# Patient Record
Sex: MALE | Race: BLACK OR AFRICAN AMERICAN | NOT HISPANIC OR LATINO | URBAN - METROPOLITAN AREA
[De-identification: names, ages, dates, MRNs, and addresses within clinical notes are randomized per-mention and may not be internally consistent; named-entity substitution may affect disease eponyms.]

---

## 2017-09-25 ENCOUNTER — EMERGENCY (EMERGENCY)
Facility: HOSPITAL | Age: 32
LOS: 1 days | Discharge: HOME | End: 2017-09-25
Attending: EMERGENCY MEDICINE | Admitting: EMERGENCY MEDICINE
Payer: MEDICARE

## 2017-09-25 VITALS
TEMPERATURE: 98 F | RESPIRATION RATE: 18 BRPM | SYSTOLIC BLOOD PRESSURE: 136 MMHG | DIASTOLIC BLOOD PRESSURE: 76 MMHG | HEART RATE: 83 BPM | OXYGEN SATURATION: 99 %

## 2017-09-25 LAB
ANION GAP SERPL CALC-SCNC: 23 MMOL/L — HIGH (ref 9–16)
BASOPHILS NFR BLD AUTO: 0.4 % — SIGNIFICANT CHANGE UP (ref 0–2)
BUN SERPL-MCNC: 17 MG/DL — SIGNIFICANT CHANGE UP (ref 7–23)
CALCIUM SERPL-MCNC: 9.6 MG/DL — SIGNIFICANT CHANGE UP (ref 8.5–10.5)
CHLORIDE SERPL-SCNC: 103 MMOL/L — SIGNIFICANT CHANGE UP (ref 96–108)
CK SERPL-CCNC: 411 U/L — HIGH (ref 39–308)
CO2 SERPL-SCNC: 18 MMOL/L — LOW (ref 22–31)
CREAT SERPL-MCNC: 1.19 MG/DL — SIGNIFICANT CHANGE UP (ref 0.5–1.3)
EOSINOPHIL NFR BLD AUTO: 0.1 % — SIGNIFICANT CHANGE UP (ref 0–6)
GLUCOSE SERPL-MCNC: 141 MG/DL — HIGH (ref 70–99)
HCT VFR BLD CALC: 48.6 % — SIGNIFICANT CHANGE UP (ref 39–50)
HGB BLD-MCNC: 16 G/DL — SIGNIFICANT CHANGE UP (ref 13–17)
IMM GRANULOCYTES NFR BLD AUTO: 0.2 % — SIGNIFICANT CHANGE UP (ref 0–1.5)
LYMPHOCYTES # BLD AUTO: 60.8 % — HIGH (ref 13–44)
MAGNESIUM SERPL-MCNC: 2 MG/DL — SIGNIFICANT CHANGE UP (ref 1.6–2.6)
MCHC RBC-ENTMCNC: 31.1 PG — SIGNIFICANT CHANGE UP (ref 27–34)
MCHC RBC-ENTMCNC: 32.9 G/DL — SIGNIFICANT CHANGE UP (ref 32–36)
MCV RBC AUTO: 94.6 FL — SIGNIFICANT CHANGE UP (ref 80–100)
MONOCYTES NFR BLD AUTO: 5.6 % — SIGNIFICANT CHANGE UP (ref 2–14)
NEUTROPHILS NFR BLD AUTO: 32.9 % — LOW (ref 43–77)
PLATELET # BLD AUTO: 191 K/UL — SIGNIFICANT CHANGE UP (ref 150–400)
POTASSIUM SERPL-MCNC: 3.3 MMOL/L — LOW (ref 3.5–5.3)
POTASSIUM SERPL-SCNC: 3.3 MMOL/L — LOW (ref 3.5–5.3)
RBC # BLD: 5.14 M/UL — SIGNIFICANT CHANGE UP (ref 4.2–5.8)
RBC # FLD: 12.8 % — SIGNIFICANT CHANGE UP (ref 10.3–16.9)
SODIUM SERPL-SCNC: 144 MMOL/L — SIGNIFICANT CHANGE UP (ref 132–145)
WBC # BLD: 11.3 K/UL — HIGH (ref 3.8–10.5)
WBC # FLD AUTO: 11.3 K/UL — HIGH (ref 3.8–10.5)

## 2017-09-25 PROCEDURE — 70450 CT HEAD/BRAIN W/O DYE: CPT | Mod: 26

## 2017-09-25 PROCEDURE — 72125 CT NECK SPINE W/O DYE: CPT | Mod: 26

## 2017-09-25 PROCEDURE — 99284 EMERGENCY DEPT VISIT MOD MDM: CPT

## 2017-09-25 RX ORDER — KETOROLAC TROMETHAMINE 30 MG/ML
30 SYRINGE (ML) INJECTION ONCE
Qty: 0 | Refills: 0 | Status: DISCONTINUED | OUTPATIENT
Start: 2017-09-25 | End: 2017-09-25

## 2017-09-25 RX ORDER — POTASSIUM CHLORIDE 20 MEQ
40 PACKET (EA) ORAL ONCE
Qty: 0 | Refills: 0 | Status: COMPLETED | OUTPATIENT
Start: 2017-09-25 | End: 2017-09-25

## 2017-09-25 RX ADMIN — Medication 30 MILLIGRAM(S): at 20:32

## 2017-09-25 RX ADMIN — Medication 40 MILLIEQUIVALENT(S): at 21:43

## 2017-09-25 NOTE — ED PROVIDER NOTE - MEDICAL DECISION MAKING DETAILS
patient had witnessed seizure for seconds, and mildly post ictal. benzo held. plan to do eletrolytes, ct brain/c spine, patient with no palpable pain on exam, or s/s of meningismus. afebrile with a nonfocal exam. ct rule out bleed or mass. toradol for neck pain. u tox pending.

## 2017-09-25 NOTE — ED PROVIDER NOTE - PROGRESS NOTE DETAILS
patient is no longer post ictal and improved history obtained. patient notes he has a hx of HIV and seizure disorder. HIV was diagnosed in 2009, at [facility] in [place], after he was in status epilepticus and diagnosed at that time. since then, he has managed seizures with keppra, and takes strybol for HIV. Patient ntoes after hospital admission for status, he developed focal neuro deficit of L foot drop and thus uses a cane to ambulate. notes no opportunistic infections. endorses he has not taken his medications for 2 days as he was been in the [place] visiting family. he notes pain to the R shoulder and this is what brought him in, as he suspects he has been having seizures from not taking his medications. notes social drinker, and uses marijuana socially. denies hx of withdrawls or drug abuse. notes he was taking alleve for the shoulder discomfort today with no relief. denies fever, chills, neck stiffness, dizziness or photophobia. denies a hx of hydrocephalus or abnormal CT brain. explained will discuss findings with neurology and likely admission for mri. patient agrees with plan. will give keppra loading dose in ed. Notes no clear cause of his seizure disorder. patient is no longer post ictal and improved history obtained. patient notes he has a hx of HIV and seizure disorder. HIV was diagnosed in 2009, at [facility] in [place], after he was in status epilepticus and diagnosed at that time. since then, he has managed seizures with keppra, and takes strybol for HIV. Patient notes after hospital admission for status, he developed focal neuro deficit of L foot drop and thus uses a cane to ambulate. notes no opportunistic infections. endorses he has not taken his medications for 2 days as he was been in the [place] visiting friends and is currently homeless. he notes pain to the R shoulder and this is what brought him in. He is currently not taking his medications. notes social drinker, and uses marijuana socially. denies hx of withdrawls or drug abuse. notes he was taking alleve for the shoulder discomfort today with no relief. denies fever, chills, neck stiffness, dizziness or photophobia. denies a hx of hydrocephalus or abnormal CT brain. explained will discuss findings with neurology. patient agrees with plan. will give keppra loading dose in ed. Notes no clear cause of his seizure disorder.

## 2017-09-25 NOTE — ED ADULT NURSE NOTE - CAS EDN DISCHARGE ASSESSMENT
Alert and oriented to person, place and time Alert and oriented to person, place and time/steady gait

## 2017-09-25 NOTE — ED PROVIDER NOTE - OBJECTIVE STATEMENT
Patient with hx of seizure disorder on keppra, bid, ambulates with a cane secondary to gait disturbance per patient, presents with neck pain. patient notes the pain starts in the upper neck, and is nonradiating, started 2 days ago, with no clear precipitant. denies falls, trauma, denies numbness, paresthesias or focal weakness. denies prior hx of similar. patient notes he took something for pain, but does not recall the name. while waiting to be seen in the ed, patient was seated in the chair and had a generalized tonic clonic seizure. notes his last seizure was 3 weeks ago. compliant with this keppra. denies neck stiffness, fever, chills or uri symtpoms. denies pain management. denies N/V/D, or cp, sob. seizure lasted seconds, patient notes mild dizziness after seizure and feeling tired which is his usual post ictal symptoms. denies headache.

## 2017-09-25 NOTE — ED PROVIDER NOTE - ENMT, MLM
Airway patent, Nasal mucosa clear. Mouth with normal mucosa. Throat has no vesicles, no oropharyngeal exudates and uvula is midline. superficial R lateral upper lip bite, and tongue abrasion

## 2017-09-25 NOTE — ED PROVIDER NOTE - NOTES
spoke to dr hancock, covering neurology at [facility]. ct results discussed, and patient's hx of seizures with noncompliance. no new focal deficits. patient will be given scripts for his keppra. advised they can follow him in the office, or if patient prefers to see neuro at [facility] in [place], he can be seen there as well. ct results consistent with chronic seizure disorder. patient agrees with plan. has no questions.

## 2017-09-26 VITALS — SYSTOLIC BLOOD PRESSURE: 126 MMHG | DIASTOLIC BLOOD PRESSURE: 81 MMHG | HEART RATE: 85 BPM

## 2017-09-26 LAB — PCP SPEC-MCNC: SIGNIFICANT CHANGE UP

## 2017-09-26 PROCEDURE — 71010: CPT | Mod: 26

## 2017-09-26 RX ORDER — LEVETIRACETAM 250 MG/1
1 TABLET, FILM COATED ORAL
Qty: 60 | Refills: 0 | OUTPATIENT
Start: 2017-09-26 | End: 2017-10-26

## 2017-09-26 RX ORDER — LEVETIRACETAM 250 MG/1
1000 TABLET, FILM COATED ORAL EVERY 12 HOURS
Qty: 0 | Refills: 0 | Status: DISCONTINUED | OUTPATIENT
Start: 2017-09-26 | End: 2017-09-29

## 2017-09-26 RX ADMIN — LEVETIRACETAM 440 MILLIGRAM(S): 250 TABLET, FILM COATED ORAL at 00:18

## 2017-09-26 NOTE — ED ADULT NURSE REASSESSMENT NOTE - NS ED NURSE REASSESS COMMENT FT1
Received pt. from [facility]osteg[facility]. pt. sleeping on stretcher, respirations even and unlabored. no signs of acute distress notcied

## 2017-09-29 DIAGNOSIS — M79.1 MYALGIA: ICD-10-CM

## 2017-09-29 DIAGNOSIS — G40.909 EPILEPSY, UNSPECIFIED, NOT INTRACTABLE, WITHOUT STATUS EPILEPTICUS: ICD-10-CM

## 2017-09-29 DIAGNOSIS — Z79.899 OTHER LONG TERM (CURRENT) DRUG THERAPY: ICD-10-CM

## 2018-04-27 ENCOUNTER — EMERGENCY (EMERGENCY)
Facility: HOSPITAL | Age: 33
LOS: 1 days | Discharge: HOME | End: 2018-04-27
Admitting: EMERGENCY MEDICINE
Payer: SELF-PAY

## 2018-04-27 VITALS
HEART RATE: 101 BPM | OXYGEN SATURATION: 98 % | TEMPERATURE: 98 F | DIASTOLIC BLOOD PRESSURE: 86 MMHG | RESPIRATION RATE: 17 BRPM | SYSTOLIC BLOOD PRESSURE: 120 MMHG

## 2018-04-27 DIAGNOSIS — F41.9 ANXIETY DISORDER, UNSPECIFIED: ICD-10-CM

## 2018-04-27 DIAGNOSIS — B20 HUMAN IMMUNODEFICIENCY VIRUS [HIV] DISEASE: ICD-10-CM

## 2018-04-27 DIAGNOSIS — F17.200 NICOTINE DEPENDENCE, UNSPECIFIED, UNCOMPLICATED: ICD-10-CM

## 2018-04-27 DIAGNOSIS — M54.9 DORSALGIA, UNSPECIFIED: ICD-10-CM

## 2018-04-27 DIAGNOSIS — M54.2 CERVICALGIA: ICD-10-CM

## 2018-04-27 PROCEDURE — 99283 EMERGENCY DEPT VISIT LOW MDM: CPT | Mod: 25

## 2018-04-27 PROCEDURE — 99053 MED SERV 10PM-8AM 24 HR FAC: CPT

## 2018-04-27 RX ORDER — KETOROLAC TROMETHAMINE 30 MG/ML
30 SYRINGE (ML) INJECTION ONCE
Qty: 0 | Refills: 0 | Status: DISCONTINUED | OUTPATIENT
Start: 2018-04-27 | End: 2018-04-27

## 2018-04-27 RX ORDER — LEVETIRACETAM 250 MG/1
750 TABLET, FILM COATED ORAL ONCE
Qty: 0 | Refills: 0 | Status: COMPLETED | OUTPATIENT
Start: 2018-04-27 | End: 2018-04-27

## 2018-04-27 RX ADMIN — Medication 30 MILLIGRAM(S): at 06:56

## 2018-04-27 RX ADMIN — LEVETIRACETAM 750 MILLIGRAM(S): 250 TABLET, FILM COATED ORAL at 06:36

## 2018-04-27 NOTE — ED PROVIDER NOTE - NS ED ROS FT
· CONSTITUTIONAL: no fever and no chills.  · CARDIOVASCULAR: normal rate and rhythm, no chest pain and no edema.  · RESPIRATORY: no chest pain, no cough, and no shortness of breath.  · GASTROINTESTINAL: no abdominal pain, no bloating, no constipation, no diarrhea, no nausea and no vomiting.  · MUSCULOSKELETAL: + back pain, no musculoskeletal pain, + neck pain, and no weakness.  · SKIN: no abrasions, no jaundice, no lesions, no pruritis, and no rashes.  · NEURO: no loss of consciousness, no gait abnormality, no headache, no sensory deficits, and no weakness.  · PSYCHIATRIC: +anxiety, no known mental health issues.

## 2018-04-27 NOTE — ED PROVIDER NOTE - PHYSICAL EXAMINATION
VITAL SIGNS: I have reviewed nursing notes and confirm.  CONSTITUTIONAL: Well-developed; well-nourished; in no acute distress.  SKIN: Skin is warm and dry, no acute rash.  HEAD: Normocephalic; atraumatic.  EYES: PERRL, EOM intact; conjunctiva and sclera clear.  ENT: No nasal discharge; airway clear.  NECK: Supple; non tender.  CARD: S1, S2 normal; no murmurs, gallops, or rubs. Regular rate and rhythm.  RESP: No wheezes, rales or rhonchi.  ABD: Normal bowel sounds; soft; non-distended; non-tender; no hepatosplenomegaly.  BACK: does not want to examine back  EXT: Normal ROM. No clubbing, cyanosis or edema.  NEURO: Alert, oriented. Grossly unremarkable.  PSYCH: Cooperative, appropriate.

## 2018-04-27 NOTE — ED PROVIDER NOTE - OBJECTIVE STATEMENT
32 year old male with h/o HIV reports medication compliance, neck pain, back pain, ambulates with walker due to back pain presents with anxiety and also 32 year old male with h/o HIV reports medication compliance, neck pain, back pain, ambulates with walker due to back pain presents with anxiety and also neck muscle tightness. reports was smoking cigarettes earlier today and made his symptoms worse.    Denies alcohol/drug use. no other symptoms currently. 32 year old male with h/o HIV reports medication compliance, neck pain, back pain, ambulates with walker due to back pain presents with anxiety and also neck muscle tightness. reports was smoking cigarettes earlier today and made his symptoms worse.  chronic pain unchanged, no new pain or injury    Denies alcohol/drug use. no other symptoms currently.

## 2018-04-27 NOTE — ED PROVIDER NOTE - PROGRESS NOTE DETAILS
toradol injection given toradol injection given for chronic pain. At the time of discharge from the Emergency Department, the patient is alert with fluent appropriate speech and ambulatory without difficulty. A complete medical screening examination was performed and no emergency medical condition was identified.  VSS at time of d/c, pt non-toxic appearing and hemodynamically stable, results, ddx, and f/u plans discussed with pt at bedside, d/c'd home to f/u with PMD, strict return precautions discussed, prompt return to ER for any worsening or new sx, pt verbalized understanding.

## 2018-04-27 NOTE — ED PROVIDER NOTE - MEDICAL DECISION MAKING DETAILS
two complaints: chronic pain and anxiety. no indiction for pysch consult at this time. will treat chronic pain

## 2018-04-27 NOTE — ED ADULT TRIAGE NOTE - CHIEF COMPLAINT QUOTE
BIBEMS, ambulates with a cane, complaining of anxiety attack. States he felt 'very anxious' while smoking cigarettes earlier, also felt 'muscle tightness' on his neck. Denies c/p and SOB. Denies alcohol/drug use. BIBEMS, ambulates with a walker, complaining of anxiety attack. States he felt 'very anxious' while smoking cigarettes earlier, also felt 'muscle tightness' on his neck. Denies c/p and SOB. Denies alcohol/drug use.

## 2018-04-27 NOTE — ED ADULT NURSE NOTE - CHIEF COMPLAINT QUOTE
BIBEMS, ambulates with a walker, complaining of anxiety attack. States he felt 'very anxious' while smoking cigarettes earlier, also felt 'muscle tightness' on his neck. Denies c/p and SOB. Denies alcohol/drug use.

## 2018-05-27 ENCOUNTER — EMERGENCY (EMERGENCY)
Facility: HOSPITAL | Age: 33
LOS: 1 days | Discharge: HOME | End: 2018-05-27
Admitting: EMERGENCY MEDICINE
Payer: MEDICAID

## 2018-05-27 VITALS
OXYGEN SATURATION: 100 % | RESPIRATION RATE: 18 BRPM | SYSTOLIC BLOOD PRESSURE: 114 MMHG | DIASTOLIC BLOOD PRESSURE: 78 MMHG | TEMPERATURE: 98 F | HEART RATE: 85 BPM

## 2018-05-27 PROCEDURE — 99284 EMERGENCY DEPT VISIT MOD MDM: CPT

## 2018-05-27 RX ORDER — LEVETIRACETAM 250 MG/1
750 TABLET, FILM COATED ORAL ONCE
Qty: 0 | Refills: 0 | Status: COMPLETED | OUTPATIENT
Start: 2018-05-27 | End: 2018-05-27

## 2018-05-27 RX ORDER — KETOROLAC TROMETHAMINE 30 MG/ML
30 SYRINGE (ML) INJECTION ONCE
Qty: 0 | Refills: 0 | Status: DISCONTINUED | OUTPATIENT
Start: 2018-05-27 | End: 2018-05-27

## 2018-05-27 RX ADMIN — Medication 30 MILLIGRAM(S): at 16:22

## 2018-05-27 RX ADMIN — LEVETIRACETAM 750 MILLIGRAM(S): 250 TABLET, FILM COATED ORAL at 16:22

## 2018-05-27 NOTE — ED PROVIDER NOTE - MEDICAL DECISION MAKING DETAILS
31 y/o M presents to ED c/o atraumatic neck pain and stiffness.  Pt requesting Toradol as this has helped for the same in the past.  Pt moving all 4 extremities.  PE limited due to pt poor effort and cooperation.  No red flags. pt also requesting his home dose of Keppra and food.  Discharge home.

## 2018-05-27 NOTE — ED PROVIDER NOTE - OBJECTIVE STATEMENT
33 y/o M presents to ED c/o neck spasm.  He states he has had similar symptoms in the past and a dose of Toradol usually helps.  He also requests a dose of his Keppra.  He states he takes Keppra 750 mg twice a day and has recently run out of his medication.  Pt ambulates with a rollator walker at .  He denies fevers/chills, headache, trauma falls, weakness or numbness.

## 2018-05-31 DIAGNOSIS — Z76.0 ENCOUNTER FOR ISSUE OF REPEAT PRESCRIPTION: ICD-10-CM

## 2018-05-31 DIAGNOSIS — M62.838 OTHER MUSCLE SPASM: ICD-10-CM

## 2018-05-31 DIAGNOSIS — Z79.899 OTHER LONG TERM (CURRENT) DRUG THERAPY: ICD-10-CM

## 2018-05-31 DIAGNOSIS — M54.2 CERVICALGIA: ICD-10-CM

## 2018-06-14 ENCOUNTER — EMERGENCY (EMERGENCY)
Facility: HOSPITAL | Age: 33
LOS: 1 days | Discharge: HOME | End: 2018-06-14
Admitting: EMERGENCY MEDICINE
Payer: MEDICARE

## 2018-06-14 VITALS
RESPIRATION RATE: 20 BRPM | DIASTOLIC BLOOD PRESSURE: 88 MMHG | HEART RATE: 54 BPM | TEMPERATURE: 98 F | WEIGHT: 149.91 LBS | SYSTOLIC BLOOD PRESSURE: 128 MMHG | OXYGEN SATURATION: 100 %

## 2018-06-14 DIAGNOSIS — R60.0 LOCALIZED EDEMA: ICD-10-CM

## 2018-06-14 DIAGNOSIS — Z79.899 OTHER LONG TERM (CURRENT) DRUG THERAPY: ICD-10-CM

## 2018-06-14 PROCEDURE — 99282 EMERGENCY DEPT VISIT SF MDM: CPT

## 2018-06-14 NOTE — ED ADULT NURSE NOTE - OBJECTIVE STATEMENT
c/o b/li leg swelling x 2 days. was seen at a different ED, had nothing done. requesting food. pt in NAD, resting comfortably and will continue to monitor. denies CP, SOB, HA, numbness/tingling.

## 2018-06-14 NOTE — ED PROVIDER NOTE - OBJECTIVE STATEMENT
PMHx homeless, seizure d/o, R drop foot uses rolling walker at baseline presents with BL pedal edema for many weeks. denies pain, palptiations,  SOB. admits to being on his feet for prolonged periods of time. states that he was at  ED this morning for same complaint and is requesting two peanut butter and jelly sandwiches

## 2018-06-14 NOTE — ED PROVIDER NOTE - PHYSICAL EXAMINATION
BL LE: skin hypertrophic. +1 pitting edema to the ankles. no calf tenderness. able to ambulate at his baseline

## 2018-06-14 NOTE — ED ADULT TRIAGE NOTE - CHIEF COMPLAINT QUOTE
Pt BIBA, complaining of b/l leg swelling x 2 days. Denies recent trauma/injury. States he went to [facility]  two hours ago, and nothing was done on him.

## 2018-06-14 NOTE — ED PROVIDER NOTE - MEDICAL DECISION MAKING DETAILS
PMHx homeless, drop foot ambulates with rolling walker presents with BL dependent edema. skin intact, afebrile. advised elevation of feet

## 2018-06-18 ENCOUNTER — EMERGENCY (EMERGENCY)
Facility: HOSPITAL | Age: 33
LOS: 1 days | Discharge: HOME | End: 2018-06-18
Admitting: EMERGENCY MEDICINE
Payer: MEDICARE

## 2018-06-18 VITALS
TEMPERATURE: 98 F | RESPIRATION RATE: 16 BRPM | SYSTOLIC BLOOD PRESSURE: 131 MMHG | HEART RATE: 90 BPM | OXYGEN SATURATION: 100 % | DIASTOLIC BLOOD PRESSURE: 86 MMHG

## 2018-06-18 VITALS
TEMPERATURE: 98 F | RESPIRATION RATE: 16 BRPM | HEART RATE: 70 BPM | SYSTOLIC BLOOD PRESSURE: 115 MMHG | OXYGEN SATURATION: 100 % | DIASTOLIC BLOOD PRESSURE: 75 MMHG

## 2018-06-18 DIAGNOSIS — M79.662 PAIN IN LEFT LOWER LEG: ICD-10-CM

## 2018-06-18 DIAGNOSIS — Z79.899 OTHER LONG TERM (CURRENT) DRUG THERAPY: ICD-10-CM

## 2018-06-18 DIAGNOSIS — M79.89 OTHER SPECIFIED SOFT TISSUE DISORDERS: ICD-10-CM

## 2018-06-18 LAB
ALBUMIN SERPL ELPH-MCNC: 3.7 G/DL — SIGNIFICANT CHANGE UP (ref 3.4–5)
ALP SERPL-CCNC: 72 U/L — SIGNIFICANT CHANGE UP (ref 40–120)
ALT FLD-CCNC: 28 U/L — SIGNIFICANT CHANGE UP (ref 12–42)
ANION GAP SERPL CALC-SCNC: 5 MMOL/L — LOW (ref 9–16)
AST SERPL-CCNC: 24 U/L — SIGNIFICANT CHANGE UP (ref 15–37)
BILIRUB SERPL-MCNC: 0.4 MG/DL — SIGNIFICANT CHANGE UP (ref 0.2–1.2)
BUN SERPL-MCNC: 12 MG/DL — SIGNIFICANT CHANGE UP (ref 7–23)
CALCIUM SERPL-MCNC: 8.9 MG/DL — SIGNIFICANT CHANGE UP (ref 8.5–10.5)
CHLORIDE SERPL-SCNC: 105 MMOL/L — SIGNIFICANT CHANGE UP (ref 96–108)
CO2 SERPL-SCNC: 29 MMOL/L — SIGNIFICANT CHANGE UP (ref 22–31)
CREAT SERPL-MCNC: 1 MG/DL — SIGNIFICANT CHANGE UP (ref 0.5–1.3)
GLUCOSE SERPL-MCNC: 94 MG/DL — SIGNIFICANT CHANGE UP (ref 70–99)
HCT VFR BLD CALC: 45.3 % — SIGNIFICANT CHANGE UP (ref 39–50)
HGB BLD-MCNC: 14.8 G/DL — SIGNIFICANT CHANGE UP (ref 13–17)
MCHC RBC-ENTMCNC: 30.3 PG — SIGNIFICANT CHANGE UP (ref 27–34)
MCHC RBC-ENTMCNC: 32.7 G/DL — SIGNIFICANT CHANGE UP (ref 32–36)
MCV RBC AUTO: 92.6 FL — SIGNIFICANT CHANGE UP (ref 80–100)
PLATELET # BLD AUTO: 148 K/UL — LOW (ref 150–400)
POTASSIUM SERPL-MCNC: 4.7 MMOL/L — SIGNIFICANT CHANGE UP (ref 3.5–5.3)
POTASSIUM SERPL-SCNC: 4.7 MMOL/L — SIGNIFICANT CHANGE UP (ref 3.5–5.3)
PROT SERPL-MCNC: 7.9 G/DL — SIGNIFICANT CHANGE UP (ref 6.4–8.2)
RBC # BLD: 4.89 M/UL — SIGNIFICANT CHANGE UP (ref 4.2–5.8)
RBC # FLD: 13.5 % — SIGNIFICANT CHANGE UP (ref 10.3–16.9)
SODIUM SERPL-SCNC: 139 MMOL/L — SIGNIFICANT CHANGE UP (ref 132–145)
WBC # BLD: 5.5 K/UL — SIGNIFICANT CHANGE UP (ref 3.8–10.5)
WBC # FLD AUTO: 5.5 K/UL — SIGNIFICANT CHANGE UP (ref 3.8–10.5)

## 2018-06-18 PROCEDURE — 99284 EMERGENCY DEPT VISIT MOD MDM: CPT

## 2018-06-18 NOTE — ED PROVIDER NOTE - MEDICAL DECISION MAKING DETAILS
31 y/o M presents to ED c/o l lower leg swelling.  On exam, pt has bilat lower leg swelling, non-tender  NO significant change from visit 4 days ago.  Labs wnl.  Pt discharged and advised to f/u with PCP.

## 2018-06-18 NOTE — ED PROVIDER NOTE - CONSTITUTIONAL, MLM
normal... Unkept, poor hygeine, well nourished, awake, oriented to person, place, time/situation and in no apparent distress.

## 2018-06-18 NOTE — ED ADULT TRIAGE NOTE - CHIEF COMPLAINT QUOTE
Patient complaining of lower left leg pain and swelling, patient ambulatory with rolling walker, at  bedside

## 2018-06-18 NOTE — ED PROVIDER NOTE - OBJECTIVE STATEMENT
31 y/o M with PMH of seizure d/o on Keppra and R drop foot (ambulates with rolling walker) undomiciled presents to ED via EMS c/o L lower leg pain and swelling.  Pt seen in ED 4 days ago with c/o bilat leg swelling and stated he was seen at an outside hospital for the same.  He denies worsening condition.  He denies fevers/chills, trauma/falls, redness, pain, weakness, numbness, chest pain, SOB.

## 2018-06-20 ENCOUNTER — EMERGENCY (EMERGENCY)
Facility: HOSPITAL | Age: 33
LOS: 1 days | Discharge: HOME | End: 2018-06-20
Admitting: EMERGENCY MEDICINE
Payer: MEDICARE

## 2018-06-20 VITALS
SYSTOLIC BLOOD PRESSURE: 112 MMHG | HEART RATE: 95 BPM | DIASTOLIC BLOOD PRESSURE: 78 MMHG | TEMPERATURE: 98 F | OXYGEN SATURATION: 95 % | RESPIRATION RATE: 20 BRPM

## 2018-06-20 LAB
BASOPHILS NFR BLD AUTO: 0.4 % — SIGNIFICANT CHANGE UP (ref 0–2)
CRP SERPL-MCNC: 3.1 MG/DL — HIGH (ref 0–0.9)
EOSINOPHIL NFR BLD AUTO: 1.3 % — SIGNIFICANT CHANGE UP (ref 0–6)
HCT VFR BLD CALC: 44.4 % — SIGNIFICANT CHANGE UP (ref 39–50)
HGB BLD-MCNC: 15 G/DL — SIGNIFICANT CHANGE UP (ref 13–17)
IMM GRANULOCYTES NFR BLD AUTO: 0.2 % — SIGNIFICANT CHANGE UP (ref 0–1.5)
LYMPHOCYTES # BLD AUTO: 33 % — SIGNIFICANT CHANGE UP (ref 13–44)
MCHC RBC-ENTMCNC: 30.4 PG — SIGNIFICANT CHANGE UP (ref 27–34)
MCHC RBC-ENTMCNC: 33.8 G/DL — SIGNIFICANT CHANGE UP (ref 32–36)
MCV RBC AUTO: 89.9 FL — SIGNIFICANT CHANGE UP (ref 80–100)
MONOCYTES NFR BLD AUTO: 10.4 % — SIGNIFICANT CHANGE UP (ref 2–14)
NEUTROPHILS NFR BLD AUTO: 54.7 % — SIGNIFICANT CHANGE UP (ref 43–77)
PLATELET # BLD AUTO: 174 K/UL — SIGNIFICANT CHANGE UP (ref 150–400)
RBC # BLD: 4.94 M/UL — SIGNIFICANT CHANGE UP (ref 4.2–5.8)
RBC # FLD: 13.4 % — SIGNIFICANT CHANGE UP (ref 10.3–16.9)
WBC # BLD: 5.3 K/UL — SIGNIFICANT CHANGE UP (ref 3.8–10.5)
WBC # FLD AUTO: 5.3 K/UL — SIGNIFICANT CHANGE UP (ref 3.8–10.5)

## 2018-06-20 PROCEDURE — 99284 EMERGENCY DEPT VISIT MOD MDM: CPT

## 2018-06-20 RX ORDER — IBUPROFEN 200 MG
600 TABLET ORAL ONCE
Qty: 0 | Refills: 0 | Status: COMPLETED | OUTPATIENT
Start: 2018-06-20 | End: 2018-06-20

## 2018-06-20 RX ORDER — IBUPROFEN 200 MG
1 TABLET ORAL
Qty: 20 | Refills: 0 | OUTPATIENT
Start: 2018-06-20

## 2018-06-20 RX ADMIN — Medication 600 MILLIGRAM(S): at 23:14

## 2018-06-20 RX ADMIN — Medication 100 MILLIGRAM(S): at 23:13

## 2018-06-20 NOTE — ED PROVIDER NOTE - OBJECTIVE STATEMENT
33 yo M with PMHx of HIV, last CD4 "high" VL undetectable, on HAART, seizure d/o, undomiciled currently, presenting c/o persistent BLE swelling x 1 wk.  Pt reports having persistent BLE swelling and redness.  Seen here 2d ago, s/p cbc/cmp with unremarkable findings and d/c'd without any intervention.  Noted persistent redness, swelling, pain to BLE and ankle region. Denies fever, chills, trauma, fall, break in the skin, FB sensation, change in ROM/sensation, paresthesia, purulent d/c, N/V, HA, dizziness, LOC, CP, SOB, calf pain, and focal weakness.  Pt has been ambulatory

## 2018-06-20 NOTE — ED PROVIDER NOTE - PHYSICAL EXAMINATION
Gen - Unkempt M, lethargic but arousable by verbal stimuli  Skin - warm, dry, intact  HEENT - AT/NC, PERRL, mild conjunctival injection, o/p clear, uvula midline, airway patent, neck supple with no step off   CV - S1S2, R/R/R  Resp - respiration non-labored, CTAB, symmetric bs b/l, no r/r/w  GI - NABS, soft, ND, NT, no rebound or guarding, no CVAT b/l  MS - w/w/p, 1+ BLE edema with erythema and warmth to the touch, no streaking, compartment soft and NT, calf supple, +SILT, distal pulses palpable and intact b/l   Neuro - Lethargic but arousable by verbal stimuli, steady gait, no focal neuro deficits

## 2018-06-20 NOTE — ED ADULT TRIAGE NOTE - CHIEF COMPLAINT QUOTE
Patient arrived via EMS complaining of bilateral leg swelling for 1 week; was seen in ED two days ago with same complaint

## 2018-06-20 NOTE — ED PROVIDER NOTE - MEDICAL DECISION MAKING DETAILS
pt with atraumatic BLE swelling and redness x 1 wk, no other associated sx, no resp involvement, +cellulitic skin changes BLE with warmth, no crepitus, NV intact, +palpable symmetric distal pulses, no systemic involvement, non toxic appearing, seen here two days ago with similar findings, no Rx provided, encouraged strict elevation, course of NSAID, doxy, f/u with PMD, strict return precautions discussed, pt verbalized understanding pt with atraumatic BLE swelling and redness x 1 wk, no other associated sx, no resp involvement, +cellulitic skin changes BLE with warmth, no crepitus, NV intact, +palpable symmetric distal pulses, no systemic involvement, non toxic appearing, seen here two days ago with similar findings, no Rx provided, repeat labs today with no leukocytosis, elevated CRP, d-dimer marginally elevated but wnl after age adjustment, likely inflammatory process with cellulitis, encouraged strict elevation, course of NSAID, doxy, f/u with PMD, strict return precautions discussed, pt verbalized understanding

## 2018-06-21 ENCOUNTER — EMERGENCY (EMERGENCY)
Facility: HOSPITAL | Age: 33
LOS: 1 days | Discharge: HOME | End: 2018-06-21
Admitting: EMERGENCY MEDICINE
Payer: MEDICARE

## 2018-06-21 VITALS
WEIGHT: 149.91 LBS | HEIGHT: 68 IN | RESPIRATION RATE: 18 BRPM | SYSTOLIC BLOOD PRESSURE: 126 MMHG | HEART RATE: 98 BPM | OXYGEN SATURATION: 98 % | TEMPERATURE: 98 F | DIASTOLIC BLOOD PRESSURE: 88 MMHG

## 2018-06-21 DIAGNOSIS — Z79.899 OTHER LONG TERM (CURRENT) DRUG THERAPY: ICD-10-CM

## 2018-06-21 DIAGNOSIS — L03.115 CELLULITIS OF RIGHT LOWER LIMB: ICD-10-CM

## 2018-06-21 DIAGNOSIS — Z79.2 LONG TERM (CURRENT) USE OF ANTIBIOTICS: ICD-10-CM

## 2018-06-21 DIAGNOSIS — L03.116 CELLULITIS OF LEFT LOWER LIMB: ICD-10-CM

## 2018-06-21 DIAGNOSIS — M79.89 OTHER SPECIFIED SOFT TISSUE DISORDERS: ICD-10-CM

## 2018-06-21 DIAGNOSIS — Z79.1 LONG TERM (CURRENT) USE OF NON-STEROIDAL ANTI-INFLAMMATORIES (NSAID): ICD-10-CM

## 2018-06-21 LAB
ANION GAP SERPL CALC-SCNC: 4 MMOL/L — LOW (ref 9–16)
BASOPHILS NFR BLD AUTO: 0.5 % — SIGNIFICANT CHANGE UP (ref 0–2)
BUN SERPL-MCNC: 12 MG/DL — SIGNIFICANT CHANGE UP (ref 7–23)
CALCIUM SERPL-MCNC: 9.2 MG/DL — SIGNIFICANT CHANGE UP (ref 8.5–10.5)
CHLORIDE SERPL-SCNC: 103 MMOL/L — SIGNIFICANT CHANGE UP (ref 96–108)
CO2 SERPL-SCNC: 33 MMOL/L — HIGH (ref 22–31)
CREAT SERPL-MCNC: 1.05 MG/DL — SIGNIFICANT CHANGE UP (ref 0.5–1.3)
CRP SERPL-MCNC: 3.5 MG/DL — HIGH (ref 0–0.9)
D DIMER BLD IA.RAPID-MCNC: 258 NG/ML DDU — HIGH
EOSINOPHIL NFR BLD AUTO: 1.1 % — SIGNIFICANT CHANGE UP (ref 0–6)
GLUCOSE SERPL-MCNC: 92 MG/DL — SIGNIFICANT CHANGE UP (ref 70–99)
HCT VFR BLD CALC: 43.6 % — SIGNIFICANT CHANGE UP (ref 39–50)
HGB BLD-MCNC: 14.8 G/DL — SIGNIFICANT CHANGE UP (ref 13–17)
IMM GRANULOCYTES NFR BLD AUTO: 0.4 % — SIGNIFICANT CHANGE UP (ref 0–1.5)
LYMPHOCYTES # BLD AUTO: 34.3 % — SIGNIFICANT CHANGE UP (ref 13–44)
MCHC RBC-ENTMCNC: 30.8 PG — SIGNIFICANT CHANGE UP (ref 27–34)
MCHC RBC-ENTMCNC: 33.9 G/DL — SIGNIFICANT CHANGE UP (ref 32–36)
MCV RBC AUTO: 90.8 FL — SIGNIFICANT CHANGE UP (ref 80–100)
MONOCYTES NFR BLD AUTO: 11.4 % — SIGNIFICANT CHANGE UP (ref 2–14)
NEUTROPHILS NFR BLD AUTO: 52.3 % — SIGNIFICANT CHANGE UP (ref 43–77)
NT-PROBNP SERPL-SCNC: <5 PG/ML — SIGNIFICANT CHANGE UP
PLATELET # BLD AUTO: 174 K/UL — SIGNIFICANT CHANGE UP (ref 150–400)
POTASSIUM SERPL-MCNC: 4.2 MMOL/L — SIGNIFICANT CHANGE UP (ref 3.5–5.3)
POTASSIUM SERPL-SCNC: 4.2 MMOL/L — SIGNIFICANT CHANGE UP (ref 3.5–5.3)
RBC # BLD: 4.8 M/UL — SIGNIFICANT CHANGE UP (ref 4.2–5.8)
RBC # FLD: 13.3 % — SIGNIFICANT CHANGE UP (ref 10.3–16.9)
SODIUM SERPL-SCNC: 140 MMOL/L — SIGNIFICANT CHANGE UP (ref 132–145)
WBC # BLD: 5.6 K/UL — SIGNIFICANT CHANGE UP (ref 3.8–10.5)
WBC # FLD AUTO: 5.6 K/UL — SIGNIFICANT CHANGE UP (ref 3.8–10.5)

## 2018-06-21 PROCEDURE — 99284 EMERGENCY DEPT VISIT MOD MDM: CPT

## 2018-06-21 RX ORDER — VANCOMYCIN HCL 1 G
1000 VIAL (EA) INTRAVENOUS ONCE
Qty: 0 | Refills: 0 | Status: COMPLETED | OUTPATIENT
Start: 2018-06-21 | End: 2018-06-21

## 2018-06-21 RX ORDER — SODIUM CHLORIDE 9 MG/ML
1000 INJECTION INTRAMUSCULAR; INTRAVENOUS; SUBCUTANEOUS ONCE
Qty: 0 | Refills: 0 | Status: COMPLETED | OUTPATIENT
Start: 2018-06-21 | End: 2018-06-21

## 2018-06-21 RX ADMIN — Medication 250 MILLIGRAM(S): at 22:30

## 2018-06-21 RX ADMIN — SODIUM CHLORIDE 1000 MILLILITER(S): 9 INJECTION INTRAMUSCULAR; INTRAVENOUS; SUBCUTANEOUS at 22:30

## 2018-06-21 NOTE — ED PROVIDER NOTE - MUSCULOSKELETAL MINIMAL EXAM
1+ pitting edema to B/L lower legs/ankle/feet mildly tender to palpation. Pulses intact, compartments soft.

## 2018-06-21 NOTE — ED ADULT NURSE NOTE - OBJECTIVE STATEMENT
Patient to ED with complaint of bilateral lower leg swelling and redness.  Patient in no acute distress

## 2018-06-21 NOTE — ED PROVIDER NOTE - MEDICAL DECISION MAKING DETAILS
Pt A&Ox3. NAD. Ambulates around ED without difficulty. Labs reviewed, WBC count 5.6. CRP 3.5, no other remarkable findings. Pt given dose of IV Vanco in ED and informed that he has Rx Doxycycline at his pharmacy waiting to be picked up. Pt agrees to  Rx in the morning. Elevation, NSAIDs, F/U with PMD in 24-48 hours. Strict return precautions reviewed with pt in which pt verbalizes understanding and agrees to.

## 2018-06-21 NOTE — ED PROVIDER NOTE - OBJECTIVE STATEMENT
33 y/o undomiciled M with PMH of Siezure D/O and HIV (unknown CD, VL undetectable) on HAART, returns to ED for persistent redness and swelling of B/L LE's x 1 week. States he was seen here 3 times over the past week but "nothing was done" and the redness and swelling persist. Pt requesting to be reevaluated, stating he was never given antibiotics. Most recent visit yesterday (6/20/18) reveals he was prescribed doxycycline however pt states he does not recall that. Prior visits show labs were drawn with no remarkable findings on CBC/CMP.    Denies fever, chills, dizziness, recent fall or injury, unilateral calf pain, open wounds or drainage

## 2018-06-21 NOTE — ED ADULT TRIAGE NOTE - CHIEF COMPLAINT QUOTE
Pt BIBA from urgent care for chronic BLE swelling, seen here yesterday for same. Pt states he was given meds yesterday, but cannot recollect the names. Denies CP or SOB, NVI

## 2018-06-24 DIAGNOSIS — L03.115 CELLULITIS OF RIGHT LOWER LIMB: ICD-10-CM

## 2018-06-24 DIAGNOSIS — L03.116 CELLULITIS OF LEFT LOWER LIMB: ICD-10-CM

## 2018-06-24 DIAGNOSIS — Z79.899 OTHER LONG TERM (CURRENT) DRUG THERAPY: ICD-10-CM

## 2018-06-24 DIAGNOSIS — M79.89 OTHER SPECIFIED SOFT TISSUE DISORDERS: ICD-10-CM

## 2019-05-08 ENCOUNTER — EMERGENCY (EMERGENCY)
Facility: HOSPITAL | Age: 34
LOS: 1 days | Discharge: HOME | End: 2019-05-08
Attending: EMERGENCY MEDICINE | Admitting: EMERGENCY MEDICINE
Payer: SELF-PAY

## 2019-05-08 VITALS
TEMPERATURE: 98 F | RESPIRATION RATE: 18 BRPM | DIASTOLIC BLOOD PRESSURE: 64 MMHG | OXYGEN SATURATION: 97 % | SYSTOLIC BLOOD PRESSURE: 107 MMHG | HEART RATE: 114 BPM

## 2019-05-08 VITALS
TEMPERATURE: 98 F | HEART RATE: 83 BPM | RESPIRATION RATE: 18 BRPM | OXYGEN SATURATION: 98 % | DIASTOLIC BLOOD PRESSURE: 67 MMHG | SYSTOLIC BLOOD PRESSURE: 101 MMHG

## 2019-05-08 PROCEDURE — 99284 EMERGENCY DEPT VISIT MOD MDM: CPT | Mod: 25

## 2019-05-08 PROCEDURE — 70450 CT HEAD/BRAIN W/O DYE: CPT | Mod: 26

## 2019-05-08 PROCEDURE — 99053 MED SERV 10PM-8AM 24 HR FAC: CPT

## 2019-05-08 RX ORDER — LEVETIRACETAM 250 MG/1
1500 TABLET, FILM COATED ORAL ONCE
Refills: 0 | Status: COMPLETED | OUTPATIENT
Start: 2019-05-08 | End: 2019-05-08

## 2019-05-08 RX ORDER — LEVETIRACETAM 250 MG/1
1 TABLET, FILM COATED ORAL
Qty: 60 | Refills: 0
Start: 2019-05-08 | End: 2019-06-06

## 2019-05-08 RX ADMIN — LEVETIRACETAM 400 MILLIGRAM(S): 250 TABLET, FILM COATED ORAL at 08:23

## 2019-05-08 RX ADMIN — LEVETIRACETAM 1500 MILLIGRAM(S): 250 TABLET, FILM COATED ORAL at 08:38

## 2019-05-08 NOTE — ED ADULT NURSE NOTE - NSIMPLEMENTINTERV_GEN_ALL_ED
Implemented All Universal Safety Interventions:  [place] to call system. Call bell, personal items and telephone within reach. Instruct patient to call for assistance. Room bathroom lighting operational. Non-slip footwear when patient is off stretcher. Physically safe environment: no spills, clutter or unnecessary equipment. Stretcher in lowest position, wheels locked, appropriate side rails in place.

## 2019-05-08 NOTE — ED ADULT TRIAGE NOTE - ARRIVAL INFO ADDITIONAL COMMENTS
pt had a witnessed seizure at the homeless shelter.   + urinary incont.  + upper front tooth loss.   arrives aaox3.  no deficits.

## 2019-05-08 NOTE — ED PROVIDER NOTE - OBJECTIVE STATEMENT
34 yo M, pmhx of frequent K2 use, seizures since 2009, non compliant with keppra 750 BID,stating its "too expensive at 80 dollars," presenting after a witnessed seizure at the shelter he stays at. Patient states last K2 use was yesterday. Denies alcohol use. Denies confusion, states "I feel back to normal except I chipped my front tooth." Patient states he has chipped other teeth in the past from having seizures.

## 2019-05-08 NOTE — ED PROVIDER NOTE - CARDIAC, MLM
Normal rate, regular rhythm.  Heart sounds S1, S2.  No murmurs, rubs or gallops. Health Care Proxy (HCP)

## 2019-05-08 NOTE — ED ADULT NURSE NOTE - OBJECTIVE STATEMENT
Pt 33y male BIBA from shelter s/p seizure. As per pt has not taken seizure meds x 1 week. urinary incontinence noted, loss of tooth.

## 2019-05-08 NOTE — ED PROVIDER NOTE - CLINICAL SUMMARY MEDICAL DECISION MAKING FREE TEXT BOX
Patient with epilepsy with med non compliance, keppra 750mg po BID, with seizure just prior to arrival. Patient AAOx4, neuro intact. GIven keppra loading dose in the ED, seen by social work to faciliate outpatient medication acquisition. Plan to discharge patient with keppra prescription. Advised to f/u with his established neurologist and dentist.

## 2019-05-12 DIAGNOSIS — R56.9 UNSPECIFIED CONVULSIONS: ICD-10-CM

## 2019-05-12 DIAGNOSIS — K02.9 DENTAL CARIES, UNSPECIFIED: ICD-10-CM

## 2019-05-12 DIAGNOSIS — G40.909 EPILEPSY, UNSPECIFIED, NOT INTRACTABLE, WITHOUT STATUS EPILEPTICUS: ICD-10-CM

## 2019-05-15 NOTE — ED ADULT TRIAGE NOTE - NS ED NOTE AC HIGH RISK COUNTRIES
, Ash, stopped by regarding the referral to PT. They cannot use in house PT they have to be referred through .   He states he went through all this when he was sent and Ave helped get it straightened out.      Ash - [phone]     No

## 2019-09-05 ENCOUNTER — APPOINTMENT (EMERGENCY)
Dept: RADIOLOGY | Facility: HOSPITAL | Age: 34
End: 2019-09-05
Payer: COMMERCIAL

## 2019-09-05 ENCOUNTER — HOSPITAL ENCOUNTER (EMERGENCY)
Facility: HOSPITAL | Age: 34
Discharge: HOME | End: 2019-09-05
Attending: EMERGENCY MEDICINE | Admitting: EMERGENCY MEDICINE
Payer: COMMERCIAL

## 2019-09-05 VITALS
BODY MASS INDEX: 26.53 KG/M2 | DIASTOLIC BLOOD PRESSURE: 57 MMHG | HEIGHT: 67 IN | OXYGEN SATURATION: 99 % | TEMPERATURE: 97.9 F | HEART RATE: 84 BPM | RESPIRATION RATE: 12 BRPM | SYSTOLIC BLOOD PRESSURE: 89 MMHG | WEIGHT: 169 LBS

## 2019-09-05 DIAGNOSIS — R56.9 SEIZURE (HCC): Primary | ICD-10-CM

## 2019-09-05 LAB
ALBUMIN SERPL BCP-MCNC: 3.9 G/DL (ref 3.5–5)
ALP SERPL-CCNC: 67 U/L (ref 46–116)
ALT SERPL W P-5'-P-CCNC: 28 U/L (ref 12–78)
ANION GAP SERPL CALCULATED.3IONS-SCNC: 13 MMOL/L (ref 4–13)
AST SERPL W P-5'-P-CCNC: 28 U/L (ref 5–45)
ATRIAL RATE: 99 BPM
BASOPHILS # BLD AUTO: 0.02 THOUSANDS/ΜL (ref 0–0.1)
BASOPHILS NFR BLD AUTO: 0 % (ref 0–1)
BILIRUB SERPL-MCNC: 0.3 MG/DL (ref 0.2–1)
BUN SERPL-MCNC: 13 MG/DL (ref 5–25)
CALCIUM SERPL-MCNC: 8.6 MG/DL (ref 8.3–10.1)
CHLORIDE SERPL-SCNC: 102 MMOL/L (ref 100–108)
CO2 SERPL-SCNC: 24 MMOL/L (ref 21–32)
CREAT SERPL-MCNC: 1.17 MG/DL (ref 0.6–1.3)
EOSINOPHIL # BLD AUTO: 0.01 THOUSAND/ΜL (ref 0–0.61)
EOSINOPHIL NFR BLD AUTO: 0 % (ref 0–6)
ERYTHROCYTE [DISTWIDTH] IN BLOOD BY AUTOMATED COUNT: 12.4 % (ref 11.6–15.1)
GFR SERPL CREATININE-BSD FRML MDRD: 94 ML/MIN/1.73SQ M
GLUCOSE SERPL-MCNC: 101 MG/DL (ref 65–140)
HCT VFR BLD AUTO: 44.9 % (ref 36.5–49.3)
HGB BLD-MCNC: 14.3 G/DL (ref 12–17)
IMM GRANULOCYTES # BLD AUTO: 0.03 THOUSAND/UL (ref 0–0.2)
IMM GRANULOCYTES NFR BLD AUTO: 1 % (ref 0–2)
LYMPHOCYTES # BLD AUTO: 1.02 THOUSANDS/ΜL (ref 0.6–4.47)
LYMPHOCYTES NFR BLD AUTO: 20 % (ref 14–44)
MCH RBC QN AUTO: 28.4 PG (ref 26.8–34.3)
MCHC RBC AUTO-ENTMCNC: 31.8 G/DL (ref 31.4–37.4)
MCV RBC AUTO: 89 FL (ref 82–98)
MONOCYTES # BLD AUTO: 0.37 THOUSAND/ΜL (ref 0.17–1.22)
MONOCYTES NFR BLD AUTO: 7 % (ref 4–12)
NEUTROPHILS # BLD AUTO: 3.78 THOUSANDS/ΜL (ref 1.85–7.62)
NEUTS SEG NFR BLD AUTO: 72 % (ref 43–75)
NRBC BLD AUTO-RTO: 0 /100 WBCS
P AXIS: 58 DEGREES
PLATELET # BLD AUTO: 126 THOUSANDS/UL (ref 149–390)
PMV BLD AUTO: 10.7 FL (ref 8.9–12.7)
POTASSIUM SERPL-SCNC: 3.5 MMOL/L (ref 3.5–5.3)
PR INTERVAL: 150 MS
PROT SERPL-MCNC: 8.5 G/DL (ref 6.4–8.2)
QRS AXIS: -11 DEGREES
QRSD INTERVAL: 94 MS
QT INTERVAL: 346 MS
QTC INTERVAL: 444 MS
RBC # BLD AUTO: 5.03 MILLION/UL (ref 3.88–5.62)
SODIUM SERPL-SCNC: 139 MMOL/L (ref 136–145)
T WAVE AXIS: 10 DEGREES
VENTRICULAR RATE: 99 BPM
WBC # BLD AUTO: 5.23 THOUSAND/UL (ref 4.31–10.16)

## 2019-09-05 PROCEDURE — 85025 COMPLETE CBC W/AUTO DIFF WBC: CPT | Performed by: EMERGENCY MEDICINE

## 2019-09-05 PROCEDURE — 70450 CT HEAD/BRAIN W/O DYE: CPT

## 2019-09-05 PROCEDURE — 80053 COMPREHEN METABOLIC PANEL: CPT | Performed by: EMERGENCY MEDICINE

## 2019-09-05 PROCEDURE — 93005 ELECTROCARDIOGRAM TRACING: CPT

## 2019-09-05 PROCEDURE — 36415 COLL VENOUS BLD VENIPUNCTURE: CPT | Performed by: EMERGENCY MEDICINE

## 2019-09-05 PROCEDURE — 96374 THER/PROPH/DIAG INJ IV PUSH: CPT

## 2019-09-05 PROCEDURE — 93010 ELECTROCARDIOGRAM REPORT: CPT | Performed by: INTERNAL MEDICINE

## 2019-09-05 PROCEDURE — 96361 HYDRATE IV INFUSION ADD-ON: CPT

## 2019-09-05 PROCEDURE — 99285 EMERGENCY DEPT VISIT HI MDM: CPT

## 2019-09-05 RX ORDER — LEVETIRACETAM 1000 MG/1
1000 TABLET ORAL 2 TIMES DAILY
Qty: 60 TABLET | Refills: 0 | Status: SHIPPED | OUTPATIENT
Start: 2019-09-05 | End: 2020-01-11

## 2019-09-05 RX ORDER — LEVETIRACETAM 750 MG/1
750 TABLET ORAL DAILY
COMMUNITY
End: 2019-09-05 | Stop reason: CLARIF

## 2019-09-05 RX ADMIN — LEVETIRACETAM 1000 MG: 100 INJECTION, SOLUTION INTRAVENOUS at 16:52

## 2019-09-05 RX ADMIN — SODIUM CHLORIDE 1000 ML: 0.9 INJECTION, SOLUTION INTRAVENOUS at 16:59

## 2019-09-05 NOTE — ED NOTES
[facility] called to arrange [address] [facility] wheelchair transport home, pt aware     Jesse Reynolds RN  09/05/19 [id number]

## 2019-09-05 NOTE — ED PROCEDURE NOTE
PROCEDURE  ECG 12 Lead Documentation Only  Date/Time: 9/5/2019 4:48 PM  Performed by: Vitaliy Horne DO  Authorized by: Vitaliy Horne DO     ECG reviewed by me, the ED Provider: yes    Patient location:  ED  Interpretation:     Interpretation: normal    Rate:     ECG rate:  99    ECG rate assessment: normal    Rhythm:     Rhythm: sinus rhythm    Ectopy:     Ectopy: none    ST segments:     ST segments:  Normal  T waves:     T waves: normal           Vitaliy Horne DO  09/05/19 1649

## 2019-09-06 NOTE — ED NOTES
Patient is resting comfortably  No information received for transfer at this time,pt aware       Jace Hernandez RN  09/05/19 2040

## 2019-09-07 NOTE — ED PROVIDER NOTES
History  Chief Complaint   Patient presents with    Seizure - Prior Hx Of     Pt arrived via squad and MICU  Pt was found behind the [employer] building unconscious  Pt with hx seizure  Missed Keppra dose yesterday  Awake and alert upon arrival   Confused about address and why he was at [address] [place] [address] Road  No c/o offered  Patient presents for evaluation of seizure  Patient reports a prior history of seizures and is on Keppra 1000 mg BID however states he only takes it once a day  Last seizure he is unsure of but thinks it was in May  Patient complains of posterior headache  Admits to missing dose of Keppra  He later admitted to not having anymore as well  History provided by:  Patient   used: No    Seizure - Prior Hx Of       Prior to Admission Medications   Prescriptions Last Dose Informant Patient Reported? Taking?   elvitegravir-cobicistat-emtricitabine-tenofovir (STRIBILD) [phone] mg   Yes Yes   Sig: Take 1 tablet by mouth daily with breakfast      Facility-Administered Medications: None       Past Medical History:   Diagnosis Date    HIV (human immunodeficiency virus infection) ([facility] )     Seizures ([facility] )        Past Surgical History:   Procedure Laterality Date    HERNIA REPAIR         History reviewed  No pertinent family history  I have reviewed and agree with the history as documented  Social History     Tobacco Use    Smoking status: Current Every Day Smoker     Packs/day: 0 25    Smokeless tobacco: Never Used   Substance Use Topics    Alcohol use: Not Currently    Drug use: Not Currently     Comment: used K2 2 months ago and marijuana in past         Review of Systems   All other systems reviewed and are negative  Physical Exam  Physical Exam   Constitutional: He is oriented to person, place, and time  No distress  HENT:   Mouth/Throat: Oropharynx is clear and moist    Eyes: Pupils are equal, round, and reactive to light  Neck: Normal range of motion  Cardiovascular: Normal rate, regular rhythm and intact distal pulses  Pulmonary/Chest: Effort normal and breath sounds normal  No respiratory distress  Abdominal: Soft  There is no tenderness  Musculoskeletal: Normal range of motion  Neurological: He is alert and oriented to person, place, and time  Skin: Capillary refill takes less than 2 seconds  He is not diaphoretic  Nursing note and vitals reviewed        Vital Signs  ED Triage Vitals   Temperature Pulse Respirations Blood Pressure SpO2   09/05/19 1626 09/05/19 1626 09/05/19 1626 09/05/19 1626 09/05/19 1626   97 9 °F (36 6 °C) (!) 110 20 108/62 97 %      Temp src Heart Rate Source Patient Position - Orthostatic VS BP Location FiO2 (%)   -- 09/05/19 1834 09/05/19 1834 09/05/19 1834 --    Monitor Lying Right arm       Pain Score       09/05/19 1626       No Pain           Vitals:    09/05/19 1830 09/05/19 1834 09/05/19 1915 09/05/19 2115   BP:  103/63 104/58 (!) 89/57   Pulse: 82 89 102 84   Patient Position - Orthostatic VS:  Lying Lying Lying         Visual Acuity      ED Medications  Medications   sodium chloride 0 9 % bolus 1,000 mL (0 mL Intravenous Stopped 9/5/19 1921)   levETIRAcetam (KEPPRA) 1,000 mg in sodium chloride 0 9 % 100 mL IVPB (0 mg Intravenous Stopped 9/5/19 1707)       Diagnostic Studies  Results Reviewed     Procedure Component Value Units Date/Time    Comprehensive metabolic panel [[id number]]  (Abnormal) Collected:  09/05/19 1651    Lab Status:  Final result Specimen:  Blood from Arm, Right Updated:  09/05/19 1734     Sodium 139 mmol/L      Potassium 3 5 mmol/L      Chloride 102 mmol/L      CO2 24 mmol/L      ANION GAP 13 mmol/L      BUN 13 mg/dL      Creatinine 1 17 mg/dL      Glucose 101 mg/dL      Calcium 8 6 mg/dL      AST 28 U/L      ALT 28 U/L      Alkaline Phosphatase 67 U/L      Total Protein 8 5 g/dL      Albumin 3 9 g/dL      Total Bilirubin 0 30 mg/dL      eGFR 94 ml/min/1 73sq m     Narrative:       National Kidney Disease Foundation guidelines for Chronic Kidney Disease (CKD):     Stage 1 with normal or high GFR (GFR > 90 mL/min/1 73 square meters)    Stage 2 Mild CKD (GFR = 60-89 mL/min/1 73 square meters)    Stage 3A Moderate CKD (GFR = 45-59 mL/min/1 73 square meters)    Stage 3B Moderate CKD (GFR = 30-44 mL/min/1 73 square meters)    Stage 4 Severe CKD (GFR = 15-29 mL/min/1 73 square meters)    Stage 5 End Stage CKD (GFR <15 mL/min/1 73 square meters)  Note: GFR calculation is accurate only with a steady state creatinine    CBC and differential [[id number]]  (Abnormal) Collected:  09/05/19 1651    Lab Status:  Final result Specimen:  Blood from Arm, Right Updated:  09/05/19 1726     WBC 5 23 Thousand/uL      RBC 5 03 Million/uL      Hemoglobin 14 3 g/dL      Hematocrit 44 9 %      MCV 89 fL      MCH 28 4 pg      MCHC 31 8 g/dL      RDW 12 4 %      MPV 10 7 fL      Platelets 022 Thousands/uL      nRBC 0 /100 WBCs      Neutrophils Relative 72 %      Immat GRANS % 1 %      Lymphocytes Relative 20 %      Monocytes Relative 7 %      Eosinophils Relative 0 %      Basophils Relative 0 %      Neutrophils Absolute 3 78 Thousands/µL      Immature Grans Absolute 0 03 Thousand/uL      Lymphocytes Absolute 1 02 Thousands/µL      Monocytes Absolute 0 37 Thousand/µL      Eosinophils Absolute 0 01 Thousand/µL      Basophils Absolute 0 02 Thousands/µL     Narrative: This is an appended report  These results have been appended to a previously verified report  CT head without contrast   Final Result by Narinder Ruff DO (09/05 1801)      No acute intracranial abnormality  Mild bilateral frontal atrophy for patient age              Workstation performed: [id number]                    Procedures  Procedures       ED Course                               MDM  Number of Diagnoses or Management Options  Seizure [facility]):   Diagnosis management comments: Pulse ox 99% on RA indicating adequate oxygenation    ER work up unremarkable, loaded with Keppra IV no seizure activity in the ER  Advised to follow up outpatient  Given Rx for Keppra  Amount and/or Complexity of Data Reviewed  Clinical lab tests: ordered and reviewed  Tests in the radiology section of CPT®: ordered and reviewed  Decide to obtain previous medical records or to obtain history from someone other than the patient: yes  Review and summarize past medical records: yes    Patient Progress  Patient progress: stable      Disposition  Final diagnoses:   Seizure [facility])     Time reflects when diagnosis was documented in both MDM as applicable and the Disposition within this note     Time User Action Codes Description Comment    9/5/2019  6:22 PM TelloVijay flynntatafidencio [R56 9] Seizure [facility])       ED Disposition     ED Disposition Condition Date/Time Comment    Discharge Stable Thu Sep 5, 2019  6:22 PM Jena Living discharge to home/self care  Follow-up Information     Follow up With Specialties Details Why Contact Info    Nels Seip, MD Neurology In 1 week  [address]  [phone]            Discharge Medication List as of 9/5/2019  6:25 PM      START taking these medications    Details   levETIRAcetam (KEPPRA) 1000 MG tablet Take 1 tablet (1,000 mg total) by mouth 2 (two) times a day for 30 days, Starting Thu 9/5/2019, Until Sat 10/5/2019, Print         CONTINUE these medications which have NOT CHANGED    Details   elvitegravir-cobicistat-emtricitabine-tenofovir (STRIBILD) [phone] mg Take 1 tablet by mouth daily with breakfast, Historical Med           No discharge procedures on file      ED Provider  Electronically Signed by           Artemio Wang DO  09/06/19 2034

## 2019-10-20 ENCOUNTER — HOSPITAL ENCOUNTER (EMERGENCY)
Facility: HOSPITAL | Age: 34
Discharge: HOME | End: 2019-10-20
Attending: EMERGENCY MEDICINE | Admitting: EMERGENCY MEDICINE
Payer: COMMERCIAL

## 2019-10-20 VITALS
DIASTOLIC BLOOD PRESSURE: 81 MMHG | SYSTOLIC BLOOD PRESSURE: 123 MMHG | OXYGEN SATURATION: 100 % | HEART RATE: 92 BPM | TEMPERATURE: 98.1 F | RESPIRATION RATE: 16 BRPM

## 2019-10-20 DIAGNOSIS — G40.909 SEIZURE DISORDER (HCC): ICD-10-CM

## 2019-10-20 DIAGNOSIS — Z76.0 MEDICATION REFILL: Primary | ICD-10-CM

## 2019-10-20 PROCEDURE — 99283 EMERGENCY DEPT VISIT LOW MDM: CPT

## 2019-10-20 RX ORDER — LEVETIRACETAM 250 MG/1
1000 TABLET ORAL ONCE
Status: COMPLETED | OUTPATIENT
Start: 2019-10-20 | End: 2019-10-20

## 2019-10-20 RX ORDER — LEVETIRACETAM 1000 MG/1
1000 TABLET ORAL 2 TIMES DAILY
Qty: 60 TABLET | Refills: 0 | Status: SHIPPED | OUTPATIENT
Start: 2019-10-20 | End: 2020-01-11

## 2019-10-20 RX ADMIN — LEVETIRACETAM 1000 MG: 250 TABLET, FILM COATED ORAL at 22:48

## 2019-10-21 NOTE — ED PROVIDER NOTES
History  Chief Complaint   Patient presents with    Medical Problem - Re-Evaluation     pt called the squad because he thought he was going to have a seizure and didn't have his meds  He [place] the squad from the [place] across the street from his moms house where his meds are but she isn't home     66-year-old male with past medical history of seizure disorder, HIV, brought to the ED with ambulance for seizure medication refill  Patient states that he is currently homeless and does not have his Keppra  Patient usually takes Keppra 1000 mg b i d   Patient states the his medications are with his mother however he does not want to contact her  Patient states that he has been compliant with his medication until today  Patient then became worried that he will have a seizure  Patient called EMS and requested to come to the ED for further evaluation  Patient's last seizure episode was about 2 months ago when he missed his Keppra dose  At this time patient has no other complaints  History provided by:  Patient  Medical Problem - Re-Evaluation   Associated symptoms: no abdominal pain, no chest pain, no congestion, no cough, no diarrhea, no fatigue, no fever, no headaches, no nausea, no shortness of breath, no sore throat, no vomiting and no wheezing        Prior to Admission Medications   Prescriptions Last Dose Informant Patient Reported? Taking?   elvitegravir-cobicistat-emtricitabine-tenofovir (STRIBILD) [id number] mg   Yes Yes   Sig: Take 1 tablet by mouth daily with breakfast   levETIRAcetam (KEPPRA) 1000 MG tablet   No Yes   Sig: Take 1 tablet (1,000 mg total) by mouth 2 (two) times a day for 30 days      Facility-Administered Medications: None       Past Medical History:   Diagnosis Date    [facility] )     Seizures ([facility] )        Past Surgical History:   Procedure Laterality Date    HERNIA REPAIR         History reviewed  No pertinent family history    I have reviewed and agree with the history as documented  Social History     Tobacco Use    Smoking status: Current Every Day Smoker     Packs/day: 0 25    Smokeless tobacco: Never Used   Substance Use Topics    Alcohol use: Not Currently    Drug use: Not Currently     Comment: used K2 2 months ago and marijuana in past         Review of Systems   Constitutional: Negative for activity change, fatigue and fever  HENT: Negative for congestion, ear discharge and sore throat  Eyes: Negative for pain and redness  Respiratory: Negative for cough, chest tightness, shortness of breath and wheezing  Cardiovascular: Negative for chest pain  Gastrointestinal: Negative for abdominal pain, diarrhea, nausea and vomiting  Endocrine: Negative for cold intolerance  Genitourinary: Negative for dysuria and urgency  Musculoskeletal: Negative for arthralgias and back pain  Neurological: Negative for dizziness, weakness and headaches  Psychiatric/Behavioral: Negative for agitation and behavioral problems  Physical Exam  Physical Exam   Constitutional: He is oriented to person, place, and time  He appears well-developed and well-nourished  HENT:   Head: Normocephalic and atraumatic  Nose: Nose normal    Mouth/Throat: Oropharynx is clear and moist    Eyes: Conjunctivae and EOM are normal    Neck: Normal range of motion  Neck supple  Cardiovascular: Normal rate, regular rhythm and normal heart sounds  Pulmonary/Chest: Effort normal and breath sounds normal    Abdominal: Soft  Bowel sounds are normal  He exhibits no distension  There is no tenderness  Musculoskeletal: Normal range of motion  Neurological: He is alert and oriented to person, place, and time  Skin: Skin is warm  Psychiatric: He has a normal mood and affect  His behavior is normal  Judgment and thought content normal    Nursing note and vitals reviewed        Vital Signs  ED Triage Vitals   Temperature Pulse Respirations Blood Pressure SpO2   10/20/19 2255 10/20/19 2250 10/20/19 2250 10/20/19 2245 10/20/19 2250   98 1 °F (36 7 °C) 92 16 123/81 100 %      Temp Source Heart Rate Source Patient Position - Orthostatic VS BP Location FiO2 (%)   10/20/19 2255 10/20/19 2250 10/20/19 2250 10/20/19 2250 --   Tympanic Monitor Lying Right arm       Pain Score       10/20/19 2250       No Pain           Vitals:    10/20/19 2245 10/20/19 2250   BP: 123/81 123/81   Pulse:  92   Patient Position - Orthostatic VS:  Lying         Visual Acuity      ED Medications  Medications   levETIRAcetam (KEPPRA) tablet 1,000 mg (1,000 mg Oral Given 10/20/19 2248)       Diagnostic Studies  Results Reviewed     None                 No orders to display              Procedures  Procedures       ED Course                               MDM  Number of Diagnoses or Management Options  Medication refill: new and requires workup  Seizure disorder [facility]): new and requires workup  Diagnosis management comments: Give patient's evening Keppra dose  Risk of Complications, Morbidity, and/or Mortality  General comments: Patient given his evening Keppra dose and a prescription for his Keppra medication  We also called patient's mother who came to pick patient up from the emergency department  At this time patient is discharged home with follow-up to PCP and Neurology  Close return instructions given to return to the ER for any worsening symptoms  Patient agrees with discharge plan  Patient well appearing at time of discharge        Patient Progress  Patient progress: stable      Disposition  Final diagnoses:   Medication refill   Seizure disorder [facility])     Time reflects when diagnosis was documented in both MDM as applicable and the Disposition within this note     Time User Action Codes Description Comment    10/20/2019 11:05 PM Ama Byers Add [Z76 0] Medication refill     10/20/2019 11:05 PM Ama Byers Add [G40 909] Seizure disorder [facility])       ED Disposition ED Disposition Condition Date/Time Comment    Discharge Stable Sun Oct 20, 2019 11:05 PM Garrick Sheriff discharge to home/self care  Follow-up Information     Follow up With Specialties Details Why Contact Info        Please follow up with her family doctor and your neurologist as scheduled  Discharge Medication List as of 10/20/2019 11:07 PM      START taking these medications    Details   !! levETIRAcetam (KEPPRA) 1000 MG tablet Take 1 tablet (1,000 mg total) by mouth 2 (two) times a day, Starting Sun 10/20/2019, Print       !! - Potential duplicate medications found  Please discuss with provider  CONTINUE these medications which have NOT CHANGED    Details   elvitegravir-cobicistat-emtricitabine-tenofovir (STRIBILD) [phone] mg Take 1 tablet by mouth daily with breakfast, Historical Med      !! levETIRAcetam (KEPPRA) 1000 MG tablet Take 1 tablet (1,000 mg total) by mouth 2 (two) times a day for 30 days, Starting Thu 9/5/2019, Until Sun 10/20/2019, Print       !! - Potential duplicate medications found  Please discuss with provider  No discharge procedures on file      ED Provider  Electronically Signed by           Iris Escalante DO  10/20/19 3223

## 2020-01-02 ENCOUNTER — HOSPITAL ENCOUNTER (EMERGENCY)
Facility: HOSPITAL | Age: 35
Discharge: HOME | End: 2020-01-03
Attending: EMERGENCY MEDICINE | Admitting: EMERGENCY MEDICINE
Payer: MEDICARE

## 2020-01-02 VITALS
RESPIRATION RATE: 18 BRPM | HEART RATE: 95 BPM | SYSTOLIC BLOOD PRESSURE: 143 MMHG | OXYGEN SATURATION: 97 % | DIASTOLIC BLOOD PRESSURE: 62 MMHG | TEMPERATURE: 98.5 F

## 2020-01-02 DIAGNOSIS — M62.838 NECK MUSCLE SPASM: Primary | ICD-10-CM

## 2020-01-02 PROCEDURE — 96372 THER/PROPH/DIAG INJ SC/IM: CPT

## 2020-01-02 PROCEDURE — 99284 EMERGENCY DEPT VISIT MOD MDM: CPT | Performed by: EMERGENCY MEDICINE

## 2020-01-02 PROCEDURE — 99283 EMERGENCY DEPT VISIT LOW MDM: CPT

## 2020-01-02 RX ORDER — METHOCARBAMOL 500 MG/1
500 TABLET, FILM COATED ORAL 2 TIMES DAILY
Qty: 14 TABLET | Refills: 0 | Status: SHIPPED | OUTPATIENT
Start: 2020-01-02 | End: 2020-01-12

## 2020-01-02 RX ORDER — METHOCARBAMOL 500 MG/1
500 TABLET, FILM COATED ORAL ONCE
Status: COMPLETED | OUTPATIENT
Start: 2020-01-02 | End: 2020-01-02

## 2020-01-02 RX ORDER — DIAZEPAM 5 MG/1
5 TABLET ORAL ONCE
Status: COMPLETED | OUTPATIENT
Start: 2020-01-02 | End: 2020-01-02

## 2020-01-02 RX ORDER — IBUPROFEN 400 MG/1
400 TABLET ORAL EVERY 6 HOURS PRN
Qty: 28 TABLET | Refills: 0 | Status: SHIPPED | OUTPATIENT
Start: 2020-01-02 | End: 2020-02-06

## 2020-01-02 RX ORDER — ACETAMINOPHEN 500 MG
500 TABLET ORAL EVERY 6 HOURS PRN
Qty: 28 TABLET | Refills: 0 | Status: SHIPPED | OUTPATIENT
Start: 2020-01-02 | End: 2020-01-09

## 2020-01-02 RX ORDER — ACETAMINOPHEN 325 MG/1
975 TABLET ORAL ONCE
Status: COMPLETED | OUTPATIENT
Start: 2020-01-02 | End: 2020-01-02

## 2020-01-02 RX ORDER — LEVETIRACETAM 1000 MG/1
1000 TABLET ORAL 2 TIMES DAILY
Qty: 60 TABLET | Refills: 0 | Status: ON HOLD | OUTPATIENT
Start: 2020-01-02 | End: 2020-04-24

## 2020-01-02 RX ORDER — KETOROLAC TROMETHAMINE 30 MG/ML
30 INJECTION, SOLUTION INTRAMUSCULAR; INTRAVENOUS ONCE
Status: COMPLETED | OUTPATIENT
Start: 2020-01-02 | End: 2020-01-02

## 2020-01-02 RX ORDER — LEVETIRACETAM 250 MG/1
1000 TABLET ORAL ONCE
Status: COMPLETED | OUTPATIENT
Start: 2020-01-02 | End: 2020-01-02

## 2020-01-02 RX ADMIN — KETOROLAC TROMETHAMINE 30 MG: 30 INJECTION, SOLUTION INTRAMUSCULAR at 21:59

## 2020-01-02 RX ADMIN — METHOCARBAMOL TABLETS 500 MG: 500 TABLET, COATED ORAL at 22:00

## 2020-01-02 RX ADMIN — DIAZEPAM 5 MG: 5 TABLET ORAL at 23:10

## 2020-01-02 RX ADMIN — ACETAMINOPHEN 975 MG: 325 TABLET, FILM COATED ORAL at 23:10

## 2020-01-02 RX ADMIN — LEVETIRACETAM 1000 MG: 250 TABLET, FILM COATED ORAL at 23:21

## 2020-01-03 NOTE — ED PROVIDER NOTES
History  Chief Complaint   Patient presents with    Neck Pain     pt presents to the ed wtih neck pain and spasms as a "side affect of his keppra"  pt's neck is hyper extended and he states it hurts to straiten it        History provided by:  Patient   used: No    Neck Pain   Pain location:  R side  Quality:  Stiffness  Pain radiates to:  Does not radiate  Pain severity:  Mild  Onset quality:  Gradual  Timing:  Intermittent  Progression:  Waxing and waning  Chronicity:  Recurrent  Context: not fall    Relieved by:  None tried  Worsened by:  Nothing  Ineffective treatments:  None tried  Associated symptoms: no bladder incontinence, no bowel incontinence and no numbness        Prior to Admission Medications   Prescriptions Last Dose Informant Patient Reported? Taking?   elvitegravir-cobicistat-emtricitabine-tenofovir (STRIBILD) [phone] mg   Yes No   Sig: Take 1 tablet by mouth daily with breakfast   levETIRAcetam (KEPPRA) 1000 MG tablet   No No   Sig: Take 1 tablet (1,000 mg total) by mouth 2 (two) times a day for 30 days   levETIRAcetam (KEPPRA) 1000 MG tablet   No No   Sig: Take 1 tablet (1,000 mg total) by mouth 2 (two) times a day      Facility-Administered Medications: None       Past Medical History:   Diagnosis Date    HIV (human immunodeficiency virus infection) ([facility] )     Seizures ([facility] )        Past Surgical History:   Procedure Laterality Date    HERNIA REPAIR         History reviewed  No pertinent family history  I have reviewed and agree with the history as documented  Social History     Tobacco Use    Smoking status: Current Every Day Smoker     Packs/day: 0 25    Smokeless tobacco: Never Used   Substance Use Topics    Alcohol use: Not Currently    Drug use: Not Currently     Comment: used K2 2 months ago and marijuana in past         Review of Systems   Gastrointestinal: Negative for bowel incontinence  Genitourinary: Negative for bladder incontinence  Musculoskeletal: Positive for neck pain and neck stiffness  Neurological: Negative for numbness  All other systems reviewed and are negative  Physical Exam  Physical Exam   Constitutional: He is oriented to person, place, and time  He appears well-developed and well-nourished  No distress  HENT:   Head: Normocephalic and atraumatic  Right Ear: External ear normal    Left Ear: External ear normal    Eyes: Pupils are equal, round, and reactive to light  EOM are normal  Right eye exhibits no discharge  Left eye exhibits no discharge  Neck: Normal range of motion  Neck supple  No tracheal deviation present  No thyromegaly present  Cardiovascular: Normal rate and regular rhythm  No murmur heard  Pulmonary/Chest: Effort normal and breath sounds normal    Abdominal: Soft  Bowel sounds are normal  He exhibits no distension  There is no tenderness  Musculoskeletal: Normal range of motion  He exhibits no edema or deformity  Neurological: He is alert and oriented to person, place, and time  No cranial nerve deficit  He exhibits normal muscle tone  Normal cranial nerve exam   Normal strength and sensation bilateral upper and lower extremities  Normal coordination, normal gait  Skin: Skin is warm  Capillary refill takes less than 2 seconds  He is not diaphoretic  Psychiatric: He has a normal mood and affect  His behavior is normal    Nursing note and vitals reviewed        Vital Signs  ED Triage Vitals [01/02/20 2135]   Temperature Pulse Respirations Blood Pressure SpO2   98 5 °F (36 9 °C) 95 18 145/58 96 %      Temp Source Heart Rate Source Patient Position - Orthostatic VS BP Location FiO2 (%)   Tympanic Monitor -- -- --      Pain Score       --           Vitals:    01/02/20 2135   BP: 145/58   Pulse: 95         Visual Acuity      ED Medications  Medications   ketorolac (TORADOL) injection 30 mg (30 mg Intramuscular Given 1/2/20 2159)   methocarbamol (ROBAXIN) tablet 500 mg (500 mg Oral Given 1/2/20 2200)   acetaminophen (TYLENOL) tablet 975 mg (975 mg Oral Given 1/2/20 2310)   diazepam (VALIUM) tablet 5 mg (5 mg Oral Given 1/2/20 2310)   levETIRAcetam (KEPPRA) tablet 1,000 mg (1,000 mg Oral Given 1/2/20 2321)       Diagnostic Studies  Results Reviewed     None                 No orders to display              Procedures  Procedures         ED Course                               MDM  Number of Diagnoses or Management Options  Neck muscle spasm: new and does not require workup  Diagnosis management comments: Recurrent right neck spasm  History of the same, thought be due to seizure medications  Not take any medications prior to arrival   States typically improves with Toradol and muscle relaxants  IM Toradol, Robaxin given to patient with improvement of his symptoms  Still with continued neck spasms  Valium, Tylenol given to patient with continued improvement  Will give home Keppra dose and prescribed Keppra home as patient states he is out of his anti seizure medications  Will prescribe muscle relaxants  No signs or symptoms consistent with meningitis  No symptoms in arms or legs, do not suspect tetanus  Likely exacerbation of chronic torticollis  Will discharge home with outpatient follow-up  No labs/imaging indicated  Risk of Complications, Morbidity, and/or Mortality  Presenting problems: moderate  Diagnostic procedures: moderate  Management options: moderate    Patient Progress  Patient progress: improved        Disposition  Final diagnoses:   Neck muscle spasm     Time reflects when diagnosis was documented in both MDM as applicable and the Disposition within this note     Time User Action Codes Description Comment    1/2/2020 11:26 PM Flores Baez Add [Q76 919] Neck muscle spasm       ED Disposition     ED Disposition Condition Date/Time Comment    Discharge Stable u Jan 2, 2020 11:26 PM Tara Bland discharge to home/self care              Follow-up Information Follow up With Specialties Details Why [address]  Schedule an appointment as soon as possible for a visit in 1 week As needed [phone]          Patient's Medications   Discharge Prescriptions    ACETAMINOPHEN (TYLENOL) 500 MG TABLET    Take 1 tablet (500 mg total) by mouth every 6 (six) hours as needed for moderate pain for up to 7 days       Start Date: 1/2/2020  End Date: 1/9/2020       Order Dose: 500 mg       Quantity: 28 tablet    Refills: 0    IBUPROFEN (MOTRIN) 400 MG TABLET    Take 1 tablet (400 mg total) by mouth every 6 (six) hours as needed for moderate pain for up to 7 days       Start Date: 1/2/2020  End Date: 1/9/2020       Order Dose: 400 mg       Quantity: 28 tablet    Refills: 0    LEVETIRACETAM (KEPPRA) 1000 MG TABLET    Take 1 tablet (1,000 mg total) by mouth 2 (two) times a day       Start Date: 1/2/2020  End Date: 2/1/2020       Order Dose: 1,000 mg       Quantity: 60 tablet    Refills: 0    METHOCARBAMOL (ROBAXIN) 500 MG TABLET    Take 1 tablet (500 mg total) by mouth 2 (two) times a day for 7 days       Start Date: 1/2/2020  End Date: 1/9/2020       Order Dose: 500 mg       Quantity: 14 tablet    Refills: 0     No discharge procedures on file      ED Provider  Electronically Signed by           Lemon Cogan, MD  01/02/20 6756

## 2020-01-03 NOTE — ED NOTES
Took over care from [facility]    Pt  Is resting  In bed asking to use the urinalat this time  Pt  States has not eaten or taken his  seizure medication today  MD notified        Neto Kim, [facility]  01/02/20 8985

## 2020-01-03 NOTE — ED NOTES
Pt  Was about to be discharged and states he can not go back to his mothers  He is now living on the [address]  He states that  He has a plan to get enough money to go to [facility]  01/02/20 0812

## 2020-01-11 ENCOUNTER — HOSPITAL ENCOUNTER (EMERGENCY)
Facility: HOSPITAL | Age: 35
Discharge: HOME | End: 2020-01-12
Attending: EMERGENCY MEDICINE
Payer: MEDICARE

## 2020-01-11 DIAGNOSIS — G40.909 SEIZURE DISORDER (HCC): ICD-10-CM

## 2020-01-11 DIAGNOSIS — Z91.14 NON COMPLIANCE W MEDICATION REGIMEN: ICD-10-CM

## 2020-01-11 DIAGNOSIS — M54.2 NECK PAIN: Primary | ICD-10-CM

## 2020-01-11 LAB
ANION GAP SERPL CALCULATED.3IONS-SCNC: 26 MMOL/L (ref 4–13)
BASOPHILS # BLD MANUAL: 0 THOUSAND/UL (ref 0–0.1)
BASOPHILS NFR MAR MANUAL: 0 % (ref 0–1)
BUN SERPL-MCNC: 9 MG/DL (ref 5–25)
CALCIUM SERPL-MCNC: 9.4 MG/DL (ref 8.3–10.1)
CHLORIDE SERPL-SCNC: 101 MMOL/L (ref 100–108)
CO2 SERPL-SCNC: 16 MMOL/L (ref 21–32)
CREAT SERPL-MCNC: 1.27 MG/DL (ref 0.6–1.3)
EOSINOPHIL # BLD MANUAL: 0.28 THOUSAND/UL (ref 0–0.4)
EOSINOPHIL NFR BLD MANUAL: 2 % (ref 0–6)
ERYTHROCYTE [DISTWIDTH] IN BLOOD BY AUTOMATED COUNT: 14.1 % (ref 11.6–15.1)
GFR SERPL CREATININE-BSD FRML MDRD: 85 ML/MIN/1.73SQ M
GLUCOSE SERPL-MCNC: 126 MG/DL (ref 65–140)
HCT VFR BLD AUTO: 44.4 % (ref 36.5–49.3)
HGB BLD-MCNC: 14 G/DL (ref 12–17)
LYMPHOCYTES # BLD AUTO: 69 % (ref 14–44)
LYMPHOCYTES # BLD AUTO: 9.6 THOUSAND/UL (ref 0.6–4.47)
MCH RBC QN AUTO: 30.2 PG (ref 26.8–34.3)
MCHC RBC AUTO-ENTMCNC: 31.5 G/DL (ref 31.4–37.4)
MCV RBC AUTO: 96 FL (ref 82–98)
MONOCYTES # BLD AUTO: 0.84 THOUSAND/UL (ref 0–1.22)
MONOCYTES NFR BLD: 6 % (ref 4–12)
NEUTROPHILS # BLD MANUAL: 3.2 THOUSAND/UL (ref 1.85–7.62)
NEUTS SEG NFR BLD AUTO: 23 % (ref 43–75)
NRBC BLD AUTO-RTO: 0 /100 WBCS
PLATELET # BLD AUTO: 177 THOUSANDS/UL (ref 149–390)
PLATELET BLD QL SMEAR: ADEQUATE
PMV BLD AUTO: 10.5 FL (ref 8.9–12.7)
POTASSIUM SERPL-SCNC: 3.1 MMOL/L (ref 3.5–5.3)
RBC # BLD AUTO: 4.64 MILLION/UL (ref 3.88–5.62)
SODIUM SERPL-SCNC: 143 MMOL/L (ref 136–145)
TOTAL CELLS COUNTED SPEC: 100
WBC # BLD AUTO: 13.92 THOUSAND/UL (ref 4.31–10.16)

## 2020-01-11 PROCEDURE — 80177 DRUG SCRN QUAN LEVETIRACETAM: CPT | Performed by: EMERGENCY MEDICINE

## 2020-01-11 PROCEDURE — 96365 THER/PROPH/DIAG IV INF INIT: CPT

## 2020-01-11 PROCEDURE — 80048 BASIC METABOLIC PNL TOTAL CA: CPT | Performed by: EMERGENCY MEDICINE

## 2020-01-11 PROCEDURE — 99284 EMERGENCY DEPT VISIT MOD MDM: CPT

## 2020-01-11 PROCEDURE — 85007 BL SMEAR W/DIFF WBC COUNT: CPT | Performed by: EMERGENCY MEDICINE

## 2020-01-11 PROCEDURE — 96361 HYDRATE IV INFUSION ADD-ON: CPT

## 2020-01-11 PROCEDURE — 96375 TX/PRO/DX INJ NEW DRUG ADDON: CPT

## 2020-01-11 PROCEDURE — 99284 EMERGENCY DEPT VISIT MOD MDM: CPT | Performed by: EMERGENCY MEDICINE

## 2020-01-11 PROCEDURE — 36415 COLL VENOUS BLD VENIPUNCTURE: CPT | Performed by: EMERGENCY MEDICINE

## 2020-01-11 PROCEDURE — 93005 ELECTROCARDIOGRAM TRACING: CPT

## 2020-01-11 PROCEDURE — 85027 COMPLETE CBC AUTOMATED: CPT | Performed by: EMERGENCY MEDICINE

## 2020-01-11 PROCEDURE — 96374 THER/PROPH/DIAG INJ IV PUSH: CPT

## 2020-01-11 RX ORDER — KETOROLAC TROMETHAMINE 30 MG/ML
15 INJECTION, SOLUTION INTRAMUSCULAR; INTRAVENOUS ONCE
Status: COMPLETED | OUTPATIENT
Start: 2020-01-11 | End: 2020-01-11

## 2020-01-11 RX ORDER — POTASSIUM CHLORIDE 20 MEQ/1
40 TABLET, EXTENDED RELEASE ORAL ONCE
Status: DISCONTINUED | OUTPATIENT
Start: 2020-01-11 | End: 2020-01-12 | Stop reason: HOSPADM

## 2020-01-11 RX ORDER — DIAZEPAM 5 MG/ML
2.5 INJECTION, SOLUTION INTRAMUSCULAR; INTRAVENOUS ONCE
Status: COMPLETED | OUTPATIENT
Start: 2020-01-11 | End: 2020-01-11

## 2020-01-11 RX ADMIN — SODIUM CHLORIDE 1000 ML: 0.9 INJECTION, SOLUTION INTRAVENOUS at 22:14

## 2020-01-11 RX ADMIN — SODIUM CHLORIDE 1000 ML: 0.9 INJECTION, SOLUTION INTRAVENOUS at 23:26

## 2020-01-11 RX ADMIN — LEVETIRACETAM 1000 MG: 100 INJECTION, SOLUTION INTRAVENOUS at 22:23

## 2020-01-11 RX ADMIN — DIAZEPAM 2.5 MG: 10 INJECTION, SOLUTION INTRAMUSCULAR; INTRAVENOUS at 22:16

## 2020-01-11 RX ADMIN — KETOROLAC TROMETHAMINE 15 MG: 30 INJECTION, SOLUTION INTRAMUSCULAR at 22:25

## 2020-01-12 ENCOUNTER — HOSPITAL ENCOUNTER (EMERGENCY)
Facility: HOSPITAL | Age: 35
Discharge: HOME | End: 2020-01-13
Attending: EMERGENCY MEDICINE | Admitting: EMERGENCY MEDICINE
Payer: MEDICARE

## 2020-01-12 VITALS
SYSTOLIC BLOOD PRESSURE: 114 MMHG | WEIGHT: 158.29 LBS | OXYGEN SATURATION: 98 % | TEMPERATURE: 98.7 F | RESPIRATION RATE: 18 BRPM | DIASTOLIC BLOOD PRESSURE: 81 MMHG | BODY MASS INDEX: 24.79 KG/M2 | HEART RATE: 62 BPM

## 2020-01-12 VITALS
OXYGEN SATURATION: 98 % | HEART RATE: 110 BPM | TEMPERATURE: 98.7 F | RESPIRATION RATE: 20 BRPM | BODY MASS INDEX: 25.21 KG/M2 | DIASTOLIC BLOOD PRESSURE: 82 MMHG | SYSTOLIC BLOOD PRESSURE: 134 MMHG | WEIGHT: 160.94 LBS

## 2020-01-12 DIAGNOSIS — G89.29 CHRONIC NECK PAIN: Primary | ICD-10-CM

## 2020-01-12 DIAGNOSIS — M54.2 CHRONIC NECK PAIN: Primary | ICD-10-CM

## 2020-01-12 DIAGNOSIS — Z59.00 HOMELESSNESS: ICD-10-CM

## 2020-01-12 LAB
ATRIAL RATE: 138 BPM
P AXIS: 50 DEGREES
PR INTERVAL: 130 MS
QRS AXIS: 16 DEGREES
QRSD INTERVAL: 98 MS
QT INTERVAL: 276 MS
QTC INTERVAL: 409 MS
T WAVE AXIS: 40 DEGREES
VENTRICULAR RATE: 132 BPM

## 2020-01-12 PROCEDURE — 96372 THER/PROPH/DIAG INJ SC/IM: CPT

## 2020-01-12 PROCEDURE — 99284 EMERGENCY DEPT VISIT MOD MDM: CPT | Performed by: EMERGENCY MEDICINE

## 2020-01-12 PROCEDURE — 96360 HYDRATION IV INFUSION INIT: CPT

## 2020-01-12 PROCEDURE — 99283 EMERGENCY DEPT VISIT LOW MDM: CPT

## 2020-01-12 PROCEDURE — 93010 ELECTROCARDIOGRAM REPORT: CPT | Performed by: INTERNAL MEDICINE

## 2020-01-12 RX ORDER — KETOROLAC TROMETHAMINE 30 MG/ML
15 INJECTION, SOLUTION INTRAMUSCULAR; INTRAVENOUS ONCE
Status: COMPLETED | OUTPATIENT
Start: 2020-01-12 | End: 2020-01-12

## 2020-01-12 RX ORDER — METHOCARBAMOL 500 MG/1
500 TABLET, FILM COATED ORAL ONCE
Status: COMPLETED | OUTPATIENT
Start: 2020-01-12 | End: 2020-01-12

## 2020-01-12 RX ORDER — PSEUDOEPHEDRINE HYDROCHLORIDE 30 MG/1
60 TABLET ORAL ONCE
Status: DISCONTINUED | OUTPATIENT
Start: 2020-01-12 | End: 2020-01-12 | Stop reason: HOSPADM

## 2020-01-12 RX ORDER — DIAZEPAM 5 MG/1
5 TABLET ORAL ONCE
Status: DISCONTINUED | OUTPATIENT
Start: 2020-01-12 | End: 2020-01-12 | Stop reason: HOSPADM

## 2020-01-12 RX ADMIN — KETOROLAC TROMETHAMINE 15 MG: 30 INJECTION, SOLUTION INTRAMUSCULAR at 23:23

## 2020-01-12 RX ADMIN — METHOCARBAMOL TABLETS 500 MG: 500 TABLET, COATED ORAL at 23:23

## 2020-01-12 SDOH — ECONOMIC STABILITY - HOUSING INSECURITY: HOMELESSNESS UNSPECIFIED: Z59.00

## 2020-01-12 NOTE — ED NOTES
Patient continues to sleep easily aroused from sleep to discharge     Derrick Laws RN  01/12/20 0069

## 2020-01-12 NOTE — ED NOTES
Transport arranged for patient for [address] [place] [address] Road,[address] Floor, RN  01/12/20 1007

## 2020-01-12 NOTE — ED PROVIDER NOTES
History  Chief Complaint   Patient presents with    Neck Pain     neck and shoulder spasm began 1 hour ago history of epilepsy, most recent seizure on wednesday  No recent injury noted     79-year-old gentleman initially signed in for neck and shoulder spasm  Patient has a history of seizures and is on Keppra  Patient states that his neck and shoulder spasms are a side effect of his Keppra  He did not take his Keppra today  Did not take anything for pain  After being triaged and placed in a room patient then had a single seizure that lasted approximately 1 minute  Patient was seen at [address] [place] [address] 9 days ago with the exact same complaint  Patient was given pain medicine muscle relaxants as well as his Keppra while in ed  He was also given a script for these medications for home  If he has noted in the chart that when he was to be discharged he refused to leave because he could not go back to his mother's and was living on the street  History provided by:  Patient and medical records   used: No    Neck Pain   Pain location:  R side  Quality:  Cramping  Pain radiates to:  R shoulder  Pain severity:  Severe  Pain is:  Unable to specify  Onset quality:  Gradual  Timing:  Intermittent  Progression:  Waxing and waning  Chronicity:  Recurrent  Ineffective treatments:  None tried  Associated symptoms: no bladder incontinence, no bowel incontinence, no chest pain, no headaches, no numbness, no paresis and no tingling    Risk factors: no recent epidural and no recent head injury        Prior to Admission Medications   Prescriptions Last Dose Informant Patient Reported?  Taking?   elvitegravir-cobicistat-emtricitabine-tenofovir (STRIBILD) [phone] mg   Yes Yes   Sig: Take 1 tablet by mouth daily with breakfast   ibuprofen (MOTRIN) 400 mg tablet   No Yes   Sig: Take 1 tablet (400 mg total) by mouth every 6 (six) hours as needed for moderate pain for up to 7 days   levETIRAcetam (KEPPRA) 1000 MG tablet   No Yes   Sig: Take 1 tablet (1,000 mg total) by mouth 2 (two) times a day      Facility-Administered Medications: None       Past Medical History:   Diagnosis Date    HIV [facility] immunodeficiency virus infection) ([facility] )     Seizures ([facility] )        Past Surgical History:   Procedure Laterality Date    HERNIA REPAIR         No family history on file  I have reviewed and agree with the history as documented  Social History     Tobacco Use    Smoking status: Current Every Day Smoker     Packs/day: 0 25    Smokeless tobacco: Never Used   Substance Use Topics    Alcohol use: Not Currently    Drug use: Not Currently     Comment: used K2 2 months ago and marijuana in past         Review of Systems   Constitutional: Negative for activity change and appetite change  HENT: Negative for congestion and facial swelling  Eyes: Negative for discharge and redness  Respiratory: Negative for cough and wheezing  Cardiovascular: Negative for chest pain and leg swelling  Gastrointestinal: Negative for abdominal distention, abdominal pain, blood in stool and bowel incontinence  Endocrine: Negative for cold intolerance and polydipsia  Genitourinary: Negative for bladder incontinence, difficulty urinating and hematuria  Musculoskeletal: Positive for neck pain  Negative for arthralgias and gait problem  Skin: Negative for color change and rash  Allergic/Immunologic: Negative for food allergies and immunocompromised state  Neurological: Negative for dizziness, tingling, seizures, numbness and headaches  Hematological: Negative for adenopathy  Does not bruise/bleed easily  Psychiatric/Behavioral: Negative for agitation, confusion and decreased concentration  All other systems reviewed and are negative  Physical Exam  Physical Exam   Constitutional: He is oriented to person, place, and time  He appears well-developed and well-nourished  Non-toxic appearance     HENT:   Head: Normocephalic and atraumatic  Right Ear: Tympanic membrane normal    Left Ear: Tympanic membrane normal    Nose: Nose normal    Mouth/Throat: Oropharynx is clear and moist    Eyes: Pupils are equal, round, and reactive to light  Conjunctivae, EOM and lids are normal    Neck: Trachea normal and normal range of motion  Neck supple  No JVD present  Carotid bruit is not present  Cardiovascular: Normal rate, regular rhythm, normal heart sounds and intact distal pulses  No extrasystoles are present  Pulmonary/Chest: Effort normal  He has no decreased breath sounds  He has no wheezes  He has no rhonchi  He has no rales  He exhibits no tenderness and no deformity  Abdominal: Soft  Bowel sounds are normal  There is no tenderness  There is no rebound, no guarding and no CVA tenderness  Musculoskeletal:        Right shoulder: He exhibits normal range of motion, no tenderness, no swelling and no deformity  Cervical back: Normal  He exhibits normal range of motion, no tenderness, no bony tenderness and no deformity  Lymphadenopathy:     He has no cervical adenopathy  He has no axillary adenopathy  Neurological: He is alert and oriented to person, place, and time  He has normal strength and normal reflexes  No cranial nerve deficit or sensory deficit  He displays a negative Romberg sign  Skin: Skin is warm and dry  Psychiatric: He has a normal mood and affect  His speech is normal and behavior is normal  Judgment and thought content normal  Cognition and memory are normal    Nursing note and vitals reviewed        Vital Signs  ED Triage Vitals   Temperature Pulse Respirations Blood Pressure SpO2   01/11/20 2034 01/11/20 2213 01/11/20 2213 01/11/20 2213 01/11/20 2213   98 7 °F (37 1 °C) (!) 134 18 121/61 100 %      Temp Source Heart Rate Source Patient Position - Orthostatic VS BP Location FiO2 (%)   01/11/20 2034 01/11/20 2213 01/11/20 2213 01/11/20 2213 --   Oral Monitor Sitting Right arm       Pain Score       --                  Vitals:    01/12/20 0230 01/12/20 0245 01/12/20 0618 01/12/20 0830   BP: 108/71 92/53 125/84 114/81   Pulse: 96 80 73 62   Patient Position - Orthostatic VS: Lying Lying Lying          Visual Acuity      ED Medications  Medications   sodium chloride 0 9 % bolus 1,000 mL (0 mL Intravenous Stopped 1/11/20 2326)   levETIRAcetam (KEPPRA) 1,000 mg in sodium chloride 0 9 % 100 mL IVPB (0 mg Intravenous Stopped 1/11/20 2250)   diazepam (VALIUM) injection 2 5 mg (2 5 mg Intravenous Given 1/11/20 2216)   ketorolac (TORADOL) injection 15 mg (15 mg Intravenous Given 1/11/20 2225)   sodium chloride 0 9 % bolus 1,000 mL (0 mL Intravenous Stopped 1/12/20 0618)       Diagnostic Studies  Results Reviewed     Procedure Component Value Units Date/Time    CBC and differential [[id number]]  (Abnormal) Collected:  01/11/20 2212    Lab Status:  Final result Specimen:  Blood from Arm, Right Updated:  01/11/20 2302     WBC 13 92 Thousand/uL      RBC 4 64 Million/uL      Hemoglobin 14 0 g/dL      Hematocrit 44 4 %      MCV 96 fL      MCH 30 2 pg      MCHC 31 5 g/dL      RDW 14 1 %      MPV 10 5 fL      Platelets 032 Thousands/uL      nRBC 0 /100 WBCs     Narrative: This is an appended report  These results have been appended to a previously verified report      Basic metabolic panel [[id number]]  (Abnormal) Collected:  01/11/20 2212    Lab Status:  Final result Specimen:  Blood from Arm, Right Updated:  01/11/20 2238     Sodium 143 mmol/L      Potassium 3 1 mmol/L      Chloride 101 mmol/L      CO2 16 mmol/L      ANION GAP 26 mmol/L      BUN 9 mg/dL      Creatinine 1 27 mg/dL      Glucose 126 mg/dL      Calcium 9 4 mg/dL      eGFR 85 ml/min/1 73sq m     Narrative:       [place] guidelines for Chronic Kidney Disease (CKD):     Stage 1 with normal or high GFR (GFR > 90 mL/min/1 73 square meters)    Stage 2 Mild CKD (GFR = 60-89 mL/min/1 73 square meters)    Stage 3A Moderate CKD (GFR = 45-59 mL/min/1 73 square meters)    Stage 3B Moderate CKD (GFR = 30-44 mL/min/1 73 square meters)    Stage 4 Severe CKD (GFR = 15-29 mL/min/1 73 square meters)    Stage 5 End Stage CKD (GFR <15 mL/min/1 73 square meters)  Note: GFR calculation is accurate only with a steady state creatinine    Levetiracetam level [[id number]] Collected:  01/11/20 2212    Lab Status: In process Specimen:  Blood from Arm, Right Updated:  01/11/20 2226                 No orders to display              Procedures  ECG 12 Lead Documentation Only  Date/Time: 1/11/2020 9:55 PM  Performed by: Rosanna Main DO  Authorized by: Rosanna Main DO     Patient location:  ED  Rate:     ECG rate:  132  Rhythm:     Rhythm: sinus tachycardia               ED Course                               MDM  Number of Diagnoses or Management Options  Neck pain: established and worsening  Non compliance w medication regimen: established and worsening  [facility] disorder ([facility] ): established and worsening     Amount and/or Complexity of Data Reviewed  Clinical lab tests: ordered and reviewed  Tests in the medicine section of CPT®: ordered and reviewed    Patient Progress  Patient progress: stable        Disposition  Final diagnoses:   Neck pain   Seizure disorder ([facility] )   Non compliance w medication regimen     Time reflects when diagnosis was documented in both MDM as applicable and the Disposition within this note     Time User Action Codes Description Comment    1/11/2020 11:44 PM Alcario Muscat K Add [M54 2] Neck pain     1/11/2020 11:44 PM Alcario Muscat K Add [G40 909] Seizure disorder ([facility] )     1/11/2020 11:44 PM Crispin Serna Add [Z91 14] Non compliance w medication regimen       ED Disposition     ED Disposition Condition Date/Time Comment    Discharge Stable Sat Jan 11, 2020 11:44 PM Garrick Sheriff discharge to home/self care              Follow-up Information     Follow up With Specialties Details Why Contact Info Additional [facility] Medicine Schedule an appointment as soon as possible for a visit   [address] [id number]  [address] , [place], [place], [place], [address]          Discharge Medication List as of 1/11/2020 11:44 PM      CONTINUE these medications which have NOT CHANGED    Details   elvitegravir-cobicistat-emtricitabine-tenofovir (STRIBILD) [phone] mg Take 1 tablet by mouth daily with breakfast, Historical Med      ibuprofen (MOTRIN) 400 mg tablet Take 1 tablet (400 mg total) by mouth every 6 (six) hours as needed for moderate pain for up to 7 days, Starting Thu 1/2/2020, Until Sat 1/11/2020, Print      levETIRAcetam (KEPPRA) 1000 MG tablet Take 1 tablet (1,000 mg total) by mouth 2 (two) times a day, Starting Thu 1/2/2020, Until Sat 2/1/2020, Print      methocarbamol (ROBAXIN) 500 mg tablet Take 1 tablet (500 mg total) by mouth 2 (two) times a day for 7 days, Starting Thu 1/2/2020, Until Sat 1/11/2020, Print           No discharge procedures on file      ED Provider  Electronically Signed by           Marybel Tello DO  01/14/20 7401

## 2020-01-12 NOTE — ED NOTES
Patient continues to sleep, will allow until morning then discharge     Roselia Rose RN  01/12/20 1093

## 2020-01-13 NOTE — ED PROVIDER NOTES
History  Chief Complaint   Patient presents with    Neck Pain     Pt  reports neck pain and spasms that began today  Pt  was here yesterday for possible seizure  As per EMS, pt  is homeless and was looking for a place to stay  71-year-old male presents via EMS with chief complaint of neck pain and the feeling that he might have a seizure  Patient has longstanding epilepsy and reports that he has neck stiffness and pain as a side effect of his Keppra  Patient was seen here yesterday for the same  Patient was seen a week ago at [facility] for similar symptoms  Of note patient is homeless and there is suspicion that his presentation to the emergency department is simply for a place to stay  According to charting patient reportedly did have a seizure here yesterday lasting 1 minute  Patient had a workup which was non-illuminating and was discharged  History provided by:  Patient and medical records   used: No    Neck Pain   Pain location:  Generalized neck  Quality:  Aching and stiffness  Stiffness is present:  All day  Pain radiates to:  Does not radiate  Pain severity:  Moderate  Pain is:  Same all the time  Timing:  Constant  Progression:  Unchanged  Chronicity:  Chronic  Relieved by:  Nothing  Worsened by:  Nothing  Ineffective treatments:  None tried  Associated symptoms: no chest pain and no fever        Prior to Admission Medications   Prescriptions Last Dose Informant Patient Reported?  Taking?   elvitegravir-cobicistat-emtricitabine-tenofovir (STRIBILD) [phone] mg   Yes Yes   Sig: Take 1 tablet by mouth daily with breakfast   ibuprofen (MOTRIN) 400 mg tablet   No Yes   Sig: Take 1 tablet (400 mg total) by mouth every 6 (six) hours as needed for moderate pain for up to 7 days   levETIRAcetam (KEPPRA) 1000 MG tablet   No Yes   Sig: Take 1 tablet (1,000 mg total) by mouth 2 (two) times a day      Facility-Administered Medications: None       Past Medical History: Diagnosis Date    [facility]     Seizures ([facility] )        Past Surgical History:   Procedure Laterality Date    HERNIA REPAIR         History reviewed  No pertinent family history  I have reviewed and agree with the history as documented  Social History     Tobacco Use    Smoking status: Current Every Day Smoker     Packs/day: 0 25    Smokeless tobacco: Never Used   Substance Use Topics    Alcohol use: Not Currently    Drug use: Not Currently     Comment: used K2 2 months ago and marijuana in past         Review of Systems   Constitutional: Negative for chills, diaphoresis and fever  Respiratory: Negative for shortness of breath  Cardiovascular: Negative for chest pain and palpitations  Gastrointestinal: Negative for diarrhea, nausea and vomiting  Genitourinary: Negative for dysuria and frequency  Musculoskeletal: Positive for neck pain  Skin: Negative for rash  Neurological: Positive for seizures  All other systems reviewed and are negative  Physical Exam  Physical Exam   Constitutional: He is oriented to person, place, and time  He appears well-developed and well-nourished  No distress  HENT:   Head: Normocephalic and atraumatic  Eyes: Pupils are equal, round, and reactive to light  EOM are normal    Neck: Normal range of motion  No JVD present  Cardiovascular: Normal rate, regular rhythm, normal heart sounds and intact distal pulses  Exam reveals no gallop and no friction rub  No murmur heard  Pulmonary/Chest: Effort normal and breath sounds normal  No respiratory distress  He has no wheezes  He has no rales  He exhibits no tenderness  Musculoskeletal: Normal range of motion  He exhibits no tenderness  Mild paravertebral muscle spasm cervical spine     Neurological: He is alert and oriented to person, place, and time  Skin: Skin is warm and dry  Capillary refill takes less than 2 seconds     Psychiatric: He has a normal mood and affect  His behavior is normal  Judgment and thought content normal    Nursing note and vitals reviewed  Vital Signs  ED Triage Vitals [01/12/20 2204]   Temperature Pulse Respirations Blood Pressure SpO2   98 7 °F (37 1 °C) (!) 110 20 134/82 98 %      Temp Source Heart Rate Source Patient Position - Orthostatic VS BP Location FiO2 (%)   Oral Monitor Lying Right arm --      Pain Score       9           Vitals:    01/12/20 2204   BP: 134/82   Pulse: (!) 110   Patient Position - Orthostatic VS: Lying         Visual Acuity      ED Medications  Medications   ketorolac (TORADOL) injection 15 mg (15 mg Intramuscular Given 1/12/20 2323)   methocarbamol (ROBAXIN) tablet 500 mg (500 mg Oral Given 1/12/20 2323)       Diagnostic Studies  Results Reviewed     None                 No orders to display              Procedures  Procedures         ED Course  ED Course as of Jan 13 0042   Jamie Reaves Jan 12, 2020 2329 Patient is comfortable without any signs of distress at this time  I do plan on discharging him after medication administration  I am slightly concerned that he may have a non-epilieptic seizure as a delay tactic to prevent discharge  If he doesn't have one I will remove this note                                      MDM  Number of Diagnoses or Management Options  Chronic neck pain: new and does not require workup  Homelessness: new and does not require workup  Diagnosis management comments: Background: 29 y o  male presents with neck pain and stiffness    Differential DX includes but is not limited to: secondary gain, chronic stiffness    Plan: muscle relaxant and anti-inflammatory therapy      Risk of Complications, Morbidity, and/or Mortality  Management options: high    Patient Progress  Patient progress: improved        Disposition  Final diagnoses:   Chronic neck pain   Homelessness     Time reflects when diagnosis was documented in both MDM as applicable and the Disposition within this note     Time User Action Codes Description Comment    1/13/2020 12:41 AM Celene Favre Add [M54 2,  G89 29] Chronic neck pain     1/13/2020 12:41 AM Celene Favre Add [Z59 0] Homelessness       ED Disposition     ED Disposition Condition Date/Time Comment    Discharge Stable Mon Jan 13, 2020 12:41 AM Nayeli Mena discharge to home/self care  Follow-up Information     Follow up With Specialties Details Why Contact Info    [employer]  Call  to establish primary care [phone]            Patient's Medications   Discharge Prescriptions    No medications on file     No discharge procedures on file      ED Provider  Electronically Signed by           Dara Bonner MD  01/13/20 2186

## 2020-01-15 ENCOUNTER — HOSPITAL ENCOUNTER (EMERGENCY)
Facility: HOSPITAL | Age: 35
Discharge: HOME | End: 2020-01-15
Attending: EMERGENCY MEDICINE | Admitting: EMERGENCY MEDICINE
Payer: MEDICARE

## 2020-01-15 VITALS
BODY MASS INDEX: 25.25 KG/M2 | OXYGEN SATURATION: 100 % | RESPIRATION RATE: 16 BRPM | HEIGHT: 67 IN | HEART RATE: 74 BPM | WEIGHT: 160.9 LBS | TEMPERATURE: 98.1 F

## 2020-01-15 DIAGNOSIS — R25.2 SPASM: Primary | ICD-10-CM

## 2020-01-15 PROCEDURE — 99283 EMERGENCY DEPT VISIT LOW MDM: CPT

## 2020-01-15 PROCEDURE — 99283 EMERGENCY DEPT VISIT LOW MDM: CPT | Performed by: EMERGENCY MEDICINE

## 2020-01-15 RX ORDER — IBUPROFEN 600 MG/1
600 TABLET ORAL ONCE
Status: COMPLETED | OUTPATIENT
Start: 2020-01-15 | End: 2020-01-15

## 2020-01-15 RX ADMIN — IBUPROFEN 600 MG: 600 TABLET ORAL at 14:44

## 2020-01-15 NOTE — ED PROVIDER NOTES
History  Chief Complaint   Patient presents with    Spasms     pt complains of neck spasms started getting worse today was seen 3 days ago for the same     30 yo male with h/o seizure d/o presents with neck spasm  Seen in ED for similar 1/2, 1/11, 1/12  During those visits symptoms were treated with Toradol and muscle relaxers although there was some charted concern about secondary gain  Pt denies numbness, weakness, of UE or clumsiness of hand  Multiple prescriptions given on previous visits  Denies new injury  Denies color change of limbs  Pt very taciturn on interview  History provided by:  Medical records and patient  History limited by: Intubated   used: No    Neck Pain   Pain location:  R side  Quality:  Cramping  Pain radiates to:  Does not radiate  Pain severity:  Moderate  Pain is:  Unable to specify  Onset quality:  Unable to specify  Timing:  Unable to specify  Progression:  Unable to specify  Context: not recent injury    Relieved by:  Muscle relaxants and NSAIDs  Associated symptoms: no numbness, no tingling and no weakness    Risk factors: no hx of head and neck radiation and no hx of spinal trauma        Prior to Admission Medications   Prescriptions Last Dose Informant Patient Reported? Taking?   elvitegravir-cobicistat-emtricitabine-tenofovir (STRIBILD) [phone] mg   Yes No   Sig: Take 1 tablet by mouth daily with breakfast   ibuprofen (MOTRIN) 400 mg tablet   No No   Sig: Take 1 tablet (400 mg total) by mouth every 6 (six) hours as needed for moderate pain for up to 7 days   levETIRAcetam (KEPPRA) 1000 MG tablet   No No   Sig: Take 1 tablet (1,000 mg total) by mouth 2 (two) times a day      Facility-Administered Medications: None       Past Medical History:   Diagnosis Date    [facility]     [facility]        Past Surgical History:   Procedure Laterality Date    HERNIA REPAIR         History reviewed   No pertinent family history  I have reviewed and agree with the history as documented  Social History     Tobacco Use    Smoking status: Current Every Day Smoker     Packs/day: 0 25    Smokeless tobacco: Never Used   Substance Use Topics    Alcohol use: Not Currently    Drug use: Not Currently     Comment: used K2 2 months ago and marijuana in past         Review of Systems   Musculoskeletal: Positive for neck pain  Neurological: Negative for tingling, weakness and numbness  All other systems reviewed and are negative  Physical Exam  Physical Exam   Constitutional: He is oriented to person, place, and time  He appears well-developed and well-nourished  HENT:   Head: Normocephalic and atraumatic  Eyes: Conjunctivae are normal    Neck: Normal range of motion  No spinous process tenderness present  No palpable spasm, no TTP with distraction, skin intact, no warmth, no rigidity,    Cardiovascular: Normal rate, regular rhythm, normal heart sounds and intact distal pulses  No murmur heard  Pulmonary/Chest: Effort normal and breath sounds normal  No respiratory distress  Abdominal: Soft  Musculoskeletal: Normal range of motion  He exhibits no deformity  Neurological: He is alert and oriented to person, place, and time  He has normal strength  He displays no atrophy and no tremor  No sensory deficit  He displays no seizure activity  Intact m/r/u nerves bilaterally, strong equal  bilaterally, able to arise from stretch and ambulate with minimal assistance (walks with cane at baseline)   Skin: Skin is warm and dry  He is not diaphoretic  Psychiatric: He has a normal mood and affect  His behavior is normal  Judgment and thought content normal    Nursing note and vitals reviewed        Vital Signs  ED Triage Vitals [01/15/20 1344]   Temperature Pulse Respirations BP SpO2   98 1 °F (36 7 °C) 74 16 -- 100 %      Temp Source Heart Rate Source Patient Position - Orthostatic VS BP Location FiO2 (%)   Oral -- -- -- --      Pain Score       8           Vitals:    01/15/20 1344   Pulse: 74         Visual Acuity      ED Medications  Medications   ibuprofen (MOTRIN) tablet 600 mg (600 mg Oral Given 1/15/20 1444)       Diagnostic Studies  Results Reviewed     None                 No orders to display              Procedures  Procedures         ED Course  ED Course as of Jan 16 1504   Wed Dominic 15, 2020   1557 4th ED visit since 11/2 for same  Treated during previous visits with Toradol and mm relaxers +/- benzos  Given multiple prescriptions in past   On my exam pt neuro intact, no strength or sensory deficits  Pt ambulates at baseline  Pt very terse with giving details of history and subsequent follow up from previous visits  Pt without significant somnolence or signs of intoxication, behavior best characterized as not cooperative  Pt does state he has somewhere safe to stay  Only requests good and a Uber home  Agrees to discharge after Motrin upon my re-evaluation                                  MDM  Number of Diagnoses or Management Options  Spasm:      Amount and/or Complexity of Data Reviewed  Review and summarize past medical records: yes    Risk of Complications, Morbidity, and/or Mortality  Presenting problems: low  Diagnostic procedures: low  Management options: low    Patient Progress  Patient progress: stable        Disposition  Final diagnoses:   None     ED Disposition     None      Follow-up Information    None         Patient's Medications   Discharge Prescriptions    No medications on file     No discharge procedures on file      ED Provider  Electronically Signed by           Trudi Mahajan MD  01/16/20 6637

## 2020-01-15 NOTE — ED NOTES
Pt  Sitting in wheelchair, pt  Being difficult to the point he does not want to talk to staff  Sitting in chair with eyes closed  Dr Dale Pope assessing pt       Benji Irizarry, RN  01/15/20 8926

## 2020-01-15 NOTE — DISCHARGE INSTRUCTIONS
Your exam was reassuring  You have been prescribed medications during your last visit that will treat this issue  You can take Motrin and Tylenol as needed  Stay hydrated  Follow up with your doctor or at the [facility] Medicine office (number provided above)

## 2020-01-17 LAB — LEVETIRACETAM SERPL-MCNC: <1 UG/ML (ref 10–40)

## 2020-01-18 NOTE — RESULT ENCOUNTER NOTE
Call patient phone number listed  No answer at this time  Voice mail boxes not been set up yet  Will send letter

## 2020-02-06 ENCOUNTER — HOSPITAL ENCOUNTER (EMERGENCY)
Facility: HOSPITAL | Age: 35
Discharge: HOME | End: 2020-02-06
Attending: EMERGENCY MEDICINE | Admitting: EMERGENCY MEDICINE
Payer: MEDICARE

## 2020-02-06 VITALS
HEART RATE: 88 BPM | DIASTOLIC BLOOD PRESSURE: 87 MMHG | WEIGHT: 155.25 LBS | BODY MASS INDEX: 24.37 KG/M2 | OXYGEN SATURATION: 98 % | RESPIRATION RATE: 16 BRPM | TEMPERATURE: 97.4 F | HEIGHT: 67 IN | SYSTOLIC BLOOD PRESSURE: 130 MMHG

## 2020-02-06 DIAGNOSIS — R56.9 SEIZURE (HCC): Primary | ICD-10-CM

## 2020-02-06 PROCEDURE — 99285 EMERGENCY DEPT VISIT HI MDM: CPT | Performed by: EMERGENCY MEDICINE

## 2020-02-06 PROCEDURE — 99285 EMERGENCY DEPT VISIT HI MDM: CPT

## 2020-02-06 PROCEDURE — 96365 THER/PROPH/DIAG IV INF INIT: CPT

## 2020-02-06 RX ADMIN — LEVETIRACETAM 1500 MG: 100 INJECTION, SOLUTION INTRAVENOUS at 07:45

## 2020-02-06 NOTE — ED PROVIDER NOTES
History  Chief Complaint   Patient presents with    Seizure - Prior Hx Of     Pt was asleep in the homeless shelter in [place], [place] and workers couldn't wake him up so they called EMS, pt was awake and incontinent urine, possible seizure, history of and pt has not had keppra since December  Patient was brought in by the [place] squad because the staff at his homeless shelter had difficult time waking him this morning  When he was arousable they noted he had urinary incontinence  Patient has no recollection of the events  Staff thinks he may have had a seizure, though no one witnessed  Patient arrives awake and alert  States he has been off seizure medicines for about a month and a half  He denies any intoxicants  He has had no recent head injury or fever  Blood sugar at the scene was normal   Patient states he had at least 1 other seizure last month  Prior to Admission Medications   Prescriptions Last Dose Informant Patient Reported? Taking?   elvitegravir-cobicistat-emtricitabine-tenofovir (STRIBILD) [phone] mg Past Month at Unknown time  Yes Yes   Sig: Take 1 tablet by mouth daily with breakfast   levETIRAcetam (KEPPRA) 1000 MG tablet Past Month at Unknown time  No Yes   Sig: Take 1 tablet (1,000 mg total) by mouth 2 (two) times a day      Facility-Administered Medications: None       Past Medical History:   Diagnosis Date    [facility]     Seizures ([facility] )        Past Surgical History:   Procedure Laterality Date    HERNIA REPAIR         History reviewed  No pertinent family history  I have reviewed and agree with the history as documented      Social History     Tobacco Use    Smoking status: Current Every Day Smoker     Packs/day: 0 25    Smokeless tobacco: Never Used   Substance Use Topics    Alcohol use: Not Currently    Drug use: Not Currently     Comment: used K2 2 months ago and marijuana in past         Review of Systems Constitutional: Negative for chills and fever  HENT: Negative for congestion and sore throat  Eyes: Negative for visual disturbance  Respiratory: Negative for chest tightness and shortness of breath  Cardiovascular: Negative for chest pain  Gastrointestinal: Negative for abdominal pain and vomiting  Genitourinary: Negative for dysuria  Musculoskeletal: Negative for back pain  Skin: Negative for rash  Neurological: Positive for seizures  Negative for weakness and headaches  Hematological: Does not bruise/bleed easily  Psychiatric/Behavioral: Positive for dysphoric mood and sleep disturbance  Negative for confusion  Physical Exam  Physical Exam   Constitutional: He is oriented to person, place, and time  He appears well-developed and well-nourished  HENT:   Head: Normocephalic and atraumatic  Patient has very poor dentition  No tongue bite or laceration is noted   Eyes: Conjunctivae are normal    Neck: Normal range of motion  Neck supple  Cardiovascular: Normal rate and regular rhythm  Pulmonary/Chest: Effort normal and breath sounds normal    Abdominal: Soft  Bowel sounds are normal  There is no tenderness  Musculoskeletal: Normal range of motion  He exhibits no edema  Neurological: He is alert and oriented to person, place, and time  No cranial nerve deficit  Coordination normal    Skin: Skin is warm and dry  Capillary refill takes less than 2 seconds  Psychiatric: He has a normal mood and affect  His behavior is normal    Patient states he does not want to discussed the reasons for his noncompliance  States that is personal  He does not want to see crisis   Nursing note and vitals reviewed        Vital Signs  ED Triage Vitals [02/06/20 0721]   Temperature Pulse Respirations Blood Pressure SpO2   (!) 97 4 °F (36 3 °C) 88 16 130/87 98 %      Temp Source Heart Rate Source Patient Position - Orthostatic VS BP Location FiO2 (%)   Tympanic Monitor Lying Right arm -- Pain Score       No Pain           Vitals:    02/06/20 0721   BP: 130/87   Pulse: 88   Patient Position - Orthostatic VS: Lying         Visual Acuity      ED Medications  Medications   levETIRAcetam (KEPPRA) 1,500 mg in sodium chloride 0 9 % 100 mL IVPB (has no administration in time range)       Diagnostic Studies  Results Reviewed     None                 No orders to display              Procedures  Procedures         ED Course                               MDM  Number of Diagnoses or Management Options  Diagnosis management comments: Patient states he has prescription medication available to him at the pharmacy, and that he chose to stop taking medication  Disposition  Final diagnoses:   None     ED Disposition     None      Follow-up Information    None         Patient's Medications   Discharge Prescriptions    No medications on file     No discharge procedures on file      ED Provider  Electronically Signed by           Aide Wallace MD  02/06/20 3597

## 2020-02-06 NOTE — ED NOTES
2/6/20 @ 0805:  PES was asked to speak with 29year-old black male, who was brought to ED by squad from [facility] in [place], [place]  According to report, [facility] staff was having a difficult time waking patient, so he was sent to ED  While in ED, patient has been sleeping, and difficult to wake  When asked what he needed, patient says, "My Keppra "  Again, when asked, "do you have your medication, he states, " yes, but not with me "  Patient said he is going back to [facility] upon discharge, but at this point, patient continues with difficulties remaining awake, but at no time, did he admit to any mental health issues  PES discussed with ED RN and MD, who will continue to monitor patient's medical condition, and when patient is fully awake, if he reports any needs that PES could assist with, to notify viji Kenney, MS

## 2020-02-06 NOTE — ED NOTES
Attempting to wake patient again,opened eyes but then closed,not waking  Patient informed he is being discharged and can get dressed but not waking to do so       Hermila Mcfadden, MARTIN  02/06/20 6581

## 2020-02-06 NOTE — ED NOTES
Patient given urinal to void,patient also given hospital pants due to his pants being wet  Patient instructed to dress after voiding,he is being discharged       Tiffanie Howell RN  02/06/20 7480

## 2020-02-06 NOTE — ED NOTES
Patient received keppra and is still sleepy at this time  Attempted to wake patient and not waking up,makes slight noise as to him hearing nurse       Ulysses Dienes, RN  02/06/20 9714

## 2020-03-19 ENCOUNTER — HOSPITAL ENCOUNTER (EMERGENCY)
Facility: HOSPITAL | Age: 35
Discharge: HOME | End: 2020-03-19
Attending: EMERGENCY MEDICINE
Payer: MEDICARE

## 2020-03-19 VITALS
RESPIRATION RATE: 18 BRPM | SYSTOLIC BLOOD PRESSURE: 144 MMHG | DIASTOLIC BLOOD PRESSURE: 67 MMHG | HEIGHT: 67 IN | OXYGEN SATURATION: 97 % | WEIGHT: 155 LBS | TEMPERATURE: 98.6 F | HEART RATE: 103 BPM | BODY MASS INDEX: 24.33 KG/M2

## 2020-03-19 DIAGNOSIS — S16.1XXA STRAIN OF NECK MUSCLE, INITIAL ENCOUNTER: Primary | ICD-10-CM

## 2020-03-19 DIAGNOSIS — M62.838 MUSCLE SPASM: ICD-10-CM

## 2020-03-19 PROCEDURE — 99284 EMERGENCY DEPT VISIT MOD MDM: CPT | Performed by: EMERGENCY MEDICINE

## 2020-03-19 PROCEDURE — 99283 EMERGENCY DEPT VISIT LOW MDM: CPT

## 2020-03-19 PROCEDURE — 96372 THER/PROPH/DIAG INJ SC/IM: CPT

## 2020-03-19 RX ORDER — ACETAMINOPHEN 325 MG/1
650 TABLET ORAL ONCE
Status: COMPLETED | OUTPATIENT
Start: 2020-03-19 | End: 2020-03-19

## 2020-03-19 RX ORDER — KETOROLAC TROMETHAMINE 30 MG/ML
30 INJECTION, SOLUTION INTRAMUSCULAR; INTRAVENOUS ONCE
Status: COMPLETED | OUTPATIENT
Start: 2020-03-19 | End: 2020-03-19

## 2020-03-19 RX ORDER — NAPROXEN 500 MG/1
500 TABLET ORAL 2 TIMES DAILY WITH MEALS
Qty: 10 TABLET | Refills: 0 | Status: SHIPPED | OUTPATIENT
Start: 2020-03-19 | End: 2020-04-08

## 2020-03-19 RX ADMIN — KETOROLAC TROMETHAMINE 30 MG: 30 INJECTION, SOLUTION INTRAMUSCULAR at 02:15

## 2020-03-19 RX ADMIN — ACETAMINOPHEN 650 MG: 325 TABLET, FILM COATED ORAL at 02:16

## 2020-03-19 NOTE — ED NOTES
ANA LUISA called for transport back to [facility]  Transfer pending @0500       Henna Sol RN  03/19/20 3320

## 2020-03-19 NOTE — ED PROVIDER NOTES
History  Chief Complaint   Patient presents with    Neck Pain     pt  was at [address] and began having  neck spasms  28 y/o male presents with neck spasms diffuse no trauma, no anterior neck pain, no neck stiffness  No fevers,no neurological symptoms of weakness, numbness,tingling or vision changes  Been here for the same in the past  The pain is sharp continuous currently 5/10 nothing makes it better movement makes it worse  No other symptoms  History provided by:  Patient   used: No        Prior to Admission Medications   Prescriptions Last Dose Informant Patient Reported? Taking?   elvitegravir-cobicistat-emtricitabine-tenofovir (STRIBILD) [phone] mg More than a month at Unknown time  Yes No   Sig: Take 1 tablet by mouth daily with breakfast   levETIRAcetam (KEPPRA) 1000 MG tablet More than a month at Unknown time  No No   Sig: Take 1 tablet (1,000 mg total) by mouth 2 (two) times a day      Facility-Administered Medications: None       Past Medical History:   Diagnosis Date    HIV (human immunodeficiency virus infection) ([facility] )     Seizures ([facility] )        Past Surgical History:   Procedure Laterality Date    HERNIA REPAIR         History reviewed  No pertinent family history  I have reviewed and agree with the history as documented  E-Cigarette/Vaping     E-Cigarette/Vaping Substances     Social History     Tobacco Use    Smoking status: Current Every Day Smoker     Packs/day: 1 00    Smokeless tobacco: Never Used   Substance Use Topics    Alcohol use: Not Currently    Drug use: Not Currently     Comment: used K2 2 months ago and marijuana in past        Review of Systems   Constitutional: Negative  HENT: Negative  Eyes: Negative  Respiratory: Negative  Cardiovascular: Negative  Gastrointestinal: Negative  Endocrine: Negative  Genitourinary: Negative  Musculoskeletal: Positive for neck pain  Skin: Negative  Allergic/Immunologic: Negative  Neurological: Negative  Hematological: Negative  Psychiatric/Behavioral: Negative  All other systems reviewed and are negative  Physical Exam  Physical Exam   Constitutional: He is oriented to person, place, and time  He appears well-developed and well-nourished  HENT:   Head: Normocephalic and atraumatic  Eyes: Pupils are equal, round, and reactive to light  EOM are normal    Neck: Normal range of motion  Neck supple  Cardiovascular: Normal rate and regular rhythm  Pulmonary/Chest: Effort normal and breath sounds normal    Abdominal: Soft  Bowel sounds are normal    Musculoskeletal: Normal range of motion  Diffuse cervical spine tenderness, no midline tenderness, no step offs  Neurological: He is alert and oriented to person, place, and time  Skin: Skin is warm and dry  Psychiatric: He has a normal mood and affect  Nursing note and vitals reviewed        Vital Signs  ED Triage Vitals   Temperature Pulse Respirations Blood Pressure SpO2   03/19/20 0131 03/19/20 0135 03/19/20 0135 03/19/20 0135 03/19/20 0135   98 6 °F (37 °C) 103 18 144/67 97 %      Temp Source Heart Rate Source Patient Position - Orthostatic VS BP Location FiO2 (%)   03/19/20 0131 -- -- 03/19/20 0135 --   Tympanic   Left arm       Pain Score       03/19/20 0135       8           Vitals:    03/19/20 0135   BP: 144/67   Pulse: 103         Visual Acuity      ED Medications  Medications   ketorolac (TORADOL) injection 30 mg (30 mg Intramuscular Given 3/19/20 0215)   acetaminophen (TYLENOL) tablet 650 mg (650 mg Oral Given 3/19/20 0216)       Diagnostic Studies  Results Reviewed     None                 No orders to display              Procedures  Procedures         ED Course                                 MDM  Number of Diagnoses or Management Options  Muscle spasm:   Strain of neck muscle, initial encounter:   Patient Progress  Patient progress: stable        Disposition  Final diagnoses:   Strain of neck muscle, initial encounter   Muscle spasm     Time reflects when diagnosis was documented in both MDM as applicable and the Disposition within this note     Time User Action Codes Description Comment    3/19/2020  2:34 AM Jacob Morris Add [S16  1XXA] Strain of neck muscle, initial encounter     3/19/2020  2:34 AM Neha Morris Add [I61 190] Muscle spasm       ED Disposition     ED Disposition Condition Date/Time Comment    Discharge Stable Thu Mar 19, 2020  2:34 AM Pedro Young discharge to home/self care  Follow-up Information     Follow up With Specialties Details Why Contact Info Additional Information    [address] [facility] Emergency Department Emergency Medicine  If symptoms worsen [address] [facility] [address]  [phone] [facility] ED, [place], [place], [address]          Patient's Medications   Discharge Prescriptions    NAPROXEN (NAPROSYN) 500 MG TABLET    Take 1 tablet (500 mg total) by mouth 2 (two) times a day with meals for 5 days       Start Date: 3/19/2020 End Date: 3/24/2020       Order Dose: 500 mg       Quantity: 10 tablet    Refills: 0     No discharge procedures on file      PDMP Review     None          ED Provider  Electronically Signed by           Emery Gardiner DO  03/19/20 4262

## 2020-03-19 NOTE — ED NOTES
Called [facility] in [facility] for pt ,he left his cell phone charging in store, per employee cell phone in their safe  Pt made aware       Nestor Garcia RN  03/19/20 0801

## 2020-04-05 ENCOUNTER — HOSPITAL ENCOUNTER (EMERGENCY)
Facility: HOSPITAL | Age: 35
Discharge: HOME | End: 2020-04-05
Attending: EMERGENCY MEDICINE | Admitting: EMERGENCY MEDICINE
Payer: MEDICARE

## 2020-04-05 VITALS
SYSTOLIC BLOOD PRESSURE: 106 MMHG | DIASTOLIC BLOOD PRESSURE: 70 MMHG | OXYGEN SATURATION: 96 % | WEIGHT: 156.53 LBS | HEART RATE: 88 BPM | TEMPERATURE: 97.2 F | BODY MASS INDEX: 24.52 KG/M2 | RESPIRATION RATE: 13 BRPM

## 2020-04-05 DIAGNOSIS — B20 HISTORY OF HIV INFECTION (HCC): ICD-10-CM

## 2020-04-05 DIAGNOSIS — R00.0 SINUS TACHYCARDIA: Primary | ICD-10-CM

## 2020-04-05 DIAGNOSIS — Z86.69 HISTORY OF EPILEPSY: ICD-10-CM

## 2020-04-05 LAB
ALBUMIN SERPL BCP-MCNC: 3.9 G/DL (ref 3.5–5)
ALP SERPL-CCNC: 61 U/L (ref 46–116)
ALT SERPL W P-5'-P-CCNC: 24 U/L (ref 12–78)
AST SERPL W P-5'-P-CCNC: 26 U/L (ref 5–45)
BACTERIA UR QL AUTO: ABNORMAL /HPF
BASOPHILS # BLD AUTO: 0.02 THOUSANDS/ΜL (ref 0–0.1)
BASOPHILS NFR BLD AUTO: 0 % (ref 0–1)
BILIRUB DIRECT SERPL-MCNC: 0.17 MG/DL (ref 0–0.2)
BILIRUB SERPL-MCNC: 0.7 MG/DL (ref 0.2–1)
BILIRUB UR QL STRIP: NEGATIVE
CLARITY UR: CLEAR
COLOR UR: YELLOW
EOSINOPHIL # BLD AUTO: 0.02 THOUSAND/ΜL (ref 0–0.61)
EOSINOPHIL NFR BLD AUTO: 0 % (ref 0–6)
ERYTHROCYTE [DISTWIDTH] IN BLOOD BY AUTOMATED COUNT: 12.2 % (ref 11.6–15.1)
GLUCOSE UR STRIP-MCNC: NEGATIVE MG/DL
HCT VFR BLD AUTO: 48.6 % (ref 36.5–49.3)
HGB BLD-MCNC: 15.4 G/DL (ref 12–17)
HGB UR QL STRIP.AUTO: NEGATIVE
IMM GRANULOCYTES # BLD AUTO: 0.02 THOUSAND/UL (ref 0–0.2)
IMM GRANULOCYTES NFR BLD AUTO: 0 % (ref 0–2)
KETONES UR STRIP-MCNC: NEGATIVE MG/DL
LEUKOCYTE ESTERASE UR QL STRIP: NEGATIVE
LYMPHOCYTES # BLD AUTO: 1.73 THOUSANDS/ΜL (ref 0.6–4.47)
LYMPHOCYTES NFR BLD AUTO: 35 % (ref 14–44)
MAGNESIUM SERPL-MCNC: 1.8 MG/DL (ref 1.6–2.6)
MCH RBC QN AUTO: 29.3 PG (ref 26.8–34.3)
MCHC RBC AUTO-ENTMCNC: 31.7 G/DL (ref 31.4–37.4)
MCV RBC AUTO: 93 FL (ref 82–98)
MONOCYTES # BLD AUTO: 0.59 THOUSAND/ΜL (ref 0.17–1.22)
MONOCYTES NFR BLD AUTO: 12 % (ref 4–12)
MUCOUS THREADS UR QL AUTO: ABNORMAL
NEUTROPHILS # BLD AUTO: 2.53 THOUSANDS/ΜL (ref 1.85–7.62)
NEUTS SEG NFR BLD AUTO: 53 % (ref 43–75)
NITRITE UR QL STRIP: NEGATIVE
NON-SQ EPI CELLS URNS QL MICRO: ABNORMAL /HPF
NRBC BLD AUTO-RTO: 0 /100 WBCS
PH UR STRIP.AUTO: 6 [PH]
PLATELET # BLD AUTO: 178 THOUSANDS/UL (ref 149–390)
PMV BLD AUTO: 10 FL (ref 8.9–12.7)
PROT SERPL-MCNC: 8.9 G/DL (ref 6.4–8.2)
PROT UR STRIP-MCNC: ABNORMAL MG/DL
RBC # BLD AUTO: 5.25 MILLION/UL (ref 3.88–5.62)
RBC #/AREA URNS AUTO: ABNORMAL /HPF
SP GR UR STRIP.AUTO: 1.02 (ref 1–1.03)
UROBILINOGEN UR QL STRIP.AUTO: 0.2 E.U./DL
WBC # BLD AUTO: 4.91 THOUSAND/UL (ref 4.31–10.16)
WBC #/AREA URNS AUTO: ABNORMAL /HPF

## 2020-04-05 PROCEDURE — 80177 DRUG SCRN QUAN LEVETIRACETAM: CPT | Performed by: EMERGENCY MEDICINE

## 2020-04-05 PROCEDURE — 99284 EMERGENCY DEPT VISIT MOD MDM: CPT | Performed by: EMERGENCY MEDICINE

## 2020-04-05 PROCEDURE — 85025 COMPLETE CBC W/AUTO DIFF WBC: CPT | Performed by: EMERGENCY MEDICINE

## 2020-04-05 PROCEDURE — 99284 EMERGENCY DEPT VISIT MOD MDM: CPT

## 2020-04-05 PROCEDURE — 36415 COLL VENOUS BLD VENIPUNCTURE: CPT | Performed by: EMERGENCY MEDICINE

## 2020-04-05 PROCEDURE — 96361 HYDRATE IV INFUSION ADD-ON: CPT

## 2020-04-05 PROCEDURE — 80076 HEPATIC FUNCTION PANEL: CPT | Performed by: EMERGENCY MEDICINE

## 2020-04-05 PROCEDURE — 83735 ASSAY OF MAGNESIUM: CPT | Performed by: EMERGENCY MEDICINE

## 2020-04-05 PROCEDURE — 81001 URINALYSIS AUTO W/SCOPE: CPT | Performed by: EMERGENCY MEDICINE

## 2020-04-05 PROCEDURE — 96360 HYDRATION IV INFUSION INIT: CPT

## 2020-04-05 RX ADMIN — SODIUM CHLORIDE 1000 ML: 0.9 INJECTION, SOLUTION INTRAVENOUS at 14:57

## 2020-04-08 ENCOUNTER — HOSPITAL ENCOUNTER (EMERGENCY)
Facility: HOSPITAL | Age: 35
Discharge: HOME | End: 2020-04-08
Attending: EMERGENCY MEDICINE | Admitting: EMERGENCY MEDICINE
Payer: MEDICARE

## 2020-04-08 VITALS
OXYGEN SATURATION: 98 % | TEMPERATURE: 98.2 F | BODY MASS INDEX: 24.16 KG/M2 | WEIGHT: 154.25 LBS | RESPIRATION RATE: 18 BRPM | SYSTOLIC BLOOD PRESSURE: 111 MMHG | HEART RATE: 82 BPM | DIASTOLIC BLOOD PRESSURE: 62 MMHG

## 2020-04-08 DIAGNOSIS — Z76.5 MALINGERING: ICD-10-CM

## 2020-04-08 DIAGNOSIS — R53.1 WEAKNESS: Primary | ICD-10-CM

## 2020-04-08 PROCEDURE — 82948 REAGENT STRIP/BLOOD GLUCOSE: CPT

## 2020-04-08 PROCEDURE — 99284 EMERGENCY DEPT VISIT MOD MDM: CPT

## 2020-04-08 PROCEDURE — 99283 EMERGENCY DEPT VISIT LOW MDM: CPT | Performed by: EMERGENCY MEDICINE

## 2020-04-08 RX ORDER — ACETAMINOPHEN 325 MG/1
650 TABLET ORAL ONCE
Status: COMPLETED | OUTPATIENT
Start: 2020-04-08 | End: 2020-04-08

## 2020-04-08 RX ADMIN — ACETAMINOPHEN 650 MG: 325 TABLET ORAL at 11:41

## 2020-04-09 LAB — GLUCOSE SERPL-MCNC: 91 MG/DL (ref 65–140)

## 2020-04-10 ENCOUNTER — APPOINTMENT (EMERGENCY)
Dept: CT IMAGING | Facility: HOSPITAL | Age: 35
End: 2020-04-10
Payer: MEDICARE

## 2020-04-10 ENCOUNTER — HOSPITAL ENCOUNTER (OUTPATIENT)
Facility: HOSPITAL | Age: 35
Setting detail: OBSERVATION
Discharge: HOME | End: 2020-04-11
Attending: EMERGENCY MEDICINE | Admitting: FAMILY MEDICINE
Payer: MEDICARE

## 2020-04-10 DIAGNOSIS — M62.838 NECK MUSCLE SPASM: ICD-10-CM

## 2020-04-10 DIAGNOSIS — Z87.898 HISTORY OF SEIZURES: ICD-10-CM

## 2020-04-10 DIAGNOSIS — R20.2 PARESTHESIAS: Primary | ICD-10-CM

## 2020-04-10 PROBLEM — R20.0 NUMBNESS IN BOTH LEGS: Status: ACTIVE | Noted: 2020-04-10

## 2020-04-10 PROBLEM — F17.200 TOBACCO DEPENDENCE: Status: ACTIVE | Noted: 2020-04-10

## 2020-04-10 PROBLEM — Z59.00 HOMELESSNESS: Status: ACTIVE | Noted: 2020-04-10

## 2020-04-10 PROBLEM — A53.9 SYPHILIS IN MALE: Status: ACTIVE | Noted: 2020-04-10

## 2020-04-10 PROBLEM — M43.6 TORTICOLLIS, ACQUIRED: Status: ACTIVE | Noted: 2020-04-10

## 2020-04-10 PROBLEM — B20 HIV (HUMAN IMMUNODEFICIENCY VIRUS INFECTION) (HCC): Status: ACTIVE | Noted: 2020-04-10

## 2020-04-10 PROBLEM — Z21 HIV (HUMAN IMMUNODEFICIENCY VIRUS INFECTION) (HCC): Status: ACTIVE | Noted: 2020-04-10

## 2020-04-10 PROBLEM — G40.909 SEIZURE DISORDER (HCC): Status: ACTIVE | Noted: 2020-04-10

## 2020-04-10 LAB
ALBUMIN SERPL BCP-MCNC: 3.3 G/DL (ref 3.5–5)
ALP SERPL-CCNC: 68 U/L (ref 46–116)
ALT SERPL W P-5'-P-CCNC: 22 U/L (ref 12–78)
AMPHETAMINES SERPL QL SCN: NEGATIVE
ANION GAP SERPL CALCULATED.3IONS-SCNC: 7 MMOL/L (ref 4–13)
AST SERPL W P-5'-P-CCNC: 27 U/L (ref 5–45)
ATRIAL RATE: 75 BPM
BARBITURATES UR QL: NEGATIVE
BASOPHILS # BLD AUTO: 0.02 THOUSANDS/ΜL (ref 0–0.1)
BASOPHILS NFR BLD AUTO: 0 % (ref 0–1)
BENZODIAZ UR QL: NEGATIVE
BILIRUB SERPL-MCNC: 0.2 MG/DL (ref 0.2–1)
BILIRUB UR QL STRIP: NEGATIVE
BUN SERPL-MCNC: 9 MG/DL (ref 5–25)
CALCIUM SERPL-MCNC: 8.5 MG/DL (ref 8.3–10.1)
CHLORIDE SERPL-SCNC: 105 MMOL/L (ref 100–108)
CK MB SERPL-MCNC: 1.1 % (ref 0–2.5)
CK MB SERPL-MCNC: 3.3 NG/ML (ref 0–5)
CK SERPL-CCNC: 306 U/L (ref 39–308)
CLARITY UR: CLEAR
CO2 SERPL-SCNC: 28 MMOL/L (ref 21–32)
COCAINE UR QL: NEGATIVE
COLOR UR: YELLOW
CREAT SERPL-MCNC: 0.71 MG/DL (ref 0.6–1.3)
CRP SERPL HS-MCNC: 8.57 MG/L
EOSINOPHIL # BLD AUTO: 0.1 THOUSAND/ΜL (ref 0–0.61)
EOSINOPHIL NFR BLD AUTO: 2 % (ref 0–6)
ERYTHROCYTE [DISTWIDTH] IN BLOOD BY AUTOMATED COUNT: 12.3 % (ref 11.6–15.1)
GFR SERPL CREATININE-BSD FRML MDRD: 142 ML/MIN/1.73SQ M
GLUCOSE SERPL-MCNC: 101 MG/DL (ref 65–140)
GLUCOSE SERPL-MCNC: 97 MG/DL (ref 65–140)
GLUCOSE UR STRIP-MCNC: NEGATIVE MG/DL
HCT VFR BLD AUTO: 40.8 % (ref 36.5–49.3)
HGB BLD-MCNC: 13.6 G/DL (ref 12–17)
HGB UR QL STRIP.AUTO: NEGATIVE
IMM GRANULOCYTES # BLD AUTO: 0.01 THOUSAND/UL (ref 0–0.2)
IMM GRANULOCYTES NFR BLD AUTO: 0 % (ref 0–2)
KETONES UR STRIP-MCNC: NEGATIVE MG/DL
LEUKOCYTE ESTERASE UR QL STRIP: NEGATIVE
LEVETIRACETAM SERPL-MCNC: 21.3 UG/ML (ref 10–40)
LYMPHOCYTES # BLD AUTO: 2.01 THOUSANDS/ΜL (ref 0.6–4.47)
LYMPHOCYTES NFR BLD AUTO: 39 % (ref 14–44)
MCH RBC QN AUTO: 30 PG (ref 26.8–34.3)
MCHC RBC AUTO-ENTMCNC: 33.3 G/DL (ref 31.4–37.4)
MCV RBC AUTO: 90 FL (ref 82–98)
METHADONE UR QL: NEGATIVE
MONOCYTES # BLD AUTO: 0.44 THOUSAND/ΜL (ref 0.17–1.22)
MONOCYTES NFR BLD AUTO: 9 % (ref 4–12)
NEUTROPHILS # BLD AUTO: 2.52 THOUSANDS/ΜL (ref 1.85–7.62)
NEUTS SEG NFR BLD AUTO: 50 % (ref 43–75)
NITRITE UR QL STRIP: NEGATIVE
NRBC BLD AUTO-RTO: 0 /100 WBCS
OPIATES UR QL SCN: NEGATIVE
P AXIS: 66 DEGREES
PCP UR QL: NEGATIVE
PH UR STRIP.AUTO: 7.5 [PH]
PLATELET # BLD AUTO: 163 THOUSANDS/UL (ref 149–390)
PMV BLD AUTO: 10.8 FL (ref 8.9–12.7)
POTASSIUM SERPL-SCNC: 3.5 MMOL/L (ref 3.5–5.3)
PR INTERVAL: 156 MS
PROT SERPL-MCNC: 7.6 G/DL (ref 6.4–8.2)
PROT UR STRIP-MCNC: NEGATIVE MG/DL
QRS AXIS: -3 DEGREES
QRSD INTERVAL: 108 MS
QT INTERVAL: 364 MS
QTC INTERVAL: 406 MS
RBC # BLD AUTO: 4.53 MILLION/UL (ref 3.88–5.62)
SODIUM SERPL-SCNC: 140 MMOL/L (ref 136–145)
SP GR UR STRIP.AUTO: 1.01 (ref 1–1.03)
T WAVE AXIS: 30 DEGREES
THC UR QL: NEGATIVE
TSH SERPL DL<=0.05 MIU/L-ACNC: 0.83 UIU/ML (ref 0.36–3.74)
UROBILINOGEN UR QL STRIP.AUTO: 0.2 E.U./DL
VENTRICULAR RATE: 75 BPM
VIT B12 SERPL-MCNC: 392 PG/ML (ref 100–900)
WBC # BLD AUTO: 5.1 THOUSAND/UL (ref 4.31–10.16)

## 2020-04-10 PROCEDURE — 82550 ASSAY OF CK (CPK): CPT | Performed by: PHYSICIAN ASSISTANT

## 2020-04-10 PROCEDURE — 82607 VITAMIN B-12: CPT | Performed by: INTERNAL MEDICINE

## 2020-04-10 PROCEDURE — 93010 ELECTROCARDIOGRAM REPORT: CPT | Performed by: INTERNAL MEDICINE

## 2020-04-10 PROCEDURE — 93005 ELECTROCARDIOGRAM TRACING: CPT

## 2020-04-10 PROCEDURE — 99285 EMERGENCY DEPT VISIT HI MDM: CPT | Performed by: PHYSICIAN ASSISTANT

## 2020-04-10 PROCEDURE — 99220 PR INITIAL OBSERVATION CARE/DAY 70 MINUTES: CPT | Performed by: FAMILY MEDICINE

## 2020-04-10 PROCEDURE — 70450 CT HEAD/BRAIN W/O DYE: CPT

## 2020-04-10 PROCEDURE — 85025 COMPLETE CBC W/AUTO DIFF WBC: CPT | Performed by: PHYSICIAN ASSISTANT

## 2020-04-10 PROCEDURE — 86141 C-REACTIVE PROTEIN HS: CPT | Performed by: PHYSICIAN ASSISTANT

## 2020-04-10 PROCEDURE — 80053 COMPREHEN METABOLIC PANEL: CPT | Performed by: PHYSICIAN ASSISTANT

## 2020-04-10 PROCEDURE — 99285 EMERGENCY DEPT VISIT HI MDM: CPT

## 2020-04-10 PROCEDURE — 36415 COLL VENOUS BLD VENIPUNCTURE: CPT | Performed by: PHYSICIAN ASSISTANT

## 2020-04-10 PROCEDURE — 82948 REAGENT STRIP/BLOOD GLUCOSE: CPT

## 2020-04-10 PROCEDURE — 80307 DRUG TEST PRSMV CHEM ANLYZR: CPT | Performed by: PHYSICIAN ASSISTANT

## 2020-04-10 PROCEDURE — 99213 OFFICE O/P EST LOW 20 MIN: CPT | Performed by: PSYCHIATRY & NEUROLOGY

## 2020-04-10 PROCEDURE — 84443 ASSAY THYROID STIM HORMONE: CPT | Performed by: PHYSICIAN ASSISTANT

## 2020-04-10 PROCEDURE — 94664 DEMO&/EVAL PT USE INHALER: CPT

## 2020-04-10 PROCEDURE — 81003 URINALYSIS AUTO W/O SCOPE: CPT | Performed by: PHYSICIAN ASSISTANT

## 2020-04-10 PROCEDURE — 94760 N-INVAS EAR/PLS OXIMETRY 1: CPT

## 2020-04-10 PROCEDURE — 82553 CREATINE MB FRACTION: CPT | Performed by: PHYSICIAN ASSISTANT

## 2020-04-10 RX ORDER — LEVETIRACETAM 500 MG/1
1000 TABLET ORAL DAILY
Status: DISCONTINUED | OUTPATIENT
Start: 2020-04-10 | End: 2020-04-10

## 2020-04-10 RX ORDER — LEVETIRACETAM 500 MG/1
1000 TABLET ORAL 2 TIMES DAILY
Status: DISCONTINUED | OUTPATIENT
Start: 2020-04-10 | End: 2020-04-11 | Stop reason: HOSPADM

## 2020-04-10 RX ORDER — NICOTINE 21 MG/24HR
1 PATCH, TRANSDERMAL 24 HOURS TRANSDERMAL DAILY
Status: DISCONTINUED | OUTPATIENT
Start: 2020-04-10 | End: 2020-04-11 | Stop reason: HOSPADM

## 2020-04-10 RX ORDER — POLYETHYLENE GLYCOL 3350 17 G/17G
17 POWDER, FOR SOLUTION ORAL ONCE
Status: COMPLETED | OUTPATIENT
Start: 2020-04-10 | End: 2020-04-10

## 2020-04-10 RX ADMIN — LEVETIRACETAM 1000 MG: 500 TABLET ORAL at 17:33

## 2020-04-10 RX ADMIN — POLYETHYLENE GLYCOL 3350 17 G: 17 POWDER, FOR SOLUTION ORAL at 17:33

## 2020-04-10 RX ADMIN — ELVITEGRAVIR, COBICISTAT, EMTRICITABINE, AND TENOFOVIR DISOPROXIL FUMARATE 1 TABLET: 150; 150; 200; 300 TABLET, FILM COATED ORAL at 14:34

## 2020-04-10 RX ADMIN — NICOTINE 1 PATCH: 21 PATCH TRANSDERMAL at 09:57

## 2020-04-10 RX ADMIN — LEVETIRACETAM 1000 MG: 500 TABLET ORAL at 09:57

## 2020-04-11 VITALS
OXYGEN SATURATION: 96 % | DIASTOLIC BLOOD PRESSURE: 63 MMHG | BODY MASS INDEX: 26.96 KG/M2 | SYSTOLIC BLOOD PRESSURE: 125 MMHG | HEIGHT: 67 IN | TEMPERATURE: 98.4 F | WEIGHT: 171.74 LBS | RESPIRATION RATE: 14 BRPM | HEART RATE: 94 BPM

## 2020-04-11 PROCEDURE — 86780 TREPONEMA PALLIDUM: CPT | Performed by: INTERNAL MEDICINE

## 2020-04-11 PROCEDURE — 99217 PR OBSERVATION CARE DISCHARGE MANAGEMENT: CPT | Performed by: PHYSICIAN ASSISTANT

## 2020-04-11 PROCEDURE — 82652 VIT D 1 25-DIHYDROXY: CPT | Performed by: INTERNAL MEDICINE

## 2020-04-11 PROCEDURE — 86593 SYPHILIS TEST NON-TREP QUANT: CPT | Performed by: INTERNAL MEDICINE

## 2020-04-11 PROCEDURE — 86592 SYPHILIS TEST NON-TREP QUAL: CPT | Performed by: INTERNAL MEDICINE

## 2020-04-11 RX ORDER — LORAZEPAM 0.5 MG/1
0.5 TABLET ORAL ONCE AS NEEDED
Status: DISCONTINUED | OUTPATIENT
Start: 2020-04-11 | End: 2020-04-11 | Stop reason: HOSPADM

## 2020-04-11 RX ORDER — ACETAMINOPHEN 325 MG/1
650 TABLET ORAL EVERY 6 HOURS PRN
Status: DISCONTINUED | OUTPATIENT
Start: 2020-04-11 | End: 2020-04-11 | Stop reason: HOSPADM

## 2020-04-11 RX ORDER — LEVETIRACETAM 1000 MG/1
1000 TABLET ORAL 2 TIMES DAILY
Qty: 60 TABLET | Refills: 0 | Status: SHIPPED | OUTPATIENT
Start: 2020-04-11 | End: 2020-07-13 | Stop reason: SDUPTHER

## 2020-04-11 RX ADMIN — ELVITEGRAVIR, COBICISTAT, EMTRICITABINE, AND TENOFOVIR DISOPROXIL FUMARATE 1 TABLET: 150; 150; 200; 300 TABLET, FILM COATED ORAL at 09:31

## 2020-04-11 RX ADMIN — LEVETIRACETAM 1000 MG: 500 TABLET ORAL at 09:30

## 2020-04-11 RX ADMIN — LEVETIRACETAM 1000 MG: 500 TABLET ORAL at 18:09

## 2020-04-13 LAB
1,25(OH)2D3 SERPL-MCNC: 30.5 PG/ML (ref 19.9–79.3)
RPR SER QL: REACTIVE
RPR SER-TITR: ABNORMAL {TITER}

## 2020-04-14 LAB — T PALLIDUM AB SER QL IF: REACTIVE

## 2020-04-16 ENCOUNTER — HOSPITAL ENCOUNTER (EMERGENCY)
Facility: HOSPITAL | Age: 35
Discharge: HOME | End: 2020-04-17
Attending: EMERGENCY MEDICINE | Admitting: EMERGENCY MEDICINE
Payer: MEDICARE

## 2020-04-16 DIAGNOSIS — M43.6 TORTICOLLIS: Primary | ICD-10-CM

## 2020-04-16 PROCEDURE — 99283 EMERGENCY DEPT VISIT LOW MDM: CPT

## 2020-04-16 PROCEDURE — 99284 EMERGENCY DEPT VISIT MOD MDM: CPT | Performed by: EMERGENCY MEDICINE

## 2020-04-16 RX ORDER — METHOCARBAMOL 500 MG/1
500 TABLET, FILM COATED ORAL ONCE
Status: COMPLETED | OUTPATIENT
Start: 2020-04-16 | End: 2020-04-17

## 2020-04-16 RX ORDER — KETOROLAC TROMETHAMINE 30 MG/ML
30 INJECTION, SOLUTION INTRAMUSCULAR; INTRAVENOUS ONCE
Status: COMPLETED | OUTPATIENT
Start: 2020-04-16 | End: 2020-04-17

## 2020-04-17 VITALS
RESPIRATION RATE: 18 BRPM | OXYGEN SATURATION: 95 % | SYSTOLIC BLOOD PRESSURE: 114 MMHG | BODY MASS INDEX: 26.9 KG/M2 | TEMPERATURE: 98 F | HEIGHT: 67 IN | DIASTOLIC BLOOD PRESSURE: 64 MMHG | HEART RATE: 86 BPM

## 2020-04-17 PROCEDURE — 96372 THER/PROPH/DIAG INJ SC/IM: CPT

## 2020-04-17 RX ADMIN — KETOROLAC TROMETHAMINE 30 MG: 30 INJECTION, SOLUTION INTRAMUSCULAR at 00:26

## 2020-04-17 RX ADMIN — METHOCARBAMOL TABLETS 500 MG: 500 TABLET, COATED ORAL at 00:25

## 2020-04-18 ENCOUNTER — APPOINTMENT (EMERGENCY)
Dept: RADIOLOGY | Facility: HOSPITAL | Age: 35
End: 2020-04-18
Payer: MEDICARE

## 2020-04-18 ENCOUNTER — APPOINTMENT (EMERGENCY)
Dept: CT IMAGING | Facility: HOSPITAL | Age: 35
End: 2020-04-18
Payer: MEDICARE

## 2020-04-18 ENCOUNTER — HOSPITAL ENCOUNTER (EMERGENCY)
Facility: HOSPITAL | Age: 35
Discharge: HOME | End: 2020-04-19
Attending: EMERGENCY MEDICINE | Admitting: EMERGENCY MEDICINE
Payer: MEDICARE

## 2020-04-18 DIAGNOSIS — M54.2 NECK PAIN: Primary | ICD-10-CM

## 2020-04-18 DIAGNOSIS — M25.511 RIGHT SHOULDER PAIN: ICD-10-CM

## 2020-04-18 PROCEDURE — 99284 EMERGENCY DEPT VISIT MOD MDM: CPT

## 2020-04-18 PROCEDURE — 70491 CT SOFT TISSUE NECK W/DYE: CPT

## 2020-04-18 PROCEDURE — 71045 X-RAY EXAM CHEST 1 VIEW: CPT

## 2020-04-18 PROCEDURE — 73030 X-RAY EXAM OF SHOULDER: CPT

## 2020-04-18 PROCEDURE — 99285 EMERGENCY DEPT VISIT HI MDM: CPT | Performed by: EMERGENCY MEDICINE

## 2020-04-18 RX ORDER — LIDOCAINE 50 MG/G
1 PATCH TOPICAL ONCE
Status: DISCONTINUED | OUTPATIENT
Start: 2020-04-18 | End: 2020-04-19 | Stop reason: HOSPADM

## 2020-04-19 VITALS
HEART RATE: 110 BPM | RESPIRATION RATE: 18 BRPM | SYSTOLIC BLOOD PRESSURE: 107 MMHG | TEMPERATURE: 98.5 F | WEIGHT: 168.21 LBS | DIASTOLIC BLOOD PRESSURE: 57 MMHG | OXYGEN SATURATION: 96 % | BODY MASS INDEX: 27.03 KG/M2 | HEIGHT: 66 IN

## 2020-04-19 LAB
ALBUMIN SERPL BCP-MCNC: 3.3 G/DL (ref 3.5–5)
ALP SERPL-CCNC: 62 U/L (ref 46–116)
ALT SERPL W P-5'-P-CCNC: 22 U/L (ref 12–78)
ANION GAP SERPL CALCULATED.3IONS-SCNC: 6 MMOL/L (ref 4–13)
APTT PPP: 33 SECONDS (ref 23–37)
AST SERPL W P-5'-P-CCNC: 26 U/L (ref 5–45)
ATRIAL RATE: 122 BPM
BASOPHILS # BLD AUTO: 0.01 THOUSANDS/ΜL (ref 0–0.1)
BASOPHILS NFR BLD AUTO: 0 % (ref 0–1)
BILIRUB SERPL-MCNC: 0.31 MG/DL (ref 0.2–1)
BUN SERPL-MCNC: 11 MG/DL (ref 5–25)
CALCIUM SERPL-MCNC: 8.3 MG/DL (ref 8.3–10.1)
CHLORIDE SERPL-SCNC: 104 MMOL/L (ref 100–108)
CK MB SERPL-MCNC: 1.3 % (ref 0–2.5)
CK MB SERPL-MCNC: 3.5 NG/ML (ref 0–5)
CK SERPL-CCNC: 270 U/L (ref 39–308)
CO2 SERPL-SCNC: 28 MMOL/L (ref 21–32)
CREAT SERPL-MCNC: 0.91 MG/DL (ref 0.6–1.3)
CRP SERPL HS-MCNC: 12.84 MG/L
EOSINOPHIL # BLD AUTO: 0.2 THOUSAND/ΜL (ref 0–0.61)
EOSINOPHIL NFR BLD AUTO: 3 % (ref 0–6)
ERYTHROCYTE [DISTWIDTH] IN BLOOD BY AUTOMATED COUNT: 12.9 % (ref 11.6–15.1)
GFR SERPL CREATININE-BSD FRML MDRD: 127 ML/MIN/1.73SQ M
GLUCOSE SERPL-MCNC: 114 MG/DL (ref 65–140)
HCT VFR BLD AUTO: 41.6 % (ref 36.5–49.3)
HGB BLD-MCNC: 13.5 G/DL (ref 12–17)
IMM GRANULOCYTES # BLD AUTO: 0.01 THOUSAND/UL (ref 0–0.2)
IMM GRANULOCYTES NFR BLD AUTO: 0 % (ref 0–2)
INR PPP: 1.06 (ref 0.84–1.19)
LACTATE SERPL-SCNC: 1.1 MMOL/L (ref 0.5–2)
LYMPHOCYTES # BLD AUTO: 1.94 THOUSANDS/ΜL (ref 0.6–4.47)
LYMPHOCYTES NFR BLD AUTO: 31 % (ref 14–44)
MAGNESIUM SERPL-MCNC: 1.7 MG/DL (ref 1.6–2.6)
MCH RBC QN AUTO: 29.3 PG (ref 26.8–34.3)
MCHC RBC AUTO-ENTMCNC: 32.5 G/DL (ref 31.4–37.4)
MCV RBC AUTO: 90 FL (ref 82–98)
MONOCYTES # BLD AUTO: 0.72 THOUSAND/ΜL (ref 0.17–1.22)
MONOCYTES NFR BLD AUTO: 11 % (ref 4–12)
NEUTROPHILS # BLD AUTO: 3.43 THOUSANDS/ΜL (ref 1.85–7.62)
NEUTS SEG NFR BLD AUTO: 55 % (ref 43–75)
NRBC BLD AUTO-RTO: 0 /100 WBCS
P AXIS: 75 DEGREES
PLATELET # BLD AUTO: 189 THOUSANDS/UL (ref 149–390)
PMV BLD AUTO: 10 FL (ref 8.9–12.7)
POTASSIUM SERPL-SCNC: 3.5 MMOL/L (ref 3.5–5.3)
PR INTERVAL: 130 MS
PROT SERPL-MCNC: 7.6 G/DL (ref 6.4–8.2)
PROTHROMBIN TIME: 13.2 SECONDS (ref 11.6–14.5)
QRS AXIS: 16 DEGREES
QRSD INTERVAL: 92 MS
QT INTERVAL: 306 MS
QTC INTERVAL: 436 MS
RBC # BLD AUTO: 4.61 MILLION/UL (ref 3.88–5.62)
SODIUM SERPL-SCNC: 138 MMOL/L (ref 136–145)
T WAVE AXIS: 68 DEGREES
TSH SERPL DL<=0.05 MIU/L-ACNC: 0.7 UIU/ML (ref 0.36–3.74)
VENTRICULAR RATE: 122 BPM
WBC # BLD AUTO: 6.31 THOUSAND/UL (ref 4.31–10.16)

## 2020-04-19 PROCEDURE — 36415 COLL VENOUS BLD VENIPUNCTURE: CPT | Performed by: EMERGENCY MEDICINE

## 2020-04-19 PROCEDURE — 93005 ELECTROCARDIOGRAM TRACING: CPT

## 2020-04-19 PROCEDURE — 80177 DRUG SCRN QUAN LEVETIRACETAM: CPT | Performed by: EMERGENCY MEDICINE

## 2020-04-19 PROCEDURE — 83735 ASSAY OF MAGNESIUM: CPT | Performed by: EMERGENCY MEDICINE

## 2020-04-19 PROCEDURE — 96361 HYDRATE IV INFUSION ADD-ON: CPT

## 2020-04-19 PROCEDURE — 85025 COMPLETE CBC W/AUTO DIFF WBC: CPT | Performed by: EMERGENCY MEDICINE

## 2020-04-19 PROCEDURE — 85610 PROTHROMBIN TIME: CPT | Performed by: EMERGENCY MEDICINE

## 2020-04-19 PROCEDURE — 96360 HYDRATION IV INFUSION INIT: CPT

## 2020-04-19 PROCEDURE — 82550 ASSAY OF CK (CPK): CPT | Performed by: EMERGENCY MEDICINE

## 2020-04-19 PROCEDURE — 83605 ASSAY OF LACTIC ACID: CPT | Performed by: EMERGENCY MEDICINE

## 2020-04-19 PROCEDURE — 85730 THROMBOPLASTIN TIME PARTIAL: CPT | Performed by: EMERGENCY MEDICINE

## 2020-04-19 PROCEDURE — 80053 COMPREHEN METABOLIC PANEL: CPT | Performed by: EMERGENCY MEDICINE

## 2020-04-19 PROCEDURE — 87040 BLOOD CULTURE FOR BACTERIA: CPT | Performed by: EMERGENCY MEDICINE

## 2020-04-19 PROCEDURE — 84443 ASSAY THYROID STIM HORMONE: CPT | Performed by: EMERGENCY MEDICINE

## 2020-04-19 PROCEDURE — 86141 C-REACTIVE PROTEIN HS: CPT | Performed by: EMERGENCY MEDICINE

## 2020-04-19 PROCEDURE — 93010 ELECTROCARDIOGRAM REPORT: CPT | Performed by: INTERNAL MEDICINE

## 2020-04-19 PROCEDURE — 82553 CREATINE MB FRACTION: CPT | Performed by: EMERGENCY MEDICINE

## 2020-04-19 RX ORDER — NAPROXEN 375 MG/1
375 TABLET ORAL 2 TIMES DAILY WITH MEALS
Qty: 20 TABLET | Refills: 0 | Status: SHIPPED | OUTPATIENT
Start: 2020-04-19 | End: 2020-04-24

## 2020-04-19 RX ADMIN — LIDOCAINE 1 PATCH: 50 PATCH TOPICAL at 01:11

## 2020-04-19 RX ADMIN — SODIUM CHLORIDE 1000 ML: 0.9 INJECTION, SOLUTION INTRAVENOUS at 00:17

## 2020-04-19 RX ADMIN — IOHEXOL 85 ML: 350 INJECTION, SOLUTION INTRAVENOUS at 01:35

## 2020-04-21 LAB — LEVETIRACETAM SERPL-MCNC: 1.9 UG/ML (ref 10–40)

## 2020-04-24 ENCOUNTER — HOSPITAL ENCOUNTER (EMERGENCY)
Facility: HOSPITAL | Age: 35
Discharge: HOME | End: 2020-04-24
Attending: EMERGENCY MEDICINE
Payer: MEDICARE

## 2020-04-24 VITALS
HEART RATE: 96 BPM | SYSTOLIC BLOOD PRESSURE: 148 MMHG | WEIGHT: 168 LBS | TEMPERATURE: 97.6 F | BODY MASS INDEX: 27 KG/M2 | DIASTOLIC BLOOD PRESSURE: 87 MMHG | RESPIRATION RATE: 18 BRPM | HEIGHT: 66 IN | OXYGEN SATURATION: 97 %

## 2020-04-24 DIAGNOSIS — M25.511 RIGHT SHOULDER PAIN: Primary | ICD-10-CM

## 2020-04-24 LAB
BACTERIA BLD CULT: NORMAL
BACTERIA BLD CULT: NORMAL

## 2020-04-24 PROCEDURE — 99283 EMERGENCY DEPT VISIT LOW MDM: CPT

## 2020-04-24 PROCEDURE — 99283 EMERGENCY DEPT VISIT LOW MDM: CPT | Performed by: EMERGENCY MEDICINE

## 2020-04-24 RX ORDER — NAPROXEN 500 MG/1
500 TABLET ORAL 2 TIMES DAILY WITH MEALS
Qty: 14 TABLET | Refills: 0 | Status: SHIPPED | OUTPATIENT
Start: 2020-04-24 | End: 2020-05-26 | Stop reason: ALTCHOICE

## 2020-04-24 RX ORDER — CYCLOBENZAPRINE HCL 10 MG
10 TABLET ORAL 3 TIMES DAILY PRN
Qty: 15 TABLET | Refills: 0 | Status: SHIPPED | OUTPATIENT
Start: 2020-04-24 | End: 2020-07-13 | Stop reason: ALTCHOICE

## 2020-04-25 ENCOUNTER — HOSPITAL ENCOUNTER (EMERGENCY)
Facility: HOSPITAL | Age: 35
Discharge: HOME | End: 2020-04-25
Attending: EMERGENCY MEDICINE | Admitting: EMERGENCY MEDICINE
Payer: MEDICARE

## 2020-04-25 VITALS
WEIGHT: 167.55 LBS | HEIGHT: 67 IN | SYSTOLIC BLOOD PRESSURE: 125 MMHG | OXYGEN SATURATION: 99 % | HEART RATE: 97 BPM | BODY MASS INDEX: 26.3 KG/M2 | DIASTOLIC BLOOD PRESSURE: 85 MMHG | RESPIRATION RATE: 14 BRPM | TEMPERATURE: 98.3 F

## 2020-04-25 DIAGNOSIS — M25.552 LEFT HIP PAIN: Primary | ICD-10-CM

## 2020-04-25 DIAGNOSIS — Z76.5 MALINGERING: ICD-10-CM

## 2020-04-25 PROCEDURE — 99283 EMERGENCY DEPT VISIT LOW MDM: CPT

## 2020-04-25 PROCEDURE — 99284 EMERGENCY DEPT VISIT MOD MDM: CPT | Performed by: EMERGENCY MEDICINE

## 2020-05-03 ENCOUNTER — HOSPITAL ENCOUNTER (EMERGENCY)
Facility: HOSPITAL | Age: 35
Discharge: HOME | End: 2020-05-03
Attending: EMERGENCY MEDICINE | Admitting: EMERGENCY MEDICINE
Payer: MEDICARE

## 2020-05-03 VITALS
HEART RATE: 114 BPM | SYSTOLIC BLOOD PRESSURE: 115 MMHG | HEIGHT: 67 IN | BODY MASS INDEX: 26.51 KG/M2 | TEMPERATURE: 98.9 F | RESPIRATION RATE: 16 BRPM | OXYGEN SATURATION: 98 % | DIASTOLIC BLOOD PRESSURE: 69 MMHG | WEIGHT: 168.87 LBS

## 2020-05-03 DIAGNOSIS — Z76.5 MALINGERING: Primary | ICD-10-CM

## 2020-05-03 PROCEDURE — 99284 EMERGENCY DEPT VISIT MOD MDM: CPT

## 2020-05-03 PROCEDURE — 99284 EMERGENCY DEPT VISIT MOD MDM: CPT | Performed by: EMERGENCY MEDICINE

## 2020-05-03 RX ORDER — LEVETIRACETAM 250 MG/1
1000 TABLET ORAL ONCE
Status: COMPLETED | OUTPATIENT
Start: 2020-05-03 | End: 2020-05-03

## 2020-05-03 RX ADMIN — LEVETIRACETAM 1000 MG: 250 TABLET, FILM COATED ORAL at 23:44

## 2020-05-14 ENCOUNTER — HOSPITAL ENCOUNTER (EMERGENCY)
Facility: HOSPITAL | Age: 35
Discharge: HOME | End: 2020-05-14
Attending: EMERGENCY MEDICINE | Admitting: EMERGENCY MEDICINE
Payer: MEDICARE

## 2020-05-14 VITALS
SYSTOLIC BLOOD PRESSURE: 110 MMHG | BODY MASS INDEX: 27.11 KG/M2 | RESPIRATION RATE: 18 BRPM | WEIGHT: 173.06 LBS | DIASTOLIC BLOOD PRESSURE: 56 MMHG | TEMPERATURE: 97.5 F | OXYGEN SATURATION: 98 % | HEART RATE: 93 BPM

## 2020-05-14 DIAGNOSIS — R53.83 FATIGUE: ICD-10-CM

## 2020-05-14 DIAGNOSIS — Z59.00 HOMELESSNESS: Primary | ICD-10-CM

## 2020-05-14 LAB
ATRIAL RATE: 81 BPM
P AXIS: 57 DEGREES
PR INTERVAL: 140 MS
QRS AXIS: 42 DEGREES
QRSD INTERVAL: 96 MS
QT INTERVAL: 362 MS
QTC INTERVAL: 420 MS
T WAVE AXIS: 14 DEGREES
VENTRICULAR RATE: 81 BPM

## 2020-05-14 PROCEDURE — 99283 EMERGENCY DEPT VISIT LOW MDM: CPT

## 2020-05-14 PROCEDURE — 93005 ELECTROCARDIOGRAM TRACING: CPT

## 2020-05-14 PROCEDURE — 99284 EMERGENCY DEPT VISIT MOD MDM: CPT | Performed by: EMERGENCY MEDICINE

## 2020-05-14 PROCEDURE — 93010 ELECTROCARDIOGRAM REPORT: CPT | Performed by: INTERNAL MEDICINE

## 2020-05-14 SDOH — ECONOMIC STABILITY - HOUSING INSECURITY: HOMELESSNESS UNSPECIFIED: Z59.00

## 2020-05-26 ENCOUNTER — APPOINTMENT (EMERGENCY)
Dept: CT IMAGING | Facility: HOSPITAL | Age: 35
End: 2020-05-26
Payer: MEDICARE

## 2020-05-26 ENCOUNTER — HOSPITAL ENCOUNTER (OUTPATIENT)
Facility: HOSPITAL | Age: 35
Setting detail: OBSERVATION
Discharge: HOME | End: 2020-05-28
Attending: EMERGENCY MEDICINE | Admitting: INTERNAL MEDICINE
Payer: MEDICARE

## 2020-05-26 ENCOUNTER — APPOINTMENT (EMERGENCY)
Dept: RADIOLOGY | Facility: HOSPITAL | Age: 35
End: 2020-05-26
Payer: MEDICARE

## 2020-05-26 DIAGNOSIS — R00.0 TACHYCARDIA: ICD-10-CM

## 2020-05-26 DIAGNOSIS — R60.9 PERIPHERAL EDEMA: Primary | ICD-10-CM

## 2020-05-26 PROBLEM — R60.0 LOWER EXTREMITY EDEMA: Status: ACTIVE | Noted: 2020-05-26

## 2020-05-26 LAB
ALBUMIN SERPL BCP-MCNC: 3.5 G/DL (ref 3.5–5)
ALP SERPL-CCNC: 62 U/L (ref 46–116)
ALT SERPL W P-5'-P-CCNC: 40 U/L (ref 12–78)
AMPHETAMINES SERPL QL SCN: NEGATIVE
ANION GAP SERPL CALCULATED.3IONS-SCNC: 8 MMOL/L (ref 4–13)
AST SERPL W P-5'-P-CCNC: 36 U/L (ref 5–45)
BACTERIA UR QL AUTO: ABNORMAL /HPF
BARBITURATES UR QL: NEGATIVE
BASOPHILS # BLD AUTO: 0.01 THOUSANDS/ΜL (ref 0–0.1)
BASOPHILS NFR BLD AUTO: 0 % (ref 0–1)
BENZODIAZ UR QL: NEGATIVE
BILIRUB SERPL-MCNC: 0.38 MG/DL (ref 0.2–1)
BILIRUB UR QL STRIP: NEGATIVE
BUN SERPL-MCNC: 12 MG/DL (ref 5–25)
CALCIUM SERPL-MCNC: 8.2 MG/DL (ref 8.3–10.1)
CHLORIDE SERPL-SCNC: 106 MMOL/L (ref 100–108)
CLARITY UR: CLEAR
CO2 SERPL-SCNC: 28 MMOL/L (ref 21–32)
COCAINE UR QL: NEGATIVE
COLOR UR: YELLOW
CREAT SERPL-MCNC: 0.99 MG/DL (ref 0.6–1.3)
D DIMER PPP FEU-MCNC: 0.74 UG/ML FEU
EOSINOPHIL # BLD AUTO: 0.08 THOUSAND/ΜL (ref 0–0.61)
EOSINOPHIL NFR BLD AUTO: 2 % (ref 0–6)
ERYTHROCYTE [DISTWIDTH] IN BLOOD BY AUTOMATED COUNT: 13.3 % (ref 11.6–15.1)
ETHANOL EXG-MCNC: 0 MG/DL
GFR SERPL CREATININE-BSD FRML MDRD: 114 ML/MIN/1.73SQ M
GLUCOSE SERPL-MCNC: 107 MG/DL (ref 65–140)
GLUCOSE UR STRIP-MCNC: NEGATIVE MG/DL
HCT VFR BLD AUTO: 42.1 % (ref 36.5–49.3)
HGB BLD-MCNC: 13.8 G/DL (ref 12–17)
HGB UR QL STRIP.AUTO: NEGATIVE
IMM GRANULOCYTES # BLD AUTO: 0.01 THOUSAND/UL (ref 0–0.2)
IMM GRANULOCYTES NFR BLD AUTO: 0 % (ref 0–2)
KETONES UR STRIP-MCNC: NEGATIVE MG/DL
LEUKOCYTE ESTERASE UR QL STRIP: NEGATIVE
LYMPHOCYTES # BLD AUTO: 1.78 THOUSANDS/ΜL (ref 0.6–4.47)
LYMPHOCYTES NFR BLD AUTO: 46 % (ref 14–44)
MCH RBC QN AUTO: 29.7 PG (ref 26.8–34.3)
MCHC RBC AUTO-ENTMCNC: 32.8 G/DL (ref 31.4–37.4)
MCV RBC AUTO: 91 FL (ref 82–98)
METHADONE UR QL: NEGATIVE
MONOCYTES # BLD AUTO: 0.39 THOUSAND/ΜL (ref 0.17–1.22)
MONOCYTES NFR BLD AUTO: 10 % (ref 4–12)
MUCOUS THREADS UR QL AUTO: ABNORMAL
NEUTROPHILS # BLD AUTO: 1.63 THOUSANDS/ΜL (ref 1.85–7.62)
NEUTS SEG NFR BLD AUTO: 42 % (ref 43–75)
NITRITE UR QL STRIP: NEGATIVE
NON-SQ EPI CELLS URNS QL MICRO: ABNORMAL /HPF
NRBC BLD AUTO-RTO: 0 /100 WBCS
NT-PROBNP SERPL-MCNC: 7 PG/ML
OPIATES UR QL SCN: NEGATIVE
PCP UR QL: NEGATIVE
PH UR STRIP.AUTO: 6.5 [PH]
PLATELET # BLD AUTO: 164 THOUSANDS/UL (ref 149–390)
PMV BLD AUTO: 9.9 FL (ref 8.9–12.7)
POTASSIUM SERPL-SCNC: 3.8 MMOL/L (ref 3.5–5.3)
PROT SERPL-MCNC: 7.9 G/DL (ref 6.4–8.2)
PROT UR STRIP-MCNC: ABNORMAL MG/DL
RBC # BLD AUTO: 4.65 MILLION/UL (ref 3.88–5.62)
RBC #/AREA URNS AUTO: ABNORMAL /HPF
SODIUM SERPL-SCNC: 142 MMOL/L (ref 136–145)
SP GR UR STRIP.AUTO: 1.02 (ref 1–1.03)
THC UR QL: POSITIVE
UROBILINOGEN UR QL STRIP.AUTO: 1 E.U./DL
WBC # BLD AUTO: 3.9 THOUSAND/UL (ref 4.31–10.16)
WBC #/AREA URNS AUTO: ABNORMAL /HPF

## 2020-05-26 PROCEDURE — 71045 X-RAY EXAM CHEST 1 VIEW: CPT

## 2020-05-26 PROCEDURE — 82075 ASSAY OF BREATH ETHANOL: CPT | Performed by: EMERGENCY MEDICINE

## 2020-05-26 PROCEDURE — 85025 COMPLETE CBC W/AUTO DIFF WBC: CPT | Performed by: EMERGENCY MEDICINE

## 2020-05-26 PROCEDURE — 81001 URINALYSIS AUTO W/SCOPE: CPT | Performed by: EMERGENCY MEDICINE

## 2020-05-26 PROCEDURE — 82553 CREATINE MB FRACTION: CPT | Performed by: NURSE PRACTITIONER

## 2020-05-26 PROCEDURE — 99285 EMERGENCY DEPT VISIT HI MDM: CPT

## 2020-05-26 PROCEDURE — 36415 COLL VENOUS BLD VENIPUNCTURE: CPT | Performed by: EMERGENCY MEDICINE

## 2020-05-26 PROCEDURE — 71275 CT ANGIOGRAPHY CHEST: CPT

## 2020-05-26 PROCEDURE — 96374 THER/PROPH/DIAG INJ IV PUSH: CPT

## 2020-05-26 PROCEDURE — 80307 DRUG TEST PRSMV CHEM ANLYZR: CPT | Performed by: EMERGENCY MEDICINE

## 2020-05-26 PROCEDURE — 83880 ASSAY OF NATRIURETIC PEPTIDE: CPT | Performed by: EMERGENCY MEDICINE

## 2020-05-26 PROCEDURE — 83735 ASSAY OF MAGNESIUM: CPT | Performed by: NURSE PRACTITIONER

## 2020-05-26 PROCEDURE — 96361 HYDRATE IV INFUSION ADD-ON: CPT

## 2020-05-26 PROCEDURE — 99284 EMERGENCY DEPT VISIT MOD MDM: CPT | Performed by: EMERGENCY MEDICINE

## 2020-05-26 PROCEDURE — 82550 ASSAY OF CK (CPK): CPT | Performed by: NURSE PRACTITIONER

## 2020-05-26 PROCEDURE — 80053 COMPREHEN METABOLIC PANEL: CPT | Performed by: EMERGENCY MEDICINE

## 2020-05-26 PROCEDURE — 93005 ELECTROCARDIOGRAM TRACING: CPT

## 2020-05-26 PROCEDURE — 84443 ASSAY THYROID STIM HORMONE: CPT | Performed by: NURSE PRACTITIONER

## 2020-05-26 PROCEDURE — 85379 FIBRIN DEGRADATION QUANT: CPT | Performed by: EMERGENCY MEDICINE

## 2020-05-26 RX ORDER — LORAZEPAM 2 MG/ML
1 INJECTION INTRAMUSCULAR ONCE
Status: COMPLETED | OUTPATIENT
Start: 2020-05-26 | End: 2020-05-26

## 2020-05-26 RX ADMIN — SODIUM CHLORIDE 1000 ML: 0.9 INJECTION, SOLUTION INTRAVENOUS at 21:19

## 2020-05-26 RX ADMIN — IOHEXOL 100 ML: 350 INJECTION, SOLUTION INTRAVENOUS at 22:16

## 2020-05-26 RX ADMIN — LORAZEPAM 1 MG: 2 INJECTION INTRAMUSCULAR; INTRAVENOUS at 21:03

## 2020-05-27 ENCOUNTER — APPOINTMENT (OUTPATIENT)
Dept: CT IMAGING | Facility: HOSPITAL | Age: 35
End: 2020-05-27
Payer: MEDICARE

## 2020-05-27 ENCOUNTER — APPOINTMENT (OUTPATIENT)
Dept: ULTRASOUND IMAGING | Facility: HOSPITAL | Age: 35
End: 2020-05-27
Payer: MEDICARE

## 2020-05-27 PROBLEM — D70.8 OTHER NEUTROPENIA (HCC): Status: ACTIVE | Noted: 2020-05-27

## 2020-05-27 LAB
ATRIAL RATE: 127 BPM
CK MB SERPL-MCNC: 1 % (ref 0–2.5)
CK MB SERPL-MCNC: 2.4 NG/ML (ref 0–5)
CK SERPL-CCNC: 232 U/L (ref 39–308)
CREAT UR-MCNC: 53 MG/DL
GLUCOSE SERPL-MCNC: 85 MG/DL (ref 65–140)
GLUCOSE SERPL-MCNC: 91 MG/DL (ref 65–140)
MAGNESIUM SERPL-MCNC: 1.9 MG/DL (ref 1.6–2.6)
P AXIS: 50 DEGREES
PR INTERVAL: 142 MS
PROT UR-MCNC: 32 MG/DL
PROT/CREAT UR: 0.6 MG/G{CREAT} (ref 0–0.1)
QRS AXIS: -8 DEGREES
QRSD INTERVAL: 94 MS
QT INTERVAL: 310 MS
QTC INTERVAL: 442 MS
T WAVE AXIS: 48 DEGREES
TSH SERPL DL<=0.05 MIU/L-ACNC: 0.6 UIU/ML (ref 0.36–3.74)
VENTRICULAR RATE: 122 BPM

## 2020-05-27 PROCEDURE — 99220 PR INITIAL OBSERVATION CARE/DAY 70 MINUTES: CPT | Performed by: INTERNAL MEDICINE

## 2020-05-27 PROCEDURE — 80177 DRUG SCRN QUAN LEVETIRACETAM: CPT | Performed by: NURSE PRACTITIONER

## 2020-05-27 PROCEDURE — 82570 ASSAY OF URINE CREATININE: CPT | Performed by: INTERNAL MEDICINE

## 2020-05-27 PROCEDURE — 87536 HIV-1 QUANT&REVRSE TRNSCRPJ: CPT | Performed by: NURSE PRACTITIONER

## 2020-05-27 PROCEDURE — 84156 ASSAY OF PROTEIN URINE: CPT | Performed by: INTERNAL MEDICINE

## 2020-05-27 PROCEDURE — 70470 CT HEAD/BRAIN W/O & W/DYE: CPT

## 2020-05-27 PROCEDURE — 93970 EXTREMITY STUDY: CPT

## 2020-05-27 PROCEDURE — 93010 ELECTROCARDIOGRAM REPORT: CPT | Performed by: INTERNAL MEDICINE

## 2020-05-27 PROCEDURE — 82948 REAGENT STRIP/BLOOD GLUCOSE: CPT

## 2020-05-27 PROCEDURE — 86361 T CELL ABSOLUTE COUNT: CPT | Performed by: NURSE PRACTITIONER

## 2020-05-27 RX ORDER — ACETAMINOPHEN 325 MG/1
650 TABLET ORAL EVERY 6 HOURS PRN
Status: DISCONTINUED | OUTPATIENT
Start: 2020-05-27 | End: 2020-05-28 | Stop reason: HOSPADM

## 2020-05-27 RX ORDER — POTASSIUM CHLORIDE 20 MEQ/1
20 TABLET, EXTENDED RELEASE ORAL ONCE
Status: DISCONTINUED | OUTPATIENT
Start: 2020-05-27 | End: 2020-05-28 | Stop reason: HOSPADM

## 2020-05-27 RX ORDER — NICOTINE 21 MG/24HR
1 PATCH, TRANSDERMAL 24 HOURS TRANSDERMAL DAILY
Status: DISCONTINUED | OUTPATIENT
Start: 2020-05-27 | End: 2020-05-28 | Stop reason: HOSPADM

## 2020-05-27 RX ORDER — ONDANSETRON 2 MG/ML
4 INJECTION INTRAMUSCULAR; INTRAVENOUS EVERY 6 HOURS PRN
Status: DISCONTINUED | OUTPATIENT
Start: 2020-05-27 | End: 2020-05-28 | Stop reason: HOSPADM

## 2020-05-27 RX ORDER — LEVETIRACETAM 500 MG/1
1000 TABLET ORAL 2 TIMES DAILY
Status: DISCONTINUED | OUTPATIENT
Start: 2020-05-27 | End: 2020-05-28 | Stop reason: HOSPADM

## 2020-05-27 RX ORDER — SODIUM CHLORIDE, SODIUM LACTATE, POTASSIUM CHLORIDE, CALCIUM CHLORIDE 600; 310; 30; 20 MG/100ML; MG/100ML; MG/100ML; MG/100ML
75 INJECTION, SOLUTION INTRAVENOUS CONTINUOUS
Status: DISPENSED | OUTPATIENT
Start: 2020-05-27 | End: 2020-05-27

## 2020-05-27 RX ADMIN — IOHEXOL 100 ML: 350 INJECTION, SOLUTION INTRAVENOUS at 22:47

## 2020-05-27 RX ADMIN — NICOTINE 1 PATCH: 21 PATCH TRANSDERMAL at 10:28

## 2020-05-27 RX ADMIN — LEVETIRACETAM 1000 MG: 500 TABLET, FILM COATED ORAL at 10:28

## 2020-05-27 RX ADMIN — LEVETIRACETAM 1000 MG: 500 TABLET, FILM COATED ORAL at 17:14

## 2020-05-27 RX ADMIN — SODIUM CHLORIDE, SODIUM LACTATE, POTASSIUM CHLORIDE, AND CALCIUM CHLORIDE 75 ML/HR: .6; .31; .03; .02 INJECTION, SOLUTION INTRAVENOUS at 01:32

## 2020-05-27 RX ADMIN — ELVITEGRAVIR, COBICISTAT, EMTRICITABINE, AND TENOFOVIR DISOPROXIL FUMARATE 1 TABLET: 150; 150; 200; 300 TABLET, FILM COATED ORAL at 10:29

## 2020-05-28 VITALS
BODY MASS INDEX: 27.8 KG/M2 | DIASTOLIC BLOOD PRESSURE: 70 MMHG | RESPIRATION RATE: 18 BRPM | TEMPERATURE: 98.8 F | WEIGHT: 177.47 LBS | OXYGEN SATURATION: 98 % | SYSTOLIC BLOOD PRESSURE: 119 MMHG | HEART RATE: 96 BPM

## 2020-05-28 LAB
ALBUMIN SERPL BCP-MCNC: 3.3 G/DL (ref 3.5–5)
ALP SERPL-CCNC: 58 U/L (ref 46–116)
ALT SERPL W P-5'-P-CCNC: 31 U/L (ref 12–78)
ANION GAP SERPL CALCULATED.3IONS-SCNC: 10 MMOL/L (ref 4–13)
AST SERPL W P-5'-P-CCNC: 35 U/L (ref 5–45)
BASOPHILS # BLD AUTO: 0 X10E3/UL (ref 0–0.2)
BASOPHILS NFR BLD AUTO: 0 %
BILIRUB SERPL-MCNC: 0.57 MG/DL (ref 0.2–1)
BUN SERPL-MCNC: 11 MG/DL (ref 5–25)
CALCIUM SERPL-MCNC: 8.5 MG/DL (ref 8.3–10.1)
CD3+CD4+ CELLS # BLD: 106 /UL (ref 359–1519)
CD3+CD4+ CELLS NFR BLD: 5.9 % (ref 30.8–58.5)
CHLORIDE SERPL-SCNC: 104 MMOL/L (ref 100–108)
CO2 SERPL-SCNC: 25 MMOL/L (ref 21–32)
CREAT SERPL-MCNC: 0.9 MG/DL (ref 0.6–1.3)
EOSINOPHIL # BLD AUTO: 0.1 X10E3/UL (ref 0–0.4)
EOSINOPHIL NFR BLD AUTO: 3 %
ERYTHROCYTE [DISTWIDTH] IN BLOOD BY AUTOMATED COUNT: 13.3 % (ref 11.6–15.4)
GFR SERPL CREATININE-BSD FRML MDRD: 128 ML/MIN/1.73SQ M
GLUCOSE SERPL-MCNC: 131 MG/DL (ref 65–140)
GLUCOSE SERPL-MCNC: 85 MG/DL (ref 65–140)
HCT VFR BLD AUTO: 38.8 % (ref 37.5–51)
HGB BLD-MCNC: 12.9 G/DL (ref 13–17.7)
IMM GRANULOCYTES # BLD: 0 X10E3/UL (ref 0–0.1)
IMM GRANULOCYTES NFR BLD: 0 %
LYMPHOCYTES # BLD AUTO: 1.8 X10E3/UL (ref 0.7–3.1)
LYMPHOCYTES NFR BLD AUTO: 39 %
MCH RBC QN AUTO: 29.5 PG (ref 26.6–33)
MCHC RBC AUTO-ENTMCNC: 33.2 G/DL (ref 31.5–35.7)
MCV RBC AUTO: 89 FL (ref 79–97)
MONOCYTES # BLD AUTO: 0.5 X10E3/UL (ref 0.1–0.9)
MONOCYTES NFR BLD AUTO: 10 %
NEUTROPHILS # BLD AUTO: 2.1 X10E3/UL (ref 1.4–7)
NEUTROPHILS NFR BLD AUTO: 48 %
PLATELET # BLD AUTO: 167 X10E3/UL (ref 150–450)
POTASSIUM SERPL-SCNC: 4.2 MMOL/L (ref 3.5–5.3)
PROT SERPL-MCNC: 7.9 G/DL (ref 6.4–8.2)
RBC # BLD AUTO: 4.38 X10E6/UL (ref 4.14–5.8)
SODIUM SERPL-SCNC: 139 MMOL/L (ref 136–145)
WBC # BLD AUTO: 4.5 X10E3/UL (ref 3.4–10.8)

## 2020-05-28 PROCEDURE — 93970 EXTREMITY STUDY: CPT | Performed by: SURGERY

## 2020-05-28 PROCEDURE — 82948 REAGENT STRIP/BLOOD GLUCOSE: CPT

## 2020-05-28 PROCEDURE — 80053 COMPREHEN METABOLIC PANEL: CPT | Performed by: INTERNAL MEDICINE

## 2020-05-28 PROCEDURE — 99217 PR OBSERVATION CARE DISCHARGE MANAGEMENT: CPT | Performed by: INTERNAL MEDICINE

## 2020-05-28 RX ADMIN — ELVITEGRAVIR, COBICISTAT, EMTRICITABINE, AND TENOFOVIR DISOPROXIL FUMARATE 1 TABLET: 150; 150; 200; 300 TABLET, FILM COATED ORAL at 09:24

## 2020-05-28 RX ADMIN — NICOTINE 1 PATCH: 21 PATCH TRANSDERMAL at 09:29

## 2020-05-28 RX ADMIN — LEVETIRACETAM 1000 MG: 500 TABLET, FILM COATED ORAL at 09:24

## 2020-05-29 LAB — LEVETIRACETAM SERPL-MCNC: 25.6 UG/ML (ref 10–40)

## 2020-05-30 LAB
HIV1 RNA # SERPL NAA+PROBE: NORMAL COPIES/ML
HIV1 RNA SERPL NAA+PROBE-LOG#: 4.72 LOG10COPY/ML

## 2020-06-05 ENCOUNTER — HOSPITAL ENCOUNTER (EMERGENCY)
Facility: HOSPITAL | Age: 35
Discharge: HOME | End: 2020-06-05
Attending: EMERGENCY MEDICINE | Admitting: EMERGENCY MEDICINE
Payer: MEDICARE

## 2020-06-05 VITALS
SYSTOLIC BLOOD PRESSURE: 147 MMHG | WEIGHT: 167.11 LBS | RESPIRATION RATE: 16 BRPM | TEMPERATURE: 96.2 F | DIASTOLIC BLOOD PRESSURE: 82 MMHG | HEART RATE: 86 BPM | BODY MASS INDEX: 26.23 KG/M2 | HEIGHT: 67 IN | OXYGEN SATURATION: 99 %

## 2020-06-05 DIAGNOSIS — R56.9 SEIZURE (HCC): Primary | ICD-10-CM

## 2020-06-05 PROCEDURE — 99284 EMERGENCY DEPT VISIT MOD MDM: CPT

## 2020-06-05 PROCEDURE — 99284 EMERGENCY DEPT VISIT MOD MDM: CPT | Performed by: EMERGENCY MEDICINE

## 2020-06-05 RX ORDER — LEVETIRACETAM 500 MG/1
1000 TABLET ORAL ONCE
Status: COMPLETED | OUTPATIENT
Start: 2020-06-05 | End: 2020-06-05

## 2020-06-05 RX ORDER — LEVETIRACETAM 1000 MG/1
1000 TABLET ORAL 2 TIMES DAILY
Qty: 60 TABLET | Refills: 0 | Status: SHIPPED | OUTPATIENT
Start: 2020-06-05 | End: 2020-07-09 | Stop reason: SDUPTHER

## 2020-06-05 RX ADMIN — LEVETIRACETAM 1000 MG: 500 TABLET ORAL at 07:27

## 2020-07-09 ENCOUNTER — HOSPITAL ENCOUNTER (EMERGENCY)
Facility: HOSPITAL | Age: 35
Discharge: HOME | End: 2020-07-09
Attending: EMERGENCY MEDICINE | Admitting: EMERGENCY MEDICINE
Payer: MEDICARE

## 2020-07-09 VITALS
TEMPERATURE: 98.3 F | DIASTOLIC BLOOD PRESSURE: 83 MMHG | BODY MASS INDEX: 26.67 KG/M2 | OXYGEN SATURATION: 97 % | SYSTOLIC BLOOD PRESSURE: 122 MMHG | RESPIRATION RATE: 18 BRPM | WEIGHT: 170.3 LBS | HEART RATE: 108 BPM

## 2020-07-09 DIAGNOSIS — B20 HIV INFECTION, UNSPECIFIED SYMPTOM STATUS (HCC): ICD-10-CM

## 2020-07-09 DIAGNOSIS — R00.0 CHRONIC TACHYCARDIA: Primary | ICD-10-CM

## 2020-07-09 DIAGNOSIS — R56.9 SEIZURE (HCC): ICD-10-CM

## 2020-07-09 DIAGNOSIS — M79.89 LEG SWELLING: ICD-10-CM

## 2020-07-09 LAB
ATRIAL RATE: 109 BPM
P AXIS: 32 DEGREES
PR INTERVAL: 164 MS
QRS AXIS: -11 DEGREES
QRSD INTERVAL: 97 MS
QT INTERVAL: 330 MS
QTC INTERVAL: 443 MS
T WAVE AXIS: 3 DEGREES
VENTRICULAR RATE: 108 BPM

## 2020-07-09 PROCEDURE — 93005 ELECTROCARDIOGRAM TRACING: CPT

## 2020-07-09 PROCEDURE — 99283 EMERGENCY DEPT VISIT LOW MDM: CPT

## 2020-07-09 PROCEDURE — 93010 ELECTROCARDIOGRAM REPORT: CPT | Performed by: INTERNAL MEDICINE

## 2020-07-09 PROCEDURE — 99285 EMERGENCY DEPT VISIT HI MDM: CPT | Performed by: EMERGENCY MEDICINE

## 2020-07-09 RX ORDER — IBUPROFEN 600 MG/1
600 TABLET ORAL ONCE
Status: COMPLETED | OUTPATIENT
Start: 2020-07-09 | End: 2020-07-09

## 2020-07-09 RX ORDER — LEVETIRACETAM 1000 MG/1
1000 TABLET ORAL 2 TIMES DAILY
Qty: 60 TABLET | Refills: 0 | Status: SHIPPED | OUTPATIENT
Start: 2020-07-09 | End: 2020-07-16 | Stop reason: SDUPTHER

## 2020-07-09 RX ORDER — LEVETIRACETAM 250 MG/1
1000 TABLET ORAL ONCE
Status: COMPLETED | OUTPATIENT
Start: 2020-07-09 | End: 2020-07-09

## 2020-07-09 RX ADMIN — IBUPROFEN 600 MG: 600 TABLET ORAL at 06:26

## 2020-07-09 RX ADMIN — LEVETIRACETAM 1000 MG: 250 TABLET, FILM COATED ORAL at 05:44

## 2020-07-09 NOTE — ED PROVIDER NOTES
History  Chief Complaint   Patient presents with    Leg Pain     Patient presents via EMS complaining of bilateral leg pain and swelling  Evaluated numerous times in ED for same  Patient is homeless  Review of records prior to arrival after EMS phone call shows patient has a history of tachycardia to the 110s which was worked up inpatient, it appears to be benign in origin  Patient also has a history of bilateral lower extremity swelling, seizure, HIV     EMS was called for patient sleeping on the ground, he noted leg swelling and stated he was recently in the hospital for it  He has had no changes to his leg swelling since his inpatient time  He has no chest pain or shortness of breath  He does note that his legs are achy, he does not take any medications at all including his prescribed Keppra or HIV meds because he has not seen a  in some time and can not afford his medications  No changes or concerns  Patient is currently homeless, he last ate at 7:00 p m  last night for dinner  Prior to Admission Medications   Prescriptions Last Dose Informant Patient Reported? Taking?    cyclobenzaprine (FLEXERIL) 10 mg tablet   No No   Sig: Take 1 tablet (10 mg total) by mouth 3 (three) times a day as needed for muscle spasms for up to 15 doses   Patient not taking: Reported on 5/26/2020   elvitegravir-cobicistat-emtricitabine-tenofovir (STRIBILD) [phone] mg   Yes No   Sig: Take 1 tablet by mouth daily with breakfast   elvitegravir-cobicistat-emtricitabine-tenofovir (Stribild) [phone] mg   No No   Sig: Take 1 tablet by mouth daily with breakfast   levETIRAcetam (KEPPRA) 1000 MG tablet   No No   Sig: Take 1 tablet (1,000 mg total) by mouth 2 (two) times a day   levETIRAcetam (KEPPRA) 1000 MG tablet   No No   Sig: Take 1 tablet (1,000 mg total) by mouth 2 (two) times a day   levETIRAcetam (KEPPRA) 1000 MG tablet   No No   Sig: Take 1 tablet (1,000 mg total) by mouth 2 (two) times a day      Facility-Administered Medications: None       Past Medical History:   Diagnosis Date    [facility]) [facility] )     [facility]        Past Surgical History:   Procedure Laterality Date    HERNIA REPAIR         History reviewed  No pertinent family history  I have reviewed and agree with the history as documented  E-Cigarette/Vaping    E-Cigarette Use Never User      E-Cigarette/Vaping Substances     Social History     Tobacco Use    Smoking status: Current Every Day Smoker     Packs/day: 1 00    Smokeless tobacco: Never Used   Substance Use Topics    Alcohol use: Yes     Comment: social    Drug use: Not Currently     Comment: used K2 2 months ago and marijuana in past        Review of Systems   All other systems reviewed and are negative  Physical Exam  Physical Exam   Constitutional: He is oriented to person, place, and time  He appears well-developed and well-nourished  HENT:   Head: Normocephalic and atraumatic  Mouth/Throat: Oropharynx is clear and moist    Eyes: Pupils are equal, round, and reactive to light  EOM are normal    Cardiovascular: Regular rhythm, normal heart sounds and intact distal pulses  Tachycardia to 108, this appears to be patient's baseline based on prior records   Pulmonary/Chest: Effort normal and breath sounds normal  No respiratory distress  He has no wheezes  He has no rales  Abdominal: Soft  He exhibits no distension  There is no tenderness  There is no rebound and no guarding  Musculoskeletal: Normal range of motion  He exhibits no deformity  Bilateral trace pitting edema without erythema or abrasions, patient has tenderness with testing of the erythema but no bony tenderness   Neurological: He is alert and oriented to person, place, and time  No sensory deficit  He exhibits normal muscle tone  Coordination normal    Skin: Skin is warm and dry  Capillary refill takes less than 2 seconds     Psychiatric: He has a normal mood and affect  His behavior is normal  Thought content normal    Nursing note and vitals reviewed  Vital Signs  ED Triage Vitals   Temperature Pulse Respirations Blood Pressure SpO2   07/09/20 0513 07/09/20 0514 07/09/20 0513 07/09/20 0514 07/09/20 0513   98 3 °F (36 8 °C) (!) 108 18 122/83 97 %      Temp Source Heart Rate Source Patient Position - Orthostatic VS BP Location FiO2 (%)   07/09/20 0513 07/09/20 0513 07/09/20 0513 07/09/20 0513 --   Oral Monitor Sitting Right arm       Pain Score       07/09/20 0513       9           Vitals:    07/09/20 0513 07/09/20 0514   BP:  122/83   Pulse:  (!) 108   Patient Position - Orthostatic VS: Sitting          Visual Acuity      ED Medications  Medications   levETIRAcetam (KEPPRA) tablet 1,000 mg (1,000 mg Oral Given 7/9/20 0544)   ibuprofen (MOTRIN) tablet 600 mg (600 mg Oral Given 7/9/20 1666)       Diagnostic Studies  Results Reviewed     None                 No orders to display              Procedures  ECG 12 Lead Documentation Only  Date/Time: 7/9/2020 6:19 AM  Performed by: Delores Temple DO  Authorized by: Delores Temple DO     Indications / Diagnosis:  Chronic tachycardia  ECG reviewed by me, the ED Provider: yes    Patient location:  ED and bedside  Previous ECG:     Previous ECG:  Compared to current  Comments:      EKG is interpreted by me:  Normal sinus rhythm at 108 beats per minute, no MATTHIEU/D, TWI lead III only, no acute ischemia, normal intervals; unchanged from prior EKG             ED Course  ED Course as of Jul 09 0713   Thu Jul 09, 2020   0544 Review of prior workup from his hospitalization at [facility] for tachycardia and peripheral edema 1 month ago:  Heart rates were from 80s to 120s  CTA was negative, troponins were negative, patient has mild leg swelling which is not thought to be due to heart failure, patient had a reassuring workup and appears to have a benign cause for his tachycardia    His physical status today appears to be much like his status was upon discharge 1 month ago  Viral load was around 53,000  CD4 count was 106          0611 EKG tonight is reassuring with a heart rate of 108 which is reasonable compared to his prior tachycardia  We do not have  available at this hour, patient is feeling better after ibuprofen  Discussed plan for discharge, follow-up with social Work this to work on [address] as well as work on how to take his HIV  Since he has not been taking his HIV meds and does not make sense restart tonight as they may no longer cover his virus  Discussed importance of follow-up with Infectious Disease and the nature of the HIV virus is something that mutated so often this stopping and starting meds will eventually lead to an untreatable and worsening disease  Patient verbalizes understanding of this and states he will follow-up with infectious disease is possible as well as social Work this morning  Patient is stable for discharge to the waiting room to wait for social Work to arrive as this will be several hours from now and the patient is medically stable  US AUDIT      Most Recent Value   Initial Alcohol Screen: US AUDIT-C    1  How often do you have a drink containing alcohol? 2 Filed at: 07/09/2020 0512   2  How many drinks containing alcohol do you have on a typical day you are drinking? 2 Filed at: 07/09/2020 0512   3a  Male UNDER 65: How often do you have five or more drinks on one occasion? 0 Filed at: 07/09/2020 8413   Audit-C Score  4 Filed at: 07/09/2020 3408                  CALI/DAST-10      Most Recent Value   How many times in the past year have you    Used an illegal drug or used a prescription medication for non-medical reasons?   Never Filed at: 07/09/2020 0037                                MDM      Disposition  Final diagnoses:   Chronic tachycardia   Leg swelling - Chronic and unchanged   HIV infection, unspecified symptom status ([facility] )     Time reflects when diagnosis was documented in both MDM as applicable and the Disposition within this note     Time User Action Codes Description Comment    7/9/2020  6:14 AM Lira Speller Add [R00 0] Chronic tachycardia     7/9/2020  6:14 AM Lira Speller Add [M79 89] Leg swelling     7/9/2020  6:14 AM Lira Speller Modify [X22 96] Leg swelling Chronic and unchanged    7/9/2020  6:15 AM Lira Speller Add [R56 9] Seizure ([address] )     7/9/2020  6:16 AM Lira Speller Add [B20] HIV infection, unspecified symptom status [facility])       ED Disposition     ED Disposition Condition Date/Time Comment    Discharge Stable Thu Jul 9, 2020  6:13 AM Ancelmo Porter discharge to home/self care              Follow-up Information     Follow up With Specialties Details Why Contact Info Additional Information    [facility] Infectious Diseases  Call to make an appointment for the next 1-2 days if he needed a new Infectious Disease doctor, otherwise you can follow-up with your infectious disease doctor in the next 1-2 days [address] [address]  [address] [facility], [place], [place], [address] Schedule an appointment as soon as possible for a visit  Call to make a follow-up with a primary care doctor next 1-2 days if you need a new one, otherwise you can follow-up with your primary doctor in the next 2-3 days [address] [address] [address]  [facility] [address] [facility] [address], [address] [facility], [place], [place], [address], [phone]          Discharge Medication List as of 7/9/2020  6:19 AM      CONTINUE these medications which have CHANGED    Details   elvitegravir-cobicistat-emtricitabine-tenofovir (Stribild) [phone] mg Take 1 tablet by mouth daily with breakfast, Starting Thu 7/9/2020, Normal      levETIRAcetam (KEPPRA) 1000 MG tablet Take 1 tablet (1,000 mg total) by mouth 2 (two) times a day, Starting Thu 7/9/2020, Until Sat 8/8/2020, Print         CONTINUE these medications which have NOT CHANGED    Details   cyclobenzaprine (FLEXERIL) 10 mg tablet Take 1 tablet (10 mg total) by mouth 3 (three) times a day as needed for muscle spasms for up to 15 doses, Starting Fri 4/24/2020, Normal           No discharge procedures on file      PDMP Review       Value Time User    PDMP Reviewed  Yes 5/27/2020 12:37 AM LEBRON Larsen          ED Provider  Electronically Signed by           Talya Peraza DO  07/09/20 1037

## 2020-07-13 ENCOUNTER — HOSPITAL ENCOUNTER (EMERGENCY)
Facility: HOSPITAL | Age: 35
Discharge: HOME | End: 2020-07-13
Payer: MEDICARE

## 2020-07-13 VITALS
BODY MASS INDEX: 27.74 KG/M2 | SYSTOLIC BLOOD PRESSURE: 130 MMHG | DIASTOLIC BLOOD PRESSURE: 85 MMHG | HEART RATE: 83 BPM | RESPIRATION RATE: 18 BRPM | OXYGEN SATURATION: 99 % | TEMPERATURE: 97.9 F | WEIGHT: 172.62 LBS | HEIGHT: 66 IN

## 2020-07-13 DIAGNOSIS — M79.89 LEG SWELLING: Primary | ICD-10-CM

## 2020-07-13 LAB
ALBUMIN SERPL BCP-MCNC: 3.7 G/DL (ref 3.4–4.8)
ALP SERPL-CCNC: 64.1 U/L (ref 10–129)
ALT SERPL W P-5'-P-CCNC: 14 U/L (ref 5–63)
ANION GAP SERPL CALCULATED.3IONS-SCNC: 6 MMOL/L (ref 4–13)
AST SERPL W P-5'-P-CCNC: 25 U/L (ref 15–41)
BILIRUB SERPL-MCNC: 0.69 MG/DL (ref 0.3–1.2)
BUN SERPL-MCNC: 9 MG/DL (ref 6–20)
CALCIUM SERPL-MCNC: 8.6 MG/DL (ref 8.4–10.2)
CHLORIDE SERPL-SCNC: 103 MMOL/L (ref 96–108)
CO2 SERPL-SCNC: 27 MMOL/L (ref 22–33)
CREAT SERPL-MCNC: 0.82 MG/DL (ref 0.5–1.2)
GFR SERPL CREATININE-BSD FRML MDRD: 133 ML/MIN/1.73SQ M
GLUCOSE SERPL-MCNC: 98 MG/DL (ref 65–140)
POTASSIUM SERPL-SCNC: 3.2 MMOL/L (ref 3.5–5)
PROT SERPL-MCNC: 7.6 G/DL (ref 6.4–8.3)
SODIUM SERPL-SCNC: 136 MMOL/L (ref 133–145)

## 2020-07-13 PROCEDURE — 80053 COMPREHEN METABOLIC PANEL: CPT

## 2020-07-13 PROCEDURE — 99284 EMERGENCY DEPT VISIT MOD MDM: CPT

## 2020-07-13 PROCEDURE — 36415 COLL VENOUS BLD VENIPUNCTURE: CPT

## 2020-07-13 PROCEDURE — 99283 EMERGENCY DEPT VISIT LOW MDM: CPT

## 2020-07-13 RX ORDER — POTASSIUM CHLORIDE 20 MEQ/1
20 TABLET, EXTENDED RELEASE ORAL ONCE
Status: COMPLETED | OUTPATIENT
Start: 2020-07-13 | End: 2020-07-13

## 2020-07-13 RX ADMIN — POTASSIUM CHLORIDE 20 MEQ: 1500 TABLET, EXTENDED RELEASE ORAL at 09:27

## 2020-07-13 NOTE — ED PROVIDER NOTES
History  Chief Complaint   Patient presents with    Leg Swelling     Patient known to [facility]  Patient homeless, picked up at [facility] for evaluation of bilatera lower ankle edema  Was recently seen for same, improved but returned x3 days ago  22-year-old male history of HIV history of seizure also homeless well known to this emergency department presents secondary to complaints of swelling in bilateral ankles  Patient really has no other specific complaints  Patient still is homeless he has been pretty much living on the streets still has not gotten plugged into a local shelter  Patient does take Keppra for seizures and states that he has been taking his medication  Patient is not appear intoxicated he is awake alert oriented cooperative  Patient denies any trauma that would involve his lower extremities he just states both ankle seems swollen and slightly tender at times  Patient denies any shortness of breath denies any chest pain denies any nausea vomiting denies any abdominal pain  Prior to Admission Medications   Prescriptions Last Dose Informant Patient Reported? Taking?   elvitegravir-cobicistat-emtricitabine-tenofovir (Stribild) [phone] mg 7/12/2020 at Unknown time  No Yes   Sig: Take 1 tablet by mouth daily with breakfast   levETIRAcetam (KEPPRA) 1000 MG tablet 7/12/2020 at Unknown time  No Yes   Sig: Take 1 tablet (1,000 mg total) by mouth 2 (two) times a day      Facility-Administered Medications: None       Past Medical History:   Diagnosis Date    [facility]     Seizures [facility]     Smoker        Past Surgical History:   Procedure Laterality Date    HERNIA REPAIR         History reviewed  No pertinent family history  I have reviewed and agree with the history as documented      E-Cigarette/Vaping    E-Cigarette Use Never User      E-Cigarette/Vaping Substances     Social History     Tobacco Use    Smoking status: Current Every Day Smoker Packs/day: 2 00     Types: Cigarettes    Smokeless tobacco: Never Used   Substance Use Topics    Alcohol use: Yes     Frequency: Monthly or less     Comment: social    Drug use: Not Currently     Comment: used K2 2 months ago and marijuana in past        Review of Systems   Constitutional: Negative for chills and fever  HENT: Negative for congestion  Eyes: Negative for visual disturbance  Respiratory: Negative for shortness of breath  Cardiovascular: Positive for leg swelling  Negative for chest pain  Gastrointestinal: Negative for abdominal pain  Endocrine: Negative for cold intolerance  Genitourinary: Negative for frequency  Musculoskeletal: Negative for gait problem  Skin: Negative for rash  Neurological: Negative for dizziness  Psychiatric/Behavioral: Negative for behavioral problems and confusion  Physical Exam  Physical Exam   Constitutional: He is oriented to person, place, and time  He appears well-developed and well-nourished  He appears distressed  HENT:   Head: Normocephalic and atraumatic  Eyes: Pupils are equal, round, and reactive to light  Conjunctivae and EOM are normal    Neck: Normal range of motion  Neck supple  Cardiovascular: Normal rate, regular rhythm and normal heart sounds  Pulmonary/Chest: Effort normal and breath sounds normal    Abdominal: Soft  Bowel sounds are normal    Musculoskeletal: Normal range of motion  He exhibits edema (bilateral ankle swelling noted)  Neurological: He is alert and oriented to person, place, and time  Skin: Skin is warm and dry  Capillary refill takes less than 2 seconds  Psychiatric: He has a normal mood and affect  His behavior is normal    Nursing note and vitals reviewed        Vital Signs  ED Triage Vitals   Temperature Pulse Respirations Blood Pressure SpO2   07/13/20 0728 07/13/20 0728 07/13/20 0728 07/13/20 0728 07/13/20 0728   97 9 °F (36 6 °C) 103 16 143/93 98 %      Temp Source Heart Rate Source Patient Position - Orthostatic VS BP Location FiO2 (%)   07/13/20 0728 07/13/20 0933 07/13/20 0728 07/13/20 0728 --   Tympanic Monitor Lying Right arm       Pain Score       --                  Vitals:    07/13/20 0728 07/13/20 0933   BP: 143/93 130/83   Pulse: 103 92   Patient Position - Orthostatic VS: Lying Lying         Visual Acuity      ED Medications  Medications   potassium chloride (K-DUR,KLOR-CON) CR tablet 20 mEq (20 mEq Oral Given 7/13/20 9435)       Diagnostic Studies  Results Reviewed     None                 No orders to display              Procedures  Procedures         ED Course                                             MDM    Patient was monitored for any remained stable here in emergency department patient did have mild hypokalemia with a potassium of 3 2  Patient received potassium chloride 20 mEq p o  Plan is to discharge patient home continue his current medication regimen follow-up emergency department symptoms worsen in any way  Impression is dependent edema  Disposition  Final diagnoses:   Leg swelling     Time reflects when diagnosis was documented in both MDM as applicable and the Disposition within this note     Time User Action Codes Description Comment    7/13/2020 11:54 AM Claudio Bojorquez [M79 [id number]] Leg swelling       ED Disposition     ED Disposition Condition Date/Time Comment    Discharge Stable Mon Jul 13, 2020 11:54 AM Alfreda Reeves discharge to home/self care  Follow-up Information     Follow up With Specialties Details Why Contact Info [facility] Schedule an appointment as soon as possible for a visit in 3 days  [address] [address]  [address] , [place], [place], [place], [address] [phone]          Patient's Medications   Discharge Prescriptions    No medications on file     No discharge procedures on file      PDMP Review       Value Time User    PDMP Reviewed  Yes 5/27/2020 12:37 AM LEBRON Macedo          ED Provider  Electronically Signed by           Jordana Gomez MD  07/13/20 [address] MD  07/13/20 [address], MD  07/13/20 4654

## 2020-07-13 NOTE — ED NOTES
Was given lunch earlier but patient did not eat it   Patient packed food up to take with him     Sharmaine Bejarano RN  07/13/20 9761

## 2020-07-13 NOTE — ED NOTES
Attempted to discharge patient  Patient requested to speak to   Nick White sent her a message and she is to come see patient in approximately 1/2 hour       Burt Blanco RN  07/13/20 6261

## 2020-07-16 ENCOUNTER — HOSPITAL ENCOUNTER (EMERGENCY)
Facility: HOSPITAL | Age: 35
Discharge: HOME | End: 2020-07-16
Attending: EMERGENCY MEDICINE | Admitting: EMERGENCY MEDICINE
Payer: MEDICARE

## 2020-07-16 VITALS
HEIGHT: 67 IN | OXYGEN SATURATION: 99 % | SYSTOLIC BLOOD PRESSURE: 119 MMHG | WEIGHT: 164 LBS | RESPIRATION RATE: 18 BRPM | DIASTOLIC BLOOD PRESSURE: 76 MMHG | BODY MASS INDEX: 25.74 KG/M2 | HEART RATE: 110 BPM | TEMPERATURE: 97.7 F

## 2020-07-16 DIAGNOSIS — Z91.19 NON COMPLIANCE WITH MEDICAL TREATMENT: ICD-10-CM

## 2020-07-16 DIAGNOSIS — B20 HIV INFECTION, UNSPECIFIED SYMPTOM STATUS (HCC): ICD-10-CM

## 2020-07-16 DIAGNOSIS — R56.9 SEIZURE (HCC): ICD-10-CM

## 2020-07-16 DIAGNOSIS — G40.909 RECURRENT SEIZURES (HCC): Primary | ICD-10-CM

## 2020-07-16 PROBLEM — Z91.199 NON COMPLIANCE WITH MEDICAL TREATMENT: Status: ACTIVE | Noted: 2020-07-16

## 2020-07-16 LAB
ALBUMIN SERPL BCP-MCNC: 4 G/DL (ref 3.4–4.8)
ALP SERPL-CCNC: 71.2 U/L (ref 10–129)
ALT SERPL W P-5'-P-CCNC: 18 U/L (ref 5–63)
AMPHETAMINES SERPL QL SCN: NEGATIVE
ANION GAP SERPL CALCULATED.3IONS-SCNC: 9 MMOL/L (ref 4–13)
AST SERPL W P-5'-P-CCNC: 38 U/L (ref 15–41)
BACTERIA UR QL AUTO: ABNORMAL /HPF
BARBITURATES UR QL: NEGATIVE
BASOPHILS # BLD AUTO: 0.04 THOUSANDS/ΜL (ref 0–0.1)
BASOPHILS NFR BLD AUTO: 1 % (ref 0–1)
BENZODIAZ UR QL: NEGATIVE
BILIRUB SERPL-MCNC: 0.5 MG/DL (ref 0.3–1.2)
BILIRUB UR QL STRIP: NEGATIVE
BUN SERPL-MCNC: 11 MG/DL (ref 6–20)
CALCIUM SERPL-MCNC: 9.1 MG/DL (ref 8.4–10.2)
CHLORIDE SERPL-SCNC: 102 MMOL/L (ref 96–108)
CLARITY UR: CLEAR
CO2 SERPL-SCNC: 26 MMOL/L (ref 22–33)
COCAINE UR QL: NEGATIVE
COLOR UR: YELLOW
CREAT SERPL-MCNC: 0.95 MG/DL (ref 0.5–1.2)
EOSINOPHIL # BLD AUTO: 0.17 THOUSAND/ΜL (ref 0–0.61)
EOSINOPHIL NFR BLD AUTO: 3 % (ref 0–6)
ERYTHROCYTE [DISTWIDTH] IN BLOOD BY AUTOMATED COUNT: 13.6 % (ref 11.6–15.1)
GFR SERPL CREATININE-BSD FRML MDRD: 120 ML/MIN/1.73SQ M
GLUCOSE SERPL-MCNC: 95 MG/DL (ref 65–140)
GLUCOSE UR STRIP-MCNC: NEGATIVE MG/DL
HCT VFR BLD AUTO: 45.2 % (ref 36.5–49.3)
HGB BLD-MCNC: 15.1 G/DL (ref 12–17)
HGB UR QL STRIP.AUTO: ABNORMAL
IMM GRANULOCYTES # BLD AUTO: 0.01 THOUSAND/UL (ref 0–0.2)
IMM GRANULOCYTES NFR BLD AUTO: 0 % (ref 0–2)
KETONES UR STRIP-MCNC: NEGATIVE MG/DL
LEUKOCYTE ESTERASE UR QL STRIP: NEGATIVE
LYMPHOCYTES # BLD AUTO: 2.49 THOUSANDS/ΜL (ref 0.6–4.47)
LYMPHOCYTES NFR BLD AUTO: 38 % (ref 14–44)
MCH RBC QN AUTO: 29.1 PG (ref 26.8–34.3)
MCHC RBC AUTO-ENTMCNC: 33.4 G/DL (ref 31.4–37.4)
MCV RBC AUTO: 87 FL (ref 82–98)
METHADONE UR QL: NEGATIVE
MONOCYTES # BLD AUTO: 0.52 THOUSAND/ΜL (ref 0.17–1.22)
MONOCYTES NFR BLD AUTO: 8 % (ref 4–12)
MUCOUS THREADS UR QL AUTO: ABNORMAL
NEUTROPHILS # BLD AUTO: 3.39 THOUSANDS/ΜL (ref 1.85–7.62)
NEUTS SEG NFR BLD AUTO: 50 % (ref 43–75)
NITRITE UR QL STRIP: NEGATIVE
NON-SQ EPI CELLS URNS QL MICRO: ABNORMAL /HPF
OPIATES UR QL SCN: NEGATIVE
OXYCODONE+OXYMORPHONE UR QL SCN: NEGATIVE
PCP UR QL: NEGATIVE
PH UR STRIP.AUTO: 6 [PH]
PLATELET # BLD AUTO: 149 THOUSANDS/UL (ref 149–390)
PMV BLD AUTO: 9.9 FL (ref 8.9–12.7)
POTASSIUM SERPL-SCNC: 4.4 MMOL/L (ref 3.5–5)
PROLACTIN SERPL-MCNC: 25 NG/ML (ref 2.5–17.4)
PROT SERPL-MCNC: 8.4 G/DL (ref 6.4–8.3)
PROT UR STRIP-MCNC: ABNORMAL MG/DL
RBC # BLD AUTO: 5.19 MILLION/UL (ref 3.88–5.62)
RBC #/AREA URNS AUTO: ABNORMAL /HPF
SODIUM SERPL-SCNC: 137 MMOL/L (ref 133–145)
SP GR UR STRIP.AUTO: 1.02 (ref 1–1.03)
THC UR QL: NEGATIVE
UROBILINOGEN UR QL STRIP.AUTO: 0.2 E.U./DL
WBC # BLD AUTO: 6.62 THOUSAND/UL (ref 4.31–10.16)
WBC #/AREA URNS AUTO: ABNORMAL /HPF

## 2020-07-16 PROCEDURE — 85025 COMPLETE CBC W/AUTO DIFF WBC: CPT | Performed by: EMERGENCY MEDICINE

## 2020-07-16 PROCEDURE — 99285 EMERGENCY DEPT VISIT HI MDM: CPT | Performed by: EMERGENCY MEDICINE

## 2020-07-16 PROCEDURE — 99284 EMERGENCY DEPT VISIT MOD MDM: CPT

## 2020-07-16 PROCEDURE — 84146 ASSAY OF PROLACTIN: CPT | Performed by: EMERGENCY MEDICINE

## 2020-07-16 PROCEDURE — 96365 THER/PROPH/DIAG IV INF INIT: CPT

## 2020-07-16 PROCEDURE — 81001 URINALYSIS AUTO W/SCOPE: CPT | Performed by: EMERGENCY MEDICINE

## 2020-07-16 PROCEDURE — 36415 COLL VENOUS BLD VENIPUNCTURE: CPT | Performed by: EMERGENCY MEDICINE

## 2020-07-16 PROCEDURE — 80307 DRUG TEST PRSMV CHEM ANLYZR: CPT | Performed by: EMERGENCY MEDICINE

## 2020-07-16 PROCEDURE — 80053 COMPREHEN METABOLIC PANEL: CPT | Performed by: EMERGENCY MEDICINE

## 2020-07-16 PROCEDURE — 80177 DRUG SCRN QUAN LEVETIRACETAM: CPT | Performed by: EMERGENCY MEDICINE

## 2020-07-16 RX ORDER — LEVETIRACETAM 1000 MG/1
1000 TABLET ORAL 2 TIMES DAILY
Qty: 60 TABLET | Refills: 0 | Status: SHIPPED | OUTPATIENT
Start: 2020-07-16 | End: 2021-05-21 | Stop reason: SDUPTHER

## 2020-07-16 RX ADMIN — Medication 500 MG: at 06:16

## 2020-07-16 RX ADMIN — SODIUM CHLORIDE 1000 ML: 0.9 INJECTION, SOLUTION INTRAVENOUS at 06:15

## 2020-07-16 NOTE — ED NOTES
Pt states last Keppra was Tuesday  Pt denies taking drugs today  Pt denies trauma to head recently       Curt Vizcaino RN  07/16/20 0677

## 2020-07-16 NOTE — DISCHARGE INSTRUCTIONS
I have sent in a months supply of your Keppra to [facility] - please pick this up today -     I have also referred to a [facility] PCP so that you may establish a PCP - please make an appt for follow up - they will be able to assist you with community services

## 2020-07-16 NOTE — ED NOTES
Patient escorted to [address] [place] [address] with boxed lunch and discharge papers   Patient was discharged by nightshift          Tang Cruz RN  07/16/20 9607

## 2020-07-16 NOTE — ED PROVIDER NOTES
History  Chief Complaint   Patient presents with    Seizure - Prior Hx Of     Found laying in front of [employer]  Pt cannot remember when he last took his Keppra     This is a 29year old male who presents emergency department this morning by EMS  Patient was once again picked up by EMS at [facility]  Patient states that he believes that he had a seizure  The patient is not postictal  Reports that he does not know who called 911 - EMS reports that they did not witness seizure activity    Patient is frequently hears does not take his Keppra as ordered  He is homeless  Patient states last time he took his Keppra was 2 days ago least as he states he is out of it     Patient is noncompliant and does not follow up with his physicians  Patient is unkempt and malodorous today  Denies recent illness fever chills  Denies striking his head          Prior to Admission Medications   Prescriptions Last Dose Informant Patient Reported? Taking?   elvitegravir-cobicistat-emtricitabine-tenofovir (Stribild) [phone] mg   No Yes   Sig: Take 1 tablet by mouth daily with breakfast   levETIRAcetam (KEPPRA) 1000 MG tablet   No Yes   Sig: Take 1 tablet (1,000 mg total) by mouth 2 (two) times a day   levETIRAcetam (KEPPRA) 1000 MG tablet   No No   Sig: Take 1 tablet (1,000 mg total) by mouth 2 (two) times a day      Facility-Administered Medications: None       Past Medical History:   Diagnosis Date    [facility]     [facility]     Smoker        Past Surgical History:   Procedure Laterality Date    HERNIA REPAIR         History reviewed  No pertinent family history  I have reviewed and agree with the history as documented      E-Cigarette/Vaping    E-Cigarette Use Never User      E-Cigarette/Vaping Substances     Social History     Tobacco Use    Smoking status: Current Every Day Smoker     Packs/day: 2 00     Types: Cigarettes    Smokeless tobacco: Never Used   Substance Use Topics    Alcohol use: Yes     Frequency: Monthly or less     Comment: social    Drug use: Not Currently     Comment: used K2 2 months ago and marijuana in past        Review of Systems   Constitutional: Negative for chills and fever  Unkempt   HENT: Negative for rhinorrhea and sore throat  Eyes: Negative for visual disturbance  Respiratory: Negative for cough and shortness of breath  Cardiovascular: Negative for chest pain and leg swelling  Gastrointestinal: Negative for abdominal pain, diarrhea, nausea and vomiting  Endocrine: Negative for cold intolerance, heat intolerance, polydipsia, polyphagia and polyuria  Genitourinary: Negative for dysuria and frequency  Musculoskeletal: Negative for back pain and myalgias  Skin: Negative for rash  Allergic/Immunologic: Negative for immunocompromised state  Neurological: Positive for seizures  Negative for dizziness, weakness and headaches  Psychiatric/Behavioral: Negative for confusion  All other systems reviewed and are negative  Physical Exam  Physical Exam   Constitutional: He is oriented to person, place, and time  He appears well-developed and well-nourished  unkempt   HENT:   Head: Normocephalic and atraumatic  Right Ear: External ear normal    Left Ear: External ear normal    Nose: Nose normal    Mouth/Throat: Oropharynx is clear and moist  No oropharyngeal exudate  Eyes: Pupils are equal, round, and reactive to light  Conjunctivae and EOM are normal  No scleral icterus  Neck: Normal range of motion  Neck supple  No JVD present  No tracheal deviation present  Cardiovascular: Normal rate, regular rhythm, normal heart sounds and intact distal pulses  No murmur heard  Pulmonary/Chest: Effort normal and breath sounds normal  No respiratory distress  He has no wheezes  He has no rales  Abdominal: Soft  Bowel sounds are normal  There is no tenderness  There is no guarding     Musculoskeletal: Normal range of motion  He exhibits no edema or tenderness  Neurological: He is alert and oriented to person, place, and time  No cranial nerve deficit or sensory deficit  He exhibits normal muscle tone  5/5 motor, nl sens   Skin: Skin is warm and dry  Capillary refill takes less than 2 seconds  Psychiatric: He has a normal mood and affect  His behavior is normal    Nursing note and vitals reviewed        Vital Signs  ED Triage Vitals [07/16/20 0533]   Temperature Pulse Respirations Blood Pressure SpO2   97 7 °F (36 5 °C) (!) 110 18 119/76 99 %      Temp Source Heart Rate Source Patient Position - Orthostatic VS BP Location FiO2 (%)   Tympanic Monitor Lying Left arm --      Pain Score       --           Vitals:    07/16/20 0533   BP: 119/76   Pulse: (!) 110   Patient Position - Orthostatic VS: Lying         Visual Acuity  Visual Acuity      Most Recent Value   L Pupil Size (mm)  2   R Pupil Size (mm)  2          ED Medications  Medications   levETIRAcetam (KEPPRA) 500 mg in sodium chloride 0 9 % 100 mL IVPB (0 mg Intravenous Stopped 7/16/20 0705)   sodium chloride 0 9 % bolus 1,000 mL (0 mL Intravenous Stopped 7/16/20 0716)       Diagnostic Studies  Results Reviewed     Procedure Component Value Units Date/Time    Prolactin [[id number]]  (Abnormal) Collected:  07/16/20 0554    Lab Status:  Final result Specimen:  Blood from Arm, Left Updated:  07/16/20 1354     Prolactin 25 0 ng/mL     Urine Microscopic [[id number]]  (Abnormal) Collected:  07/16/20 0552    Lab Status:  Final result Specimen:  Urine, Clean Catch Updated:  07/16/20 0637     RBC, UA None Seen /hpf      WBC, UA None Seen /hpf      Epithelial Cells None Seen /hpf      Bacteria, UA None Seen /hpf      MUCUS THREADS Occasional    Rapid drug screen, urine [[id number]]  (Normal) Collected:  07/16/20 0552    Lab Status:  Final result Specimen:  Urine, Clean Catch Updated:  07/16/20 0636     Amph/Meth UR Negative     Barbiturate Ur Negative     Benzodiazepine Urine Negative Cocaine Urine Negative     Methadone Urine Negative     Opiate Urine Negative     PCP Ur Negative     THC Urine Negative     Oxycodone Urine Negative    Narrative:       FOR MEDICAL PURPOSES ONLY  IF CONFIRMATION NEEDED PLEASE CONTACT THE LAB WITHIN 5 DAYS      Drug Screen Cutoff Levels:  AMPHETAMINE/METHAMPHETAMINES  1000 ng/mL  BARBITURATES     200 ng/mL  BENZODIAZEPINES     200 ng/mL  COCAINE      300 ng/mL  METHADONE      300 ng/mL  OPIATES      300 ng/mL  PHENCYCLIDINE     25 ng/mL  THC       50 ng/mL  OXYCODONE      100 ng/mL    Comprehensive metabolic panel [[id number]]  (Abnormal) Collected:  07/16/20 0554    Lab Status:  Final result Specimen:  Blood from Arm, Left Updated:  07/16/20 0636     Sodium 137 mmol/L      Potassium 4 4 mmol/L      Chloride 102 mmol/L      CO2 26 mmol/L      ANION GAP 9 mmol/L      BUN 11 mg/dL      Creatinine 0 95 mg/dL      Glucose 95 mg/dL      Calcium 9 1 mg/dL      AST 38 U/L      ALT 18 U/L      Alkaline Phosphatase 71 2 U/L      Total Protein 8 4 g/dL      Albumin 4 0 g/dL      Total Bilirubin 0 50 mg/dL      eGFR 120 ml/min/1 73sq m     Narrative:       National Kidney Disease Foundation guidelines for Chronic Kidney Disease (CKD):     Stage 1 with normal or high GFR (GFR > 90 mL/min/1 73 square meters)    Stage 2 Mild CKD (GFR = 60-89 mL/min/1 73 square meters)    Stage 3A Moderate CKD (GFR = 45-59 mL/min/1 73 square meters)    Stage 3B Moderate CKD (GFR = 30-44 mL/min/1 73 square meters)    Stage 4 Severe CKD (GFR = 15-29 mL/min/1 73 square meters)    Stage 5 End Stage CKD (GFR <15 mL/min/1 73 square meters)  Note: GFR calculation is accurate only with a steady state creatinine    CBC and differential [[id number]]  (Normal) Collected:  07/16/20 0555    Lab Status:  Final result Specimen:  Blood from Arm, Left Updated:  07/16/20 0610     WBC 6 62 Thousand/uL      RBC 5 19 Million/uL      Hemoglobin 15 1 g/dL      Hematocrit 45 2 %      MCV 87 fL      MCH 29 1 pg MCHC 33 4 g/dL      RDW 13 6 %      MPV 9 9 fL      Platelets 117 Thousands/uL      Neutrophils Relative 50 %      Immat GRANS % 0 %      Lymphocytes Relative 38 %      Monocytes Relative 8 %      Eosinophils Relative 3 %      Basophils Relative 1 %      Neutrophils Absolute 3 39 Thousands/µL      Immature Grans Absolute 0 01 Thousand/uL      Lymphocytes Absolute 2 49 Thousands/µL      Monocytes Absolute 0 52 Thousand/µL      Eosinophils Absolute 0 17 Thousand/µL      Basophils Absolute 0 04 Thousands/µL     UA w Reflex to Microscopic w Reflex to Culture [[id number]]  (Abnormal) Collected:  07/16/20 0552    Lab Status:  Final result Specimen:  Urine, Clean Catch Updated:  07/16/20 0608     Color, UA Yellow     Clarity, UA Clear     Specific [place], UA 1 020     pH, UA 6 0     Leukocytes, UA Negative     Nitrite, UA Negative     Protein, UA Trace mg/dl      Glucose, UA Negative mg/dl      Ketones, UA Negative mg/dl      Urobilinogen, UA 0 2 E U /dl      Bilirubin, UA Negative     Blood, UA Trace-Intact    Levetiracetam level [[id number]] Collected:  07/16/20 0554    Lab Status: In process Specimen:  Blood from Arm, Left Updated:  07/16/20 0601                 No orders to display              Procedures  Procedures         ED Course    this 70-year-old male with a history of noncompliance with his medications and seizure history was brought to the emergency department by EMS  Apparently patient was at local gas station and asked to come to the ER because he stated that he had a seizure  This was not witnessed by anybody  Patient is here frequently  Patient was alert and oriented cooperative on arrival   He was not postictal   Vital signs were stable  Labs were normal   Patient states he has been out of his Keppra for 3 days  Gave him 1 dose of Keppra IV and did prescribe Keppra for him for 1 month  Also referred to PCP     Patient remained stable while  He did not have any seizure activity while here  Patient is stable for discharge    US AUDIT      Most Recent Value   Initial Alcohol Screen: US AUDIT-C    1  How often do you have a drink containing alcohol?  0 Filed at: 07/16/2020 0531   2  How many drinks containing alcohol do you have on a typical day you are drinking? 0 Filed at: 07/16/2020 0531   3a  Male UNDER 65: How often do you have five or more drinks on one occasion? 0 Filed at: 07/16/2020 0531   3b  FEMALE Any Age, or MALE 65+: How often do you have 4 or more drinks on one occassion? 0 Filed at: 07/16/2020 0531   Audit-C Score  0 Filed at: 07/16/2020 0531                  CALI/DAST-10      Most Recent Value   How many times in the past year have you    Used an illegal drug or used a prescription medication for non-medical reasons? Never Filed at: 07/16/2020 0532                                MDM  Number of Diagnoses or Management Options  Recurrent seizures [facility]):   Diagnosis management comments: This patient has seizures and pseudoseizures  He is here in the ED frequently for the same  He is almost always picked up with the wall while downtown  Patient is not postictal   Exam is normal   Will do CBC CMP and a Keppra level  Will give patient 500 mg of Keppra IV  Patient states he had a Keppra so I will order a Keppra  The plan is to check blood for infection or electrolyte imbalances    Also do UDS       Amount and/or Complexity of Data Reviewed  Clinical lab tests: ordered    Risk of Complications, Morbidity, and/or Mortality  Presenting problems: moderate  Diagnostic procedures: minimal  Management options: low          Disposition  Final diagnoses:   Recurrent seizures ([facility] )   Non compliance with medical treatment     Time reflects when diagnosis was documented in both MDM as applicable and the Disposition within this note     Time User Action Codes Description Comment    7/16/2020  5:41 AM Carl Rodriguez Add [X37 810] Recurrent seizures ([facility] )     7/16/2020  5:42 AM Carl Rodriguez Add [B20] HIV infection, unspecified symptom status ([place] [address] )     7/16/2020  5:42 AM Lon Dace Modify [B20] HIV infection, unspecified symptom status ([place] [address] )     7/16/2020  5:42 AM Lon Dace Modify [B20] HIV infection, unspecified symptom status ([place] [address] )     7/16/2020  5:42 AM Lon Dace Add [R56 9] Seizure ([place] [address] )     7/16/2020  5:42 AM Lon Dace Modify [B20] HIV infection, unspecified symptom status ([place] [address] )     7/16/2020  6:52 AM Lon Dace Add [Z91 19] Non compliance with medical treatment       ED Disposition     ED Disposition Condition Date/Time Comment    Discharge Stable Thu Jul 16, 2020  6:51 AM Owen Monae discharge to home/self care              Follow-up Information    None         Discharge Medication List as of 7/16/2020  6:55 AM      CONTINUE these medications which have CHANGED    Details   levETIRAcetam (KEPPRA) 1000 MG tablet Take 1 tablet (1,000 mg total) by mouth 2 (two) times a day, Starting Thu 7/16/2020, Until Sat 8/15/2020, Print         CONTINUE these medications which have NOT CHANGED    Details   elvitegravir-cobicistat-emtricitabine-tenofovir (Stribild) [phone] mg Take 1 tablet by mouth daily with breakfast, Starting Thu 7/9/2020, Normal               PDMP Review       Value Time User    PDMP Reviewed  Yes 5/27/2020 12:37 AM Amparo Tolliver, [address]           ED Provider  Electronically Signed by           Luli Alva MD  07/19/20 5103

## 2020-07-20 LAB — LEVETIRACETAM SERPL-MCNC: <1 UG/ML (ref 10–40)

## 2020-07-27 ENCOUNTER — APPOINTMENT (EMERGENCY)
Dept: RADIOLOGY | Facility: HOSPITAL | Age: 35
End: 2020-07-27
Payer: MEDICARE

## 2020-07-27 ENCOUNTER — HOSPITAL ENCOUNTER (EMERGENCY)
Facility: HOSPITAL | Age: 35
Discharge: HOME | End: 2020-07-27
Attending: EMERGENCY MEDICINE
Payer: MEDICARE

## 2020-07-27 VITALS
HEART RATE: 94 BPM | WEIGHT: 164.02 LBS | BODY MASS INDEX: 25.69 KG/M2 | DIASTOLIC BLOOD PRESSURE: 89 MMHG | TEMPERATURE: 98.3 F | SYSTOLIC BLOOD PRESSURE: 131 MMHG | RESPIRATION RATE: 16 BRPM | OXYGEN SATURATION: 98 %

## 2020-07-27 DIAGNOSIS — Z59.00 HOMELESSNESS: Primary | ICD-10-CM

## 2020-07-27 DIAGNOSIS — R60.0 BILATERAL LOWER EXTREMITY EDEMA: ICD-10-CM

## 2020-07-27 PROCEDURE — 99284 EMERGENCY DEPT VISIT MOD MDM: CPT

## 2020-07-27 PROCEDURE — 99284 EMERGENCY DEPT VISIT MOD MDM: CPT | Performed by: EMERGENCY MEDICINE

## 2020-07-27 PROCEDURE — 71045 X-RAY EXAM CHEST 1 VIEW: CPT

## 2020-07-27 SDOH — ECONOMIC STABILITY - HOUSING INSECURITY: HOMELESSNESS UNSPECIFIED: Z59.00

## 2020-07-27 NOTE — ED PROVIDER NOTES
History  Chief Complaint   Patient presents with    Numbness     Pt presents to the ED with c/o of numbness in B/L feet and toes     28-year-old homeless male with history of seizure disorder and HIV presents emergency department complaining of lower extremity edema, pain in his legs and difficulty with walking secondary to some numbness and pain in his feet and toes  He is homeless, he frequently visits the emergency department, he has had peripheral edema in his legs chronically and has been worked up with negative lower extremity Doppler ultrasounds and he has had CT studies that have ruled out pulmonary embolisms in the past   He is not complaining of any shortness of breath or chest pain, he just says that his feet are hurting because he is homeless and walking on them all day and he does have some peripheral edema  No shortness of breath, no cough, no fever  After speaking with him for some time he requests feet have something the eat and requests if he can sleep here for the night since he has no vertigo at this time  History provided by:  Patient      Prior to Admission Medications   Prescriptions Last Dose Informant Patient Reported? Taking?   elvitegravir-cobicistat-emtricitabine-tenofovir (Stribild) [phone] mg   No No   Sig: Take 1 tablet by mouth daily with breakfast   levETIRAcetam (KEPPRA) 1000 MG tablet   No No   Sig: Take 1 tablet (1,000 mg total) by mouth 2 (two) times a day      Facility-Administered Medications: None       Past Medical History:   Diagnosis Date    [facility]     [facility]izures ([facility] )     Smoker        Past Surgical History:   Procedure Laterality Date    HERNIA REPAIR         History reviewed  No pertinent family history  I have reviewed and agree with the history as documented      E-Cigarette/Vaping    E-Cigarette Use Never User      E-Cigarette/Vaping Substances     Social History     Tobacco Use    Smoking status: Current Every Day Smoker     Packs/day: 2 00     Types: Cigarettes    Smokeless tobacco: Never Used   Substance Use Topics    Alcohol use: Yes     Frequency: Monthly or less     Comment: social    Drug use: Not Currently     Comment: used K2 2 months ago and marijuana in past        Review of Systems   Constitutional: Negative for fever  HENT: Negative for sore throat  Eyes: Negative for discharge  Respiratory: Negative for shortness of breath  Cardiovascular: Negative for chest pain  Gastrointestinal: Negative for vomiting  Endocrine: Negative for polydipsia and polyuria  Genitourinary: Negative for dysuria and frequency  Musculoskeletal: Positive for arthralgias  Negative for myalgias  Skin: Negative for rash  Neurological: Positive for numbness  Negative for seizures  Hematological: Negative for adenopathy  Psychiatric/Behavioral: Negative for hallucinations  Physical Exam  Physical Exam   Constitutional: He is oriented to person, place, and time  He appears well-developed and well-nourished  HENT:   Head: Normocephalic and atraumatic  Eyes: Pupils are equal, round, and reactive to light  Conjunctivae and EOM are normal    Neck: Normal range of motion  Neck supple  Cardiovascular: Normal rate, regular rhythm and normal heart sounds  Exam reveals no gallop and no friction rub  No murmur heard  Pulmonary/Chest: Effort normal and breath sounds normal  No respiratory distress  Abdominal: Soft  Bowel sounds are normal  He exhibits no distension  There is no tenderness  No hernia  Musculoskeletal: Normal range of motion  He exhibits edema ( patient has bilateral peripheral edema to the mid-shin this which is chronic)  He exhibits no tenderness  Neurological: He is alert and oriented to person, place, and time  A sensory deficit (Patient states slightly decreased sensation in his legs, however he has no sensory deficits on physical exam) is present   He exhibits normal muscle tone    Chronic right drop foot, patient uses a cane to walk because of this which he says he has had since 2009   Skin: Skin is warm and dry  Capillary refill takes less than 2 seconds  Patient has onychomycosis of his toenails of his feet   Psychiatric: He has a normal mood and affect  Judgment normal    Nursing note and vitals reviewed  Vital Signs  ED Triage Vitals [07/27/20 0118]   Temperature Pulse Respirations Blood Pressure SpO2   98 3 °F (36 8 °C) 94 16 131/89 98 %      Temp Source Heart Rate Source Patient Position - Orthostatic VS BP Location FiO2 (%)   Tympanic Monitor Sitting Right arm --      Pain Score       --           Vitals:    07/27/20 0118   BP: 131/89   Pulse: 94   Patient Position - Orthostatic VS: Sitting         Visual Acuity      ED Medications  Medications - No data to display    Diagnostic Studies  Results Reviewed     None                 XR chest 1 view portable   ED Interpretation by Vikram Feldman MD (07/27 0330)   No acute cardiopulmonary process                 Procedures  Procedures         ED Course       US AUDIT      Most Recent Value   Initial Alcohol Screen: US AUDIT-C    1  How often do you have a drink containing alcohol? 1 Filed at: 07/27/2020 0120   2  How many drinks containing alcohol do you have on a typical day you are drinking? 1 Filed at: 07/27/2020 0120   3a  Male UNDER 65: How often do you have five or more drinks on one occasion? 1 Filed at: 07/27/2020 0120   Audit-C Score  3 Filed at: 07/27/2020 0120                  CALI/DAST-10      Most Recent Value   How many times in the past year have you    Used an illegal drug or used a prescription medication for non-medical reasons? Monthly Filed at: 07/27/2020 0119   In the past 12 months      1  Have you used drugs other than those required for medical reasons? 0 Filed at: 07/27/2020 0119   2  Do you use more than one drug at a time? 0 Filed at: 07/27/2020 0119   3   Have you had medical problems as a result of your drug use (e g , memory loss, hepatitis, convulsions, bleeding, etc )? 0 Filed at: 07/27/2020 0119   4  Have you had "blackouts" or "flashbacks" as a result of drug use? YesNo  0 Filed at: 07/27/2020 0119   5  Do you ever feel bad or guilty about your drug use? 0 Filed at: 07/27/2020 0119   6  Does your spouse (or parent) ever complain about your involvement with drugs? 0 Filed at: 07/27/2020 0119   7  Have you neglected your family because of your use of drugs? 0 Filed at: 07/27/2020 0119   8  Have you engaged in illegal activities in order to obtain drugs? 0 Filed at: 07/27/2020 0119   9  Have you ever experienced withdrawal symptoms (felt sick) when you stopped taking drugs? 0 Filed at: 07/27/2020 0119   10  Are you always able to stop using drugs when you want to?  0 Filed at: 07/27/2020 0119   DAST-10 Score  0 Filed at: 07/27/2020 0119                                MDM  Number of Diagnoses or Management Options     Amount and/or Complexity of Data Reviewed  Tests in the radiology section of CPT®: ordered and reviewed    Risk of Complications, Morbidity, and/or Mortality  Presenting problems: low  Diagnostic procedures: low  Management options: low  General comments: Patient given something the eat in the emergency department, we let him sleep, obtain screening chest x-ray on the patient which is normal   After allowing the sleep for several hours and feeding the patient giving him some fresh socks, he is able to ambulate on his own, stable for discharge      Patient Progress  Patient progress: stable        Disposition  Final diagnoses:   Homelessness   Bilateral lower extremity edema - chronic     Time reflects when diagnosis was documented in both MDM as applicable and the Disposition within this note     Time User Action Codes Description Comment    7/27/2020  3:51 AM [employer] Add [Z59 0] Homelessness     7/27/2020  3:51 AM [place] Add [R60 0] Bilateral lower extremity edema 7/27/2020  3:51 AM Douglas Parmar [R60 0] Bilateral lower extremity edema chronic      ED Disposition     ED Disposition Condition Date/Time Comment    Discharge Stable Mon Jul 27, 2020  3:50 AM Cecelia Gibbs discharge to home/self care  Follow-up Information     Follow up With Specialties Details Why Contact Info Additional Information    [facility] [address] [facility] Schedule an appointment as soon as possible for a visit   [facility] [id number] [place] [address], [facility] [address] [facility] [address], [phone]          Patient's Medications   Discharge Prescriptions    No medications on file     No discharge procedures on file      PDMP Review       Value Time User    PDMP Reviewed  Yes 5/27/2020 12:37 AM LEBRON Murdock          ED Provider  Electronically Signed by           Aaliyah Gonzalez MD  07/27/20 1409

## 2020-07-28 ENCOUNTER — HOSPITAL ENCOUNTER (EMERGENCY)
Facility: HOSPITAL | Age: 35
Discharge: HOME | End: 2020-07-28
Attending: EMERGENCY MEDICINE
Payer: MEDICARE

## 2020-07-28 VITALS
OXYGEN SATURATION: 97 % | WEIGHT: 163.14 LBS | DIASTOLIC BLOOD PRESSURE: 64 MMHG | BODY MASS INDEX: 25.61 KG/M2 | HEART RATE: 97 BPM | HEIGHT: 67 IN | TEMPERATURE: 97.8 F | SYSTOLIC BLOOD PRESSURE: 111 MMHG | RESPIRATION RATE: 19 BRPM

## 2020-07-28 DIAGNOSIS — M25.472 ANKLE EDEMA, BILATERAL: Primary | ICD-10-CM

## 2020-07-28 DIAGNOSIS — Z59.00 HOMELESSNESS: ICD-10-CM

## 2020-07-28 DIAGNOSIS — B20 HIV (HUMAN IMMUNODEFICIENCY VIRUS INFECTION) (HCC): ICD-10-CM

## 2020-07-28 DIAGNOSIS — G40.909 SEIZURE DISORDER (HCC): ICD-10-CM

## 2020-07-28 DIAGNOSIS — Z91.19 NON COMPLIANCE WITH MEDICAL TREATMENT: ICD-10-CM

## 2020-07-28 DIAGNOSIS — M25.471 ANKLE EDEMA, BILATERAL: Primary | ICD-10-CM

## 2020-07-28 PROCEDURE — 99283 EMERGENCY DEPT VISIT LOW MDM: CPT

## 2020-07-28 PROCEDURE — 99281 EMR DPT VST MAYX REQ PHY/QHP: CPT

## 2020-07-28 RX ORDER — LEVETIRACETAM 250 MG/1
500 TABLET ORAL ONCE
Status: COMPLETED | OUTPATIENT
Start: 2020-07-28 | End: 2020-07-28

## 2020-07-28 RX ADMIN — LEVETIRACETAM 500 MG: 250 TABLET, FILM COATED ORAL at 05:37

## 2020-07-28 SDOH — ECONOMIC STABILITY - HOUSING INSECURITY: HOMELESSNESS UNSPECIFIED: Z59.00

## 2020-07-28 NOTE — ED PROVIDER NOTES
History  Chief Complaint   Patient presents with    Joint Swelling     pt presents to ed via EMS with swollen ankles hx of the same seen yesterday for the same      HPI    Prior to Admission Medications   Prescriptions Last Dose Informant Patient Reported? Taking?   elvitegravir-cobicistat-emtricitabine-tenofovir (Stribild) [phone] mg Unknown at Unknown time  No No   Sig: Take 1 tablet by mouth daily with breakfast   levETIRAcetam (KEPPRA) 1000 MG tablet Unknown at Unknown time  No No   Sig: Take 1 tablet (1,000 mg total) by mouth 2 (two) times a day      Facility-Administered Medications: None       Past Medical History:   Diagnosis Date    [facility]     [facility]     Smoker        Past Surgical History:   Procedure Laterality Date    HERNIA REPAIR         History reviewed  No pertinent family history  I have reviewed and agree with the history as documented  E-Cigarette/Vaping    E-Cigarette Use Never User      E-Cigarette/Vaping Substances     Social History     Tobacco Use    Smoking status: Current Every Day Smoker     Packs/day: 2 00     Types: Cigarettes    Smokeless tobacco: Never Used   Substance Use Topics    Alcohol use: Yes     Frequency: Monthly or less     Comment: social    Drug use: Yes     Types: Marijuana     Comment: used K2 2 months ago and marijuana in past        Review of Systems   Constitutional: Negative for chills and fever  HENT: Negative for congestion  Eyes: Negative for visual disturbance  Respiratory: Negative for shortness of breath  Cardiovascular: Positive for leg swelling  Negative for chest pain  Gastrointestinal: Negative for abdominal pain  Endocrine: Negative for cold intolerance  Genitourinary: Negative for frequency  Musculoskeletal: Negative for gait problem  Skin: Negative for rash  Neurological: Negative for dizziness     Psychiatric/Behavioral: Negative for behavioral problems and confusion  Physical Exam  Physical Exam  Vitals signs and nursing note reviewed  Constitutional:       Appearance: He is well-developed  HENT:      Head: Normocephalic and atraumatic  Eyes:      Conjunctiva/sclera: Conjunctivae normal       Pupils: Pupils are equal, round, and reactive to light  Neck:      Musculoskeletal: Normal range of motion and neck supple  Cardiovascular:      Rate and Rhythm: Normal rate and regular rhythm  Heart sounds: Normal heart sounds  Pulmonary:      Effort: Pulmonary effort is normal       Breath sounds: Normal breath sounds  Abdominal:      General: Bowel sounds are normal       Palpations: Abdomen is soft  Musculoskeletal: Normal range of motion  Right lower leg: Edema present  Left lower leg: Edema present  Skin:     General: Skin is warm and dry  Capillary Refill: Capillary refill takes less than 2 seconds  Neurological:      Mental Status: He is alert and oriented to person, place, and time  Psychiatric:         Behavior: Behavior normal          Vital Signs  ED Triage Vitals [07/28/20 0045]   Temperature Pulse Respirations Blood Pressure SpO2   97 8 °F (36 6 °C) 97 19 111/64 97 %      Temp Source Heart Rate Source Patient Position - Orthostatic VS BP Location FiO2 (%)   Tympanic Monitor Lying Left arm --      Pain Score       7           Vitals:    07/28/20 0045   BP: 111/64   Pulse: 97   Patient Position - Orthostatic VS: Lying         Visual Acuity      ED Medications  Medications   levETIRAcetam (KEPPRA) tablet 500 mg (500 mg Oral Given 7/28/20 0537)       Diagnostic Studies  Results Reviewed     None                 No orders to display              Procedures  Procedures         ED Course       US AUDIT      Most Recent Value   Initial Alcohol Screen: US AUDIT-C    1  How often do you have a drink containing alcohol?   1 Filed at: 07/28/2020 0047   Audit-C Score  1 Filed at: 07/28/2020 0047                  CALI/DAST-10 Most Recent Value   How many times in the past year have you    Used an illegal drug or used a prescription medication for non-medical reasons? Never Filed at: 07/28/2020 0047                                MDM      Disposition  Final diagnoses: Ankle edema, bilateral   Homelessness   Seizure disorder ([facility] )   Non compliance with medical treatment   HIV [facility]human immunodeficiency virus infection) ([facility] )     Time reflects when diagnosis was documented in both MDM as applicable and the Disposition within this note     Time User Action Codes Description Comment    7/28/2020 12:59 AM [place] Caldron Add [M25 471,  M25 472] Ankle edema, bilateral     7/28/2020 12:59 AM [place] Caldron Add [Z59 0] Homelessness     7/28/2020 12:59 AM Carolina Caldron Add [G40 909] Seizure disorder ([facility] )     7/28/2020  1:00 AM Carolina Caldron Add [Z91 19] Non compliance with medical treatment     7/28/2020  1:00 AM Carolina Caldron Add [B20] HIV (human immunodeficiency virus infection) [facility])       ED Disposition     ED Disposition Condition Date/Time Comment    Discharge Stable Tue Jul 28, 2020  1:48 AM [place] discharge to home/self care              Follow-up Information    None         Discharge Medication List as of 7/28/2020  1:48 AM      CONTINUE these medications which have NOT CHANGED    Details   elvitegravir-cobicistat-emtricitabine-tenofovir (Stribild) [phone] mg Take 1 tablet by mouth daily with breakfast, Starting Thu 7/9/2020, Normal      levETIRAcetam (KEPPRA) 1000 MG tablet Take 1 tablet (1,000 mg total) by mouth 2 (two) times a day, Starting Thu 7/16/2020, Until Sat 8/15/2020, Print               PDMP Review       Value Time User    PDMP Reviewed  Yes 5/27/2020 12:37 AM Yeimy Castañeda, [address] [place] [address]          ED Provider  Electronically Signed by           Jessi Watt MD  07/28/20 7723 [facility] Genesis Hernandez MD  08/07/20 8311

## 2020-07-28 NOTE — DISCHARGE INSTRUCTIONS
AS we discussed, I have again referred you to a [facility] PCP - since you are not able to receive incoming calls on your telephone, you will need to call yourself - call [phone] which is Central Scheduling

## 2020-07-31 ENCOUNTER — HOSPITAL ENCOUNTER (EMERGENCY)
Facility: HOSPITAL | Age: 35
Discharge: HOME | End: 2020-07-31
Attending: EMERGENCY MEDICINE | Admitting: EMERGENCY MEDICINE
Payer: MEDICARE

## 2020-07-31 VITALS
HEART RATE: 91 BPM | RESPIRATION RATE: 19 BRPM | SYSTOLIC BLOOD PRESSURE: 141 MMHG | OXYGEN SATURATION: 98 % | HEIGHT: 67 IN | TEMPERATURE: 98.7 F | BODY MASS INDEX: 25.11 KG/M2 | DIASTOLIC BLOOD PRESSURE: 65 MMHG | WEIGHT: 160 LBS

## 2020-07-31 DIAGNOSIS — R60.0 BILATERAL LEG EDEMA: Primary | ICD-10-CM

## 2020-07-31 PROCEDURE — 99283 EMERGENCY DEPT VISIT LOW MDM: CPT

## 2020-07-31 PROCEDURE — 99284 EMERGENCY DEPT VISIT MOD MDM: CPT | Performed by: EMERGENCY MEDICINE

## 2020-07-31 RX ORDER — IBUPROFEN 600 MG/1
600 TABLET ORAL ONCE
Status: COMPLETED | OUTPATIENT
Start: 2020-07-31 | End: 2020-07-31

## 2020-07-31 RX ADMIN — IBUPROFEN 600 MG: 600 TABLET ORAL at 01:43

## 2020-07-31 NOTE — ED PROVIDER NOTES
History  Chief Complaint   Patient presents with    Foot Swelling     bilateral foot swelling and pain with ambulation  seen in ED 2 days ago for same  This is a 15-year-old male with a history of HIV that presents to the emergency department complaining of bilateral lower extremity pain  Patient states that pain is been going on for quite some time  He states there is nothing new about his pain today  He states he has been walking hurts more  Patient denies any new or additional injuries  The patient denies any skin breakdown  Patient denies taking any medication prior to arrival in the emergency department  Patient denies fevers or chills  Patient denies chest pain or trouble breathing  Prior to Admission Medications   Prescriptions Last Dose Informant Patient Reported? Taking?   elvitegravir-cobicistat-emtricitabine-tenofovir (Stribild) [phone] mg More than a month at Unknown time  No No   Sig: Take 1 tablet by mouth daily with breakfast   levETIRAcetam (KEPPRA) 1000 MG tablet Past Week at Unknown time  No Yes   Sig: Take 1 tablet (1,000 mg total) by mouth 2 (two) times a day      Facility-Administered Medications: None       Past Medical History:   Diagnosis Date    [facility] ([facility] )     Seizures ([facility] )     Smoker        Past Surgical History:   Procedure Laterality Date    HERNIA REPAIR         History reviewed  No pertinent family history  I have reviewed and agree with the history as documented      E-Cigarette/Vaping    E-Cigarette Use Never User      E-Cigarette/Vaping Substances     Social History     Tobacco Use    Smoking status: Current Every Day Smoker     Packs/day: 2 00     Types: Cigarettes    Smokeless tobacco: Never Used   Substance Use Topics    Alcohol use: Yes     Frequency: Monthly or less     Comment: social    Drug use: Yes     Types: Marijuana     Comment: used K2 2 months ago and marijuana in past        Review of Systems   All other systems reviewed and are negative  Physical Exam  Physical Exam  Constitutional:  Vital signs reviewed, patient appears nontoxic, no acute distress  Eyes: Pupils equal round reactive to light and accommodation, extraocular muscles intact  HEENT: trachea midline, no JVD, moist mucous membranes  Respiratory: lung sounds clear throughout, no rhonchi, no rales  Cardiovascular: regular rate rhythm, no murmurs or rubs  Abdomen: soft, nontender, nondistended, no rebound or guarding  Back: no CVA tenderness, normal inspection  Extremities:  Bilateral lower extremity edema, unchanged from prior visits, pulses equal in all 4 extremities  Neuro: awake, alert, oriented, no focal weakness  Skin: warm, dry, intact, no rashes noted    Vital Signs  ED Triage Vitals [07/31/20 0049]   Temperature Pulse Respirations Blood Pressure SpO2   98 7 °F (37 1 °C) (!) 108 18 137/88 96 %      Temp Source Heart Rate Source Patient Position - Orthostatic VS BP Location FiO2 (%)   Tympanic Monitor Sitting Right arm --      Pain Score       9           Vitals:    07/31/20 0049 07/31/20 0145   BP: 137/88 141/65   Pulse: (!) 108 91   Patient Position - Orthostatic VS: Sitting Lying         Visual Acuity      ED Medications  Medications   ibuprofen (MOTRIN) tablet 600 mg (600 mg Oral Given 7/31/20 0143)       Diagnostic Studies  Results Reviewed     None                 No orders to display              Procedures  Procedures         ED Course       US AUDIT      Most Recent Value   Initial Alcohol Screen: US AUDIT-C    1  How often do you have a drink containing alcohol? 1 Filed at: 07/31/2020 0051   2  How many drinks containing alcohol do you have on a typical day you are drinking? 0 Filed at: 07/31/2020 0051   3a  Male UNDER 65: How often do you have five or more drinks on one occasion?   0 Filed at: 07/31/2020 0051   Audit-C Score  1 Filed at: 07/31/2020 0051                  CALI/DAST-10      Most Recent Value   How many times in the past year have you    Used an illegal drug or used a prescription medication for non-medical reasons? Monthly Filed at: 07/31/2020 0051   In the past 12 months      1  Have you used drugs other than those required for medical reasons? 1 Filed at: 07/31/2020 0051   2  Do you use more than one drug at a time? 1 Filed at: 07/31/2020 0051   3  Have you had medical problems as a result of your drug use (e g , memory loss, hepatitis, convulsions, bleeding, etc )? 0 Filed at: 07/31/2020 0051   4  Have you had "blackouts" or "flashbacks" as a result of drug use? YesNo  0 Filed at: 07/31/2020 0051   5  Do you ever feel bad or guilty about your drug use? 0 Filed at: 07/31/2020 0051   6  Does your spouse (or parent) ever complain about your involvement with drugs? 0 Filed at: 07/31/2020 0051   7  Have you neglected your family because of your use of drugs? 0 Filed at: 07/31/2020 0051   8  Have you engaged in illegal activities in order to obtain drugs? 0 Filed at: 07/31/2020 0051   9  Have you ever experienced withdrawal symptoms (felt sick) when you stopped taking drugs? 0 Filed at: 07/31/2020 0051   10  Are you always able to stop using drugs when you want to?  0 Filed at: 07/31/2020 0051   DAST-10 Score  2 Filed at: 07/31/2020 0051                                MDM  Number of Diagnoses or Management Options  Bilateral leg edema:   Diagnosis management comments: This is a 51-year-old male who presents to the emergency department complaining of chronic lower extremity pain and swelling  The patient is well known to the emergency department  The patient has had unchanged exam from his prior visits  Patient was given Motrin in the emergency department and discharged with follow-up to his primary care physician  The patient did receive Motrin and a sandwich          Amount and/or Complexity of Data Reviewed  Review and summarize past medical records: yes          Disposition  Final diagnoses: Bilateral leg edema     Time reflects when diagnosis was documented in both MDM as applicable and the Disposition within this note     Time User Action Codes Description Comment    7/31/2020  1:10 AM Blayne Bojorquez [R60 0] Bilateral leg edema       ED Disposition     ED Disposition Condition Date/Time Comment    Discharge Stable Fri Jul 31, 2020  1:10 AM Shwetha Maria De Jesus discharge to home/self care  Follow-up Information     Follow up With Specialties Details Why Contact Info Additional [id number]  Emergency Department Emergency Medicine  As needed [address] [facility],[address] [address] [phone]  ED, [address], [address]          Discharge Medication List as of 7/31/2020  1:10 AM      CONTINUE these medications which have NOT CHANGED    Details   levETIRAcetam (KEPPRA) 1000 MG tablet Take 1 tablet (1,000 mg total) by mouth 2 (two) times a day, Starting Thu 7/16/2020, Until Sat 8/15/2020, Print      elvitegravir-cobicistat-emtricitabine-tenofovir (Stribild) [phone] mg Take 1 tablet by mouth daily with breakfast, Starting Thu 7/9/2020, Normal           No discharge procedures on file      PDMP Review       Value Time User    PDMP Reviewed  Yes 5/27/2020 12:37 AM LEBRON Meyers          ED Provider  Electronically Signed by           José Miguel Addison DO  07/31/20 7527

## 2020-07-31 NOTE — ED NOTES
Pt given boxed lunch and motrin, pt requested "Can I stay here for the night because it is raining?" (pt homeless)  Pt aware he will not be held in the department but may wait in waiting area for lata Monreal RN  07/31/20 8335

## 2020-08-07 ENCOUNTER — HOSPITAL ENCOUNTER (EMERGENCY)
Facility: HOSPITAL | Age: 35
Discharge: HOME | End: 2020-08-07
Payer: MEDICARE

## 2020-08-07 VITALS
SYSTOLIC BLOOD PRESSURE: 126 MMHG | DIASTOLIC BLOOD PRESSURE: 71 MMHG | RESPIRATION RATE: 17 BRPM | HEART RATE: 89 BPM | TEMPERATURE: 97.8 F | OXYGEN SATURATION: 99 %

## 2020-08-07 DIAGNOSIS — T67.5XXA HEAT EXHAUSTION, INITIAL ENCOUNTER: Primary | ICD-10-CM

## 2020-08-07 LAB
ALBUMIN SERPL BCP-MCNC: 3.6 G/DL (ref 3.4–4.8)
ALP SERPL-CCNC: 54.5 U/L (ref 10–129)
ALT SERPL W P-5'-P-CCNC: 11 U/L (ref 5–63)
ANION GAP SERPL CALCULATED.3IONS-SCNC: 8 MMOL/L (ref 4–13)
AST SERPL W P-5'-P-CCNC: 21 U/L (ref 15–41)
BASOPHILS # BLD AUTO: 0.02 THOUSANDS/ΜL (ref 0–0.1)
BASOPHILS NFR BLD AUTO: 0 % (ref 0–1)
BILIRUB SERPL-MCNC: 0.39 MG/DL (ref 0.3–1.2)
BUN SERPL-MCNC: 10 MG/DL (ref 6–20)
CALCIUM SERPL-MCNC: 8.7 MG/DL (ref 8.4–10.2)
CHLORIDE SERPL-SCNC: 105 MMOL/L (ref 96–108)
CO2 SERPL-SCNC: 25 MMOL/L (ref 22–33)
CREAT SERPL-MCNC: 0.79 MG/DL (ref 0.5–1.2)
EOSINOPHIL # BLD AUTO: 0.18 THOUSAND/ΜL (ref 0–0.61)
EOSINOPHIL NFR BLD AUTO: 4 % (ref 0–6)
ERYTHROCYTE [DISTWIDTH] IN BLOOD BY AUTOMATED COUNT: 13.4 % (ref 11.6–15.1)
GFR SERPL CREATININE-BSD FRML MDRD: 135 ML/MIN/1.73SQ M
GLUCOSE SERPL-MCNC: 87 MG/DL (ref 65–140)
HCT VFR BLD AUTO: 42.4 % (ref 36.5–49.3)
HGB BLD-MCNC: 14.3 G/DL (ref 12–17)
IMM GRANULOCYTES # BLD AUTO: 0.01 THOUSAND/UL (ref 0–0.2)
IMM GRANULOCYTES NFR BLD AUTO: 0 % (ref 0–2)
LYMPHOCYTES # BLD AUTO: 1.95 THOUSANDS/ΜL (ref 0.6–4.47)
LYMPHOCYTES NFR BLD AUTO: 39 % (ref 14–44)
MCH RBC QN AUTO: 29.2 PG (ref 26.8–34.3)
MCHC RBC AUTO-ENTMCNC: 33.7 G/DL (ref 31.4–37.4)
MCV RBC AUTO: 87 FL (ref 82–98)
MONOCYTES # BLD AUTO: 0.57 THOUSAND/ΜL (ref 0.17–1.22)
MONOCYTES NFR BLD AUTO: 11 % (ref 4–12)
NEUTROPHILS # BLD AUTO: 2.33 THOUSANDS/ΜL (ref 1.85–7.62)
NEUTS SEG NFR BLD AUTO: 46 % (ref 43–75)
PLATELET # BLD AUTO: 181 THOUSANDS/UL (ref 149–390)
PMV BLD AUTO: 10.2 FL (ref 8.9–12.7)
POTASSIUM SERPL-SCNC: 3.6 MMOL/L (ref 3.5–5)
PROT SERPL-MCNC: 7.5 G/DL (ref 6.4–8.3)
RBC # BLD AUTO: 4.89 MILLION/UL (ref 3.88–5.62)
SODIUM SERPL-SCNC: 138 MMOL/L (ref 133–145)
WBC # BLD AUTO: 5.06 THOUSAND/UL (ref 4.31–10.16)

## 2020-08-07 PROCEDURE — 80177 DRUG SCRN QUAN LEVETIRACETAM: CPT

## 2020-08-07 PROCEDURE — 99285 EMERGENCY DEPT VISIT HI MDM: CPT

## 2020-08-07 PROCEDURE — 36415 COLL VENOUS BLD VENIPUNCTURE: CPT

## 2020-08-07 PROCEDURE — 85025 COMPLETE CBC W/AUTO DIFF WBC: CPT

## 2020-08-07 PROCEDURE — 80053 COMPREHEN METABOLIC PANEL: CPT

## 2020-08-07 PROCEDURE — 99282 EMERGENCY DEPT VISIT SF MDM: CPT

## 2020-08-07 RX ORDER — SODIUM CHLORIDE 9 MG/ML
500 INJECTION, SOLUTION INTRAVENOUS CONTINUOUS
Status: DISCONTINUED | OUTPATIENT
Start: 2020-08-07 | End: 2020-08-07

## 2020-08-07 RX ORDER — SODIUM CHLORIDE 9 MG/ML
500 INJECTION, SOLUTION INTRAVENOUS ONCE
Status: DISCONTINUED | OUTPATIENT
Start: 2020-08-07 | End: 2020-08-07 | Stop reason: HOSPADM

## 2020-08-07 NOTE — ED PROVIDER NOTES
History  Chief Complaint   Patient presents with    Weakness - Generalized     Police called due to pt falling asleep in parking lot, pt c/o ankle sweeling and generalized weakness     70-year-old male known history of HIV seizure disorder homelessness social situation presents secondary to sleeping in a parking lot today  Patient is homeless patient has no place to go apparently somewhat despondent and sleeping in a parking lot called EMS EMS evaluated patient brought here to the emergency department  Patient's only complaint was that he is just feeling weak in his legs or swelling  Patient denies any trauma he is awake alert oriented answers all questions appropriately  Does arrive slightly tachycardic possible heat exhaustion by history  Prior to Admission Medications   Prescriptions Last Dose Informant Patient Reported? Taking?   elvitegravir-cobicistat-emtricitabine-tenofovir (Stribild) [phone] mg   No No   Sig: Take 1 tablet by mouth daily with breakfast   levETIRAcetam (KEPPRA) 1000 MG tablet   No No   Sig: Take 1 tablet (1,000 mg total) by mouth 2 (two) times a day      Facility-Administered Medications: None       Past Medical History:   Diagnosis Date    HIV [facility]human immunodeficiency virus infection) ([facility] )     Seizures ([facility] )     Smoker        Past Surgical History:   Procedure Laterality Date    HERNIA REPAIR         History reviewed  No pertinent family history  I have reviewed and agree with the history as documented      E-Cigarette/Vaping    E-Cigarette Use Never User      E-Cigarette/Vaping Substances     Social History     Tobacco Use    Smoking status: Current Every Day Smoker     Packs/day: 1 00     Types: Cigarettes    Smokeless tobacco: Never Used   Substance Use Topics    Alcohol use: Yes     Frequency: 4 or more times a week    Drug use: Yes     Types: Marijuana     Comment: used K2 2 months ago and marijuana in past        Review of Systems   Constitutional: Positive for fatigue  Negative for chills and fever  HENT: Negative for congestion  Eyes: Negative for visual disturbance  Respiratory: Negative for shortness of breath  Cardiovascular: Positive for leg swelling  Negative for chest pain  Gastrointestinal: Negative for abdominal pain  Endocrine: Negative for cold intolerance  Genitourinary: Negative for frequency  Musculoskeletal: Negative for gait problem  Skin: Negative for rash  Neurological: Positive for weakness  Negative for dizziness  Psychiatric/Behavioral: Negative for behavioral problems and confusion  Physical Exam  Physical Exam  Vitals signs and nursing note reviewed  Constitutional:       Appearance: He is well-developed  HENT:      Head: Normocephalic and atraumatic  Eyes:      Conjunctiva/sclera: Conjunctivae normal       Pupils: Pupils are equal, round, and reactive to light  Neck:      Musculoskeletal: Normal range of motion and neck supple  Cardiovascular:      Rate and Rhythm: Normal rate and regular rhythm  Heart sounds: Normal heart sounds  Pulmonary:      Effort: Pulmonary effort is normal       Breath sounds: Normal breath sounds  Abdominal:      General: Bowel sounds are normal       Palpations: Abdomen is soft  Musculoskeletal: Normal range of motion  Right lower leg: Edema present  Left lower leg: Edema present  Skin:     General: Skin is warm and dry  Capillary Refill: Capillary refill takes less than 2 seconds  Neurological:      Mental Status: He is alert and oriented to person, place, and time     Psychiatric:         Behavior: Behavior normal          Vital Signs  ED Triage Vitals [08/07/20 1414]   Temp Pulse Respirations Blood Pressure SpO2   -- (!) 121 18 124/70 99 %      Temp src Heart Rate Source Patient Position - Orthostatic VS BP Location FiO2 (%)   -- Monitor Lying Left arm --      Pain Score       --           Vitals:    08/07/20 1414   BP: 124/70   Pulse: (!) 121   Patient Position - Orthostatic VS: Lying         Visual Acuity      ED Medications  Medications   sodium chloride 0 9 % infusion (has no administration in time range)       Diagnostic Studies  Results Reviewed     Procedure Component Value Units Date/Time    Comprehensive metabolic panel [[id number]] Collected:  08/07/20 1426    Lab Status:  Final result Specimen:  Blood from Arm, Left Updated:  08/07/20 1505     Sodium 138 mmol/L      Potassium 3 6 mmol/L      Chloride 105 mmol/L      CO2 25 mmol/L      ANION GAP 8 mmol/L      BUN 10 mg/dL      Creatinine 0 79 mg/dL      Glucose 87 mg/dL      Calcium 8 7 mg/dL      AST 21 U/L      ALT 11 U/L      Alkaline Phosphatase 54 5 U/L      Total Protein 7 5 g/dL      Albumin 3 6 g/dL      Total Bilirubin 0 39 mg/dL      eGFR 135 ml/min/1 73sq m     Narrative:       [place] guidelines for Chronic Kidney Disease (CKD):     Stage 1 with normal or high GFR (GFR > 90 mL/min/1 73 square meters)    Stage 2 Mild CKD (GFR = 60-89 mL/min/1 73 square meters)    Stage 3A Moderate CKD (GFR = 45-59 mL/min/1 73 square meters)    Stage 3B Moderate CKD (GFR = 30-44 mL/min/1 73 square meters)    Stage 4 Severe CKD (GFR = 15-29 mL/min/1 73 square meters)    Stage 5 End Stage CKD (GFR <15 mL/min/1 73 square meters)  Note: GFR calculation is accurate only with a steady state creatinine    CBC and differential [[id number]]  (Normal) Collected:  08/07/20 1426    Lab Status:  Final result Specimen:  Blood from Arm, Left Updated:  08/07/20 1437     WBC 5 06 Thousand/uL      RBC 4 89 Million/uL      Hemoglobin 14 3 g/dL      Hematocrit 42 4 %      MCV 87 fL      MCH 29 2 pg      MCHC 33 7 g/dL      RDW 13 4 %      MPV 10 2 fL      Platelets 238 Thousands/uL      Neutrophils Relative 46 %      Immat GRANS % 0 %      Lymphocytes Relative 39 %      Monocytes Relative 11 %      Eosinophils Relative 4 %      Basophils Relative 0 %      Neutrophils Absolute 2 33 Thousands/µL      Immature Grans Absolute 0 01 Thousand/uL      Lymphocytes Absolute 1 95 Thousands/µL      Monocytes Absolute 0 57 Thousand/µL      Eosinophils Absolute 0 18 Thousand/µL      Basophils Absolute 0 02 Thousands/µL     Levetiracetam level [[id number]] Collected:  08/07/20 1426    Lab Status: In process Specimen:  Blood from Arm, Left Updated:  08/07/20 1428                 No orders to display              Procedures  Procedures         ED Course       US AUDIT      Most Recent Value   Initial Alcohol Screen: US AUDIT-C    1  How often do you have a drink containing alcohol? 3 Filed at: 08/07/2020 1414   3a  Male UNDER 65: How often do you have five or more drinks on one occasion? 0 Filed at: 08/07/2020 1414   Audit-C Score  3 Filed at: 08/07/2020 1414                  CALI/DAST-10      Most Recent Value   How many times in the past year have you    Used an illegal drug or used a prescription medication for non-medical reasons? Never Filed at: 08/07/2020 1415                                [facility]  Number of Diagnoses or Management Options  Diagnosis management comments: Patient was monitored in the emergency department remained stable  Lab work demonstrated no acute findings vital signs within normal limits patient clinically was stable is able the tolerate a p o  Diet  Plan will be to discharge patient home  Impression is heat exhaustion        Disposition  Final diagnoses:   Heat exhaustion, initial encounter     Time reflects when diagnosis was documented in both MDM as applicable and the Disposition within this note     Time User Action Codes Description Comment    8/7/2020  6:45 PM Saint Heir  5XXA] Heat exhaustion, initial encounter       ED Disposition     ED Disposition Condition Date/Time Comment    Discharge Stable Fri Aug 7, 2020  6:45 PM Lily Nunes discharge to home/self care              Follow-up Information     Follow up With Specialties Details Why Contact Info Additional [facility] Family Medicine Schedule an appointment as soon as possible for a visit in 1 week  [address] [id number]  [address] , [place], [place], [address]          Patient's Medications   Discharge Prescriptions    No medications on file     No discharge procedures on file      PDMP Review       Value Time User    PDMP Reviewed  Yes 5/27/2020 12:37 AM LEBRON Marie          ED Provider  Electronically Signed by           Ayush Perrin MD  08/07/20 9589

## 2020-08-07 NOTE — ED NOTES
Pt resting on litter in no distress even an unlabored breathing pt left to rest at this time      He Buck RN  08/07/20 0553

## 2020-08-08 ENCOUNTER — HOSPITAL ENCOUNTER (EMERGENCY)
Facility: HOSPITAL | Age: 35
Discharge: HOME | End: 2020-08-08
Attending: EMERGENCY MEDICINE | Admitting: EMERGENCY MEDICINE
Payer: MEDICARE

## 2020-08-08 VITALS
SYSTOLIC BLOOD PRESSURE: 127 MMHG | OXYGEN SATURATION: 98 % | TEMPERATURE: 97.5 F | RESPIRATION RATE: 16 BRPM | HEART RATE: 82 BPM | DIASTOLIC BLOOD PRESSURE: 78 MMHG

## 2020-08-08 DIAGNOSIS — Z91.14 NON COMPLIANCE W MEDICATION REGIMEN: ICD-10-CM

## 2020-08-08 DIAGNOSIS — G40.909 SEIZURE DISORDER (HCC): Primary | ICD-10-CM

## 2020-08-08 DIAGNOSIS — E86.0 DEHYDRATION: ICD-10-CM

## 2020-08-08 DIAGNOSIS — G40.909 RECURRENT SEIZURES (HCC): ICD-10-CM

## 2020-08-08 PROBLEM — Z91.148 NON COMPLIANCE W MEDICATION REGIMEN: Status: ACTIVE | Noted: 2020-07-16

## 2020-08-08 LAB
ALBUMIN SERPL BCP-MCNC: 4 G/DL (ref 3.4–4.8)
ALP SERPL-CCNC: 61.1 U/L (ref 10–129)
ALT SERPL W P-5'-P-CCNC: 13 U/L (ref 5–63)
ANION GAP SERPL CALCULATED.3IONS-SCNC: 15 MMOL/L (ref 4–13)
AST SERPL W P-5'-P-CCNC: 31 U/L (ref 15–41)
BASOPHILS # BLD AUTO: 0.02 THOUSANDS/ΜL (ref 0–0.1)
BASOPHILS NFR BLD AUTO: 0 % (ref 0–1)
BILIRUB SERPL-MCNC: 0.44 MG/DL (ref 0.3–1.2)
BUN SERPL-MCNC: 11 MG/DL (ref 6–20)
CALCIUM SERPL-MCNC: 8.9 MG/DL (ref 8.4–10.2)
CHLORIDE SERPL-SCNC: 102 MMOL/L (ref 96–108)
CO2 SERPL-SCNC: 19 MMOL/L (ref 22–33)
CREAT SERPL-MCNC: 0.9 MG/DL (ref 0.5–1.2)
EOSINOPHIL # BLD AUTO: 0.1 THOUSAND/ΜL (ref 0–0.61)
EOSINOPHIL NFR BLD AUTO: 2 % (ref 0–6)
ERYTHROCYTE [DISTWIDTH] IN BLOOD BY AUTOMATED COUNT: 13.5 % (ref 11.6–15.1)
GFR SERPL CREATININE-BSD FRML MDRD: 128 ML/MIN/1.73SQ M
GLUCOSE SERPL-MCNC: 88 MG/DL (ref 65–140)
HCT VFR BLD AUTO: 46.3 % (ref 36.5–49.3)
HGB BLD-MCNC: 15.2 G/DL (ref 12–17)
IMM GRANULOCYTES # BLD AUTO: 0.01 THOUSAND/UL (ref 0–0.2)
IMM GRANULOCYTES NFR BLD AUTO: 0 % (ref 0–2)
LYMPHOCYTES # BLD AUTO: 2.8 THOUSANDS/ΜL (ref 0.6–4.47)
LYMPHOCYTES NFR BLD AUTO: 45 % (ref 14–44)
MAGNESIUM SERPL-MCNC: 1.9 MG/DL (ref 1.6–2.6)
MCH RBC QN AUTO: 28.8 PG (ref 26.8–34.3)
MCHC RBC AUTO-ENTMCNC: 32.8 G/DL (ref 31.4–37.4)
MCV RBC AUTO: 88 FL (ref 82–98)
MONOCYTES # BLD AUTO: 0.55 THOUSAND/ΜL (ref 0.17–1.22)
MONOCYTES NFR BLD AUTO: 9 % (ref 4–12)
NEUTROPHILS # BLD AUTO: 2.75 THOUSANDS/ΜL (ref 1.85–7.62)
NEUTS SEG NFR BLD AUTO: 44 % (ref 43–75)
PLATELET # BLD AUTO: 177 THOUSANDS/UL (ref 149–390)
PMV BLD AUTO: 10.1 FL (ref 8.9–12.7)
POTASSIUM SERPL-SCNC: 3.6 MMOL/L (ref 3.5–5)
PROLACTIN SERPL-MCNC: 27.6 NG/ML (ref 2.5–17.4)
PROT SERPL-MCNC: 7.9 G/DL (ref 6.4–8.3)
RBC # BLD AUTO: 5.28 MILLION/UL (ref 3.88–5.62)
SODIUM SERPL-SCNC: 136 MMOL/L (ref 133–145)
WBC # BLD AUTO: 6.23 THOUSAND/UL (ref 4.31–10.16)

## 2020-08-08 PROCEDURE — 83735 ASSAY OF MAGNESIUM: CPT | Performed by: EMERGENCY MEDICINE

## 2020-08-08 PROCEDURE — 99285 EMERGENCY DEPT VISIT HI MDM: CPT | Performed by: EMERGENCY MEDICINE

## 2020-08-08 PROCEDURE — 36415 COLL VENOUS BLD VENIPUNCTURE: CPT | Performed by: EMERGENCY MEDICINE

## 2020-08-08 PROCEDURE — 80053 COMPREHEN METABOLIC PANEL: CPT | Performed by: EMERGENCY MEDICINE

## 2020-08-08 PROCEDURE — 80177 DRUG SCRN QUAN LEVETIRACETAM: CPT | Performed by: EMERGENCY MEDICINE

## 2020-08-08 PROCEDURE — 84146 ASSAY OF PROLACTIN: CPT | Performed by: EMERGENCY MEDICINE

## 2020-08-08 PROCEDURE — 85025 COMPLETE CBC W/AUTO DIFF WBC: CPT | Performed by: EMERGENCY MEDICINE

## 2020-08-08 PROCEDURE — 96365 THER/PROPH/DIAG IV INF INIT: CPT

## 2020-08-08 PROCEDURE — 99284 EMERGENCY DEPT VISIT MOD MDM: CPT

## 2020-08-08 PROCEDURE — 96361 HYDRATE IV INFUSION ADD-ON: CPT

## 2020-08-08 RX ADMIN — SODIUM CHLORIDE 500 MG: 9 INJECTION, SOLUTION INTRAVENOUS at 09:12

## 2020-08-08 RX ADMIN — SODIUM CHLORIDE 1000 ML: 0.9 INJECTION, SOLUTION INTRAVENOUS at 10:28

## 2020-08-08 RX ADMIN — SODIUM CHLORIDE 1000 ML: 0.9 INJECTION, SOLUTION INTRAVENOUS at 08:28

## 2020-08-08 NOTE — ED NOTES
Pt lunch tray arrived, attempting to wake pt up to eat, but he refuses to get up stating "i'm tired", linda, call bell with in reach     [facility], RN  08/08/20 0563

## 2020-08-08 NOTE — ED NOTES
Pt continues to sleep, vss, call bell within reach     [facility], RN  08/08/20 6273

## 2020-08-10 LAB — LEVETIRACETAM SERPL-MCNC: <1 UG/ML (ref 10–40)

## 2020-08-11 LAB — LEVETIRACETAM SERPL-MCNC: <1 UG/ML (ref 10–40)

## 2021-05-16 ENCOUNTER — HOSPITAL ENCOUNTER (EMERGENCY)
Facility: HOSPITAL | Age: 36
Discharge: HOME | End: 2021-05-16
Attending: EMERGENCY MEDICINE
Payer: MEDICARE

## 2021-05-16 VITALS
DIASTOLIC BLOOD PRESSURE: 81 MMHG | SYSTOLIC BLOOD PRESSURE: 123 MMHG | WEIGHT: 136 LBS | OXYGEN SATURATION: 98 % | HEART RATE: 108 BPM | HEIGHT: 66 IN | RESPIRATION RATE: 20 BRPM | TEMPERATURE: 97.5 F | BODY MASS INDEX: 21.86 KG/M2

## 2021-05-16 DIAGNOSIS — R56.9 SEIZURE (HCC): Primary | ICD-10-CM

## 2021-05-16 LAB
ALBUMIN SERPL BCP-MCNC: 3.6 G/DL (ref 3.5–5)
ALP SERPL-CCNC: 72 U/L (ref 46–116)
ALT SERPL W P-5'-P-CCNC: 39 U/L (ref 12–78)
ANION GAP SERPL CALCULATED.3IONS-SCNC: 8 MMOL/L (ref 4–13)
AST SERPL W P-5'-P-CCNC: 39 U/L (ref 5–45)
BASOPHILS # BLD AUTO: 0.02 THOUSANDS/ΜL (ref 0–0.1)
BASOPHILS NFR BLD AUTO: 0 % (ref 0–1)
BILIRUB SERPL-MCNC: 0.36 MG/DL (ref 0.2–1)
BUN SERPL-MCNC: 10 MG/DL (ref 5–25)
CALCIUM SERPL-MCNC: 8.7 MG/DL (ref 8.3–10.1)
CHLORIDE SERPL-SCNC: 103 MMOL/L (ref 100–108)
CO2 SERPL-SCNC: 31 MMOL/L (ref 21–32)
CREAT SERPL-MCNC: 0.71 MG/DL (ref 0.6–1.3)
EOSINOPHIL # BLD AUTO: 0.02 THOUSAND/ΜL (ref 0–0.61)
EOSINOPHIL NFR BLD AUTO: 0 % (ref 0–6)
ERYTHROCYTE [DISTWIDTH] IN BLOOD BY AUTOMATED COUNT: 13.3 % (ref 11.6–15.1)
GFR SERPL CREATININE-BSD FRML MDRD: 141 ML/MIN/1.73SQ M
GLUCOSE SERPL-MCNC: 98 MG/DL (ref 65–140)
HCT VFR BLD AUTO: 39.8 % (ref 36.5–49.3)
HGB BLD-MCNC: 12.7 G/DL (ref 12–17)
IMM GRANULOCYTES # BLD AUTO: 0.02 THOUSAND/UL (ref 0–0.2)
IMM GRANULOCYTES NFR BLD AUTO: 0 % (ref 0–2)
LYMPHOCYTES # BLD AUTO: 2.33 THOUSANDS/ΜL (ref 0.6–4.47)
LYMPHOCYTES NFR BLD AUTO: 35 % (ref 14–44)
MAGNESIUM SERPL-MCNC: 1.7 MG/DL (ref 1.6–2.6)
MCH RBC QN AUTO: 29.3 PG (ref 26.8–34.3)
MCHC RBC AUTO-ENTMCNC: 31.9 G/DL (ref 31.4–37.4)
MCV RBC AUTO: 92 FL (ref 82–98)
MONOCYTES # BLD AUTO: 0.6 THOUSAND/ΜL (ref 0.17–1.22)
MONOCYTES NFR BLD AUTO: 9 % (ref 4–12)
NEUTROPHILS # BLD AUTO: 3.69 THOUSANDS/ΜL (ref 1.85–7.62)
NEUTS SEG NFR BLD AUTO: 56 % (ref 43–75)
NRBC BLD AUTO-RTO: 0 /100 WBCS
PLATELET # BLD AUTO: 155 THOUSANDS/UL (ref 149–390)
PMV BLD AUTO: 10.5 FL (ref 8.9–12.7)
POTASSIUM SERPL-SCNC: 3.6 MMOL/L (ref 3.5–5.3)
PROT SERPL-MCNC: 7.9 G/DL (ref 6.4–8.2)
RBC # BLD AUTO: 4.34 MILLION/UL (ref 3.88–5.62)
SODIUM SERPL-SCNC: 142 MMOL/L (ref 136–145)
WBC # BLD AUTO: 6.68 THOUSAND/UL (ref 4.31–10.16)

## 2021-05-16 PROCEDURE — 96365 THER/PROPH/DIAG IV INF INIT: CPT

## 2021-05-16 PROCEDURE — 96361 HYDRATE IV INFUSION ADD-ON: CPT

## 2021-05-16 PROCEDURE — 36415 COLL VENOUS BLD VENIPUNCTURE: CPT | Performed by: EMERGENCY MEDICINE

## 2021-05-16 PROCEDURE — 80053 COMPREHEN METABOLIC PANEL: CPT | Performed by: EMERGENCY MEDICINE

## 2021-05-16 PROCEDURE — 85025 COMPLETE CBC W/AUTO DIFF WBC: CPT | Performed by: EMERGENCY MEDICINE

## 2021-05-16 PROCEDURE — 83735 ASSAY OF MAGNESIUM: CPT | Performed by: EMERGENCY MEDICINE

## 2021-05-16 PROCEDURE — 99284 EMERGENCY DEPT VISIT MOD MDM: CPT

## 2021-05-16 PROCEDURE — 93005 ELECTROCARDIOGRAM TRACING: CPT

## 2021-05-16 PROCEDURE — 99285 EMERGENCY DEPT VISIT HI MDM: CPT | Performed by: EMERGENCY MEDICINE

## 2021-05-16 RX ORDER — LEVETIRACETAM 750 MG/1
750 TABLET ORAL 2 TIMES DAILY
Qty: 28 TABLET | Refills: 0 | Status: SHIPPED | OUTPATIENT
Start: 2021-05-16 | End: 2021-05-21 | Stop reason: SDUPTHER

## 2021-05-16 RX ADMIN — LEVETIRACETAM 1000 MG: 100 INJECTION, SOLUTION INTRAVENOUS at 05:42

## 2021-05-16 RX ADMIN — SODIUM CHLORIDE 1000 ML: 0.9 INJECTION, SOLUTION INTRAVENOUS at 05:12

## 2021-05-16 NOTE — ED PROVIDER NOTES
History  Chief Complaint   Patient presents with    Seizure - Prior Hx Of     PATIENT STATES "I HAVE NOT HAD MY SEIZURE MEDICATIONS IN OVER A MONTH"     22-year-old male with history of HIV history of seizures supposed to be on antiseizure medicine says that he is getting an aura might get a seizure so he came to the ER  Admits to mild lightheadedness,  Denies any head trauma no neck pain back pain abdominal pain nausea vomiting fevers chills diarrhea or any other symptoms  Not on any medications for his HIV currently  He is homeless  History provided by:  Patient   used: No        Prior to Admission Medications   Prescriptions Last Dose Informant Patient Reported? Taking?   elvitegravir-cobicistat-emtricitabine-tenofovir (Stribild) [phone] mg Not Taking at Unknown time  No No   Sig: Take 1 tablet by mouth daily with breakfast   Patient not taking: Reported on 5/16/2021   levETIRAcetam (KEPPRA) 1000 MG tablet Not Taking at Unknown time  No No   Sig: Take 1 tablet (1,000 mg total) by mouth 2 (two) times a day   Patient not taking: Reported on 5/16/2021      Facility-Administered Medications: None       Past Medical History:   Diagnosis Date    [facility] immunodeficiency virus infection) [facility] )     [facility]     Smoker        Past Surgical History:   Procedure Laterality Date    HERNIA REPAIR         No family history on file  I have reviewed and agree with the history as documented  E-Cigarette/Vaping    E-Cigarette Use Never User      E-Cigarette/Vaping Substances     Social History     Tobacco Use    Smoking status: Current Every Day Smoker     Packs/day: 1 00     Types: Cigarettes    Smokeless tobacco: Never Used   Substance Use Topics    Alcohol use: Yes     Frequency: 4 or more times a week    Drug use: Yes     Types: Marijuana     Comment: used K2 2 months ago and marijuana in past        Review of Systems   Constitutional: Negative  HENT: Negative  Eyes: Negative  Respiratory: Negative  Cardiovascular: Negative  Gastrointestinal: Negative  Endocrine: Negative  Genitourinary: Negative  Musculoskeletal: Negative  Skin: Negative  Allergic/Immunologic: Negative  Neurological: Negative  Hematological: Negative  Psychiatric/Behavioral: Negative  All other systems reviewed and are negative  Physical Exam  Physical Exam  Vitals signs and nursing note reviewed  Constitutional:       Appearance: He is well-developed  HENT:      Head: Normocephalic and atraumatic  Eyes:      Pupils: Pupils are equal, round, and reactive to light  Neck:      Musculoskeletal: Normal range of motion and neck supple  Cardiovascular:      Rate and Rhythm: Normal rate and regular rhythm  Pulmonary:      Effort: Pulmonary effort is normal       Breath sounds: Normal breath sounds  Abdominal:      General: Bowel sounds are normal       Palpations: Abdomen is soft  Musculoskeletal: Normal range of motion  Skin:     General: Skin is warm and dry  Capillary Refill: Capillary refill takes less than 2 seconds  Neurological:      General: No focal deficit present  Mental Status: He is alert and oriented to person, place, and time  Mental status is at baseline  Cranial Nerves: No cranial nerve deficit  Sensory: No sensory deficit  Motor: No weakness        Coordination: Coordination normal       Gait: Gait normal       Deep Tendon Reflexes: Reflexes normal          Vital Signs  ED Triage Vitals [05/16/21 0505]   Temperature Pulse Respirations Blood Pressure SpO2   97 5 °F (36 4 °C) (!) 118 20 123/77 99 %      Temp Source Heart Rate Source Patient Position - Orthostatic VS BP Location FiO2 (%)   Tympanic Monitor Lying Right arm --      Pain Score       --           Vitals:    05/16/21 0505 05/16/21 0530 05/16/21 0600 05/16/21 0630   BP: 123/77 144/88 128/85 123/81   Pulse: (!) 118 (!) 114 (!) 114 (!) 108   Patient Position - Orthostatic VS: Lying  Lying Sitting         Visual Acuity  Visual Acuity      Most Recent Value   L Pupil Size (mm)  2   R Pupil Size (mm)  2          ED Medications  Medications   levETIRAcetam (KEPPRA) 1,000 mg in sodium chloride 0 9 % 100 mL IVPB (0 mg Intravenous Stopped 5/16/21 0607)   sodium chloride 0 9 % bolus 1,000 mL (0 mL Intravenous Stopped 5/16/21 0630)       Diagnostic Studies  Results Reviewed     Procedure Component Value Units Date/Time    Comprehensive metabolic panel [[id number]] Collected: 05/16/21 0509    Lab Status: Final result Specimen: Blood from Arm, Left Updated: 05/16/21 0533     Sodium 142 mmol/L      Potassium 3 6 mmol/L      Chloride 103 mmol/L      CO2 31 mmol/L      ANION GAP 8 mmol/L      BUN 10 mg/dL      Creatinine 0 71 mg/dL      Glucose 98 mg/dL      Calcium 8 7 mg/dL      AST 39 U/L      ALT 39 U/L      Alkaline Phosphatase 72 U/L      Total Protein 7 9 g/dL      Albumin 3 6 g/dL      Total Bilirubin 0 36 mg/dL      eGFR 141 ml/min/1 73sq m     Narrative:      [place] guidelines for Chronic Kidney Disease (CKD):     Stage 1 with normal or high GFR (GFR > 90 mL/min/1 73 square meters)    Stage 2 Mild CKD (GFR = 60-89 mL/min/1 73 square meters)    Stage 3A Moderate CKD (GFR = 45-59 mL/min/1 73 square meters)    Stage 3B Moderate CKD (GFR = 30-44 mL/min/1 73 square meters)    Stage 4 Severe CKD (GFR = 15-29 mL/min/1 73 square meters)    Stage 5 End Stage CKD (GFR <15 mL/min/1 73 square meters)  Note: GFR calculation is accurate only with a steady state creatinine    Magnesium [[id number]]  (Normal) Collected: 05/16/21 0509    Lab Status: Final result Specimen: Blood from Arm, Left Updated: 05/16/21 0533     Magnesium 1 7 mg/dL     CBC and differential [[id number]] Collected: 05/16/21 0509    Lab Status: Final result Specimen: Blood from Arm, Left Updated: 05/16/21 0514     WBC 6 68 Thousand/uL      RBC 4 34 Million/uL      Hemoglobin 12 7 g/dL      Hematocrit 39 8 %      MCV 92 fL      MCH 29 3 pg      MCHC 31 9 g/dL      RDW 13 3 %      MPV 10 5 fL      Platelets 141 Thousands/uL      nRBC 0 /100 WBCs      Neutrophils Relative 56 %      Immat GRANS % 0 %      Lymphocytes Relative 35 %      Monocytes Relative 9 %      Eosinophils Relative 0 %      Basophils Relative 0 %      Neutrophils Absolute 3 69 Thousands/µL      Immature Grans Absolute 0 02 Thousand/uL      Lymphocytes Absolute 2 33 Thousands/µL      Monocytes Absolute 0 60 Thousand/µL      Eosinophils Absolute 0 02 Thousand/µL      Basophils Absolute 0 02 Thousands/µL                  No orders to display              Procedures  Procedures         ED Course                             SBIRT 20yo+      Most Recent Value   SBIRT (24 yo +)   In order to provide better care to our patients, we are screening all of our patients for alcohol and drug use  Would it be okay to ask you these screening questions? Yes Filed at: 05/16/2021 9748   Initial Alcohol Screen: US AUDIT-C    1  How often do you have a drink containing alcohol?  0 Filed at: 05/16/2021 0614   2  How many drinks containing alcohol do you have on a typical day you are drinking? 0 Filed at: 05/16/2021 0614   3a  Male UNDER 65: How often do you have five or more drinks on one occasion? 0 Filed at: 05/16/2021 0614   3b  FEMALE Any Age, or MALE 65+: How often do you have 4 or more drinks on one occassion? 0 Filed at: 05/16/2021 2412   Audit-C Score  0 Filed at: 05/16/2021 6532   CALI: How many times in the past year have you    Used an illegal drug or used a prescription medication for non-medical reasons?   Never Filed at: 05/16/2021 0614                    MDM  Number of Diagnoses or Management Options  Seizure [facility]):      Amount and/or Complexity of Data Reviewed  Clinical lab tests: ordered and reviewed  Tests in the medicine section of CPT®: ordered and reviewed    Patient Progress  Patient progress: stable      Disposition  Final diagnoses:   Seizure [facility])     Time reflects when diagnosis was documented in both MDM as applicable and the Disposition within this note     Time User Action Codes Description Comment    5/16/2021  6:08 AM Cy Morris Add [R56 9] Seizure [facility])       ED Disposition     ED Disposition Condition Date/Time Comment    Discharge Stable Sun May 16, 2021  6:08 AM Laila Taylor discharge to home/self care  Follow-up Information     Follow up With Specialties Details Why Contact Info Additional Information    [address] [facility] Emergency Department Emergency Medicine  If symptoms worsen [address] [facility] [address]  [address] [address] Emergency Department, [place], [place], [address]          Discharge Medication List as of 5/16/2021  6:08 AM      CONTINUE these medications which have CHANGED    Details   levETIRAcetam (KEPPRA) 750 mg tablet Take 1 tablet (750 mg total) by mouth 2 (two) times a day for 14 days, Starting Sun 5/16/2021, Until Sun 5/30/2021, Normal         CONTINUE these medications which have NOT CHANGED    Details   elvitegravir-cobicistat-emtricitabine-tenofovir (Stribild) [phone] mg Take 1 tablet by mouth daily with breakfast, Starting Thu 7/9/2020, Normal           No discharge procedures on file      PDMP Review       Value Time User    PDMP Reviewed  Yes 5/27/2020 12:37 AM LEBRON Garcia          ED Provider  Electronically Signed by           Rosalba Ovalles DO  05/18/21 0234

## 2021-05-16 NOTE — ED NOTES
PATIENT DENIES AURA OR SEIZURES AT THIS TIME  STATES "MY LAST SEIZURE WAS A WHILE AGO"  PATIENT REPORTS "I HAVE A STIFF NECK, THIS IS NORMAL"  PATIENT AOX4, PLEASANT AND COOPERATIVE  RESPIRATIONS NOTED TO BE EVEN AND UNLABORED  DENIES CHEST PAIN, SOB, N/V/D        Pao Callaway RN  05/16/21 1138

## 2021-05-17 LAB
ATRIAL RATE: 113 BPM
ATRIAL RATE: 114 BPM
P AXIS: 15 DEGREES
P AXIS: 54 DEGREES
PR INTERVAL: 104 MS
PR INTERVAL: 134 MS
QRS AXIS: -11 DEGREES
QRS AXIS: -14 DEGREES
QRSD INTERVAL: 106 MS
QRSD INTERVAL: 82 MS
QT INTERVAL: 346 MS
QT INTERVAL: 370 MS
QTC INTERVAL: 474 MS
QTC INTERVAL: 509 MS
T WAVE AXIS: 20 DEGREES
T WAVE AXIS: 35 DEGREES
VENTRICULAR RATE: 113 BPM
VENTRICULAR RATE: 114 BPM

## 2021-05-17 PROCEDURE — 93010 ELECTROCARDIOGRAM REPORT: CPT | Performed by: INTERNAL MEDICINE

## 2021-05-21 ENCOUNTER — APPOINTMENT (EMERGENCY)
Dept: RADIOLOGY | Facility: HOSPITAL | Age: 36
End: 2021-05-21
Payer: MEDICARE

## 2021-05-21 ENCOUNTER — APPOINTMENT (EMERGENCY)
Dept: CT IMAGING | Facility: HOSPITAL | Age: 36
End: 2021-05-21
Payer: MEDICARE

## 2021-05-21 ENCOUNTER — HOSPITAL ENCOUNTER (EMERGENCY)
Facility: HOSPITAL | Age: 36
Discharge: HOME | End: 2021-05-21
Attending: INTERNAL MEDICINE
Payer: MEDICARE

## 2021-05-21 ENCOUNTER — HOSPITAL ENCOUNTER (EMERGENCY)
Facility: HOSPITAL | Age: 36
Discharge: HOME | End: 2021-05-21
Attending: EMERGENCY MEDICINE | Admitting: EMERGENCY MEDICINE
Payer: MEDICARE

## 2021-05-21 VITALS
HEART RATE: 94 BPM | RESPIRATION RATE: 18 BRPM | TEMPERATURE: 98.7 F | DIASTOLIC BLOOD PRESSURE: 71 MMHG | OXYGEN SATURATION: 100 % | SYSTOLIC BLOOD PRESSURE: 111 MMHG

## 2021-05-21 VITALS
HEART RATE: 98 BPM | DIASTOLIC BLOOD PRESSURE: 70 MMHG | BODY MASS INDEX: 21.95 KG/M2 | OXYGEN SATURATION: 99 % | RESPIRATION RATE: 16 BRPM | WEIGHT: 136 LBS | TEMPERATURE: 98.6 F | SYSTOLIC BLOOD PRESSURE: 114 MMHG

## 2021-05-21 DIAGNOSIS — M54.9 BACK PAIN: Primary | ICD-10-CM
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
## 2021-05-21 DIAGNOSIS — Z91.14 NONCOMPLIANCE W/MEDICATION TREATMENT DUE TO INTERMIT USE OF MEDICATION: ICD-10-CM

## 2021-05-21 DIAGNOSIS — R56.9 SEIZURE (HCC): ICD-10-CM

## 2021-05-21 DIAGNOSIS — G40.919 BREAKTHROUGH SEIZURE (HCC): Primary | ICD-10-CM

## 2021-05-21 LAB
ALBUMIN SERPL BCP-MCNC: 4 G/DL (ref 3.4–4.8)
ALP SERPL-CCNC: 49.8 U/L (ref 10–129)
ALT SERPL W P-5'-P-CCNC: 17 U/L (ref 5–63)
ANION GAP SERPL CALCULATED.3IONS-SCNC: 12 MMOL/L (ref 4–13)
AST SERPL W P-5'-P-CCNC: 24 U/L (ref 15–41)
BASE EX.OXY STD BLDV CALC-SCNC: 69.6 % (ref 60–80)
BASE EXCESS BLDV CALC-SCNC: -4.2 MMOL/L
BASOPHILS # BLD AUTO: 0.02 THOUSANDS/ΜL (ref 0–0.1)
BASOPHILS NFR BLD AUTO: 0 % (ref 0–1)
BILIRUB SERPL-MCNC: 0.43 MG/DL (ref 0.3–1.2)
BUN SERPL-MCNC: 13 MG/DL (ref 6–20)
CALCIUM SERPL-MCNC: 8.8 MG/DL (ref 8.4–10.2)
CHLORIDE SERPL-SCNC: 102 MMOL/L (ref 96–108)
CO2 SERPL-SCNC: 23 MMOL/L (ref 22–33)
CREAT SERPL-MCNC: 0.9 MG/DL (ref 0.5–1.2)
EOSINOPHIL # BLD AUTO: 0.01 THOUSAND/ΜL (ref 0–0.61)
EOSINOPHIL NFR BLD AUTO: 0 % (ref 0–6)
ERYTHROCYTE [DISTWIDTH] IN BLOOD BY AUTOMATED COUNT: 14.2 % (ref 11.6–15.1)
GFR SERPL CREATININE-BSD FRML MDRD: 127 ML/MIN/1.73SQ M
GLUCOSE SERPL-MCNC: 81 MG/DL (ref 65–140)
GLUCOSE SERPL-MCNC: 83 MG/DL (ref 65–140)
HCO3 BLDV-SCNC: 21.4 MMOL/L (ref 24–30)
HCT VFR BLD AUTO: 39.9 % (ref 36.5–49.3)
HGB BLD-MCNC: 13.1 G/DL (ref 12–17)
IMM GRANULOCYTES # BLD AUTO: 0.02 THOUSAND/UL (ref 0–0.2)
IMM GRANULOCYTES NFR BLD AUTO: 0 % (ref 0–2)
LYMPHOCYTES # BLD AUTO: 2.06 THOUSANDS/ΜL (ref 0.6–4.47)
LYMPHOCYTES NFR BLD AUTO: 31 % (ref 14–44)
MAGNESIUM SERPL-MCNC: 1.9 MG/DL (ref 1.6–2.6)
MCH RBC QN AUTO: 29.4 PG (ref 26.8–34.3)
MCHC RBC AUTO-ENTMCNC: 32.8 G/DL (ref 31.4–37.4)
MCV RBC AUTO: 90 FL (ref 82–98)
MONOCYTES # BLD AUTO: 0.76 THOUSAND/ΜL (ref 0.17–1.22)
MONOCYTES NFR BLD AUTO: 11 % (ref 4–12)
NEUTROPHILS # BLD AUTO: 3.82 THOUSANDS/ΜL (ref 1.85–7.62)
NEUTS SEG NFR BLD AUTO: 58 % (ref 43–75)
O2 CT BLDV-SCNC: 13.8 ML/DL
PCO2 BLDV: 41.2 MM HG (ref 42–50)
PH BLDV: 7.33 [PH] (ref 7.3–7.4)
PLATELET # BLD AUTO: 200 THOUSANDS/UL (ref 149–390)
PMV BLD AUTO: 10.3 FL (ref 8.9–12.7)
PO2 BLDV: 38.8 MM HG (ref 35–45)
POTASSIUM SERPL-SCNC: 3.3 MMOL/L (ref 3.5–5)
PROLACTIN SERPL-MCNC: 17 NG/ML (ref 2.5–17.4)
PROT SERPL-MCNC: 7.4 G/DL (ref 6.4–8.3)
RBC # BLD AUTO: 4.45 MILLION/UL (ref 3.88–5.62)
SODIUM SERPL-SCNC: 137 MMOL/L (ref 133–145)
TSH SERPL DL<=0.05 MIU/L-ACNC: 0.51 UIU/ML (ref 0.34–5.6)
WBC # BLD AUTO: 6.69 THOUSAND/UL (ref 4.31–10.16)

## 2021-05-21 PROCEDURE — 86360 T CELL ABSOLUTE COUNT/RATIO: CPT | Performed by: PHYSICIAN ASSISTANT

## 2021-05-21 PROCEDURE — 36415 COLL VENOUS BLD VENIPUNCTURE: CPT | Performed by: PHYSICIAN ASSISTANT

## 2021-05-21 PROCEDURE — 71045 X-RAY EXAM CHEST 1 VIEW: CPT

## 2021-05-21 PROCEDURE — 87536 HIV-1 QUANT&REVRSE TRNSCRPJ: CPT | Performed by: PHYSICIAN ASSISTANT

## 2021-05-21 PROCEDURE — 80053 COMPREHEN METABOLIC PANEL: CPT | Performed by: PHYSICIAN ASSISTANT

## 2021-05-21 PROCEDURE — 80177 DRUG SCRN QUAN LEVETIRACETAM: CPT | Performed by: PHYSICIAN ASSISTANT

## 2021-05-21 PROCEDURE — 96365 THER/PROPH/DIAG IV INF INIT: CPT

## 2021-05-21 PROCEDURE — 83735 ASSAY OF MAGNESIUM: CPT | Performed by: PHYSICIAN ASSISTANT

## 2021-05-21 PROCEDURE — G1004 CDSM NDSC: HCPCS

## 2021-05-21 PROCEDURE — 99285 EMERGENCY DEPT VISIT HI MDM: CPT | Performed by: PHYSICIAN ASSISTANT

## 2021-05-21 PROCEDURE — 85025 COMPLETE CBC W/AUTO DIFF WBC: CPT | Performed by: PHYSICIAN ASSISTANT

## 2021-05-21 PROCEDURE — 96361 HYDRATE IV INFUSION ADD-ON: CPT

## 2021-05-21 PROCEDURE — 99284 EMERGENCY DEPT VISIT MOD MDM: CPT | Performed by: EMERGENCY MEDICINE

## 2021-05-21 PROCEDURE — 82805 BLOOD GASES W/O2 SATURATION: CPT | Performed by: PHYSICIAN ASSISTANT

## 2021-05-21 PROCEDURE — 82948 REAGENT STRIP/BLOOD GLUCOSE: CPT

## 2021-05-21 PROCEDURE — 84146 ASSAY OF PROLACTIN: CPT | Performed by: PHYSICIAN ASSISTANT

## 2021-05-21 PROCEDURE — 72100 X-RAY EXAM L-S SPINE 2/3 VWS: CPT

## 2021-05-21 PROCEDURE — 99285 EMERGENCY DEPT VISIT HI MDM: CPT

## 2021-05-21 PROCEDURE — 70470 CT HEAD/BRAIN W/O & W/DYE: CPT

## 2021-05-21 PROCEDURE — 99283 EMERGENCY DEPT VISIT LOW MDM: CPT

## 2021-05-21 PROCEDURE — 84443 ASSAY THYROID STIM HORMONE: CPT | Performed by: PHYSICIAN ASSISTANT

## 2021-05-21 RX ORDER — LEVETIRACETAM 750 MG/1
750 TABLET ORAL 2 TIMES DAILY
Qty: 28 TABLET | Refills: 0 | Status: ON HOLD | OUTPATIENT
Start: 2021-05-21 | End: 2021-11-18

## 2021-05-21 RX ORDER — POTASSIUM CHLORIDE 20 MEQ/1
20 TABLET, EXTENDED RELEASE ORAL ONCE
Status: COMPLETED | OUTPATIENT
Start: 2021-05-21 | End: 2021-05-21

## 2021-05-21 RX ORDER — LEVETIRACETAM 1000 MG/1
1000 TABLET ORAL 2 TIMES DAILY
Qty: 60 TABLET | Refills: 0 | Status: SHIPPED | OUTPATIENT
Start: 2021-05-21 | End: 2021-08-07 | Stop reason: ALTCHOICE

## 2021-05-21 RX ORDER — NALOXONE HYDROCHLORIDE 1 MG/ML
INJECTION INTRAMUSCULAR; INTRAVENOUS; SUBCUTANEOUS
Status: COMPLETED
Start: 2021-05-21 | End: 2021-05-21

## 2021-05-21 RX ORDER — ACETAMINOPHEN 325 MG/1
650 TABLET ORAL ONCE
Status: COMPLETED | OUTPATIENT
Start: 2021-05-21 | End: 2021-05-21

## 2021-05-21 RX ADMIN — LEVETIRACETAM 750 MG: 100 INJECTION, SOLUTION INTRAVENOUS at 15:39

## 2021-05-21 RX ADMIN — SODIUM CHLORIDE 1000 ML: 0.9 INJECTION, SOLUTION INTRAVENOUS at 15:37

## 2021-05-21 RX ADMIN — IOHEXOL 100 ML: 350 INJECTION, SOLUTION INTRAVENOUS at 18:42

## 2021-05-21 RX ADMIN — POTASSIUM CHLORIDE 20 MEQ: 1500 TABLET, EXTENDED RELEASE ORAL at 19:14

## 2021-05-21 RX ADMIN — ACETAMINOPHEN 650 MG: 325 TABLET, FILM COATED ORAL at 23:04

## 2021-05-21 NOTE — ED NOTES
Patient drowsy and unable to safely take anything PO at this time, provider made aware  KcL held at this time       Ashwin Campa RN  05/21/21 1570

## 2021-05-21 NOTE — ED PROVIDER NOTES
History  Chief Complaint   Patient presents with    Seizure - Prior Hx Of     Per EMS pt was eating ice cream when he went unresponsive  Pt has hx of seizures, told EMS it was a seizure  Pt got 0 4mg narcan nasal by FD, then 2mg IV by EMS  27-year-old male with hx HIV comes in today via EMS after he had a witnessed seizure while eating ice cream   Patient reports he did not take his Keppra yesterday or today, he can not remember which day it was  He tells me he ran out and didn't  a refill  Reports he does have a refill at the pharmacy  He denies HA or change in vision  Tells me his last viral load was undetectable and doesn't know what his CD4 count was  Thinks his last blood work was a few months ago  Prior to Admission Medications   Prescriptions Last Dose Informant Patient Reported? Taking?   elvitegravir-cobicistat-emtricitabine-tenofovir (Stribild) [phone] mg   No No   Sig: Take 1 tablet by mouth daily with breakfast   Patient not taking: Reported on 5/16/2021   levETIRAcetam (KEPPRA) 1000 MG tablet   No No   Sig: Take 1 tablet (1,000 mg total) by mouth 2 (two) times a day   Patient not taking: Reported on 5/16/2021   levETIRAcetam (KEPPRA) 1000 MG tablet   No No   Sig: Take 1 tablet (1,000 mg total) by mouth 2 (two) times a day   levETIRAcetam (KEPPRA) 750 mg tablet Past Week at Unknown time  No Yes   Sig: Take 1 tablet (750 mg total) by mouth 2 (two) times a day for 14 days   levETIRAcetam (KEPPRA) 750 mg tablet   No No   Sig: Take 1 tablet (750 mg total) by mouth 2 (two) times a day for 14 days      Facility-Administered Medications: None       Past Medical History:   Diagnosis Date    [facility] )     [facility]     Smoker        Past Surgical History:   Procedure Laterality Date    HERNIA REPAIR         History reviewed  No pertinent family history    I have reviewed and agree with the history as documented  E-Cigarette/Vaping    E-Cigarette Use Never User      E-Cigarette/Vaping Substances     Social History     Tobacco Use    Smoking status: Current Every Day Smoker     Packs/day: 1 00     Types: Cigarettes    Smokeless tobacco: Never Used   Substance Use Topics    Alcohol use: Yes     Frequency: 4 or more times a week    Drug use: Yes     Types: Marijuana     Comment: used K2 2 months ago and marijuana in past        Review of Systems   Constitutional: Negative for fever  HENT: Negative for nosebleeds  Eyes: Negative for redness  Respiratory: Negative for shortness of breath  Cardiovascular: Negative for chest pain  Gastrointestinal: Negative for blood in stool  Genitourinary: Negative for hematuria  Musculoskeletal: Negative for gait problem  Skin: Negative for rash  Neurological: Negative for seizures  Psychiatric/Behavioral: Negative for behavioral problems  Physical Exam  Physical Exam  Constitutional:       Appearance: Normal appearance  Comments: Post-ictal, slow to respond, confused at times   HENT:      Head: Normocephalic and atraumatic  Nose: No rhinorrhea  Mouth/Throat:      Mouth: Mucous membranes are moist    Eyes:      Extraocular Movements: Extraocular movements intact  Neck:      Musculoskeletal: Normal range of motion  Cardiovascular:      Rate and Rhythm: Normal rate and regular rhythm  Pulmonary:      Effort: Pulmonary effort is normal  No respiratory distress  Breath sounds: Normal breath sounds  Abdominal:      General: Abdomen is flat  Bowel sounds are normal       Palpations: Abdomen is soft  Musculoskeletal: Normal range of motion  Skin:     General: Skin is warm and dry  Neurological:      General: No focal deficit present  Mental Status: He is alert and oriented to person, place, and time     Psychiatric:         Mood and Affect: Mood normal          Behavior: Behavior normal          Vital Signs  ED Triage Vitals   Temperature Pulse Respirations Blood Pressure SpO2   05/21/21 1402 05/21/21 1402 05/21/21 1402 05/21/21 1402 05/21/21 1402   98 6 °F (37 °C) 104 20 124/72 99 %      Temp Source Heart Rate Source Patient Position - Orthostatic VS BP Location FiO2 (%)   05/21/21 1402 05/21/21 1402 05/21/21 1402 05/21/21 1402 --   Tympanic Monitor Lying Right arm       Pain Score       05/21/21 1819       No Pain           Vitals:    05/21/21 1402 05/21/21 1428 05/21/21 1543 05/21/21 1819   BP: 124/72 115/73 104/62 114/70   Pulse: 104 91 80 98   Patient Position - Orthostatic VS: Lying Lying Lying          Visual Acuity      ED Medications  Medications   naloxone (NARCAN) 2 MG/2ML injection **ADS Override Pull** (0 mg  Given to EMS 5/21/21 1407)   potassium chloride (K-DUR,KLOR-CON) CR tablet 20 mEq (20 mEq Oral Given 5/21/21 1914)   sodium chloride 0 9 % bolus 1,000 mL (0 mL Intravenous Stopped 5/21/21 1657)   levETIRAcetam (KEPPRA) 750 mg in sodium chloride 0 9 % 100 mL IVPB (0 mg Intravenous Stopped 5/21/21 1600)   iohexol (OMNIPAQUE) 350 MG/ML injection (SINGLE-DOSE) 100 mL (100 mL Intravenous Given 5/21/21 1842)       Diagnostic Studies  Results Reviewed     Procedure Component Value Units Date/Time    TSH [[id number]]  (Normal) Collected: 05/21/21 1425    Lab Status: Final result Specimen: Blood from Arm, Left Updated: 05/21/21 1511     TSH 3RD GENERATON 0 506 uIU/mL     Narrative:      Patients undergoing fluorescein dye angiography may retain small amounts of fluorescein in the body for 48-72 hours post procedure  Samples containing fluorescein can produce falsely depressed TSH values  If the patient had this procedure,a specimen should be resubmitted post fluorescein clearance        Comprehensive metabolic panel [[id number]]  (Abnormal) Collected: 05/21/21 1425    Lab Status: Final result Specimen: Blood from Arm, Left Updated: 05/21/21 1500     Sodium 137 mmol/L      Potassium 3 3 mmol/L      Chloride 102 mmol/L CO2 23 mmol/L      ANION GAP 12 mmol/L      BUN 13 mg/dL      Creatinine 0 90 mg/dL      Glucose 81 mg/dL      Calcium 8 8 mg/dL      AST 24 U/L      ALT 17 U/L      Alkaline Phosphatase 49 8 U/L      Total Protein 7 4 g/dL      Albumin 4 0 g/dL      Total Bilirubin 0 43 mg/dL      eGFR 127 ml/min/1 73sq m     Narrative:      National Kidney Disease Foundation guidelines for Chronic Kidney Disease (CKD):     Stage 1 with normal or high GFR (GFR > 90 mL/min/1 73 square meters)    Stage 2 Mild CKD (GFR = 60-89 mL/min/1 73 square meters)    Stage 3A Moderate CKD (GFR = 45-59 mL/min/1 73 square meters)    Stage 3B Moderate CKD (GFR = 30-44 mL/min/1 73 square meters)    Stage 4 Severe CKD (GFR = 15-29 mL/min/1 73 square meters)    Stage 5 End Stage CKD (GFR <15 mL/min/1 73 square meters)  Note: GFR calculation is accurate only with a steady state creatinine    Magnesium [[id number]]  (Normal) Collected: 05/21/21 1425    Lab Status: Final result Specimen: Blood from Arm, Left Updated: 05/21/21 1500     Magnesium 1 9 mg/dL     Blood gas, venous [[id number]]  (Abnormal) Collected: 05/21/21 1425    Lab Status: Final result Specimen: Blood from Arm, Left Updated: 05/21/21 1441     pH, Carl 7 333     pCO2, Carl 41 2 mm Hg      pO2, Carl 38 8 mm Hg      HCO3, Carl 21 4 mmol/L      Base Excess, Carl -4 2 mmol/L      O2 Content, Carl 13 8 ml/dL      O2 HGB, VENOUS 69 6 %     CBC and differential [[id number]]  (Normal) Collected: 05/21/21 1425    Lab Status: Final result Specimen: Blood from Arm, Left Updated: 05/21/21 1437     WBC 6 69 Thousand/uL      RBC 4 45 Million/uL      Hemoglobin 13 1 g/dL      Hematocrit 39 9 %      MCV 90 fL      MCH 29 4 pg      MCHC 32 8 g/dL      RDW 14 2 %      MPV 10 3 fL      Platelets 042 Thousands/uL      Neutrophils Relative 58 %      Immat GRANS % 0 %      Lymphocytes Relative 31 %      Monocytes Relative 11 %      Eosinophils Relative 0 %      Basophils Relative 0 %      Neutrophils Absolute 3 82 Thousands/µL      Immature Grans Absolute 0 02 Thousand/uL      Lymphocytes Absolute 2 06 Thousands/µL      Monocytes Absolute 0 76 Thousand/µL      Eosinophils Absolute 0 01 Thousand/µL      Basophils Absolute 0 02 Thousands/µL     Prolactin [[id number]] Collected: 05/21/21 1425    Lab Status: In process Specimen: Blood from Arm, Left Updated: 05/21/21 1432    HIV-1 RNA, quantitative, PCR [[id number]] Collected: 05/21/21 1425    Lab Status: In process Specimen: Blood from Arm, Left Updated: 05/21/21 1432    Levetiracetam level [[id number]] Collected: 05/21/21 1425    Lab Status: In process Specimen: Blood from Arm, Left Updated: 05/21/21 1431    T-helper cells CD4/CD8 % [[id number]] Collected: 05/21/21 1425    Lab Status: In process Specimen: Blood from Arm, Left Updated: 05/21/21 1431    Fingerstick Glucose (POCT) [[id number]]  (Normal) Collected: 05/21/21 1422    Lab Status: Final result Updated: 05/21/21 1428     POC Glucose 83 mg/dl     Rapid drug screen, urine [[id number]]     Lab Status: No result Specimen: Urine     UA w Reflex to Microscopic w Reflex to Culture [[id number]]     Lab Status: No result Specimen: Urine                  CT head with and without contrast   Final Result by Reggie Thomas MD (05/21 6754)      No acute intracranial abnormality  Workstation performed: [id number]         XR chest 1 view portable   ED Interpretation by Marion Ramos PA-C (05/21 1440)   No acute cardiopulmonary disease      Final Result by Reggie Thomas MD (05/21 3160)      No acute cardiopulmonary disease  Workstation performed: [id number]                    Procedures  Procedures         ED Course  ED Course as of May 21 2000   Fri May 21, 2021   1430 Patient alert and talking      [phone] Patient re-evaluated, again lethargic, arousable to verbal stimuli      1743 Still lethargic   Spoke with hospitalist  Recommends head CT w wo given hx of HIV      1908 Patient is now awake and alert and asking for sandwich  Patient will be discharged home                                              MDM  Number of Diagnoses or Management Options  Breakthrough seizure [facility]):   Noncompliance w/medication treatment due to intermit use of medication:   Diagnosis management comments: 72-year-old male with known history of noncompliance with his seizure medications comes in today after having a witnessed seizure in public  Patient was lethargic throughout most of his stay in the emergency department  He eventually came around and was awake alert oriented and asking for food  Patient does have a history of HIV and a head CT was performed as he states he has not had 1 done in 3 years  There is no sign of intracranial infection or any acute abnormality to account for his repeat seizures  Patient was given a new prescription for his Keppra  Patient was given a dose of his Keppra IV here      Disposition  Final diagnoses:   Breakthrough seizure ([address] )   Noncompliance w/medication treatment due to intermit use of medication     Time reflects when diagnosis was documented in both MDM as applicable and the Disposition within this note     Time User Action Codes Description Comment    5/21/2021  7:09 PM [place] Daily Add [G40 919] Breakthrough seizure ([address] )     5/21/2021  7:10 PM [place] Daily Add [R56 9] Seizure ([address] )     5/21/2021  7:11 PM [place] Daily Add [Z91 14] Noncompliance w/medication treatment due to intermit use of medication       ED Disposition     ED Disposition Condition Date/Time Comment    Discharge Stable Fri May 21, 2021  7:09 PM Azael Loyola discharge to home/self care              Follow-up Information     Follow up With Specialties Details Why Contact Info [facility] Schedule an appointment as soon as possible for a visit   [address] [id number]  [address] , [place], [place], [address]    Infol[employer]  Call  As needed [phone]       [facility] Schedule an appointment as soon as possible for a visit   [address], [address] [facility] [address] [facility], [address] [facility], [address] [place] [address], [place], [facility]          Discharge Medication List as of 5/21/2021  7:12 PM      CONTINUE these medications which have CHANGED    Details   !! levETIRAcetam (KEPPRA) 1000 MG tablet Take 1 tablet (1,000 mg total) by mouth 2 (two) times a day, Starting Fri 5/21/2021, Until Sun 6/20/2021, Print      !! levETIRAcetam (KEPPRA) 750 mg tablet Take 1 tablet (750 mg total) by mouth 2 (two) times a day for 14 days, Starting Fri 5/21/2021, Until Fri 6/4/2021, Normal       !! - Potential duplicate medications found  Please discuss with provider        CONTINUE these medications which have NOT CHANGED    Details   elvitegravir-cobicistat-emtricitabine-tenofovir (Stribild) [phone] mg Take 1 tablet by mouth daily with breakfast, Starting Thu 7/9/2020, Normal               PDMP Review       Value Time User    PDMP Reviewed  Yes 5/27/2020 12:37 AM LEBRON Lemus          ED Provider  Electronically Signed by           Martha Albert PA-C  05/21/21 2000

## 2021-05-21 NOTE — ED NOTES
Patient unable to provide urine sample at this time, will reattempt once fluid bolus is completed     Alaina Floyd RN  05/21/21 8365

## 2021-05-22 LAB
BASOPHILS # BLD AUTO: 0 X10E3/UL (ref 0–0.2)
BASOPHILS NFR BLD AUTO: 0 %
CD3+CD4+ CELLS # BLD: 115 /UL (ref 359–1519)
CD3+CD4+ CELLS NFR BLD: 5 % (ref 30.8–58.5)
CD3+CD4+ CELLS/CD3+CD8+ CLL BLD: 0.09 % (ref 0.92–3.72)
CD3+CD8+ CELLS # BLD: 1341 /UL (ref 109–897)
CD3+CD8+ CELLS NFR BLD: 58.3 % (ref 12–35.5)
EOSINOPHIL # BLD AUTO: 0 X10E3/UL (ref 0–0.4)
EOSINOPHIL NFR BLD AUTO: 0 %
ERYTHROCYTE [DISTWIDTH] IN BLOOD BY AUTOMATED COUNT: 13.8 % (ref 11.6–15.4)
HCT VFR BLD AUTO: 38.1 % (ref 37.5–51)
HGB BLD-MCNC: 13 G/DL (ref 13–17.7)
IMM GRANULOCYTES # BLD: 0 X10E3/UL (ref 0–0.1)
IMM GRANULOCYTES NFR BLD: 0 %
LYMPHOCYTES # BLD AUTO: 2.3 X10E3/UL (ref 0.7–3.1)
LYMPHOCYTES NFR BLD AUTO: 32 %
MCH RBC QN AUTO: 30.4 PG (ref 26.6–33)
MCHC RBC AUTO-ENTMCNC: 34.1 G/DL (ref 31.5–35.7)
MCV RBC AUTO: 89 FL (ref 79–97)
MONOCYTES # BLD AUTO: 0.8 X10E3/UL (ref 0.1–0.9)
MONOCYTES NFR BLD AUTO: 11 %
NEUTROPHILS # BLD AUTO: 4.1 X10E3/UL (ref 1.4–7)
NEUTROPHILS NFR BLD AUTO: 57 %
PLATELET # BLD AUTO: 179 X10E3/UL (ref 150–450)
RBC # BLD AUTO: 4.28 X10E6/UL (ref 4.14–5.8)
WBC # BLD AUTO: 7.2 X10E3/UL (ref 3.4–10.8)

## 2021-05-22 NOTE — ED PROVIDER NOTES
History  Chief Complaint   Patient presents with    Fall     Pt was walking uphill, fell backwards  Pt unable to get up on his own, EMS called     This is a 42-year-old male presents the emergency department complaining of lower back pain  Patient states he was walking up a hill and fell backwards onto his buttocks  He complains of pain to the lower back  Pain does not radiate  It is minor  Nothing makes it better or worse  The patient describes it as dull ache  The patient denies hitting his head  Patient denies no head or neck pain  The patient denies fevers or chills  Prior to Admission Medications   Prescriptions Last Dose Informant Patient Reported? Taking?   elvitegravir-cobicistat-emtricitabine-tenofovir (Stribild) [phone] mg   No No   Sig: Take 1 tablet by mouth daily with breakfast   Patient not taking: Reported on 5/16/2021   levETIRAcetam (KEPPRA) 1000 MG tablet   No No   Sig: Take 1 tablet (1,000 mg total) by mouth 2 (two) times a day   levETIRAcetam (KEPPRA) 750 mg tablet   No No   Sig: Take 1 tablet (750 mg total) by mouth 2 (two) times a day for 14 days      Facility-Administered Medications: None       Past Medical History:   Diagnosis Date    HIV [facility] immunodeficiency virus infection) ([facility] )     Seizures ([facility] )     Smoker        Past Surgical History:   Procedure Laterality Date    HERNIA REPAIR         No family history on file  I have reviewed and agree with the history as documented      E-Cigarette/Vaping    E-Cigarette Use Never User      E-Cigarette/Vaping Substances     Social History     Tobacco Use    Smoking status: Current Every Day Smoker     Packs/day: 1 00     Types: Cigarettes    Smokeless tobacco: Never Used   Substance Use Topics    Alcohol use: Yes     Frequency: 4 or more times a week    Drug use: Yes     Types: Marijuana     Comment: used K2 2 months ago and marijuana in past        Review of Systems   All other systems reviewed and are negative  Physical Exam  Physical Exam  Constitutional:  Vital signs reviewed, patient appears nontoxic, no acute distress  Eyes: Pupils equal round reactive to light and accommodation, extraocular muscles intact  HEENT: trachea midline, no JVD, moist mucous membranes, atraumatic, normocephalic, no C-spine tenderness  Respiratory: lung sounds clear throughout, no rhonchi, no rales  Cardiovascular: regular rate rhythm, no murmurs or rubs  Abdomen: soft, nontender, nondistended, no rebound or guarding  Back: no CVA tenderness, normal inspection, no T spine tenderness, L4-L5 midline tenderness  Extremities: no edema, pulses equal in all 4 extremities  Neuro: awake, alert, oriented, no focal weakness  Skin: warm, dry, intact, no rashes noted    Vital Signs  ED Triage Vitals   Temperature Pulse Respirations Blood Pressure SpO2   05/21/21 2209 05/21/21 2150 05/21/21 2150 05/21/21 2150 05/21/21 2150   98 7 °F (37 1 °C) 94 18 111/71 100 %      Temp Source Heart Rate Source Patient Position - Orthostatic VS BP Location FiO2 (%)   05/21/21 2209 -- -- -- --   Oral          Pain Score       05/21/21 2304       7           Vitals:    05/21/21 2150   BP: 111/71   Pulse: 94         Visual Acuity      ED Medications  Medications   acetaminophen (TYLENOL) tablet 650 mg (650 mg Oral Given 5/21/21 2304)       Diagnostic Studies  Results Reviewed     None                 XR lumbar spine 2 or 3 views   ED Interpretation by Richard Perera DO (05/21 2301)   No fracture                   Procedures  Procedures         ED Course  ED Course as of May 22 0621   Fri May 21, 2021   2301 The patient had an x-ray that was interpreted by me of his lumbar spine that shows no acute fracture  SBIRT 20yo+      Most Recent Value   SBIRT (24 yo +)   In order to provide better care to our patients, we are screening all of our patients for alcohol and drug use  Would it be okay to ask you these screening questions? No Filed at: 05/21/2021 2209                    [facility]  Number of Diagnoses or Management Options  Back pain:   Fall, initial encounter:   Diagnosis management comments: This is a 22-year-old male presented to the emergency department complaining of low back pain after fall  The patient was neurologically intact  I considered fracture, contusion  These and other diagnoses were considered  The patient had an x-ray that was interpreted by me that showed no acute fracture  The patient was well-appearing on exam unable to ambulate without difficulty  The patient was discharged after receiving Tylenol for pain medication  Amount and/or Complexity of Data Reviewed  Tests in the radiology section of CPT®: reviewed and ordered  Independent visualization of images, tracings, or specimens: yes        Disposition  Final diagnoses:   Back pain   Fall, initial encounter     Time reflects when diagnosis was documented in both MDM as applicable and the Disposition within this note     Time User Action Codes Description Comment    5/21/2021 11:01 PM Caroline Bojorquez [M54 9] Back pain     5/21/2021 11:01 PM Caroline Jones Doctor, initial encounter       ED Disposition     ED Disposition Condition Date/Time Comment    Discharge Stable Fri May 21, 2021 11:01 PM Rashmi Chavez discharge to home/self care              Follow-up Information     Follow up With Specialties Details Why Contact Info    [employer]    [phone]            Discharge Medication List as of 5/21/2021 11:02 PM      CONTINUE these medications which have NOT CHANGED    Details   elvitegravir-cobicistat-emtricitabine-tenofovir (Stribild) [phone] mg Take 1 tablet by mouth daily with breakfast, Starting Thu 7/9/2020, Normal      !! levETIRAcetam (KEPPRA) 1000 MG tablet Take 1 tablet (1,000 mg total) by mouth 2 (two) times a day, Starting Fri 5/21/2021, Until Sun 6/20/2021, Print      !! levETIRAcetam (KEPPRA) 750 mg tablet Take 1 tablet (750 mg total) by mouth 2 (two) times a day for 14 days, Starting Fri 5/21/2021, Until Fri 6/4/2021, Normal       !! - Potential duplicate medications found  Please discuss with provider  No discharge procedures on file      PDMP Review       Value Time User    PDMP Reviewed  Yes 5/27/2020 12:37 AM LEBRON Mcknight          ED Provider  Electronically Signed by           Jyothi Alcantara DO  05/22/21 8425

## 2021-05-23 LAB
HIV1 RNA # SERPL NAA+PROBE: 110 COPIES/ML
HIV1 RNA SERPL NAA+PROBE-LOG#: 2.04 LOG10COPY/ML

## 2021-05-26 LAB — LEVETIRACETAM SERPL-MCNC: <1 UG/ML (ref 10–40)

## 2021-06-09 ENCOUNTER — TELEPHONE (OUTPATIENT)
Dept: NEUROLOGY | Facility: CLINIC | Age: 36
End: 2021-06-09

## 2021-06-09 NOTE — TELEPHONE ENCOUNTER
Unable to contact patient  Patient does routinely go to the ED, for seizures and any medical needs  I will close out referral, as there is not a phone number listed; and no patient contacts listed

## 2021-07-30 ENCOUNTER — HOSPITAL ENCOUNTER (EMERGENCY)
Facility: HOSPITAL | Age: 36
Discharge: HOME | End: 2021-07-30
Payer: MEDICARE

## 2021-07-30 ENCOUNTER — APPOINTMENT (EMERGENCY)
Dept: RADIOLOGY | Facility: HOSPITAL | Age: 36
End: 2021-07-30
Payer: MEDICARE

## 2021-07-30 VITALS
TEMPERATURE: 98.1 F | DIASTOLIC BLOOD PRESSURE: 54 MMHG | RESPIRATION RATE: 16 BRPM | WEIGHT: 144.6 LBS | HEART RATE: 68 BPM | HEIGHT: 66 IN | SYSTOLIC BLOOD PRESSURE: 102 MMHG | BODY MASS INDEX: 23.24 KG/M2 | OXYGEN SATURATION: 97 %

## 2021-07-30 DIAGNOSIS — S30.0XXA CONTUSION OF LOWER BACK, INITIAL ENCOUNTER: Primary | ICD-10-CM

## 2021-07-30 DIAGNOSIS — L03.115 CELLULITIS OF RIGHT LEG: ICD-10-CM

## 2021-07-30 LAB
ALBUMIN SERPL BCP-MCNC: 4.2 G/DL (ref 3.4–4.8)
ALP SERPL-CCNC: 50.7 U/L (ref 10–129)
ALT SERPL W P-5'-P-CCNC: 12 U/L (ref 5–63)
ANION GAP SERPL CALCULATED.3IONS-SCNC: 4 MMOL/L (ref 4–13)
AST SERPL W P-5'-P-CCNC: 23 U/L (ref 15–41)
BASOPHILS # BLD AUTO: 0.03 THOUSANDS/ΜL (ref 0–0.1)
BASOPHILS NFR BLD AUTO: 1 % (ref 0–1)
BILIRUB SERPL-MCNC: 0.56 MG/DL (ref 0.3–1.2)
BILIRUB UR QL STRIP: NEGATIVE
BUN SERPL-MCNC: 7 MG/DL (ref 6–20)
CALCIUM SERPL-MCNC: 9.1 MG/DL (ref 8.4–10.2)
CHLORIDE SERPL-SCNC: 104 MMOL/L (ref 96–108)
CLARITY UR: CLEAR
CO2 SERPL-SCNC: 32 MMOL/L (ref 22–33)
COLOR UR: YELLOW
CREAT SERPL-MCNC: 0.98 MG/DL (ref 0.5–1.2)
EOSINOPHIL # BLD AUTO: 0.2 THOUSAND/ΜL (ref 0–0.61)
EOSINOPHIL NFR BLD AUTO: 4 % (ref 0–6)
ERYTHROCYTE [DISTWIDTH] IN BLOOD BY AUTOMATED COUNT: 14.4 % (ref 11.6–15.1)
GFR SERPL CREATININE-BSD FRML MDRD: 115 ML/MIN/1.73SQ M
GLUCOSE SERPL-MCNC: 92 MG/DL (ref 65–140)
GLUCOSE UR STRIP-MCNC: NEGATIVE MG/DL
HCT VFR BLD AUTO: 43.9 % (ref 36.5–49.3)
HGB BLD-MCNC: 14.9 G/DL (ref 12–17)
HGB UR QL STRIP.AUTO: NEGATIVE
IMM GRANULOCYTES # BLD AUTO: 0.01 THOUSAND/UL (ref 0–0.2)
IMM GRANULOCYTES NFR BLD AUTO: 0 % (ref 0–2)
KETONES UR STRIP-MCNC: NEGATIVE MG/DL
LACTATE SERPL-SCNC: 1.1 MMOL/L (ref 0–2)
LEUKOCYTE ESTERASE UR QL STRIP: NEGATIVE
LYMPHOCYTES # BLD AUTO: 1.52 THOUSANDS/ΜL (ref 0.6–4.47)
LYMPHOCYTES NFR BLD AUTO: 30 % (ref 14–44)
MCH RBC QN AUTO: 30.5 PG (ref 26.8–34.3)
MCHC RBC AUTO-ENTMCNC: 33.9 G/DL (ref 31.4–37.4)
MCV RBC AUTO: 90 FL (ref 82–98)
MONOCYTES # BLD AUTO: 0.47 THOUSAND/ΜL (ref 0.17–1.22)
MONOCYTES NFR BLD AUTO: 9 % (ref 4–12)
NEUTROPHILS # BLD AUTO: 2.85 THOUSANDS/ΜL (ref 1.85–7.62)
NEUTS SEG NFR BLD AUTO: 56 % (ref 43–75)
NITRITE UR QL STRIP: NEGATIVE
PH UR STRIP.AUTO: 6.5 [PH]
PLATELET # BLD AUTO: 179 THOUSANDS/UL (ref 149–390)
PMV BLD AUTO: 10.6 FL (ref 8.9–12.7)
POTASSIUM SERPL-SCNC: 4.4 MMOL/L (ref 3.5–5)
PROT SERPL-MCNC: 8 G/DL (ref 6.4–8.3)
PROT UR STRIP-MCNC: NEGATIVE MG/DL
RBC # BLD AUTO: 4.89 MILLION/UL (ref 3.88–5.62)
SODIUM SERPL-SCNC: 140 MMOL/L (ref 133–145)
SP GR UR STRIP.AUTO: 1.01 (ref 1–1.03)
UROBILINOGEN UR QL STRIP.AUTO: 0.2 E.U./DL
WBC # BLD AUTO: 5.08 THOUSAND/UL (ref 4.31–10.16)

## 2021-07-30 PROCEDURE — 99283 EMERGENCY DEPT VISIT LOW MDM: CPT

## 2021-07-30 PROCEDURE — 85025 COMPLETE CBC W/AUTO DIFF WBC: CPT

## 2021-07-30 PROCEDURE — 96365 THER/PROPH/DIAG IV INF INIT: CPT

## 2021-07-30 PROCEDURE — 96361 HYDRATE IV INFUSION ADD-ON: CPT

## 2021-07-30 PROCEDURE — 81003 URINALYSIS AUTO W/O SCOPE: CPT

## 2021-07-30 PROCEDURE — 72100 X-RAY EXAM L-S SPINE 2/3 VWS: CPT

## 2021-07-30 PROCEDURE — 80053 COMPREHEN METABOLIC PANEL: CPT

## 2021-07-30 PROCEDURE — 73590 X-RAY EXAM OF LOWER LEG: CPT

## 2021-07-30 PROCEDURE — 99285 EMERGENCY DEPT VISIT HI MDM: CPT

## 2021-07-30 PROCEDURE — 83605 ASSAY OF LACTIC ACID: CPT

## 2021-07-30 PROCEDURE — 80177 DRUG SCRN QUAN LEVETIRACETAM: CPT

## 2021-07-30 PROCEDURE — 36415 COLL VENOUS BLD VENIPUNCTURE: CPT

## 2021-07-30 RX ORDER — CEPHALEXIN 250 MG/1
500 CAPSULE ORAL EVERY 8 HOURS SCHEDULED
Qty: 21 CAPSULE | Refills: 0 | Status: SHIPPED | OUTPATIENT
Start: 2021-07-30 | End: 2021-08-07

## 2021-07-30 RX ORDER — CEFAZOLIN SODIUM 1 G/50ML
1000 SOLUTION INTRAVENOUS ONCE
Status: COMPLETED | OUTPATIENT
Start: 2021-07-30 | End: 2021-07-30

## 2021-07-30 RX ORDER — SODIUM CHLORIDE 9 MG/ML
125 INJECTION, SOLUTION INTRAVENOUS CONTINUOUS
Status: DISCONTINUED | OUTPATIENT
Start: 2021-07-30 | End: 2021-07-30 | Stop reason: HOSPADM

## 2021-07-30 RX ADMIN — SODIUM CHLORIDE 125 ML/HR: 0.9 INJECTION, SOLUTION INTRAVENOUS at 08:10

## 2021-07-30 RX ADMIN — CEFAZOLIN SODIUM 1000 MG: 1 SOLUTION INTRAVENOUS at 08:11

## 2021-07-30 NOTE — ED PROVIDER NOTES
History  Chief Complaint   Patient presents with   Juanjose Raza Fall     pt reports mechanical fall, while trying to ambulate with walker  pt state he fell on to bottom and has a c/o tailbone pain  70-year-old male history of HIV seizure disorder homelessness presents secondary to fall last evening  Patient new normally uses a rolling walker patient states that he went to sit down on the walker and it slid away from him and he landed on his low back buttock area  Patient denies hitting his head  Patient is complaining of pain in his low back and difficulty walking secondary to this  Patient also is complaining of pain in his right lower ankle area for the past few days patient has swelling noted in this area as well  Patient does have what appears to be area of an abrasion in that area  There is some redness noted as well as warmth consistent with possible infection patient is awake and alert denies any headaches denies any nausea vomiting denies any chest pain denies any shortness of breath  Patient states yet even though he is home was he has been able to eat and drink people been giving him food  Patient claims he has been compliant with his medication he does take Keppra for seizures and Stibild  Prior to Admission Medications   Prescriptions Last Dose Informant Patient Reported?  Taking?   elvitegravir-cobicistat-emtricitabine-tenofovir (Stribild) [phone] mg   No Yes   Sig: Take 1 tablet by mouth daily with breakfast   levETIRAcetam (KEPPRA) 1000 MG tablet   No Yes   Sig: Take 1 tablet (1,000 mg total) by mouth 2 (two) times a day   levETIRAcetam (KEPPRA) 750 mg tablet   No Yes   Sig: Take 1 tablet (750 mg total) by mouth 2 (two) times a day for 14 days      Facility-Administered Medications: None       Past Medical History:   Diagnosis Date    HIV [facility] immunodeficiency virus [facility] [address] )     [facility]     Smoker        Past Surgical History:   Procedure Laterality Date    HERNIA REPAIR         History reviewed  No pertinent family history  I have reviewed and agree with the history as documented  E-Cigarette/Vaping    E-Cigarette Use Never User      E-Cigarette/Vaping Substances     Social History     Tobacco Use    Smoking status: Current Every Day Smoker     Packs/day: 1 00     Types: Cigarettes    Smokeless tobacco: Never Used   Vaping Use    Vaping Use: Never used   Substance Use Topics    Alcohol use: Yes    Drug use: Yes     Types: Marijuana     Comment: used K2 2 months ago and marijuana in past        Review of Systems   Constitutional: Negative for chills and fever  HENT: Negative for congestion  Eyes: Negative for visual disturbance  Respiratory: Negative for shortness of breath  Cardiovascular: Negative for chest pain  Gastrointestinal: Negative for abdominal pain  Endocrine: Negative for cold intolerance  Genitourinary: Negative for frequency  Musculoskeletal: Positive for arthralgias and back pain  Negative for gait problem  Skin: Positive for wound  Negative for rash  Neurological: Positive for weakness  Negative for dizziness  Psychiatric/Behavioral: Negative for behavioral problems and confusion  Physical Exam  Physical Exam  Vitals and nursing note reviewed  Constitutional:       Appearance: He is well-developed  HENT:      Head: Normocephalic and atraumatic  Eyes:      Conjunctiva/sclera: Conjunctivae normal       Pupils: Pupils are equal, round, and reactive to light  Cardiovascular:      Rate and Rhythm: Normal rate and regular rhythm  Heart sounds: Normal heart sounds  Pulmonary:      Effort: Pulmonary effort is normal       Breath sounds: Normal breath sounds  Abdominal:      General: Bowel sounds are normal       Palpations: Abdomen is soft  Musculoskeletal:         General: Swelling and tenderness present  Normal range of motion  Cervical back: Normal range of motion and neck supple  Comments: Patient has tenderness to palpation over low back buttock area no wound noted no bleeding  Patient also has what appears to be significant swelling right ankle area appears to be abrasion over the anterior aspect of the lower leg 2+ edema noted of right ankle 1+ edema noted left ankle   Skin:     General: Skin is warm and dry  Capillary Refill: Capillary refill takes less than 2 seconds  Neurological:      Mental Status: He is alert and oriented to person, place, and time     Psychiatric:         Behavior: Behavior normal          Vital Signs  ED Triage Vitals [07/30/21 0743]   Temperature Pulse Respirations Blood Pressure SpO2   98 1 °F (36 7 °C) 76 16 126/92 100 %      Temp Source Heart Rate Source Patient Position - Orthostatic VS BP Location FiO2 (%)   Oral Monitor Lying Left arm --      Pain Score       --           Vitals:    07/30/21 0743   BP: 126/92   Pulse: 76   Patient Position - Orthostatic VS: Lying         Visual Acuity      ED Medications  Medications   sodium chloride 0 9 % infusion (125 mL/hr Intravenous New Bag 7/30/21 0810)   ceFAZolin (ANCEF) IVPB (premix in dextrose) 1,000 mg 50 mL (0 mg Intravenous Stopped 7/30/21 0841)       Diagnostic Studies  Results Reviewed     Procedure Component Value Units Date/Time    Comprehensive metabolic panel [[id number]] Collected: 07/30/21 0809    Lab Status: Final result Specimen: Blood from Arm, Right Updated: 07/30/21 7909     Sodium 140 mmol/L      Potassium 4 4 mmol/L      Chloride 104 mmol/L      CO2 32 mmol/L      ANION GAP 4 mmol/L      BUN 7 mg/dL      Creatinine 0 98 mg/dL      Glucose 92 mg/dL      Calcium 9 1 mg/dL      AST 23 U/L      ALT 12 U/L      Alkaline Phosphatase 50 7 U/L      Total Protein 8 0 g/dL      Albumin 4 2 g/dL      Total Bilirubin 0 56 mg/dL      eGFR 115 ml/min/1 73sq m     Narrative:      [place] guidelines for Chronic Kidney Disease (CKD):     Stage 1 with normal or high GFR (GFR > 90 mL/min/1 73 square meters)    Stage 2 Mild CKD (GFR = 60-89 mL/min/1 73 square meters)    Stage 3A Moderate CKD (GFR = 45-59 mL/min/1 73 square meters)    Stage 3B Moderate CKD (GFR = 30-44 mL/min/1 73 square meters)    Stage 4 Severe CKD (GFR = 15-29 mL/min/1 73 square meters)    Stage 5 End Stage CKD (GFR <15 mL/min/1 73 square meters)  Note: GFR calculation is accurate only with a steady state creatinine    Lactic acid, plasma [[id number]]  (Normal) Collected: 07/30/21 0808    Lab Status: Final result Specimen: Blood from Arm, Right Updated: 07/30/21 7321     LACTIC ACID 1 1 mmol/L     Narrative:      Result may be elevated if tourniquet was used during collection      CBC and differential [[id number]]  (Normal) Collected: 07/30/21 0809    Lab Status: Final result Specimen: Blood from Arm, Right Updated: 07/30/21 0820     WBC 5 08 Thousand/uL      RBC 4 89 Million/uL      Hemoglobin 14 9 g/dL      Hematocrit 43 9 %      MCV 90 fL      MCH 30 5 pg      MCHC 33 9 g/dL      RDW 14 4 %      MPV 10 6 fL      Platelets 063 Thousands/uL      Neutrophils Relative 56 %      Immat GRANS % 0 %      Lymphocytes Relative 30 %      Monocytes Relative 9 %      Eosinophils Relative 4 %      Basophils Relative 1 %      Neutrophils Absolute 2 85 Thousands/µL      Immature Grans Absolute 0 01 Thousand/uL      Lymphocytes Absolute 1 52 Thousands/µL      Monocytes Absolute 0 47 Thousand/µL      Eosinophils Absolute 0 20 Thousand/µL      Basophils Absolute 0 03 Thousands/µL     UA w Reflex to Microscopic w Reflex to Culture [[id number]]  (Normal) Collected: 07/30/21 0808    Lab Status: Final result Specimen: Urine, Clean Catch Updated: 07/30/21 0819     Color, UA Yellow     Clarity, UA Clear     Specific Gravity, UA 1 010     pH, UA 6 5     Leukocytes, UA Negative     Nitrite, UA Negative     Protein, UA Negative mg/dl      Glucose, UA Negative mg/dl      Ketones, UA Negative mg/dl      Urobilinogen, UA 0 2 E U /dl      Bilirubin, UA Negative     Blood, UA Negative    Levetiracetam level [[id number]] Collected: 07/30/21 0809    Lab Status: In process Specimen: Blood from Arm, Right Updated: 07/30/21 0813                 XR tibia fibula 2 views RIGHT   ED Interpretation by Lucía Sidhu MD (07/30 1753)   No acute fracture or dislocation      XR spine lumbar 2 or 3 views injury   ED Interpretation by Lucía Sidhu MD (07/30 3469)   No acute fracture or dislocation                 Procedures  Procedures         ED Course                                           MDM  Number of Diagnoses or Management Options  Diagnosis management comments: Patient was monitored emergency department lab work demonstrated no acute findings x-ray of right tib-fib demonstrate no acute fracture no acute findings x-ray of lumbar spine demonstrated acute fractures no dislocation  Impression is fall contusion low back abrasion possible early cellulitis right lower leg  Plan will be to discharge patient home continue his current medications will add Keflex 500 3 times a day for the next 7 days for early infection leg Tylenol as needed for buttock pain cold packs as needed follow-up with primary care physician within the week for any ongoing issues  Disposition  Final diagnoses:   Contusion of lower back, initial encounter   Cellulitis of right leg     Time reflects when diagnosis was documented in both MDM as applicable and the Disposition within this note     Time User Action Codes Description Comment    7/30/2021 10:43 AM IND [employer] Reveal Add [S30  0XXA] Contusion of lower back, initial encounter     7/30/2021 10:44 AM Lolita Reveal Add [Y54 926] Cellulitis of right leg       ED Disposition     ED Disposition Condition Date/Time Comment    Discharge Stable Fri Jul 30, 2021 10:43 AM Richmond Muhammad discharge to home/self care              Follow-up Information     Follow up With Specialties Details Why Contact Info Additional Information    [facility] [address] Schedule an appointment as soon as possible for a visit in 1 week  [address] [id number]  [address] , [place], [place], [place], [address]          Patient's Medications   Discharge Prescriptions    CEPHALEXIN (KEFLEX) 250 MG CAPSULE    Take 2 capsules (500 mg total) by mouth every 8 (eight) hours for 7 days       Start Date: 7/30/2021 End Date: 8/6/2021       Order Dose: 500 mg       Quantity: 21 capsule    Refills: 0     No discharge procedures on file      PDMP Review       Value Time User    PDMP Reviewed  Yes 5/27/2020 12:37 AM LEBRON Valdez          ED Provider  Electronically Signed by           Jason Barillas MD  07/30/21 8129

## 2021-08-01 LAB — LEVETIRACETAM SERPL-MCNC: 7.7 UG/ML (ref 10–40)

## 2021-08-01 NOTE — RESULT ENCOUNTER NOTE
Patient has a subtherapeutic Keppra level, however he is homeless, the have phone number listed, does not have any emergency contact listed   No way to contact patient

## 2021-08-04 ENCOUNTER — APPOINTMENT (EMERGENCY)
Dept: RADIOLOGY | Facility: HOSPITAL | Age: 36
End: 2021-08-04
Payer: MEDICARE

## 2021-08-04 ENCOUNTER — HOSPITAL ENCOUNTER (EMERGENCY)
Facility: HOSPITAL | Age: 36
Discharge: HOME | End: 2021-08-04
Attending: INTERNAL MEDICINE
Payer: MEDICARE

## 2021-08-04 VITALS
SYSTOLIC BLOOD PRESSURE: 117 MMHG | OXYGEN SATURATION: 99 % | RESPIRATION RATE: 18 BRPM | TEMPERATURE: 97.8 F | HEART RATE: 81 BPM | WEIGHT: 140.65 LBS | HEIGHT: 66 IN | DIASTOLIC BLOOD PRESSURE: 85 MMHG | BODY MASS INDEX: 22.6 KG/M2

## 2021-08-04 DIAGNOSIS — R60.9 DEPENDENT EDEMA: Primary | ICD-10-CM

## 2021-08-04 LAB
ALBUMIN SERPL BCP-MCNC: 4.1 G/DL (ref 3.4–4.8)
ALP SERPL-CCNC: 56 U/L (ref 10–129)
ALT SERPL W P-5'-P-CCNC: 12 U/L (ref 5–63)
ANION GAP SERPL CALCULATED.3IONS-SCNC: 7 MMOL/L (ref 4–13)
AST SERPL W P-5'-P-CCNC: 22 U/L (ref 15–41)
BASOPHILS # BLD AUTO: 0.01 THOUSANDS/ΜL (ref 0–0.1)
BASOPHILS NFR BLD AUTO: 0 % (ref 0–1)
BILIRUB SERPL-MCNC: 0.41 MG/DL (ref 0.3–1.2)
BNP SERPL-MCNC: 14.1 PG/ML (ref 1–100)
BUN SERPL-MCNC: 14 MG/DL (ref 6–20)
CALCIUM SERPL-MCNC: 9 MG/DL (ref 8.4–10.2)
CHLORIDE SERPL-SCNC: 102 MMOL/L (ref 96–108)
CO2 SERPL-SCNC: 29 MMOL/L (ref 22–33)
CREAT SERPL-MCNC: 0.85 MG/DL (ref 0.5–1.2)
EOSINOPHIL # BLD AUTO: 0.17 THOUSAND/ΜL (ref 0–0.61)
EOSINOPHIL NFR BLD AUTO: 4 % (ref 0–6)
ERYTHROCYTE [DISTWIDTH] IN BLOOD BY AUTOMATED COUNT: 14.1 % (ref 11.6–15.1)
GFR SERPL CREATININE-BSD FRML MDRD: 131 ML/MIN/1.73SQ M
GLUCOSE SERPL-MCNC: 102 MG/DL (ref 65–140)
HCT VFR BLD AUTO: 43 % (ref 36.5–49.3)
HGB BLD-MCNC: 14.4 G/DL (ref 12–17)
IMM GRANULOCYTES # BLD AUTO: 0.01 THOUSAND/UL (ref 0–0.2)
IMM GRANULOCYTES NFR BLD AUTO: 0 % (ref 0–2)
LYMPHOCYTES # BLD AUTO: 1.22 THOUSANDS/ΜL (ref 0.6–4.47)
LYMPHOCYTES NFR BLD AUTO: 30 % (ref 14–44)
MCH RBC QN AUTO: 30.1 PG (ref 26.8–34.3)
MCHC RBC AUTO-ENTMCNC: 33.5 G/DL (ref 31.4–37.4)
MCV RBC AUTO: 90 FL (ref 82–98)
MONOCYTES # BLD AUTO: 0.44 THOUSAND/ΜL (ref 0.17–1.22)
MONOCYTES NFR BLD AUTO: 11 % (ref 4–12)
NEUTROPHILS # BLD AUTO: 2.26 THOUSANDS/ΜL (ref 1.85–7.62)
NEUTS SEG NFR BLD AUTO: 55 % (ref 43–75)
PLATELET # BLD AUTO: 172 THOUSANDS/UL (ref 149–390)
PMV BLD AUTO: 9.9 FL (ref 8.9–12.7)
POTASSIUM SERPL-SCNC: 3.8 MMOL/L (ref 3.5–5)
PROT SERPL-MCNC: 7.9 G/DL (ref 6.4–8.3)
RBC # BLD AUTO: 4.79 MILLION/UL (ref 3.88–5.62)
SODIUM SERPL-SCNC: 138 MMOL/L (ref 133–145)
TROPONIN I SERPL-MCNC: <0.03 NG/ML (ref 0–0.07)
WBC # BLD AUTO: 4.11 THOUSAND/UL (ref 4.31–10.16)

## 2021-08-04 PROCEDURE — 71045 X-RAY EXAM CHEST 1 VIEW: CPT

## 2021-08-04 PROCEDURE — 80053 COMPREHEN METABOLIC PANEL: CPT | Performed by: INTERNAL MEDICINE

## 2021-08-04 PROCEDURE — 80177 DRUG SCRN QUAN LEVETIRACETAM: CPT | Performed by: INTERNAL MEDICINE

## 2021-08-04 PROCEDURE — 83880 ASSAY OF NATRIURETIC PEPTIDE: CPT | Performed by: INTERNAL MEDICINE

## 2021-08-04 PROCEDURE — 96374 THER/PROPH/DIAG INJ IV PUSH: CPT

## 2021-08-04 PROCEDURE — 99284 EMERGENCY DEPT VISIT MOD MDM: CPT | Performed by: INTERNAL MEDICINE

## 2021-08-04 PROCEDURE — 84484 ASSAY OF TROPONIN QUANT: CPT | Performed by: INTERNAL MEDICINE

## 2021-08-04 PROCEDURE — 36415 COLL VENOUS BLD VENIPUNCTURE: CPT | Performed by: INTERNAL MEDICINE

## 2021-08-04 PROCEDURE — 99284 EMERGENCY DEPT VISIT MOD MDM: CPT

## 2021-08-04 PROCEDURE — 85025 COMPLETE CBC W/AUTO DIFF WBC: CPT | Performed by: INTERNAL MEDICINE

## 2021-08-04 PROCEDURE — 73610 X-RAY EXAM OF ANKLE: CPT

## 2021-08-04 RX ORDER — FUROSEMIDE 10 MG/ML
20 INJECTION INTRAMUSCULAR; INTRAVENOUS ONCE
Status: COMPLETED | OUTPATIENT
Start: 2021-08-04 | End: 2021-08-04

## 2021-08-04 RX ORDER — FUROSEMIDE 20 MG/1
20 TABLET ORAL EVERY OTHER DAY
Qty: 15 TABLET | Refills: 0 | Status: SHIPPED | OUTPATIENT
Start: 2021-08-04 | End: 2021-11-19 | Stop reason: HOSPADM

## 2021-08-04 RX ADMIN — FUROSEMIDE 20 MG: 10 INJECTION, SOLUTION INTRAVENOUS at 11:02

## 2021-08-04 NOTE — DISCHARGE INSTRUCTIONS
Take medications as prescribed  Follow up with your PMD  Decrease salt in deit  Keep leges elevated all times while you are lying or sitting  Labs Reviewed   CBC AND DIFFERENTIAL - Abnormal       Result Value Ref Range Status    WBC 4 11 (*) 4 31 - 10 16 Thousand/uL Final    RBC 4 79  3 88 - 5 62 Million/uL Final    Hemoglobin 14 4  12 0 - 17 0 g/dL Final    Hematocrit 43 0  36 5 - 49 3 % Final    MCV 90  82 - 98 fL Final    MCH 30 1  26 8 - 34 3 pg Final    MCHC 33 5  31 4 - 37 4 g/dL Final    RDW 14 1  11 6 - 15 1 % Final    MPV 9 9  8 9 - 12 7 fL Final    Platelets 409  347 - 390 Thousands/uL Final    Neutrophils Relative 55  43 - 75 % Final    Immat GRANS % 0  0 - 2 % Final    Lymphocytes Relative 30  14 - 44 % Final    Monocytes Relative 11  4 - 12 % Final    Eosinophils Relative 4  0 - 6 % Final    Basophils Relative 0  0 - 1 % Final    Neutrophils Absolute 2 26  1 85 - 7 62 Thousands/µL Final    Immature Grans Absolute 0 01  0 00 - 0 20 Thousand/uL Final    Lymphocytes Absolute 1 22  0 60 - 4 47 Thousands/µL Final    Monocytes Absolute 0 44  0 17 - 1 22 Thousand/µL Final    Eosinophils Absolute 0 17  0 00 - 0 61 Thousand/µL Final    Basophils Absolute 0 01  0 00 - 0 10 Thousands/µL Final   TROPONIN I - Normal    Troponin I <0 03  0 00 - 0 07 ng/mL Final    Comment: [employer] Chemistry analyzer 99% cutoff is > 0 03ng/mL    o cTnl 99% cutoff is useful only when applied to patients in the clinical setting of myocardial ischemia  o cTnl 99% cutoff should be interpreted in the context of clinical history, ECG findings and possibly cardiac imaging to establish correct diagnosis  o cTnl 99% cutoff may be suggestive but clearly not indicative of a coronary event without the clinical setting of myocardial ischemia     B-TYPE NATRIURETIC PEPTIDE (BNP)CA, [facility]AMPUSES ONLY - Normal    BNP 14 1  1 - 100 pg/mL Final   COMPREHENSIVE METABOLIC PANEL    Sodium 769  133 - 145 mmol/L Final    Potassium 3 8  3 5 - 5 0 mmol/L Final    Chloride 102  96 - 108 mmol/L Final    CO2 29  22 - 33 mmol/L Final    ANION GAP 7  4 - 13 mmol/L Final    BUN 14  6 - 20 mg/dL Final    Creatinine 0 85  0 50 - 1 20 mg/dL Final    Comment: Standardized to IDMS reference method    Glucose 102  65 - 140 mg/dL Final    Comment: If the patient is fasting, the ADA then defines impaired fasting glucose as > 100 mg/dL and diabetes as > or equal to 123 mg/dL  Specimen collection should occur prior to Sulfasalazine administration due to the potential for falsely depressed results  Specimen collection should occur prior to Sulfapyridine administration due to the potential for falsely elevated results  Calcium 9 0  8 4 - 10 2 mg/dL Final    AST 22  15 - 41 U/L Final    Comment: Specimen collection should occur prior to Sulfasalazine administration due to the potential for falsely depressed results  ALT 12  5 - 63 U/L Final    Comment: Specimen collection should occur prior to Sulfasalazine administration due to the potential for falsely depressed results       Alkaline Phosphatase 56 0  10 - 129 U/L Final    Total Protein 7 9  6 4 - 8 3 g/dL Final    Albumin 4 1  3 4 - 4 8 g/dL Final    Total Bilirubin 0 41  0 30 - 1 20 mg/dL Final    eGFR 131  ml/min/1 73sq m Final    Narrative:     [place] guidelines for Chronic Kidney Disease (CKD):     Stage 1 with normal or high GFR (GFR > 90 mL/min/1 73 square meters)    Stage 2 Mild CKD (GFR = 60-89 mL/min/1 73 square meters)    Stage 3A Moderate CKD (GFR = 45-59 mL/min/1 73 square meters)    Stage 3B Moderate CKD (GFR = 30-44 mL/min/1 73 square meters)    Stage 4 Severe CKD (GFR = 15-29 mL/min/1 73 square meters)    Stage 5 End Stage CKD (GFR <15 mL/min/1 73 square meters)  Note: GFR calculation is accurate only with a steady state creatinine   LEVETIRACETAM LEVEL

## 2021-08-04 NOTE — ED PROVIDER NOTES
History  Chief Complaint   Patient presents with    Ankle Swelling     Patient complains of bilat ankle pain and swelling x 2 days  Denies injury      A 35Y old homeless, came by ambulance for swollen both ankles  Pt denies any trauma or falls  But he was at er on 7/30/21 and came for fall, and had back pain, and according to the note of dr Vinnie Diggs has swelling of both ankles R>L  Pt had work up and discharged home  Pt has h/o of HIV on Biktarvy and seizure disorder and he is on Keppra  Pt denies any sob, chest pain, no h/o of CAD  Pt stated he can not walk today and called 911  Pt had h/o of drug abuse and quit one year ago   Prior to Admission Medications   Prescriptions Last Dose Informant Patient Reported? Taking? cephalexin (KEFLEX) 250 mg capsule   No No   Sig: Take 2 capsules (500 mg total) by mouth every 8 (eight) hours for 7 days   elvitegravir-cobicistat-emtricitabine-tenofovir (Stribild) [phone] mg   No No   Sig: Take 1 tablet by mouth daily with breakfast   levETIRAcetam (KEPPRA) 1000 MG tablet   No No   Sig: Take 1 tablet (1,000 mg total) by mouth 2 (two) times a day   levETIRAcetam (KEPPRA) 750 mg tablet   No No   Sig: Take 1 tablet (750 mg total) by mouth 2 (two) times a day for 14 days      Facility-Administered Medications: None       Past Medical History:   Diagnosis Date    [facility]     Seizures ([facility] )     Smoker        Past Surgical History:   Procedure Laterality Date    HERNIA REPAIR         History reviewed  No pertinent family history  I have reviewed and agree with the history as documented  E-Cigarette/Vaping    E-Cigarette Use Never User      E-Cigarette/Vaping Substances     Social History     Tobacco Use    Smoking status: Current Every Day Smoker     Packs/day: 1 00     Types: Cigarettes    Smokeless tobacco: Never Used   Vaping Use    Vaping Use: Never used   Substance Use Topics    Alcohol use:  Yes    Drug use: Not Currently       Review of Systems   Constitutional: Negative for diaphoresis, fatigue and fever  HENT: Negative for hearing loss  Eyes: Negative for visual disturbance  Respiratory: Negative for cough, chest tightness and shortness of breath  Cardiovascular: Negative for chest pain, palpitations and leg swelling  Gastrointestinal: Negative for abdominal pain, diarrhea, nausea and vomiting  Genitourinary: Negative for difficulty urinating, dysuria, flank pain and hematuria  Musculoskeletal: Negative for arthralgias, back pain, gait problem, neck pain and neck stiffness  Skin: Negative for color change, pallor and rash  Neurological: Negative for dizziness, light-headedness and headaches  Hematological: Negative for adenopathy  Does not bruise/bleed easily  Psychiatric/Behavioral: Negative for agitation and behavioral problems  Physical Exam  Physical Exam  Vitals and nursing note reviewed  Constitutional:       General: He is not in acute distress  Appearance: Normal appearance  He is well-developed  He is not ill-appearing, toxic-appearing or diaphoretic  HENT:      Head: Normocephalic and atraumatic  Right Ear: Ear canal normal       Left Ear: Ear canal normal       Nose: Nose normal  No congestion or rhinorrhea  Mouth/Throat:      Mouth: Mucous membranes are moist       Pharynx: No oropharyngeal exudate or posterior oropharyngeal erythema  Eyes:      General: No scleral icterus  Right eye: No discharge  Left eye: No discharge  Extraocular Movements: Extraocular movements intact  Conjunctiva/sclera: Conjunctivae normal       Pupils: Pupils are equal, round, and reactive to light  Cardiovascular:      Rate and Rhythm: Normal rate and regular rhythm  Heart sounds: Normal heart sounds  No murmur heard  No friction rub  Pulmonary:      Effort: Pulmonary effort is normal  No respiratory distress        Breath sounds: Normal breath sounds  No wheezing  Chest:      Chest wall: No tenderness  Abdominal:      General: Bowel sounds are normal  There is no distension  Palpations: Abdomen is soft  There is no mass  Tenderness: There is no abdominal tenderness  There is no right CVA tenderness or guarding  Musculoskeletal:         General: Swelling present  No tenderness, deformity or signs of injury  Normal range of motion  Cervical back: Normal range of motion and neck supple  Right lower leg: Edema present  Left lower leg: Edema present  Comments: Has bilateral ankle and foot edema ++   Skin:     General: Skin is warm and dry  Capillary Refill: Capillary refill takes less than 2 seconds  Coloration: Skin is not pale  Findings: No erythema or rash  Neurological:      Mental Status: He is alert and oriented to person, place, and time     Psychiatric:         Behavior: Behavior normal          Vital Signs  ED Triage Vitals   Temperature Pulse Respirations Blood Pressure SpO2   08/04/21 0933 08/04/21 0933 08/04/21 0933 08/04/21 0935 08/04/21 0933   97 8 °F (36 6 °C) 81 18 117/85 99 %      Temp Source Heart Rate Source Patient Position - Orthostatic VS BP Location FiO2 (%)   08/04/21 0933 -- -- -- --   Oral          Pain Score       --                  Vitals:    08/04/21 0933 08/04/21 0935   BP:  117/85   Pulse: 81          Visual Acuity      ED Medications  Medications   furosemide (LASIX) injection 20 mg (20 mg Intravenous Given 8/4/21 1102)       Diagnostic Studies  Results Reviewed     Procedure Component Value Units Date/Time    B-Type Natriuretic Peptide(BNP) CA, GH, EA Camp[facility] Only [[id number]]  (Normal) Collected: 08/04/21 1057    Lab Status: Final result Specimen: Blood from Arm, Left Updated: 08/04/21 1130     BNP 14 1 pg/mL     Troponin I [[id number]]  (Normal) Collected: 08/04/21 1057    Lab Status: Final result Specimen: Blood from Arm, Left Updated: 08/04/21 1127     Troponin I <0 03 ng/mL     Comprehensive metabolic panel [[id number]] Collected: 08/04/21 1057    Lab Status: Final result Specimen: Blood from Arm, Left Updated: 08/04/21 1125     Sodium 138 mmol/L      Potassium 3 8 mmol/L      Chloride 102 mmol/L      CO2 29 mmol/L      ANION GAP 7 mmol/L      BUN 14 mg/dL      Creatinine 0 85 mg/dL      Glucose 102 mg/dL      Calcium 9 0 mg/dL      AST 22 U/L      ALT 12 U/L      Alkaline Phosphatase 56 0 U/L      Total Protein 7 9 g/dL      Albumin 4 1 g/dL      Total Bilirubin 0 41 mg/dL      eGFR 131 ml/min/1 73sq m     Narrative:      [employer] guidelines for Chronic Kidney Disease (CKD):     Stage 1 with normal or high GFR (GFR > 90 mL/min/1 73 square meters)    Stage 2 Mild CKD (GFR = 60-89 mL/min/1 73 square meters)    Stage 3A Moderate CKD (GFR = 45-59 mL/min/1 73 square meters)    Stage 3B Moderate CKD (GFR = 30-44 mL/min/1 73 square meters)    Stage 4 Severe CKD (GFR = 15-29 mL/min/1 73 square meters)    Stage 5 End Stage CKD (GFR <15 mL/min/1 73 square meters)  Note: GFR calculation is accurate only with a steady state creatinine    CBC and differential [[id number]]  (Abnormal) Collected: 08/04/21 1057    Lab Status: Final result Specimen: Blood from Arm, Left Updated: 08/04/21 1116     WBC 4 11 Thousand/uL      RBC 4 79 Million/uL      Hemoglobin 14 4 g/dL      Hematocrit 43 0 %      MCV 90 fL      MCH 30 1 pg      MCHC 33 5 g/dL      RDW 14 1 %      MPV 9 9 fL      Platelets 185 Thousands/uL      Neutrophils Relative 55 %      Immat GRANS % 0 %      Lymphocytes Relative 30 %      Monocytes Relative 11 %      Eosinophils Relative 4 %      Basophils Relative 0 %      Neutrophils Absolute 2 26 Thousands/µL      Immature Grans Absolute 0 01 Thousand/uL      Lymphocytes Absolute 1 22 Thousands/µL      Monocytes Absolute 0 44 Thousand/µL      Eosinophils Absolute 0 17 Thousand/µL      Basophils Absolute 0 01 Thousands/µL     Levetiracetam level [[id number]] Collected: 08/04/21 1057    Lab Status: In process Specimen: Blood from Arm, Left Updated: 08/04/21 1102                 XR ankle 3+ views LEFT   Final Result by Alissa Stanton MD (08/04 1344)      No acute osseous abnormality  Workstation performed: [id number]         XR ankle 3+ views RIGHT   Final Result by Alissa Stanton MD (08/04 1344)   Soft tissue swelling      No acute osseous abnormality  Workstation performed: [id number]         XR chest portable   Final Result by Alissa Stanton MD (08/04 1324)      No acute cardiopulmonary disease  Findings are stable            Workstation performed: [id number]                    Procedures  Procedures         ED Course  ED Course as of Aug 04 1821   Wed Aug 04, 2021   1153 X ray L & R ankle; no fracture or dislocation  CXR  No acute pulmonary findings  MDM  Number of Diagnoses or Management Options  Diagnosis management comments: Case discussed with pt in detail and all questions and concerns answered  Will discharge home on diuretics  Amount and/or Complexity of Data Reviewed  Clinical lab tests: ordered and reviewed  Tests in the radiology section of CPT®: ordered and reviewed    Risk of Complications, Morbidity, and/or Mortality  Presenting problems: moderate  Diagnostic procedures: moderate  Management options: moderate        Disposition  Final diagnoses:   Dependent edema     Time reflects when diagnosis was documented in both MDM as applicable and the Disposition within this note     Time User Action Codes Description Comment    8/4/2021 11:55 AM Bea Hogan Add [R60 9] Dependent edema       ED Disposition     ED Disposition Condition Date/Time Comment    Discharge Stable Wed Aug 4, 2021 11:55 AM Giovanni Terryer discharge to home/self care              Follow-up Information     Follow up With Specialties Details Why Contact Info      In 1 week FOLLOW UP with your PMD          Discharge Medication List as of 8/4/2021 11:59 AM      START taking these medications    Details   furosemide (LASIX) 20 mg tablet Take 1 tablet (20 mg total) by mouth every other day for 15 days, Starting Wed 8/4/2021, Until Thu 8/19/2021, Normal         CONTINUE these medications which have NOT CHANGED    Details   cephalexin (KEFLEX) 250 mg capsule Take 2 capsules (500 mg total) by mouth every 8 (eight) hours for 7 days, Starting Fri 7/30/2021, Until Fri 8/6/2021, Normal      elvitegravir-cobicistat-emtricitabine-tenofovir (Stribild) [phone] mg Take 1 tablet by mouth daily with breakfast, Starting Thu 7/9/2020, Normal      levETIRAcetam (KEPPRA) 1000 MG tablet Take 1 tablet (1,000 mg total) by mouth 2 (two) times a day, Starting Fri 5/21/2021, Until Fri 7/30/2021, Print      levETIRAcetam (KEPPRA) 750 mg tablet Take 1 tablet (750 mg total) by mouth 2 (two) times a day for 14 days, Starting Fri 5/21/2021, Until Fri 7/30/2021, Normal           No discharge procedures on file      PDMP Review       Value Time User    PDMP Reviewed  Yes 5/27/2020 12:37 AM LEBRON Carr          ED Provider  Electronically Signed by           Kwame Obrien MD  08/04/21 9035

## 2021-08-07 ENCOUNTER — HOSPITAL ENCOUNTER (EMERGENCY)
Facility: HOSPITAL | Age: 36
Discharge: HOME | End: 2021-08-07
Payer: MEDICARE

## 2021-08-07 ENCOUNTER — HOSPITAL ENCOUNTER (EMERGENCY)
Facility: HOSPITAL | Age: 36
Discharge: HOME | End: 2021-08-07
Attending: EMERGENCY MEDICINE | Admitting: EMERGENCY MEDICINE
Payer: MEDICARE

## 2021-08-07 VITALS
WEIGHT: 140 LBS | BODY MASS INDEX: 22.5 KG/M2 | HEIGHT: 66 IN | RESPIRATION RATE: 18 BRPM | DIASTOLIC BLOOD PRESSURE: 71 MMHG | SYSTOLIC BLOOD PRESSURE: 112 MMHG | OXYGEN SATURATION: 98 % | TEMPERATURE: 98.7 F | HEART RATE: 78 BPM

## 2021-08-07 VITALS
DIASTOLIC BLOOD PRESSURE: 57 MMHG | OXYGEN SATURATION: 97 % | RESPIRATION RATE: 18 BRPM | TEMPERATURE: 98.4 F | SYSTOLIC BLOOD PRESSURE: 97 MMHG | HEART RATE: 96 BPM

## 2021-08-07 DIAGNOSIS — R53.83 FATIGUE: Primary | ICD-10-CM

## 2021-08-07 DIAGNOSIS — R60.9 DEPENDENT EDEMA: Primary | ICD-10-CM

## 2021-08-07 LAB
ALBUMIN SERPL BCP-MCNC: 4.4 G/DL (ref 3.4–4.8)
ALP SERPL-CCNC: 54.6 U/L (ref 10–129)
ALT SERPL W P-5'-P-CCNC: 15 U/L (ref 5–63)
ANION GAP SERPL CALCULATED.3IONS-SCNC: 5 MMOL/L (ref 4–13)
AST SERPL W P-5'-P-CCNC: 24 U/L (ref 15–41)
BASOPHILS # BLD AUTO: 0.02 THOUSANDS/ΜL (ref 0–0.1)
BASOPHILS NFR BLD AUTO: 0 % (ref 0–1)
BILIRUB SERPL-MCNC: 0.38 MG/DL (ref 0.3–1.2)
BUN SERPL-MCNC: 18 MG/DL (ref 6–20)
CALCIUM SERPL-MCNC: 9.6 MG/DL (ref 8.4–10.2)
CHLORIDE SERPL-SCNC: 104 MMOL/L (ref 96–108)
CO2 SERPL-SCNC: 32 MMOL/L (ref 22–33)
CREAT SERPL-MCNC: 1.21 MG/DL (ref 0.5–1.2)
EOSINOPHIL # BLD AUTO: 0.21 THOUSAND/ΜL (ref 0–0.61)
EOSINOPHIL NFR BLD AUTO: 4 % (ref 0–6)
ERYTHROCYTE [DISTWIDTH] IN BLOOD BY AUTOMATED COUNT: 14.5 % (ref 11.6–15.1)
GFR SERPL CREATININE-BSD FRML MDRD: 89 ML/MIN/1.73SQ M
GLUCOSE SERPL-MCNC: 111 MG/DL (ref 65–140)
GLUCOSE SERPL-MCNC: 91 MG/DL (ref 65–140)
HCT VFR BLD AUTO: 44 % (ref 36.5–49.3)
HGB BLD-MCNC: 14.7 G/DL (ref 12–17)
IMM GRANULOCYTES # BLD AUTO: 0.01 THOUSAND/UL (ref 0–0.2)
IMM GRANULOCYTES NFR BLD AUTO: 0 % (ref 0–2)
LEVETIRACETAM SERPL-MCNC: 4.6 UG/ML (ref 10–40)
LYMPHOCYTES # BLD AUTO: 2.16 THOUSANDS/ΜL (ref 0.6–4.47)
LYMPHOCYTES NFR BLD AUTO: 39 % (ref 14–44)
MCH RBC QN AUTO: 30.4 PG (ref 26.8–34.3)
MCHC RBC AUTO-ENTMCNC: 33.4 G/DL (ref 31.4–37.4)
MCV RBC AUTO: 91 FL (ref 82–98)
MONOCYTES # BLD AUTO: 0.67 THOUSAND/ΜL (ref 0.17–1.22)
MONOCYTES NFR BLD AUTO: 12 % (ref 4–12)
NEUTROPHILS # BLD AUTO: 2.53 THOUSANDS/ΜL (ref 1.85–7.62)
NEUTS SEG NFR BLD AUTO: 45 % (ref 43–75)
PLATELET # BLD AUTO: 201 THOUSANDS/UL (ref 149–390)
PMV BLD AUTO: 10.6 FL (ref 8.9–12.7)
POTASSIUM SERPL-SCNC: 4.3 MMOL/L (ref 3.5–5)
PROT SERPL-MCNC: 8.3 G/DL (ref 6.4–8.3)
RBC # BLD AUTO: 4.83 MILLION/UL (ref 3.88–5.62)
SODIUM SERPL-SCNC: 141 MMOL/L (ref 133–145)
WBC # BLD AUTO: 5.6 THOUSAND/UL (ref 4.31–10.16)

## 2021-08-07 PROCEDURE — 99284 EMERGENCY DEPT VISIT MOD MDM: CPT

## 2021-08-07 PROCEDURE — 99284 EMERGENCY DEPT VISIT MOD MDM: CPT | Performed by: EMERGENCY MEDICINE

## 2021-08-07 PROCEDURE — 85025 COMPLETE CBC W/AUTO DIFF WBC: CPT | Performed by: STUDENT IN AN ORGANIZED HEALTH CARE EDUCATION/TRAINING PROGRAM

## 2021-08-07 PROCEDURE — 82948 REAGENT STRIP/BLOOD GLUCOSE: CPT

## 2021-08-07 PROCEDURE — 80053 COMPREHEN METABOLIC PANEL: CPT | Performed by: STUDENT IN AN ORGANIZED HEALTH CARE EDUCATION/TRAINING PROGRAM

## 2021-08-07 PROCEDURE — 96374 THER/PROPH/DIAG INJ IV PUSH: CPT

## 2021-08-07 PROCEDURE — 36415 COLL VENOUS BLD VENIPUNCTURE: CPT | Performed by: STUDENT IN AN ORGANIZED HEALTH CARE EDUCATION/TRAINING PROGRAM

## 2021-08-07 RX ORDER — LAMOTRIGINE 100 MG/1
100 TABLET ORAL ONCE
Status: COMPLETED | OUTPATIENT
Start: 2021-08-07 | End: 2021-08-07

## 2021-08-07 RX ORDER — KETOROLAC TROMETHAMINE 30 MG/ML
15 INJECTION, SOLUTION INTRAMUSCULAR; INTRAVENOUS ONCE
Status: COMPLETED | OUTPATIENT
Start: 2021-08-07 | End: 2021-08-07

## 2021-08-07 RX ADMIN — LAMOTRIGINE 100 MG: 100 TABLET ORAL at 13:52

## 2021-08-07 RX ADMIN — KETOROLAC TROMETHAMINE 15 MG: 30 INJECTION, SOLUTION INTRAMUSCULAR; INTRAVENOUS at 04:08

## 2021-08-07 NOTE — ED PROVIDER NOTES
History  Chief Complaint   Patient presents with    Ankle Swelling     Bilateral ankle/foot swelling 3-4 days  Patient seen in ER 1 week ago for same  Jose Guadalupe is a 28year old male with a PMHx of HIV, seizure disorder, and tobacco use who presents to the emergency department for evaluation of b/l symmetric ankle swelling and pain that began 4 days ago, states he is able to ambulate, denies trauma or falls  Was seen here 8/4 with the same symptoms, XR with no acute fracture, CXR with no acute cardiopulmonary abnormality, CBC, CMP, BNP, trop unremarkable, was prescribed lasix and discharged  Patient reports he been compliant on Keppra and Biktarvy and has been taking prescribed lasix  Patient notes he is homeless d/t not getting along with his mother who he lives with, he notes he sits sleeping up and rarely elevates his legs  Patient denies h/o similar symptoms; however, on chart review has been seen with same symptoms multiple times dating back to 5/2020 with unremarkable work ups  Patient denies seizure activity, lightheadedness, syncope, HA, fever/chills, sore throat, cough, SOB, chest pain, abdominal pain, n/v/d, urinary symptoms, or any other complaints at this time  History provided by:  Patient   used: No    Ankle Swelling  Associated symptoms: no back pain, no fever and no neck pain        Prior to Admission Medications   Prescriptions Last Dose Informant Patient Reported? Taking?    cephalexin (KEFLEX) 250 mg capsule   No Yes   Sig: Take 2 capsules (500 mg total) by mouth every 8 (eight) hours for 7 days   elvitegravir-cobicistat-emtricitabine-tenofovir (Stribild) [phone] mg   No Yes   Sig: Take 1 tablet by mouth daily with breakfast   furosemide (LASIX) 20 mg tablet   No Yes   Sig: Take 1 tablet (20 mg total) by mouth every other day for 15 days   levETIRAcetam (KEPPRA) 750 mg tablet   No Yes   Sig: Take 1 tablet (750 mg total) by mouth 2 (two) times a day for 14 days Patient taking differently: Take 750 mg by mouth daily       Facility-Administered Medications: None       Past Medical History:   Diagnosis Date    HIV ([facility] immunodeficiency virus infection) ([facility] )     Seizures ([facility] )     Smoker        Past Surgical History:   Procedure Laterality Date    HERNIA REPAIR         History reviewed  No pertinent family history  I have reviewed and agree with the history as documented  E-Cigarette/Vaping    E-Cigarette Use Never User      E-Cigarette/Vaping Substances     Social History     Tobacco Use    Smoking status: Current Every Day Smoker     Packs/day: 1 00     Types: Cigarettes    Smokeless tobacco: Never Used   Vaping Use    Vaping Use: Never used   Substance Use Topics    Alcohol use: Yes    Drug use: Not Currently       Review of Systems   Constitutional: Negative for chills and fever  HENT: Negative for ear pain, sore throat and trouble swallowing  Eyes: Negative for pain and visual disturbance  Respiratory: Negative for cough and shortness of breath  Cardiovascular: Negative for chest pain and palpitations  Gastrointestinal: Negative for abdominal pain, diarrhea, nausea and vomiting  Genitourinary: Negative for dysuria and frequency  Musculoskeletal: Negative for back pain and neck pain  Skin: Negative for color change and rash  Neurological: Negative for syncope, light-headedness and headaches  Physical Exam  Physical Exam  Vitals and nursing note reviewed  Constitutional:       General: He is not in acute distress  Appearance: Normal appearance  He is well-developed  HENT:      Head: Normocephalic and atraumatic  Nose: Nose normal       Mouth/Throat:      Mouth: Mucous membranes are moist    Eyes:      Extraocular Movements: Extraocular movements intact  Conjunctiva/sclera: Conjunctivae normal       Pupils: Pupils are equal, round, and reactive to light     Cardiovascular:      Rate and Rhythm: Normal rate and regular rhythm  Heart sounds: No murmur heard  No friction rub  No gallop  Pulmonary:      Effort: Pulmonary effort is normal  No respiratory distress  Breath sounds: No stridor  No wheezing, rhonchi or rales  Abdominal:      General: Bowel sounds are normal  There is no distension  Palpations: Abdomen is soft  Tenderness: There is no abdominal tenderness  There is no right CVA tenderness, left CVA tenderness, guarding or rebound  Musculoskeletal:      Cervical back: Normal range of motion and neck supple  Comments: B/l symmetric foot and ankle swelling, TTP to posterior portion of ankle and forefoot, ROM of ankles limited d/t pain, sensation intact, cap refill <2s, DP 1+, no erythema    No calf tenderness or erythema b/l   Skin:     General: Skin is warm and dry  Capillary Refill: Capillary refill takes less than 2 seconds  Findings: No rash  Neurological:      General: No focal deficit present  Mental Status: He is alert and oriented to person, place, and time           Vital Signs  ED Triage Vitals [08/07/21 0344]   Temperature Pulse Respirations Blood Pressure SpO2   98 4 °F (36 9 °C) 92 18 128/88 100 %      Temp Source Heart Rate Source Patient Position - Orthostatic VS BP Location FiO2 (%)   Oral Monitor -- Left arm --      Pain Score       9           Vitals:    08/07/21 0344 08/07/21 0435   BP: 128/88 97/57   Pulse: 92 96         Visual Acuity      ED Medications  Medications   ketorolac (TORADOL) injection 15 mg (15 mg Intravenous Given 8/7/21 0408)       Diagnostic Studies  Results Reviewed     Procedure Component Value Units Date/Time    Comprehensive metabolic panel [[id number]]  (Abnormal) Collected: 08/07/21 0408    Lab Status: Final result Specimen: Blood from Arm, Left Updated: 08/07/21 0435     Sodium 141 mmol/L      Potassium 4 3 mmol/L      Chloride 104 mmol/L      CO2 32 mmol/L      ANION GAP 5 mmol/L      BUN 18 mg/dL      Creatinine 1 21 mg/dL Glucose 91 mg/dL      Calcium 9 6 mg/dL      AST 24 U/L      ALT 15 U/L      Alkaline Phosphatase 54 6 U/L      Total Protein 8 3 g/dL      Albumin 4 4 g/dL      Total Bilirubin 0 38 mg/dL      eGFR 89 ml/min/1 73sq m     Narrative:      [place] guidelines for Chronic Kidney Disease (CKD):     Stage 1 with normal or high GFR (GFR > 90 mL/min/1 73 square meters)    Stage 2 Mild CKD (GFR = 60-89 mL/min/1 73 square meters)    Stage 3A Moderate CKD (GFR = 45-59 mL/min/1 73 square meters)    Stage 3B Moderate CKD (GFR = 30-44 mL/min/1 73 square meters)    Stage 4 Severe CKD (GFR = 15-29 mL/min/1 73 square meters)    Stage 5 End Stage CKD (GFR <15 mL/min/1 73 square meters)  Note: GFR calculation is accurate only with a steady state creatinine    CBC and differential [[id number]]  (Normal) Collected: 08/07/21 0408    Lab Status: Final result Specimen: Blood from Arm, Left Updated: 08/07/21 0417     WBC 5 60 Thousand/uL      RBC 4 83 Million/uL      Hemoglobin 14 7 g/dL      Hematocrit 44 0 %      MCV 91 fL      MCH 30 4 pg      MCHC 33 4 g/dL      RDW 14 5 %      MPV 10 6 fL      Platelets 171 Thousands/uL      Neutrophils Relative 45 %      Immat GRANS % 0 %      Lymphocytes Relative 39 %      Monocytes Relative 12 %      Eosinophils Relative 4 %      Basophils Relative 0 %      Neutrophils Absolute 2 53 Thousands/µL      Immature Grans Absolute 0 01 Thousand/uL      Lymphocytes Absolute 2 16 Thousands/µL      Monocytes Absolute 0 67 Thousand/µL      Eosinophils Absolute 0 21 Thousand/µL      Basophils Absolute 0 02 Thousands/µL                  No orders to display              Procedures  Procedures         ED Course  ED Course as of Aug 07 0456   Sat Aug 07, 2021   0435 CBC/CMP unremarkable      0442 Pain improved with toradol                                              MDM  Number of Diagnoses or Management Options  Dependent edema  Diagnosis management comments: Austin Suarez is a 28 year old male with a PMHx of HIV, seizure disorder, and tobacco use who presents to the ED with b/l symmetric ankle swelling and pain, able to ambulate, denies trauma or falls, compliant on Keppra, Biktarvy, and lasix  Was seen here 8/4 with the same symptoms, XR with no acute fracture, CXR with no acute cardiopulmonary abnormality, CBC, CMP, BNP, trop unremarkable  Today CBC and CMP unremarkable, pain improved with toradol  Dependent edema likely d/t sleeping in a sitting position and rarely elevating LE    -ibuprofen/tylenol PRN for pain  -f/u with PCP  -strict ED return precautions discussed     Results discussed with patient, who verbalized understanding and agreement with the management plan  Strict ED return instructions were discussed at bedside and all questions were answered  Prior to discharge, I provided both verbal and written instructions of the management plan and the signs and symptoms that should prompt the patient to return to the ED  All questions were answered and the patient was comfortable with the plan of care and discharged home  The patient agrees to return to the Emergency Department for concerns and/or progression of illness  Amount and/or Complexity of Data Reviewed  Clinical lab tests: ordered and reviewed    Patient Progress  Patient progress: improved      Disposition  Final diagnoses:   Dependent edema     Time reflects when diagnosis was documented in both MDM as applicable and the Disposition within this note     Time User Action Codes Description Comment    8/7/2021  4:41 AM Leeanne Damon Add [R60 9] Dependent edema       ED Disposition     ED Disposition Condition Date/Time Comment    Discharge Stable Sat Aug 7, 2021  4:41 AM Onesimo Villela discharge to home/self care              Follow-up Information     Follow up With Specialties Details Why Contact Info Additional Information    [address] Family Medicine Schedule an appointment as soon as possible for a visit in 1 day  DARNELL Matos [address] [facility] [address]  [address] [facility], [address] [address], [address], [place], [place], [address], [address] [facility]  Emergency Department Emergency Medicine  If symptoms worsen [address] [address]  [address] [facility] Emergency Department, [address], [address]          Discharge Medication List as of 8/7/2021  4:41 AM      CONTINUE these medications which have NOT CHANGED    Details   cephalexin (KEFLEX) 250 mg capsule Take 2 capsules (500 mg total) by mouth every 8 (eight) hours for 7 days, Starting Fri 7/30/2021, Until Sat 8/7/2021, Normal      elvitegravir-cobicistat-emtricitabine-tenofovir (Stribild) [phone] mg Take 1 tablet by mouth daily with breakfast, Starting Thu 7/9/2020, Normal      furosemide (LASIX) 20 mg tablet Take 1 tablet (20 mg total) by mouth every other day for 15 days, Starting Wed 8/4/2021, Until Thu 8/19/2021, Normal      levETIRAcetam (KEPPRA) 750 mg tablet Take 1 tablet (750 mg total) by mouth 2 (two) times a day for 14 days, Starting Fri 5/21/2021, Until Sat 8/7/2021, Normal           No discharge procedures on file      PDMP Review       Value Time User    PDMP Reviewed  Yes 5/27/2020 12:37 AM LEBRON Edgar          ED Provider  Electronically Signed by           Vance Yañez PA-C  08/07/21 7991

## 2021-08-07 NOTE — ED PROVIDER NOTES
History  Chief Complaint   Patient presents with    Ankle Pain     Patient here with c/o bilateral ankle pain  Patient seen Rebecca Ellis this morning for same complaint, found outside sleeping sitting on walker, did not take Lasix today yet  45-year-old male history of seizure disorder HIV homelessness here secondary to being found sleeping in his walker on the street  Patient was discharged from the emergency department a few hours ago walked across the street with his walker set in his walker placed all his belongings on the curve and sitting there and falling asleep  People stop to talk to him he was unresponsive so they called EMS to have him brought back to the emergency department  Patient shows no outward signs of trauma  This is patient's typical presentation  Patient does arouse to verbal stimuli voluntarily at times  Patient's vital signs demonstrate a mild hypotension normal heart rate normal respiratory rate pulse ox 90%  Patient's blood sugar was within normal range  Prior to Admission Medications   Prescriptions Last Dose Informant Patient Reported? Taking? cephalexin (KEFLEX) 250 mg capsule   No Yes   Sig: Take 2 capsules (500 mg total) by mouth every 8 (eight) hours for 7 days   elvitegravir-cobicistat-emtricitabine-tenofovir (Stribild) [phone] mg   No Yes   Sig: Take 1 tablet by mouth daily with breakfast   furosemide (LASIX) 20 mg tablet   No Yes   Sig: Take 1 tablet (20 mg total) by mouth every other day for 15 days   levETIRAcetam (KEPPRA) 750 mg tablet   No Yes   Sig: Take 1 tablet (750 mg total) by mouth 2 (two) times a day for 14 days   Patient taking differently: Take 750 mg by mouth daily       Facility-Administered Medications: None       Past Medical History:   Diagnosis Date    HIV [facility]human immunodeficiency virus infection) ([facility] [place] )     Seizures ([facility] )     Smoker        Past Surgical History:   Procedure Laterality Date    HERNIA REPAIR         History reviewed  No pertinent family history  I have reviewed and agree with the history as documented  E-Cigarette/Vaping    E-Cigarette Use Never User      E-Cigarette/Vaping Substances     Social History     Tobacco Use    Smoking status: Current Every Day Smoker     Packs/day: 1 00     Types: Cigarettes    Smokeless tobacco: Never Used   Vaping Use    Vaping Use: Never used   Substance Use Topics    Alcohol use: Yes    Drug use: Not Currently       Review of Systems   Constitutional: Positive for fatigue  Negative for chills and fever  HENT: Negative for congestion  Eyes: Negative for visual disturbance  Respiratory: Negative for shortness of breath  Cardiovascular: Negative for chest pain  Gastrointestinal: Negative for abdominal pain  Endocrine: Negative for cold intolerance  Genitourinary: Negative for frequency  Musculoskeletal: Negative for gait problem  Skin: Negative for rash  Neurological: Positive for weakness  Negative for dizziness  Psychiatric/Behavioral: Negative for behavioral problems and confusion  Physical Exam  Physical Exam  Vitals and nursing note reviewed  Constitutional:       Appearance: He is well-developed  HENT:      Head: Normocephalic and atraumatic  Right Ear: Tympanic membrane normal       Left Ear: Tympanic membrane normal       Nose: Nose normal       Mouth/Throat:      Mouth: Mucous membranes are dry  Eyes:      Conjunctiva/sclera: Conjunctivae normal       Pupils: Pupils are equal, round, and reactive to light  Cardiovascular:      Rate and Rhythm: Normal rate and regular rhythm  Heart sounds: Normal heart sounds  Pulmonary:      Effort: Pulmonary effort is normal       Breath sounds: Normal breath sounds  Abdominal:      General: Bowel sounds are normal       Palpations: Abdomen is soft  Musculoskeletal:         General: Normal range of motion  Cervical back: Normal range of motion and neck supple     Skin:     General: Skin is warm and dry  Capillary Refill: Capillary refill takes less than 2 seconds  Neurological:      General: No focal deficit present  Mental Status: He is alert  Vital Signs  ED Triage Vitals   Temperature Pulse Respirations Blood Pressure SpO2   08/07/21 1030 08/07/21 0952 08/07/21 0952 08/07/21 0952 08/07/21 0952   98 7 °F (37 1 °C) 78 17 94/53 98 %      Temp Source Heart Rate Source Patient Position - Orthostatic VS BP Location FiO2 (%)   08/07/21 0952 08/07/21 0952 08/07/21 0952 08/07/21 0952 --   Oral Monitor Lying Right arm       Pain Score       08/07/21 1045       No Pain           Vitals:    08/07/21 0952 08/07/21 1045 08/07/21 1150 08/07/21 1353   BP: 94/53 122/89 110/69 112/71   Pulse: 78 74 72 78   Patient Position - Orthostatic VS: Lying            Visual Acuity      ED Medications  Medications   lamoTRIgine (LaMICtal) tablet 100 mg (100 mg Oral Given 8/7/21 1352)       Diagnostic Studies  Results Reviewed     Procedure Component Value Units Date/Time    Fingerstick Glucose (POCT) [[id number]]  (Normal) Collected: 08/07/21 0953    Lab Status: Final result Updated: 08/07/21 0954     POC Glucose 111 mg/dl                  No orders to display              Procedures  Procedures         ED Course                                           MDM  Number of Diagnoses or Management Options  Diagnosis management comments: Patient stable in the emergency department no acute events occurred patient was given Keppra secondary to him being subtherapeutic on Keppra level of the chest came back to us  Other than that patient be discharged continue his current medications follow up with his medical team as needed        Disposition  Final diagnoses:   Fatigue     Time reflects when diagnosis was documented in both MDM as applicable and the Disposition within this note     Time User Action Codes Description Comment    8/7/2021  2:20 PM Glo Dutton Add [R53 83] Fatigue       ED Disposition     ED Disposition Condition Date/Time Comment    Discharge Stable Sat Aug 7, 2021  2:19 PM Daniel Dodd discharge to home/self care  Follow-up Information     Follow up With Specialties Details Why Contact Info Additional Information    [address] [facility] Medicine Schedule an appointment as soon as possible for a visit in 3 days  [address] [id number]  [address] , [place], [place], [address]          Current Discharge Medication List      CONTINUE these medications which have NOT CHANGED    Details   cephalexin (KEFLEX) 250 mg capsule Take 2 capsules (500 mg total) by mouth every 8 (eight) hours for 7 days  Qty: 21 capsule, Refills: 0    Associated Diagnoses: Cellulitis of right leg      elvitegravir-cobicistat-emtricitabine-tenofovir (Stribild) [phone] mg Take 1 tablet by mouth daily with breakfast  Qty: 30 tablet, Refills: 0    Associated Diagnoses: HIV infection, unspecified symptom status (HCC)      furosemide (LASIX) 20 mg tablet Take 1 tablet (20 mg total) by mouth every other day for 15 days  Qty: 15 tablet, Refills: 0    Associated Diagnoses: Dependent edema      levETIRAcetam (KEPPRA) 750 mg tablet Take 1 tablet (750 mg total) by mouth 2 (two) times a day for 14 days  Qty: 28 tablet, Refills: 0    Associated Diagnoses: Seizure ([address] )           No discharge procedures on file      PDMP Review       Value Time User    PDMP Reviewed  Yes 5/27/2020 12:37 AM LEBRON Mena          ED Provider  Electronically Signed by           Vickey Brian MD  08/07/21 9656

## 2021-08-07 NOTE — ED NOTES
Patient noted sleeping, woken up and encouraged to eat his sandwich/drink his juice     Jeet Leach RN  08/07/21 6515

## 2021-08-07 NOTE — ED ATTENDING ATTESTATION
8/7/2021  Harjeet aMuricio, DO, saw and evaluated the patient  I have discussed the patient with the resident/non-physician practitioner and agree with the resident's/non-physician practitioner's findings, Plan of Care, and MDM as documented in the resident's/non-physician practitioner's note, except where noted  All available labs and Radiology studies were reviewed  I was present for key portions of any procedure(s) performed by the resident/non-physician practitioner and I was immediately available to provide assistance  At this point I agree with the current assessment done in the Emergency Department  I have conducted an independent evaluation of this patient a history and physical is as follows:  28-year-old homeless male presents the emergency department with ankle swelling  Patient has bilateral ankle swelling on exam   Patient had lab work that was within normal limits and no change from his prior lab work few days ago  Patient will be discharged with follow-up to his primary care physician    ED Course         Critical Care Time  Procedures

## 2021-08-07 NOTE — ED NOTES
Patient ate a few bites from sandwich, noted sleeping at present, awoken and encouraged to eat and drink, call bell within reach, bed low position, side rails up x 2     Twin Clancy, MARTIN  08/07/21 2321

## 2021-08-07 NOTE — ED NOTES
When patient's [facility] is placed at 90 degree angle patient verbal and responding to questions  Patient reports his ankles hurt, admits to not taking lasix's today, reports there is not reason   He reports not knowing who called Frank Palmer RN  08/07/21 [address], RN  08/07/21 1064

## 2021-11-01 ENCOUNTER — HOSPITAL ENCOUNTER (EMERGENCY)
Facility: HOSPITAL | Age: 36
Discharge: HOME | End: 2021-11-01
Attending: EMERGENCY MEDICINE | Admitting: EMERGENCY MEDICINE
Payer: MEDICARE

## 2021-11-01 VITALS
RESPIRATION RATE: 18 BRPM | HEART RATE: 66 BPM | DIASTOLIC BLOOD PRESSURE: 85 MMHG | TEMPERATURE: 97.7 F | WEIGHT: 140 LBS | BODY MASS INDEX: 22.5 KG/M2 | OXYGEN SATURATION: 100 % | SYSTOLIC BLOOD PRESSURE: 125 MMHG | HEIGHT: 66 IN

## 2021-11-01 DIAGNOSIS — Z59.00 HOMELESSNESS: ICD-10-CM

## 2021-11-01 DIAGNOSIS — R53.1 WEAKNESS: Primary | ICD-10-CM

## 2021-11-01 LAB
ALBUMIN SERPL BCP-MCNC: 4.4 G/DL (ref 3.4–4.8)
ALP SERPL-CCNC: 56 U/L (ref 10–129)
ALT SERPL W P-5'-P-CCNC: 12 U/L (ref 5–63)
ANION GAP SERPL CALCULATED.3IONS-SCNC: 5 MMOL/L (ref 4–13)
AST SERPL W P-5'-P-CCNC: 21 U/L (ref 15–41)
BASOPHILS # BLD AUTO: 0.01 THOUSANDS/ΜL (ref 0–0.1)
BASOPHILS NFR BLD AUTO: 0 % (ref 0–1)
BILIRUB SERPL-MCNC: 0.32 MG/DL (ref 0.3–1.2)
BILIRUB UR QL STRIP: NEGATIVE
BUN SERPL-MCNC: 15 MG/DL (ref 6–20)
CALCIUM SERPL-MCNC: 9.4 MG/DL (ref 8.4–10.2)
CHLORIDE SERPL-SCNC: 105 MMOL/L (ref 96–108)
CLARITY UR: CLEAR
CO2 SERPL-SCNC: 33 MMOL/L (ref 22–33)
COLOR UR: YELLOW
CREAT SERPL-MCNC: 0.85 MG/DL (ref 0.5–1.2)
EOSINOPHIL # BLD AUTO: 0.08 THOUSAND/ΜL (ref 0–0.61)
EOSINOPHIL NFR BLD AUTO: 1 % (ref 0–6)
ERYTHROCYTE [DISTWIDTH] IN BLOOD BY AUTOMATED COUNT: 13.2 % (ref 11.6–15.1)
GFR SERPL CREATININE-BSD FRML MDRD: 130 ML/MIN/1.73SQ M
GLUCOSE SERPL-MCNC: 73 MG/DL (ref 65–140)
GLUCOSE UR STRIP-MCNC: NEGATIVE MG/DL
HCT VFR BLD AUTO: 42.7 % (ref 36.5–49.3)
HGB BLD-MCNC: 14.6 G/DL (ref 12–17)
HGB UR QL STRIP.AUTO: NEGATIVE
IMM GRANULOCYTES # BLD AUTO: 0.01 THOUSAND/UL (ref 0–0.2)
IMM GRANULOCYTES NFR BLD AUTO: 0 % (ref 0–2)
KETONES UR STRIP-MCNC: NEGATIVE MG/DL
LEUKOCYTE ESTERASE UR QL STRIP: NEGATIVE
LYMPHOCYTES # BLD AUTO: 1.71 THOUSANDS/ΜL (ref 0.6–4.47)
LYMPHOCYTES NFR BLD AUTO: 28 % (ref 14–44)
MCH RBC QN AUTO: 30.2 PG (ref 26.8–34.3)
MCHC RBC AUTO-ENTMCNC: 34.2 G/DL (ref 31.4–37.4)
MCV RBC AUTO: 88 FL (ref 82–98)
MONOCYTES # BLD AUTO: 0.63 THOUSAND/ΜL (ref 0.17–1.22)
MONOCYTES NFR BLD AUTO: 11 % (ref 4–12)
NEUTROPHILS # BLD AUTO: 3.58 THOUSANDS/ΜL (ref 1.85–7.62)
NEUTS SEG NFR BLD AUTO: 60 % (ref 43–75)
NITRITE UR QL STRIP: NEGATIVE
PH UR STRIP.AUTO: 6 [PH]
PLATELET # BLD AUTO: 173 THOUSANDS/UL (ref 149–390)
PMV BLD AUTO: 10.7 FL (ref 8.9–12.7)
POTASSIUM SERPL-SCNC: 4.1 MMOL/L (ref 3.5–5)
PROT SERPL-MCNC: 7.7 G/DL (ref 6.4–8.3)
PROT UR STRIP-MCNC: NEGATIVE MG/DL
RBC # BLD AUTO: 4.84 MILLION/UL (ref 3.88–5.62)
SODIUM SERPL-SCNC: 143 MMOL/L (ref 133–145)
SP GR UR STRIP.AUTO: 1.02 (ref 1–1.03)
UROBILINOGEN UR QL STRIP.AUTO: 0.2 E.U./DL
WBC # BLD AUTO: 6.02 THOUSAND/UL (ref 4.31–10.16)

## 2021-11-01 PROCEDURE — 80053 COMPREHEN METABOLIC PANEL: CPT | Performed by: PHYSICIAN ASSISTANT

## 2021-11-01 PROCEDURE — 81003 URINALYSIS AUTO W/O SCOPE: CPT | Performed by: PHYSICIAN ASSISTANT

## 2021-11-01 PROCEDURE — 99283 EMERGENCY DEPT VISIT LOW MDM: CPT

## 2021-11-01 PROCEDURE — 99284 EMERGENCY DEPT VISIT MOD MDM: CPT | Performed by: PHYSICIAN ASSISTANT

## 2021-11-01 PROCEDURE — 85025 COMPLETE CBC W/AUTO DIFF WBC: CPT | Performed by: PHYSICIAN ASSISTANT

## 2021-11-01 PROCEDURE — 36415 COLL VENOUS BLD VENIPUNCTURE: CPT | Performed by: PHYSICIAN ASSISTANT

## 2021-11-01 RX ADMIN — LEVETIRACETAM 750 MG: 250 TABLET, FILM COATED ORAL at 08:59

## 2021-11-01 SDOH — ECONOMIC STABILITY - HOUSING INSECURITY: HOMELESSNESS UNSPECIFIED: Z59.00

## 2021-11-16 ENCOUNTER — HOSPITAL ENCOUNTER (EMERGENCY)
Facility: HOSPITAL | Age: 36
Discharge: HOME | DRG: 101 | End: 2021-11-16
Attending: EMERGENCY MEDICINE
Payer: MEDICARE

## 2021-11-16 VITALS
TEMPERATURE: 98.2 F | WEIGHT: 147.8 LBS | DIASTOLIC BLOOD PRESSURE: 75 MMHG | BODY MASS INDEX: 23.2 KG/M2 | OXYGEN SATURATION: 100 % | HEART RATE: 85 BPM | RESPIRATION RATE: 16 BRPM | HEIGHT: 67 IN | SYSTOLIC BLOOD PRESSURE: 118 MMHG

## 2021-11-16 DIAGNOSIS — Z76.89 FREQUENT PATIENT IN EMERGENCY DEPARTMENT: ICD-10-CM

## 2021-11-16 DIAGNOSIS — Z59.00 HOMELESSNESS: Primary | ICD-10-CM

## 2021-11-16 PROCEDURE — 99283 EMERGENCY DEPT VISIT LOW MDM: CPT

## 2021-11-16 PROCEDURE — 99284 EMERGENCY DEPT VISIT MOD MDM: CPT | Performed by: EMERGENCY MEDICINE

## 2021-11-16 RX ORDER — LEVETIRACETAM 500 MG/1
500 TABLET ORAL ONCE
Status: COMPLETED | OUTPATIENT
Start: 2021-11-16 | End: 2021-11-16

## 2021-11-16 RX ADMIN — LEVETIRACETAM 500 MG: 500 TABLET, FILM COATED ORAL at 01:10

## 2021-11-16 SDOH — ECONOMIC STABILITY - HOUSING INSECURITY: HOMELESSNESS UNSPECIFIED: Z59.00

## 2021-11-17 ENCOUNTER — APPOINTMENT (EMERGENCY)
Dept: CT IMAGING | Facility: HOSPITAL | Age: 36
DRG: 101 | End: 2021-11-17
Payer: MEDICARE

## 2021-11-17 ENCOUNTER — HOSPITAL ENCOUNTER (EMERGENCY)
Facility: HOSPITAL | Age: 36
Discharge: HOME | DRG: 101 | End: 2021-11-17
Attending: EMERGENCY MEDICINE | Admitting: EMERGENCY MEDICINE
Payer: MEDICARE

## 2021-11-17 ENCOUNTER — APPOINTMENT (EMERGENCY)
Dept: RADIOLOGY | Facility: HOSPITAL | Age: 36
DRG: 101 | End: 2021-11-17
Payer: MEDICARE

## 2021-11-17 ENCOUNTER — HOSPITAL ENCOUNTER (INPATIENT)
Facility: HOSPITAL | Age: 36
LOS: 1 days | Discharge: HOME | DRG: 101 | End: 2021-11-19
Attending: EMERGENCY MEDICINE | Admitting: INTERNAL MEDICINE
Payer: MEDICARE

## 2021-11-17 VITALS
WEIGHT: 147 LBS | HEIGHT: 67 IN | SYSTOLIC BLOOD PRESSURE: 119 MMHG | TEMPERATURE: 97.8 F | DIASTOLIC BLOOD PRESSURE: 73 MMHG | BODY MASS INDEX: 23.07 KG/M2 | OXYGEN SATURATION: 97 % | RESPIRATION RATE: 14 BRPM | HEART RATE: 90 BPM

## 2021-11-17 DIAGNOSIS — Z72.0 TOBACCO ABUSE: ICD-10-CM
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
## 2021-11-17 DIAGNOSIS — E87.6 HYPOKALEMIA: ICD-10-CM

## 2021-11-17 DIAGNOSIS — S00.81XA ABRASION OF FACE, INITIAL ENCOUNTER: ICD-10-CM

## 2021-11-17 DIAGNOSIS — R56.9 SEIZURE (HCC): Primary | ICD-10-CM

## 2021-11-17 DIAGNOSIS — G40.919 BREAKTHROUGH SEIZURE (HCC): Primary | ICD-10-CM

## 2021-11-17 LAB
ALBUMIN SERPL BCP-MCNC: 4.2 G/DL (ref 3.4–4.8)
ALBUMIN SERPL BCP-MCNC: 4.4 G/DL (ref 3.4–4.8)
ALP SERPL-CCNC: 53.9 U/L (ref 10–129)
ALP SERPL-CCNC: 55.6 U/L (ref 10–129)
ALT SERPL W P-5'-P-CCNC: 11 U/L (ref 5–63)
ALT SERPL W P-5'-P-CCNC: 13 U/L (ref 5–63)
ANION GAP SERPL CALCULATED.3IONS-SCNC: 13 MMOL/L (ref 4–13)
ANION GAP SERPL CALCULATED.3IONS-SCNC: 21 MMOL/L (ref 4–13)
AST SERPL W P-5'-P-CCNC: 20 U/L (ref 15–41)
AST SERPL W P-5'-P-CCNC: 22 U/L (ref 15–41)
BILIRUB SERPL-MCNC: 0.31 MG/DL (ref 0.3–1.2)
BILIRUB SERPL-MCNC: 0.37 MG/DL (ref 0.3–1.2)
BUN SERPL-MCNC: 13 MG/DL (ref 6–20)
BUN SERPL-MCNC: 14 MG/DL (ref 6–20)
CALCIUM SERPL-MCNC: 8.6 MG/DL (ref 8.4–10.2)
CALCIUM SERPL-MCNC: 9.4 MG/DL (ref 8.4–10.2)
CHLORIDE SERPL-SCNC: 105 MMOL/L (ref 96–108)
CHLORIDE SERPL-SCNC: 107 MMOL/L (ref 96–108)
CO2 SERPL-SCNC: 14 MMOL/L (ref 22–33)
CO2 SERPL-SCNC: 23 MMOL/L (ref 22–33)
CREAT SERPL-MCNC: 1.01 MG/DL (ref 0.5–1.2)
CREAT SERPL-MCNC: 1.05 MG/DL (ref 0.5–1.2)
GFR SERPL CREATININE-BSD FRML MDRD: 105 ML/MIN/1.73SQ M
GFR SERPL CREATININE-BSD FRML MDRD: 110 ML/MIN/1.73SQ M
GLUCOSE SERPL-MCNC: 102 MG/DL (ref 65–140)
GLUCOSE SERPL-MCNC: 131 MG/DL (ref 65–140)
MAGNESIUM SERPL-MCNC: 2.1 MG/DL (ref 1.6–2.6)
PHOSPHATE SERPL-MCNC: 3.3 MG/DL (ref 2.5–5)
POTASSIUM SERPL-SCNC: 3.4 MMOL/L (ref 3.5–5)
POTASSIUM SERPL-SCNC: 3.9 MMOL/L (ref 3.5–5)
PROT SERPL-MCNC: 7.8 G/DL (ref 6.4–8.3)
PROT SERPL-MCNC: 8 G/DL (ref 6.4–8.3)
SODIUM SERPL-SCNC: 141 MMOL/L (ref 133–145)
SODIUM SERPL-SCNC: 142 MMOL/L (ref 133–145)

## 2021-11-17 PROCEDURE — 93005 ELECTROCARDIOGRAM TRACING: CPT

## 2021-11-17 PROCEDURE — 99285 EMERGENCY DEPT VISIT HI MDM: CPT | Performed by: EMERGENCY MEDICINE

## 2021-11-17 PROCEDURE — 83735 ASSAY OF MAGNESIUM: CPT | Performed by: STUDENT IN AN ORGANIZED HEALTH CARE EDUCATION/TRAINING PROGRAM

## 2021-11-17 PROCEDURE — 70450 CT HEAD/BRAIN W/O DYE: CPT

## 2021-11-17 PROCEDURE — 99285 EMERGENCY DEPT VISIT HI MDM: CPT

## 2021-11-17 PROCEDURE — 96360 HYDRATION IV INFUSION INIT: CPT

## 2021-11-17 PROCEDURE — 36415 COLL VENOUS BLD VENIPUNCTURE: CPT | Performed by: EMERGENCY MEDICINE

## 2021-11-17 PROCEDURE — 72125 CT NECK SPINE W/O DYE: CPT

## 2021-11-17 PROCEDURE — 80177 DRUG SCRN QUAN LEVETIRACETAM: CPT | Performed by: EMERGENCY MEDICINE

## 2021-11-17 PROCEDURE — 73030 X-RAY EXAM OF SHOULDER: CPT

## 2021-11-17 PROCEDURE — 96365 THER/PROPH/DIAG IV INF INIT: CPT

## 2021-11-17 PROCEDURE — 72040 X-RAY EXAM NECK SPINE 2-3 VW: CPT

## 2021-11-17 PROCEDURE — 96375 TX/PRO/DX INJ NEW DRUG ADDON: CPT

## 2021-11-17 PROCEDURE — 84100 ASSAY OF PHOSPHORUS: CPT | Performed by: STUDENT IN AN ORGANIZED HEALTH CARE EDUCATION/TRAINING PROGRAM

## 2021-11-17 PROCEDURE — 80053 COMPREHEN METABOLIC PANEL: CPT | Performed by: STUDENT IN AN ORGANIZED HEALTH CARE EDUCATION/TRAINING PROGRAM

## 2021-11-17 PROCEDURE — 99284 EMERGENCY DEPT VISIT MOD MDM: CPT

## 2021-11-17 PROCEDURE — 70486 CT MAXILLOFACIAL W/O DYE: CPT

## 2021-11-17 PROCEDURE — 99285 EMERGENCY DEPT VISIT HI MDM: CPT | Performed by: STUDENT IN AN ORGANIZED HEALTH CARE EDUCATION/TRAINING PROGRAM

## 2021-11-17 PROCEDURE — 80053 COMPREHEN METABOLIC PANEL: CPT | Performed by: EMERGENCY MEDICINE

## 2021-11-17 PROCEDURE — 96361 HYDRATE IV INFUSION ADD-ON: CPT

## 2021-11-17 RX ORDER — LEVETIRACETAM 10 MG/ML
1000 INJECTION INTRAVASCULAR ONCE
Status: COMPLETED | OUTPATIENT
Start: 2021-11-17 | End: 2021-11-17

## 2021-11-17 RX ORDER — POTASSIUM CHLORIDE 14.9 MG/ML
20 INJECTION INTRAVENOUS ONCE
Status: COMPLETED | OUTPATIENT
Start: 2021-11-17 | End: 2021-11-18

## 2021-11-17 RX ORDER — POTASSIUM CHLORIDE 20 MEQ/1
20 TABLET, EXTENDED RELEASE ORAL ONCE
Status: DISCONTINUED | OUTPATIENT
Start: 2021-11-17 | End: 2021-11-19 | Stop reason: HOSPADM

## 2021-11-17 RX ADMIN — SODIUM CHLORIDE 500 ML: 0.9 INJECTION, SOLUTION INTRAVENOUS at 18:47

## 2021-11-17 RX ADMIN — POTASSIUM CHLORIDE 20 MEQ: 14.9 INJECTION, SOLUTION INTRAVENOUS at 23:53

## 2021-11-17 RX ADMIN — SODIUM CHLORIDE 1000 ML: 0.9 INJECTION, SOLUTION INTRAVENOUS at 21:50

## 2021-11-17 RX ADMIN — SODIUM CHLORIDE 1000 ML: 0.9 INJECTION, SOLUTION INTRAVENOUS at 23:50

## 2021-11-17 RX ADMIN — LEVETIRACETAM 1000 MG: 10 INJECTION INTRAVENOUS at 21:48

## 2021-11-18 PROBLEM — E87.2 METABOLIC ACIDOSIS: Status: ACTIVE | Noted: 2021-11-18

## 2021-11-18 PROBLEM — E87.20 METABOLIC ACIDOSIS: Status: ACTIVE | Noted: 2021-11-18

## 2021-11-18 LAB
AMPHETAMINES SERPL QL SCN: NEGATIVE
ANION GAP SERPL CALCULATED.3IONS-SCNC: 6 MMOL/L (ref 4–13)
ATRIAL RATE: 126 BPM
BARBITURATES UR QL: NEGATIVE
BASE EX.OXY STD BLDV CALC-SCNC: 95.7 % (ref 60–80)
BASE EXCESS BLDV CALC-SCNC: -1.6 MMOL/L
BASOPHILS # BLD AUTO: 0.01 THOUSANDS/ΜL (ref 0–0.1)
BASOPHILS # BLD AUTO: 0.02 THOUSANDS/ΜL (ref 0–0.1)
BASOPHILS NFR BLD AUTO: 0 % (ref 0–1)
BASOPHILS NFR BLD AUTO: 0 % (ref 0–1)
BENZODIAZ UR QL: NEGATIVE
BILIRUB UR QL STRIP: NEGATIVE
BUN SERPL-MCNC: 11 MG/DL (ref 6–20)
CALCIUM SERPL-MCNC: 8.1 MG/DL (ref 8.4–10.2)
CHLORIDE SERPL-SCNC: 112 MMOL/L (ref 96–108)
CLARITY UR: CLEAR
CO2 SERPL-SCNC: 25 MMOL/L (ref 22–33)
COCAINE UR QL: NEGATIVE
COLOR UR: YELLOW
CREAT SERPL-MCNC: 0.87 MG/DL (ref 0.5–1.2)
EOSINOPHIL # BLD AUTO: 0.01 THOUSAND/ΜL (ref 0–0.61)
EOSINOPHIL # BLD AUTO: 0.02 THOUSAND/ΜL (ref 0–0.61)
EOSINOPHIL NFR BLD AUTO: 0 % (ref 0–6)
EOSINOPHIL NFR BLD AUTO: 0 % (ref 0–6)
ERYTHROCYTE [DISTWIDTH] IN BLOOD BY AUTOMATED COUNT: 13.8 % (ref 11.6–15.1)
ERYTHROCYTE [DISTWIDTH] IN BLOOD BY AUTOMATED COUNT: 13.8 % (ref 11.6–15.1)
GFR SERPL CREATININE-BSD FRML MDRD: 128 ML/MIN/1.73SQ M
GLUCOSE SERPL-MCNC: 82 MG/DL (ref 65–140)
GLUCOSE UR STRIP-MCNC: NEGATIVE MG/DL
HCO3 BLDV-SCNC: 23.2 MMOL/L (ref 24–30)
HCT VFR BLD AUTO: 36.2 % (ref 36.5–49.3)
HCT VFR BLD AUTO: 39.6 % (ref 36.5–49.3)
HGB BLD-MCNC: 12 G/DL (ref 12–17)
HGB BLD-MCNC: 13.2 G/DL (ref 12–17)
HGB UR QL STRIP.AUTO: NEGATIVE
IMM GRANULOCYTES # BLD AUTO: 0.01 THOUSAND/UL (ref 0–0.2)
IMM GRANULOCYTES # BLD AUTO: 0.01 THOUSAND/UL (ref 0–0.2)
IMM GRANULOCYTES NFR BLD AUTO: 0 % (ref 0–2)
IMM GRANULOCYTES NFR BLD AUTO: 0 % (ref 0–2)
KETONES UR STRIP-MCNC: NEGATIVE MG/DL
LACTATE SERPL-SCNC: 0.4 MMOL/L (ref 0–2)
LEUKOCYTE ESTERASE UR QL STRIP: NEGATIVE
LYMPHOCYTES # BLD AUTO: 1.1 THOUSANDS/ΜL (ref 0.6–4.47)
LYMPHOCYTES # BLD AUTO: 1.93 THOUSANDS/ΜL (ref 0.6–4.47)
LYMPHOCYTES NFR BLD AUTO: 14 % (ref 14–44)
LYMPHOCYTES NFR BLD AUTO: 27 % (ref 14–44)
MCH RBC QN AUTO: 29.9 PG (ref 26.8–34.3)
MCH RBC QN AUTO: 30.2 PG (ref 26.8–34.3)
MCHC RBC AUTO-ENTMCNC: 33.1 G/DL (ref 31.4–37.4)
MCHC RBC AUTO-ENTMCNC: 33.3 G/DL (ref 31.4–37.4)
MCV RBC AUTO: 90 FL (ref 82–98)
MCV RBC AUTO: 91 FL (ref 82–98)
METHADONE UR QL: NEGATIVE
MONOCYTES # BLD AUTO: 0.72 THOUSAND/ΜL (ref 0.17–1.22)
MONOCYTES # BLD AUTO: 0.79 THOUSAND/ΜL (ref 0.17–1.22)
MONOCYTES NFR BLD AUTO: 10 % (ref 4–12)
MONOCYTES NFR BLD AUTO: 10 % (ref 4–12)
NEUTROPHILS # BLD AUTO: 4.44 THOUSANDS/ΜL (ref 1.85–7.62)
NEUTROPHILS # BLD AUTO: 5.93 THOUSANDS/ΜL (ref 1.85–7.62)
NEUTS SEG NFR BLD AUTO: 63 % (ref 43–75)
NEUTS SEG NFR BLD AUTO: 76 % (ref 43–75)
NITRITE UR QL STRIP: NEGATIVE
O2 CT BLDV-SCNC: 17.4 ML/DL
OPIATES UR QL SCN: NEGATIVE
OXYCODONE+OXYMORPHONE UR QL SCN: NEGATIVE
P AXIS: 62 DEGREES
PCO2 BLDV: 39.4 MM HG (ref 42–50)
PCP UR QL: NEGATIVE
PH BLDV: 7.39 [PH] (ref 7.3–7.4)
PH UR STRIP.AUTO: 6 [PH]
PLATELET # BLD AUTO: 166 THOUSANDS/UL (ref 149–390)
PLATELET # BLD AUTO: 179 THOUSANDS/UL (ref 149–390)
PMV BLD AUTO: 10.8 FL (ref 8.9–12.7)
PMV BLD AUTO: 11.6 FL (ref 8.9–12.7)
PO2 BLDV: 132.3 MM HG (ref 35–45)
POTASSIUM SERPL-SCNC: 3.8 MMOL/L (ref 3.5–5)
PR INTERVAL: 148 MS
PROT UR STRIP-MCNC: NEGATIVE MG/DL
QRS AXIS: 13 DEGREES
QRSD INTERVAL: 91 MS
QT INTERVAL: 316 MS
QTC INTERVAL: 458 MS
RBC # BLD AUTO: 3.97 MILLION/UL (ref 3.88–5.62)
RBC # BLD AUTO: 4.41 MILLION/UL (ref 3.88–5.62)
SODIUM SERPL-SCNC: 143 MMOL/L (ref 133–145)
SP GR UR STRIP.AUTO: >=1.03 (ref 1–1.03)
T WAVE AXIS: -12 DEGREES
THC UR QL: POSITIVE
UROBILINOGEN UR QL STRIP.AUTO: 0.2 E.U./DL
VENTRICULAR RATE: 126 BPM
WBC # BLD AUTO: 7.14 THOUSAND/UL (ref 4.31–10.16)
WBC # BLD AUTO: 7.85 THOUSAND/UL (ref 4.31–10.16)

## 2021-11-18 PROCEDURE — 36415 COLL VENOUS BLD VENIPUNCTURE: CPT | Performed by: STUDENT IN AN ORGANIZED HEALTH CARE EDUCATION/TRAINING PROGRAM

## 2021-11-18 PROCEDURE — 80048 BASIC METABOLIC PNL TOTAL CA: CPT | Performed by: NURSE PRACTITIONER

## 2021-11-18 PROCEDURE — 83605 ASSAY OF LACTIC ACID: CPT | Performed by: NURSE PRACTITIONER

## 2021-11-18 PROCEDURE — 96366 THER/PROPH/DIAG IV INF ADDON: CPT

## 2021-11-18 PROCEDURE — 82805 BLOOD GASES W/O2 SATURATION: CPT | Performed by: NURSE PRACTITIONER

## 2021-11-18 PROCEDURE — 85025 COMPLETE CBC W/AUTO DIFF WBC: CPT | Performed by: NURSE PRACTITIONER

## 2021-11-18 PROCEDURE — 80307 DRUG TEST PRSMV CHEM ANLYZR: CPT | Performed by: STUDENT IN AN ORGANIZED HEALTH CARE EDUCATION/TRAINING PROGRAM

## 2021-11-18 PROCEDURE — 81003 URINALYSIS AUTO W/O SCOPE: CPT | Performed by: STUDENT IN AN ORGANIZED HEALTH CARE EDUCATION/TRAINING PROGRAM

## 2021-11-18 PROCEDURE — G0425 INPT/ED TELECONSULT30: HCPCS | Performed by: PSYCHIATRY & NEUROLOGY

## 2021-11-18 PROCEDURE — 90715 TDAP VACCINE 7 YRS/> IM: CPT | Performed by: STUDENT IN AN ORGANIZED HEALTH CARE EDUCATION/TRAINING PROGRAM

## 2021-11-18 PROCEDURE — 93010 ELECTROCARDIOGRAM REPORT: CPT | Performed by: INTERNAL MEDICINE

## 2021-11-18 PROCEDURE — 99223 1ST HOSP IP/OBS HIGH 75: CPT | Performed by: INTERNAL MEDICINE

## 2021-11-18 PROCEDURE — 85025 COMPLETE CBC W/AUTO DIFF WBC: CPT | Performed by: STUDENT IN AN ORGANIZED HEALTH CARE EDUCATION/TRAINING PROGRAM

## 2021-11-18 RX ORDER — LEVETIRACETAM 500 MG/1
750 TABLET ORAL DAILY
Status: DISCONTINUED | OUTPATIENT
Start: 2021-11-18 | End: 2021-11-18

## 2021-11-18 RX ORDER — LEVETIRACETAM 10 MG/ML
1000 INJECTION INTRAVASCULAR EVERY 12 HOURS SCHEDULED
Status: DISCONTINUED | OUTPATIENT
Start: 2021-11-18 | End: 2021-11-18

## 2021-11-18 RX ORDER — LEVETIRACETAM 10 MG/ML
1000 INJECTION INTRAVASCULAR EVERY 12 HOURS
Status: DISCONTINUED | OUTPATIENT
Start: 2021-11-18 | End: 2021-11-18

## 2021-11-18 RX ORDER — NICOTINE 21 MG/24HR
1 PATCH, TRANSDERMAL 24 HOURS TRANSDERMAL DAILY
Qty: 28 PATCH | Refills: 0 | Status: SHIPPED | OUTPATIENT
Start: 2021-11-19 | End: 2022-03-28

## 2021-11-18 RX ORDER — LEVETIRACETAM 750 MG/1
750 TABLET ORAL 2 TIMES DAILY
Qty: 60 TABLET | Refills: 0 | Status: SHIPPED | OUTPATIENT
Start: 2021-11-18 | End: 2021-11-19

## 2021-11-18 RX ORDER — SODIUM CHLORIDE 9 MG/ML
125 INJECTION, SOLUTION INTRAVENOUS CONTINUOUS
Status: DISCONTINUED | OUTPATIENT
Start: 2021-11-18 | End: 2021-11-19 | Stop reason: HOSPADM

## 2021-11-18 RX ORDER — NICOTINE 21 MG/24HR
1 PATCH, TRANSDERMAL 24 HOURS TRANSDERMAL DAILY
Status: DISCONTINUED | OUTPATIENT
Start: 2021-11-18 | End: 2021-11-19 | Stop reason: HOSPADM

## 2021-11-18 RX ADMIN — ELVITEGRAVIR, COBICISTAT, EMTRICITABINE, AND TENOFOVIR DISOPROXIL FUMARATE 1 TABLET: 150; 150; 200; 300 TABLET, FILM COATED ORAL at 09:30

## 2021-11-18 RX ADMIN — SODIUM CHLORIDE 125 ML/HR: 0.9 INJECTION, SOLUTION INTRAVENOUS at 22:20

## 2021-11-18 RX ADMIN — TETANUS TOXOID, REDUCED DIPHTHERIA TOXOID AND ACELLULAR PERTUSSIS VACCINE, ADSORBED 0.5 ML: 5; 2.5; 8; 8; 2.5 SUSPENSION INTRAMUSCULAR at 02:47

## 2021-11-18 RX ADMIN — LEVETIRACETAM 1000 MG: 10 INJECTION INTRAVENOUS at 15:10

## 2021-11-18 RX ADMIN — SODIUM CHLORIDE 125 ML/HR: 0.9 INJECTION, SOLUTION INTRAVENOUS at 13:14

## 2021-11-18 RX ADMIN — SODIUM CHLORIDE 125 ML/HR: 0.9 INJECTION, SOLUTION INTRAVENOUS at 04:51

## 2021-11-19 VITALS
HEART RATE: 98 BPM | TEMPERATURE: 98.9 F | HEIGHT: 67 IN | RESPIRATION RATE: 18 BRPM | SYSTOLIC BLOOD PRESSURE: 109 MMHG | WEIGHT: 149.91 LBS | BODY MASS INDEX: 23.53 KG/M2 | OXYGEN SATURATION: 99 % | DIASTOLIC BLOOD PRESSURE: 69 MMHG

## 2021-11-19 LAB
ATRIAL RATE: 126 BPM
P AXIS: 62 DEGREES
PR INTERVAL: 148 MS
QRS AXIS: 13 DEGREES
QRSD INTERVAL: 91 MS
QT INTERVAL: 316 MS
QTC INTERVAL: 458 MS
T WAVE AXIS: -12 DEGREES
VENTRICULAR RATE: 126 BPM

## 2021-11-19 PROCEDURE — 93010 ELECTROCARDIOGRAM REPORT: CPT | Performed by: INTERNAL MEDICINE

## 2021-11-19 PROCEDURE — 99239 HOSP IP/OBS DSCHRG MGMT >30: CPT | Performed by: INTERNAL MEDICINE

## 2021-11-19 RX ORDER — LEVETIRACETAM 750 MG/1
750 TABLET ORAL 2 TIMES DAILY
Qty: 60 TABLET | Refills: 0 | Status: SHIPPED | OUTPATIENT
Start: 2021-11-19 | End: 2022-01-15 | Stop reason: SDUPTHER

## 2021-11-19 RX ADMIN — LEVETIRACETAM 750 MG: 100 INJECTION, SOLUTION INTRAVENOUS at 02:22

## 2021-11-19 RX ADMIN — ELVITEGRAVIR, COBICISTAT, EMTRICITABINE, AND TENOFOVIR DISOPROXIL FUMARATE 1 TABLET: 150; 150; 200; 300 TABLET, FILM COATED ORAL at 08:43

## 2021-11-28 LAB — LEVETIRACETAM SERPL-MCNC: <1 UG/ML (ref 10–40)

## 2021-12-04 ENCOUNTER — HOSPITAL ENCOUNTER (EMERGENCY)
Facility: HOSPITAL | Age: 36
Discharge: HOME | End: 2021-12-04
Attending: EMERGENCY MEDICINE
Payer: MEDICARE

## 2021-12-04 VITALS
TEMPERATURE: 98.5 F | RESPIRATION RATE: 16 BRPM | HEART RATE: 95 BPM | WEIGHT: 147.9 LBS | DIASTOLIC BLOOD PRESSURE: 73 MMHG | OXYGEN SATURATION: 97 % | BODY MASS INDEX: 23.16 KG/M2 | SYSTOLIC BLOOD PRESSURE: 118 MMHG

## 2021-12-04 DIAGNOSIS — M62.838 TRAPEZIUS MUSCLE SPASM: Primary | ICD-10-CM

## 2021-12-04 LAB
ANION GAP SERPL CALCULATED.3IONS-SCNC: 8 MMOL/L (ref 4–13)
BUN SERPL-MCNC: 8 MG/DL (ref 5–25)
CALCIUM SERPL-MCNC: 8.8 MG/DL (ref 8.3–10.1)
CHLORIDE SERPL-SCNC: 104 MMOL/L (ref 100–108)
CO2 SERPL-SCNC: 30 MMOL/L (ref 21–32)
CREAT SERPL-MCNC: 0.92 MG/DL (ref 0.6–1.3)
GFR SERPL CREATININE-BSD FRML MDRD: 123 ML/MIN/1.73SQ M
GLUCOSE SERPL-MCNC: 87 MG/DL (ref 65–140)
POTASSIUM SERPL-SCNC: 4 MMOL/L (ref 3.5–5.3)
SODIUM SERPL-SCNC: 142 MMOL/L (ref 136–145)

## 2021-12-04 PROCEDURE — 36415 COLL VENOUS BLD VENIPUNCTURE: CPT | Performed by: EMERGENCY MEDICINE

## 2021-12-04 PROCEDURE — 96361 HYDRATE IV INFUSION ADD-ON: CPT

## 2021-12-04 PROCEDURE — 99284 EMERGENCY DEPT VISIT MOD MDM: CPT

## 2021-12-04 PROCEDURE — 96360 HYDRATION IV INFUSION INIT: CPT

## 2021-12-04 PROCEDURE — 80048 BASIC METABOLIC PNL TOTAL CA: CPT | Performed by: EMERGENCY MEDICINE

## 2021-12-04 PROCEDURE — 99284 EMERGENCY DEPT VISIT MOD MDM: CPT | Performed by: EMERGENCY MEDICINE

## 2021-12-04 RX ADMIN — SODIUM CHLORIDE 1000 ML: 0.9 INJECTION, SOLUTION INTRAVENOUS at 03:49

## 2022-01-15 ENCOUNTER — HOSPITAL ENCOUNTER (EMERGENCY)
Facility: HOSPITAL | Age: 37
Discharge: HOME | End: 2022-01-15
Attending: EMERGENCY MEDICINE
Payer: MEDICARE

## 2022-01-15 VITALS
DIASTOLIC BLOOD PRESSURE: 62 MMHG | OXYGEN SATURATION: 100 % | HEART RATE: 88 BPM | TEMPERATURE: 98.2 F | SYSTOLIC BLOOD PRESSURE: 98 MMHG | RESPIRATION RATE: 18 BRPM

## 2022-01-15 DIAGNOSIS — Z91.14 NON COMPLIANCE W MEDICATION REGIMEN: ICD-10-CM

## 2022-01-15 DIAGNOSIS — Z59.00 HOMELESSNESS: Primary | ICD-10-CM

## 2022-01-15 DIAGNOSIS — R56.9 SEIZURE (HCC): ICD-10-CM

## 2022-01-15 LAB — ETHANOL EXG-MCNC: 0.06 MG/DL

## 2022-01-15 PROCEDURE — 82075 ASSAY OF BREATH ETHANOL: CPT | Performed by: EMERGENCY MEDICINE

## 2022-01-15 PROCEDURE — 99284 EMERGENCY DEPT VISIT MOD MDM: CPT

## 2022-01-15 PROCEDURE — 99284 EMERGENCY DEPT VISIT MOD MDM: CPT | Performed by: EMERGENCY MEDICINE

## 2022-01-15 RX ORDER — LEVETIRACETAM 750 MG/1
750 TABLET ORAL 2 TIMES DAILY
Qty: 60 TABLET | Refills: 0 | Status: SHIPPED | OUTPATIENT
Start: 2022-01-15 | End: 2022-06-26 | Stop reason: ALTCHOICE

## 2022-01-15 RX ORDER — LEVETIRACETAM 500 MG/1
1000 TABLET ORAL ONCE
Status: COMPLETED | OUTPATIENT
Start: 2022-01-15 | End: 2022-01-15

## 2022-01-15 RX ADMIN — LEVETIRACETAM 1000 MG: 500 TABLET, FILM COATED ORAL at 04:14

## 2022-01-15 SDOH — ECONOMIC STABILITY - HOUSING INSECURITY: HOMELESSNESS UNSPECIFIED: Z59.00

## 2022-01-15 NOTE — ED PROVIDER NOTES
History  Chief Complaint   Patient presents with    Alcohol Problem     Per ems, patient was drinking, called ems for seizures, did not have seizure  EMS reports pt outside of a buidling and someone with him  Reports drinking 3 drinks of [place] and being outside because he has no where to go     This is a 39year old male that is well known to this ED - he has a seizure and has non-compliance - reports that he has not taken his Keppra for the past 4 days as he ran out  - this is not uncommon for Deepa Zhang    He is homeless    He reports that he had 3 vodka drinks this evening with the last drink at midnight    Reports that he "feels like he is going to have a seizure" however denies actual seizure activity    He reports that he has nowhere to go as he is homeless so he called 911    Denies CP or shortness of breath              Prior to Admission Medications   Prescriptions Last Dose Informant Patient Reported? Taking?   elvitegravir-cobicistat-emtricitabine-tenofovir (Stribild) [phone] mg   No No   Sig: Take 1 tablet by mouth daily with breakfast   levETIRAcetam (KEPPRA) 750 mg tablet   No No   Sig: Take 1 tablet (750 mg total) by mouth 2 (two) times a day for 14 days   levETIRAcetam (KEPPRA) 750 mg tablet   No No   Sig: Take 1 tablet (750 mg total) by mouth 2 (two) times a day   nicotine (NICODERM CQ) 21 mg/24 hr TD 24 hr patch   No No   Sig: Place 1 patch on the skin daily      Facility-Administered Medications: None       Past Medical History:   Diagnosis Date    HIV (human immunodeficiency virus infection) ([facility] )     Seizures ([facility] )     Smoker        Past Surgical History:   Procedure Laterality Date    HERNIA REPAIR         History reviewed  No pertinent family history  I have reviewed and agree with the history as documented      E-Cigarette/Vaping    E-Cigarette Use Never User      E-Cigarette/Vaping Substances     Social History     Tobacco Use    Smoking status: Current Every Day Smoker Packs/day: 1 00     Types: Cigarettes    Smokeless tobacco: Never Used   Vaping Use    Vaping Use: Never used   Substance Use Topics    Alcohol use: Yes     Comment: socially    Drug use: Yes     Types: Marijuana       Review of Systems   Constitutional: Negative for activity change, appetite change, chills, diaphoresis, fatigue, fever and unexpected weight change  HENT: Negative for congestion, ear discharge, ear pain, mouth sores, sinus pressure, sinus pain, sneezing, sore throat, trouble swallowing and voice change  Eyes: Negative for photophobia, pain, discharge, redness, itching and visual disturbance  Respiratory: Negative for cough, chest tightness and shortness of breath  Cardiovascular: Negative for chest pain, palpitations and leg swelling  Gastrointestinal: Negative for abdominal pain, constipation, nausea and vomiting  Endocrine: Negative for cold intolerance, heat intolerance, polydipsia, polyphagia and polyuria  Genitourinary: Negative for decreased urine volume, difficulty urinating, dysuria, frequency, hematuria and urgency  Musculoskeletal: Negative for arthralgias, back pain, gait problem, joint swelling, myalgias, neck pain and neck stiffness  Skin: Negative for color change and rash  Allergic/Immunologic: Negative for immunocompromised state  Neurological: Negative for dizziness, tremors, seizures, syncope, speech difficulty, weakness, light-headedness, numbness and headaches  Hematological: Does not bruise/bleed easily  Psychiatric/Behavioral: Negative for behavioral problems and suicidal ideas  Physical Exam  Physical Exam  Vitals and nursing note reviewed  Constitutional:       General: He is not in acute distress  Appearance: Normal appearance  He is well-developed and normal weight  He is not ill-appearing, toxic-appearing or diaphoretic  Comments: unkempt   HENT:      Head: Normocephalic and atraumatic        Right Ear: Tympanic membrane, ear canal and external ear normal  There is no impacted cerumen  Left Ear: Tympanic membrane, ear canal and external ear normal  There is no impacted cerumen  Nose: Nose normal  No congestion or rhinorrhea  Mouth/Throat:      Mouth: Mucous membranes are moist       Pharynx: Oropharynx is clear  No oropharyngeal exudate or posterior oropharyngeal erythema  Eyes:      General: No scleral icterus  Right eye: No discharge  Left eye: No discharge  Extraocular Movements: Extraocular movements intact  Conjunctiva/sclera: Conjunctivae normal       Pupils: Pupils are equal, round, and reactive to light  Neck:      Vascular: No JVD  Trachea: No tracheal deviation  Cardiovascular:      Rate and Rhythm: Normal rate and regular rhythm  Heart sounds: Normal heart sounds  No murmur heard  Pulmonary:      Effort: Pulmonary effort is normal  No respiratory distress  Breath sounds: Normal breath sounds  No wheezing or rales  Chest:      Chest wall: No tenderness  Abdominal:      General: Bowel sounds are normal       Palpations: Abdomen is soft  There is no mass  Tenderness: There is no abdominal tenderness  There is no right CVA tenderness, left CVA tenderness or guarding  Hernia: No hernia is present  Musculoskeletal:         General: No swelling, tenderness, deformity or signs of injury  Normal range of motion  Cervical back: Normal range of motion and neck supple  No rigidity  No muscular tenderness  Right lower leg: No edema  Left lower leg: No edema  Lymphadenopathy:      Cervical: No cervical adenopathy  Skin:     General: Skin is warm and dry  Capillary Refill: Capillary refill takes less than 2 seconds  Findings: No bruising, erythema or rash  Neurological:      General: No focal deficit present  Mental Status: He is alert and oriented to person, place, and time  Mental status is at baseline        Cranial Nerves: No cranial nerve deficit  Sensory: No sensory deficit  Motor: No weakness or abnormal muscle tone  Coordination: Coordination normal       Gait: Gait normal       Deep Tendon Reflexes: Reflexes normal    Psychiatric:         Mood and Affect: Mood normal          Behavior: Behavior normal          Thought Content: Thought content normal          Judgment: Judgment normal          Vital Signs  ED Triage Vitals [01/15/22 0354]   Temperature Pulse Respirations Blood Pressure SpO2   98 2 °F (36 8 °C) 88 18 98/62 100 %      Temp Source Heart Rate Source Patient Position - Orthostatic VS BP Location FiO2 (%)   Oral Monitor -- Right arm --      Pain Score       No Pain           Vitals:    01/15/22 0354   BP: 98/62   Pulse: 88         Visual Acuity      ED Medications  Medications   levETIRAcetam (KEPPRA) tablet 1,000 mg (1,000 mg Oral Given 1/15/22 0414)       Diagnostic Studies  Results Reviewed     Procedure Component Value Units Date/Time    POCT alcohol breath test [[id number]]  (Normal) Resulted: 01/15/22 0403    Lab Status: Final result Updated: 01/15/22 0403     EXTBreath Alcohol 0 056                 No orders to display              Procedures  Procedures         ED Course                                             MDM  Number of Diagnoses or Management Options  Homelessness: minor  Non compliance w medication regimen: established and worsening  Diagnosis management comments:   This is a 39year old male with a PMH of seizures and non-compliance with his medications - he is homeless and well known to this ED    He called 911 from the [facility] where was drinking earlier this evening - he reports that he has not taken his meds in 4 days as he ran out - did not have seizure    Not intoxicated   ETOH 56    Keppra 1000 mg given orally - no seizure activity here in the ED    Ordered Keppra to the pharmacy    Discussed the importance of taking his meds and not drinking alcohol to avoid seizure activity - Petra Maciel stated "my neurologist told me I can have 4 alcohol drinks per day "    Patient was reexamined at this time and informed of laboratory and/or imaging results and was found to be stable for discharge  Return to emergency department criteria was reviewed with the patient who verbalized understanding and was agreeable to discharge and the treatment plan at this time  Portions of the record may have been created with voice recognition software  Occasional wrong word or "sound a like" substitutions may have occurred due to the inherent limitations of voice recognition software  Read the chart carefully and recognize, using context, where substitutions have occurred  Amount and/or Complexity of Data Reviewed  Obtain history from someone other than the patient: yes (ems)    Risk of Complications, Morbidity, and/or Mortality  Presenting problems: minimal  Diagnostic procedures: minimal  Management options: minimal    Patient Progress  Patient progress: stable      Disposition  Final diagnoses:   Homelessness   Non compliance w medication regimen     Time reflects when diagnosis was documented in both MDM as applicable and the Disposition within this note     Time User Action Codes Description Comment    1/15/2022  4:07 AM Marylin Dilling Add [Z59 00] Homelessness     1/15/2022  4:08 AM Marylin Dilling Add [Z91 14] Non compliance w medication regimen     1/15/2022  4:08 AM Marylin Dilling Modify [Z91 14] Non compliance w medication regimen     1/15/2022  4:08 AM Marylin Dilling Add [R56 9] Seizure ([address] )     1/15/2022  4:08 AM Marylin Dilling Modify [Z91 14] Non compliance w medication regimen     1/15/2022  4:09 AM Mraylin Dilling Modify [Z91 14] Non compliance w medication regimen       ED Disposition     ED Disposition Condition Date/Time Comment    Discharge Stable Sat Dominic 15, 2022  4:07 AM Ramses Valdez discharge to home/self care              Follow-up Information    None         Discharge Medication List as of 1/15/2022  4:09 AM      CONTINUE these medications which have CHANGED    Details   levETIRAcetam (KEPPRA) 750 mg tablet Take 1 tablet (750 mg total) by mouth 2 (two) times a day, Starting Sat 1/15/2022, Until Mon 2/14/2022, Normal         CONTINUE these medications which have NOT CHANGED    Details   elvitegravir-cobicistat-emtricitabine-tenofovir (Stribild) [phone] mg Take 1 tablet by mouth daily with breakfast, Starting Thu 7/9/2020, Normal      nicotine (NICODERM CQ) 21 mg/24 hr TD 24 hr patch Place 1 patch on the skin daily, Starting Fri 11/19/2021, Normal             No discharge procedures on file      PDMP Review       Value Time User    PDMP Reviewed  Yes 5/27/2020 12:37 AM LEBRON Pro          ED Provider  Electronically Signed by           Bonnie Torres MD  01/15/22 3910

## 2022-01-17 ENCOUNTER — HOSPITAL ENCOUNTER (EMERGENCY)
Facility: HOSPITAL | Age: 37
Discharge: HOME | End: 2022-01-18
Attending: EMERGENCY MEDICINE | Admitting: EMERGENCY MEDICINE
Payer: MEDICARE

## 2022-01-17 VITALS
HEART RATE: 96 BPM | DIASTOLIC BLOOD PRESSURE: 66 MMHG | SYSTOLIC BLOOD PRESSURE: 131 MMHG | OXYGEN SATURATION: 95 % | BODY MASS INDEX: 23.63 KG/M2 | WEIGHT: 147 LBS | HEIGHT: 66 IN | RESPIRATION RATE: 18 BRPM | TEMPERATURE: 97.5 F

## 2022-01-17 DIAGNOSIS — G40.919 BREAKTHROUGH SEIZURE (HCC): Primary | ICD-10-CM

## 2022-01-17 DIAGNOSIS — U07.1 COVID-19: ICD-10-CM

## 2022-01-17 LAB
ALBUMIN SERPL BCP-MCNC: 4.2 G/DL (ref 3.4–4.8)
ALP SERPL-CCNC: 54.4 U/L (ref 10–129)
ALT SERPL W P-5'-P-CCNC: 16 U/L (ref 5–63)
AMPHETAMINES SERPL QL SCN: NEGATIVE
ANION GAP SERPL CALCULATED.3IONS-SCNC: 8 MMOL/L (ref 4–13)
AST SERPL W P-5'-P-CCNC: 30 U/L (ref 15–41)
BACTERIA UR QL AUTO: ABNORMAL /HPF
BARBITURATES UR QL: NEGATIVE
BASOPHILS # BLD AUTO: 0.01 THOUSANDS/ΜL (ref 0–0.1)
BASOPHILS NFR BLD AUTO: 0 % (ref 0–1)
BENZODIAZ UR QL: NEGATIVE
BILIRUB SERPL-MCNC: 0.44 MG/DL (ref 0.3–1.2)
BILIRUB UR QL STRIP: NEGATIVE
BUN SERPL-MCNC: 11 MG/DL (ref 6–20)
CALCIUM SERPL-MCNC: 9.1 MG/DL (ref 8.4–10.2)
CHLORIDE SERPL-SCNC: 101 MMOL/L (ref 96–108)
CLARITY UR: CLEAR
CO2 SERPL-SCNC: 27 MMOL/L (ref 22–33)
COCAINE UR QL: NEGATIVE
COLOR UR: YELLOW
CREAT SERPL-MCNC: 0.79 MG/DL (ref 0.5–1.2)
EOSINOPHIL # BLD AUTO: 0.02 THOUSAND/ΜL (ref 0–0.61)
EOSINOPHIL NFR BLD AUTO: 0 % (ref 0–6)
ERYTHROCYTE [DISTWIDTH] IN BLOOD BY AUTOMATED COUNT: 13.5 % (ref 11.6–15.1)
ETHANOL EXG-MCNC: 0 MG/DL
FLUAV RNA RESP QL NAA+PROBE: NEGATIVE
FLUBV RNA RESP QL NAA+PROBE: NEGATIVE
GFR SERPL CREATININE-BSD FRML MDRD: 115 ML/MIN/1.73SQ M
GLUCOSE SERPL-MCNC: 96 MG/DL (ref 65–140)
GLUCOSE UR STRIP-MCNC: NEGATIVE MG/DL
HCT VFR BLD AUTO: 41.2 % (ref 36.5–49.3)
HGB BLD-MCNC: 14 G/DL (ref 12–17)
HGB UR QL STRIP.AUTO: ABNORMAL
IMM GRANULOCYTES # BLD AUTO: 0.02 THOUSAND/UL (ref 0–0.2)
IMM GRANULOCYTES NFR BLD AUTO: 0 % (ref 0–2)
KETONES UR STRIP-MCNC: NEGATIVE MG/DL
LEUKOCYTE ESTERASE UR QL STRIP: NEGATIVE
LYMPHOCYTES # BLD AUTO: 1.64 THOUSANDS/ΜL (ref 0.6–4.47)
LYMPHOCYTES NFR BLD AUTO: 21 % (ref 14–44)
MAGNESIUM SERPL-MCNC: 1.8 MG/DL (ref 1.6–2.6)
MCH RBC QN AUTO: 30.1 PG (ref 26.8–34.3)
MCHC RBC AUTO-ENTMCNC: 34 G/DL (ref 31.4–37.4)
MCV RBC AUTO: 89 FL (ref 82–98)
METHADONE UR QL: NEGATIVE
MONOCYTES # BLD AUTO: 0.7 THOUSAND/ΜL (ref 0.17–1.22)
MONOCYTES NFR BLD AUTO: 9 % (ref 4–12)
MUCOUS THREADS UR QL AUTO: ABNORMAL
NEUTROPHILS # BLD AUTO: 5.36 THOUSANDS/ΜL (ref 1.85–7.62)
NEUTS SEG NFR BLD AUTO: 70 % (ref 43–75)
NITRITE UR QL STRIP: NEGATIVE
NON-SQ EPI CELLS URNS QL MICRO: ABNORMAL /HPF
NRBC BLD AUTO-RTO: 0 /100 WBCS
OPIATES UR QL SCN: NEGATIVE
OTHER STN SPEC: ABNORMAL
OXYCODONE+OXYMORPHONE UR QL SCN: NEGATIVE
PCP UR QL: NEGATIVE
PH UR STRIP.AUTO: 6 [PH]
PLATELET # BLD AUTO: 148 THOUSANDS/UL (ref 149–390)
PMV BLD AUTO: 10.7 FL (ref 8.9–12.7)
POTASSIUM SERPL-SCNC: 4 MMOL/L (ref 3.5–5)
PROT SERPL-MCNC: 7.6 G/DL (ref 6.4–8.3)
PROT UR STRIP-MCNC: NEGATIVE MG/DL
RBC # BLD AUTO: 4.65 MILLION/UL (ref 3.88–5.62)
RBC #/AREA URNS AUTO: ABNORMAL /HPF
RSV RNA RESP QL NAA+PROBE: NEGATIVE
SARS-COV-2 RNA RESP QL NAA+PROBE: POSITIVE
SODIUM SERPL-SCNC: 136 MMOL/L (ref 133–145)
SP GR UR STRIP.AUTO: 1.01 (ref 1–1.03)
THC UR QL: NEGATIVE
UROBILINOGEN UR QL STRIP.AUTO: 0.2 E.U./DL
WBC # BLD AUTO: 7.75 THOUSAND/UL (ref 4.31–10.16)
WBC #/AREA URNS AUTO: ABNORMAL /HPF

## 2022-01-17 PROCEDURE — 0241U HB NFCT DS VIR RESP RNA 4 TRGT: CPT | Performed by: EMERGENCY MEDICINE

## 2022-01-17 PROCEDURE — 93005 ELECTROCARDIOGRAM TRACING: CPT

## 2022-01-17 PROCEDURE — 99284 EMERGENCY DEPT VISIT MOD MDM: CPT | Performed by: EMERGENCY MEDICINE

## 2022-01-17 PROCEDURE — 80307 DRUG TEST PRSMV CHEM ANLYZR: CPT | Performed by: EMERGENCY MEDICINE

## 2022-01-17 PROCEDURE — 96361 HYDRATE IV INFUSION ADD-ON: CPT

## 2022-01-17 PROCEDURE — 83735 ASSAY OF MAGNESIUM: CPT | Performed by: EMERGENCY MEDICINE

## 2022-01-17 PROCEDURE — 96374 THER/PROPH/DIAG INJ IV PUSH: CPT

## 2022-01-17 PROCEDURE — 36415 COLL VENOUS BLD VENIPUNCTURE: CPT | Performed by: EMERGENCY MEDICINE

## 2022-01-17 PROCEDURE — 82075 ASSAY OF BREATH ETHANOL: CPT | Performed by: EMERGENCY MEDICINE

## 2022-01-17 PROCEDURE — 99284 EMERGENCY DEPT VISIT MOD MDM: CPT

## 2022-01-17 PROCEDURE — 80177 DRUG SCRN QUAN LEVETIRACETAM: CPT | Performed by: EMERGENCY MEDICINE

## 2022-01-17 PROCEDURE — 81001 URINALYSIS AUTO W/SCOPE: CPT | Performed by: EMERGENCY MEDICINE

## 2022-01-17 PROCEDURE — 85025 COMPLETE CBC W/AUTO DIFF WBC: CPT | Performed by: EMERGENCY MEDICINE

## 2022-01-17 PROCEDURE — 80053 COMPREHEN METABOLIC PANEL: CPT | Performed by: EMERGENCY MEDICINE

## 2022-01-17 RX ORDER — LEVETIRACETAM 10 MG/ML
1000 INJECTION INTRAVASCULAR ONCE
Status: COMPLETED | OUTPATIENT
Start: 2022-01-17 | End: 2022-01-17

## 2022-01-17 RX ORDER — B-COMPLEX WITH VITAMIN C
4 TABLET ORAL
Qty: 40 TABLET | Refills: 0 | Status: SHIPPED | OUTPATIENT
Start: 2022-01-17 | End: 2022-03-28

## 2022-01-17 RX ORDER — IBUPROFEN 600 MG/1
600 TABLET ORAL EVERY 6 HOURS PRN
Qty: 30 TABLET | Refills: 0 | Status: SHIPPED | OUTPATIENT
Start: 2022-01-17 | End: 2022-03-26 | Stop reason: SDUPTHER

## 2022-01-17 RX ORDER — MULTIVITAMIN
1 CAPSULE ORAL DAILY
Qty: 10 CAPSULE | Refills: 0 | Status: SHIPPED | OUTPATIENT
Start: 2022-01-17 | End: 2022-03-28

## 2022-01-17 RX ORDER — ONDANSETRON 4 MG/1
4 TABLET, FILM COATED ORAL EVERY 6 HOURS
Qty: 20 TABLET | Refills: 0 | Status: SHIPPED | OUTPATIENT
Start: 2022-01-17 | End: 2022-03-28

## 2022-01-17 RX ADMIN — SODIUM CHLORIDE 1000 ML: 0.9 INJECTION, SOLUTION INTRAVENOUS at 21:46

## 2022-01-17 RX ADMIN — LEVETIRACETAM 1000 MG: 10 INJECTION INTRAVENOUS at 21:46

## 2022-01-18 LAB
ATRIAL RATE: 107 BPM
ATRIAL RATE: 110 BPM
P AXIS: 42 DEGREES
P AXIS: 46 DEGREES
PR INTERVAL: 147 MS
PR INTERVAL: 154 MS
QRS AXIS: -11 DEGREES
QRS AXIS: -12 DEGREES
QRSD INTERVAL: 94 MS
QRSD INTERVAL: 98 MS
QT INTERVAL: 328 MS
QT INTERVAL: 335 MS
QTC INTERVAL: 436 MS
QTC INTERVAL: 449 MS
T WAVE AXIS: 11 DEGREES
T WAVE AXIS: 8 DEGREES
VENTRICULAR RATE: 106 BPM
VENTRICULAR RATE: 108 BPM

## 2022-01-18 PROCEDURE — 93010 ELECTROCARDIOGRAM REPORT: CPT | Performed by: INTERNAL MEDICINE

## 2022-01-18 NOTE — ED PROCEDURE NOTE
PROCEDURE  ECG 12 Lead Documentation Only    Date/Time: 1/17/2022 9:56 PM  Performed by: Hilario Tracy MD  Authorized by:  Hilario Tracy MD     Indications / Diagnosis:  Seizure  ECG reviewed by me, the ED Provider: yes    Patient location:  ED and bedside  Previous ECG:     Previous ECG:  Unavailable  Interpretation:     Interpretation: abnormal    Quality:     Tracing quality:  Limited by artifact  Rate:     ECG rate:  108    ECG rate assessment: tachycardic    Rhythm:     Rhythm: sinus tachycardia    Ectopy:     Ectopy: none    QRS:     QRS axis:  Normal    QRS intervals:  Normal  Conduction:     Conduction: normal    ST segments:     ST segments:  Normal  T waves:     T waves: normal    Other findings:     Other findings: LVH    Comments:      No acute ischemia or infarction         Hilario Tracy MD  01/17/22 1463

## 2022-01-18 NOTE — DISCHARGE INSTRUCTIONS
Take your Keppra as ordered - I sent a new RX to your pharmacy 2 night ago when you were here    You are COVID positive - you must quarantine for the next 10 days     I have also prescribed vitamins and medications to help with the symptoms of [address]    Your healthcare provider and/or public health staff have evaluated you and have determined that you do not need to remain in the hospital at this time  At this time you can be isolated at home where you will be monitored by staff from your local or state health department  You should carefully follow the prevention and isolation steps below until a healthcare provider or local or state health department says that you can return to your normal activities  Stay home except to get medical care    People who are mildly ill with COVID-19 are able to isolate at home during their illness  You should restrict activities outside your home, except for getting medical care  Do not go to work, school, or public areas  Avoid using public transportation, ride-sharing, or taxis  Separate yourself from other people and animals in your home    People: As much as possible, you should stay in a specific room and away from other people in your home  Also, you should use a separate bathroom, if available  Animals: You should restrict contact with pets and other animals while you are sick with COVID-19, just like you would around other people  Although there have not been reports of pets or other animals becoming sick with COVID-19, it is still recommended that people sick with COVID-19 limit contact with animals until more information is known about the virus  When possible, have another member of your household care for your animals while you are sick  If you are sick with COVID-19, avoid contact with your pet, including petting, snuggling, being kissed or licked, and sharing food   If you must care for your pet or be around animals while you are sick, wash your hands before and after you interact with pets and wear a facemask  See COVID-19 and Animals for more information  Call ahead before visiting your doctor    If you have a medical appointment, call the healthcare provider and tell them that you have or may have COVID-19  This will help the healthcare providers office take steps to keep other people from getting infected or exposed  Wear a facemask    You should wear a facemask when you are around other people (e g , sharing a room or vehicle) or pets and before you enter a healthcare providers office  If you are not able to wear a facemask (for example, because it causes trouble breathing), then people who live with you should not stay in the same room with you, or they should wear a facemask if they enter your room  Cover your coughs and sneezes    Cover your mouth and nose with a tissue when you cough or sneeze  Throw used tissues in a lined trash can  Immediately wash your hands with soap and water for at least 20 seconds or, if soap and water are not available, clean your hands with an alcohol-based hand  that contains at least 60% alcohol  Clean your hands often    Wash your hands often with soap and water for at least 20 seconds, especially after blowing your nose, coughing, or sneezing; going to the bathroom; and before eating or preparing food  If soap and water are not readily available, use an alcohol-based hand  with at least 60% alcohol, covering all surfaces of your hands and rubbing them together until they feel dry  Soap and water are the best option if hands are visibly dirty  Avoid touching your eyes, nose, and mouth with unwashed hands  Avoid sharing personal household items    You should not share dishes, drinking glasses, cups, eating utensils, towels, or bedding with other people or pets in your home  After using these items, they should be washed thoroughly with soap and water      Clean all high-touch surfaces everyday    High touch surfaces include counters, tabletops, doorknobs, bathroom fixtures, toilets, phones, keyboards, tablets, and bedside tables  Also, clean any surfaces that may have blood, stool, or body fluids on them  Use a household cleaning spray or wipe, according to the label instructions  Labels contain instructions for safe and effective use of the cleaning product including precautions you should take when applying the product, such as wearing gloves and making sure you have good ventilation during use of the product  Monitor your symptoms    Seek prompt medical attention if your illness is worsening (e g , difficulty breathing)  Before seeking care, call your healthcare provider and tell them that you have, or are being evaluated for, COVID-19  Put on a facemask before you enter the facility  These steps will help the healthcare providers office to keep other people in the office or waiting room from getting infected or exposed  Ask your healthcare provider to call the local or [facility] health department  Persons who are placed under active monitoring or facilitated self-monitoring should follow instructions provided by their local health department or occupational health professionals, as appropriate  If you have a medical emergency and need to call 911, notify the dispatch personnel that you have, or are being evaluated for COVID-19  If possible, put on a facemask before emergency medical services arrive  Discontinuing home isolation    Patients with confirmed COVID-19 should remain under home isolation precautions until the following conditions are met:    They have had no fever for at least 24 hours (that is one full day of no fever without the use medicine that reduces fevers)  AND  other symptoms have improved (for example, when their cough or shortness of breath have improved)  AND  If had mild or moderate illness, at least 10 days have passed since their symptoms first appeared or if severe illness (needed oxygen) or immunosuppressed, at least 20 days have passed since symptoms first appeared  Patients with confirmed COVID-19 should also notify close contacts (including their workplace) and ask that they self-quarantine  Currently, close contact is defined as being within 6 feet for 15 minutes or more from the period 24 hours starting 48 hours before symptom onset to the time at which the patient went into isolation  Close contacts of patients diagnosed with COVID-19 should be instructed by the patient to self-quarantine for 14 days from the last time of their last contact with the patient       Source: [employer]

## 2022-01-18 NOTE — ED PROVIDER NOTES
History  Chief Complaint   Patient presents with    Seizure - Prior Hx Of     witnessed seizure by bystanders, no head strike, history of seizures     This is a 39year old male that presented to the ED via EMS  He reports that he had a seizure while with his friends this evening - self limited - did not have to have medications to stop the seizure  He is not post ictal on exam      He has a seizure hx with hx of non-compliance  Her is ordered to be taking Keppra 750 mg BID which he frequently runs out of - I did send a new RX in for him when he was here 2 nights ago however he has not picked it up    He had been drinking alcohol 2 night ago when he was here - he reported that his neurologist has advised him that "he can have up to 4 alcoholic beverages a day "    Denies head injury, chest pain or shortness of breath          Prior to Admission Medications   Prescriptions Last Dose Informant Patient Reported? Taking?   elvitegravir-cobicistat-emtricitabine-tenofovir (Stribild) [phone] mg   No No   Sig: Take 1 tablet by mouth daily with breakfast   levETIRAcetam (KEPPRA) 750 mg tablet   No No   Sig: Take 1 tablet (750 mg total) by mouth 2 (two) times a day   nicotine (NICODERM CQ) 21 mg/24 hr TD 24 hr patch   No No   Sig: Place 1 patch on the skin daily      Facility-Administered Medications: None       Past Medical History:   Diagnosis Date    HIV (human immunodeficiency virus infection) ([facility] )     Seizures ([facility] )     Smoker        Past Surgical History:   Procedure Laterality Date    HERNIA REPAIR         History reviewed  No pertinent family history  I have reviewed and agree with the history as documented      E-Cigarette/Vaping    E-Cigarette Use Never User      E-Cigarette/Vaping Substances     Social History     Tobacco Use    Smoking status: Current Every Day Smoker     Packs/day: 1 00     Types: Cigarettes    Smokeless tobacco: Never Used   Vaping Use    Vaping Use: Never used   Substance Use Topics    Alcohol use: Yes     Comment: socially    Drug use: Yes     Types: Marijuana       Review of Systems   Constitutional: Negative for activity change, appetite change, chills, diaphoresis, fatigue, fever and unexpected weight change  HENT: Negative for congestion, ear discharge, ear pain, mouth sores, sinus pressure, sinus pain, sneezing, sore throat, trouble swallowing and voice change  Eyes: Negative for photophobia, pain, discharge, redness, itching and visual disturbance  Respiratory: Negative for cough, chest tightness and shortness of breath  Cardiovascular: Negative for chest pain, palpitations and leg swelling  Gastrointestinal: Negative for abdominal pain, constipation, nausea and vomiting  Endocrine: Negative for cold intolerance, heat intolerance, polydipsia, polyphagia and polyuria  Genitourinary: Negative for decreased urine volume, difficulty urinating, dysuria, frequency, hematuria and urgency  Musculoskeletal: Negative for arthralgias, back pain, gait problem, joint swelling, myalgias, neck pain and neck stiffness  Skin: Negative for color change and rash  Allergic/Immunologic: Negative for immunocompromised state  Neurological: Positive for seizures  Negative for dizziness, tremors, syncope, speech difficulty, weakness, light-headedness, numbness and headaches  Hematological: Does not bruise/bleed easily  Psychiatric/Behavioral: Negative for behavioral problems and suicidal ideas  Physical Exam  Physical Exam  Vitals and nursing note reviewed  Constitutional:       General: He is not in acute distress  Appearance: Normal appearance  He is well-developed and normal weight  He is not ill-appearing, toxic-appearing or diaphoretic  HENT:      Head: Normocephalic and atraumatic  Right Ear: Tympanic membrane, ear canal and external ear normal  There is no impacted cerumen        Left Ear: Tympanic membrane, ear canal and external ear normal  There is no impacted cerumen  Nose: Nose normal  No congestion or rhinorrhea  Mouth/Throat:      Mouth: Mucous membranes are moist       Pharynx: Oropharynx is clear  No oropharyngeal exudate or posterior oropharyngeal erythema  Eyes:      General: No scleral icterus  Right eye: No discharge  Left eye: No discharge  Extraocular Movements: Extraocular movements intact  Conjunctiva/sclera: Conjunctivae normal       Pupils: Pupils are equal, round, and reactive to light  Neck:      Vascular: No JVD  Trachea: No tracheal deviation  Cardiovascular:      Rate and Rhythm: Normal rate and regular rhythm  Heart sounds: Normal heart sounds  No murmur heard  Pulmonary:      Effort: Pulmonary effort is normal  No respiratory distress  Breath sounds: Normal breath sounds  No wheezing or rales  Chest:      Chest wall: No tenderness  Abdominal:      General: Bowel sounds are normal       Palpations: Abdomen is soft  There is no mass  Tenderness: There is no abdominal tenderness  There is no right CVA tenderness, left CVA tenderness or guarding  Hernia: No hernia is present  Musculoskeletal:         General: No swelling, tenderness, deformity or signs of injury  Normal range of motion  Cervical back: Normal range of motion and neck supple  No rigidity  No muscular tenderness  Right lower leg: No edema  Left lower leg: No edema  Lymphadenopathy:      Cervical: No cervical adenopathy  Skin:     General: Skin is warm and dry  Capillary Refill: Capillary refill takes less than 2 seconds  Findings: No bruising, erythema or rash  Neurological:      General: No focal deficit present  Mental Status: He is alert and oriented to person, place, and time  Mental status is at baseline  Cranial Nerves: No cranial nerve deficit  Sensory: No sensory deficit  Motor: No weakness or abnormal muscle tone        Coordination: Coordination normal       Gait: Gait normal       Deep Tendon Reflexes: Reflexes normal    Psychiatric:         Mood and Affect: Mood normal          Behavior: Behavior normal          Thought Content: Thought content normal          Judgment: Judgment normal          Vital Signs  ED Triage Vitals   Temperature Pulse Respirations Blood Pressure SpO2   01/17/22 2112 01/17/22 2112 01/17/22 2112 01/17/22 2112 01/17/22 2112   97 5 °F (36 4 °C) (!) 110 16 129/81 96 %      Temp Source Heart Rate Source Patient Position - Orthostatic VS BP Location FiO2 (%)   01/17/22 2112 01/17/22 2112 01/17/22 2112 01/17/22 2112 --   Oral Monitor Lying Right arm       Pain Score       01/17/22 2111       No Pain           Vitals:    01/17/22 2112 01/17/22 2304   BP: 129/81 131/66   Pulse: (!) 110 96   Patient Position - Orthostatic VS: Lying          Visual Acuity  Visual Acuity      Most Recent Value   L Pupil Size (mm) 3   R Pupil Size (mm) 3          ED Medications  Medications   sodium chloride 0 9 % bolus 1,000 mL (0 mL Intravenous Stopped 1/17/22 2304)   levetiracetam in NaCl (KEPPRA) infusion 1,000 mg (1,000 mg Intravenous Given 1/17/22 2146)       Diagnostic Studies  Results Reviewed     Procedure Component Value Units Date/Time    COVID/FLU/RSV [[id number]]  (Abnormal) Collected: 01/17/22 2144    Lab Status: Final result Specimen: Nares from Nose Updated: 01/17/22 2240     SARS-CoV-2 Positive     INFLUENZA A PCR Negative     INFLUENZA B PCR Negative     RSV PCR Negative    Narrative:      FOR PEDIATRIC PATIENTS - copy/paste COVID Guidelines URL to browser: [email or web address]    SARS-CoV-2 assay is a Nucleic Acid Amplification assay intended for the  qualitative detection of nucleic acid from SARS-CoV-2 in nasopharyngeal  swabs  Results are for the presumptive identification of SARS-CoV-2 RNA      Positive results are indicative of infection with SARS-CoV-2, the virus  causing COVID-19, but do not rule out bacterial infection or co-infection  with other viruses  Laboratories within the [place] and its  territories are required to report all positive results to the appropriate  public health authorities  Negative results do not preclude SARS-CoV-2  infection and should not be used as the sole basis for treatment or other  patient management decisions  Negative results must be combined with  clinical observations, patient history, and epidemiological information  This test has not been FDA cleared or approved  This test has been authorized by FDA under an Emergency Use Authorization  (EUA)  This test is only authorized for the duration of time the  declaration that circumstances exist justifying the authorization of the  emergency use of an in vitro diagnostic tests for detection of SARS-CoV-2  virus and/or diagnosis of COVID-19 infection under section 564(b)(1) of  the Act, 21 U  S C  648EFZ-2(K)(1), unless the authorization is terminated  or revoked sooner  The test has been validated but independent review by FDA  and CLIA is pending  Test performed using [employer] GeneXpert: This RT-PCR assay targets N2,  a region unique to SARS-CoV-2  A conserved region in the E-gene was chosen  for pan-Sarbecovirus detection which includes SARS-CoV-2  Rapid drug screen, urine [[id number]]  (Normal) Collected: 01/17/22 2144    Lab Status: Final result Specimen: Urine, Clean Catch Updated: 01/17/22 2214     Amph/Meth UR Negative     Barbiturate Ur Negative     Benzodiazepine Urine Negative     Cocaine Urine Negative     Methadone Urine Negative     Opiate Urine Negative     PCP Ur Negative     THC Urine Negative     Oxycodone Urine Negative    Narrative:      FOR MEDICAL PURPOSES ONLY  IF CONFIRMATION NEEDED PLEASE CONTACT THE LAB WITHIN 5 DAYS      Drug Screen Cutoff Levels:  AMPHETAMINE/METHAMPHETAMINES  1000 ng/mL  BARBITURATES     200 ng/mL  BENZODIAZEPINES     200 ng/mL  COCAINE      300 ng/mL  METHADONE      300 ng/mL  OPIATES      300 ng/mL  PHENCYCLIDINE     25 ng/mL  THC       50 ng/mL  OXYCODONE      100 ng/mL    Comprehensive metabolic panel [[id number]] Collected: 01/17/22 2144    Lab Status: Final result Specimen: Blood from Arm, Left Updated: 01/17/22 2213     Sodium 136 mmol/L      Potassium 4 0 mmol/L      Chloride 101 mmol/L      CO2 27 mmol/L      ANION GAP 8 mmol/L      BUN 11 mg/dL      Creatinine 0 79 mg/dL      Glucose 96 mg/dL      Calcium 9 1 mg/dL      AST 30 U/L      ALT 16 U/L      Alkaline Phosphatase 54 4 U/L      Total Protein 7 6 g/dL      Albumin 4 2 g/dL      Total Bilirubin 0 44 mg/dL      eGFR 115 ml/min/1 73sq m     Narrative:      [place] guidelines for Chronic Kidney Disease (CKD):     Stage 1 with normal or high GFR (GFR > 90 mL/min/1 73 square meters)    Stage 2 Mild CKD (GFR = 60-89 mL/min/1 73 square meters)    Stage 3A Moderate CKD (GFR = 45-59 mL/min/1 73 square meters)    Stage 3B Moderate CKD (GFR = 30-44 mL/min/1 73 square meters)    Stage 4 Severe CKD (GFR = 15-29 mL/min/1 73 square meters)    Stage 5 End Stage CKD (GFR <15 mL/min/1 73 square meters)  Note: GFR calculation is accurate only with a steady state creatinine    Magnesium [[id number]]  (Normal) Collected: 01/17/22 2144    Lab Status: Final result Specimen: Blood from Arm, Left Updated: 01/17/22 2213     Magnesium 1 8 mg/dL     Urine Microscopic [[id number]]  (Abnormal) Collected: 01/17/22 2144    Lab Status: Final result Specimen: Urine, Clean Catch Updated: 01/17/22 2208     RBC, UA 0-1 /hpf      WBC, UA 4-10 /hpf      Epithelial Cells None Seen /hpf      Bacteria, UA Occasional /hpf      OTHER OBSERVATIONS Sperm Present     MUCUS THREADS Occasional    UA (URINE) with reflex to Scope [[id number]]  (Abnormal) Collected: 01/17/22 2144    Lab Status: Final result Specimen: Urine, Clean Catch Updated: 01/17/22 2158     Color, UA Yellow Clarity, UA Clear     Specific Gravity, UA 1 010     pH, UA 6 0     Leukocytes, UA Negative     Nitrite, UA Negative     Protein, UA Negative mg/dl      Glucose, UA Negative mg/dl      Ketones, UA Negative mg/dl      Urobilinogen, UA 0 2 E U /dl      Bilirubin, UA Negative     Blood, UA Trace-Intact    CBC and differential [[id number]]  (Abnormal) Collected: 01/17/22 2144    Lab Status: Final result Specimen: Blood from Arm, Left Updated: 01/17/22 2156     WBC 7 75 Thousand/uL      RBC 4 65 Million/uL      Hemoglobin 14 0 g/dL      Hematocrit 41 2 %      MCV 89 fL      MCH 30 1 pg      MCHC 34 0 g/dL      RDW 13 5 %      MPV 10 7 fL      Platelets 647 Thousands/uL      nRBC 0 /100 WBCs      Neutrophils Relative 70 %      Immat GRANS % 0 %      Lymphocytes Relative 21 %      Monocytes Relative 9 %      Eosinophils Relative 0 %      Basophils Relative 0 %      Neutrophils Absolute 5 36 Thousands/µL      Immature Grans Absolute 0 02 Thousand/uL      Lymphocytes Absolute 1 64 Thousands/µL      Monocytes Absolute 0 70 Thousand/µL      Eosinophils Absolute 0 02 Thousand/µL      Basophils Absolute 0 01 Thousands/µL     POCT alcohol breath test [[id number]]  (Normal) Resulted: 01/17/22 2155    Lab Status: Final result Updated: 01/17/22 2156     EXTBreath Alcohol 0 000    Levetiracetam level [[id number]] Collected: 01/17/22 2144    Lab Status: In process Specimen: Blood from Arm, Left Updated: 01/17/22 2153                 No orders to display              Procedures  Procedures         ED Course  ED Course as of 01/17/22 2308   Mon Jan 17, 2022   2306 COVID positive flu negative RSV negative  Magnesium normal 1 8  Electrolytes are normal   Liver function tests are normal     Rapid drug screen is negative  Alcohol level is negative  No evidence of infection with leuko sites normal at 7 75  Hemoglobin hematocrit stable 14 and 41 2  No indications of anemia    Platelet count is 511 this consistent with the COVID virus    Hydrated patient with L of normal saline and also medicated him with Keppra 1000 mg IV  Patient is stable for discharge  SBIRT 22yo+      Most Recent Value   SBIRT (24 yo +)    In order to provide better care to our patients, we are screening all of our patients for alcohol and drug use  Would it be okay to ask you these screening questions? No Filed at: 01/17/2022 2113                    MDM  Number of Diagnoses or Management Options  Breakthrough seizure [facility]): established and worsening  COVID-19: new and requires workup  Diagnosis management comments: 10year-old male lives well-known to this emergency department  He has history of seizures number repeatedly presents with either a breakthrough seizure or "feeling he was going to have a seizure  "He has history of noncompliance with his Keppra  INC no 2 nights ago when he had had a small amount of alcohol with an alcohol level of 30 any stated he felt like he was going to the seizure  That time I did load him with Keppra  I also prescribed a new prescription for Keppra and he was discharged  The tonight apparently he had a short grand mal seizure in front of his friends  He was not postictal on arrival   He did not require any medications for intervention to stop the seizure  Again loaded him with Keppra this time IV  Discussed with him the need to take his Keppra  Did check an alcohol level which was negative  Is concerned the patient may have COVID due to him being other and states most of the time the COVID in the was positive  Discussed with patient the need to quarantine  He does in fact have a place to stay he as he resides with his mother        Concern for repeated noncompliance of meds, drinking alcohol, substance abuse, COVID, flu, electrolyte imbalance, and   Liver function, leukocytosis, anemia, thrombocytopenia, dehydration and other concerns       Amount and/or Complexity of Data Reviewed  Clinical lab tests: ordered and reviewed  Obtain history from someone other than the patient: yes (ems)  Review and summarize past medical records: yes    Risk of Complications, Morbidity, and/or Mortality  Presenting problems: high  Diagnostic procedures: moderate  Management options: moderate    Patient Progress  Patient progress: improved      Disposition  Final diagnoses:   Breakthrough seizure ([address] )   COVID-19     Time reflects when diagnosis was documented in both MDM as applicable and the Disposition within this note     Time User Action Codes Description Comment    1/17/2022 10:51 PM Mook Bojorquez [G40 919] Breakthrough seizure ([address] )     1/17/2022 10:51 PM [address] Mayela [U07 1] COVID-19       ED Disposition     ED Disposition Condition Date/Time Comment    Discharge Stable Mon Jan 17, 2022 10:51 PM Alfreda Reeves discharge to home/self care              Follow-up Information     Follow up With Specialties Details Why Contact Info    [employer]  Schedule an appointment as soon as possible for a visit in 3 days  [phone]            Patient's Medications   Discharge Prescriptions    ASCORBIC ACID (VITAMIN C) 1000 MG TABLET    Take 1 tablet (1,000 mg total) by mouth 2 (two) times a day for 10 days       Start Date: 1/17/2022 End Date: 1/27/2022       Order Dose: 1,000 mg       Quantity: 20 tablet    Refills: 0    CALCIUM CARBONATE-VITAMIN D (OSCAL-D) 500 MG-200 UNITS PER TABLET    Take 4 tablets by mouth daily with breakfast for 10 days       Start Date: 1/17/2022 End Date: 1/27/2022       Order Dose: 4 tablets       Quantity: 40 tablet    Refills: 0    IBUPROFEN (MOTRIN) 600 MG TABLET    Take 1 tablet (600 mg total) by mouth every 6 (six) hours as needed for fever or headaches       Start Date: 1/17/2022 End Date: --       Order Dose: 600 mg       Quantity: 30 tablet    Refills: 0    MULTIPLE VITAMIN (MULTIVITAMIN) CAPSULE    Take 1 capsule by mouth daily for 10 days       Start Date: 1/17/2022 End Date: 1/27/2022       Order Dose: 1 capsule       Quantity: 10 capsule    Refills: 0    ONDANSETRON (ZOFRAN) 4 MG TABLET    Take 1 tablet (4 mg total) by mouth every 6 (six) hours       Start Date: 1/17/2022 End Date: --       Order Dose: 4 mg       Quantity: 20 tablet    Refills: 0       No discharge procedures on file      PDMP Review       Value Time User    PDMP Reviewed  Yes 5/27/2020 12:37 AM LEBRON Fortune          ED Provider  Electronically Signed by           Christiana Quiroga MD  01/17/22 6982

## 2022-01-26 LAB — LEVETIRACETAM SERPL-MCNC: <1 UG/ML (ref 10–40)

## 2022-01-26 NOTE — RESULT ENCOUNTER NOTE
Attempted to call regarding subtherapeutic keppra (as expected, hasn't been taking)- call cannot be completed   Called and left messaged for emergenct contact to have patient call back ER for test results

## 2022-03-26 ENCOUNTER — HOSPITAL ENCOUNTER (EMERGENCY)
Facility: HOSPITAL | Age: 37
Discharge: HOME | End: 2022-03-26
Attending: INTERNAL MEDICINE
Payer: MEDICARE

## 2022-03-26 ENCOUNTER — APPOINTMENT (EMERGENCY)
Dept: RADIOLOGY | Facility: HOSPITAL | Age: 37
End: 2022-03-26
Payer: MEDICARE

## 2022-03-26 VITALS
TEMPERATURE: 97.8 F | DIASTOLIC BLOOD PRESSURE: 56 MMHG | OXYGEN SATURATION: 99 % | SYSTOLIC BLOOD PRESSURE: 91 MMHG | HEART RATE: 62 BPM | RESPIRATION RATE: 16 BRPM

## 2022-03-26 DIAGNOSIS — U07.1 COVID-19: ICD-10-CM

## 2022-03-26 DIAGNOSIS — M62.838 NECK MUSCLE SPASM: Primary | ICD-10-CM

## 2022-03-26 PROCEDURE — 96372 THER/PROPH/DIAG INJ SC/IM: CPT

## 2022-03-26 PROCEDURE — 99284 EMERGENCY DEPT VISIT MOD MDM: CPT | Performed by: INTERNAL MEDICINE

## 2022-03-26 PROCEDURE — 99284 EMERGENCY DEPT VISIT MOD MDM: CPT

## 2022-03-26 PROCEDURE — 72040 X-RAY EXAM NECK SPINE 2-3 VW: CPT

## 2022-03-26 RX ORDER — IBUPROFEN 600 MG/1
600 TABLET ORAL EVERY 6 HOURS PRN
Qty: 30 TABLET | Refills: 0 | Status: SHIPPED | OUTPATIENT
Start: 2022-03-26

## 2022-03-26 RX ORDER — CYCLOBENZAPRINE HCL 10 MG
10 TABLET ORAL ONCE
Status: COMPLETED | OUTPATIENT
Start: 2022-03-26 | End: 2022-03-26

## 2022-03-26 RX ORDER — KETOROLAC TROMETHAMINE 30 MG/ML
30 INJECTION, SOLUTION INTRAMUSCULAR; INTRAVENOUS ONCE
Status: COMPLETED | OUTPATIENT
Start: 2022-03-26 | End: 2022-03-26

## 2022-03-26 RX ORDER — CYCLOBENZAPRINE HCL 10 MG
10 TABLET ORAL 2 TIMES DAILY PRN
Qty: 20 TABLET | Refills: 0 | Status: SHIPPED | OUTPATIENT
Start: 2022-03-26

## 2022-03-26 RX ADMIN — KETOROLAC TROMETHAMINE 30 MG: 30 INJECTION, SOLUTION INTRAMUSCULAR at 10:00

## 2022-03-26 RX ADMIN — CYCLOBENZAPRINE HYDROCHLORIDE 10 MG: 10 TABLET, FILM COATED ORAL at 10:00

## 2022-03-26 NOTE — ED PROVIDER NOTES
History  Chief Complaint   Patient presents with    Neck Pain     pt presents to ED  homeless, pt found inside "[employer] station asleep on walker, staff called EMS, pt admits to generalized body stiffness and neck pain     This is 45y old was found at [employer] [facility] asleep , when awaked pt complaint of neck pain and generalized body ache  Pt has no chest pain, sob, abdomial pain  Pt denies N/V/D  Pt stated he has neck muscle spasm for 6 month but he does not take anything for it  Pt denies HA, Dizziness, fever  Pt stated when her turn his head to L side he has the pain  Pt has on other symtpoms  Prior to Admission Medications   Prescriptions Last Dose Informant Patient Reported? Taking?    Multiple Vitamin (multivitamin) capsule   No No   Sig: Take 1 capsule by mouth daily for 10 days   ascorbic acid (VITAMIN C) 1000 MG tablet   No No   Sig: Take 1 tablet (1,000 mg total) by mouth 2 (two) times a day for 10 days   calcium carbonate-vitamin D (OSCAL-D) 500 mg-200 units per tablet   No No   Sig: Take 4 tablets by mouth daily with breakfast for 10 days   elvitegravir-cobicistat-emtricitabine-tenofovir (Stribild) [phone] mg   No No   Sig: Take 1 tablet by mouth daily with breakfast   ibuprofen (MOTRIN) 600 mg tablet   No No   Sig: Take 1 tablet (600 mg total) by mouth every 6 (six) hours as needed for fever or headaches   ibuprofen (MOTRIN) 600 mg tablet   No No   Sig: Take 1 tablet (600 mg total) by mouth every 6 (six) hours as needed for fever or headaches   levETIRAcetam (KEPPRA) 750 mg tablet   No No   Sig: Take 1 tablet (750 mg total) by mouth 2 (two) times a day   nicotine (NICODERM CQ) 21 mg/24 hr TD 24 hr patch   No No   Sig: Place 1 patch on the skin daily   ondansetron (ZOFRAN) 4 mg tablet   No No   Sig: Take 1 tablet (4 mg total) by mouth every 6 (six) hours      Facility-Administered Medications: None       Past Medical History:   Diagnosis Date    HIV (human immunodeficiency virus infection) ([facility] )     Seizures ([facility] )     Smoker     Syphilis        Past Surgical History:   Procedure Laterality Date    HERNIA REPAIR         History reviewed  No pertinent family history  I have reviewed and agree with the history as documented  E-Cigarette/Vaping    E-Cigarette Use Never User      E-Cigarette/Vaping Substances     Social History     Tobacco Use    Smoking status: Current Every Day Smoker     Packs/day: 1 00     Types: Cigarettes    Smokeless tobacco: Never Used   Vaping Use    Vaping Use: Never used   Substance Use Topics    Alcohol use: Not Currently     Comment: socially    Drug use: Yes     Types: Marijuana       Review of Systems   Constitutional: Negative for diaphoresis, fatigue and fever  HENT: Negative for hearing loss  Respiratory: Negative for cough, chest tightness and shortness of breath  Cardiovascular: Negative for chest pain, palpitations and leg swelling  Gastrointestinal: Negative for abdominal pain, diarrhea, nausea and vomiting  Genitourinary: Negative for difficulty urinating, dysuria, flank pain and hematuria  Musculoskeletal: Positive for neck pain and neck stiffness  Negative for arthralgias, back pain and gait problem  Skin: Negative for color change, pallor and rash  Neurological: Negative for dizziness, light-headedness and headaches  Hematological: Negative for adenopathy  Does not bruise/bleed easily  Psychiatric/Behavioral: Negative for agitation and behavioral problems  Physical Exam  Physical Exam  Vitals and nursing note reviewed  Constitutional:       General: He is not in acute distress  Appearance: He is well-developed  He is not ill-appearing, toxic-appearing or diaphoretic  HENT:      Head: Normocephalic and atraumatic  Right Ear: Tympanic membrane, ear canal and external ear normal       Left Ear: Tympanic membrane, ear canal and external ear normal       Nose: Nose normal  No congestion or rhinorrhea  Mouth/Throat:      Mouth: Mucous membranes are moist       Pharynx: No oropharyngeal exudate or posterior oropharyngeal erythema  Eyes:      Extraocular Movements: Extraocular movements intact  Pupils: Pupils are equal, round, and reactive to light  Neck:      Vascular: No carotid bruit  Comments: L sternocleidomastoid muscle spasm , NO tenderness at the cervical spines  Cardiovascular:      Rate and Rhythm: Normal rate and regular rhythm  Heart sounds: Normal heart sounds  No murmur heard  No friction rub  No gallop  Pulmonary:      Effort: Pulmonary effort is normal  No respiratory distress  Breath sounds: Normal breath sounds  No wheezing, rhonchi or rales  Chest:      Chest wall: No tenderness  Abdominal:      General: Bowel sounds are normal  There is no distension  Palpations: Abdomen is soft  There is no mass  Tenderness: There is no abdominal tenderness  There is no right CVA tenderness, left CVA tenderness, guarding or rebound  Hernia: No hernia is present  Musculoskeletal:         General: No swelling, tenderness, deformity or signs of injury  Normal range of motion  Cervical back: Normal range of motion and neck supple  No rigidity or tenderness  Right lower leg: No edema  Left lower leg: No edema  Lymphadenopathy:      Cervical: No cervical adenopathy  Skin:     General: Skin is warm and dry  Capillary Refill: Capillary refill takes less than 2 seconds  Coloration: Skin is not jaundiced or pale  Findings: No bruising, erythema, lesion or rash  Neurological:      General: No focal deficit present  Mental Status: He is alert and oriented to person, place, and time     Psychiatric:         Behavior: Behavior normal          Vital Signs  ED Triage Vitals   Temperature Pulse Respirations Blood Pressure SpO2   03/26/22 0935 03/26/22 0933 03/26/22 0933 03/26/22 0933 03/26/22 0933   97 8 °F (36 6 °C) 73 19 123/66 99 % Temp Source Heart Rate Source Patient Position - Orthostatic VS BP Location FiO2 (%)   03/26/22 0935 03/26/22 0933 03/26/22 0933 03/26/22 0933 --   Oral Monitor Lying Left arm       Pain Score       03/26/22 1000       10 - Worst Possible Pain           Vitals:    03/26/22 0933 03/26/22 1043 03/26/22 1149   BP: 123/66 113/69 91/56   Pulse: 73 71 62   Patient Position - Orthostatic VS: Lying Lying Lying         Visual Acuity      ED Medications  Medications   ketorolac (TORADOL) injection 30 mg (30 mg Intramuscular Given 3/26/22 1000)   cyclobenzaprine (FLEXERIL) tablet 10 mg (10 mg Oral Given 3/26/22 1000)       Diagnostic Studies  Results Reviewed     None                 XR cervical spine 2 or 3 views   Final Result by Irwin Castro MD (03/26 1345)      No acute osseous abnormality  Straightening of cervical lordosis  Cervical degenerative changes  No acute osseous abnormality  Workstation performed: [id number]                    Procedures  Procedures         ED Course  ED Course as of 03/26/22 1840   Sat Mar 26, 2022   1137 X ray cervical spine shows; DJD  NO fracture or sublaxation                               SBIRT 20yo+      Most Recent Value   SBIRT (24 yo +)    In order to provide better care to our patients, we are screening all of our patients for alcohol and drug use  Would it be okay to ask you these screening questions? Yes Filed at: 03/26/2022 2582   Initial Alcohol Screen: US AUDIT-C     1  How often do you have a drink containing alcohol? 0 Filed at: 03/26/2022 0951   2  How many drinks containing alcohol do you have on a typical day you are drinking? 0 Filed at: 03/26/2022 0951   3a  Male UNDER 65: How often do you have five or more drinks on one occasion? 0 Filed at: 03/26/2022 0951   3b  FEMALE Any Age, or MALE 65+: How often do you have 4 or more drinks on one occassion?  0 Filed at: 03/26/2022 0951   Audit-C Score 0 Filed at: 03/26/2022 8209   CALI: How many times in the past year have you    Used an illegal drug or used a prescription medication for non-medical reasons? Never Filed at: 03/26/2022 1162                    [facility]  Number of Diagnoses or Management Options  Diagnosis management comments: A 45y old came for having neck spasm for 6 month, and take no meds  Pt on arrival asked to eat and he was sleeping at the gas station  Pt has x ray cervical spine , and it shows DJD  Pt felt better after receiving toradol and flexeril  Will discharge pt  Amount and/or Complexity of Data Reviewed  Tests in the radiology section of CPT®: ordered and reviewed    Risk of Complications, Morbidity, and/or Mortality  Presenting problems: moderate  Diagnostic procedures: low  Management options: moderate        Disposition  Final diagnoses:   Neck muscle spasm     Time reflects when diagnosis was documented in both MDM as applicable and the Disposition within this note     Time User Action Codes Description Comment    3/26/2022 11:47 AM Veverly Herve Add [M62 838] Neck muscle spasm     3/26/2022 11:48 AM Kimberly Mesa Add [U07 1] COVID-19     3/26/2022 11:48 AM Kimberly Mesa Modify [U07 1] COVID-19     3/26/2022 11:48 AM Kimberly Sandoval Modify [U07 1] COVID-19       ED Disposition     ED Disposition Condition Date/Time Comment    Discharge Stable Sat Mar 26, 2022 11:47 AM Kimberly Neeta discharge to home/self care              Follow-up Information     Follow up With Specialties Details Why Contact Info      In 1 week  follow up with your PMD    [facility]  Go to  As needed [address] [id number]          Discharge Medication List as of 3/26/2022 11:50 AM      START taking these medications    Details   cyclobenzaprine (FLEXERIL) 10 mg tablet Take 1 tablet (10 mg total) by mouth 2 (two) times a day as needed for muscle spasms, Starting Sat 3/26/2022, Normal         CONTINUE these medications which have CHANGED    Details ibuprofen (MOTRIN) 600 mg tablet Take 1 tablet (600 mg total) by mouth every 6 (six) hours as needed for fever or headaches, Starting Sat 3/26/2022, Normal         CONTINUE these medications which have NOT CHANGED    Details   ascorbic acid (VITAMIN C) 1000 MG tablet Take 1 tablet (1,000 mg total) by mouth 2 (two) times a day for 10 days, Starting Mon 1/17/2022, Until Thu 1/27/2022, Normal      calcium carbonate-vitamin D (OSCAL-D) 500 mg-200 units per tablet Take 4 tablets by mouth daily with breakfast for 10 days, Starting Mon 1/17/2022, Until Thu 1/27/2022, Normal      elvitegravir-cobicistat-emtricitabine-tenofovir (Stribild) [phone] mg Take 1 tablet by mouth daily with breakfast, Starting Thu 7/9/2020, Normal      levETIRAcetam (KEPPRA) 750 mg tablet Take 1 tablet (750 mg total) by mouth 2 (two) times a day, Starting Sat 1/15/2022, Until Mon 2/14/2022, Normal      Multiple Vitamin (multivitamin) capsule Take 1 capsule by mouth daily for 10 days, Starting Mon 1/17/2022, Until Thu 1/27/2022, Normal      nicotine (NICODERM CQ) 21 mg/24 hr TD 24 hr patch Place 1 patch on the skin daily, Starting Fri 11/19/2021, Normal      ondansetron (ZOFRAN) 4 mg tablet Take 1 tablet (4 mg total) by mouth every 6 (six) hours, Starting Mon 1/17/2022, Normal             No discharge procedures on file      PDMP Review       Value Time User    PDMP Reviewed  Yes 5/27/2020 12:37 AM LEBRON Montenegro          ED Provider  Electronically Signed by           Angus Montenegro MD  03/26/22 7236

## 2022-03-28 ENCOUNTER — HOSPITAL ENCOUNTER (EMERGENCY)
Facility: HOSPITAL | Age: 37
Discharge: HOME | End: 2022-03-28
Attending: EMERGENCY MEDICINE
Payer: MEDICARE

## 2022-03-28 VITALS
DIASTOLIC BLOOD PRESSURE: 84 MMHG | RESPIRATION RATE: 16 BRPM | BODY MASS INDEX: 24.21 KG/M2 | WEIGHT: 150 LBS | HEART RATE: 85 BPM | OXYGEN SATURATION: 96 % | TEMPERATURE: 98.4 F | SYSTOLIC BLOOD PRESSURE: 137 MMHG

## 2022-03-28 DIAGNOSIS — B20 HIV INFECTION, UNSPECIFIED SYMPTOM STATUS (HCC): ICD-10-CM

## 2022-03-28 DIAGNOSIS — G40.909 SEIZURE DISORDER (HCC): ICD-10-CM

## 2022-03-28 DIAGNOSIS — Z76.0 MEDICATION REFILL: Primary | ICD-10-CM

## 2022-03-28 PROCEDURE — 96365 THER/PROPH/DIAG IV INF INIT: CPT

## 2022-03-28 PROCEDURE — 99284 EMERGENCY DEPT VISIT MOD MDM: CPT

## 2022-03-28 PROCEDURE — 96366 THER/PROPH/DIAG IV INF ADDON: CPT

## 2022-03-28 PROCEDURE — 99284 EMERGENCY DEPT VISIT MOD MDM: CPT | Performed by: EMERGENCY MEDICINE

## 2022-03-28 RX ORDER — LEVETIRACETAM 750 MG/1
750 TABLET ORAL 2 TIMES DAILY
Qty: 60 TABLET | Refills: 0 | Status: SHIPPED | OUTPATIENT
Start: 2022-03-28

## 2022-03-28 RX ORDER — BICTEGRAVIR SODIUM, EMTRICITABINE, AND TENOFOVIR ALAFENAMIDE FUMARATE 50; 200; 25 MG/1; MG/1; MG/1
1 TABLET ORAL
Qty: 30 TABLET | Refills: 0 | Status: SHIPPED | OUTPATIENT
Start: 2022-03-28

## 2022-03-28 RX ORDER — BICTEGRAVIR SODIUM, EMTRICITABINE, AND TENOFOVIR ALAFENAMIDE FUMARATE 50; 200; 25 MG/1; MG/1; MG/1
1 TABLET ORAL DAILY
COMMUNITY
Start: 2022-03-22

## 2022-03-28 RX ORDER — LEVETIRACETAM 500 MG/1
1500 TABLET ORAL ONCE
Status: DISCONTINUED | OUTPATIENT
Start: 2022-03-28 | End: 2022-03-28

## 2022-03-28 RX ADMIN — LEVETIRACETAM 1500 MG: 100 INJECTION, SOLUTION INTRAVENOUS at 03:28

## 2022-03-28 RX ADMIN — BICTEGRAVIR SODIUM, EMTRICITABINE, AND TENOFOVIR ALAFENAMIDE FUMARATE 1 TABLET: 50; 200; 25 TABLET ORAL at 06:20

## 2022-03-28 NOTE — ED PROVIDER NOTES
History  Chief Complaint   Patient presents with    Seizure - Prior Hx Of     patient was at convenient store and felt like he may have a seizure, has not taken seizure meds in a few days due to not being available to him, patient reports he is homeless     51-year-old male with past medical history of seizure disorder, HIV, presents to the ED for medication refill  Patient states that he had a fight with his mom and left the house without taking his medications  He has been without his HIV and seizure medication for the past 3 days  Patient was at a local wall a earlier this evening  Patient felt like he was going to have a seizure  Patient preemptively called EMS and was brought to the ED for further evaluation  Patient did not have a seizure event yet  Patient has no other complaints  Patient is requesting refill of his HIV medication as well as his Keppra  Patient is currently homeless but feels safe  History provided by:  Patient  Seizure - Prior Hx Of      Prior to Admission Medications   Prescriptions Last Dose Informant Patient Reported?  Taking?   bictegravir-emtricitab-tenofovir alafenamide (Biktarvy) -25 MG tablet Unknown at Unknown time  Yes No   Sig: Take 1 tablet by mouth daily   cyclobenzaprine (FLEXERIL) 10 mg tablet Unknown at Unknown time  No No   Sig: Take 1 tablet (10 mg total) by mouth 2 (two) times a day as needed for muscle spasms   ibuprofen (MOTRIN) 600 mg tablet Not Taking at Unknown time  No No   Sig: Take 1 tablet (600 mg total) by mouth every 6 (six) hours as needed for fever or headaches   Patient not taking: Reported on 3/28/2022    levETIRAcetam (KEPPRA) 750 mg tablet Past Week at Unknown time  No Yes   Sig: Take 1 tablet (750 mg total) by mouth 2 (two) times a day      Facility-Administered Medications: None       Past Medical History:   Diagnosis Date    [facility] ([facility] immunodeficiency virus [facility]) ([facility] )     [facility]     Smoker     Syphilis        Past Surgical History:   Procedure Laterality Date    HERNIA REPAIR         History reviewed  No pertinent family history  I have reviewed and agree with the history as documented  E-Cigarette/Vaping    E-Cigarette Use Never User      E-Cigarette/Vaping Substances     Social History     Tobacco Use    Smoking status: Current Every Day Smoker     Packs/day: 1 00     Types: Cigarettes    Smokeless tobacco: Never Used   Vaping Use    Vaping Use: Never used   Substance Use Topics    Alcohol use: Not Currently     Comment: socially    Drug use: Yes     Types: Marijuana       Review of Systems   Constitutional: Negative for activity change, fatigue and fever  HENT: Negative for congestion, ear discharge and sore throat  Eyes: Negative for pain and redness  Respiratory: Negative for cough, chest tightness, shortness of breath and wheezing  Cardiovascular: Negative for chest pain  Gastrointestinal: Negative for abdominal pain, diarrhea, nausea and vomiting  Endocrine: Negative for cold intolerance  Genitourinary: Negative for dysuria and urgency  Musculoskeletal: Negative for arthralgias and back pain  Neurological: Negative for dizziness, weakness and headaches  Psychiatric/Behavioral: Negative for agitation and behavioral problems  Physical Exam  Physical Exam  Vitals and nursing note reviewed  Constitutional:       Appearance: He is well-developed  HENT:      Head: Normocephalic and atraumatic  Nose: Nose normal    Eyes:      Conjunctiva/sclera: Conjunctivae normal    Cardiovascular:      Rate and Rhythm: Normal rate and regular rhythm  Heart sounds: Normal heart sounds  Pulmonary:      Effort: Pulmonary effort is normal       Breath sounds: Normal breath sounds  Abdominal:      General: Bowel sounds are normal  There is no distension  Palpations: Abdomen is soft  Tenderness: There is no abdominal tenderness     Musculoskeletal:         General: Normal range of motion  Cervical back: Normal range of motion and neck supple  Skin:     General: Skin is warm  Neurological:      General: No focal deficit present  Mental Status: He is alert and oriented to person, place, and time  Psychiatric:         Mood and Affect: Mood normal          Behavior: Behavior normal          Thought Content: Thought content normal          Judgment: Judgment normal          Vital Signs  ED Triage Vitals [03/28/22 0255]   Temperature Pulse Respirations Blood Pressure SpO2   98 4 °F (36 9 °C) 85 16 137/84 96 %      Temp Source Heart Rate Source Patient Position - Orthostatic VS BP Location FiO2 (%)   Oral Monitor Lying Right arm --      Pain Score       --           Vitals:    03/28/22 0255   BP: 137/84   Pulse: 85   Patient Position - Orthostatic VS: Lying         Visual Acuity      ED Medications  Medications   levETIRAcetam (KEPPRA) 1,500 mg in sodium chloride 0 9 % 100 mL IVPB (has no administration in time range)   bictegravir-emtricitab-tenofovir alafenamide (BIKTARVY) -25 MG tablet 1 tablet (has no administration in time range)       Diagnostic Studies  Results Reviewed     None                 No orders to display              Procedures  Procedures         ED Course                                             MDM  Number of Diagnoses or Management Options  HIV infection, unspecified symptom status ([facility] ): new and requires workup  Medication refill: new and requires workup  Seizure disorder [facility]): new and requires workup     Amount and/or Complexity of Data Reviewed  Tests in the medicine section of CPT®: ordered and reviewed  Review and summarize past medical records: yes  Independent visualization of images, tracings, or specimens: yes    Risk of Complications, Morbidity, and/or Mortality  General comments: Keppra load as well as patient's HIV medications given in the ED    Patient given prescription for his HIV medication as well as his Keppra and discharged with follow-up to his physicians  Close return instructions given to return to the ER for any worsening symptoms  Patient agrees with discharge plan  Patient well appearing at time of discharge  Please Note: Fluency Direct voice recognition software may have been used in the creation of this document  Wrong words or sound a like substitutions may have occurred due to the inherent limitations of the voice software  Patient Progress  Patient progress: stable      Disposition  Final diagnoses:   Medication refill   Seizure disorder ([facility] )   HIV infection, unspecified symptom status ([facility] )     Time reflects when diagnosis was documented in both MDM as applicable and the Disposition within this note     Time User Action Codes Description Comment    3/28/2022  3:07 AM Avani Bonnieduncan Add [Z76 0] Medication refill     3/28/2022  3:07 AM Luisito Jacobs Add [G40 909] Seizure disorder ([facility] )     3/28/2022  3:14 AM Luisito Jacobs Add [B20] HIV infection, unspecified symptom status [facility])       ED Disposition     ED Disposition Condition Date/Time Comment    Discharge Stable Mon Mar 28, 2022  3:07 AM Pastor Acosta discharge to home/self care  Follow-up Information     Follow up With Specialties Details Why Contact Info    Your family doctor  Schedule an appointment as soon as possible for a visit             Patient's Medications   Discharge Prescriptions    BICTEGRAVIR-EMTRICITAB-TENOFOVIR ALAFENAMIDE (BIKTARVY) -25 MG TABLET    Take 1 tablet by mouth daily with breakfast       Start Date: 3/28/2022 End Date: --       Order Dose: 1 tablet       Quantity: 30 tablet    Refills: 0    LEVETIRACETAM (KEPPRA) 750 MG TABLET    Take 1 tablet (750 mg total) by mouth 2 (two) times a day       Start Date: 3/28/2022 End Date: --       Order Dose: 750 mg       Quantity: 60 tablet    Refills: 0       No discharge procedures on file      PDMP Review       Value Time User    PDMP Reviewed  Yes 5/27/2020 12:37 AM Gypsy Gregory, [address] [place] [address]          ED Provider  Electronically Signed by           Carlotta Tovar,   03/28/22 0982

## 2022-04-08 ENCOUNTER — HOSPITAL ENCOUNTER (EMERGENCY)
Facility: HOSPITAL | Age: 37
Discharge: HOME | End: 2022-04-08
Attending: EMERGENCY MEDICINE
Payer: MEDICARE

## 2022-04-08 ENCOUNTER — APPOINTMENT (EMERGENCY)
Dept: RADIOLOGY | Facility: HOSPITAL | Age: 37
End: 2022-04-08
Payer: MEDICARE

## 2022-04-08 VITALS
WEIGHT: 149.91 LBS | RESPIRATION RATE: 18 BRPM | HEART RATE: 104 BPM | SYSTOLIC BLOOD PRESSURE: 115 MMHG | OXYGEN SATURATION: 98 % | TEMPERATURE: 98.1 F | DIASTOLIC BLOOD PRESSURE: 80 MMHG | HEIGHT: 66 IN | BODY MASS INDEX: 24.09 KG/M2

## 2022-04-08 DIAGNOSIS — E87.6 HYPOKALEMIA: ICD-10-CM

## 2022-04-08 DIAGNOSIS — S43.402A SPRAIN OF LEFT SHOULDER: Primary | ICD-10-CM

## 2022-04-08 DIAGNOSIS — G40.919 BREAKTHROUGH SEIZURE (HCC): ICD-10-CM

## 2022-04-08 LAB
ALBUMIN SERPL BCP-MCNC: 4.6 G/DL (ref 3.5–5)
ALP SERPL-CCNC: 58 U/L (ref 34–104)
ALT SERPL W P-5'-P-CCNC: 21 U/L (ref 7–52)
ANION GAP SERPL CALCULATED.3IONS-SCNC: 21 MMOL/L (ref 4–13)
AST SERPL W P-5'-P-CCNC: 42 U/L (ref 13–39)
BACTERIA UR QL AUTO: NORMAL /HPF
BASOPHILS # BLD AUTO: 0.04 THOUSANDS/ΜL (ref 0–0.1)
BASOPHILS NFR BLD AUTO: 0 % (ref 0–1)
BILIRUB SERPL-MCNC: 0.47 MG/DL (ref 0.2–1)
BILIRUB UR QL STRIP: NEGATIVE
BUN SERPL-MCNC: 13 MG/DL (ref 5–25)
CALCIUM SERPL-MCNC: 9.1 MG/DL (ref 8.4–10.2)
CHLORIDE SERPL-SCNC: 100 MMOL/L (ref 96–108)
CLARITY UR: CLEAR
CO2 SERPL-SCNC: 14 MMOL/L (ref 21–32)
COLOR UR: YELLOW
CREAT SERPL-MCNC: 1.05 MG/DL (ref 0.6–1.3)
EOSINOPHIL # BLD AUTO: 0.04 THOUSAND/ΜL (ref 0–0.61)
EOSINOPHIL NFR BLD AUTO: 0 % (ref 0–6)
ERYTHROCYTE [DISTWIDTH] IN BLOOD BY AUTOMATED COUNT: 13.8 % (ref 11.6–15.1)
GFR SERPL CREATININE-BSD FRML MDRD: 90 ML/MIN/1.73SQ M
GLUCOSE SERPL-MCNC: 112 MG/DL (ref 65–140)
GLUCOSE UR STRIP-MCNC: NEGATIVE MG/DL
HCT VFR BLD AUTO: 44.2 % (ref 36.5–49.3)
HGB BLD-MCNC: 14.6 G/DL (ref 12–17)
HGB UR QL STRIP.AUTO: ABNORMAL
IMM GRANULOCYTES # BLD AUTO: 0.02 THOUSAND/UL (ref 0–0.2)
IMM GRANULOCYTES NFR BLD AUTO: 0 % (ref 0–2)
KETONES UR STRIP-MCNC: NEGATIVE MG/DL
LEUKOCYTE ESTERASE UR QL STRIP: NEGATIVE
LYMPHOCYTES # BLD AUTO: 4.89 THOUSANDS/ΜL (ref 0.6–4.47)
LYMPHOCYTES NFR BLD AUTO: 49 % (ref 14–44)
MCH RBC QN AUTO: 30 PG (ref 26.8–34.3)
MCHC RBC AUTO-ENTMCNC: 33 G/DL (ref 31.4–37.4)
MCV RBC AUTO: 91 FL (ref 82–98)
MONOCYTES # BLD AUTO: 0.82 THOUSAND/ΜL (ref 0.17–1.22)
MONOCYTES NFR BLD AUTO: 8 % (ref 4–12)
NEUTROPHILS # BLD AUTO: 4.37 THOUSANDS/ΜL (ref 1.85–7.62)
NEUTS SEG NFR BLD AUTO: 43 % (ref 43–75)
NITRITE UR QL STRIP: NEGATIVE
NON-SQ EPI CELLS URNS QL MICRO: NORMAL /HPF
NRBC BLD AUTO-RTO: 0 /100 WBCS
PH UR STRIP.AUTO: 6 [PH]
PLATELET # BLD AUTO: 174 THOUSANDS/UL (ref 149–390)
PMV BLD AUTO: 11.4 FL (ref 8.9–12.7)
POTASSIUM SERPL-SCNC: 3.1 MMOL/L (ref 3.5–5.3)
PROT SERPL-MCNC: 7.9 G/DL (ref 6.4–8.4)
PROT UR STRIP-MCNC: ABNORMAL MG/DL
RBC # BLD AUTO: 4.87 MILLION/UL (ref 3.88–5.62)
RBC #/AREA URNS AUTO: NORMAL /HPF
SODIUM SERPL-SCNC: 135 MMOL/L (ref 135–147)
SP GR UR STRIP.AUTO: 1.02 (ref 1–1.03)
UROBILINOGEN UR QL STRIP.AUTO: 0.2 E.U./DL
WBC # BLD AUTO: 10.18 THOUSAND/UL (ref 4.31–10.16)
WBC #/AREA URNS AUTO: NORMAL /HPF

## 2022-04-08 PROCEDURE — 99285 EMERGENCY DEPT VISIT HI MDM: CPT | Performed by: EMERGENCY MEDICINE

## 2022-04-08 PROCEDURE — 81001 URINALYSIS AUTO W/SCOPE: CPT | Performed by: EMERGENCY MEDICINE

## 2022-04-08 PROCEDURE — 36415 COLL VENOUS BLD VENIPUNCTURE: CPT | Performed by: EMERGENCY MEDICINE

## 2022-04-08 PROCEDURE — 85025 COMPLETE CBC W/AUTO DIFF WBC: CPT | Performed by: EMERGENCY MEDICINE

## 2022-04-08 PROCEDURE — 99284 EMERGENCY DEPT VISIT MOD MDM: CPT

## 2022-04-08 PROCEDURE — 80053 COMPREHEN METABOLIC PANEL: CPT | Performed by: EMERGENCY MEDICINE

## 2022-04-08 PROCEDURE — 96374 THER/PROPH/DIAG INJ IV PUSH: CPT

## 2022-04-08 PROCEDURE — 73030 X-RAY EXAM OF SHOULDER: CPT

## 2022-04-08 RX ORDER — POTASSIUM CHLORIDE 20 MEQ/1
40 TABLET, EXTENDED RELEASE ORAL ONCE
Status: COMPLETED | OUTPATIENT
Start: 2022-04-08 | End: 2022-04-08

## 2022-04-08 RX ORDER — LEVETIRACETAM 10 MG/ML
1000 INJECTION INTRAVASCULAR ONCE
Status: COMPLETED | OUTPATIENT
Start: 2022-04-08 | End: 2022-04-08

## 2022-04-08 RX ORDER — IBUPROFEN 600 MG/1
600 TABLET ORAL ONCE
Status: COMPLETED | OUTPATIENT
Start: 2022-04-08 | End: 2022-04-08

## 2022-04-08 RX ORDER — ACETAMINOPHEN 325 MG/1
975 TABLET ORAL ONCE
Status: COMPLETED | OUTPATIENT
Start: 2022-04-08 | End: 2022-04-08

## 2022-04-08 RX ADMIN — IBUPROFEN 600 MG: 600 TABLET ORAL at 13:14

## 2022-04-08 RX ADMIN — POTASSIUM CHLORIDE 40 MEQ: 1500 TABLET, EXTENDED RELEASE ORAL at 15:49

## 2022-04-08 RX ADMIN — ACETAMINOPHEN 975 MG: 325 TABLET ORAL at 13:14

## 2022-04-08 RX ADMIN — LEVETIRACETAM 1000 MG: 10 INJECTION INTRAVENOUS at 13:14

## 2022-04-08 NOTE — ED NOTES
Keysha provided for the pt to [address] [place] [address], [address] [place] [address],[address], as requested by the pt     Riya Bertrand RN  04/08/22 9199

## 2022-04-08 NOTE — ED PROVIDER NOTES
History  Chief Complaint   Patient presents with    Seizure - Prior Hx Of     Pt presents to ED after having seizure w/ prior hx of  Pt seen here many times for same  Pt A&O on arrival      51-year-old male, history of HIV, seizures, presents after he was at a store and ended up having a seizure  Patient reports that he missed his dose of Keppra yesterday, otherwise he takes his Keppra every day any took today  Patient reports he did not fall down or hit his head, it was a witnessed seizure, he did not have any oral trauma or incontinence, patient is now complaining of pain in his left shoulder, but denies any recent fever, headache, dizziness, cough, shortness of breath, abdominal pain, nausea, vomiting, chest pain, constipation or diarrhea, dysuria, hematuria, or other complaints  Patient drinks occasionally, smokes a pack a day, regularly uses marijuana  Prior to Admission Medications   Prescriptions Last Dose Informant Patient Reported?  Taking?   bictegravir-emtricitab-tenofovir alafenamide (Biktarvy) -25 MG tablet   Yes No   Sig: Take 1 tablet by mouth daily   bictegravir-emtricitab-tenofovir alafenamide (Biktarvy) -25 MG tablet   No No   Sig: Take 1 tablet by mouth daily with breakfast   cyclobenzaprine (FLEXERIL) 10 mg tablet   No No   Sig: Take 1 tablet (10 mg total) by mouth 2 (two) times a day as needed for muscle spasms   ibuprofen (MOTRIN) 600 mg tablet   No No   Sig: Take 1 tablet (600 mg total) by mouth every 6 (six) hours as needed for fever or headaches   Patient not taking: Reported on 3/28/2022    levETIRAcetam (KEPPRA) 750 mg tablet   No No   Sig: Take 1 tablet (750 mg total) by mouth 2 (two) times a day   levETIRAcetam (Keppra) 750 mg tablet   No No   Sig: Take 1 tablet (750 mg total) by mouth 2 (two) times a day      Facility-Administered Medications: None       Past Medical History:   Diagnosis Date    HIV (human immunodeficiency virus infection) ([facility] )     Se[facility]ures ([facility] [address] )     Smoker     Syphilis        Past Surgical History:   Procedure Laterality Date    HERNIA REPAIR         No family history on file  I have reviewed and agree with the history as documented  E-Cigarette/Vaping    E-Cigarette Use Never User      E-Cigarette/Vaping Substances     Social History     Tobacco Use    Smoking status: Current Every Day Smoker     Packs/day: 1 00     Types: Cigarettes    Smokeless tobacco: Never Used   Vaping Use    Vaping Use: Never used   Substance Use Topics    Alcohol use: Yes     Alcohol/week: 2 0 standard drinks     Types: 1 Glasses of wine, 1 Cans of beer per week    Drug use: Yes     Types: Marijuana       Review of Systems   Constitutional: Negative for fever  HENT: Negative for congestion  Eyes: Negative for visual disturbance  Respiratory: Negative for cough and shortness of breath  Cardiovascular: Negative for chest pain  Gastrointestinal: Negative for abdominal pain, diarrhea and vomiting  Endocrine: Negative for polyuria  Genitourinary: Negative for dysuria and hematuria  Musculoskeletal: Positive for arthralgias  Negative for myalgias  Neurological: Positive for seizures  Negative for dizziness and headaches  Physical Exam  Physical Exam  Constitutional:       Appearance: Normal appearance  HENT:      Head: Normocephalic and atraumatic  Right Ear: External ear normal       Left Ear: External ear normal       Mouth/Throat:      Mouth: Mucous membranes are moist       Pharynx: Oropharynx is clear  Eyes:      Conjunctiva/sclera: Conjunctivae normal       Pupils: Pupils are equal, round, and reactive to light  Cardiovascular:      Rate and Rhythm: Normal rate and regular rhythm  Heart sounds: Normal heart sounds  Pulmonary:      Breath sounds: Normal breath sounds  Abdominal:      General: Abdomen is flat  There is no distension  Palpations: Abdomen is soft  Musculoskeletal:         General: No deformity  Normal range of motion  Cervical back: Normal range of motion  Comments: Tenderness to palpation over the left glenohumeral joint, with pain with passive range of motion of the joint  Skin:     General: Skin is warm and dry  Neurological:      General: No focal deficit present  Mental Status: He is alert and oriented to person, place, and time  Cranial Nerves: No cranial nerve deficit  Sensory: No sensory deficit  Motor: No weakness     Psychiatric:         Mood and Affect: Mood normal          Behavior: Behavior normal          Vital Signs  ED Triage Vitals   Temperature Pulse Respirations Blood Pressure SpO2   04/08/22 1235 04/08/22 1235 04/08/22 1235 04/08/22 1236 04/08/22 1235   98 1 °F (36 7 °C) (!) 109 16 126/99 95 %      Temp src Heart Rate Source Patient Position - Orthostatic VS BP Location FiO2 (%)   -- 04/08/22 1400 04/08/22 1400 04/08/22 1400 --    Monitor Sitting Left arm       Pain Score       --                  Vitals:    04/08/22 1235 04/08/22 1236 04/08/22 1400   BP:  126/99 115/80   Pulse: (!) 109  104   Patient Position - Orthostatic VS:   Sitting         Visual Acuity  Visual Acuity      Most Recent Value   L Pupil Size (mm) 2   R Pupil Size (mm) 2          ED Medications  Medications   levetiracetam in NaCl (KEPPRA) infusion 1,000 mg (1,000 mg Intravenous Given 4/8/22 1314)   acetaminophen (TYLENOL) tablet 975 mg (975 mg Oral Given 4/8/22 1314)   ibuprofen (MOTRIN) tablet 600 mg (600 mg Oral Given 4/8/22 1314)   potassium chloride (K-DUR,KLOR-CON) CR tablet 40 mEq (40 mEq Oral Given 4/8/22 1549)       Diagnostic Studies  Results Reviewed     Procedure Component Value Units Date/Time    Urine Microscopic [[id number]]  (Normal) Collected: 04/08/22 1337    Lab Status: Final result Specimen: Urine, Clean Catch Updated: 04/08/22 1414     RBC, UA None Seen /hpf      WBC, UA 0-1 /hpf      Epithelial Cells None Seen /hpf      Bacteria, UA None Seen /hpf     UA (URINE) with reflex to Scope [[id number]]  (Abnormal) Collected: 04/08/22 1337    Lab Status: Final result Specimen: Urine, Clean Catch Updated: 04/08/22 1359     Color, UA Yellow     Clarity, UA Clear     Specific Gravity, UA 1 025     pH, UA 6 0     Leukocytes, UA Negative     Nitrite, UA Negative     Protein, UA Trace mg/dl      Glucose, UA Negative mg/dl      Ketones, UA Negative mg/dl      Urobilinogen, UA 0 2 E U /dl      Bilirubin, UA Negative     Blood, UA 1+    Comprehensive metabolic panel [[id number]]  (Abnormal) Collected: 04/08/22 1307    Lab Status: Final result Specimen: Blood from Arm, Right Updated: 04/08/22 1339     Sodium 135 mmol/L      Potassium 3 1 mmol/L      Chloride 100 mmol/L      CO2 14 mmol/L      ANION GAP 21 mmol/L      BUN 13 mg/dL      Creatinine 1 05 mg/dL      Glucose 112 mg/dL      Calcium 9 1 mg/dL      AST 42 U/L      ALT 21 U/L      Alkaline Phosphatase 58 U/L      Total Protein 7 9 g/dL      Albumin 4 6 g/dL      Total Bilirubin 0 47 mg/dL      eGFR 90 ml/min/1 73sq m     Narrative:      [place] guidelines for Chronic Kidney Disease (CKD):     Stage 1 with normal or high GFR (GFR > 90 mL/min/1 73 square meters)    Stage 2 Mild CKD (GFR = 60-89 mL/min/1 73 square meters)    Stage 3A Moderate CKD (GFR = 45-59 mL/min/1 73 square meters)    Stage 3B Moderate CKD (GFR = 30-44 mL/min/1 73 square meters)    Stage 4 Severe CKD (GFR = 15-29 mL/min/1 73 square meters)    Stage 5 End Stage CKD (GFR <15 mL/min/1 73 square meters)  Note: GFR calculation is accurate only with a steady state creatinine    CBC and differential [[id number]]  (Abnormal) Collected: 04/08/22 1307    Lab Status: Final result Specimen: Blood from Arm, Right Updated: 04/08/22 1323     WBC 10 18 Thousand/uL      RBC 4 87 Million/uL      Hemoglobin 14 6 g/dL      Hematocrit 44 2 %      MCV 91 fL      MCH 30 0 pg      MCHC 33 0 g/dL      RDW 13 8 %      MPV 11 4 fL      Platelets 949 Thousands/uL nRBC 0 /100 WBCs      Neutrophils Relative 43 %      Immat GRANS % 0 %      Lymphocytes Relative 49 %      Monocytes Relative 8 %      Eosinophils Relative 0 %      Basophils Relative 0 %      Neutrophils Absolute 4 37 Thousands/µL      Immature Grans Absolute 0 02 Thousand/uL      Lymphocytes Absolute 4 89 Thousands/µL      Monocytes Absolute 0 82 Thousand/µL      Eosinophils Absolute 0 04 Thousand/µL      Basophils Absolute 0 04 Thousands/µL                  XR shoulder 2+ views LEFT   Final Result by Paty Bojorquez MD (04/08 1429)      No acute osseous abnormality  Workstation performed: [id number]                    Procedures  Procedures         ED Course         MDM  Number of Diagnoses or Management Options  Breakthrough seizure ([facility] )  Hypokalemia  Sprain of left shoulder  Diagnosis management comments: Patient's workup is remarkable for hypokalemia which will be repleted, imaging was unremarkable, patient placed into a sling and will be discharged with follow-up with orthopedic surgery for further management of shoulders brain, return indications given  Patient is safe for discharge home  Patient was given an additional dose of his antiseizure medications are discharge for breakthrough seizure  Disposition  Final diagnoses:   Sprain of left shoulder   Breakthrough seizure ([facility] )   Hypokalemia     Time reflects when diagnosis was documented in both MDM as applicable and the Disposition within this note     Time User Action Codes Description Comment    4/8/2022  2:45 PM Dannial Leyland Add [H48 821F] Sprain of left shoulder     4/8/2022  2:46 PM Dannial Leyland Add [G40 919] Breakthrough seizure ([facility] )     4/8/2022  2:48 PM Dannial Leyland Add [E87 6] Hypokalemia       ED Disposition     ED Disposition Condition Date/Time Comment    Discharge Stable Fri Apr 8, 2022  2:45 PM Jose Aviles discharge to home/self care              Follow-up Information     Follow up With Specialties Details Why Contact Info Additional Information    MIC[place]ANO Jessica Cooper [address] Emergency Department Emergency Medicine Go to  As needed [address] [address]  [address] [facility] Emergency Department, [address] Family Medicine Schedule an appointment as soon as possible for a visit in 3 days For follow-up [phone] [facility], [facility] [address], [facility] [place] [facility], [facility]   [phone]    Yo Berumen MD Neurology Schedule an appointment as soon as possible for a visit in 3 days For follow-up [address] [phone]       [address] Specialists [facility] Orthopedic Surgery Schedule an appointment as soon as possible for a visit in 3 days For follow-up [address] [address] [phone] [address] [facility] [address] [facility] 97, 44[address] [facility] [place] [phone]          Discharge Medication List as of 4/8/2022  2:48 PM      CONTINUE these medications which have NOT CHANGED    Details   !! bictegravir-emtricitab-tenofovir alafenamide (Biktarvy) -25 MG tablet Take 1 tablet by mouth daily, Starting Tue 3/22/2022, Historical Med      !! bictegravir-emtricitab-tenofovir alafenamide (Biktarvy) -25 MG tablet Take 1 tablet by mouth daily with breakfast, Starting Mon 3/28/2022, Normal      cyclobenzaprine (FLEXERIL) 10 mg tablet Take 1 tablet (10 mg total) by mouth 2 (two) times a day as needed for muscle spasms, Starting Sat 3/26/2022, Normal      ibuprofen (MOTRIN) 600 mg tablet Take 1 tablet (600 mg total) by mouth every 6 (six) hours as needed for fever or headaches, Starting Sat 3/26/2022, Normal      levETIRAcetam (Keppra) 750 mg tablet Take 1 tablet (750 mg total) by mouth 2 (two) times a day, Starting Mon 3/28/2022, Normal       !! - Potential duplicate medications found  Please discuss with provider  No discharge procedures on file      PDMP Review       Value Time User    PDMP Reviewed  Yes 5/27/2020 12:37 AM LEBRON Chacon          ED Provider  Electronically Signed by           Dominic Tracey MD  04/15/22 8370

## 2022-06-26 ENCOUNTER — HOSPITAL ENCOUNTER (EMERGENCY)
Facility: HOSPITAL | Age: 37
Discharge: HOME | End: 2022-06-26
Attending: EMERGENCY MEDICINE
Payer: MEDICARE

## 2022-06-26 VITALS
SYSTOLIC BLOOD PRESSURE: 99 MMHG | DIASTOLIC BLOOD PRESSURE: 66 MMHG | TEMPERATURE: 98.1 F | RESPIRATION RATE: 18 BRPM | HEART RATE: 95 BPM | OXYGEN SATURATION: 97 %

## 2022-06-26 DIAGNOSIS — E87.6 HYPOKALEMIA: ICD-10-CM

## 2022-06-26 DIAGNOSIS — R53.83 FATIGUE: Primary | ICD-10-CM

## 2022-06-26 LAB
ALBUMIN SERPL BCP-MCNC: 4.5 G/DL (ref 3.5–5)
ALP SERPL-CCNC: 63 U/L (ref 34–104)
ALT SERPL W P-5'-P-CCNC: 14 U/L (ref 7–52)
ANION GAP SERPL CALCULATED.3IONS-SCNC: 8 MMOL/L (ref 4–13)
AST SERPL W P-5'-P-CCNC: 22 U/L (ref 13–39)
BASOPHILS # BLD AUTO: 0.03 THOUSANDS/ΜL (ref 0–0.1)
BASOPHILS NFR BLD AUTO: 0 % (ref 0–1)
BILIRUB SERPL-MCNC: 0.56 MG/DL (ref 0.2–1)
BILIRUB UR QL STRIP: NEGATIVE
BUN SERPL-MCNC: 12 MG/DL (ref 5–25)
CALCIUM SERPL-MCNC: 9.3 MG/DL (ref 8.4–10.2)
CHLORIDE SERPL-SCNC: 101 MMOL/L (ref 96–108)
CLARITY UR: CLEAR
CO2 SERPL-SCNC: 28 MMOL/L (ref 21–32)
COLOR UR: YELLOW
CREAT SERPL-MCNC: 0.87 MG/DL (ref 0.6–1.3)
EOSINOPHIL # BLD AUTO: 0.17 THOUSAND/ΜL (ref 0–0.61)
EOSINOPHIL NFR BLD AUTO: 3 % (ref 0–6)
ERYTHROCYTE [DISTWIDTH] IN BLOOD BY AUTOMATED COUNT: 13.4 % (ref 11.6–15.1)
GFR SERPL CREATININE-BSD FRML MDRD: 111 ML/MIN/1.73SQ M
GLUCOSE SERPL-MCNC: 96 MG/DL (ref 65–140)
GLUCOSE UR STRIP-MCNC: NEGATIVE MG/DL
HCT VFR BLD AUTO: 45.2 % (ref 36.5–49.3)
HGB BLD-MCNC: 15.5 G/DL (ref 12–17)
HGB UR QL STRIP.AUTO: NEGATIVE
IMM GRANULOCYTES # BLD AUTO: 0 THOUSAND/UL (ref 0–0.2)
IMM GRANULOCYTES NFR BLD AUTO: 0 % (ref 0–2)
KETONES UR STRIP-MCNC: NEGATIVE MG/DL
LEUKOCYTE ESTERASE UR QL STRIP: NEGATIVE
LYMPHOCYTES # BLD AUTO: 2.59 THOUSANDS/ΜL (ref 0.6–4.47)
LYMPHOCYTES NFR BLD AUTO: 38 % (ref 14–44)
MCH RBC QN AUTO: 31.2 PG (ref 26.8–34.3)
MCHC RBC AUTO-ENTMCNC: 34.3 G/DL (ref 31.4–37.4)
MCV RBC AUTO: 91 FL (ref 82–98)
MONOCYTES # BLD AUTO: 0.73 THOUSAND/ΜL (ref 0.17–1.22)
MONOCYTES NFR BLD AUTO: 11 % (ref 4–12)
NEUTROPHILS # BLD AUTO: 3.28 THOUSANDS/ΜL (ref 1.85–7.62)
NEUTS SEG NFR BLD AUTO: 48 % (ref 43–75)
NITRITE UR QL STRIP: NEGATIVE
NRBC BLD AUTO-RTO: 0 /100 WBCS
PH UR STRIP.AUTO: 6 [PH]
PLATELET # BLD AUTO: 158 THOUSANDS/UL (ref 149–390)
PMV BLD AUTO: 11.1 FL (ref 8.9–12.7)
POTASSIUM SERPL-SCNC: 3 MMOL/L (ref 3.5–5.3)
PROT SERPL-MCNC: 8 G/DL (ref 6.4–8.4)
PROT UR STRIP-MCNC: NEGATIVE MG/DL
RBC # BLD AUTO: 4.97 MILLION/UL (ref 3.88–5.62)
SODIUM SERPL-SCNC: 137 MMOL/L (ref 135–147)
SP GR UR STRIP.AUTO: <=1.005 (ref 1–1.03)
UROBILINOGEN UR QL STRIP.AUTO: 0.2 E.U./DL
WBC # BLD AUTO: 6.8 THOUSAND/UL (ref 4.31–10.16)

## 2022-06-26 PROCEDURE — 99284 EMERGENCY DEPT VISIT MOD MDM: CPT

## 2022-06-26 PROCEDURE — 36415 COLL VENOUS BLD VENIPUNCTURE: CPT | Performed by: EMERGENCY MEDICINE

## 2022-06-26 PROCEDURE — 80177 DRUG SCRN QUAN LEVETIRACETAM: CPT | Performed by: EMERGENCY MEDICINE

## 2022-06-26 PROCEDURE — 96367 TX/PROPH/DG ADDL SEQ IV INF: CPT

## 2022-06-26 PROCEDURE — 85025 COMPLETE CBC W/AUTO DIFF WBC: CPT | Performed by: EMERGENCY MEDICINE

## 2022-06-26 PROCEDURE — 96366 THER/PROPH/DIAG IV INF ADDON: CPT

## 2022-06-26 PROCEDURE — 81003 URINALYSIS AUTO W/O SCOPE: CPT | Performed by: EMERGENCY MEDICINE

## 2022-06-26 PROCEDURE — 96365 THER/PROPH/DIAG IV INF INIT: CPT

## 2022-06-26 PROCEDURE — 80053 COMPREHEN METABOLIC PANEL: CPT | Performed by: EMERGENCY MEDICINE

## 2022-06-26 PROCEDURE — 99284 EMERGENCY DEPT VISIT MOD MDM: CPT | Performed by: EMERGENCY MEDICINE

## 2022-06-26 RX ORDER — POTASSIUM CHLORIDE 20 MEQ/1
40 TABLET, EXTENDED RELEASE ORAL ONCE
Status: COMPLETED | OUTPATIENT
Start: 2022-06-26 | End: 2022-06-26

## 2022-06-26 RX ORDER — MORPHINE SULFATE 4 MG/ML
4 INJECTION, SOLUTION INTRAMUSCULAR; INTRAVENOUS ONCE
Status: DISCONTINUED | OUTPATIENT
Start: 2022-06-26 | End: 2022-06-26

## 2022-06-26 RX ORDER — POTASSIUM CHLORIDE 14.9 MG/ML
20 INJECTION INTRAVENOUS ONCE
Status: COMPLETED | OUTPATIENT
Start: 2022-06-26 | End: 2022-06-26

## 2022-06-26 RX ADMIN — POTASSIUM CHLORIDE 40 MEQ: 1500 TABLET, EXTENDED RELEASE ORAL at 11:36

## 2022-06-26 RX ADMIN — LEVETIRACETAM 750 MG: 100 INJECTION, SOLUTION INTRAVENOUS at 10:56

## 2022-06-26 RX ADMIN — SODIUM CHLORIDE 500 ML: 0.9 INJECTION, SOLUTION INTRAVENOUS at 11:37

## 2022-06-26 RX ADMIN — POTASSIUM CHLORIDE 20 MEQ: 14.9 INJECTION, SOLUTION INTRAVENOUS at 11:38

## 2022-06-26 NOTE — ED PROVIDER NOTES
History  Chief Complaint   Patient presents with    Seizure - Prior Hx Of     Brought by [facility], felt like he was going to have seizure, no seizure activity, drinking last night, did not take seizure medication x 2 days, smoked pot last night     To er with concern for seizure aura  He states he was in a coffee shop and got very tired and this is usually what occurs before a seizure  He was out last night, had one large alc slushie and smoked some thc  Denies ha, fever, chills, recent illness, trauma  reported he has not taken his seizure meds in two days  Hx of hiv  Does not feel sick no fever chills, ha, neck stiffness, abd pain, uri sx, uti sx, gi sx  Denies focal neuro sx  Prior to Admission Medications   Prescriptions Last Dose Informant Patient Reported? Taking?   bictegravir-emtricitab-tenofovir alafenamide (Biktarvy) -25 MG tablet   Yes Yes   Sig: Take 1 tablet by mouth daily   bictegravir-emtricitab-tenofovir alafenamide (Biktarvy) -25 MG tablet   No Yes   Sig: Take 1 tablet by mouth daily with breakfast   cyclobenzaprine (FLEXERIL) 10 mg tablet   No Yes   Sig: Take 1 tablet (10 mg total) by mouth 2 (two) times a day as needed for muscle spasms   ibuprofen (MOTRIN) 600 mg tablet Not Taking at Unknown time  No No   Sig: Take 1 tablet (600 mg total) by mouth every 6 (six) hours as needed for fever or headaches   Patient not taking: No sig reported   levETIRAcetam (Keppra) 750 mg tablet   No Yes   Sig: Take 1 tablet (750 mg total) by mouth 2 (two) times a day      Facility-Administered Medications: None       Past Medical History:   Diagnosis Date    [facility]) [facility] [address] )     [facility]     Smoker     Syphilis        Past Surgical History:   Procedure Laterality Date    HERNIA REPAIR         History reviewed  No pertinent family history  I have reviewed and agree with the history as documented      E-Cigarette/Vaping    E-Cigarette Use Never User      E-Cigarette/Vaping Substances     Social History     Tobacco Use    Smoking status: Current Every Day Smoker     Packs/day: 1 00     Types: Cigarettes    Smokeless tobacco: Never Used   Vaping Use    Vaping Use: Never used   Substance Use Topics    Alcohol use: Yes     Alcohol/week: 2 0 standard drinks     Types: 1 Glasses of wine, 1 Cans of beer per week    Drug use: Yes     Types: Marijuana       Review of Systems   All other systems reviewed and are negative  Physical Exam  Physical Exam  Constitutional:       General: He is not in acute distress  Appearance: Normal appearance  He is normal weight  He is not ill-appearing, toxic-appearing or diaphoretic  HENT:      Head: Normocephalic and atraumatic  Right Ear: External ear normal       Left Ear: External ear normal       Nose: Nose normal       Mouth/Throat:      Mouth: Mucous membranes are moist       Pharynx: No oropharyngeal exudate or posterior oropharyngeal erythema  Eyes:      General: No scleral icterus  Right eye: No discharge  Left eye: No discharge  Conjunctiva/sclera: Conjunctivae normal       Pupils: Pupils are equal, round, and reactive to light  Neck:      Vascular: No JVD  Trachea: No tracheal deviation  Cardiovascular:      Rate and Rhythm: Normal rate and regular rhythm  Pulses: Normal pulses  Heart sounds: Normal heart sounds  No murmur heard  No friction rub  No gallop  Pulmonary:      Effort: Pulmonary effort is normal  No respiratory distress  Breath sounds: Normal breath sounds  No stridor  No wheezing, rhonchi or rales  Chest:      Chest wall: No tenderness  Abdominal:      General: Abdomen is flat  Bowel sounds are normal  There is no distension  Palpations: Abdomen is soft  There is no mass  Tenderness: There is no abdominal tenderness  There is no right CVA tenderness, left CVA tenderness, guarding or rebound        Hernia: No hernia is present  Musculoskeletal:         General: No swelling, tenderness, deformity or signs of injury  Normal range of motion  Cervical back: Normal range of motion and neck supple  No rigidity or tenderness  Right lower leg: No edema  Left lower leg: No edema  Skin:     General: Skin is warm and dry  Capillary Refill: Capillary refill takes less than 2 seconds  Coloration: Skin is not jaundiced or pale  Findings: No bruising, erythema, lesion or rash  Neurological:      General: No focal deficit present  Mental Status: He is alert and oriented to person, place, and time  Cranial Nerves: No cranial nerve deficit  Sensory: No sensory deficit  Motor: No weakness or abnormal muscle tone        Coordination: Coordination normal       Gait: Gait normal       Deep Tendon Reflexes: Reflexes normal          Vital Signs  ED Triage Vitals [06/26/22 1032]   Temperature Pulse Respirations Blood Pressure SpO2   98 1 °F (36 7 °C) 77 18 123/74 100 %      Temp Source Heart Rate Source Patient Position - Orthostatic VS BP Location FiO2 (%)   Oral Monitor -- Left arm --      Pain Score       No Pain           Vitals:    06/26/22 1032 06/26/22 1120 06/26/22 1213 06/26/22 1305   BP: 123/74 114/66 116/64 99/66   Pulse: 77 58 64 95         Visual Acuity      ED Medications  Medications   levETIRAcetam (KEPPRA) 750 mg in sodium chloride 0 9 % 100 mL IVPB (0 mg Intravenous Stopped 6/26/22 1118)   potassium chloride 20 mEq IVPB (premix) (0 mEq Intravenous Stopped 6/26/22 1331)   potassium chloride (K-DUR,KLOR-CON) CR tablet 40 mEq (40 mEq Oral Given 6/26/22 1136)   sodium chloride 0 9 % bolus 500 mL (0 mL Intravenous Stopped 6/26/22 1220)       Diagnostic Studies  Results Reviewed     Procedure Component Value Units Date/Time    Comprehensive metabolic panel [[id number]]  (Abnormal) Collected: 06/26/22 1042    Lab Status: Final result Specimen: Blood from Arm, Left Updated: 06/26/22 1113 Sodium 137 mmol/L      Potassium 3 0 mmol/L      Chloride 101 mmol/L      CO2 28 mmol/L      ANION GAP 8 mmol/L      BUN 12 mg/dL      Creatinine 0 87 mg/dL      Glucose 96 mg/dL      Calcium 9 3 mg/dL      AST 22 U/L      ALT 14 U/L      Alkaline Phosphatase 63 U/L      Total Protein 8 0 g/dL      Albumin 4 5 g/dL      Total Bilirubin 0 56 mg/dL      eGFR 111 ml/min/1 73sq m     Narrative:      National Kidney Disease Foundation guidelines for Chronic Kidney Disease (CKD):     Stage 1 with normal or high GFR (GFR > 90 mL/min/1 73 square meters)    Stage 2 Mild CKD (GFR = 60-89 mL/min/1 73 square meters)    Stage 3A Moderate CKD (GFR = 45-59 mL/min/1 73 square meters)    Stage 3B Moderate CKD (GFR = 30-44 mL/min/1 73 square meters)    Stage 4 Severe CKD (GFR = 15-29 mL/min/1 73 square meters)    Stage 5 End Stage CKD (GFR <15 mL/min/1 73 square meters)  Note: GFR calculation is accurate only with a steady state creatinine    UA w Reflex to Microscopic w Reflex to Culture [[id number]]  (Normal) Collected: 06/26/22 1044    Lab Status: Final result Specimen: Urine, Clean Catch Updated: 06/26/22 1102     Color, UA Yellow     Clarity, UA Clear     Specific Gravity, UA <=1 005     pH, UA 6 0     Leukocytes, UA Negative     Nitrite, UA Negative     Protein, UA Negative mg/dl      Glucose, UA Negative mg/dl      Ketones, UA Negative mg/dl      Urobilinogen, UA 0 2 E U /dl      Bilirubin, UA Negative     Occult Blood, UA Negative    CBC and differential [[id number]] Collected: 06/26/22 1042    Lab Status: Final result Specimen: Blood from Arm, Left Updated: 06/26/22 1052     WBC 6 80 Thousand/uL      RBC 4 97 Million/uL      Hemoglobin 15 5 g/dL      Hematocrit 45 2 %      MCV 91 fL      MCH 31 2 pg      MCHC 34 3 g/dL      RDW 13 4 %      MPV 11 1 fL      Platelets 885 Thousands/uL      nRBC 0 /100 WBCs      Neutrophils Relative 48 %      Immat GRANS % 0 %      Lymphocytes Relative 38 %      Monocytes Relative 11 % Eosinophils Relative 3 %      Basophils Relative 0 %      Neutrophils Absolute 3 28 Thousands/µL      Immature Grans Absolute 0 00 Thousand/uL      Lymphocytes Absolute 2 59 Thousands/µL      Monocytes Absolute 0 73 Thousand/µL      Eosinophils Absolute 0 17 Thousand/µL      Basophils Absolute 0 03 Thousands/µL     Levetiracetam level [[id number]] Collected: 06/26/22 1042    Lab Status: In process Specimen: Blood from Arm, Left Updated: 06/26/22 1050                 No orders to display              Procedures  Procedures         ED Course                                             MDM  Number of Diagnoses or Management Options  Diagnosis management comments: To er with concern for aura, he felt tired, prior to seizure that has not yet occurred  No seizure today  He reported non compliance with his keppra x2 days  In er he was given keppra and labs checked, K low and is being repleted  His labs are otherwise reassuring  Will plan to dc home with pcp fu  He reported he was plenty of keppra at home and does not need this refilled  He will return with red flags, sx  I have addressed all red flags, need for fu and when to return to er and she has voiced understanding  Disposition  Final diagnoses:   Fatigue   Hypokalemia     Time reflects when diagnosis was documented in both MDM as applicable and the Disposition within this note     Time User Action Codes Description Comment    6/26/2022  1:14 PM Sandra Bojorquez [R53 83] Fatigue     6/26/2022  1:14 PM Sandra Bojorquez [E87 6] Hypokalemia       ED Disposition     ED Disposition   Discharge    Condition   Stable    Date/Time   Sun Jun 26, 2022  1:14 PM    Comment   Noé Siegel discharge to home/self care                 Follow-up Information     Follow up With Specialties Details Why Contact Info Additional [id number]  Emergency Department Emergency Medicine  If symptoms worsen [address] [facility],[address] [facility] Emergency Department, [address] [facility], [address] [place] [address] Rd          Discharge Medication List as of 6/26/2022  1:16 PM      CONTINUE these medications which have NOT CHANGED    Details   !! bictegravir-emtricitab-tenofovir alafenamide (Biktarvy) -25 MG tablet Take 1 tablet by mouth daily, Starting Tue 3/22/2022, Historical Med      !! bictegravir-emtricitab-tenofovir alafenamide (Biktarvy) -25 MG tablet Take 1 tablet by mouth daily with breakfast, Starting Mon 3/28/2022, Normal      cyclobenzaprine (FLEXERIL) 10 mg tablet Take 1 tablet (10 mg total) by mouth 2 (two) times a day as needed for muscle spasms, Starting Sat 3/26/2022, Normal      levETIRAcetam (Keppra) 750 mg tablet Take 1 tablet (750 mg total) by mouth 2 (two) times a day, Starting Mon 3/28/2022, Normal      ibuprofen (MOTRIN) 600 mg tablet Take 1 tablet (600 mg total) by mouth every 6 (six) hours as needed for fever or headaches, Starting Sat 3/26/2022, Normal       !! - Potential duplicate medications found  Please discuss with provider  No discharge procedures on file      PDMP Review       Value Time User    PDMP Reviewed  Yes 5/27/2020 12:37 AM LEBRON Garcia          ED Provider  Electronically Signed by           Santiago Mayer MD  06/26/22 2996

## 2022-06-26 NOTE — ED NOTES
Pt awoken, aware to call for ride as he will be discharged after potassium infusion   Pt stated "I hear you"     Francoise Castellanos, RN  06/26/22 2601

## 2022-06-29 ENCOUNTER — HOSPITAL ENCOUNTER (EMERGENCY)
Facility: HOSPITAL | Age: 37
Discharge: HOME | End: 2022-06-29
Attending: EMERGENCY MEDICINE
Payer: MEDICARE

## 2022-06-29 ENCOUNTER — APPOINTMENT (EMERGENCY)
Dept: RADIOLOGY | Facility: HOSPITAL | Age: 37
End: 2022-06-29
Payer: MEDICARE

## 2022-06-29 ENCOUNTER — APPOINTMENT (EMERGENCY)
Dept: CT IMAGING | Facility: HOSPITAL | Age: 37
End: 2022-06-29
Payer: MEDICARE

## 2022-06-29 VITALS
DIASTOLIC BLOOD PRESSURE: 60 MMHG | SYSTOLIC BLOOD PRESSURE: 95 MMHG | OXYGEN SATURATION: 97 % | HEART RATE: 74 BPM | RESPIRATION RATE: 16 BRPM | TEMPERATURE: 97.6 F

## 2022-06-29 DIAGNOSIS — S93.402A SPRAIN OF LEFT ANKLE, UNSPECIFIED LIGAMENT, INITIAL ENCOUNTER: Primary | ICD-10-CM

## 2022-06-29 DIAGNOSIS — R56.9 SEIZURE (HCC): ICD-10-CM

## 2022-06-29 DIAGNOSIS — E87.6 HYPOKALEMIA: ICD-10-CM

## 2022-06-29 LAB
ALBUMIN SERPL BCP-MCNC: 4.5 G/DL (ref 3.5–5)
ALP SERPL-CCNC: 57 U/L (ref 34–104)
ALT SERPL W P-5'-P-CCNC: 15 U/L (ref 7–52)
ANION GAP SERPL CALCULATED.3IONS-SCNC: 20 MMOL/L (ref 4–13)
AST SERPL W P-5'-P-CCNC: 22 U/L (ref 13–39)
BASOPHILS # BLD MANUAL: 0 THOUSAND/UL (ref 0–0.1)
BASOPHILS NFR MAR MANUAL: 0 % (ref 0–1)
BILIRUB SERPL-MCNC: 0.57 MG/DL (ref 0.2–1)
BUN SERPL-MCNC: 12 MG/DL (ref 5–25)
CALCIUM SERPL-MCNC: 9.4 MG/DL (ref 8.4–10.2)
CHLORIDE SERPL-SCNC: 102 MMOL/L (ref 96–108)
CO2 SERPL-SCNC: 19 MMOL/L (ref 21–32)
CREAT SERPL-MCNC: 1.01 MG/DL (ref 0.6–1.3)
EOSINOPHIL # BLD MANUAL: 0 THOUSAND/UL (ref 0–0.4)
EOSINOPHIL NFR BLD MANUAL: 0 % (ref 0–6)
ERYTHROCYTE [DISTWIDTH] IN BLOOD BY AUTOMATED COUNT: 14 % (ref 11.6–15.1)
GFR SERPL CREATININE-BSD FRML MDRD: 95 ML/MIN/1.73SQ M
GLUCOSE SERPL-MCNC: 118 MG/DL (ref 65–140)
HCT VFR BLD AUTO: 47.4 % (ref 36.5–49.3)
HGB BLD-MCNC: 15.2 G/DL (ref 12–17)
LYMPHOCYTES # BLD AUTO: 75 % (ref 14–44)
LYMPHOCYTES # BLD AUTO: 9.08 THOUSAND/UL (ref 0.6–4.47)
MCH RBC QN AUTO: 30.8 PG (ref 26.8–34.3)
MCHC RBC AUTO-ENTMCNC: 32.1 G/DL (ref 31.4–37.4)
MCV RBC AUTO: 96 FL (ref 82–98)
MONOCYTES # BLD AUTO: 0.61 THOUSAND/UL (ref 0–1.22)
MONOCYTES NFR BLD: 5 % (ref 4–12)
NEUTROPHILS # BLD MANUAL: 2.42 THOUSAND/UL (ref 1.85–7.62)
NEUTS SEG NFR BLD AUTO: 20 % (ref 43–75)
PLATELET # BLD AUTO: 174 THOUSANDS/UL (ref 149–390)
PLATELET BLD QL SMEAR: ADEQUATE
PMV BLD AUTO: 11.5 FL (ref 8.9–12.7)
POTASSIUM SERPL-SCNC: 2.9 MMOL/L (ref 3.5–5.3)
PROT SERPL-MCNC: 7.9 G/DL (ref 6.4–8.4)
RBC # BLD AUTO: 4.93 MILLION/UL (ref 3.88–5.62)
RBC MORPH BLD: NORMAL
SODIUM SERPL-SCNC: 141 MMOL/L (ref 135–147)
WBC # BLD AUTO: 12.11 THOUSAND/UL (ref 4.31–10.16)

## 2022-06-29 PROCEDURE — 73610 X-RAY EXAM OF ANKLE: CPT

## 2022-06-29 PROCEDURE — 96374 THER/PROPH/DIAG INJ IV PUSH: CPT

## 2022-06-29 PROCEDURE — 80053 COMPREHEN METABOLIC PANEL: CPT | Performed by: EMERGENCY MEDICINE

## 2022-06-29 PROCEDURE — 70450 CT HEAD/BRAIN W/O DYE: CPT

## 2022-06-29 PROCEDURE — 85027 COMPLETE CBC AUTOMATED: CPT | Performed by: EMERGENCY MEDICINE

## 2022-06-29 PROCEDURE — 99284 EMERGENCY DEPT VISIT MOD MDM: CPT | Performed by: EMERGENCY MEDICINE

## 2022-06-29 PROCEDURE — G1004 CDSM NDSC: HCPCS

## 2022-06-29 PROCEDURE — 36415 COLL VENOUS BLD VENIPUNCTURE: CPT | Performed by: EMERGENCY MEDICINE

## 2022-06-29 PROCEDURE — 99285 EMERGENCY DEPT VISIT HI MDM: CPT

## 2022-06-29 PROCEDURE — 93005 ELECTROCARDIOGRAM TRACING: CPT

## 2022-06-29 PROCEDURE — 85007 BL SMEAR W/DIFF WBC COUNT: CPT | Performed by: EMERGENCY MEDICINE

## 2022-06-29 RX ORDER — POTASSIUM CHLORIDE 20 MEQ/1
40 TABLET, EXTENDED RELEASE ORAL ONCE
Status: DISCONTINUED | OUTPATIENT
Start: 2022-06-29 | End: 2022-06-29

## 2022-06-29 RX ORDER — LEVETIRACETAM 10 MG/ML
1000 INJECTION INTRAVASCULAR ONCE
Status: COMPLETED | OUTPATIENT
Start: 2022-06-29 | End: 2022-06-29

## 2022-06-29 RX ORDER — POTASSIUM CHLORIDE 29.8 MG/ML
40 INJECTION INTRAVENOUS ONCE
Status: DISCONTINUED | OUTPATIENT
Start: 2022-06-29 | End: 2022-06-29

## 2022-06-29 RX ORDER — POTASSIUM CHLORIDE 20 MEQ/1
40 TABLET, EXTENDED RELEASE ORAL ONCE
Status: COMPLETED | OUTPATIENT
Start: 2022-06-29 | End: 2022-06-29

## 2022-06-29 RX ADMIN — POTASSIUM CHLORIDE 40 MEQ: 1500 TABLET, EXTENDED RELEASE ORAL at 08:30

## 2022-06-29 RX ADMIN — LEVETIRACETAM 1000 MG: 10 INJECTION INTRAVENOUS at 04:46

## 2022-06-29 NOTE — ED NOTES
Patient began seizing while lying on stretcher when radiology staff entered room and woke patient up to let him know she planned to do x-ray  Patient placed on L side safely by myself and Adriana Christopher RN and patient's mouth suctioned  Seizure activity lasted less than 2 mins total  Blankets secured to stretcher siderails for safety  IV access established and bloodwork drawn per physician's orders       Kleber Blood RN  06/29/22 4019

## 2022-06-29 NOTE — ED PROVIDER NOTES
History  Chief Complaint   Patient presents with    Ankle Pain    Fall     Patient reports fall tonight secondary to L ankle pain and swelling that has been going on for a few days     Patient presents to the emergency room with left ankle pain after a fall from standing in the community  Just prior to my evaluation patient had a witnessed tonic-clonic full body seizure  History provided by:  Patient   used: No    Fall      Prior to Admission Medications   Prescriptions Last Dose Informant Patient Reported? Taking?   bictegravir-emtricitab-tenofovir alafenamide (Biktarvy) -25 MG tablet   Yes No   Sig: Take 1 tablet by mouth daily   bictegravir-emtricitab-tenofovir alafenamide (Biktarvy) -25 MG tablet   No No   Sig: Take 1 tablet by mouth daily with breakfast   cyclobenzaprine (FLEXERIL) 10 mg tablet   No No   Sig: Take 1 tablet (10 mg total) by mouth 2 (two) times a day as needed for muscle spasms   ibuprofen (MOTRIN) 600 mg tablet   No No   Sig: Take 1 tablet (600 mg total) by mouth every 6 (six) hours as needed for fever or headaches   Patient not taking: No sig reported   levETIRAcetam (Keppra) 750 mg tablet   No No   Sig: Take 1 tablet (750 mg total) by mouth 2 (two) times a day      Facility-Administered Medications: None       Past Medical History:   Diagnosis Date    HIV [facility]human immunodeficiency virus infection) ([facility] )     Seizures ([facility] )     Smoker     Syphilis        Past Surgical History:   Procedure Laterality Date    HERNIA REPAIR         History reviewed  No pertinent family history  I have reviewed and agree with the history as documented      E-Cigarette/Vaping    E-Cigarette Use Never User      E-Cigarette/Vaping Substances     Social History     Tobacco Use    Smoking status: Current Every Day Smoker     Packs/day: 1 00     Types: Cigarettes    Smokeless tobacco: Never Used   Vaping Use    Vaping Use: Never used   Substance Use Topics    Alcohol use: Yes     Alcohol/week: 2 0 standard drinks     Types: 1 Glasses of wine, 1 Cans of beer per week    Drug use: Yes     Types: Marijuana       Review of Systems   Unable to perform ROS: Acuity of condition       Physical Exam  Physical Exam  Vitals and nursing note reviewed  Constitutional:       Appearance: He is ill-appearing  HENT:      Head: Normocephalic and atraumatic  Mouth/Throat:      Mouth: Mucous membranes are moist    Eyes:      Conjunctiva/sclera: Conjunctivae normal    Cardiovascular:      Rate and Rhythm: Tachycardia present  Pulses: Normal pulses  Abdominal:      General: Bowel sounds are normal  There is no distension  Palpations: There is no mass  Tenderness: There is no abdominal tenderness  There is no guarding or rebound  Hernia: No hernia is present  Skin:     General: Skin is warm and dry  Capillary Refill: Capillary refill takes less than 2 seconds  Findings: No rash  Neurological:      Mental Status: He is unresponsive  Comments: Patient actively seizing during evaluation  Full body convulsions with snoring respirations           Vital Signs  ED Triage Vitals   Temperature Pulse Respirations Blood Pressure SpO2   06/29/22 0405 06/29/22 0405 06/29/22 0405 06/29/22 0405 06/29/22 0405   97 6 °F (36 4 °C) 65 16 133/82 100 %      Temp src Heart Rate Source Patient Position - Orthostatic VS BP Location FiO2 (%)   -- 06/29/22 0405 06/29/22 0405 06/29/22 0405 --    Monitor Lying Left arm       Pain Score       06/29/22 0530       No Pain           Vitals:    06/29/22 0405 06/29/22 0530   BP: 133/82 117/73   Pulse: 65 95   Patient Position - Orthostatic VS: Lying Lying         Visual Acuity      ED Medications  Medications   potassium chloride (K-DUR,KLOR-CON) CR tablet 40 mEq (has no administration in time range)   levetiracetam in NaCl (KEPPRA) infusion 1,000 mg (1,000 mg Intravenous Given 6/29/22 0446)       Diagnostic Studies  Results Reviewed Procedure Component Value Units Date/Time    CBC and differential [[id number]]  (Abnormal) Collected: 06/29/22 0438    Lab Status: Final result Specimen: Blood from Arm, Left Updated: 06/29/22 0555     WBC 12 11 Thousand/uL      RBC 4 93 Million/uL      Hemoglobin 15 2 g/dL      Hematocrit 47 4 %      MCV 96 fL      MCH 30 8 pg      MCHC 32 1 g/dL      RDW 14 0 %      MPV 11 5 fL      Platelets 094 Thousands/uL     Narrative: This is an appended report  These results have been appended to a previously verified report      Manual Differential(PHLEBS Do Not Order) [[id number]]  (Abnormal) Collected: 06/29/22 0438    Lab Status: Final result Specimen: Blood from Arm, Left Updated: 06/29/22 0555     Segmented % 20 %      Lymphocytes % 75 %      Monocytes % 5 %      Eosinophils, % 0 %      Basophils % 0 %      Absolute Neutrophils 2 42 Thousand/uL      Lymphocytes Absolute 9 08 Thousand/uL      Monocytes Absolute 0 61 Thousand/uL      Eosinophils Absolute 0 00 Thousand/uL      Basophils Absolute 0 00 Thousand/uL      Total Counted --     RBC Morphology Normal     Platelet Estimate Adequate    Comprehensive metabolic panel [[id number]]  (Abnormal) Collected: 06/29/22 0438    Lab Status: Final result Specimen: Blood from Arm, Left Updated: 06/29/22 0513     Sodium 141 mmol/L      Potassium 2 9 mmol/L      Chloride 102 mmol/L      CO2 19 mmol/L      ANION GAP 20 mmol/L      BUN 12 mg/dL      Creatinine 1 01 mg/dL      Glucose 118 mg/dL      Calcium 9 4 mg/dL      AST 22 U/L      ALT 15 U/L      Alkaline Phosphatase 57 U/L      Total Protein 7 9 g/dL      Albumin 4 5 g/dL      Total Bilirubin 0 57 mg/dL      eGFR 95 ml/min/1 73sq m     Narrative:      [facility] guidelines for Chronic Kidney Disease (CKD):     Stage 1 with normal or high GFR (GFR > 90 mL/min/1 73 square meters)    Stage 2 Mild CKD (GFR = 60-89 mL/min/1 73 square meters)    Stage 3A Moderate CKD (GFR = 45-59 mL/min/1 73 square meters)    Stage 3B Moderate CKD (GFR = 30-44 mL/min/1 73 square meters)    Stage 4 Severe CKD (GFR = 15-29 mL/min/1 73 square meters)    Stage 5 End Stage CKD (GFR <15 mL/min/1 73 square meters)  Note: GFR calculation is accurate only with a steady state creatinine    Rapid drug screen, urine [[id number]]     Lab Status: No result Specimen: Urine     UA w Reflex to Microscopic w Reflex to Culture [[id number]]     Lab Status: No result Specimen: Urine                  CT head without contrast   Final Result by Deidra Wilson MD (06/29 5888)      No acute intracranial abnormality  Bifrontal atrophy and mild microangiopathic changes are similar to the prior examination  Workstation performed: [id number]         XR ankle 3+ views LEFT    (Results Pending)              Procedures  Procedures         ED Course  ED Course as of 06/29/22 0705   Wed Jun 29, 2022   [id number] Patient currently resting comfortably on stretcher  Labs and imaging reviewed  Potassium repleted  Patient given a g of IV Keppra for presumed medication noncompliance  MDM  Number of Diagnoses or Management Options     Amount and/or Complexity of Data Reviewed  Clinical lab tests: ordered and reviewed  Tests in the radiology section of CPT®: ordered and reviewed  Review and summarize past medical records: yes    Risk of Complications, Morbidity, and/or Mortality  Presenting problems: moderate  Diagnostic procedures: moderate  Management options: low  General comments: Patient signed out to Dr Francis Hernandez at shift change pending administration of oral potassium and discharge          Disposition  Final diagnoses:   Sprain of left ankle, unspecified ligament, initial encounter   Seizure ([facility] )   Hypokalemia     Time reflects when diagnosis was documented in both MDM as applicable and the Disposition within this note     Time User Action Codes Description Comment    6/29/2022  6:26 AM Garrett Diaz Oren Alpers Add [T40 633E] Sprain of left ankle, unspecified ligament, initial encounter     6/29/2022  6:26 AM [place] April Add [R56 9] Seizure ([address] )     6/29/2022  7:05 AM [place] April Add [E87 6] Hypokalemia       ED Disposition     None      Follow-up Information    None         Patient's Medications   Discharge Prescriptions    No medications on file       No discharge procedures on file      PDMP Review       Value Time User    PDMP Reviewed  Yes 5/27/2020 12:37 AM LEBRON Kelley          ED Provider  Electronically Signed by           Divya Smyth MD  06/29/22 4816

## 2022-06-30 LAB
ATRIAL RATE: 102 BPM
P AXIS: 48 DEGREES
PR INTERVAL: 154 MS
QRS AXIS: -8 DEGREES
QRSD INTERVAL: 92 MS
QT INTERVAL: 368 MS
QTC INTERVAL: 479 MS
T WAVE AXIS: 1 DEGREES
VENTRICULAR RATE: 102 BPM

## 2022-06-30 PROCEDURE — 93010 ELECTROCARDIOGRAM REPORT: CPT | Performed by: INTERNAL MEDICINE

## 2022-07-01 LAB — LEVETIRACETAM SERPL-MCNC: 1.2 UG/ML (ref 10–40)

## 2022-07-02 ENCOUNTER — HOSPITAL ENCOUNTER (EMERGENCY)
Facility: HOSPITAL | Age: 37
Discharge: HOME | End: 2022-07-03
Attending: EMERGENCY MEDICINE
Payer: MEDICARE

## 2022-07-02 ENCOUNTER — APPOINTMENT (EMERGENCY)
Dept: RADIOLOGY | Facility: HOSPITAL | Age: 37
End: 2022-07-02
Payer: MEDICARE

## 2022-07-02 DIAGNOSIS — R60.9 PERIPHERAL EDEMA: Primary | ICD-10-CM

## 2022-07-02 PROCEDURE — 99284 EMERGENCY DEPT VISIT MOD MDM: CPT

## 2022-07-02 RX ORDER — KETOROLAC TROMETHAMINE 30 MG/ML
15 INJECTION, SOLUTION INTRAMUSCULAR; INTRAVENOUS ONCE
Status: COMPLETED | OUTPATIENT
Start: 2022-07-03 | End: 2022-07-03

## 2022-07-03 ENCOUNTER — APPOINTMENT (EMERGENCY)
Dept: RADIOLOGY | Facility: HOSPITAL | Age: 37
End: 2022-07-03
Payer: MEDICARE

## 2022-07-03 VITALS
SYSTOLIC BLOOD PRESSURE: 99 MMHG | TEMPERATURE: 98 F | HEART RATE: 68 BPM | OXYGEN SATURATION: 94 % | WEIGHT: 150 LBS | HEIGHT: 66 IN | RESPIRATION RATE: 16 BRPM | BODY MASS INDEX: 24.11 KG/M2 | DIASTOLIC BLOOD PRESSURE: 60 MMHG

## 2022-07-03 LAB
ALBUMIN SERPL BCP-MCNC: 4 G/DL (ref 3.5–5)
ALP SERPL-CCNC: 62 U/L (ref 34–104)
ALT SERPL W P-5'-P-CCNC: 11 U/L (ref 7–52)
ANION GAP SERPL CALCULATED.3IONS-SCNC: 6 MMOL/L (ref 4–13)
AST SERPL W P-5'-P-CCNC: 20 U/L (ref 13–39)
ATRIAL RATE: 90 BPM
BASOPHILS # BLD AUTO: 0.02 THOUSANDS/ΜL (ref 0–0.1)
BASOPHILS NFR BLD AUTO: 0 % (ref 0–1)
BILIRUB SERPL-MCNC: 0.34 MG/DL (ref 0.2–1)
BNP SERPL-MCNC: 6 PG/ML (ref 0–100)
BUN SERPL-MCNC: 9 MG/DL (ref 5–25)
CALCIUM SERPL-MCNC: 9.1 MG/DL (ref 8.4–10.2)
CHLORIDE SERPL-SCNC: 103 MMOL/L (ref 96–108)
CO2 SERPL-SCNC: 29 MMOL/L (ref 21–32)
CREAT SERPL-MCNC: 1.07 MG/DL (ref 0.6–1.3)
EOSINOPHIL # BLD AUTO: 0.22 THOUSAND/ΜL (ref 0–0.61)
EOSINOPHIL NFR BLD AUTO: 3 % (ref 0–6)
ERYTHROCYTE [DISTWIDTH] IN BLOOD BY AUTOMATED COUNT: 13.7 % (ref 11.6–15.1)
GFR SERPL CREATININE-BSD FRML MDRD: 88 ML/MIN/1.73SQ M
GLUCOSE SERPL-MCNC: 93 MG/DL (ref 65–140)
HCT VFR BLD AUTO: 40.3 % (ref 36.5–49.3)
HGB BLD-MCNC: 13.7 G/DL (ref 12–17)
IMM GRANULOCYTES # BLD AUTO: 0.01 THOUSAND/UL (ref 0–0.2)
IMM GRANULOCYTES NFR BLD AUTO: 0 % (ref 0–2)
LYMPHOCYTES # BLD AUTO: 1.91 THOUSANDS/ΜL (ref 0.6–4.47)
LYMPHOCYTES NFR BLD AUTO: 28 % (ref 14–44)
MAGNESIUM SERPL-MCNC: 1.9 MG/DL (ref 1.9–2.7)
MCH RBC QN AUTO: 31 PG (ref 26.8–34.3)
MCHC RBC AUTO-ENTMCNC: 34 G/DL (ref 31.4–37.4)
MCV RBC AUTO: 91 FL (ref 82–98)
MONOCYTES # BLD AUTO: 0.58 THOUSAND/ΜL (ref 0.17–1.22)
MONOCYTES NFR BLD AUTO: 9 % (ref 4–12)
NEUTROPHILS # BLD AUTO: 4.07 THOUSANDS/ΜL (ref 1.85–7.62)
NEUTS SEG NFR BLD AUTO: 60 % (ref 43–75)
NRBC BLD AUTO-RTO: 0 /100 WBCS
P AXIS: 35 DEGREES
PLATELET # BLD AUTO: 156 THOUSANDS/UL (ref 149–390)
PMV BLD AUTO: 10.9 FL (ref 8.9–12.7)
POTASSIUM SERPL-SCNC: 4 MMOL/L (ref 3.5–5.3)
PR INTERVAL: 150 MS
PROT SERPL-MCNC: 7.2 G/DL (ref 6.4–8.4)
QRS AXIS: -23 DEGREES
QRSD INTERVAL: 100 MS
QT INTERVAL: 360 MS
QTC INTERVAL: 441 MS
RBC # BLD AUTO: 4.42 MILLION/UL (ref 3.88–5.62)
SODIUM SERPL-SCNC: 138 MMOL/L (ref 135–147)
T WAVE AXIS: 6 DEGREES
VENTRICULAR RATE: 90 BPM
WBC # BLD AUTO: 6.81 THOUSAND/UL (ref 4.31–10.16)

## 2022-07-03 PROCEDURE — 80053 COMPREHEN METABOLIC PANEL: CPT | Performed by: STUDENT IN AN ORGANIZED HEALTH CARE EDUCATION/TRAINING PROGRAM

## 2022-07-03 PROCEDURE — 83735 ASSAY OF MAGNESIUM: CPT | Performed by: STUDENT IN AN ORGANIZED HEALTH CARE EDUCATION/TRAINING PROGRAM

## 2022-07-03 PROCEDURE — 36415 COLL VENOUS BLD VENIPUNCTURE: CPT | Performed by: STUDENT IN AN ORGANIZED HEALTH CARE EDUCATION/TRAINING PROGRAM

## 2022-07-03 PROCEDURE — 99285 EMERGENCY DEPT VISIT HI MDM: CPT | Performed by: STUDENT IN AN ORGANIZED HEALTH CARE EDUCATION/TRAINING PROGRAM

## 2022-07-03 PROCEDURE — 96374 THER/PROPH/DIAG INJ IV PUSH: CPT

## 2022-07-03 PROCEDURE — 93005 ELECTROCARDIOGRAM TRACING: CPT

## 2022-07-03 PROCEDURE — 85025 COMPLETE CBC W/AUTO DIFF WBC: CPT | Performed by: STUDENT IN AN ORGANIZED HEALTH CARE EDUCATION/TRAINING PROGRAM

## 2022-07-03 PROCEDURE — 93010 ELECTROCARDIOGRAM REPORT: CPT | Performed by: INTERNAL MEDICINE

## 2022-07-03 PROCEDURE — 83880 ASSAY OF NATRIURETIC PEPTIDE: CPT | Performed by: STUDENT IN AN ORGANIZED HEALTH CARE EDUCATION/TRAINING PROGRAM

## 2022-07-03 PROCEDURE — 71045 X-RAY EXAM CHEST 1 VIEW: CPT

## 2022-07-03 RX ORDER — FUROSEMIDE 10 MG/ML
20 INJECTION INTRAMUSCULAR; INTRAVENOUS ONCE
Status: DISCONTINUED | OUTPATIENT
Start: 2022-07-03 | End: 2022-07-03 | Stop reason: HOSPADM

## 2022-07-03 RX ADMIN — KETOROLAC TROMETHAMINE 15 MG: 30 INJECTION, SOLUTION INTRAMUSCULAR at 00:04

## 2022-07-03 NOTE — ED PROVIDER NOTES
History  Chief Complaint   Patient presents with    Ankle Swelling     Patient reports B/L ankle swelling x2 days  Denies injury     Patient is a 24-year-old male history of HIV and seizures who presents emergency department today with concern for bilateral ankle swelling x2 days  Patient states over the past 2 days he reports worsening swelling in his ankles along with pain which is worse to touch and when attempting to walk  States he is able to walk approximately 10-20 steps for he needs to take break due to pain  He denies any recent trauma or injuries to the area  States he has had issues like this in the past   Patient denies any chest pain or shortness of breath, lightheadedness or dizziness, fevers or chills, abdominal pain/nausea/vomiting,, numbness or tingling in extremities  Prior to Admission Medications   Prescriptions Last Dose Informant Patient Reported? Taking?    Cholecalciferol 25 MCG (1000 UT) tablet   Yes No   Sig: Take 1,000 Units by mouth   bictegravir-emtricitab-tenofovir alafenamide (Biktarvy) -25 MG tablet 7/2/2022 at Unknown time  Yes Yes   Sig: Take 1 tablet by mouth daily   bictegravir-emtricitab-tenofovir alafenamide (Biktarvy) -25 MG tablet 7/2/2022 at Unknown time  No Yes   Sig: Take 1 tablet by mouth daily with breakfast   cyclobenzaprine (FLEXERIL) 10 mg tablet Not Taking at Unknown time  No No   Sig: Take 1 tablet (10 mg total) by mouth 2 (two) times a day as needed for muscle spasms   Patient not taking: Reported on 7/2/2022   ibuprofen (MOTRIN) 600 mg tablet   No No   Sig: Take 1 tablet (600 mg total) by mouth every 6 (six) hours as needed for fever or headaches   Patient not taking: No sig reported   levETIRAcetam (Keppra) 750 mg tablet 7/2/2022 at Unknown time  No Yes   Sig: Take 1 tablet (750 mg total) by mouth 2 (two) times a day      Facility-Administered Medications: None       Past Medical History:   Diagnosis Date    [facility] )     Seizures ([facility] )     Smoker     Syphilis        Past Surgical History:   Procedure Laterality Date    HERNIA REPAIR         No family history on file  I have reviewed and agree with the history as documented  E-Cigarette/Vaping    E-Cigarette Use Never User      E-Cigarette/Vaping Substances     Social History     Tobacco Use    Smoking status: Current Every Day Smoker     Packs/day: 1 50     Types: Cigarettes    Smokeless tobacco: Never Used   Vaping Use    Vaping Use: Never used   Substance Use Topics    Alcohol use: Yes     Alcohol/week: 2 0 standard drinks     Types: 1 Glasses of wine, 1 Cans of beer per week     Comment: socially    Drug use: Yes     Types: Marijuana     Comment: last used 07/02/22       Review of Systems   Constitutional: Negative for chills, fatigue and fever  HENT: Negative  Respiratory: Negative for cough, chest tightness and shortness of breath  Cardiovascular: Positive for leg swelling  Negative for chest pain  Gastrointestinal: Negative for abdominal pain, diarrhea, nausea and vomiting  Musculoskeletal: Positive for arthralgias (ankle pain)  Negative for back pain and neck pain  Skin: Negative for color change, pallor and rash  Neurological: Negative for dizziness, weakness, light-headedness and numbness  All other systems reviewed and are negative  Physical Exam  Physical Exam  Vitals and nursing note reviewed  Constitutional:       General: He is not in acute distress  Appearance: Normal appearance  He is not ill-appearing  Eyes:      Conjunctiva/sclera: Conjunctivae normal       Pupils: Pupils are equal, round, and reactive to light  Cardiovascular:      Rate and Rhythm: Normal rate and regular rhythm  Pulses: Normal pulses  Dorsalis pedis pulses are 2+ on the right side and 2+ on the left side  Posterior tibial pulses are 2+ on the right side and 2+ on the left side        Heart sounds: Normal heart sounds  No murmur heard  Pulmonary:      Effort: Pulmonary effort is normal  No respiratory distress  Breath sounds: Normal breath sounds  No decreased breath sounds, wheezing or rales  Musculoskeletal:      Cervical back: Neck supple  Right lower leg: Tenderness present  No bony tenderness  2+ Pitting Edema present  Left lower leg: Tenderness present  No bony tenderness  2+ Pitting Edema present  Right ankle: Tenderness present  Left ankle: Tenderness present  Right foot: Normal range of motion  No tenderness or bony tenderness  Normal pulse  Left foot: Normal range of motion  No tenderness or bony tenderness  Normal pulse  Skin:     General: Skin is warm and dry  Neurological:      General: No focal deficit present  Mental Status: He is alert and oriented to person, place, and time  Sensory: Sensation is intact  No sensory deficit  Motor: Motor function is intact  No weakness  Psychiatric:         Attention and Perception: Attention normal          Mood and Affect: Mood normal          Speech: Speech normal          Behavior: Behavior normal  Behavior is cooperative  Thought Content:  Thought content normal          Judgment: Judgment normal          Vital Signs  ED Triage Vitals   Temperature Pulse Respirations Blood Pressure SpO2   07/02/22 2329 07/02/22 2329 07/02/22 2329 07/02/22 2329 07/02/22 2329   98 °F (36 7 °C) 92 18 119/74 100 %      Temp Source Heart Rate Source Patient Position - Orthostatic VS BP Location FiO2 (%)   07/02/22 2329 07/02/22 2329 07/02/22 2329 07/02/22 2329 --   Oral Monitor Lying Left arm       Pain Score       07/03/22 0004       4           Vitals:    07/02/22 2329 07/03/22 0056   BP: 119/74 99/60   Pulse: 92 68   Patient Position - Orthostatic VS: Lying Lying         Visual Acuity      ED Medications  Medications   ketorolac (TORADOL) injection 15 mg (15 mg Intravenous Given 7/3/22 0004)       Diagnostic Studies  Results Reviewed     Procedure Component Value Units Date/Time    B-Type Natriuretic Peptide(BNP) AN, CA, EA Campuses Only [[id number]]  (Normal) Collected: 07/03/22 0005    Lab Status: Final result Specimen: Blood from Arm, Right Updated: 07/03/22 0034     BNP 6 pg/mL     Comprehensive metabolic panel [[id number]] Collected: 07/03/22 0005    Lab Status: Final result Specimen: Blood from Arm, Right Updated: 07/03/22 0031     Sodium 138 mmol/L      Potassium 4 0 mmol/L      Chloride 103 mmol/L      CO2 29 mmol/L      ANION GAP 6 mmol/L      BUN 9 mg/dL      Creatinine 1 07 mg/dL      Glucose 93 mg/dL      Calcium 9 1 mg/dL      AST 20 U/L      ALT 11 U/L      Alkaline Phosphatase 62 U/L      Total Protein 7 2 g/dL      Albumin 4 0 g/dL      Total Bilirubin 0 34 mg/dL      eGFR 88 ml/min/1 73sq m     Narrative:      [facility] guidelines for Chronic Kidney Disease (CKD):     Stage 1 with normal or high GFR (GFR > 90 mL/min/1 73 square meters)    Stage 2 Mild CKD (GFR = 60-89 mL/min/1 73 square meters)    Stage 3A Moderate CKD (GFR = 45-59 mL/min/1 73 square meters)    Stage 3B Moderate CKD (GFR = 30-44 mL/min/1 73 square meters)    Stage 4 Severe CKD (GFR = 15-29 mL/min/1 73 square meters)    Stage 5 End Stage CKD (GFR <15 mL/min/1 73 square meters)  Note: GFR calculation is accurate only with a steady state creatinine    Magnesium [[id number]]  (Normal) Collected: 07/03/22 0005    Lab Status: Final result Specimen: Blood from Arm, Right Updated: 07/03/22 0031     Magnesium 1 9 mg/dL     CBC and differential [[id number]] Collected: 07/03/22 0005    Lab Status: Final result Specimen: Blood from Arm, Right Updated: 07/03/22 0013     WBC 6 81 Thousand/uL      RBC 4 42 Million/uL      Hemoglobin 13 7 g/dL      Hematocrit 40 3 %      MCV 91 fL      MCH 31 0 pg      MCHC 34 0 g/dL      RDW 13 7 %      MPV 10 9 fL      Platelets 504 Thousands/uL      nRBC 0 /100 WBCs      Neutrophils Relative 60 %      Immat GRANS % 0 %      Lymphocytes Relative 28 %      Monocytes Relative 9 %      Eosinophils Relative 3 %      Basophils Relative 0 %      Neutrophils Absolute 4 07 Thousands/µL      Immature Grans Absolute 0 01 Thousand/uL      Lymphocytes Absolute 1 91 Thousands/µL      Monocytes Absolute 0 58 Thousand/µL      Eosinophils Absolute 0 22 Thousand/µL      Basophils Absolute 0 02 Thousands/µL                  XR chest 1 view portable    (Results Pending)              Procedures  ECG 12 Lead Documentation Only    Date/Time: 7/3/2022 12:12 AM  Performed by: Sonu Varela PA-C  Authorized by: Sonu Varela PA-C     Indications / Diagnosis:  Lower extremity edema  Patient location:  ED  Interpretation:     Interpretation: non-specific    Rate:     ECG rate:  90    ECG rate assessment: normal    Rhythm:     Rhythm: sinus rhythm    Ectopy:     Ectopy: none    QRS:     QRS axis:  Left    QRS intervals:  Normal  Conduction:     Conduction: normal    ST segments:     ST segments:  Non-specific    Elevation:  V2  T waves:     T waves: non-specific and inverted      Inverted:  III  Comments:      No signs of ischemia or block                 ED Course  ED Course as of 07/03/22 0535   Sun Jul 03, 2022   0024 Lasix not given due to patient's pressures of 90s over 60s  Plan to discharge home with PCP follow-up for further evaluation and management peripheral edema  MDM  Number of Diagnoses or Management Options  Peripheral edema  Diagnosis management comments: Patient  61-year-old male presents emergency department today with concern for bilateral lower extremity swelling x3 days  Examination showed 2+ pitting edema bilateral lower extremities extending to the distal lower legs but was otherwise unremarkable  Laboratory evaluation including CBC and CMP were unremarkable and reassuring    Cardiac workup is also reassuring including BNP which was within normal limits, EKG which appeared consistent with prior EKGs and chest from which showed no acute cardiopulmonary abnormalities or signs of pulmonary congestion  Suspected cause edema likely dependent  Planned to give patient single dose of Lasix in the emergency department was unable due to patient's pressures being in the 90s/60  Patient was discharged home advised to follow-up with primary care provider within the next week for re-evaluation to return emergency department any new or worsening symptoms as discussed with patient  Patient verbalized understanding agreement plan of care, all patient's questions were answered, patient was stable at time of discharge  Amount and/or Complexity of Data Reviewed  Clinical lab tests: ordered and reviewed  Tests in the radiology section of CPT®: ordered and reviewed  Independent visualization of images, tracings, or specimens: yes    Patient Progress  Patient progress: stable      Disposition  Final diagnoses:   Peripheral edema     Time reflects when diagnosis was documented in both MDM as applicable and the Disposition within this note     Time User Action Codes Description Comment    7/3/2022 12:58 AM Mandi Bojorquez [R60 9] Peripheral edema       ED Disposition     ED Disposition   Discharge    Condition   Stable    Date/Time   Sun Jul 3, 2022 12:58 AM    Comment   Laila Taylor discharge to home/self care                 Follow-up Information     Follow up With Specialties Details Why Contact Info Additional Information    [facility] [address] [facility]   [address] [id number]  [facility] [address] [place] [address], [address] [address], [address] [place] [address], [id number], [address] [facility]  Emergency Department Emergency Medicine   [address] [facility],[address] [id number]  [facility] Emergency Department, [address] [place] [address] [address] [facility] [address], [address]          Discharge Medication List as of 7/3/2022  1:01 AM      CONTINUE these medications which have NOT CHANGED    Details   !! bictegravir-emtricitab-tenofovir alafenamide (Biktarvy) -25 MG tablet Take 1 tablet by mouth daily, Starting Tue 3/22/2022, Historical Med      !! bictegravir-emtricitab-tenofovir alafenamide (Biktarvy) -25 MG tablet Take 1 tablet by mouth daily with breakfast, Starting Mon 3/28/2022, Normal      levETIRAcetam (Keppra) 750 mg tablet Take 1 tablet (750 mg total) by mouth 2 (two) times a day, Starting Mon 3/28/2022, Normal      Cholecalciferol 25 MCG (1000 UT) tablet Take 1,000 Units by mouth, Historical Med      cyclobenzaprine (FLEXERIL) 10 mg tablet Take 1 tablet (10 mg total) by mouth 2 (two) times a day as needed for muscle spasms, Starting Sat 3/26/2022, Normal      ibuprofen (MOTRIN) 600 mg tablet Take 1 tablet (600 mg total) by mouth every 6 (six) hours as needed for fever or headaches, Starting Sat 3/26/2022, Normal       !! - Potential duplicate medications found  Please discuss with provider  No discharge procedures on file      PDMP Review       Value Time User    PDMP Reviewed  Yes 5/27/2020 12:37 AM LEBRON Meyers          ED Provider  Electronically Signed by           Holley Lacey PA-C  07/03/22 9218

## 2022-07-03 NOTE — DISCHARGE INSTRUCTIONS
Continue all medications as directed  Use Tylenol or ibuprofen every 6 hours as needed for pain  Continue to rest and elevate the affected extremities for symptomatic relief the  Follow-up with primary care provider for further evaluation and management of peripheral edema  Return emergency department any new or worsening symptoms including development of chest pain shortness of breath, lightheadedness or dizziness, complete loss of sensations in the legs, severe redness or red streaking extending to legs, development of fever or chills, lightheadedness or dizziness, passing out

## 2022-07-05 ENCOUNTER — HOSPITAL ENCOUNTER (EMERGENCY)
Facility: HOSPITAL | Age: 37
Discharge: HOME | End: 2022-07-05
Attending: EMERGENCY MEDICINE
Payer: MEDICARE

## 2022-07-05 VITALS
RESPIRATION RATE: 16 BRPM | TEMPERATURE: 98.5 F | OXYGEN SATURATION: 97 % | WEIGHT: 162.04 LBS | DIASTOLIC BLOOD PRESSURE: 61 MMHG | HEART RATE: 89 BPM | SYSTOLIC BLOOD PRESSURE: 109 MMHG | BODY MASS INDEX: 26.04 KG/M2 | HEIGHT: 66 IN

## 2022-07-05 DIAGNOSIS — G40.919 BREAKTHROUGH SEIZURE (HCC): Primary | ICD-10-CM

## 2022-07-05 LAB
ALBUMIN SERPL BCP-MCNC: 4.7 G/DL (ref 3.5–5)
ALP SERPL-CCNC: 68 U/L (ref 34–104)
ALT SERPL W P-5'-P-CCNC: 13 U/L (ref 7–52)
ANION GAP SERPL CALCULATED.3IONS-SCNC: 18 MMOL/L (ref 4–13)
AST SERPL W P-5'-P-CCNC: 25 U/L (ref 13–39)
ATRIAL RATE: 105 BPM
BASOPHILS # BLD AUTO: 0.02 THOUSANDS/ΜL (ref 0–0.1)
BASOPHILS NFR BLD AUTO: 0 % (ref 0–1)
BILIRUB SERPL-MCNC: 0.46 MG/DL (ref 0.2–1)
BUN SERPL-MCNC: 11 MG/DL (ref 5–25)
CALCIUM SERPL-MCNC: 9.2 MG/DL (ref 8.4–10.2)
CHLORIDE SERPL-SCNC: 99 MMOL/L (ref 96–108)
CO2 SERPL-SCNC: 19 MMOL/L (ref 21–32)
CREAT SERPL-MCNC: 1.02 MG/DL (ref 0.6–1.3)
EOSINOPHIL # BLD AUTO: 0.13 THOUSAND/ΜL (ref 0–0.61)
EOSINOPHIL NFR BLD AUTO: 2 % (ref 0–6)
ERYTHROCYTE [DISTWIDTH] IN BLOOD BY AUTOMATED COUNT: 13.8 % (ref 11.6–15.1)
ETHANOL SERPL-MCNC: <10 MG/DL
GFR SERPL CREATININE-BSD FRML MDRD: 94 ML/MIN/1.73SQ M
GLUCOSE SERPL-MCNC: 73 MG/DL (ref 65–140)
GLUCOSE SERPL-MCNC: 98 MG/DL (ref 65–140)
HCT VFR BLD AUTO: 45.7 % (ref 36.5–49.3)
HGB BLD-MCNC: 15.2 G/DL (ref 12–17)
IMM GRANULOCYTES # BLD AUTO: 0.02 THOUSAND/UL (ref 0–0.2)
IMM GRANULOCYTES NFR BLD AUTO: 0 % (ref 0–2)
LYMPHOCYTES # BLD AUTO: 2.98 THOUSANDS/ΜL (ref 0.6–4.47)
LYMPHOCYTES NFR BLD AUTO: 39 % (ref 14–44)
MAGNESIUM SERPL-MCNC: 2.1 MG/DL (ref 1.9–2.7)
MCH RBC QN AUTO: 30.8 PG (ref 26.8–34.3)
MCHC RBC AUTO-ENTMCNC: 33.3 G/DL (ref 31.4–37.4)
MCV RBC AUTO: 93 FL (ref 82–98)
MONOCYTES # BLD AUTO: 0.7 THOUSAND/ΜL (ref 0.17–1.22)
MONOCYTES NFR BLD AUTO: 9 % (ref 4–12)
NEUTROPHILS # BLD AUTO: 3.74 THOUSANDS/ΜL (ref 1.85–7.62)
NEUTS SEG NFR BLD AUTO: 50 % (ref 43–75)
NRBC BLD AUTO-RTO: 0 /100 WBCS
P AXIS: 43 DEGREES
PHOSPHATE SERPL-MCNC: 2.2 MG/DL (ref 2.7–4.5)
PLATELET # BLD AUTO: 183 THOUSANDS/UL (ref 149–390)
PMV BLD AUTO: 10.7 FL (ref 8.9–12.7)
POTASSIUM SERPL-SCNC: 3.5 MMOL/L (ref 3.5–5.3)
PR INTERVAL: 158 MS
PROT SERPL-MCNC: 8.4 G/DL (ref 6.4–8.4)
QRS AXIS: 2 DEGREES
QRSD INTERVAL: 98 MS
QT INTERVAL: 352 MS
QTC INTERVAL: 465 MS
RBC # BLD AUTO: 4.94 MILLION/UL (ref 3.88–5.62)
SODIUM SERPL-SCNC: 136 MMOL/L (ref 135–147)
T WAVE AXIS: 20 DEGREES
VENTRICULAR RATE: 105 BPM
WBC # BLD AUTO: 7.59 THOUSAND/UL (ref 4.31–10.16)

## 2022-07-05 PROCEDURE — 80053 COMPREHEN METABOLIC PANEL: CPT | Performed by: EMERGENCY MEDICINE

## 2022-07-05 PROCEDURE — 36415 COLL VENOUS BLD VENIPUNCTURE: CPT | Performed by: EMERGENCY MEDICINE

## 2022-07-05 PROCEDURE — 80177 DRUG SCRN QUAN LEVETIRACETAM: CPT | Performed by: EMERGENCY MEDICINE

## 2022-07-05 PROCEDURE — 99285 EMERGENCY DEPT VISIT HI MDM: CPT | Performed by: EMERGENCY MEDICINE

## 2022-07-05 PROCEDURE — 84100 ASSAY OF PHOSPHORUS: CPT | Performed by: EMERGENCY MEDICINE

## 2022-07-05 PROCEDURE — 82077 ASSAY SPEC XCP UR&BREATH IA: CPT | Performed by: EMERGENCY MEDICINE

## 2022-07-05 PROCEDURE — 96374 THER/PROPH/DIAG INJ IV PUSH: CPT

## 2022-07-05 PROCEDURE — 93010 ELECTROCARDIOGRAM REPORT: CPT | Performed by: INTERNAL MEDICINE

## 2022-07-05 PROCEDURE — 99284 EMERGENCY DEPT VISIT MOD MDM: CPT

## 2022-07-05 PROCEDURE — 82948 REAGENT STRIP/BLOOD GLUCOSE: CPT

## 2022-07-05 PROCEDURE — 93005 ELECTROCARDIOGRAM TRACING: CPT

## 2022-07-05 PROCEDURE — 85025 COMPLETE CBC W/AUTO DIFF WBC: CPT | Performed by: EMERGENCY MEDICINE

## 2022-07-05 PROCEDURE — 83735 ASSAY OF MAGNESIUM: CPT | Performed by: EMERGENCY MEDICINE

## 2022-07-05 PROCEDURE — 96361 HYDRATE IV INFUSION ADD-ON: CPT

## 2022-07-05 RX ORDER — LORAZEPAM 2 MG/ML
0.5 INJECTION INTRAMUSCULAR ONCE
Status: DISCONTINUED | OUTPATIENT
Start: 2022-07-05 | End: 2022-07-05

## 2022-07-05 RX ORDER — LORAZEPAM 2 MG/ML
1 INJECTION INTRAMUSCULAR ONCE
Status: COMPLETED | OUTPATIENT
Start: 2022-07-05 | End: 2022-07-05

## 2022-07-05 RX ORDER — LEVETIRACETAM 500 MG/1
1000 TABLET ORAL ONCE
Status: COMPLETED | OUTPATIENT
Start: 2022-07-05 | End: 2022-07-05

## 2022-07-05 RX ADMIN — SODIUM CHLORIDE 1000 ML: 0.9 INJECTION, SOLUTION INTRAVENOUS at 03:03

## 2022-07-05 RX ADMIN — LEVETIRACETAM 1000 MG: 500 TABLET, FILM COATED ORAL at 05:49

## 2022-07-05 RX ADMIN — Medication 2 TABLET: at 05:49

## 2022-07-05 RX ADMIN — LORAZEPAM 1 MG: 2 INJECTION INTRAMUSCULAR; INTRAVENOUS at 03:05

## 2022-07-05 NOTE — DISCHARGE INSTRUCTIONS
Follow up with neurology and with your primary doctor, and return to the emergency department for new or worsening symptoms

## 2022-07-05 NOTE — ED PROVIDER NOTES
History  Chief Complaint   Patient presents with    Seizure - Prior Hx Of     Arrives via EMS after reportedly having a seizure while sitting outside of a convenience store tonight  Patient does have history of a seizure disorder  Patient is a 80-year-old male seen in the emergency department with concern for apparent seizure witnessed by bystanders outside a marked prior to evaluation  Patient states that he does not remember what happened  Per EMS, patient had a seizure episode witnessed by in individual, who apparently called 911  Patient notes history of seizure disorder, and states that he has been taking Keppra as prescribed  Patient notes no headache, neck pain, chest pain, shortness of breath, abdominal pain, nausea, vomiting, weakness  Patient denies bowel and bladder incontinence  Prior to Admission Medications   Prescriptions Last Dose Informant Patient Reported? Taking?    Cholecalciferol 25 MCG (1000 UT) tablet   Yes No   Sig: Take 1,000 Units by mouth   bictegravir-emtricitab-tenofovir alafenamide (Biktarvy) -25 MG tablet   Yes No   Sig: Take 1 tablet by mouth daily   bictegravir-emtricitab-tenofovir alafenamide (Biktarvy) -25 MG tablet   No No   Sig: Take 1 tablet by mouth daily with breakfast   cyclobenzaprine (FLEXERIL) 10 mg tablet   No No   Sig: Take 1 tablet (10 mg total) by mouth 2 (two) times a day as needed for muscle spasms   Patient not taking: Reported on 7/2/2022   ibuprofen (MOTRIN) 600 mg tablet   No No   Sig: Take 1 tablet (600 mg total) by mouth every 6 (six) hours as needed for fever or headaches   Patient not taking: No sig reported   levETIRAcetam (Keppra) 750 mg tablet   No No   Sig: Take 1 tablet (750 mg total) by mouth 2 (two) times a day      Facility-Administered Medications: None       Past Medical History:   Diagnosis Date    HIV [facility]human immunodeficiency virus infection) ([facility] [address] )     Seizures ([facility] [address] )     Smoker     Syphilis        Past Surgical History:   Procedure Laterality Date    HERNIA REPAIR         No family history on file  I have reviewed and agree with the history as documented  E-Cigarette/Vaping    E-Cigarette Use Never User      E-Cigarette/Vaping Substances     Social History     Tobacco Use    Smoking status: Current Every Day Smoker     Packs/day: 1 50     Types: Cigarettes    Smokeless tobacco: Never Used   Vaping Use    Vaping Use: Never used   Substance Use Topics    Alcohol use: Yes     Alcohol/week: 2 0 standard drinks     Types: 1 Glasses of wine, 1 Cans of beer per week     Comment: socially    Drug use: Yes     Types: Marijuana     Comment: last used 07/02/22       Review of Systems   Constitutional: Negative for chills and fever  HENT: Negative for ear pain and sore throat  Eyes: Negative for pain and visual disturbance  Respiratory: Negative for cough and shortness of breath  Cardiovascular: Negative for chest pain and palpitations  Gastrointestinal: Negative for abdominal pain and vomiting  Genitourinary: Negative for decreased urine volume and difficulty urinating  Musculoskeletal: Negative for arthralgias and back pain  Skin: Negative for color change and rash  Neurological: Positive for seizures  Negative for syncope  Psychiatric/Behavioral: Negative for agitation and confusion  All other systems reviewed and are negative  Physical Exam  Physical Exam  Vitals and nursing note reviewed  Constitutional:       General: He is not in acute distress  Appearance: He is well-developed  HENT:      Head: Normocephalic and atraumatic  Right Ear: External ear normal       Left Ear: External ear normal       Nose: Nose normal       Mouth/Throat:      Mouth: Mucous membranes are moist       Comments: left lateral abrasion/bite mirna to tongue  Eyes:      General: No scleral icterus  Conjunctiva/sclera: Conjunctivae normal    Cardiovascular:      Rate and Rhythm: Regular rhythm  Tachycardia present  Heart sounds: No murmur heard  Pulmonary:      Effort: Pulmonary effort is normal  No respiratory distress  Breath sounds: Normal breath sounds  Abdominal:      Palpations: Abdomen is soft  Tenderness: There is no abdominal tenderness  Musculoskeletal:         General: No deformity or signs of injury  Cervical back: Normal range of motion and neck supple  Skin:     General: Skin is warm and dry  Neurological:      General: No focal deficit present  Mental Status: He is alert  Cranial Nerves: No cranial nerve deficit  Sensory: No sensory deficit  Comments: sleepy, arousable to voice/light touch   Psychiatric:         Mood and Affect: Mood normal          Behavior: Behavior normal          Thought Content:  Thought content normal          Vital Signs  ED Triage Vitals [07/05/22 0250]   Temperature Pulse Respirations Blood Pressure SpO2   98 5 °F (36 9 °C) (!) 109 16 112/74 96 %      Temp Source Heart Rate Source Patient Position - Orthostatic VS BP Location FiO2 (%)   Oral Monitor Lying Right arm --      Pain Score       No Pain           Vitals:    07/05/22 0312 07/05/22 0330 07/05/22 0430 07/05/22 0500   BP: 116/76 129/85 106/58 120/76   Pulse: (!) 112  102 89   Patient Position - Orthostatic VS: Lying Lying Lying Lying         Visual Acuity  Visual Acuity    Flowsheet Row Most Recent Value   L Pupil Size (mm) 3   R Pupil Size (mm) 3          ED Medications  Medications   sodium chloride 0 9 % bolus 1,000 mL (1,000 mL Intravenous New Bag 7/5/22 0303)   LORazepam (ATIVAN) injection 1 mg (1 mg Intravenous Given 7/5/22 0305)   potassium-sodium phosphateS (K-PHOS,PHOSPHA 250) -250 mg tablet 2 tablet (2 tablets Oral Given 7/5/22 0549)   levETIRAcetam (KEPPRA) tablet 1,000 mg (1,000 mg Oral Given 7/5/22 0549)       Diagnostic Studies  Results Reviewed     Procedure Component Value Units Date/Time    Comprehensive metabolic panel [[id number]] (Abnormal) Collected: 07/05/22 0302    Lab Status: Final result Specimen: Blood from Arm, Left Updated: 07/05/22 0421     Sodium 136 mmol/L      Potassium 3 5 mmol/L      Chloride 99 mmol/L      CO2 19 mmol/L      ANION GAP 18 mmol/L      BUN 11 mg/dL      Creatinine 1 02 mg/dL      Glucose 73 mg/dL      Calcium 9 2 mg/dL      AST 25 U/L      ALT 13 U/L      Alkaline Phosphatase 68 U/L      Total Protein 8 4 g/dL      Albumin 4 7 g/dL      Total Bilirubin 0 46 mg/dL      eGFR 94 ml/min/1 73sq m     Narrative:      [place] guidelines for Chronic Kidney Disease (CKD):     Stage 1 with normal or high GFR (GFR > 90 mL/min/1 73 square meters)    Stage 2 Mild CKD (GFR = 60-89 mL/min/1 73 square meters)    Stage 3A Moderate CKD (GFR = 45-59 mL/min/1 73 square meters)    Stage 3B Moderate CKD (GFR = 30-44 mL/min/1 73 square meters)    Stage 4 Severe CKD (GFR = 15-29 mL/min/1 73 square meters)    Stage 5 End Stage CKD (GFR <15 mL/min/1 73 square meters)  Note: GFR calculation is accurate only with a steady state creatinine    Magnesium [[id number]]  (Normal) Collected: 07/05/22 0302    Lab Status: Final result Specimen: Blood from Arm, Left Updated: 07/05/22 0421     Magnesium 2 1 mg/dL     Phosphorus [[id number]]  (Abnormal) Collected: 07/05/22 0302    Lab Status: Final result Specimen: Blood from Arm, Left Updated: 07/05/22 0421     Phosphorus 2 2 mg/dL     Ethanol [[id number]]  (Normal) Collected: 07/05/22 0302    Lab Status: Final result Specimen: Blood from Arm, Left Updated: 07/05/22 0333     Ethanol Lvl <10 mg/dL     Fingerstick Glucose (POCT) [[id number]]  (Normal) Collected: 07/05/22 0319    Lab Status: Final result Updated: 07/05/22 0320     POC Glucose 98 mg/dl     CBC and differential [[id number]] Collected: 07/05/22 0302    Lab Status: Final result Specimen: Blood from Arm, Left Updated: 07/05/22 0319     WBC 7 59 Thousand/uL      RBC 4 94 Million/uL      Hemoglobin 15 2 g/dL Hematocrit 45 7 %      MCV 93 fL      MCH 30 8 pg      MCHC 33 3 g/dL      RDW 13 8 %      MPV 10 7 fL      Platelets 748 Thousands/uL      nRBC 0 /100 WBCs      Neutrophils Relative 50 %      Immat GRANS % 0 %      Lymphocytes Relative 39 %      Monocytes Relative 9 %      Eosinophils Relative 2 %      Basophils Relative 0 %      Neutrophils Absolute 3 74 Thousands/µL      Immature Grans Absolute 0 02 Thousand/uL      Lymphocytes Absolute 2 98 Thousands/µL      Monocytes Absolute 0 70 Thousand/µL      Eosinophils Absolute 0 13 Thousand/µL      Basophils Absolute 0 02 Thousands/µL     Levetiracetam level [[id number]] Collected: 07/05/22 0302    Lab Status: In process Specimen: Blood from Arm, Left Updated: 07/05/22 0317    Rapid drug screen, urine [[id number]]     Lab Status: No result Specimen: Urine                  No orders to display              Procedures  ECG 12 Lead Documentation Only    Date/Time: 7/5/2022 3:21 AM  Performed by: Osmar Moya MD  Authorized by: Osmar Moya MD     Indications / Diagnosis:  Seizure  ECG reviewed by me, the ED Provider: yes    Rate:     ECG rate:  105    ECG rate assessment: tachycardic    Rhythm:     Rhythm: sinus tachycardia    QRS:     QRS axis:  Left  ST segments:     ST segments:  Non-specific  T waves:     T waves: non-specific    Comments:      Sinus tachycardia at 105, left axis deviation, , QRS 98, QTc 465, nonspecific ST-T wave abnormality, no definite evidence of acute ischemia             ED Course                                             MDM  Number of Diagnoses or Management Options  Breakthrough seizure [facility])  Diagnosis management comments: Patient is a 66-year-old male seen in the emergency department with concern for apparent seizure episode  EKG was obtained and noted  Patient was treated with medication for symptom control  Laboratory evaluation remarkable for low CO2 of 19, elevated anion gap of 18, low phosphorus of 2 2   Evaluation is consistent with breakthrough seizure  Patient was monitored in the emergency department, and had return to normal mental status  Plan to have patient follow up with neurology/outpatient providers  Patient stable for discharge home  Discharge instructions were reviewed with patient  Amount and/or Complexity of Data Reviewed  Clinical lab tests: ordered and reviewed  Tests in the medicine section of CPT®: ordered and reviewed        Disposition  Final diagnoses:   Breakthrough seizure ([address] )     Time reflects when diagnosis was documented in both MDM as applicable and the Disposition within this note     Time User Action Codes Description Comment    7/5/2022  2:51 AM Daniele Tillman Add [G40 919] Breakthrough seizure [facility])       ED Disposition     ED Disposition   Discharge    Condition   Stable    Date/Time   Tue Jul 5, 2022  5:49 AM    Comment   Beth De La Rosa discharge to home/self care  Follow-up Information     Follow up With Specialties Details Why Contact Info Additional Information    [facility] [place] [facility]  [phone]  [phone] [facility] [address] [facility] [place], [place], [address] [place] [address]    Your primary doctor  Call in 1 day       [place] Call  As needed [address] [phone]  [id number]  , [place], [place] [facility] [place], [place], [address] [phone]          Patient's Medications   Discharge Prescriptions    No medications on file       No discharge procedures on file      PDMP Review       Value Time User    PDMP Reviewed  Yes 5/27/2020 12:37 AM LEBRON Guillen          ED Provider  Electronically Signed by           Alex Reza MD  07/05/22 0607

## 2022-07-07 LAB
ATRIAL RATE: 90 BPM
P AXIS: 35 DEGREES
PR INTERVAL: 150 MS
QRS AXIS: -23 DEGREES
QRSD INTERVAL: 100 MS
QT INTERVAL: 360 MS
QTC INTERVAL: 441 MS
T WAVE AXIS: 6 DEGREES
VENTRICULAR RATE: 90 BPM

## 2022-07-07 PROCEDURE — 93010 ELECTROCARDIOGRAM REPORT: CPT | Performed by: INTERNAL MEDICINE

## 2022-07-10 LAB — LEVETIRACETAM SERPL-MCNC: <1 UG/ML (ref 10–40)

## 2022-07-12 ENCOUNTER — HOSPITAL ENCOUNTER (EMERGENCY)
Facility: HOSPITAL | Age: 37
Discharge: HOME | End: 2022-07-12
Attending: EMERGENCY MEDICINE
Payer: MEDICARE

## 2022-07-12 ENCOUNTER — APPOINTMENT (EMERGENCY)
Dept: RADIOLOGY | Facility: HOSPITAL | Age: 37
End: 2022-07-12
Payer: MEDICARE

## 2022-07-12 VITALS
HEIGHT: 66 IN | BODY MASS INDEX: 24.43 KG/M2 | WEIGHT: 152 LBS | RESPIRATION RATE: 18 BRPM | HEART RATE: 90 BPM | SYSTOLIC BLOOD PRESSURE: 137 MMHG | DIASTOLIC BLOOD PRESSURE: 67 MMHG | OXYGEN SATURATION: 98 % | TEMPERATURE: 98.3 F

## 2022-07-12 DIAGNOSIS — E86.1 HYPOTENSION DUE TO HYPOVOLEMIA: ICD-10-CM

## 2022-07-12 DIAGNOSIS — T67.5XXA HEAT EXHAUSTION, INITIAL ENCOUNTER: Primary | ICD-10-CM

## 2022-07-12 DIAGNOSIS — I95.89 HYPOTENSION DUE TO HYPOVOLEMIA: ICD-10-CM

## 2022-07-12 LAB
ALBUMIN SERPL BCP-MCNC: 4.2 G/DL (ref 3.5–5)
ALP SERPL-CCNC: 48 U/L (ref 34–104)
ALT SERPL W P-5'-P-CCNC: 16 U/L (ref 7–52)
ANION GAP SERPL CALCULATED.3IONS-SCNC: 5 MMOL/L (ref 4–13)
ANION GAP SERPL CALCULATED.3IONS-SCNC: 7 MMOL/L (ref 4–13)
AST SERPL W P-5'-P-CCNC: 25 U/L (ref 13–39)
BACTERIA UR QL AUTO: ABNORMAL /HPF
BASOPHILS # BLD AUTO: 0.04 THOUSANDS/ΜL (ref 0–0.1)
BASOPHILS NFR BLD AUTO: 1 % (ref 0–1)
BILIRUB SERPL-MCNC: 0.38 MG/DL (ref 0.2–1)
BILIRUB UR QL STRIP: NEGATIVE
BUN SERPL-MCNC: 17 MG/DL (ref 5–25)
BUN SERPL-MCNC: 19 MG/DL (ref 5–25)
CALCIUM SERPL-MCNC: 8.1 MG/DL (ref 8.4–10.2)
CALCIUM SERPL-MCNC: 9.2 MG/DL (ref 8.4–10.2)
CARDIAC TROPONIN I PNL SERPL HS: 3 NG/L
CHLORIDE SERPL-SCNC: 103 MMOL/L (ref 96–108)
CHLORIDE SERPL-SCNC: 107 MMOL/L (ref 96–108)
CK MB SERPL-MCNC: 1.3 % (ref 0–2.5)
CK MB SERPL-MCNC: 6.2 NG/ML (ref 0.6–6.3)
CK SERPL-CCNC: 467 U/L (ref 39–308)
CLARITY UR: CLEAR
CO2 SERPL-SCNC: 25 MMOL/L (ref 21–32)
CO2 SERPL-SCNC: 27 MMOL/L (ref 21–32)
COLOR UR: YELLOW
CREAT SERPL-MCNC: 0.97 MG/DL (ref 0.6–1.3)
CREAT SERPL-MCNC: 1.46 MG/DL (ref 0.6–1.3)
EOSINOPHIL # BLD AUTO: 0.04 THOUSAND/ΜL (ref 0–0.61)
EOSINOPHIL NFR BLD AUTO: 1 % (ref 0–6)
ERYTHROCYTE [DISTWIDTH] IN BLOOD BY AUTOMATED COUNT: 13.6 % (ref 11.6–15.1)
FLUAV RNA RESP QL NAA+PROBE: NEGATIVE
FLUBV RNA RESP QL NAA+PROBE: NEGATIVE
GFR SERPL CREATININE-BSD FRML MDRD: 100 ML/MIN/1.73SQ M
GFR SERPL CREATININE-BSD FRML MDRD: 61 ML/MIN/1.73SQ M
GLUCOSE SERPL-MCNC: 125 MG/DL (ref 65–140)
GLUCOSE SERPL-MCNC: 139 MG/DL (ref 65–140)
GLUCOSE UR STRIP-MCNC: NEGATIVE MG/DL
HCT VFR BLD AUTO: 43.2 % (ref 36.5–49.3)
HGB BLD-MCNC: 14.6 G/DL (ref 12–17)
HGB UR QL STRIP.AUTO: NEGATIVE
HYALINE CASTS #/AREA URNS LPF: ABNORMAL /LPF
IMM GRANULOCYTES # BLD AUTO: 0.02 THOUSAND/UL (ref 0–0.2)
IMM GRANULOCYTES NFR BLD AUTO: 0 % (ref 0–2)
KETONES UR STRIP-MCNC: ABNORMAL MG/DL
LACTATE SERPL-SCNC: 1 MMOL/L (ref 0.5–2)
LEUKOCYTE ESTERASE UR QL STRIP: NEGATIVE
LYMPHOCYTES # BLD AUTO: 0.9 THOUSANDS/ΜL (ref 0.6–4.47)
LYMPHOCYTES NFR BLD AUTO: 13 % (ref 14–44)
MAGNESIUM SERPL-MCNC: 1.7 MG/DL (ref 1.9–2.7)
MCH RBC QN AUTO: 30.9 PG (ref 26.8–34.3)
MCHC RBC AUTO-ENTMCNC: 33.8 G/DL (ref 31.4–37.4)
MCV RBC AUTO: 92 FL (ref 82–98)
MONOCYTES # BLD AUTO: 0.41 THOUSAND/ΜL (ref 0.17–1.22)
MONOCYTES NFR BLD AUTO: 6 % (ref 4–12)
MUCOUS THREADS UR QL AUTO: ABNORMAL
NEUTROPHILS # BLD AUTO: 5.71 THOUSANDS/ΜL (ref 1.85–7.62)
NEUTS SEG NFR BLD AUTO: 79 % (ref 43–75)
NITRITE UR QL STRIP: NEGATIVE
NON-SQ EPI CELLS URNS QL MICRO: ABNORMAL /HPF
NRBC BLD AUTO-RTO: 0 /100 WBCS
PH UR STRIP.AUTO: 6 [PH]
PHOSPHATE SERPL-MCNC: 3.7 MG/DL (ref 2.7–4.5)
PLATELET # BLD AUTO: 178 THOUSANDS/UL (ref 149–390)
PMV BLD AUTO: 10 FL (ref 8.9–12.7)
POTASSIUM SERPL-SCNC: 3.8 MMOL/L (ref 3.5–5.3)
POTASSIUM SERPL-SCNC: 4.5 MMOL/L (ref 3.5–5.3)
PROT SERPL-MCNC: 7.9 G/DL (ref 6.4–8.4)
PROT UR STRIP-MCNC: ABNORMAL MG/DL
RBC # BLD AUTO: 4.72 MILLION/UL (ref 3.88–5.62)
RBC #/AREA URNS AUTO: ABNORMAL /HPF
RSV RNA RESP QL NAA+PROBE: NEGATIVE
SARS-COV-2 RNA RESP QL NAA+PROBE: NEGATIVE
SODIUM SERPL-SCNC: 137 MMOL/L (ref 135–147)
SODIUM SERPL-SCNC: 137 MMOL/L (ref 135–147)
SP GR UR STRIP.AUTO: 1.02 (ref 1–1.03)
TSH SERPL DL<=0.05 MIU/L-ACNC: 1.26 UIU/ML (ref 0.45–4.5)
UROBILINOGEN UR QL STRIP.AUTO: 1 E.U./DL
WBC # BLD AUTO: 7.12 THOUSAND/UL (ref 4.31–10.16)
WBC #/AREA URNS AUTO: ABNORMAL /HPF

## 2022-07-12 PROCEDURE — 84484 ASSAY OF TROPONIN QUANT: CPT | Performed by: EMERGENCY MEDICINE

## 2022-07-12 PROCEDURE — 82553 CREATINE MB FRACTION: CPT | Performed by: EMERGENCY MEDICINE

## 2022-07-12 PROCEDURE — 71045 X-RAY EXAM CHEST 1 VIEW: CPT

## 2022-07-12 PROCEDURE — 82550 ASSAY OF CK (CPK): CPT | Performed by: EMERGENCY MEDICINE

## 2022-07-12 PROCEDURE — 80053 COMPREHEN METABOLIC PANEL: CPT | Performed by: EMERGENCY MEDICINE

## 2022-07-12 PROCEDURE — 99284 EMERGENCY DEPT VISIT MOD MDM: CPT | Performed by: EMERGENCY MEDICINE

## 2022-07-12 PROCEDURE — 85025 COMPLETE CBC W/AUTO DIFF WBC: CPT | Performed by: EMERGENCY MEDICINE

## 2022-07-12 PROCEDURE — 80048 BASIC METABOLIC PNL TOTAL CA: CPT | Performed by: EMERGENCY MEDICINE

## 2022-07-12 PROCEDURE — 83605 ASSAY OF LACTIC ACID: CPT | Performed by: EMERGENCY MEDICINE

## 2022-07-12 PROCEDURE — 81001 URINALYSIS AUTO W/SCOPE: CPT | Performed by: EMERGENCY MEDICINE

## 2022-07-12 PROCEDURE — 93005 ELECTROCARDIOGRAM TRACING: CPT

## 2022-07-12 PROCEDURE — 96365 THER/PROPH/DIAG IV INF INIT: CPT

## 2022-07-12 PROCEDURE — 83735 ASSAY OF MAGNESIUM: CPT | Performed by: EMERGENCY MEDICINE

## 2022-07-12 PROCEDURE — 84100 ASSAY OF PHOSPHORUS: CPT | Performed by: EMERGENCY MEDICINE

## 2022-07-12 PROCEDURE — 36415 COLL VENOUS BLD VENIPUNCTURE: CPT | Performed by: EMERGENCY MEDICINE

## 2022-07-12 PROCEDURE — 96366 THER/PROPH/DIAG IV INF ADDON: CPT

## 2022-07-12 PROCEDURE — 87040 BLOOD CULTURE FOR BACTERIA: CPT | Performed by: EMERGENCY MEDICINE

## 2022-07-12 PROCEDURE — 99285 EMERGENCY DEPT VISIT HI MDM: CPT

## 2022-07-12 PROCEDURE — 0241U HB NFCT DS VIR RESP RNA 4 TRGT: CPT | Performed by: EMERGENCY MEDICINE

## 2022-07-12 PROCEDURE — 84443 ASSAY THYROID STIM HORMONE: CPT | Performed by: EMERGENCY MEDICINE

## 2022-07-12 RX ADMIN — SODIUM CHLORIDE, SODIUM LACTATE, POTASSIUM CHLORIDE, AND CALCIUM CHLORIDE 1000 ML: .6; .31; .03; .02 INJECTION, SOLUTION INTRAVENOUS at 18:31

## 2022-07-12 RX ADMIN — SODIUM CHLORIDE, SODIUM LACTATE, POTASSIUM CHLORIDE, AND CALCIUM CHLORIDE 1000 ML: .6; .31; .03; .02 INJECTION, SOLUTION INTRAVENOUS at 20:52

## 2022-07-12 NOTE — ED PROVIDER NOTES
History  Chief Complaint   Patient presents with    Hypotension     Arrived via [facility] after calling 911 to be "checked" due to feeling shaky  Pt found to hypotensive in the 80's     Pt was outdoors in sun all day and began to feel weak and lightheaded  EMS on arrival noted bp 70/46, hr 90, fs 130  No cp/sob  No focal motor/sensory/speech/visual  No symp fever/chills/cough/sput/myalgias  No dysuria/freq/hematuria/change bh/abdo pain  PMH smoker, marijuana use, seizures, HIV  Prior to Admission Medications   Prescriptions Last Dose Informant Patient Reported? Taking? Cholecalciferol 25 MCG (1000 UT) tablet   Yes No   Sig: Take 1,000 Units by mouth   bictegravir-emtricitab-tenofovir alafenamide (Biktarvy) -25 MG tablet   Yes No   Sig: Take 1 tablet by mouth daily   bictegravir-emtricitab-tenofovir alafenamide (Biktarvy) -25 MG tablet   No No   Sig: Take 1 tablet by mouth daily with breakfast   cyclobenzaprine (FLEXERIL) 10 mg tablet   No No   Sig: Take 1 tablet (10 mg total) by mouth 2 (two) times a day as needed for muscle spasms   Patient not taking: Reported on 7/2/2022   ibuprofen (MOTRIN) 600 mg tablet   No No   Sig: Take 1 tablet (600 mg total) by mouth every 6 (six) hours as needed for fever or headaches   Patient not taking: No sig reported   levETIRAcetam (Keppra) 750 mg tablet   No No   Sig: Take 1 tablet (750 mg total) by mouth 2 (two) times a day      Facility-Administered Medications: None       Past Medical History:   Diagnosis Date    [facility] [address] [facility]     [facility]     Smoker     Syphilis        Past Surgical History:   Procedure Laterality Date    HERNIA REPAIR         History reviewed  No pertinent family history  I have reviewed and agree with the history as documented      E-Cigarette/Vaping    E-Cigarette Use Never User      E-Cigarette/Vaping Substances     Social History     Tobacco Use    Smoking status: Current Every Day Smoker     Packs/day: 1 50     Types: Cigarettes    Smokeless tobacco: Never Used   Vaping Use    Vaping Use: Never used   Substance Use Topics    Alcohol use: Yes     Alcohol/week: 2 0 standard drinks     Types: 1 Glasses of wine, 1 Cans of beer per week     Comment: socially    Drug use: Yes     Types: Marijuana     Comment: last used 07/02/22       Review of Systems   Constitutional: Positive for fatigue  Negative for appetite change and fever  HENT: Negative for rhinorrhea  Eyes: Negative for visual disturbance  Respiratory: Negative for shortness of breath  Cardiovascular: Negative for chest pain  Gastrointestinal: Negative for abdominal pain, diarrhea and vomiting  Endocrine: Negative for polydipsia  Genitourinary: Negative for dysuria, frequency and hematuria  Musculoskeletal: Negative for neck stiffness  Skin: Negative for rash  Allergic/Immunologic: Negative for immunocompromised state  Neurological: Positive for light-headedness  Negative for speech difficulty, weakness and numbness  Psychiatric/Behavioral: Negative for suicidal ideas  Physical Exam  Physical Exam  Constitutional:       General: He is not in acute distress  HENT:      Head: Normocephalic and atraumatic  Right Ear: External ear normal       Left Ear: External ear normal       Nose: Nose normal       Mouth/Throat:      Pharynx: Oropharynx is clear  Eyes:      Conjunctiva/sclera: Conjunctivae normal    Cardiovascular:      Rate and Rhythm: Normal rate and regular rhythm  Heart sounds: Normal heart sounds  No murmur heard  Pulmonary:      Effort: Pulmonary effort is normal       Breath sounds: Normal breath sounds  Abdominal:      General: Bowel sounds are normal       Palpations: Abdomen is soft  There is no mass  Tenderness: There is no abdominal tenderness  There is no guarding  Musculoskeletal:         General: No swelling or tenderness        Cervical back: Normal range of motion and neck supple  Right lower leg: No edema  Left lower leg: No edema  Skin:     General: Skin is warm and dry  Capillary Refill: Capillary refill takes less than 2 seconds  Neurological:      General: No focal deficit present  Mental Status: He is alert and oriented to person, place, and time  Psychiatric:         Mood and Affect: Mood normal          Vital Signs  ED Triage Vitals   Temperature Pulse Respirations Blood Pressure SpO2   07/12/22 1757 07/12/22 1757 07/12/22 1757 07/12/22 1757 07/12/22 1757   98 2 °F (36 8 °C) 91 18 (!) 89/49 98 %      Temp Source Heart Rate Source Patient Position - Orthostatic VS BP Location FiO2 (%)   07/12/22 1757 07/12/22 1836 07/12/22 1836 07/12/22 1836 --   Oral Monitor Lying Left arm       Pain Score       07/12/22 1757       7           Vitals:    07/12/22 1757 07/12/22 1836 07/12/22 2053   BP: (!) 89/49 102/75 137/67   Pulse: 91 89 90   Patient Position - Orthostatic VS:  Lying Lying         Visual Acuity      ED Medications  Medications   lactated ringers bolus 1,000 mL (0 mL Intravenous Stopped 7/12/22 2053)   lactated ringers bolus 1,000 mL (0 mL Intravenous Stopped 7/12/22 2201)       Diagnostic Studies  Results Reviewed     Procedure Component Value Units Date/Time    Blood culture #1 [[id number]] Collected: 07/12/22 1819    Lab Status: Preliminary result Specimen: Blood from Arm, Left Updated: 07/13/22 0003     Blood Culture Received in Microbiology Lab  Culture in Progress  Blood culture #2 [[id number]] Collected: 07/12/22 1826    Lab Status: Preliminary result Specimen: Blood from Arm, Right Updated: 07/13/22 0003     Blood Culture Received in Microbiology Lab  Culture in Progress      Urine Microscopic [[id number]]  (Abnormal) Collected: 07/12/22 2137    Lab Status: Final result Specimen: Urine, Clean Catch Updated: 07/12/22 2218     RBC, UA 1-2 /hpf      WBC, UA 2-4 /hpf      Epithelial Cells None Seen /hpf      Bacteria, UA Occasional /hpf Hyaline Casts, UA 4-10 /lpf      MUCUS THREADS Innumerable    UA w Reflex to Microscopic w Reflex to Culture [[id number]]  (Abnormal) Collected: 07/12/22 2137    Lab Status: Final result Specimen: Urine, Clean Catch Updated: 07/12/22 2146     Color, UA Yellow     Clarity, UA Clear     Specific Gravity, UA 1 025     pH, UA 6 0     Leukocytes, UA Negative     Nitrite, UA Negative     Protein, UA Trace mg/dl      Glucose, UA Negative mg/dl      Ketones, UA Trace mg/dl      Urobilinogen, UA 1 0 E U /dl      Bilirubin, UA Negative     Occult Blood, UA Negative    Basic metabolic panel [[id number]]  (Abnormal) Collected: 07/12/22 2051    Lab Status: Final result Specimen: Blood from Arm, Right Updated: 07/12/22 2128     Sodium 137 mmol/L      Potassium 4 5 mmol/L      Chloride 107 mmol/L      CO2 25 mmol/L      ANION GAP 5 mmol/L      BUN 17 mg/dL      Creatinine 0 97 mg/dL      Glucose 125 mg/dL      Calcium 8 1 mg/dL      eGFR 100 ml/min/1 73sq m     Narrative:      [place] guidelines for Chronic Kidney Disease (CKD):     Stage 1 with normal or high GFR (GFR > 90 mL/min/1 73 square meters)    Stage 2 Mild CKD (GFR = 60-89 mL/min/1 73 square meters)    Stage 3A Moderate CKD (GFR = 45-59 mL/min/1 73 square meters)    Stage 3B Moderate CKD (GFR = 30-44 mL/min/1 73 square meters)    Stage 4 Severe CKD (GFR = 15-29 mL/min/1 73 square meters)    Stage 5 End Stage CKD (GFR <15 mL/min/1 73 square meters)  Note: GFR calculation is accurate only with a steady state creatinine    Lactic acid [[id number]]  (Normal) Collected: 07/12/22 1949    Lab Status: Final result Specimen: Blood from Arm, Left Updated: 07/12/22 2023     LACTIC ACID 1 0 mmol/L     Narrative:      Result may be elevated if tourniquet was used during collection      CKMB [[id number]]  (Normal) Collected: 07/12/22 1804    Lab Status: Final result Specimen: Blood from Arm, Right Updated: 07/12/22 2009     CK-MB Index 1 3 %      CK-MB 6 2 ng/mL     CK (with reflex to MB) [[id number]]  (Abnormal) Collected: 07/12/22 1804    Lab Status: Final result Specimen: Blood from Arm, Right Updated: 07/12/22 1934     Total  U/L     TSH [[id number]]  (Normal) Collected: 07/12/22 1804    Lab Status: Final result Specimen: Blood from Arm, Right Updated: 07/12/22 1915     TSH 3RD GENERATON 1 260 uIU/mL     Narrative:      Patients undergoing fluorescein dye angiography may retain small amounts of fluorescein in the body for 48-72 hours post procedure  Samples containing fluorescein can produce falsely depressed TSH values  If the patient had this procedure,a specimen should be resubmitted post fluorescein clearance  FLU/RSV/COVID - if FLU/RSV clinically relevant [[id number]]  (Normal) Collected: 07/12/22 1804    Lab Status: Final result Specimen: Nasopharyngeal Swab Updated: 07/12/22 1850     SARS-CoV-2 Negative     INFLUENZA A PCR Negative     INFLUENZA B PCR Negative     RSV PCR Negative    Narrative:      FOR PEDIATRIC PATIENTS - copy/paste COVID Guidelines URL to browser: [email or web address]    SARS-CoV-2 assay is a Nucleic Acid Amplification assay intended for the  qualitative detection of nucleic acid from SARS-CoV-2 in nasopharyngeal  swabs  Results are for the presumptive identification of SARS-CoV-2 RNA  Positive results are indicative of infection with SARS-CoV-2, the virus  causing COVID-19, but do not rule out bacterial infection or co-infection  with other viruses  Laboratories within the [place] and its  territories are required to report all positive results to the appropriate  public health authorities  Negative results do not preclude SARS-CoV-2  infection and should not be used as the sole basis for treatment or other  patient management decisions  Negative results must be combined with  clinical observations, patient history, and epidemiological information    This test has not been FDA cleared or approved  This test has been authorized by FDA under an Emergency Use Authorization  (EUA)  This test is only authorized for the duration of time the  declaration that circumstances exist justifying the authorization of the  emergency use of an in vitro diagnostic tests for detection of SARS-CoV-2  virus and/or diagnosis of COVID-19 infection under section 564(b)(1) of  the Act, 21 U  S C  809KRB-8(F)(3), unless the authorization is terminated  or revoked sooner  The test has been validated but independent review by FDA  and CLIA is pending  Test performed using [employer] GeneXpert: This RT-PCR assay targets N2,  a region unique to SARS-CoV-2  A conserved region in the E-gene was chosen  for pan-Sarbecovirus detection which includes SARS-CoV-2      HS Troponin 0hr (reflex protocol) [[id number]]  (Normal) Collected: 07/12/22 1804    Lab Status: Final result Specimen: Blood from Arm, Right Updated: 07/12/22 1836     hs TnI 0hr 3 ng/L     Comprehensive metabolic panel [[id number]]  (Abnormal) Collected: 07/12/22 1804    Lab Status: Final result Specimen: Blood from Arm, Right Updated: 07/12/22 1833     Sodium 137 mmol/L      Potassium 3 8 mmol/L      Chloride 103 mmol/L      CO2 27 mmol/L      ANION GAP 7 mmol/L      BUN 19 mg/dL      Creatinine 1 46 mg/dL      Glucose 139 mg/dL      Calcium 9 2 mg/dL      AST 25 U/L      ALT 16 U/L      Alkaline Phosphatase 48 U/L      Total Protein 7 9 g/dL      Albumin 4 2 g/dL      Total Bilirubin 0 38 mg/dL      eGFR 61 ml/min/1 73sq m     Narrative:      [place] guidelines for Chronic Kidney Disease (CKD):     Stage 1 with normal or high GFR (GFR > 90 mL/min/1 73 square meters)    Stage 2 Mild CKD (GFR = 60-89 mL/min/1 73 square meters)    Stage 3A Moderate CKD (GFR = 45-59 mL/min/1 73 square meters)    Stage 3B Moderate CKD (GFR = 30-44 mL/min/1 73 square meters)    Stage 4 Severe CKD (GFR = 15-29 mL/min/1 73 square meters)    Stage 5 End Stage CKD (GFR <15 mL/min/1 73 square meters)  Note: GFR calculation is accurate only with a steady state creatinine    Phosphorus [[id number]]  (Normal) Collected: 07/12/22 1804    Lab Status: Final result Specimen: Blood from Arm, Right Updated: 07/12/22 1833     Phosphorus 3 7 mg/dL     Magnesium [[id number]]  (Abnormal) Collected: 07/12/22 1804    Lab Status: Final result Specimen: Blood from Arm, Right Updated: 07/12/22 1833     Magnesium 1 7 mg/dL     CBC and differential [[id number]]  (Abnormal) Collected: 07/12/22 1804    Lab Status: Final result Specimen: Blood from Arm, Right Updated: 07/12/22 1812     WBC 7 12 Thousand/uL      RBC 4 72 Million/uL      Hemoglobin 14 6 g/dL      Hematocrit 43 2 %      MCV 92 fL      MCH 30 9 pg      MCHC 33 8 g/dL      RDW 13 6 %      MPV 10 0 fL      Platelets 871 Thousands/uL      nRBC 0 /100 WBCs      Neutrophils Relative 79 %      Immat GRANS % 0 %      Lymphocytes Relative 13 %      Monocytes Relative 6 %      Eosinophils Relative 1 %      Basophils Relative 1 %      Neutrophils Absolute 5 71 Thousands/µL      Immature Grans Absolute 0 02 Thousand/uL      Lymphocytes Absolute 0 90 Thousands/µL      Monocytes Absolute 0 41 Thousand/µL      Eosinophils Absolute 0 04 Thousand/µL      Basophils Absolute 0 04 Thousands/µL                  XR chest 1 view portable   Final Result by Alvina Fuentes MD (07/13 1470)      No acute cardiopulmonary disease  Colonic dilatation  Workstation performed: [id number]                    Procedures  Procedures         ED Course                                             MDM  Number of Diagnoses or Management Options  Heat exhaustion, initial encounter  Hypotension due to hypovolemia  Diagnosis management comments: Clinical impression of hypotension from hypovolemia and heat exhaustion  All symptoms resolved here with IVF, 2nd creat improved   Dc home pmd f/u      Disposition  Final diagnoses: Heat exhaustion, initial encounter   Hypotension due to hypovolemia     Time reflects when diagnosis was documented in both MDM as applicable and the Disposition within this note     Time User Action Codes Description Comment    7/12/2022  9:44 PM Karena Jernigan, [id number] [address]  5XXA] Heat exhaustion, initial encounter     7/12/2022  9:44 PM Shila Knight Add [I95 89,  E86 1] Hypotension due to hypovolemia       ED Disposition     ED Disposition   Discharge    Condition   Stable    Date/Time   Tue Jul 12, 2022  9:44 PM    Comment   Carlos Alberto Page discharge to home/self care                 Follow-up Information     Follow up With Specialties Details Why Contact Info Additional Information    [facility] Schedule an appointment as soon as possible for a visit in 2 days  [address] [id number]  [id number] , [place], [place], [address]          Discharge Medication List as of 7/12/2022  9:44 PM      CONTINUE these medications which have NOT CHANGED    Details   !! bictegravir-emtricitab-tenofovir alafenamide (Biktarvy) -25 MG tablet Take 1 tablet by mouth daily, Starting Tue 3/22/2022, Historical Med      !! bictegravir-emtricitab-tenofovir alafenamide (Biktarvy) -25 MG tablet Take 1 tablet by mouth daily with breakfast, Starting Mon 3/28/2022, Normal      Cholecalciferol 25 MCG (1000 UT) tablet Take 1,000 Units by mouth, Historical Med      cyclobenzaprine (FLEXERIL) 10 mg tablet Take 1 tablet (10 mg total) by mouth 2 (two) times a day as needed for muscle spasms, Starting Sat 3/26/2022, Normal      ibuprofen (MOTRIN) 600 mg tablet Take 1 tablet (600 mg total) by mouth every 6 (six) hours as needed for fever or headaches, Starting Sat 3/26/2022, Normal      levETIRAcetam (Keppra) 750 mg tablet Take 1 tablet (750 mg total) by mouth 2 (two) times a day, Starting Mon 3/28/2022, Normal !! - Potential duplicate medications found  Please discuss with provider  No discharge procedures on file      PDMP Review       Value Time User    PDMP Reviewed  Yes 5/27/2020 12:37 AM LEBRON Macias          ED Provider  Electronically Signed by           Dahiana Terry MD  07/13/22 0196

## 2022-07-14 LAB
ATRIAL RATE: 87 BPM
P AXIS: 43 DEGREES
PR INTERVAL: 140 MS
QRS AXIS: -4 DEGREES
QRSD INTERVAL: 94 MS
QT INTERVAL: 372 MS
QTC INTERVAL: 447 MS
T WAVE AXIS: -7 DEGREES
VENTRICULAR RATE: 87 BPM

## 2022-07-14 PROCEDURE — 93010 ELECTROCARDIOGRAM REPORT: CPT | Performed by: INTERNAL MEDICINE

## 2022-07-17 LAB
BACTERIA BLD CULT: NORMAL
BACTERIA BLD CULT: NORMAL

## 2022-07-19 ENCOUNTER — HOSPITAL ENCOUNTER (EMERGENCY)
Facility: HOSPITAL | Age: 37
Discharge: HOME | End: 2022-07-20
Attending: EMERGENCY MEDICINE | Admitting: EMERGENCY MEDICINE
Payer: MEDICARE

## 2022-07-19 DIAGNOSIS — E86.0 DEHYDRATION: Primary | ICD-10-CM

## 2022-07-19 DIAGNOSIS — T67.5XXA HEAT EXHAUSTION, INITIAL ENCOUNTER: ICD-10-CM

## 2022-07-19 LAB
ALBUMIN SERPL BCP-MCNC: 4 G/DL (ref 3.5–5)
ALP SERPL-CCNC: 53 U/L (ref 34–104)
ALT SERPL W P-5'-P-CCNC: 13 U/L (ref 7–52)
AMPHETAMINES SERPL QL SCN: NEGATIVE
ANION GAP SERPL CALCULATED.3IONS-SCNC: 8 MMOL/L (ref 4–13)
AST SERPL W P-5'-P-CCNC: 20 U/L (ref 13–39)
BACTERIA UR QL AUTO: ABNORMAL /HPF
BARBITURATES UR QL: NEGATIVE
BASOPHILS # BLD AUTO: 0.02 THOUSANDS/ΜL (ref 0–0.1)
BASOPHILS NFR BLD AUTO: 1 % (ref 0–1)
BENZODIAZ UR QL: NEGATIVE
BILIRUB SERPL-MCNC: 0.35 MG/DL (ref 0.2–1)
BILIRUB UR QL STRIP: NEGATIVE
BUN SERPL-MCNC: 19 MG/DL (ref 5–25)
CALCIUM SERPL-MCNC: 9 MG/DL (ref 8.4–10.2)
CHLORIDE SERPL-SCNC: 105 MMOL/L (ref 96–108)
CK MB SERPL-MCNC: 1.3 % (ref 0–2.5)
CK MB SERPL-MCNC: 4.8 NG/ML (ref 0.6–6.3)
CK SERPL-CCNC: 358 U/L (ref 39–308)
CLARITY UR: CLEAR
CO2 SERPL-SCNC: 26 MMOL/L (ref 21–32)
COCAINE UR QL: NEGATIVE
COLOR UR: YELLOW
CREAT SERPL-MCNC: 1.31 MG/DL (ref 0.6–1.3)
EOSINOPHIL # BLD AUTO: 0.06 THOUSAND/ΜL (ref 0–0.61)
EOSINOPHIL NFR BLD AUTO: 1 % (ref 0–6)
ERYTHROCYTE [DISTWIDTH] IN BLOOD BY AUTOMATED COUNT: 14 % (ref 11.6–15.1)
GFR SERPL CREATININE-BSD FRML MDRD: 69 ML/MIN/1.73SQ M
GLUCOSE SERPL-MCNC: 139 MG/DL (ref 65–140)
GLUCOSE UR STRIP-MCNC: NEGATIVE MG/DL
HCT VFR BLD AUTO: 41.5 % (ref 36.5–49.3)
HGB BLD-MCNC: 14.3 G/DL (ref 12–17)
HGB UR QL STRIP.AUTO: NEGATIVE
HYALINE CASTS #/AREA URNS LPF: ABNORMAL /LPF
IMM GRANULOCYTES # BLD AUTO: 0 THOUSAND/UL (ref 0–0.2)
IMM GRANULOCYTES NFR BLD AUTO: 0 % (ref 0–2)
KETONES UR STRIP-MCNC: ABNORMAL MG/DL
LEUKOCYTE ESTERASE UR QL STRIP: NEGATIVE
LYMPHOCYTES # BLD AUTO: 1.47 THOUSANDS/ΜL (ref 0.6–4.47)
LYMPHOCYTES NFR BLD AUTO: 34 % (ref 14–44)
MAGNESIUM SERPL-MCNC: 1.8 MG/DL (ref 1.9–2.7)
MCH RBC QN AUTO: 31.2 PG (ref 26.8–34.3)
MCHC RBC AUTO-ENTMCNC: 34.5 G/DL (ref 31.4–37.4)
MCV RBC AUTO: 91 FL (ref 82–98)
METHADONE UR QL: NEGATIVE
MONOCYTES # BLD AUTO: 0.3 THOUSAND/ΜL (ref 0.17–1.22)
MONOCYTES NFR BLD AUTO: 7 % (ref 4–12)
MUCOUS THREADS UR QL AUTO: ABNORMAL
NEUTROPHILS # BLD AUTO: 2.45 THOUSANDS/ΜL (ref 1.85–7.62)
NEUTS SEG NFR BLD AUTO: 57 % (ref 43–75)
NITRITE UR QL STRIP: NEGATIVE
NON-SQ EPI CELLS URNS QL MICRO: ABNORMAL /HPF
NRBC BLD AUTO-RTO: 0 /100 WBCS
OPIATES UR QL SCN: NEGATIVE
OTHER STN SPEC: ABNORMAL
OXYCODONE+OXYMORPHONE UR QL SCN: NEGATIVE
PCP UR QL: NEGATIVE
PH UR STRIP.AUTO: 6 [PH]
PLATELET # BLD AUTO: 165 THOUSANDS/UL (ref 149–390)
PMV BLD AUTO: 10.6 FL (ref 8.9–12.7)
POTASSIUM SERPL-SCNC: 3.3 MMOL/L (ref 3.5–5.3)
PROT SERPL-MCNC: 7.4 G/DL (ref 6.4–8.4)
PROT UR STRIP-MCNC: ABNORMAL MG/DL
RBC # BLD AUTO: 4.58 MILLION/UL (ref 3.88–5.62)
RBC #/AREA URNS AUTO: ABNORMAL /HPF
SODIUM SERPL-SCNC: 139 MMOL/L (ref 135–147)
SP GR UR STRIP.AUTO: >=1.03 (ref 1–1.03)
THC UR QL: POSITIVE
UROBILINOGEN UR QL STRIP.AUTO: 0.2 E.U./DL
WBC # BLD AUTO: 4.3 THOUSAND/UL (ref 4.31–10.16)
WBC #/AREA URNS AUTO: ABNORMAL /HPF

## 2022-07-19 PROCEDURE — 99284 EMERGENCY DEPT VISIT MOD MDM: CPT | Performed by: EMERGENCY MEDICINE

## 2022-07-19 PROCEDURE — 80053 COMPREHEN METABOLIC PANEL: CPT | Performed by: EMERGENCY MEDICINE

## 2022-07-19 PROCEDURE — 96360 HYDRATION IV INFUSION INIT: CPT

## 2022-07-19 PROCEDURE — 81003 URINALYSIS AUTO W/O SCOPE: CPT | Performed by: EMERGENCY MEDICINE

## 2022-07-19 PROCEDURE — 99284 EMERGENCY DEPT VISIT MOD MDM: CPT

## 2022-07-19 PROCEDURE — 82550 ASSAY OF CK (CPK): CPT | Performed by: EMERGENCY MEDICINE

## 2022-07-19 PROCEDURE — 80307 DRUG TEST PRSMV CHEM ANLYZR: CPT | Performed by: EMERGENCY MEDICINE

## 2022-07-19 PROCEDURE — 82553 CREATINE MB FRACTION: CPT | Performed by: EMERGENCY MEDICINE

## 2022-07-19 PROCEDURE — 81001 URINALYSIS AUTO W/SCOPE: CPT | Performed by: EMERGENCY MEDICINE

## 2022-07-19 PROCEDURE — 36415 COLL VENOUS BLD VENIPUNCTURE: CPT | Performed by: EMERGENCY MEDICINE

## 2022-07-19 PROCEDURE — 83735 ASSAY OF MAGNESIUM: CPT | Performed by: EMERGENCY MEDICINE

## 2022-07-19 PROCEDURE — 85025 COMPLETE CBC W/AUTO DIFF WBC: CPT | Performed by: EMERGENCY MEDICINE

## 2022-07-19 PROCEDURE — 96361 HYDRATE IV INFUSION ADD-ON: CPT

## 2022-07-19 RX ADMIN — SODIUM CHLORIDE 1000 ML: 0.9 INJECTION, SOLUTION INTRAVENOUS at 23:01

## 2022-07-19 RX ADMIN — SODIUM CHLORIDE 1000 ML: 0.9 INJECTION, SOLUTION INTRAVENOUS at 19:15

## 2022-07-19 NOTE — ED PROVIDER NOTES
History  Chief Complaint   Patient presents with    Dehydration     Arrives via EMS with report of a bystander calling 911 for patient possibly becoming dehydrated while hanging out outside on [facility] for several hours in hot weather today  EMS reports SBP 70s upon their arrival, improved to 90s en route with IV fluids on board  Patient with no complaints upon arrival, states he feels better  Patient presents to the emergency department with acute onset lightheadedness and mild fatigue  911 was called by a bystander who noticed patient was standing outside in the heat most of the day  He was noted by a mass to be hypotensive, given a fluid bolus which improved his blood pressure  Upon arrival to the emergency department patient has no specific concerns at this time  He does have an extensive medical history including HIV infection  History provided by:  Patient   used: No        Prior to Admission Medications   Prescriptions Last Dose Informant Patient Reported? Taking?    Cholecalciferol 25 MCG (1000 UT) tablet   Yes No   Sig: Take 1,000 Units by mouth   bictegravir-emtricitab-tenofovir alafenamide (Biktarvy) -25 MG tablet   Yes No   Sig: Take 1 tablet by mouth daily   bictegravir-emtricitab-tenofovir alafenamide (Biktarvy) -25 MG tablet   No No   Sig: Take 1 tablet by mouth daily with breakfast   cyclobenzaprine (FLEXERIL) 10 mg tablet   No No   Sig: Take 1 tablet (10 mg total) by mouth 2 (two) times a day as needed for muscle spasms   Patient not taking: Reported on 7/2/2022   ibuprofen (MOTRIN) 600 mg tablet   No No   Sig: Take 1 tablet (600 mg total) by mouth every 6 (six) hours as needed for fever or headaches   Patient not taking: No sig reported   levETIRAcetam (Keppra) 750 mg tablet   No No   Sig: Take 1 tablet (750 mg total) by mouth 2 (two) times a day      Facility-Administered Medications: None       Past Medical History:   Diagnosis Date    HIV (human immunodeficiency virus infection) ([facility] )     Seizures ([facility] )     Smoker     Syphilis        Past Surgical History:   Procedure Laterality Date    HERNIA REPAIR         History reviewed  No pertinent family history  I have reviewed and agree with the history as documented  E-Cigarette/Vaping    E-Cigarette Use Never User      E-Cigarette/Vaping Substances     Social History     Tobacco Use    Smoking status: Current Every Day Smoker     Packs/day: 1 50     Types: Cigarettes    Smokeless tobacco: Never Used   Vaping Use    Vaping Use: Never used   Substance Use Topics    Alcohol use: Yes     Alcohol/week: 2 0 standard drinks     Types: 1 Glasses of wine, 1 Cans of beer per week     Comment: socially    Drug use: Yes     Types: Marijuana     Comment: last used 07/02/22       Review of Systems   Constitutional: Negative for chills and fever  Eyes: Negative for visual disturbance  Respiratory: Negative for shortness of breath  Cardiovascular: Negative for chest pain  Gastrointestinal: Negative for abdominal pain, blood in stool, nausea and vomiting  Genitourinary: Negative for dysuria  Musculoskeletal: Negative for back pain and joint swelling  Skin: Negative for color change  Neurological: Positive for light-headedness  All other systems reviewed and are negative  Physical Exam  Physical Exam  Vitals and nursing note reviewed  Constitutional:       Appearance: He is well-developed  He is not ill-appearing or toxic-appearing  HENT:      Head: Normocephalic and atraumatic  Mouth/Throat:      Mouth: Mucous membranes are dry  Eyes:      Conjunctiva/sclera: Conjunctivae normal    Cardiovascular:      Rate and Rhythm: Normal rate and regular rhythm  Heart sounds: No murmur heard  Pulmonary:      Effort: Pulmonary effort is normal  No respiratory distress  Breath sounds: Normal breath sounds  Abdominal:      Palpations: Abdomen is soft  Tenderness:  There is no abdominal tenderness  Musculoskeletal:         General: Normal range of motion  Cervical back: Neck supple  Skin:     General: Skin is warm and dry  Capillary Refill: Capillary refill takes less than 2 seconds  Neurological:      General: No focal deficit present  Mental Status: He is alert and oriented to person, place, and time  Mental status is at baseline  Psychiatric:         Mood and Affect: Mood normal          Vital Signs  ED Triage Vitals [07/19/22 1913]   Temperature Pulse Respirations Blood Pressure SpO2   98 °F (36 7 °C) (!) 106 18 (!) 82/66 96 %      Temp Source Heart Rate Source Patient Position - Orthostatic VS BP Location FiO2 (%)   Oral Monitor Sitting Right arm --      Pain Score       No Pain           Vitals:    07/19/22 2000 07/19/22 2245 07/19/22 2330 07/20/22 0015   BP: 97/64 114/75 120/77 107/93   Pulse: 91 101 92 90   Patient Position - Orthostatic VS: Sitting Lying Lying Lying         Visual Acuity      ED Medications  Medications   sodium chloride 0 9 % bolus 1,000 mL (0 mL Intravenous Stopped 7/20/22 0015)   sodium chloride 0 9 % bolus 1,000 mL (0 mL Intravenous Stopped 7/20/22 0020)       Diagnostic Studies  Results Reviewed     Procedure Component Value Units Date/Time    Rapid drug screen, urine [[id number]]  (Abnormal) Collected: 07/19/22 2224    Lab Status: Final result Specimen: Urine, Clean Catch Updated: 07/19/22 2306     Amph/Meth UR Negative     Barbiturate Ur Negative     Benzodiazepine Urine Negative     Cocaine Urine Negative     Methadone Urine Negative     Opiate Urine Negative     PCP Ur Negative     THC Urine Positive     Oxycodone Urine Negative    Narrative:      Presumptive report  If requested, specimen will be sent to reference lab for confirmation  FOR MEDICAL PURPOSES ONLY  IF CONFIRMATION NEEDED PLEASE CONTACT THE LAB WITHIN 5 DAYS      Drug Screen Cutoff Levels:  AMPHETAMINE/METHAMPHETAMINES  1000 ng/mL  BARBITURATES     200 ng/mL  BENZODIAZEPINES     200 ng/mL  COCAINE      300 ng/mL  METHADONE      300 ng/mL  OPIATES      300 ng/mL  PHENCYCLIDINE     25 ng/mL  THC       50 ng/mL  OXYCODONE      100 ng/mL    Urine Microscopic [[id number]]  (Abnormal) Collected: 07/19/22 2224    Lab Status: Final result Specimen: Urine, Clean Catch Updated: 07/19/22 2304     RBC, UA 0-5 /hpf      WBC, UA 0-5 /hpf      Epithelial Cells Occasional /hpf      Bacteria, UA Occasional /hpf      Hyaline Casts, UA 0-1 /lpf      OTHER OBSERVATIONS Sperm Present     MUCUS THREADS Innumerable    UA w Reflex to Microscopic w Reflex to Culture [[id number]]  (Abnormal) Collected: 07/19/22 2224    Lab Status: Final result Specimen: Urine, Clean Catch Updated: 07/19/22 2245     Color, UA Yellow     Clarity, UA Clear     Specific Gravity, UA >=1 030     pH, UA 6 0     Leukocytes, UA Negative     Nitrite, UA Negative     Protein, UA 1+ mg/dl      Glucose, UA Negative mg/dl      Ketones, UA Trace mg/dl      Urobilinogen, UA 0 2 E U /dl      Bilirubin, UA Negative     Occult Blood, UA Negative    CKMB [[id number]]  (Normal) Collected: 07/19/22 1936    Lab Status: Final result Specimen: Blood from Arm, Left Updated: 07/19/22 2025     CK-MB Index 1 3 %      CK-MB 4 8 ng/mL     Comprehensive metabolic panel [[id number]]  (Abnormal) Collected: 07/19/22 1936    Lab Status: Final result Specimen: Blood from Arm, Left Updated: 07/19/22 1959     Sodium 139 mmol/L      Potassium 3 3 mmol/L      Chloride 105 mmol/L      CO2 26 mmol/L      ANION GAP 8 mmol/L      BUN 19 mg/dL      Creatinine 1 31 mg/dL      Glucose 139 mg/dL      Calcium 9 0 mg/dL      AST 20 U/L      ALT 13 U/L      Alkaline Phosphatase 53 U/L      Total Protein 7 4 g/dL      Albumin 4 0 g/dL      Total Bilirubin 0 35 mg/dL      eGFR 69 ml/min/1 73sq m     Narrative:      Chris guidelines for Chronic Kidney Disease (CKD):     Stage 1 with normal or high GFR (GFR > 90 mL/min/1 73 square meters)    Stage 2 Mild CKD (GFR = 60-89 mL/min/1 73 square meters)    Stage 3A Moderate CKD (GFR = 45-59 mL/min/1 73 square meters)    Stage 3B Moderate CKD (GFR = 30-44 mL/min/1 73 square meters)    Stage 4 Severe CKD (GFR = 15-29 mL/min/1 73 square meters)    Stage 5 End Stage CKD (GFR <15 mL/min/1 73 square meters)  Note: GFR calculation is accurate only with a steady state creatinine    Magnesium [[id number]]  (Abnormal) Collected: 07/19/22 1936    Lab Status: Final result Specimen: Blood from Arm, Left Updated: 07/19/22 1959     Magnesium 1 8 mg/dL     CK Total with Reflex CKMB [[id number]]  (Abnormal) Collected: 07/19/22 1936    Lab Status: Final result Specimen: Blood from Arm, Left Updated: 07/19/22 1959     Total  U/L     CBC and differential [[id number]]  (Abnormal) Collected: 07/19/22 1936    Lab Status: Final result Specimen: Blood from Arm, Left Updated: 07/19/22 1943     WBC 4 30 Thousand/uL      RBC 4 58 Million/uL      Hemoglobin 14 3 g/dL      Hematocrit 41 5 %      MCV 91 fL      MCH 31 2 pg      MCHC 34 5 g/dL      RDW 14 0 %      MPV 10 6 fL      Platelets 067 Thousands/uL      nRBC 0 /100 WBCs      Neutrophils Relative 57 %      Immat GRANS % 0 %      Lymphocytes Relative 34 %      Monocytes Relative 7 %      Eosinophils Relative 1 %      Basophils Relative 1 %      Neutrophils Absolute 2 45 Thousands/µL      Immature Grans Absolute 0 00 Thousand/uL      Lymphocytes Absolute 1 47 Thousands/µL      Monocytes Absolute 0 30 Thousand/µL      Eosinophils Absolute 0 06 Thousand/µL      Basophils Absolute 0 02 Thousands/µL                  No orders to display              Procedures  Procedures         ED Course                                             MDM  Number of Diagnoses or Management Options     Amount and/or Complexity of Data Reviewed  Clinical lab tests: ordered and reviewed  Review and summarize past medical records: yes    Risk of Complications, Morbidity, and/or Mortality  Presenting problems: moderate  Diagnostic procedures: moderate  Management options: low  General comments: This is a 42-year-old male presents to the emergency department after what he describes as heat exhaustion  Patient states he had not been drinking probably holiday and got lightheaded and dizzy  He has no signs of cardiac dysrhythmia on his EKG, labs are benign and patient states that he feels much better after some IV fluid hydration in the emergency department  Repeat examination reveals no acute pathology  Patient's hemodynamics are stable at time of discharge  He is instructed to follow-up with his primary care physician in next 24-48 hours and to return to the emergency department any time for any new or worsening issues  Patient Progress  Patient progress: improved      Disposition  Final diagnoses:   Dehydration   Heat exhaustion, initial encounter     Time reflects when diagnosis was documented in both MDM as applicable and the Disposition within this note     Time User Action Codes Description Comment    7/20/2022 12:02 AM Benjamina Pellet Add [E86 0] Dehydration     7/20/2022 12:03 AM Garnetta Pellet Add Arson Ramo  [id number]] Heat exhaustion, initial encounter       ED Disposition     ED Disposition   Discharge    Condition   Stable    Date/Time   Wed Jul 20, 2022 12:03 AM    Comment   Carlos Alberto Page discharge to home/self care                 Follow-up Information     Follow up With Specialties Details Why Contact Info Additional Information    [facility] Medicine Family Medicine Call in 1 day  [address] [address]  [address], [address] [place], [place], [address], [phone]          Discharge Medication List as of 7/20/2022 12:03 AM      CONTINUE these medications which have NOT CHANGED    Details   !! bictegravir-emtricitab-tenofovir alafenamide (Biktarvy) -25 MG tablet Take 1 tablet by mouth daily, Starting Tue 3/22/2022, Historical Med      !! bictegravir-emtricitab-tenofovir alafenamide (Biktarvy) -25 MG tablet Take 1 tablet by mouth daily with breakfast, Starting Mon 3/28/2022, Normal      Cholecalciferol 25 MCG (1000 UT) tablet Take 1,000 Units by mouth, Historical Med      cyclobenzaprine (FLEXERIL) 10 mg tablet Take 1 tablet (10 mg total) by mouth 2 (two) times a day as needed for muscle spasms, Starting Sat 3/26/2022, Normal      ibuprofen (MOTRIN) 600 mg tablet Take 1 tablet (600 mg total) by mouth every 6 (six) hours as needed for fever or headaches, Starting Sat 3/26/2022, Normal      levETIRAcetam (Keppra) 750 mg tablet Take 1 tablet (750 mg total) by mouth 2 (two) times a day, Starting Mon 3/28/2022, Normal       !! - Potential duplicate medications found  Please discuss with provider  No discharge procedures on file      PDMP Review       Value Time User    PDMP Reviewed  Yes 5/27/2020 12:37 AM LEBRON Montenegro          ED Provider  Electronically Signed by           Lyubov Patten MD  07/20/22 0666

## 2022-07-20 VITALS
RESPIRATION RATE: 16 BRPM | TEMPERATURE: 98 F | DIASTOLIC BLOOD PRESSURE: 93 MMHG | HEART RATE: 90 BPM | SYSTOLIC BLOOD PRESSURE: 107 MMHG | OXYGEN SATURATION: 100 %

## 2022-08-31 ENCOUNTER — HOSPITAL ENCOUNTER (EMERGENCY)
Facility: HOSPITAL | Age: 37
Discharge: HOME | End: 2022-08-31
Attending: EMERGENCY MEDICINE
Payer: MEDICARE

## 2022-08-31 VITALS
DIASTOLIC BLOOD PRESSURE: 75 MMHG | HEART RATE: 56 BPM | TEMPERATURE: 97.6 F | RESPIRATION RATE: 12 BRPM | OXYGEN SATURATION: 100 % | SYSTOLIC BLOOD PRESSURE: 101 MMHG

## 2022-08-31 DIAGNOSIS — E83.42 HYPOMAGNESEMIA: ICD-10-CM

## 2022-08-31 DIAGNOSIS — R11.0 NAUSEA: Primary | ICD-10-CM

## 2022-08-31 DIAGNOSIS — E87.6 HYPOKALEMIA: ICD-10-CM

## 2022-08-31 LAB
ANION GAP SERPL CALCULATED.3IONS-SCNC: 5 MMOL/L (ref 4–13)
BASOPHILS # BLD AUTO: 0.01 THOUSANDS/ΜL (ref 0–0.1)
BASOPHILS NFR BLD AUTO: 0 % (ref 0–1)
BUN SERPL-MCNC: 13 MG/DL (ref 5–25)
CALCIUM SERPL-MCNC: 8.2 MG/DL (ref 8.4–10.2)
CHLORIDE SERPL-SCNC: 106 MMOL/L (ref 96–108)
CO2 SERPL-SCNC: 27 MMOL/L (ref 21–32)
CREAT SERPL-MCNC: 0.81 MG/DL (ref 0.6–1.3)
EOSINOPHIL # BLD AUTO: 0.15 THOUSAND/ΜL (ref 0–0.61)
EOSINOPHIL NFR BLD AUTO: 3 % (ref 0–6)
ERYTHROCYTE [DISTWIDTH] IN BLOOD BY AUTOMATED COUNT: 13.4 % (ref 11.6–15.1)
GFR SERPL CREATININE-BSD FRML MDRD: 114 ML/MIN/1.73SQ M
GLUCOSE SERPL-MCNC: 117 MG/DL (ref 65–140)
HCT VFR BLD AUTO: 40.4 % (ref 36.5–49.3)
HGB BLD-MCNC: 13.5 G/DL (ref 12–17)
IMM GRANULOCYTES # BLD AUTO: 0 THOUSAND/UL (ref 0–0.2)
IMM GRANULOCYTES NFR BLD AUTO: 0 % (ref 0–2)
LYMPHOCYTES # BLD AUTO: 1.77 THOUSANDS/ΜL (ref 0.6–4.47)
LYMPHOCYTES NFR BLD AUTO: 40 % (ref 14–44)
MAGNESIUM SERPL-MCNC: 1.7 MG/DL (ref 1.9–2.7)
MCH RBC QN AUTO: 30.8 PG (ref 26.8–34.3)
MCHC RBC AUTO-ENTMCNC: 33.4 G/DL (ref 31.4–37.4)
MCV RBC AUTO: 92 FL (ref 82–98)
MONOCYTES # BLD AUTO: 0.51 THOUSAND/ΜL (ref 0.17–1.22)
MONOCYTES NFR BLD AUTO: 11 % (ref 4–12)
NEUTROPHILS # BLD AUTO: 2.04 THOUSANDS/ΜL (ref 1.85–7.62)
NEUTS SEG NFR BLD AUTO: 46 % (ref 43–75)
NRBC BLD AUTO-RTO: 0 /100 WBCS
PLATELET # BLD AUTO: 144 THOUSANDS/UL (ref 149–390)
PMV BLD AUTO: 10.8 FL (ref 8.9–12.7)
POTASSIUM SERPL-SCNC: 3.3 MMOL/L (ref 3.5–5.3)
RBC # BLD AUTO: 4.38 MILLION/UL (ref 3.88–5.62)
SARS-COV-2 RNA RESP QL NAA+PROBE: NEGATIVE
SODIUM SERPL-SCNC: 138 MMOL/L (ref 135–147)
WBC # BLD AUTO: 4.48 THOUSAND/UL (ref 4.31–10.16)

## 2022-08-31 PROCEDURE — 85025 COMPLETE CBC W/AUTO DIFF WBC: CPT | Performed by: EMERGENCY MEDICINE

## 2022-08-31 PROCEDURE — 99284 EMERGENCY DEPT VISIT MOD MDM: CPT

## 2022-08-31 PROCEDURE — 87635 SARS-COV-2 COVID-19 AMP PRB: CPT | Performed by: EMERGENCY MEDICINE

## 2022-08-31 PROCEDURE — 80048 BASIC METABOLIC PNL TOTAL CA: CPT | Performed by: EMERGENCY MEDICINE

## 2022-08-31 PROCEDURE — 83735 ASSAY OF MAGNESIUM: CPT | Performed by: EMERGENCY MEDICINE

## 2022-08-31 PROCEDURE — 96361 HYDRATE IV INFUSION ADD-ON: CPT

## 2022-08-31 PROCEDURE — 96365 THER/PROPH/DIAG IV INF INIT: CPT

## 2022-08-31 PROCEDURE — 36415 COLL VENOUS BLD VENIPUNCTURE: CPT | Performed by: EMERGENCY MEDICINE

## 2022-08-31 PROCEDURE — 99284 EMERGENCY DEPT VISIT MOD MDM: CPT | Performed by: EMERGENCY MEDICINE

## 2022-08-31 RX ORDER — MAGNESIUM SULFATE HEPTAHYDRATE 40 MG/ML
2 INJECTION, SOLUTION INTRAVENOUS ONCE
Status: COMPLETED | OUTPATIENT
Start: 2022-08-31 | End: 2022-08-31

## 2022-08-31 RX ORDER — ONDANSETRON 4 MG/1
4 TABLET, ORALLY DISINTEGRATING ORAL EVERY 6 HOURS PRN
Qty: 20 TABLET | Refills: 0 | Status: SHIPPED | OUTPATIENT
Start: 2022-08-31

## 2022-08-31 RX ORDER — MAGNESIUM SULFATE HEPTAHYDRATE 40 MG/ML
2 INJECTION, SOLUTION INTRAVENOUS ONCE
Status: DISCONTINUED | OUTPATIENT
Start: 2022-08-31 | End: 2022-08-31

## 2022-08-31 RX ORDER — ONDANSETRON 2 MG/ML
1 INJECTION INTRAMUSCULAR; INTRAVENOUS ONCE
Status: COMPLETED | OUTPATIENT
Start: 2022-08-31 | End: 2022-08-31

## 2022-08-31 RX ORDER — POTASSIUM CHLORIDE 20 MEQ/1
40 TABLET, EXTENDED RELEASE ORAL ONCE
Status: COMPLETED | OUTPATIENT
Start: 2022-08-31 | End: 2022-08-31

## 2022-08-31 RX ADMIN — POTASSIUM CHLORIDE 40 MEQ: 1500 TABLET, EXTENDED RELEASE ORAL at 13:33

## 2022-08-31 RX ADMIN — SODIUM CHLORIDE 1000 ML: 0.9 INJECTION, SOLUTION INTRAVENOUS at 12:20

## 2022-08-31 RX ADMIN — MAGNESIUM SULFATE HEPTAHYDRATE 2 G: 40 INJECTION, SOLUTION INTRAVENOUS at 13:32

## 2022-08-31 NOTE — ED PROVIDER NOTES
History  Chief Complaint   Patient presents with    Nausea     Patient complains of nausea for the last 3 days, feeling tired along with symptoms  49-year-old male with history of HIV and seizure disorder presents to the emergency department for evaluation of nausea and fatigue  The patient reports feeling nauseous the past 3 days  States that 2 nights ago he had 1 episode of nonbloody and nonbilious emesis  He was at a [employer] and started to feel very fatigued so called for an ambulance  The patient states that he has been compliant with all of his medications  The patient has not taken any new medications to treat his symptoms  The patient denies any other current symptoms including fevers, chills, headache, blurry vision, sore throat, cough, chest pain, shortness of breath and localized numbness, tingling or weakness  Prior to Admission Medications   Prescriptions Last Dose Informant Patient Reported? Taking?    Cholecalciferol 25 MCG (1000 UT) tablet   Yes No   Sig: Take 1,000 Units by mouth   bictegravir-emtricitab-tenofovir alafenamide (Biktarvy) -25 MG tablet   Yes No   Sig: Take 1 tablet by mouth daily   bictegravir-emtricitab-tenofovir alafenamide (Biktarvy) -25 MG tablet   No No   Sig: Take 1 tablet by mouth daily with breakfast   cyclobenzaprine (FLEXERIL) 10 mg tablet   No No   Sig: Take 1 tablet (10 mg total) by mouth 2 (two) times a day as needed for muscle spasms   Patient not taking: Reported on 7/2/2022   ibuprofen (MOTRIN) 600 mg tablet   No No   Sig: Take 1 tablet (600 mg total) by mouth every 6 (six) hours as needed for fever or headaches   Patient not taking: No sig reported   levETIRAcetam (Keppra) 750 mg tablet   No No   Sig: Take 1 tablet (750 mg total) by mouth 2 (two) times a day      Facility-Administered Medications: None       Past Medical History:   Diagnosis Date    [facility] immunodeficiency virus infection) ([facility])     [facility]     Smoker     Syphilis        Past Surgical History:   Procedure Laterality Date    HERNIA REPAIR         No family history on file  I have reviewed and agree with the history as documented  E-Cigarette/Vaping    E-Cigarette Use Never User      E-Cigarette/Vaping Substances     Social History     Tobacco Use    Smoking status: Current Every Day Smoker     Packs/day: 1 50     Types: Cigarettes    Smokeless tobacco: Never Used   Vaping Use    Vaping Use: Never used   Substance Use Topics    Alcohol use: Yes     Alcohol/week: 2 0 standard drinks     Types: 1 Glasses of wine, 1 Cans of beer per week     Comment: socially    Drug use: Yes     Types: Marijuana     Comment: last used08/3022       Review of Systems   Constitutional: Positive for fatigue  Negative for chills and fever  HENT: Negative for ear pain and sore throat  Eyes: Negative for pain and visual disturbance  Respiratory: Negative for cough and shortness of breath  Cardiovascular: Negative for chest pain and palpitations  Gastrointestinal: Positive for nausea and vomiting  Negative for abdominal pain  Genitourinary: Negative for dysuria and hematuria  Musculoskeletal: Negative for arthralgias and back pain  Skin: Negative for color change and rash  Neurological: Negative for seizures and syncope  All other systems reviewed and are negative  Physical Exam  Physical Exam  Vitals and nursing note reviewed  Constitutional:       Appearance: He is well-developed  HENT:      Head: Normocephalic and atraumatic  Eyes:      Conjunctiva/sclera: Conjunctivae normal    Cardiovascular:      Rate and Rhythm: Normal rate and regular rhythm  Heart sounds: No murmur heard  Pulmonary:      Effort: Pulmonary effort is normal  No respiratory distress  Breath sounds: Normal breath sounds  Abdominal:      Palpations: Abdomen is soft  Tenderness: There is no abdominal tenderness     Musculoskeletal:      Cervical back: Neck supple  Skin:     General: Skin is warm and dry  Neurological:      Mental Status: He is alert  Vital Signs  ED Triage Vitals   Temperature Pulse Respirations Blood Pressure SpO2   08/31/22 1157 08/31/22 1206 08/31/22 1206 08/31/22 1206 08/31/22 1206   98 5 °F (36 9 °C) 78 12 102/70 100 %      Temp Source Heart Rate Source Patient Position - Orthostatic VS BP Location FiO2 (%)   08/31/22 1157 08/31/22 1437 08/31/22 1206 08/31/22 1206 --   Oral Monitor Lying Right arm       Pain Score       --                  Vitals:    08/31/22 1206 08/31/22 1437   BP: 102/70 101/75   Pulse: 78 56   Patient Position - Orthostatic VS: Lying Lying         Visual Acuity      ED Medications  Medications   ondansetron (FOR EMS ONLY) (ZOFRAN) 4 mg/2 mL injection 4 mg (0 mg Does not apply Given to EMS 8/31/22 1157)   sodium chloride 0 9 % bolus 1,000 mL (0 mL Intravenous Stopped 8/31/22 1437)   potassium chloride (K-DUR,KLOR-CON) CR tablet 40 mEq (40 mEq Oral Given 8/31/22 1333)   magnesium sulfate 2 g/50 mL IVPB (premix) 2 g (0 g Intravenous Stopped 8/31/22 1437)       Diagnostic Studies  Results Reviewed     Procedure Component Value Units Date/Time    COVID only [[id number]]  (Normal) Collected: 08/31/22 1220    Lab Status: Final result Specimen: Nares from Nose Updated: 08/31/22 1329     SARS-CoV-2 Negative    Narrative:      FOR PEDIATRIC PATIENTS - copy/paste COVID Guidelines URL to browser: [email or web address] [email or web address]  [email or web address]    SARS-CoV-2 assay is a Nucleic Acid Amplification assay intended for the  qualitative detection of nucleic acid from SARS-CoV-2 in nasopharyngeal  swabs  Results are for the presumptive identification of SARS-CoV-2 RNA  Positive results are indicative of infection with SARS-CoV-2, the virus  causing COVID-19, but do not rule out bacterial infection or co-infection  with other viruses   Laboratories within the [place] and its  territories are required to report all positive results to the appropriate  public health authorities  Negative results do not preclude SARS-CoV-2  infection and should not be used as the sole basis for treatment or other  patient management decisions  Negative results must be combined with  clinical observations, patient history, and epidemiological information  This test has not been FDA cleared or approved  This test has been authorized by FDA under an Emergency Use Authorization  (EUA)  This test is only authorized for the duration of time the  declaration that circumstances exist justifying the authorization of the  emergency use of an in vitro diagnostic tests for detection of SARS-CoV-2  virus and/or diagnosis of COVID-19 infection under section 564(b)(1) of  the Act, 21 U  S C  909VNU-8(O)(8), unless the authorization is terminated  or revoked sooner  The test has been validated but independent review by FDA  and CLIA is pending  Test performed using [employer]: This RT-PCR assay targets N2,  a region unique to SARS-CoV-2  A conserved region in the E-gene was chosen  for pan-Sarbecovirus detection which includes SARS-CoV-2      Basic metabolic panel [[id number]]  (Abnormal) Collected: 08/31/22 1220    Lab Status: Final result Specimen: Blood from Arm, Left Updated: 08/31/22 1246     Sodium 138 mmol/L      Potassium 3 3 mmol/L      Chloride 106 mmol/L      CO2 27 mmol/L      ANION GAP 5 mmol/L      BUN 13 mg/dL      Creatinine 0 81 mg/dL      Glucose 117 mg/dL      Calcium 8 2 mg/dL      eGFR 114 ml/min/1 73sq m     Narrative:      [place] guidelines for Chronic Kidney Disease (CKD):     Stage 1 with normal or high GFR (GFR > 90 mL/min/1 73 square meters)    Stage 2 Mild CKD (GFR = 60-89 mL/min/1 73 square meters)    Stage 3A Moderate CKD (GFR = 45-59 mL/min/1 73 square meters)    Stage 3B Moderate CKD (GFR = 30-44 mL/min/1 73 square meters)    Stage 4 Severe CKD (GFR = 15-29 mL/min/1 73 square meters)    Stage 5 End Stage CKD (GFR <15 mL/min/1 73 square meters)  Note: GFR calculation is accurate only with a steady state creatinine    Magnesium [[id number]]  (Abnormal) Collected: 08/31/22 1220    Lab Status: Final result Specimen: Blood from Arm, Left Updated: 08/31/22 1246     Magnesium 1 7 mg/dL     CBC and differential [[id number]]  (Abnormal) Collected: 08/31/22 1220    Lab Status: Final result Specimen: Blood from Arm, Left Updated: 08/31/22 1228     WBC 4 48 Thousand/uL      RBC 4 38 Million/uL      Hemoglobin 13 5 g/dL      Hematocrit 40 4 %      MCV 92 fL      MCH 30 8 pg      MCHC 33 4 g/dL      RDW 13 4 %      MPV 10 8 fL      Platelets 634 Thousands/uL      nRBC 0 /100 WBCs      Neutrophils Relative 46 %      Immat GRANS % 0 %      Lymphocytes Relative 40 %      Monocytes Relative 11 %      Eosinophils Relative 3 %      Basophils Relative 0 %      Neutrophils Absolute 2 04 Thousands/µL      Immature Grans Absolute 0 00 Thousand/uL      Lymphocytes Absolute 1 77 Thousands/µL      Monocytes Absolute 0 51 Thousand/µL      Eosinophils Absolute 0 15 Thousand/µL      Basophils Absolute 0 01 Thousands/µL                  No orders to display              Procedures  Procedures         ED Course                 MDM  Number of Diagnoses or Management Options  Hypokalemia  Hypomagnesemia  Nausea  Diagnosis management comments: 27-year-old male presented to the emergency department for evaluation of nausea and fatigue  On arrival the patient was awake, alert, oriented and in no acute distress  Initial vital signs within normal limits  Physical exam was unremarkable  Workup done in the emergency department showed the patient was mildly hypokalemic and hypomagnesemic  The patient was treated with Zofran, a fluid bolus and his potassium and magnesium repleted  On re-evaluation patient reported significant improvement of symptoms    He is appropriate for discharge at this time with recommendation to follow up with his PCP  Patient provided with a prescription for Zofran  Return precautions were discussed  Patient agrees with the plan for discharge and feels comfortable to go home with proper f/u  Advised to return for worsening or additional problems  Diagnostic tests were reviewed and questions answered  Diagnosis, care plan and treatment options were discussed  The patient understands instructions and will follow up as directed  Disposition  Final diagnoses:   Nausea   Hypokalemia   Hypomagnesemia     Time reflects when diagnosis was documented in both MDM as applicable and the Disposition within this note     Time User Action Codes Description Comment    8/31/2022  1:37 PM Emmit Fitch [R11 0] Nausea     8/31/2022  1:37 PM Emmit Fitch [E87 6] Hypokalemia     8/31/2022  1:37 PM Alberto Bojorquez [E83 42] Hypomagnesemia       ED Disposition     ED Disposition   Discharge    Condition   Stable    Date/Time   Wed Aug 31, 2022  1:37 PM    Comment   Lily Nunes discharge to home/self care                 Follow-up Information     Follow up With Specialties Details Why Contact Info Additional Information    Nacho Tirado  Schedule an appointment as soon as possible for a visit   [facility] ([facility] - Internal Medicine    [phone]     [address] Emergency Department Emergency Medicine Go to  If symptoms worsen [address] [facility],[address] [id number]  [address] [facility] Emergency Department, [address], [address]          Discharge Medication List as of 8/31/2022  2:37 PM      START taking these medications    Details   ondansetron (Zofran ODT) 4 mg disintegrating tablet Take 1 tablet (4 mg total) by mouth every 6 (six) hours as needed for nausea or vomiting, Starting Wed 8/31/2022, Normal         CONTINUE these medications which have NOT CHANGED    Details   !! bictegravir-emtricitab-tenofovir alafenamide (Biktarvy) -25 MG tablet Take 1 tablet by mouth daily, Starting Tue 3/22/2022, Historical Med      !! bictegravir-emtricitab-tenofovir alafenamide (Biktarvy) -25 MG tablet Take 1 tablet by mouth daily with breakfast, Starting Mon 3/28/2022, Normal      Cholecalciferol 25 MCG (1000 UT) tablet Take 1,000 Units by mouth, Historical Med      cyclobenzaprine (FLEXERIL) 10 mg tablet Take 1 tablet (10 mg total) by mouth 2 (two) times a day as needed for muscle spasms, Starting Sat 3/26/2022, Normal      ibuprofen (MOTRIN) 600 mg tablet Take 1 tablet (600 mg total) by mouth every 6 (six) hours as needed for fever or headaches, Starting Sat 3/26/2022, Normal      levETIRAcetam (Keppra) 750 mg tablet Take 1 tablet (750 mg total) by mouth 2 (two) times a day, Starting Mon 3/28/2022, Normal       !! - Potential duplicate medications found  Please discuss with provider  No discharge procedures on file      PDMP Review       Value Time User    PDMP Reviewed  Yes 5/27/2020 12:37 AM LEBRON Jaime          ED Provider  Electronically Signed by           Marysol Florence MD  08/31/22 6521

## 2022-09-05 ENCOUNTER — HOSPITAL ENCOUNTER (EMERGENCY)
Facility: HOSPITAL | Age: 37
Discharge: HOME | End: 2022-09-05
Attending: EMERGENCY MEDICINE
Payer: MEDICARE

## 2022-09-05 VITALS
HEART RATE: 101 BPM | TEMPERATURE: 98.5 F | WEIGHT: 152 LBS | HEIGHT: 66 IN | SYSTOLIC BLOOD PRESSURE: 127 MMHG | DIASTOLIC BLOOD PRESSURE: 86 MMHG | RESPIRATION RATE: 18 BRPM | OXYGEN SATURATION: 98 % | BODY MASS INDEX: 24.43 KG/M2

## 2022-09-05 DIAGNOSIS — R60.9 PERIPHERAL EDEMA: Primary | ICD-10-CM

## 2022-09-05 LAB
ANION GAP SERPL CALCULATED.3IONS-SCNC: 6 MMOL/L (ref 4–13)
BASOPHILS # BLD AUTO: 0.03 THOUSANDS/ΜL (ref 0–0.1)
BASOPHILS NFR BLD AUTO: 0 % (ref 0–1)
BUN SERPL-MCNC: 13 MG/DL (ref 5–25)
CALCIUM SERPL-MCNC: 9.2 MG/DL (ref 8.4–10.2)
CHLORIDE SERPL-SCNC: 98 MMOL/L (ref 96–108)
CO2 SERPL-SCNC: 32 MMOL/L (ref 21–32)
CREAT SERPL-MCNC: 0.92 MG/DL (ref 0.6–1.3)
EOSINOPHIL # BLD AUTO: 0.23 THOUSAND/ΜL (ref 0–0.61)
EOSINOPHIL NFR BLD AUTO: 3 % (ref 0–6)
ERYTHROCYTE [DISTWIDTH] IN BLOOD BY AUTOMATED COUNT: 13.6 % (ref 11.6–15.1)
GFR SERPL CREATININE-BSD FRML MDRD: 106 ML/MIN/1.73SQ M
GLUCOSE SERPL-MCNC: 83 MG/DL (ref 65–140)
HCT VFR BLD AUTO: 43.9 % (ref 36.5–49.3)
HGB BLD-MCNC: 15.1 G/DL (ref 12–17)
IMM GRANULOCYTES # BLD AUTO: 0.01 THOUSAND/UL (ref 0–0.2)
IMM GRANULOCYTES NFR BLD AUTO: 0 % (ref 0–2)
LYMPHOCYTES # BLD AUTO: 1.52 THOUSANDS/ΜL (ref 0.6–4.47)
LYMPHOCYTES NFR BLD AUTO: 18 % (ref 14–44)
MCH RBC QN AUTO: 31.3 PG (ref 26.8–34.3)
MCHC RBC AUTO-ENTMCNC: 34.4 G/DL (ref 31.4–37.4)
MCV RBC AUTO: 91 FL (ref 82–98)
MONOCYTES # BLD AUTO: 0.6 THOUSAND/ΜL (ref 0.17–1.22)
MONOCYTES NFR BLD AUTO: 7 % (ref 4–12)
NEUTROPHILS # BLD AUTO: 6.03 THOUSANDS/ΜL (ref 1.85–7.62)
NEUTS SEG NFR BLD AUTO: 72 % (ref 43–75)
NRBC BLD AUTO-RTO: 0 /100 WBCS
PLATELET # BLD AUTO: 171 THOUSANDS/UL (ref 149–390)
PMV BLD AUTO: 11 FL (ref 8.9–12.7)
POTASSIUM SERPL-SCNC: 3.9 MMOL/L (ref 3.5–5.3)
RBC # BLD AUTO: 4.83 MILLION/UL (ref 3.88–5.62)
SODIUM SERPL-SCNC: 136 MMOL/L (ref 135–147)
WBC # BLD AUTO: 8.42 THOUSAND/UL (ref 4.31–10.16)

## 2022-09-05 PROCEDURE — 96374 THER/PROPH/DIAG INJ IV PUSH: CPT

## 2022-09-05 PROCEDURE — 99283 EMERGENCY DEPT VISIT LOW MDM: CPT

## 2022-09-05 PROCEDURE — 80048 BASIC METABOLIC PNL TOTAL CA: CPT | Performed by: EMERGENCY MEDICINE

## 2022-09-05 PROCEDURE — 99284 EMERGENCY DEPT VISIT MOD MDM: CPT | Performed by: EMERGENCY MEDICINE

## 2022-09-05 PROCEDURE — 36415 COLL VENOUS BLD VENIPUNCTURE: CPT | Performed by: EMERGENCY MEDICINE

## 2022-09-05 PROCEDURE — 85025 COMPLETE CBC W/AUTO DIFF WBC: CPT | Performed by: EMERGENCY MEDICINE

## 2022-09-05 RX ORDER — ACETAMINOPHEN 325 MG/1
650 TABLET ORAL ONCE
Status: COMPLETED | OUTPATIENT
Start: 2022-09-05 | End: 2022-09-05

## 2022-09-05 RX ORDER — FUROSEMIDE 10 MG/ML
20 INJECTION INTRAMUSCULAR; INTRAVENOUS ONCE
Status: COMPLETED | OUTPATIENT
Start: 2022-09-05 | End: 2022-09-05

## 2022-09-05 RX ADMIN — ACETAMINOPHEN 650 MG: 325 TABLET ORAL at 20:53

## 2022-09-05 RX ADMIN — FUROSEMIDE 20 MG: 10 INJECTION, SOLUTION INTRAMUSCULAR; INTRAVENOUS at 20:54

## 2022-09-06 NOTE — ED PROVIDER NOTES
History  Chief Complaint   Patient presents with    Leg Swelling     Pt presents with bilateral leg edema and lower back pain  77-year-old male comes in for evaluation of bilateral lower extremity edema as well as coccyx pain and neck pain  Patient denies any traumatic injury  Patient has a long history of peripheral edema he feels like his legs are worse than usual today  Patient denies any new or different medications  History provided by:  Patient   used: No    Medical Problem  Location:  Patient comes in with multiple complaints including bilateral peripheral edema coccyx pain and neck pain  Severity:  Moderate  Onset quality:  Sudden  Duration:  3 hours  Timing:  Constant  Progression:  Worsening  Chronicity:  New  Associated symptoms: no abdominal pain, no chest pain, no cough, no ear pain, no fever, no headaches, no rash, no shortness of breath, no sore throat and no vomiting        Prior to Admission Medications   Prescriptions Last Dose Informant Patient Reported? Taking?    Cholecalciferol 25 MCG (1000 UT) tablet   Yes No   Sig: Take 1,000 Units by mouth   bictegravir-emtricitab-tenofovir alafenamide (Biktarvy) -25 MG tablet   Yes No   Sig: Take 1 tablet by mouth daily   bictegravir-emtricitab-tenofovir alafenamide (Biktarvy) -25 MG tablet   No No   Sig: Take 1 tablet by mouth daily with breakfast   cyclobenzaprine (FLEXERIL) 10 mg tablet   No No   Sig: Take 1 tablet (10 mg total) by mouth 2 (two) times a day as needed for muscle spasms   Patient not taking: Reported on 7/2/2022   ibuprofen (MOTRIN) 600 mg tablet   No No   Sig: Take 1 tablet (600 mg total) by mouth every 6 (six) hours as needed for fever or headaches   Patient not taking: No sig reported   levETIRAcetam (Keppra) 750 mg tablet   No No   Sig: Take 1 tablet (750 mg total) by mouth 2 (two) times a day   ondansetron (Zofran ODT) 4 mg disintegrating tablet   No No   Sig: Take 1 tablet (4 mg total) by mouth every 6 (six) hours as needed for nausea or vomiting      Facility-Administered Medications: None       Past Medical History:   Diagnosis Date    [facility] ([facility] )     Seizures ([facility] )     Smoker     Syphilis        Past Surgical History:   Procedure Laterality Date    HERNIA REPAIR         History reviewed  No pertinent family history  I have reviewed and agree with the history as documented  E-Cigarette/Vaping    E-Cigarette Use Never User      E-Cigarette/Vaping Substances     Social History     Tobacco Use    Smoking status: Current Every Day Smoker     Packs/day: 1 50     Types: Cigarettes    Smokeless tobacco: Never Used   Vaping Use    Vaping Use: Never used   Substance Use Topics    Alcohol use: Yes     Alcohol/week: 2 0 standard drinks     Types: 1 Glasses of wine, 1 Cans of beer per week     Comment: socially    Drug use: Yes     Types: Marijuana     Comment: last used08/3022       Review of Systems   Constitutional: Negative for chills and fever  HENT: Negative for ear pain and sore throat  Eyes: Negative for pain and visual disturbance  Respiratory: Negative for cough and shortness of breath  Cardiovascular: Negative for chest pain and palpitations  Gastrointestinal: Negative for abdominal pain and vomiting  Genitourinary: Negative for dysuria and hematuria  Musculoskeletal: Negative for arthralgias and back pain  Skin: Negative for color change and rash  Neurological: Negative for seizures, syncope and headaches  All other systems reviewed and are negative  Physical Exam  Physical Exam  Vitals and nursing note reviewed  Constitutional:       Appearance: He is well-developed  He is not toxic-appearing  HENT:      Head: Normocephalic and atraumatic        Right Ear: Tympanic membrane normal       Left Ear: Tympanic membrane normal       Nose: Nose normal    Eyes:      General: Lids are normal       Conjunctiva/sclera: Conjunctivae normal       Pupils: Pupils are equal, round, and reactive to light  Neck:      Vascular: No carotid bruit or JVD  Trachea: Trachea normal    Cardiovascular:      Rate and Rhythm: Normal rate and regular rhythm  No extrasystoles are present  Heart sounds: Normal heart sounds  Pulmonary:      Effort: Pulmonary effort is normal       Breath sounds: No decreased breath sounds, wheezing, rhonchi or rales  Chest:      Chest wall: No deformity or tenderness  Abdominal:      General: Bowel sounds are normal       Palpations: Abdomen is soft  Tenderness: There is no abdominal tenderness  There is no guarding or rebound  Musculoskeletal:      Right shoulder: No swelling, deformity or tenderness  Normal range of motion  Cervical back: Normal range of motion and neck supple  No deformity, tenderness or bony tenderness  Right lower le+ Pitting Edema present  Left lower le+ Edema present  Lymphadenopathy:      Cervical: No cervical adenopathy  Skin:     General: Skin is warm and dry  Neurological:      Mental Status: He is alert and oriented to person, place, and time  Cranial Nerves: No cranial nerve deficit  Sensory: No sensory deficit  Deep Tendon Reflexes: Reflexes are normal and symmetric  Psychiatric:         Speech: Speech normal          Behavior: Behavior normal          Thought Content:  Thought content normal          Judgment: Judgment normal          Vital Signs  ED Triage Vitals [22]   Temperature Pulse Respirations Blood Pressure SpO2   98 5 °F (36 9 °C) 101 18 127/86 98 %      Temp Source Heart Rate Source Patient Position - Orthostatic VS BP Location FiO2 (%)   Oral Monitor Lying Right arm --      Pain Score       10 - Worst Possible Pain           Vitals:    22   BP: 127/86   Pulse: 101   Patient Position - Orthostatic VS: Lying         Visual Acuity      ED Medications  Medications   acetaminophen (TYLENOL) tablet 650 mg (650 mg Oral Given 9/5/22 2053)   furosemide (LASIX) injection 20 mg (20 mg Intravenous Given 9/5/22 2054)       Diagnostic Studies  Results Reviewed     Procedure Component Value Units Date/Time    Basic metabolic panel [[id number]] Collected: 09/05/22 2053    Lab Status: Final result Specimen: Blood from Arm, Right Updated: 09/05/22 2122     Sodium 136 mmol/L      Potassium 3 9 mmol/L      Chloride 98 mmol/L      CO2 32 mmol/L      ANION GAP 6 mmol/L      BUN 13 mg/dL      Creatinine 0 92 mg/dL      Glucose 83 mg/dL      Calcium 9 2 mg/dL      eGFR 106 ml/min/1 73sq m     Narrative:      [place] guidelines for Chronic Kidney Disease (CKD):     Stage 1 with normal or high GFR (GFR > 90 mL/min/1 73 square meters)    Stage 2 Mild CKD (GFR = 60-89 mL/min/1 73 square meters)    Stage 3A Moderate CKD (GFR = 45-59 mL/min/1 73 square meters)    Stage 3B Moderate CKD (GFR = 30-44 mL/min/1 73 square meters)    Stage 4 Severe CKD (GFR = 15-29 mL/min/1 73 square meters)    Stage 5 End Stage CKD (GFR <15 mL/min/1 73 square meters)  Note: GFR calculation is accurate only with a steady state creatinine    CBC and differential [[id number]] Collected: 09/05/22 2053    Lab Status: Final result Specimen: Blood from Arm, Right Updated: 09/05/22 2102     WBC 8 42 Thousand/uL      RBC 4 83 Million/uL      Hemoglobin 15 1 g/dL      Hematocrit 43 9 %      MCV 91 fL      MCH 31 3 pg      MCHC 34 4 g/dL      RDW 13 6 %      MPV 11 0 fL      Platelets 768 Thousands/uL      nRBC 0 /100 WBCs      Neutrophils Relative 72 %      Immat GRANS % 0 %      Lymphocytes Relative 18 %      Monocytes Relative 7 %      Eosinophils Relative 3 %      Basophils Relative 0 %      Neutrophils Absolute 6 03 Thousands/µL      Immature Grans Absolute 0 01 Thousand/uL      Lymphocytes Absolute 1 52 Thousands/µL      Monocytes Absolute 0 60 Thousand/µL      Eosinophils Absolute 0 23 Thousand/µL      Basophils Absolute 0 03 Thousands/µL                  No orders to display              Procedures  Procedures         ED Course                                             MDM  Number of Diagnoses or Management Options  Peripheral edema: new and requires workup     Amount and/or Complexity of Data Reviewed  Clinical lab tests: ordered and reviewed  Tests in the radiology section of CPT®: ordered and reviewed  Tests in the medicine section of CPT®: ordered and reviewed  Independent visualization of images, tracings, or specimens: yes    Patient Progress  Patient progress: stable      Disposition  Final diagnoses:   Peripheral edema     Time reflects when diagnosis was documented in both MDM as applicable and the Disposition within this note     Time User Action Codes Description Comment    9/5/2022  9:31 PM Jeni Maynard Add [R60 9] Peripheral edema       ED Disposition     ED Disposition   Discharge    Condition   Stable    Date/Time   Mon Sep 5, 2022  9:31 PM    Comment   Hodan Oswald discharge to home/self care  Follow-up Information     Follow up With Specialties Details Why Contact Info Additional Information    [facility]   [address] [id number]  [address]  , [place], [place], [address]          Patient's Medications   Discharge Prescriptions    No medications on file       No discharge procedures on file      PDMP Review       Value Time User    PDMP Reviewed  Yes 5/27/2020 12:37 AM LEBRON Marie          ED Provider  Electronically Signed by           Luis F Hough DO  09/05/22 [id number]

## 2022-09-09 ENCOUNTER — APPOINTMENT (OUTPATIENT)
Dept: RADIOLOGY | Facility: HOSPITAL | Age: 37
End: 2022-09-09
Payer: MEDICARE

## 2022-09-09 ENCOUNTER — HOSPITAL ENCOUNTER (EMERGENCY)
Facility: HOSPITAL | Age: 37
Discharge: HOME | End: 2022-09-09
Attending: EMERGENCY MEDICINE
Payer: MEDICARE

## 2022-09-09 VITALS
BODY MASS INDEX: 24.87 KG/M2 | HEART RATE: 70 BPM | WEIGHT: 154.76 LBS | OXYGEN SATURATION: 100 % | DIASTOLIC BLOOD PRESSURE: 85 MMHG | HEIGHT: 66 IN | SYSTOLIC BLOOD PRESSURE: 125 MMHG | TEMPERATURE: 98.3 F | RESPIRATION RATE: 16 BRPM

## 2022-09-09 DIAGNOSIS — R60.0 BILATERAL LOWER EXTREMITY EDEMA: Primary | ICD-10-CM

## 2022-09-09 LAB
ALBUMIN SERPL BCP-MCNC: 4.1 G/DL (ref 3.5–5)
ALP SERPL-CCNC: 61 U/L (ref 34–104)
ALT SERPL W P-5'-P-CCNC: 16 U/L (ref 7–52)
ANION GAP SERPL CALCULATED.3IONS-SCNC: 6 MMOL/L (ref 4–13)
AST SERPL W P-5'-P-CCNC: 21 U/L (ref 13–39)
BASOPHILS # BLD AUTO: 0.03 THOUSANDS/ΜL (ref 0–0.1)
BASOPHILS NFR BLD AUTO: 1 % (ref 0–1)
BILIRUB SERPL-MCNC: 0.34 MG/DL (ref 0.2–1)
BUN SERPL-MCNC: 14 MG/DL (ref 5–25)
CALCIUM SERPL-MCNC: 9.1 MG/DL (ref 8.4–10.2)
CHLORIDE SERPL-SCNC: 101 MMOL/L (ref 96–108)
CO2 SERPL-SCNC: 29 MMOL/L (ref 21–32)
CREAT SERPL-MCNC: 0.81 MG/DL (ref 0.6–1.3)
EOSINOPHIL # BLD AUTO: 0.14 THOUSAND/ΜL (ref 0–0.61)
EOSINOPHIL NFR BLD AUTO: 2 % (ref 0–6)
ERYTHROCYTE [DISTWIDTH] IN BLOOD BY AUTOMATED COUNT: 13.3 % (ref 11.6–15.1)
GFR SERPL CREATININE-BSD FRML MDRD: 114 ML/MIN/1.73SQ M
GLUCOSE SERPL-MCNC: 92 MG/DL (ref 65–140)
HCT VFR BLD AUTO: 40.6 % (ref 36.5–49.3)
HGB BLD-MCNC: 13.9 G/DL (ref 12–17)
IMM GRANULOCYTES # BLD AUTO: 0.01 THOUSAND/UL (ref 0–0.2)
IMM GRANULOCYTES NFR BLD AUTO: 0 % (ref 0–2)
LYMPHOCYTES # BLD AUTO: 1.69 THOUSANDS/ΜL (ref 0.6–4.47)
LYMPHOCYTES NFR BLD AUTO: 28 % (ref 14–44)
MCH RBC QN AUTO: 30.9 PG (ref 26.8–34.3)
MCHC RBC AUTO-ENTMCNC: 34.2 G/DL (ref 31.4–37.4)
MCV RBC AUTO: 90 FL (ref 82–98)
MONOCYTES # BLD AUTO: 0.68 THOUSAND/ΜL (ref 0.17–1.22)
MONOCYTES NFR BLD AUTO: 11 % (ref 4–12)
NEUTROPHILS # BLD AUTO: 3.52 THOUSANDS/ΜL (ref 1.85–7.62)
NEUTS SEG NFR BLD AUTO: 58 % (ref 43–75)
NRBC BLD AUTO-RTO: 0 /100 WBCS
PLATELET # BLD AUTO: 186 THOUSANDS/UL (ref 149–390)
PMV BLD AUTO: 10.2 FL (ref 8.9–12.7)
POTASSIUM SERPL-SCNC: 3.9 MMOL/L (ref 3.5–5.3)
PROT SERPL-MCNC: 7.6 G/DL (ref 6.4–8.4)
RBC # BLD AUTO: 4.5 MILLION/UL (ref 3.88–5.62)
SODIUM SERPL-SCNC: 136 MMOL/L (ref 135–147)
WBC # BLD AUTO: 6.07 THOUSAND/UL (ref 4.31–10.16)

## 2022-09-09 PROCEDURE — 96375 TX/PRO/DX INJ NEW DRUG ADDON: CPT

## 2022-09-09 PROCEDURE — 80053 COMPREHEN METABOLIC PANEL: CPT | Performed by: EMERGENCY MEDICINE

## 2022-09-09 PROCEDURE — 73610 X-RAY EXAM OF ANKLE: CPT

## 2022-09-09 PROCEDURE — 96374 THER/PROPH/DIAG INJ IV PUSH: CPT

## 2022-09-09 PROCEDURE — 99284 EMERGENCY DEPT VISIT MOD MDM: CPT | Performed by: EMERGENCY MEDICINE

## 2022-09-09 PROCEDURE — 99284 EMERGENCY DEPT VISIT MOD MDM: CPT

## 2022-09-09 PROCEDURE — 85025 COMPLETE CBC W/AUTO DIFF WBC: CPT | Performed by: EMERGENCY MEDICINE

## 2022-09-09 PROCEDURE — 36415 COLL VENOUS BLD VENIPUNCTURE: CPT | Performed by: EMERGENCY MEDICINE

## 2022-09-09 RX ORDER — FUROSEMIDE 10 MG/ML
20 INJECTION INTRAMUSCULAR; INTRAVENOUS ONCE
Status: COMPLETED | OUTPATIENT
Start: 2022-09-09 | End: 2022-09-09

## 2022-09-09 RX ORDER — ACETAMINOPHEN 325 MG/1
975 TABLET ORAL ONCE
Status: COMPLETED | OUTPATIENT
Start: 2022-09-09 | End: 2022-09-09

## 2022-09-09 RX ORDER — KETOROLAC TROMETHAMINE 30 MG/ML
15 INJECTION, SOLUTION INTRAMUSCULAR; INTRAVENOUS ONCE
Status: COMPLETED | OUTPATIENT
Start: 2022-09-09 | End: 2022-09-09

## 2022-09-09 RX ADMIN — KETOROLAC TROMETHAMINE 15 MG: 30 INJECTION, SOLUTION INTRAMUSCULAR at 15:42

## 2022-09-09 RX ADMIN — FUROSEMIDE 20 MG: 10 INJECTION, SOLUTION INTRAMUSCULAR; INTRAVENOUS at 16:46

## 2022-09-09 RX ADMIN — ACETAMINOPHEN 975 MG: 325 TABLET ORAL at 15:42

## 2022-09-09 NOTE — DISCHARGE INSTRUCTIONS
Follow up with your primary care physician  Please return to the emergency department if you develop worsening symptoms, severe pain, chest pain, shortness of breath, or anything else concerning to you

## 2022-09-09 NOTE — ED PROVIDER NOTES
History  Chief Complaint   Patient presents with    Leg Swelling     Pt presents to ED via EMS from the [facility] w/ bilateral leg swelling for two days  Pt here for same two days ago  Pt uses walker to ambulate  49-year-old male with history of HIV, seizures who presents for evaluation of bilateral lower extremity edema and pain  Patient reports that over the past 2 days, his ankles have become progressively more swollen  He had a similar event 4 days ago for which he was evaluated and given a dose of IV Lasix with improvement  He notes that he has pain that is isolated to his bilateral ankles that has caused him to be unable to ambulate today  He has not had any injury to the lower extremities  He otherwise feels at baseline and denies fevers, chest pain, shortness of breath, abdominal pain, vomiting  He did not take any medications prior to arrival for his symptoms  He also notes that he spends the majority of the day on his feet and never has his legs elevated as he is currently homeless  Prior to Admission Medications   Prescriptions Last Dose Informant Patient Reported? Taking?    Cholecalciferol 25 MCG (1000 UT) tablet   Yes No   Sig: Take 1,000 Units by mouth   bictegravir-emtricitab-tenofovir alafenamide (Biktarvy) -25 MG tablet   Yes No   Sig: Take 1 tablet by mouth daily   bictegravir-emtricitab-tenofovir alafenamide (Biktarvy) -25 MG tablet   No No   Sig: Take 1 tablet by mouth daily with breakfast   cyclobenzaprine (FLEXERIL) 10 mg tablet   No No   Sig: Take 1 tablet (10 mg total) by mouth 2 (two) times a day as needed for muscle spasms   Patient not taking: Reported on 7/2/2022   ibuprofen (MOTRIN) 600 mg tablet   No No   Sig: Take 1 tablet (600 mg total) by mouth every 6 (six) hours as needed for fever or headaches   Patient not taking: No sig reported   levETIRAcetam (Keppra) 750 mg tablet   No No   Sig: Take 1 tablet (750 mg total) by mouth 2 (two) times a day   ondansetron (Zofran ODT) 4 mg disintegrating tablet   No No   Sig: Take 1 tablet (4 mg total) by mouth every 6 (six) hours as needed for nausea or vomiting      Facility-Administered Medications: None       Past Medical History:   Diagnosis Date    HIV ([facility] immunodeficiency virus infection) ([facility] )     Seizures ([facility] )     Smoker     Syphilis        Past Surgical History:   Procedure Laterality Date    HERNIA REPAIR         History reviewed  No pertinent family history  I have reviewed and agree with the history as documented  E-Cigarette/Vaping    E-Cigarette Use Never User      E-Cigarette/Vaping Substances     Social History     Tobacco Use    Smoking status: Current Every Day Smoker     Packs/day: 1 50     Types: Cigarettes    Smokeless tobacco: Never Used   Vaping Use    Vaping Use: Never used   Substance Use Topics    Alcohol use: Yes     Alcohol/week: 2 0 standard drinks     Types: 1 Glasses of wine, 1 Cans of beer per week     Comment: socially    Drug use: Yes     Frequency: 45 0 times per week     Types: Marijuana     Comment: last used08/3022       Review of Systems   Constitutional: Negative for chills and fever  HENT: Negative for congestion and sore throat  Eyes: Negative for visual disturbance  Respiratory: Negative for cough, chest tightness and shortness of breath  Cardiovascular: Positive for leg swelling  Negative for chest pain  Gastrointestinal: Negative for abdominal pain, diarrhea, nausea and vomiting  Genitourinary: Negative for flank pain and frequency  Musculoskeletal: Positive for arthralgias and gait problem  Skin: Negative for color change and rash  Neurological: Negative for weakness and numbness  All other systems reviewed and are negative  Physical Exam  Physical Exam  Vitals and nursing note reviewed  Constitutional:       General: He is not in acute distress  Appearance: He is not ill-appearing     HENT:      Head: Normocephalic and atraumatic  Nose: Nose normal       Mouth/Throat:      Mouth: Mucous membranes are moist    Eyes:      Extraocular Movements: Extraocular movements intact  Cardiovascular:      Rate and Rhythm: Normal rate and regular rhythm  Heart sounds: No murmur heard  No friction rub  No gallop  Comments: 2+ DP pulses bilaterally  Pulmonary:      Effort: Pulmonary effort is normal       Breath sounds: Normal breath sounds  No wheezing, rhonchi or rales  Abdominal:      General: There is no distension  Palpations: Abdomen is soft  Tenderness: There is no abdominal tenderness  Musculoskeletal:         General: Normal range of motion  Cervical back: Normal range of motion and neck supple  Comments: 1+ pitting edema noted to bilateral lower extremities to the mid shin  Significant tenderness to palpation along bilateral ankle joints  Skin:     General: Skin is warm and dry  Findings: No rash  Comments: No warmth, erythema, or other lesions overlying the bilateral lower extremities  Neurological:      General: No focal deficit present  Mental Status: He is alert and oriented to person, place, and time  Comments: 5/5 strength and sensation intact to bilateral lower extremities     Psychiatric:         Behavior: Behavior normal          Vital Signs  ED Triage Vitals   Temperature Pulse Respirations Blood Pressure SpO2   09/09/22 1513 09/09/22 1510 09/09/22 1510 09/09/22 1510 09/09/22 1510   98 3 °F (36 8 °C) (!) 107 16 124/82 100 %      Temp Source Heart Rate Source Patient Position - Orthostatic VS BP Location FiO2 (%)   09/09/22 1513 09/09/22 1642 09/09/22 1642 09/09/22 1642 --   Oral Monitor Lying Left arm       Pain Score       09/09/22 1542       10 - Worst Possible Pain           Vitals:    09/09/22 1510 09/09/22 1642 09/09/22 1720   BP: 124/82 110/80 125/85   Pulse: (!) 107 70    Patient Position - Orthostatic VS:  Lying Lying         Visual Acuity      ED Medications  Medications   ketorolac (TORADOL) injection 15 mg (15 mg Intravenous Given 9/9/22 1542)   acetaminophen (TYLENOL) tablet 975 mg (975 mg Oral Given 9/9/22 1542)   furosemide (LASIX) injection 20 mg (20 mg Intravenous Given 9/9/22 1646)       Diagnostic Studies  Results Reviewed     Procedure Component Value Units Date/Time    Comprehensive metabolic panel [[id number]] Collected: 09/09/22 1541    Lab Status: Final result Specimen: Blood from Arm, Left Updated: 09/09/22 1604     Sodium 136 mmol/L      Potassium 3 9 mmol/L      Chloride 101 mmol/L      CO2 29 mmol/L      ANION GAP 6 mmol/L      BUN 14 mg/dL      Creatinine 0 81 mg/dL      Glucose 92 mg/dL      Calcium 9 1 mg/dL      AST 21 U/L      ALT 16 U/L      Alkaline Phosphatase 61 U/L      Total Protein 7 6 g/dL      Albumin 4 1 g/dL      Total Bilirubin 0 34 mg/dL      eGFR 114 ml/min/1 73sq m     Narrative:      National Kidney Disease Foundation guidelines for Chronic Kidney Disease (CKD):     Stage 1 with normal or high GFR (GFR > 90 mL/min/1 73 square meters)    Stage 2 Mild CKD (GFR = 60-89 mL/min/1 73 square meters)    Stage 3A Moderate CKD (GFR = 45-59 mL/min/1 73 square meters)    Stage 3B Moderate CKD (GFR = 30-44 mL/min/1 73 square meters)    Stage 4 Severe CKD (GFR = 15-29 mL/min/1 73 square meters)    Stage 5 End Stage CKD (GFR <15 mL/min/1 73 square meters)  Note: GFR calculation is accurate only with a steady state creatinine    CBC and differential [[id number]] Collected: 09/09/22 1541    Lab Status: Final result Specimen: Blood from Arm, Left Updated: 09/09/22 1548     WBC 6 07 Thousand/uL      RBC 4 50 Million/uL      Hemoglobin 13 9 g/dL      Hematocrit 40 6 %      MCV 90 fL      MCH 30 9 pg      MCHC 34 2 g/dL      RDW 13 3 %      MPV 10 2 fL      Platelets 062 Thousands/uL      nRBC 0 /100 WBCs      Neutrophils Relative 58 %      Immat GRANS % 0 %      Lymphocytes Relative 28 %      Monocytes Relative 11 %      Eosinophils Relative 2 %      Basophils Relative 1 %      Neutrophils Absolute 3 52 Thousands/µL      Immature Grans Absolute 0 01 Thousand/uL      Lymphocytes Absolute 1 69 Thousands/µL      Monocytes Absolute 0 68 Thousand/µL      Eosinophils Absolute 0 14 Thousand/µL      Basophils Absolute 0 03 Thousands/µL                  XR ankle 3+ views RIGHT   Final Result by Shanna Barba MD (09/09 1609)      Soft tissue swelling without acute osseous injury  Workstation performed: [id number]         XR ankle 3+ views LEFT   Final Result by Shanna Barba MD (09/09 1611)      Soft tissue swelling without acute osseous injury  Workstation performed: [id number]                    Procedures  Procedures         ED Course  ED Course as of 09/09/22 2037   Fri Sep 09, 2022   1549 CBC and differential                               SBIRT 20yo+    Flowsheet Row Most Recent Value   SBIRT (23 yo +)    In order to provide better care to our patients, we are screening all of our patients for alcohol and drug use  Would it be okay to ask you these screening questions? No Filed at: 09/09/2022 1545                    MDM  Number of Diagnoses or Management Options  Bilateral lower extremity edema: new and requires workup  Diagnosis management comments: 27-year-old male who presents for bilateral lower extremity edema  No other signs or symptoms concerning for acute heart failure  The extremities are neurovascularly intact  Will obtain labs, x-rays of bilateral ankles given significant tenderness  Will treat symptomatically  Labs unremarkable  X-rays without any acute pathology  20 mg of IV Lasix given  Advised follow-up with primary care physician  Return precautions discussed         Amount and/or Complexity of Data Reviewed  Clinical lab tests: ordered and reviewed  Tests in the radiology section of CPT®: ordered and reviewed  Review and summarize past medical records: yes    Risk of Complications, Morbidity, and/or Mortality  Presenting problems: high  Diagnostic procedures: low  Management options: moderate    Patient Progress  Patient progress: stable      Disposition  Final diagnoses:   Bilateral lower extremity edema     Time reflects when diagnosis was documented in both MDM as applicable and the Disposition within this note     Time User Action Codes Description Comment    9/9/2022  5:18 PM Shereen Bojorquez [R60 0] Bilateral lower extremity edema       ED Disposition     ED Disposition   Discharge    Condition   Stable    Date/Time   Fri Sep 9, 2022  5:18 PM    Comment   Brian Vasquez discharge to home/self care                 Follow-up Information     Follow up With Specialties Details Why Contact Info    [employer]folink  Schedule an appointment as soon as possible for a visit   [phone]            Discharge Medication List as of 9/9/2022  5:19 PM      CONTINUE these medications which have NOT CHANGED    Details   !! bictegravir-emtricitab-tenofovir alafenamide (Biktarvy) -25 MG tablet Take 1 tablet by mouth daily, Starting Tue 3/22/2022, Historical Med      !! bictegravir-emtricitab-tenofovir alafenamide (Biktarvy) -25 MG tablet Take 1 tablet by mouth daily with breakfast, Starting Mon 3/28/2022, Normal      Cholecalciferol 25 MCG (1000 UT) tablet Take 1,000 Units by mouth, Historical Med      cyclobenzaprine (FLEXERIL) 10 mg tablet Take 1 tablet (10 mg total) by mouth 2 (two) times a day as needed for muscle spasms, Starting Sat 3/26/2022, Normal      ibuprofen (MOTRIN) 600 mg tablet Take 1 tablet (600 mg total) by mouth every 6 (six) hours as needed for fever or headaches, Starting Sat 3/26/2022, Normal      levETIRAcetam (Keppra) 750 mg tablet Take 1 tablet (750 mg total) by mouth 2 (two) times a day, Starting Mon 3/28/2022, Normal      ondansetron (Zofran ODT) 4 mg disintegrating tablet Take 1 tablet (4 mg total) by mouth every 6 (six) hours as needed for nausea or vomiting, Starting Wed 8/31/2022, Normal       !! - Potential duplicate medications found  Please discuss with provider  No discharge procedures on file      PDMP Review       Value Time User    PDMP Reviewed  Yes 5/27/2020 12:37 AM LEBRON Kelley          ED Provider  Electronically Signed by           Cathy Mendiola MD  09/09/22 2037

## 2022-09-12 ENCOUNTER — APPOINTMENT (OUTPATIENT)
Dept: RADIOLOGY | Facility: HOSPITAL | Age: 37
End: 2022-09-12
Payer: MEDICARE

## 2022-09-12 ENCOUNTER — HOSPITAL ENCOUNTER (EMERGENCY)
Facility: HOSPITAL | Age: 37
Discharge: HOME | End: 2022-09-13
Attending: EMERGENCY MEDICINE
Payer: MEDICARE

## 2022-09-12 DIAGNOSIS — Z59.00 HOMELESSNESS: Primary | ICD-10-CM

## 2022-09-12 DIAGNOSIS — B34.9 VIRAL ILLNESS: ICD-10-CM

## 2022-09-12 DIAGNOSIS — R26.2 AMBULATORY DYSFUNCTION: ICD-10-CM

## 2022-09-12 PROCEDURE — 85025 COMPLETE CBC W/AUTO DIFF WBC: CPT | Performed by: EMERGENCY MEDICINE

## 2022-09-12 PROCEDURE — 36415 COLL VENOUS BLD VENIPUNCTURE: CPT | Performed by: EMERGENCY MEDICINE

## 2022-09-12 PROCEDURE — 87040 BLOOD CULTURE FOR BACTERIA: CPT | Performed by: EMERGENCY MEDICINE

## 2022-09-12 PROCEDURE — 85730 THROMBOPLASTIN TIME PARTIAL: CPT | Performed by: EMERGENCY MEDICINE

## 2022-09-12 PROCEDURE — 83605 ASSAY OF LACTIC ACID: CPT | Performed by: EMERGENCY MEDICINE

## 2022-09-12 PROCEDURE — 99284 EMERGENCY DEPT VISIT MOD MDM: CPT

## 2022-09-12 PROCEDURE — 80053 COMPREHEN METABOLIC PANEL: CPT | Performed by: EMERGENCY MEDICINE

## 2022-09-12 PROCEDURE — 71045 X-RAY EXAM CHEST 1 VIEW: CPT

## 2022-09-12 PROCEDURE — 84145 PROCALCITONIN (PCT): CPT | Performed by: EMERGENCY MEDICINE

## 2022-09-12 PROCEDURE — 85610 PROTHROMBIN TIME: CPT | Performed by: EMERGENCY MEDICINE

## 2022-09-12 PROCEDURE — 87635 SARS-COV-2 COVID-19 AMP PRB: CPT | Performed by: EMERGENCY MEDICINE

## 2022-09-12 SDOH — ECONOMIC STABILITY - HOUSING INSECURITY: HOMELESSNESS UNSPECIFIED: Z59.00

## 2022-09-13 VITALS
HEART RATE: 96 BPM | TEMPERATURE: 98.5 F | RESPIRATION RATE: 18 BRPM | OXYGEN SATURATION: 100 % | DIASTOLIC BLOOD PRESSURE: 85 MMHG | SYSTOLIC BLOOD PRESSURE: 120 MMHG

## 2022-09-13 LAB
ALBUMIN SERPL BCP-MCNC: 4.1 G/DL (ref 3.5–5)
ALP SERPL-CCNC: 63 U/L (ref 34–104)
ALT SERPL W P-5'-P-CCNC: 13 U/L (ref 7–52)
ANION GAP SERPL CALCULATED.3IONS-SCNC: 6 MMOL/L (ref 4–13)
APTT PPP: 27 SECONDS (ref 23–37)
AST SERPL W P-5'-P-CCNC: 20 U/L (ref 13–39)
BACTERIA UR QL AUTO: ABNORMAL /HPF
BASOPHILS # BLD AUTO: 0.02 THOUSANDS/ΜL (ref 0–0.1)
BASOPHILS NFR BLD AUTO: 0 % (ref 0–1)
BILIRUB SERPL-MCNC: 0.42 MG/DL (ref 0.2–1)
BILIRUB UR QL STRIP: NEGATIVE
BUN SERPL-MCNC: 18 MG/DL (ref 5–25)
CALCIUM SERPL-MCNC: 9.2 MG/DL (ref 8.4–10.2)
CHLORIDE SERPL-SCNC: 99 MMOL/L (ref 96–108)
CLARITY UR: CLEAR
CO2 SERPL-SCNC: 29 MMOL/L (ref 21–32)
COLOR UR: YELLOW
CREAT SERPL-MCNC: 1.13 MG/DL (ref 0.6–1.3)
EOSINOPHIL # BLD AUTO: 0.02 THOUSAND/ΜL (ref 0–0.61)
EOSINOPHIL NFR BLD AUTO: 0 % (ref 0–6)
ERYTHROCYTE [DISTWIDTH] IN BLOOD BY AUTOMATED COUNT: 13.4 % (ref 11.6–15.1)
GFR SERPL CREATININE-BSD FRML MDRD: 83 ML/MIN/1.73SQ M
GLUCOSE SERPL-MCNC: 120 MG/DL (ref 65–140)
GLUCOSE UR STRIP-MCNC: NEGATIVE MG/DL
HCT VFR BLD AUTO: 40.9 % (ref 36.5–49.3)
HGB BLD-MCNC: 14 G/DL (ref 12–17)
HGB UR QL STRIP.AUTO: NEGATIVE
IMM GRANULOCYTES # BLD AUTO: 0.04 THOUSAND/UL (ref 0–0.2)
IMM GRANULOCYTES NFR BLD AUTO: 0 % (ref 0–2)
INR PPP: 0.98 (ref 0.84–1.19)
KETONES UR STRIP-MCNC: NEGATIVE MG/DL
LACTATE SERPL-SCNC: 0.9 MMOL/L (ref 0.5–2)
LEUKOCYTE ESTERASE UR QL STRIP: NEGATIVE
LYMPHOCYTES # BLD AUTO: 0.85 THOUSANDS/ΜL (ref 0.6–4.47)
LYMPHOCYTES NFR BLD AUTO: 7 % (ref 14–44)
MCH RBC QN AUTO: 31.2 PG (ref 26.8–34.3)
MCHC RBC AUTO-ENTMCNC: 34.2 G/DL (ref 31.4–37.4)
MCV RBC AUTO: 91 FL (ref 82–98)
MONOCYTES # BLD AUTO: 0.83 THOUSAND/ΜL (ref 0.17–1.22)
MONOCYTES NFR BLD AUTO: 7 % (ref 4–12)
MUCOUS THREADS UR QL AUTO: ABNORMAL
NEUTROPHILS # BLD AUTO: 9.84 THOUSANDS/ΜL (ref 1.85–7.62)
NEUTS SEG NFR BLD AUTO: 86 % (ref 43–75)
NITRITE UR QL STRIP: NEGATIVE
NON-SQ EPI CELLS URNS QL MICRO: ABNORMAL /HPF
NRBC BLD AUTO-RTO: 0 /100 WBCS
OTHER STN SPEC: ABNORMAL
PH UR STRIP.AUTO: 6 [PH]
PLATELET # BLD AUTO: 191 THOUSANDS/UL (ref 149–390)
PMV BLD AUTO: 10 FL (ref 8.9–12.7)
POTASSIUM SERPL-SCNC: 3.8 MMOL/L (ref 3.5–5.3)
PROCALCITONIN SERPL-MCNC: 0.07 NG/ML
PROT SERPL-MCNC: 7.9 G/DL (ref 6.4–8.4)
PROT UR STRIP-MCNC: ABNORMAL MG/DL
PROTHROMBIN TIME: 13.3 SECONDS (ref 11.6–14.5)
RBC # BLD AUTO: 4.49 MILLION/UL (ref 3.88–5.62)
RBC #/AREA URNS AUTO: ABNORMAL /HPF
SARS-COV-2 RNA RESP QL NAA+PROBE: NEGATIVE
SODIUM SERPL-SCNC: 134 MMOL/L (ref 135–147)
SP GR UR STRIP.AUTO: 1.01 (ref 1–1.03)
UROBILINOGEN UR QL STRIP.AUTO: 1 E.U./DL
WBC # BLD AUTO: 11.6 THOUSAND/UL (ref 4.31–10.16)
WBC #/AREA URNS AUTO: ABNORMAL /HPF

## 2022-09-13 PROCEDURE — 36415 COLL VENOUS BLD VENIPUNCTURE: CPT | Performed by: EMERGENCY MEDICINE

## 2022-09-13 PROCEDURE — 81001 URINALYSIS AUTO W/SCOPE: CPT | Performed by: EMERGENCY MEDICINE

## 2022-09-13 PROCEDURE — 87040 BLOOD CULTURE FOR BACTERIA: CPT | Performed by: EMERGENCY MEDICINE

## 2022-09-13 PROCEDURE — 96361 HYDRATE IV INFUSION ADD-ON: CPT

## 2022-09-13 PROCEDURE — 93005 ELECTROCARDIOGRAM TRACING: CPT

## 2022-09-13 PROCEDURE — 99285 EMERGENCY DEPT VISIT HI MDM: CPT | Performed by: EMERGENCY MEDICINE

## 2022-09-13 PROCEDURE — 81003 URINALYSIS AUTO W/O SCOPE: CPT | Performed by: EMERGENCY MEDICINE

## 2022-09-13 PROCEDURE — 96360 HYDRATION IV INFUSION INIT: CPT

## 2022-09-13 RX ORDER — ACETAMINOPHEN 325 MG/1
650 TABLET ORAL ONCE
Status: COMPLETED | OUTPATIENT
Start: 2022-09-13 | End: 2022-09-13

## 2022-09-13 RX ADMIN — ACETAMINOPHEN 650 MG: 325 TABLET, FILM COATED ORAL at 00:12

## 2022-09-13 RX ADMIN — SODIUM CHLORIDE 500 ML: 0.9 INJECTION, SOLUTION INTRAVENOUS at 00:15

## 2022-09-13 NOTE — ED PROVIDER NOTES
History  Chief Complaint   Patient presents with    Dehydration     Pt presents via EMS stating he hasn't eaten all day and nothing to drink  Pt provides no other complaints at this time     This is a 54-year-old male who presents the emergency department with EMS  As per the patient, he states he has not been eating or drinking today  He states that he is hungry  He denies fevers or chills  He denies nausea vomiting  He denies chest pain or trouble breathing  He states he has only hungry and needs a place to relax  Prior to Admission Medications   Prescriptions Last Dose Informant Patient Reported? Taking? Cholecalciferol 25 MCG (1000 UT) tablet   Yes No   Sig: Take 1,000 Units by mouth   bictegravir-emtricitab-tenofovir alafenamide (Biktarvy) -25 MG tablet   Yes No   Sig: Take 1 tablet by mouth daily   bictegravir-emtricitab-tenofovir alafenamide (Biktarvy) -25 MG tablet   No No   Sig: Take 1 tablet by mouth daily with breakfast   cyclobenzaprine (FLEXERIL) 10 mg tablet   No No   Sig: Take 1 tablet (10 mg total) by mouth 2 (two) times a day as needed for muscle spasms   Patient not taking: Reported on 7/2/2022   ibuprofen (MOTRIN) 600 mg tablet   No No   Sig: Take 1 tablet (600 mg total) by mouth every 6 (six) hours as needed for fever or headaches   Patient not taking: No sig reported   levETIRAcetam (Keppra) 750 mg tablet   No No   Sig: Take 1 tablet (750 mg total) by mouth 2 (two) times a day   ondansetron (Zofran ODT) 4 mg disintegrating tablet   No No   Sig: Take 1 tablet (4 mg total) by mouth every 6 (six) hours as needed for nausea or vomiting      Facility-Administered Medications: None       Past Medical History:   Diagnosis Date    [facility]     Seizures ([facility] )     Smoker     Syphilis        Past Surgical History:   Procedure Laterality Date    HERNIA REPAIR         History reviewed  No pertinent family history    I have reviewed and agree with the history as documented  E-Cigarette/Vaping    E-Cigarette Use Never User      E-Cigarette/Vaping Substances     Social History     Tobacco Use    Smoking status: Current Every Day Smoker     Packs/day: 1 50     Types: Cigarettes    Smokeless tobacco: Never Used   Vaping Use    Vaping Use: Never used   Substance Use Topics    Alcohol use: Yes     Alcohol/week: 2 0 standard drinks     Types: 1 Glasses of wine, 1 Cans of beer per week     Comment: socially    Drug use: Yes     Frequency: 45 0 times per week     Types: Marijuana     Comment: last used08/3022       Review of Systems   All other systems reviewed and are negative        Physical Exam  Physical Exam  Constitutional:  Vital signs reviewed, patient appears nontoxic, no acute distress  Eyes: Pupils equal round reactive to light and accommodation, extraocular muscles intact  HEENT: trachea midline, no JVD, moist mucous membranes  Respiratory: lung sounds clear throughout, no rhonchi, no rales  Cardiovascular:  Mildly tachycardic rate, regular rhythm, no murmurs or rubs  Abdomen: soft, nontender, nondistended, no rebound or guarding  Back: no CVA tenderness, normal inspection  Extremities: no edema, pulses equal in all 4 extremities  Neuro: awake, alert, oriented, no focal weakness  Skin: warm, dry, intact, no rashes noted    Vital Signs  ED Triage Vitals [09/12/22 2314]   Temperature Pulse Respirations Blood Pressure SpO2   100 °F (37 8 °C) 104 18 99/73 100 %      Temp Source Heart Rate Source Patient Position - Orthostatic VS BP Location FiO2 (%)   Oral Monitor Lying Left arm --      Pain Score       --           Vitals:    09/12/22 2314 09/13/22 0001 09/13/22 0046 09/13/22 0207   BP: 99/73  120/85    Pulse: 104 (!) 122 (!) 110 96   Patient Position - Orthostatic VS: Lying  Lying          Visual Acuity      ED Medications  Medications   acetaminophen (TYLENOL) tablet 650 mg (650 mg Oral Given 9/13/22 0012)   sodium chloride 0 9 % bolus 500 mL (0 mL Intravenous Stopped 9/13/22 0209)       Diagnostic Studies  Results Reviewed     Procedure Component Value Units Date/Time    Urine Microscopic [[id number]]  (Abnormal) Collected: 09/13/22 0215    Lab Status: Final result Specimen: Urine, Clean Catch Updated: 09/13/22 0234     RBC, UA None Seen /hpf      WBC, UA 2-4 /hpf      Epithelial Cells None Seen /hpf      Bacteria, UA Occasional /hpf      OTHER OBSERVATIONS Sperm Present     MUCUS THREADS Occasional    UA w Reflex to Microscopic w Reflex to Culture [[id number]]  (Abnormal) Collected: 09/13/22 0215    Lab Status: Final result Specimen: Urine, Clean Catch Updated: 09/13/22 0222     Color, UA Yellow     Clarity, UA Clear     Specific Gravity, UA 1 015     pH, UA 6 0     Leukocytes, UA Negative     Nitrite, UA Negative     Protein, UA 1+ mg/dl      Glucose, UA Negative mg/dl      Ketones, UA Negative mg/dl      Urobilinogen, UA 1 0 E U /dl      Bilirubin, UA Negative     Occult Blood, UA Negative    COVID only [[id number]]  (Normal) Collected: 09/12/22 2357    Lab Status: Final result Specimen: Nares from Nose Updated: 09/13/22 0054     SARS-CoV-2 Negative    Narrative:      FOR PEDIATRIC PATIENTS - copy/paste COVID Guidelines URL to browser: [email or web address] [email or web address]  [employer]    SARS-CoV-2 assay is a Nucleic Acid Amplification assay intended for the  qualitative detection of nucleic acid from SARS-CoV-2 in nasopharyngeal  swabs  Results are for the presumptive identification of SARS-CoV-2 RNA  Positive results are indicative of infection with SARS-CoV-2, the virus  causing COVID-19, but do not rule out bacterial infection or co-infection  with other viruses  Laboratories within the [place] and its  territories are required to report all positive results to the appropriate  public health authorities   Negative results do not preclude SARS-CoV-2  infection and should not be used as the sole basis for treatment or other  patient management decisions  Negative results must be combined with  clinical observations, patient history, and epidemiological information  This test has not been FDA cleared or approved  This test has been authorized by FDA under an Emergency Use Authorization  (EUA)  This test is only authorized for the duration of time the  declaration that circumstances exist justifying the authorization of the  emergency use of an in vitro diagnostic tests for detection of SARS-CoV-2  virus and/or diagnosis of COVID-19 infection under section 564(b)(1) of  the Act, 21 U  S C  472PEN-0(E)(5), unless the authorization is terminated  or revoked sooner  The test has been validated but independent review by FDA  and CLIA is pending  Test performed using [employer]: This RT-PCR assay targets N2,  a region unique to SARS-CoV-2  A conserved region in the E-gene was chosen  for pan-Sarbecovirus detection which includes SARS-CoV-2      Procalcitonin [[id number]]  (Normal) Collected: 09/12/22 2357    Lab Status: Final result Specimen: Blood from Arm, Right Updated: 09/13/22 0030     Procalcitonin 0 07 ng/ml     Comprehensive metabolic panel [[id number]]  (Abnormal) Collected: 09/12/22 2357    Lab Status: Final result Specimen: Blood from Arm, Right Updated: 09/13/22 0020     Sodium 134 mmol/L      Potassium 3 8 mmol/L      Chloride 99 mmol/L      CO2 29 mmol/L      ANION GAP 6 mmol/L      BUN 18 mg/dL      Creatinine 1 13 mg/dL      Glucose 120 mg/dL      Calcium 9 2 mg/dL      AST 20 U/L      ALT 13 U/L      Alkaline Phosphatase 63 U/L      Total Protein 7 9 g/dL      Albumin 4 1 g/dL      Total Bilirubin 0 42 mg/dL      eGFR 83 ml/min/1 73sq m     Narrative:      [facility] guidelines for Chronic Kidney Disease (CKD):     Stage 1 with normal or high GFR (GFR > 90 mL/min/1 73 square meters)    Stage 2 Mild CKD (GFR = 60-89 mL/min/1 73 square meters)    Stage 3A Moderate CKD (GFR = 45-59 mL/min/1 73 square meters)    Stage 3B Moderate CKD (GFR = 30-44 mL/min/1 73 square meters)    Stage 4 Severe CKD (GFR = 15-29 mL/min/1 73 square meters)    Stage 5 End Stage CKD (GFR <15 mL/min/1 73 square meters)  Note: GFR calculation is accurate only with a steady state creatinine    Lactic acid [[id number]]  (Normal) Collected: 09/12/22 2357    Lab Status: Final result Specimen: Blood from Arm, Right Updated: 09/13/22 0020     LACTIC ACID 0 9 mmol/L     Narrative:      Result may be elevated if tourniquet was used during collection  Blood culture #1 [[id number]] Collected: 09/13/22 0016    Lab Status: In process Specimen: Blood from Arm, Left Updated: 09/13/22 0019    Protime-INR [[id number]]  (Normal) Collected: 09/12/22 2357    Lab Status: Final result Specimen: Blood from Arm, Right Updated: 09/13/22 0013     Protime 13 3 seconds      INR 0 98    APTT [[id number]]  (Normal) Collected: 09/12/22 2357    Lab Status: Final result Specimen: Blood from Arm, Right Updated: 09/13/22 0013     PTT 27 seconds     CBC and differential [[id number]]  (Abnormal) Collected: 09/12/22 2357    Lab Status: Final result Specimen: Blood from Arm, Right Updated: 09/13/22 0005     WBC 11 60 Thousand/uL      RBC 4 49 Million/uL      Hemoglobin 14 0 g/dL      Hematocrit 40 9 %      MCV 91 fL      MCH 31 2 pg      MCHC 34 2 g/dL      RDW 13 4 %      MPV 10 0 fL      Platelets 252 Thousands/uL      nRBC 0 /100 WBCs      Neutrophils Relative 86 %      Immat GRANS % 0 %      Lymphocytes Relative 7 %      Monocytes Relative 7 %      Eosinophils Relative 0 %      Basophils Relative 0 %      Neutrophils Absolute 9 84 Thousands/µL      Immature Grans Absolute 0 04 Thousand/uL      Lymphocytes Absolute 0 85 Thousands/µL      Monocytes Absolute 0 83 Thousand/µL      Eosinophils Absolute 0 02 Thousand/µL      Basophils Absolute 0 02 Thousands/µL     Blood culture #2 [[id number]] Collected: 09/12/22 2357    Lab Status:  In process Specimen: Blood from Arm, Right Updated: 09/13/22 0001                 XR chest portable    (Results Pending)              Procedures  ECG 12 Lead Documentation Only    Date/Time: 9/13/2022 4:02 AM  Performed by: Roro Joseph DO  Authorized by: Roro Joseph DO     ECG reviewed by me, the ED Provider: yes    Patient location:  ED  Comments:      Sinus tachycardia, rate 118, normal DC, normal QTC, no STEMI             ED Course  ED Course as of 09/13/22 0405   Mon Sep 12, 2022   2325 The patient has no complaints at this point  Will allow him to eat and re-evaluate  Tue Sep 13, 2022   0001 The patient continues to be tachycardic  He does feel warm to palpation  He will be worked up for infection even though the patient has no complaints at this point  0010 The patient had a chest x-ray that was interpreted by me that shows no pneumonia or pneumothorax    0403 The patient had lab work significant for a slight white count of 11 6  He had a chest x-ray that was unremarkable  He had I UA that had no wbc's and was nitrite and leuk negative  The patient has no complaints at this point other than being hungry  He is given a sandwich and tolerated p o  without difficulty  He is no longer tachycardic in has remained afebrile throughout this stay in the emergency department  He will be discharged with follow-up to his primary care physician  Of note, EMS did not bring the patient's belongings with him including his walker  It was locked in the local store  We will allow him to sleep overnight to be able to get his medications and equipment in the morning  SBIRT 20yo+    Flowsheet Row Most Recent Value   SBIRT (25 yo +)    In order to provide better care to our patients, we are screening all of our patients for alcohol and drug use  Would it be okay to ask you these screening questions?  No Filed at: 09/12/2022 2340                    MDM  Number of Diagnoses or Management Options  Ambulatory dysfunction  Homelessness  Viral illness  Diagnosis management comments: This is a 49-year-old male presents the emergency department requesting a sandwich and time to relax  At this point the patient is mildly tachycardic and has a temperature of a 100° F  he has no complaints at this point  We will monitor him and evaluate him  for possible infection  Amount and/or Complexity of Data Reviewed  Clinical lab tests: reviewed and ordered  Decide to obtain previous medical records or to obtain history from someone other than the patient: yes  Obtain history from someone other than the patient: yes (EMS)  Review and summarize past medical records: yes  Independent visualization of images, tracings, or specimens: yes        Disposition  Final diagnoses:   Homelessness   Ambulatory dysfunction   Viral illness     Time reflects when diagnosis was documented in both MDM as applicable and the Disposition within this note     Time User Action Codes Description Comment    9/13/2022  3:33 AM Caralee Graham Add [Z59 00] Homelessness     9/13/2022  3:33 AM Caralee Graham Add [R26 2] Ambulatory dysfunction     9/13/2022  3:35 AM Caralee Graham Add [B34 9] Viral illness       ED Disposition     ED Disposition   Discharge    Condition   Stable    Date/Time   Tue Sep 13, 2022  3:35 AM    Comment   Emerson Soto discharge to home/self care                 Follow-up Information     Follow up With Specialties Details Why Contact Info Additional [id number]  Emergency Department Emergency Medicine  If symptoms worsen [address] [facility],[address] [id number]  [address] [facility] Emergency Department, [address], [address] [place] [address] Sherif Villarreal Internal Medicine In 2 days  [address]  [phone]             Patient's Medications   Discharge Prescriptions    No medications on file       No discharge procedures on file      PDMP Review       Value Time User    PDMP Reviewed  Yes 5/27/2020 12:37 AM LEBRON [place]          ED Provider  Electronically Signed by           Du Mcbride DO  09/13/22 0402

## 2022-09-13 NOTE — ED NOTES
ANA LUISA called to [facility] ([address] [place] [address] [facility]UR[facility]) for patient to obtain his belongings  Pt escorted to [address] [place] [address] via wheelchair with limited assistance, gait slightly unsteady, patient states that is his baseline        Kera Mcclellan, MARTIN  09/13/22 5961

## 2022-09-15 LAB
ATRIAL RATE: 118 BPM
P AXIS: 40 DEGREES
PR INTERVAL: 144 MS
QRS AXIS: -21 DEGREES
QRSD INTERVAL: 93 MS
QT INTERVAL: 306 MS
QTC INTERVAL: 429 MS
T WAVE AXIS: 2 DEGREES
VENTRICULAR RATE: 118 BPM

## 2022-09-15 PROCEDURE — 93010 ELECTROCARDIOGRAM REPORT: CPT | Performed by: INTERNAL MEDICINE

## 2022-09-16 ENCOUNTER — APPOINTMENT (OUTPATIENT)
Dept: RADIOLOGY | Facility: HOSPITAL | Age: 37
End: 2022-09-16
Payer: MEDICARE

## 2022-09-16 ENCOUNTER — HOSPITAL ENCOUNTER (EMERGENCY)
Facility: HOSPITAL | Age: 37
Discharge: HOME | End: 2022-09-16
Attending: INTERNAL MEDICINE
Payer: MEDICARE

## 2022-09-16 ENCOUNTER — HOSPITAL ENCOUNTER (EMERGENCY)
Facility: HOSPITAL | Age: 37
Discharge: HOME | End: 2022-09-16
Attending: EMERGENCY MEDICINE
Payer: MEDICARE

## 2022-09-16 VITALS
RESPIRATION RATE: 18 BRPM | DIASTOLIC BLOOD PRESSURE: 73 MMHG | HEART RATE: 107 BPM | OXYGEN SATURATION: 100 % | TEMPERATURE: 98.5 F | SYSTOLIC BLOOD PRESSURE: 149 MMHG

## 2022-09-16 VITALS
OXYGEN SATURATION: 100 % | DIASTOLIC BLOOD PRESSURE: 76 MMHG | BODY MASS INDEX: 24.86 KG/M2 | HEART RATE: 111 BPM | TEMPERATURE: 99.4 F | RESPIRATION RATE: 18 BRPM | WEIGHT: 154 LBS | SYSTOLIC BLOOD PRESSURE: 112 MMHG

## 2022-09-16 DIAGNOSIS — G40.909 RECURRENT SEIZURES (HCC): ICD-10-CM

## 2022-09-16 DIAGNOSIS — R60.0 PEDAL EDEMA: Primary | ICD-10-CM

## 2022-09-16 DIAGNOSIS — B20 HIV INFECTION, UNSPECIFIED SYMPTOM STATUS (HCC): ICD-10-CM

## 2022-09-16 DIAGNOSIS — M79.673 FOOT PAIN: ICD-10-CM

## 2022-09-16 DIAGNOSIS — G40.909 SEIZURE DISORDER (HCC): Primary | ICD-10-CM

## 2022-09-16 DIAGNOSIS — Z76.89 FREQUENT PATIENT IN EMERGENCY DEPARTMENT: ICD-10-CM

## 2022-09-16 DIAGNOSIS — Z59.00 HOMELESSNESS: ICD-10-CM

## 2022-09-16 LAB
ALBUMIN SERPL BCP-MCNC: 4.2 G/DL (ref 3.5–5)
ALP SERPL-CCNC: 72 U/L (ref 34–104)
ALT SERPL W P-5'-P-CCNC: 15 U/L (ref 7–52)
ANION GAP SERPL CALCULATED.3IONS-SCNC: 8 MMOL/L (ref 4–13)
AST SERPL W P-5'-P-CCNC: 16 U/L (ref 13–39)
ATRIAL RATE: 104 BPM
BASOPHILS # BLD AUTO: 0.05 THOUSANDS/ΜL (ref 0–0.1)
BASOPHILS NFR BLD AUTO: 1 % (ref 0–1)
BILIRUB SERPL-MCNC: 0.32 MG/DL (ref 0.2–1)
BUN SERPL-MCNC: 14 MG/DL (ref 5–25)
CALCIUM SERPL-MCNC: 9.6 MG/DL (ref 8.4–10.2)
CHLORIDE SERPL-SCNC: 100 MMOL/L (ref 96–108)
CO2 SERPL-SCNC: 29 MMOL/L (ref 21–32)
CREAT SERPL-MCNC: 0.78 MG/DL (ref 0.6–1.3)
EOSINOPHIL # BLD AUTO: 0.27 THOUSAND/ΜL (ref 0–0.61)
EOSINOPHIL NFR BLD AUTO: 3 % (ref 0–6)
ERYTHROCYTE [DISTWIDTH] IN BLOOD BY AUTOMATED COUNT: 13.2 % (ref 11.6–15.1)
GFR SERPL CREATININE-BSD FRML MDRD: 116 ML/MIN/1.73SQ M
GLUCOSE SERPL-MCNC: 93 MG/DL (ref 65–140)
HCT VFR BLD AUTO: 43.5 % (ref 36.5–49.3)
HGB BLD-MCNC: 14.8 G/DL (ref 12–17)
IMM GRANULOCYTES # BLD AUTO: 0.03 THOUSAND/UL (ref 0–0.2)
IMM GRANULOCYTES NFR BLD AUTO: 0 % (ref 0–2)
LYMPHOCYTES # BLD AUTO: 2.12 THOUSANDS/ΜL (ref 0.6–4.47)
LYMPHOCYTES NFR BLD AUTO: 25 % (ref 14–44)
MCH RBC QN AUTO: 30.9 PG (ref 26.8–34.3)
MCHC RBC AUTO-ENTMCNC: 34 G/DL (ref 31.4–37.4)
MCV RBC AUTO: 91 FL (ref 82–98)
MONOCYTES # BLD AUTO: 1.22 THOUSAND/ΜL (ref 0.17–1.22)
MONOCYTES NFR BLD AUTO: 15 % (ref 4–12)
NEUTROPHILS # BLD AUTO: 4.7 THOUSANDS/ΜL (ref 1.85–7.62)
NEUTS SEG NFR BLD AUTO: 56 % (ref 43–75)
NRBC BLD AUTO-RTO: 0 /100 WBCS
P AXIS: 53 DEGREES
PLATELET # BLD AUTO: 217 THOUSANDS/UL (ref 149–390)
PMV BLD AUTO: 10.1 FL (ref 8.9–12.7)
POTASSIUM SERPL-SCNC: 3.7 MMOL/L (ref 3.5–5.3)
PR INTERVAL: 157 MS
PROT SERPL-MCNC: 8.1 G/DL (ref 6.4–8.4)
QRS AXIS: -17 DEGREES
QRSD INTERVAL: 91 MS
QT INTERVAL: 333 MS
QTC INTERVAL: 438 MS
RBC # BLD AUTO: 4.79 MILLION/UL (ref 3.88–5.62)
SODIUM SERPL-SCNC: 137 MMOL/L (ref 135–147)
T WAVE AXIS: 34 DEGREES
VENTRICULAR RATE: 104 BPM
WBC # BLD AUTO: 8.39 THOUSAND/UL (ref 4.31–10.16)

## 2022-09-16 PROCEDURE — 99285 EMERGENCY DEPT VISIT HI MDM: CPT | Performed by: INTERNAL MEDICINE

## 2022-09-16 PROCEDURE — 99284 EMERGENCY DEPT VISIT MOD MDM: CPT | Performed by: EMERGENCY MEDICINE

## 2022-09-16 PROCEDURE — 36415 COLL VENOUS BLD VENIPUNCTURE: CPT | Performed by: INTERNAL MEDICINE

## 2022-09-16 PROCEDURE — 96374 THER/PROPH/DIAG INJ IV PUSH: CPT

## 2022-09-16 PROCEDURE — 85025 COMPLETE CBC W/AUTO DIFF WBC: CPT | Performed by: INTERNAL MEDICINE

## 2022-09-16 PROCEDURE — 80177 DRUG SCRN QUAN LEVETIRACETAM: CPT | Performed by: INTERNAL MEDICINE

## 2022-09-16 PROCEDURE — 80053 COMPREHEN METABOLIC PANEL: CPT | Performed by: INTERNAL MEDICINE

## 2022-09-16 PROCEDURE — 99284 EMERGENCY DEPT VISIT MOD MDM: CPT

## 2022-09-16 PROCEDURE — 93010 ELECTROCARDIOGRAM REPORT: CPT | Performed by: INTERNAL MEDICINE

## 2022-09-16 PROCEDURE — 93005 ELECTROCARDIOGRAM TRACING: CPT

## 2022-09-16 PROCEDURE — 73630 X-RAY EXAM OF FOOT: CPT

## 2022-09-16 RX ORDER — LEVETIRACETAM 10 MG/ML
1000 INJECTION INTRAVASCULAR ONCE
Status: COMPLETED | OUTPATIENT
Start: 2022-09-16 | End: 2022-09-16

## 2022-09-16 RX ORDER — ACETAMINOPHEN 325 MG/1
650 TABLET ORAL ONCE
Status: DISCONTINUED | OUTPATIENT
Start: 2022-09-16 | End: 2022-09-16 | Stop reason: HOSPADM

## 2022-09-16 RX ADMIN — LEVETIRACETAM 1000 MG: 10 INJECTION INTRAVASCULAR at 15:07

## 2022-09-16 SDOH — ECONOMIC STABILITY - HOUSING INSECURITY: HOMELESSNESS UNSPECIFIED: Z59.00

## 2022-09-16 NOTE — DISCHARGE INSTRUCTIONS
FOLLOW up with neurology  Take medications as prescribed  Labs Reviewed   CBC AND DIFFERENTIAL - Abnormal       Result Value Ref Range Status    WBC 8 39  4 31 - 10 16 Thousand/uL Final    RBC 4 79  3 88 - 5 62 Million/uL Final    Hemoglobin 14 8  12 0 - 17 0 g/dL Final    Hematocrit 43 5  36 5 - 49 3 % Final    MCV 91  82 - 98 fL Final    MCH 30 9  26 8 - 34 3 pg Final    MCHC 34 0  31 4 - 37 4 g/dL Final    RDW 13 2  11 6 - 15 1 % Final    MPV 10 1  8 9 - 12 7 fL Final    Platelets 390  134 - 390 Thousands/uL Final    nRBC 0  /100 WBCs Final    Neutrophils Relative 56  43 - 75 % Final    Immat GRANS % 0  0 - 2 % Final    Lymphocytes Relative 25  14 - 44 % Final    Monocytes Relative 15 (*) 4 - 12 % Final    Eosinophils Relative 3  0 - 6 % Final    Basophils Relative 1  0 - 1 % Final    Neutrophils Absolute 4 70  1 85 - 7 62 Thousands/µL Final    Immature Grans Absolute 0 03  0 00 - 0 20 Thousand/uL Final    Lymphocytes Absolute 2 12  0 60 - 4 47 Thousands/µL Final    Monocytes Absolute 1 22  0 17 - 1 22 Thousand/µL Final    Eosinophils Absolute 0 27  0 00 - 0 61 Thousand/µL Final    Basophils Absolute 0 05  0 00 - 0 10 Thousands/µL Final   COMPREHENSIVE METABOLIC PANEL    Sodium 630  135 - 147 mmol/L Final    Potassium 3 7  3 5 - 5 3 mmol/L Final    Chloride 100  96 - 108 mmol/L Final    CO2 29  21 - 32 mmol/L Final    ANION GAP 8  4 - 13 mmol/L Final    BUN 14  5 - 25 mg/dL Final    Creatinine 0 78  0 60 - 1 30 mg/dL Final    Comment: Standardized to IDMS reference method    Glucose 93  65 - 140 mg/dL Final    Comment: If the patient is fasting, the ADA then defines impaired fasting glucose as > 100 mg/dL and diabetes as > or equal to 123 mg/dL  Specimen collection should occur prior to Sulfasalazine administration due to the potential for falsely depressed results  Specimen collection should occur prior to Sulfapyridine administration due to the potential for falsely elevated results      Calcium 9 6  8 4 - 10 2 mg/dL Final    AST 16  13 - 39 U/L Final    Comment: Specimen collection should occur prior to Sulfasalazine administration due to the potential for falsely depressed results  ALT 15  7 - 52 U/L Final    Comment: Specimen collection should occur prior to Sulfasalazine administration due to the potential for falsely depressed results       Alkaline Phosphatase 72  34 - 104 U/L Final    Total Protein 8 1  6 4 - 8 4 g/dL Final    Albumin 4 2  3 5 - 5 0 g/dL Final    Total Bilirubin 0 32  0 20 - 1 00 mg/dL Final    eGFR 116  ml/min/1 73sq m Final    Narrative:     [place] guidelines for Chronic Kidney Disease (CKD):     Stage 1 with normal or high GFR (GFR > 90 mL/min/1 73 square meters)    Stage 2 Mild CKD (GFR = 60-89 mL/min/1 73 square meters)    Stage 3A Moderate CKD (GFR = 45-59 mL/min/1 73 square meters)    Stage 3B Moderate CKD (GFR = 30-44 mL/min/1 73 square meters)    Stage 4 Severe CKD (GFR = 15-29 mL/min/1 73 square meters)    Stage 5 End Stage CKD (GFR <15 mL/min/1 73 square meters)  Note: GFR calculation is accurate only with a steady state creatinine   LEVETIRACETAM LEVEL

## 2022-09-16 NOTE — ED PROVIDER NOTES
History  Chief Complaint   Patient presents with    Seizure - Prior Hx Of     Pt reported by bystanders to EMS to have had a seizure     This is a 39years old homeless brought by EMS from possible seizure  According to the EMS story bystander found him shaking so he call 911 and patient brought to the ER patient is awake alert oriented x3  Patient has no body injuries and he has no urinary of fecal incontinence  Patient stated that he has history of seizure and he is on Keppra 750 mg twice daily  Patient also having history of HIV positive  Patient is not followed by neurologist but his ID doctor is on hold right for him Keppra  EMS stated that by the time the right should to the scene patient is awake alert oriented  Patient stated he did not take his Keppra today  Patient stated last seizure he has was 2 months ago  Patient at the ER in no distress and he is asymptomatic  Patient has been to the ER multiple times last time was 4 days ago as he want to find a place to stay and eat and drink        History provided by:  Patient   used: No    Seizure - Prior Hx Of  Seizure activity on arrival: no    Seizure type:  Unable to specify  Initial focality:  Unable to specify  Episode characteristics: stiffening    Postictal symptoms comment:  None  Return to baseline: yes    Severity:  Unable to specify  Timing:  Once  Number of seizures this episode:  1  Progression:  Improving  Context: not alcohol withdrawal, not cerebral palsy, not change in medication, not sleeping less, not developmental delay, not drug use, not emotional upset, not family hx of seizures, not fever, not flashing visual stimuli, not intracranial lesion, not intracranial shunt, medical compliance, not possible hypoglycemia, not possible medication ingestion, not pregnant, not previous head injury and not stress    Recent head injury:  No recent head injuries  History of seizures: yes        Prior to Admission Medications Prescriptions Last Dose Informant Patient Reported? Taking? Cholecalciferol 25 MCG (1000 UT) tablet   Yes No   Sig: Take 1,000 Units by mouth   bictegravir-emtricitab-tenofovir alafenamide (Biktarvy) -25 MG tablet   Yes No   Sig: Take 1 tablet by mouth daily   bictegravir-emtricitab-tenofovir alafenamide (Biktarvy) -25 MG tablet   No No   Sig: Take 1 tablet by mouth daily with breakfast   cyclobenzaprine (FLEXERIL) 10 mg tablet   No No   Sig: Take 1 tablet (10 mg total) by mouth 2 (two) times a day as needed for muscle spasms   Patient not taking: Reported on 7/2/2022   ibuprofen (MOTRIN) 600 mg tablet   No No   Sig: Take 1 tablet (600 mg total) by mouth every 6 (six) hours as needed for fever or headaches   Patient not taking: No sig reported   levETIRAcetam (Keppra) 750 mg tablet   No No   Sig: Take 1 tablet (750 mg total) by mouth 2 (two) times a day   ondansetron (Zofran ODT) 4 mg disintegrating tablet   No No   Sig: Take 1 tablet (4 mg total) by mouth every 6 (six) hours as needed for nausea or vomiting   Patient not taking: Reported on 9/17/2022      Facility-Administered Medications: None       Past Medical History:   Diagnosis Date    HIV (human immunodeficiency virus infection) (HCC)     Seizures (HCC)     Smoker     Syphilis        Past Surgical History:   Procedure Laterality Date    HERNIA REPAIR         No family history on file  I have reviewed and agree with the history as documented  E-Cigarette/Vaping    E-Cigarette Use Never User      E-Cigarette/Vaping Substances     Social History     Tobacco Use    Smoking status: Current Every Day Smoker     Packs/day: 1 50     Types: Cigarettes    Smokeless tobacco: Never Used   Vaping Use    Vaping Use: Never used   Substance Use Topics    Alcohol use:  Yes     Alcohol/week: 2 0 standard drinks     Types: 1 Glasses of wine, 1 Cans of beer per week     Comment: socially    Drug use: Yes     Frequency: 45 0 times per week Types: Marijuana     Comment: last used08/3022       Review of Systems   Constitutional: Negative for diaphoresis, fatigue and fever  HENT: Negative for hearing loss  Respiratory: Negative for cough, chest tightness and shortness of breath  Cardiovascular: Negative for chest pain, palpitations and leg swelling  Gastrointestinal: Negative for abdominal pain, diarrhea, nausea and vomiting  Genitourinary: Negative for difficulty urinating, dysuria, flank pain and hematuria  Musculoskeletal: Negative for arthralgias, back pain, gait problem, neck pain and neck stiffness  Skin: Negative for color change, pallor and rash  Neurological: Positive for seizures  Negative for dizziness, tremors, syncope, speech difficulty, weakness, light-headedness and headaches  Hematological: Negative for adenopathy  Does not bruise/bleed easily  Psychiatric/Behavioral: Negative for agitation and behavioral problems  Physical Exam  Physical Exam  Vitals and nursing note reviewed  Constitutional:       General: He is not in acute distress  Appearance: He is well-developed  He is not ill-appearing, toxic-appearing or diaphoretic  HENT:      Head: Normocephalic and atraumatic  Right Ear: Ear canal and external ear normal       Left Ear: Ear canal and external ear normal       Nose: Nose normal  No congestion or rhinorrhea  Mouth/Throat:      Mouth: Mucous membranes are moist       Pharynx: No oropharyngeal exudate or posterior oropharyngeal erythema  Eyes:      Extraocular Movements: Extraocular movements intact  Pupils: Pupils are equal, round, and reactive to light  Neck:      Vascular: No carotid bruit  Cardiovascular:      Rate and Rhythm: Normal rate and regular rhythm  Pulses: Normal pulses  Heart sounds: Normal heart sounds  No murmur heard  No friction rub  No gallop  Pulmonary:      Effort: Pulmonary effort is normal  No respiratory distress        Breath sounds: Normal breath sounds  No wheezing, rhonchi or rales  Chest:      Chest wall: No tenderness  Abdominal:      General: Bowel sounds are normal  There is no distension  Palpations: Abdomen is soft  There is no mass  Tenderness: There is no abdominal tenderness  There is no right CVA tenderness, left CVA tenderness or guarding  Hernia: No hernia is present  Musculoskeletal:         General: No swelling, tenderness, deformity or signs of injury  Normal range of motion  Cervical back: Normal range of motion and neck supple  No rigidity or tenderness  Right lower leg: No edema  Left lower leg: No edema  Lymphadenopathy:      Cervical: No cervical adenopathy  Skin:     General: Skin is warm and dry  Capillary Refill: Capillary refill takes less than 2 seconds  Coloration: Skin is not jaundiced or pale  Findings: No bruising, erythema, lesion or rash  Neurological:      General: No focal deficit present  Mental Status: He is alert and oriented to person, place, and time  Cranial Nerves: No cranial nerve deficit  Sensory: No sensory deficit  Motor: No weakness        Coordination: Coordination normal    Psychiatric:         Mood and Affect: Mood normal          Behavior: Behavior normal          Vital Signs  ED Triage Vitals   Temperature Pulse Respirations Blood Pressure SpO2   09/16/22 1453 09/16/22 1453 09/16/22 1441 09/16/22 1453 09/16/22 1453   98 5 °F (36 9 °C) (!) 107 18 149/73 100 %      Temp Source Heart Rate Source Patient Position - Orthostatic VS BP Location FiO2 (%)   09/16/22 1453 09/16/22 1441 09/16/22 1453 09/16/22 1453 --   Oral Monitor Lying Left arm       Pain Score       --                  Vitals:    09/16/22 1453   BP: 149/73   Pulse: (!) 107   Patient Position - Orthostatic VS: Lying         Visual Acuity      ED Medications  Medications   levetiracetam in NaCl (KEPPRA) infusion 1,000 mg (1,000 mg Intravenous Given 9/16/22 1507) Diagnostic Studies  Results Reviewed     Procedure Component Value Units Date/Time    Comprehensive metabolic panel [[id number]] Collected: 09/16/22 1459    Lab Status: Final result Specimen: Blood from Arm, Right Updated: 09/16/22 1525     Sodium 137 mmol/L      Potassium 3 7 mmol/L      Chloride 100 mmol/L      CO2 29 mmol/L      ANION GAP 8 mmol/L      BUN 14 mg/dL      Creatinine 0 78 mg/dL      Glucose 93 mg/dL      Calcium 9 6 mg/dL      AST 16 U/L      ALT 15 U/L      Alkaline Phosphatase 72 U/L      Total Protein 8 1 g/dL      Albumin 4 2 g/dL      Total Bilirubin 0 32 mg/dL      eGFR 116 ml/min/1 73sq m     Narrative:      National Kidney Disease Foundation guidelines for Chronic Kidney Disease (CKD):     Stage 1 with normal or high GFR (GFR > 90 mL/min/1 73 square meters)    Stage 2 Mild CKD (GFR = 60-89 mL/min/1 73 square meters)    Stage 3A Moderate CKD (GFR = 45-59 mL/min/1 73 square meters)    Stage 3B Moderate CKD (GFR = 30-44 mL/min/1 73 square meters)    Stage 4 Severe CKD (GFR = 15-29 mL/min/1 73 square meters)    Stage 5 End Stage CKD (GFR <15 mL/min/1 73 square meters)  Note: GFR calculation is accurate only with a steady state creatinine    CBC and differential [[id number]]  (Abnormal) Collected: 09/16/22 1459    Lab Status: Final result Specimen: Blood from Arm, Right Updated: 09/16/22 1504     WBC 8 39 Thousand/uL      RBC 4 79 Million/uL      Hemoglobin 14 8 g/dL      Hematocrit 43 5 %      MCV 91 fL      MCH 30 9 pg      MCHC 34 0 g/dL      RDW 13 2 %      MPV 10 1 fL      Platelets 452 Thousands/uL      nRBC 0 /100 WBCs      Neutrophils Relative 56 %      Immat GRANS % 0 %      Lymphocytes Relative 25 %      Monocytes Relative 15 %      Eosinophils Relative 3 %      Basophils Relative 1 %      Neutrophils Absolute 4 70 Thousands/µL      Immature Grans Absolute 0 03 Thousand/uL      Lymphocytes Absolute 2 12 Thousands/µL      Monocytes Absolute 1 22 Thousand/µL      Eosinophils Absolute 0 27 Thousand/µL      Basophils Absolute 0 05 Thousands/µL     Levetiracetam level [[id number]] Collected: 09/16/22 1459    Lab Status: In process Specimen: Blood from Arm, Right Updated: 09/16/22 1501                 No orders to display              Procedures  ECG 12 Lead Documentation Only    Date/Time: 9/16/2022 2:57 PM  Performed by: Jonna Palomino MD  Authorized by: Jonna Palomino MD     Indications / Diagnosis:  Seizure disorder  Patient location:  ED and bedside  Interpretation:     Interpretation: abnormal    Rate:     ECG rate:  104    ECG rate assessment: tachycardic    Rhythm:     Rhythm: sinus tachycardia    Ectopy:     Ectopy: none    QRS:     QRS axis:  Left  Conduction:     Conduction: normal    ST segments:     ST segments:  Normal  T waves:     T waves: normal               ED Course                                             MDM  Number of Diagnoses or Management Options  Diagnosis management comments: THIS IS A 39YEARS OLD HOMELESS CAME TO THE ER, as bystanders found him shaking so he call 911 when the ambulance arrived there patient was awake alert responsive  The story of seizure is not really clear, and the patient has multiple visits to the ER asking to eat and drink and have some rest in the ER  Patient has no apparent injuries and he has no urinary incontinence he has no tongue biting or lip biting  Patient was received Keppra IV 1000 mg as patient stated that he did not take his Keppra today  We also sent a Keppra level which takes few days to get the result back  As patient is responsive in no distress  Are going to discharge patient  Patient has enough medication for Keppra         Amount and/or Complexity of Data Reviewed  Clinical lab tests: ordered and reviewed  Decide to obtain previous medical records or to obtain history from someone other than the patient: yes  Review and summarize past medical records: yes  Independent visualization of images, tracings, or specimens: yes    Risk of Complications, Morbidity, and/or Mortality  Presenting problems: moderate  Diagnostic procedures: moderate  Management options: moderate        Disposition  Final diagnoses:   Seizure disorder ([address] )     Time reflects when diagnosis was documented in both MDM as applicable and the Disposition within this note     Time User Action Codes Description Comment    9/16/2022  3:48 PM Philip Bojorquez [G40 [id number]] Seizure disorder [facility])       ED Disposition     ED Disposition   Discharge    Condition   Stable    Date/Time   Fri Sep 16, 2022  3:48 PM    Comment   Rashmi Chavez discharge to home/self care                 Follow-up Information     Follow up With Specialties Details Why Contact Info      In 1 week  follow up with your PMD     Ephraim Houser MD Neurology In 3 days  [address]  [phone]            Discharge Medication List as of 9/16/2022  3:49 PM      CONTINUE these medications which have NOT CHANGED    Details   !! bictegravir-emtricitab-tenofovir alafenamide (Biktarvy) -25 MG tablet Take 1 tablet by mouth daily, Starting Tue 3/22/2022, Historical Med      !! bictegravir-emtricitab-tenofovir alafenamide (Biktarvy) -25 MG tablet Take 1 tablet by mouth daily with breakfast, Starting Mon 3/28/2022, Normal      Cholecalciferol 25 MCG (1000 UT) tablet Take 1,000 Units by mouth, Historical Med      cyclobenzaprine (FLEXERIL) 10 mg tablet Take 1 tablet (10 mg total) by mouth 2 (two) times a day as needed for muscle spasms, Starting Sat 3/26/2022, Normal      ibuprofen (MOTRIN) 600 mg tablet Take 1 tablet (600 mg total) by mouth every 6 (six) hours as needed for fever or headaches, Starting Sat 3/26/2022, Normal      levETIRAcetam (Keppra) 750 mg tablet Take 1 tablet (750 mg total) by mouth 2 (two) times a day, Starting Mon 3/28/2022, Normal      ondansetron (Zofran ODT) 4 mg disintegrating tablet Take 1 tablet (4 mg total) by mouth every 6 (six) hours as needed for nausea or vomiting, Starting Wed 8/31/2022, Normal       !! - Potential duplicate medications found  Please discuss with provider  No discharge procedures on file      PDMP Review       Value Time User    PDMP Reviewed  Yes 5/27/2020 12:37 AM LEBRON Larsen          ED Provider  Electronically Signed by           Ana Cruz MD  09/17/22 6672

## 2022-09-16 NOTE — ED PROVIDER NOTES
History  Chief Complaint   Patient presents with    Edema     Edema of right ankle  Patient is a 14-year-old male with history of HIV, seizures, seen earlier this afternoon in the emergency department for breakthrough seizure and discharge who presents today for right greater than left foot pain and swelling that is worse today than his baseline  Reports that he always has swelling in his feet but worse today  No numbness, tingling, weakness, bleeding, or new pain anywhere else  He is homeless and spends lot of time on his feet  The pain is diffuse, nonradiating, worse with palpation and ambulation and better with rest   Pain is aching  No preceding injury or trauma  ROS otherwise negative  History provided by:  Medical records and patient   used: No        Prior to Admission Medications   Prescriptions Last Dose Informant Patient Reported? Taking?    Cholecalciferol 25 MCG (1000 UT) tablet   Yes No   Sig: Take 1,000 Units by mouth   bictegravir-emtricitab-tenofovir alafenamide (Biktarvy) -25 MG tablet   Yes No   Sig: Take 1 tablet by mouth daily   bictegravir-emtricitab-tenofovir alafenamide (Biktarvy) -25 MG tablet   No No   Sig: Take 1 tablet by mouth daily with breakfast   cyclobenzaprine (FLEXERIL) 10 mg tablet   No No   Sig: Take 1 tablet (10 mg total) by mouth 2 (two) times a day as needed for muscle spasms   Patient not taking: Reported on 7/2/2022   ibuprofen (MOTRIN) 600 mg tablet   No No   Sig: Take 1 tablet (600 mg total) by mouth every 6 (six) hours as needed for fever or headaches   Patient not taking: No sig reported   levETIRAcetam (Keppra) 750 mg tablet   No No   Sig: Take 1 tablet (750 mg total) by mouth 2 (two) times a day   ondansetron (Zofran ODT) 4 mg disintegrating tablet   No No   Sig: Take 1 tablet (4 mg total) by mouth every 6 (six) hours as needed for nausea or vomiting      Facility-Administered Medications: None       Past Medical History: Diagnosis Date    [facility]     Seizures ([facility] )     Smoker     Syphilis        Past Surgical History:   Procedure Laterality Date    HERNIA REPAIR         History reviewed  No pertinent family history  I have reviewed and agree with the history as documented  E-Cigarette/Vaping    E-Cigarette Use Never User      E-Cigarette/Vaping Substances     Social History     Tobacco Use    Smoking status: Current Every Day Smoker     Packs/day: 1 50     Types: Cigarettes    Smokeless tobacco: Never Used   Vaping Use    Vaping Use: Never used   Substance Use Topics    Alcohol use: Yes     Alcohol/week: 2 0 standard drinks     Types: 1 Glasses of wine, 1 Cans of beer per week     Comment: socially    Drug use: Yes     Frequency: 45 0 times per week     Types: Marijuana     Comment: last used08/3022       Review of Systems   Reason unable to perform ROS: please see above for ROS  All other ROS negative  Physical Exam  Physical Exam  Vitals and nursing note reviewed  Constitutional:       General: He is not in acute distress  Appearance: He is well-developed  He is not diaphoretic  HENT:      Head: Normocephalic and atraumatic  Eyes:      General: No scleral icterus  Conjunctiva/sclera: Conjunctivae normal    Neck:      Vascular: No JVD  Trachea: No tracheal deviation  Cardiovascular:      Rate and Rhythm: Normal rate  Pulmonary:      Effort: Pulmonary effort is normal    Abdominal:      General: There is no distension  Musculoskeletal:         General: No deformity  Cervical back: Neck supple  Comments: Significant swelling and erythema of the right foot diffusely without rashes or lesions, pain out of proportion to exam   Able to ambulate on the extremity  Strength intact  Pulses 2+ at the right ankle   Skin:     General: Skin is warm and dry  Neurological:      Mental Status: He is alert     Psychiatric:         Behavior: Behavior normal          Vital Signs  ED Triage Vitals [09/16/22 1724]   Temperature Pulse Respirations Blood Pressure SpO2   99 4 °F (37 4 °C) (!) 111 18 112/76 100 %      Temp Source Heart Rate Source Patient Position - Orthostatic VS BP Location FiO2 (%)   Oral Monitor Lying Left arm --      Pain Score       10 - Worst Possible Pain           Vitals:    09/16/22 1724   BP: 112/76   Pulse: (!) 111   Patient Position - Orthostatic VS: Lying         Visual Acuity      ED Medications  Medications   acetaminophen (TYLENOL) tablet 650 mg (has no administration in time range)       Diagnostic Studies  Results Reviewed     None                 XR foot 3+ views RIGHT    (Results Pending)              Procedures  Procedures         ED Course  ED Course as of 09/16/22 1804   Fri Sep 16, 2022   1738 HR 90s on my final eval of pt after x-ray  1739 Temperature: 99 4 °F (37 4 °C)   1739 Blood Pressure: 112/76   1739 Respirations: 18                                             MDM  Number of Diagnoses or Management Options  Diagnosis management comments: Extremity does not appear ischemic or frankly infected  Likely musculoskeletal injury  Will do x-ray of the right foot, refer to primary care podiatry  Patient had CBC and chemistry ordered earlier this afternoon that were unremarkable         Amount and/or Complexity of Data Reviewed  Tests in the radiology section of CPT®: ordered and reviewed  Review and summarize past medical records: yes    Patient Progress  Patient progress: stable      Disposition  Final diagnoses:   Pedal edema   Foot pain   HIV infection, unspecified symptom status ([facility] )   Homelessness   Recurrent seizures ([facility] )   Frequent patient in emergency department     Time reflects when diagnosis was documented in both MDM as applicable and the Disposition within this note     Time User Action Codes Description Comment    9/16/2022  5:34 PM Chi Lee Add [R60 0] Pedal edema     9/16/2022  5:34 PM [facility] Ahr Add [H76 [id number]] Foot pain     9/16/2022  5:35 PM Hedwig Ahr Add [B20] HIV infection, unspecified symptom status ([facility] )     9/16/2022  5:35 PM Hedwig Ahr Add [Z59 00] Homelessness     9/16/2022  5:35 PM Hedwig Ahr Add [R25 [id number]] [facility]     9/16/2022  5:35 PM Hedwig Ahr Add [Z76 89] Frequent patient in emergency department       ED Disposition     ED Disposition   Discharge    Condition   Stable    Date/Time   Fri Sep 16, 2022  5:34 PM    Comment   [facility] discharge to home/self care  Results of completed tests discussed  Return to ER precautions given, verbal and written, and questions answered satisfactorily                     Follow-up Information     Follow up With Specialties Details Why Contact Info Additional Information    [employer]  Call in 1 day To get a primary care provider, if needed [address] [place] [address] Podiatry Call in 1 day For specialty evaluation and/or treatment [address] [facility],[address] [facility] [address] [place] [address]  [phone]          Patient's Medications   Discharge Prescriptions    No medications on file           PDMP Review       Value Time User    PDMP Reviewed  Yes 5/27/2020 12:37 AM Jovanny Moy          ED Provider  Electronically Signed by           Gloria Luis DO  09/16/22 9220

## 2022-09-17 ENCOUNTER — HOSPITAL ENCOUNTER (EMERGENCY)
Facility: HOSPITAL | Age: 37
Discharge: HOME | End: 2022-09-17
Attending: EMERGENCY MEDICINE
Payer: MEDICARE

## 2022-09-17 VITALS
RESPIRATION RATE: 18 BRPM | BODY MASS INDEX: 25.67 KG/M2 | WEIGHT: 159.7 LBS | SYSTOLIC BLOOD PRESSURE: 126 MMHG | DIASTOLIC BLOOD PRESSURE: 91 MMHG | HEART RATE: 111 BPM | HEIGHT: 66 IN | TEMPERATURE: 98.2 F | OXYGEN SATURATION: 98 %

## 2022-09-17 DIAGNOSIS — M25.571 CHRONIC PAIN OF RIGHT ANKLE: ICD-10-CM

## 2022-09-17 DIAGNOSIS — G89.29 CHRONIC PAIN OF RIGHT ANKLE: ICD-10-CM

## 2022-09-17 DIAGNOSIS — R60.0 PEDAL EDEMA: Primary | ICD-10-CM

## 2022-09-17 PROCEDURE — 99284 EMERGENCY DEPT VISIT MOD MDM: CPT | Performed by: EMERGENCY MEDICINE

## 2022-09-17 PROCEDURE — 99283 EMERGENCY DEPT VISIT LOW MDM: CPT

## 2022-09-17 RX ORDER — ACETAMINOPHEN 325 MG/1
650 TABLET ORAL ONCE
Status: COMPLETED | OUTPATIENT
Start: 2022-09-17 | End: 2022-09-17

## 2022-09-17 RX ORDER — NAPROXEN 250 MG/1
500 TABLET ORAL ONCE
Status: COMPLETED | OUTPATIENT
Start: 2022-09-17 | End: 2022-09-17

## 2022-09-17 RX ADMIN — NAPROXEN 500 MG: 250 TABLET ORAL at 05:30

## 2022-09-17 RX ADMIN — ACETAMINOPHEN 650 MG: 325 TABLET, FILM COATED ORAL at 05:30

## 2022-09-18 LAB
BACTERIA BLD CULT: NORMAL
BACTERIA BLD CULT: NORMAL

## 2022-09-18 NOTE — ED PROVIDER NOTES
History  Chief Complaint   Patient presents with    Ankle Pain     Pt reports to ED, reports right ankle pain, no injury  HPI  Patient presents to the emergency department for evaluation of right foot ankle pain  This is his 3rd visit to the emergency department in less than 12 hours for similar complaints  He was seen and evaluated by emergency room physician and discharged with a diagnosis of peripheral edema  Compression garments were placed in the emergency department to help alleviate symptoms  Patient after discharge was found sleeping outside on a park bench front of the ER  He was asked to leave he decided to check himself back in stating that his right foot and ankle were more swollen and starting to hurt  He denies any other associated symptoms  Prior to Admission Medications   Prescriptions Last Dose Informant Patient Reported? Taking?    Cholecalciferol 25 MCG (1000 UT) tablet   Yes No   Sig: Take 1,000 Units by mouth   bictegravir-emtricitab-tenofovir alafenamide (Biktarvy) -25 MG tablet   Yes No   Sig: Take 1 tablet by mouth daily   bictegravir-emtricitab-tenofovir alafenamide (Biktarvy) -25 MG tablet   No No   Sig: Take 1 tablet by mouth daily with breakfast   cyclobenzaprine (FLEXERIL) 10 mg tablet   No No   Sig: Take 1 tablet (10 mg total) by mouth 2 (two) times a day as needed for muscle spasms   Patient not taking: Reported on 7/2/2022   ibuprofen (MOTRIN) 600 mg tablet   No No   Sig: Take 1 tablet (600 mg total) by mouth every 6 (six) hours as needed for fever or headaches   Patient not taking: No sig reported   levETIRAcetam (Keppra) 750 mg tablet   No No   Sig: Take 1 tablet (750 mg total) by mouth 2 (two) times a day   ondansetron (Zofran ODT) 4 mg disintegrating tablet Not Taking at Unknown time  No No   Sig: Take 1 tablet (4 mg total) by mouth every 6 (six) hours as needed for nausea or vomiting   Patient not taking: Reported on 9/17/2022      Facility-Administered Medications: None       Past Medical History:   Diagnosis Date    HIV [facility] immunodeficiency virus infection) ([facility] )     Seizures ([facility] )     Smoker     Syphilis        Past Surgical History:   Procedure Laterality Date    HERNIA REPAIR         History reviewed  No pertinent family history  I have reviewed and agree with the history as documented  E-Cigarette/Vaping    E-Cigarette Use Never User      E-Cigarette/Vaping Substances     Social History     Tobacco Use    Smoking status: Current Every Day Smoker     Packs/day: 1 50     Types: Cigarettes    Smokeless tobacco: Never Used   Vaping Use    Vaping Use: Never used   Substance Use Topics    Alcohol use: Yes     Alcohol/week: 2 0 standard drinks     Types: 1 Glasses of wine, 1 Cans of beer per week     Comment: socially    Drug use: Yes     Frequency: 45 0 times per week     Types: Marijuana     Comment: last used08/3022       Review of Systems   Constitutional: Negative for fever  Respiratory: Negative for chest tightness, shortness of breath and wheezing  Cardiovascular: Positive for leg swelling  Negative for chest pain  Gastrointestinal: Negative for abdominal pain and blood in stool  Genitourinary: Negative for flank pain  Musculoskeletal: Negative for back pain  Neurological: Negative for syncope  All other systems reviewed and are negative  Physical Exam  Physical Exam  Vitals and nursing note reviewed  Constitutional:       Appearance: He is well-developed  HENT:      Head: Normocephalic and atraumatic  Eyes:      Conjunctiva/sclera: Conjunctivae normal    Cardiovascular:      Rate and Rhythm: Normal rate and regular rhythm  Heart sounds: No murmur heard  Pulmonary:      Effort: Pulmonary effort is normal  No respiratory distress  Breath sounds: Normal breath sounds  Abdominal:      Palpations: Abdomen is soft  Tenderness: There is no abdominal tenderness     Musculoskeletal:      Cervical back: Neck supple  Right lower leg: Edema present  Left lower leg: Edema present  Comments: 1+ pedal edema bilaterally  Skin:     General: Skin is warm and dry  Findings: No erythema or rash  Neurological:      General: No focal deficit present  Mental Status: He is alert and oriented to person, place, and time  Mental status is at baseline  Vital Signs  ED Triage Vitals   Temperature Pulse Respirations Blood Pressure SpO2   09/17/22 0445 09/17/22 0445 09/17/22 0445 09/17/22 0445 09/17/22 0445   98 2 °F (36 8 °C) (!) 111 18 126/91 98 %      Temp Source Heart Rate Source Patient Position - Orthostatic VS BP Location FiO2 (%)   09/17/22 0445 09/17/22 0445 09/17/22 0445 09/17/22 0445 --   Oral Monitor Lying Left arm       Pain Score       09/17/22 0444       10 - Worst Possible Pain           Vitals:    09/17/22 0445   BP: 126/91   Pulse: (!) 111   Patient Position - Orthostatic VS: Lying         Visual Acuity      ED Medications  Medications   acetaminophen (TYLENOL) tablet 650 mg (650 mg Oral Given 9/17/22 0530)   naproxen (NAPROSYN) tablet 500 mg (500 mg Oral Given 9/17/22 0530)       Diagnostic Studies  Results Reviewed     None                 No orders to display              Procedures  Procedures         ED Course                               SBIRT 20yo+    Flowsheet Row Most Recent Value   SBIRT (25 yo +)    In order to provide better care to our patients, we are screening all of our patients for alcohol and drug use  Would it be okay to ask you these screening questions?  No Filed at: 09/17/2022 0448                    MDM  Number of Diagnoses or Management Options     Amount and/or Complexity of Data Reviewed  Review and summarize past medical records: yes    Risk of Complications, Morbidity, and/or Mortality  Presenting problems: low  Diagnostic procedures: low  Management options: minimal        Disposition  Final diagnoses:   Pedal edema   Chronic pain of right ankle     Time reflects when diagnosis was documented in both MDM as applicable and the Disposition within this note     Time User Action Codes Description Comment    9/17/2022  5:31 AM Baltazar Bojorquez [R60 0] Pedal edema     9/17/2022  5:32 AM Baltazar Bojorquez [H58 794,  G89 29] Chronic pain of right ankle       ED Disposition     ED Disposition   Discharge    Condition   Stable    Date/Time   Sat Sep 17, 2022  5:31 AM    Comment   Jazzmine Baer discharge to home/self care  Follow-up Information    None         Discharge Medication List as of 9/17/2022  5:34 AM      CONTINUE these medications which have NOT CHANGED    Details   !! bictegravir-emtricitab-tenofovir alafenamide (Biktarvy) -25 MG tablet Take 1 tablet by mouth daily, Starting Tue 3/22/2022, Historical Med      !! bictegravir-emtricitab-tenofovir alafenamide (Biktarvy) -25 MG tablet Take 1 tablet by mouth daily with breakfast, Starting Mon 3/28/2022, Normal      Cholecalciferol 25 MCG (1000 UT) tablet Take 1,000 Units by mouth, Historical Med      cyclobenzaprine (FLEXERIL) 10 mg tablet Take 1 tablet (10 mg total) by mouth 2 (two) times a day as needed for muscle spasms, Starting Sat 3/26/2022, Normal      ibuprofen (MOTRIN) 600 mg tablet Take 1 tablet (600 mg total) by mouth every 6 (six) hours as needed for fever or headaches, Starting Sat 3/26/2022, Normal      levETIRAcetam (Keppra) 750 mg tablet Take 1 tablet (750 mg total) by mouth 2 (two) times a day, Starting Mon 3/28/2022, Normal      ondansetron (Zofran ODT) 4 mg disintegrating tablet Take 1 tablet (4 mg total) by mouth every 6 (six) hours as needed for nausea or vomiting, Starting Wed 8/31/2022, Normal       !! - Potential duplicate medications found  Please discuss with provider  No discharge procedures on file      PDMP Review       Value Time User    PDMP Reviewed  Yes 5/27/2020 12:37 AM LEBRON Childers          ED Provider  Electronically Signed by Lyubov Patten MD  09/18/22 0594

## 2022-09-19 LAB — LEVETIRACETAM SERPL-MCNC: <1 UG/ML (ref 10–40)

## 2022-09-21 ENCOUNTER — HOSPITAL ENCOUNTER (EMERGENCY)
Facility: HOSPITAL | Age: 37
Discharge: HOME | End: 2022-09-21
Attending: EMERGENCY MEDICINE
Payer: MEDICARE

## 2022-09-21 VITALS
HEART RATE: 110 BPM | DIASTOLIC BLOOD PRESSURE: 83 MMHG | HEIGHT: 66 IN | SYSTOLIC BLOOD PRESSURE: 123 MMHG | RESPIRATION RATE: 18 BRPM | WEIGHT: 159 LBS | BODY MASS INDEX: 25.55 KG/M2 | TEMPERATURE: 98.8 F | OXYGEN SATURATION: 100 %

## 2022-09-21 DIAGNOSIS — G40.909 SEIZURE DISORDER (HCC): ICD-10-CM

## 2022-09-21 DIAGNOSIS — G40.919 BREAKTHROUGH SEIZURE (HCC): Primary | ICD-10-CM

## 2022-09-21 DIAGNOSIS — Z71.89 COMPLEX CARE COORDINATION: Primary | ICD-10-CM

## 2022-09-21 LAB
ANION GAP SERPL CALCULATED.3IONS-SCNC: 15 MMOL/L (ref 4–13)
BASOPHILS # BLD AUTO: 0.04 THOUSANDS/ΜL (ref 0–0.1)
BASOPHILS NFR BLD AUTO: 1 % (ref 0–1)
BUN SERPL-MCNC: 14 MG/DL (ref 5–25)
CALCIUM SERPL-MCNC: 9.4 MG/DL (ref 8.4–10.2)
CHLORIDE SERPL-SCNC: 101 MMOL/L (ref 96–108)
CO2 SERPL-SCNC: 20 MMOL/L (ref 21–32)
CREAT SERPL-MCNC: 1.05 MG/DL (ref 0.6–1.3)
EOSINOPHIL # BLD AUTO: 0.16 THOUSAND/ΜL (ref 0–0.61)
EOSINOPHIL NFR BLD AUTO: 2 % (ref 0–6)
ERYTHROCYTE [DISTWIDTH] IN BLOOD BY AUTOMATED COUNT: 13.3 % (ref 11.6–15.1)
GFR SERPL CREATININE-BSD FRML MDRD: 90 ML/MIN/1.73SQ M
GLUCOSE SERPL-MCNC: 92 MG/DL (ref 65–140)
HCT VFR BLD AUTO: 39.9 % (ref 36.5–49.3)
HGB BLD-MCNC: 13.3 G/DL (ref 12–17)
IMM GRANULOCYTES # BLD AUTO: 0.06 THOUSAND/UL (ref 0–0.2)
IMM GRANULOCYTES NFR BLD AUTO: 1 % (ref 0–2)
LYMPHOCYTES # BLD AUTO: 1.16 THOUSANDS/ΜL (ref 0.6–4.47)
LYMPHOCYTES NFR BLD AUTO: 17 % (ref 14–44)
MCH RBC QN AUTO: 30.2 PG (ref 26.8–34.3)
MCHC RBC AUTO-ENTMCNC: 33.3 G/DL (ref 31.4–37.4)
MCV RBC AUTO: 91 FL (ref 82–98)
MONOCYTES # BLD AUTO: 0.58 THOUSAND/ΜL (ref 0.17–1.22)
MONOCYTES NFR BLD AUTO: 8 % (ref 4–12)
NEUTROPHILS # BLD AUTO: 5.01 THOUSANDS/ΜL (ref 1.85–7.62)
NEUTS SEG NFR BLD AUTO: 71 % (ref 43–75)
NRBC BLD AUTO-RTO: 0 /100 WBCS
PLATELET # BLD AUTO: 240 THOUSANDS/UL (ref 149–390)
PMV BLD AUTO: 9.8 FL (ref 8.9–12.7)
POTASSIUM SERPL-SCNC: 3.8 MMOL/L (ref 3.5–5.3)
RBC # BLD AUTO: 4.4 MILLION/UL (ref 3.88–5.62)
SODIUM SERPL-SCNC: 136 MMOL/L (ref 135–147)
WBC # BLD AUTO: 7.01 THOUSAND/UL (ref 4.31–10.16)

## 2022-09-21 PROCEDURE — 85025 COMPLETE CBC W/AUTO DIFF WBC: CPT | Performed by: EMERGENCY MEDICINE

## 2022-09-21 PROCEDURE — 99284 EMERGENCY DEPT VISIT MOD MDM: CPT

## 2022-09-21 PROCEDURE — 80048 BASIC METABOLIC PNL TOTAL CA: CPT | Performed by: EMERGENCY MEDICINE

## 2022-09-21 PROCEDURE — 96365 THER/PROPH/DIAG IV INF INIT: CPT

## 2022-09-21 PROCEDURE — 36415 COLL VENOUS BLD VENIPUNCTURE: CPT | Performed by: EMERGENCY MEDICINE

## 2022-09-21 PROCEDURE — 99284 EMERGENCY DEPT VISIT MOD MDM: CPT | Performed by: EMERGENCY MEDICINE

## 2022-09-21 PROCEDURE — 80177 DRUG SCRN QUAN LEVETIRACETAM: CPT | Performed by: EMERGENCY MEDICINE

## 2022-09-21 RX ORDER — LEVETIRACETAM 5 MG/ML
INJECTION INTRAVASCULAR
Status: DISCONTINUED
Start: 2022-09-21 | End: 2022-09-22 | Stop reason: HOSPADM

## 2022-09-21 RX ORDER — LEVETIRACETAM 5 MG/ML
INJECTION INTRAVASCULAR
Status: COMPLETED
Start: 2022-09-21 | End: 2022-09-21

## 2022-09-21 RX ORDER — LEVETIRACETAM 750 MG/1
750 TABLET ORAL 2 TIMES DAILY
Qty: 60 TABLET | Refills: 0 | Status: SHIPPED | OUTPATIENT
Start: 2022-09-21 | End: 2022-09-30 | Stop reason: SDUPTHER

## 2022-09-21 RX ADMIN — LEVETIRACETAM 1500 MG: 100 INJECTION, SOLUTION INTRAVENOUS at 20:50

## 2022-09-21 NOTE — ED PROVIDER NOTES
History  Chief Complaint   Patient presents with    Seizure - Prior Hx Of     PT PRESENTS VIA EMS FROM [facility] after having a witnessed seizure that was less than 5 mins long  No head injury     43-year-old male history of HIV, seizures on Keppra  Patient presents for seizure  Less than 5 minutes in duration  Patient states is taking seizure medication which is prescribed by his primary care provider  Review of records reveals that his Keppra level was undetectable 5 days ago when he was last seen for a seizure  Patient has history HIV reportedly on Biktarvy  Leukocyte count was normal 5 days ago  Patient has been referred to neurology  Has not followed up  Seizure - Prior Hx Of  Seizure type:  Grand mal  Preceding symptoms: no sensation of an aura present    Initial focality:  None  Postictal symptoms: confusion and somnolence    Return to baseline: yes    Severity:  Moderate  Timing:  Once  Progression:  Partially resolved  PTA treatment:  None  History of seizures: yes    Similar to previous episodes: yes        Prior to Admission Medications   Prescriptions Last Dose Informant Patient Reported? Taking?    Cholecalciferol 25 MCG (1000 UT) tablet   Yes No   Sig: Take 1,000 Units by mouth   bictegravir-emtricitab-tenofovir alafenamide (Biktarvy) -25 MG tablet   Yes No   Sig: Take 1 tablet by mouth daily   bictegravir-emtricitab-tenofovir alafenamide (Biktarvy) -25 MG tablet   No No   Sig: Take 1 tablet by mouth daily with breakfast   cyclobenzaprine (FLEXERIL) 10 mg tablet   No No   Sig: Take 1 tablet (10 mg total) by mouth 2 (two) times a day as needed for muscle spasms   Patient not taking: Reported on 7/2/2022   levETIRAcetam (Keppra) 750 mg tablet   No No   Sig: Take 1 tablet (750 mg total) by mouth 2 (two) times a day   levETIRAcetam (Keppra) 750 mg tablet   No Yes   Sig: Take 1 tablet (750 mg total) by mouth 2 (two) times a day   ondansetron (Zofran ODT) 4 mg disintegrating tablet   No No   Sig: Take 1 tablet (4 mg total) by mouth every 6 (six) hours as needed for nausea or vomiting   Patient not taking: Reported on 9/17/2022      Facility-Administered Medications: None       Past Medical History:   Diagnosis Date    HIV [facility]human immunodeficiency virus infection) ([facility] )     Seizures ([facility] )     Smoker     Syphilis        Past Surgical History:   Procedure Laterality Date    HERNIA REPAIR         History reviewed  No pertinent family history  I have reviewed and agree with the history as documented  E-Cigarette/Vaping    E-Cigarette Use Never User      E-Cigarette/Vaping Substances     Social History     Tobacco Use    Smoking status: Current Every Day Smoker     Packs/day: 1 50     Types: Cigarettes    Smokeless tobacco: Never Used   Vaping Use    Vaping Use: Never used   Substance Use Topics    Alcohol use: Yes     Alcohol/week: 2 0 standard drinks     Types: 1 Glasses of wine, 1 Cans of beer per week     Comment: socially    Drug use: Yes     Frequency: 45 0 times per week     Types: Marijuana     Comment: last used08/3022       Review of Systems   Neurological: Positive for seizures  All other systems reviewed and are negative  Physical Exam  Physical Exam  Vitals and nursing note reviewed  Constitutional:       General: He is not in acute distress  Appearance: He is well-developed  He is not diaphoretic  Comments: Fatigued/ post ictal     HENT:      Head: Normocephalic and atraumatic  Right Ear: External ear normal       Left Ear: External ear normal    Eyes:      Conjunctiva/sclera: Conjunctivae normal    Neck:      Trachea: No tracheal deviation  Cardiovascular:      Rate and Rhythm: Normal rate and regular rhythm  Heart sounds: Normal heart sounds  No murmur heard  Pulmonary:      Effort: No respiratory distress  Breath sounds: Normal breath sounds  No stridor  No wheezing or rales     Abdominal:      General: Bowel sounds are normal  There is no distension  Palpations: Abdomen is soft  There is no mass  Tenderness: There is no abdominal tenderness  There is no guarding or rebound  Musculoskeletal:         General: No tenderness or deformity  Skin:     General: Skin is dry  Findings: No rash  Neurological:      General: No focal deficit present  Cranial Nerves: No cranial nerve deficit  Sensory: No sensory deficit  Motor: No weakness or abnormal muscle tone  Coordination: Coordination normal    Psychiatric:         Behavior: Behavior normal          Thought Content:  Thought content normal          Judgment: Judgment normal          Vital Signs  ED Triage Vitals   Temperature Pulse Respirations Blood Pressure SpO2   09/21/22 1845 09/21/22 1845 09/21/22 1845 09/21/22 1845 09/21/22 1845   98 8 °F (37 1 °C) (!) 132 18 132/73 95 %      Temp Source Heart Rate Source Patient Position - Orthostatic VS BP Location FiO2 (%)   09/21/22 1845 09/21/22 2034 09/21/22 1845 09/21/22 1845 --   Oral Monitor Lying Right arm       Pain Score       09/21/22 1845       No Pain           Vitals:    09/21/22 1845 09/21/22 2034 09/21/22 2122 09/21/22 2149   BP: 132/73 103/53  123/83   Pulse: (!) 132 (!) 106 90 (!) 110   Patient Position - Orthostatic VS: Lying Lying  Lying         Visual Acuity      ED Medications  Medications   levETIRAcetam (KEPPRA) 1,500 mg in sodium chloride 0 9 % 100 mL IVPB (0 mg Intravenous Stopped 9/21/22 2149)   levETIRAcetam in NaCl (KEPPRA) 500 MG/100ML infusion **ADS Override Pull** (0 mg  Override Pull 9/21/22 2049)       Diagnostic Studies  Results Reviewed     Procedure Component Value Units Date/Time    Basic metabolic panel [[id number]]  (Abnormal) Collected: 09/21/22 1933    Lab Status: Final result Specimen: Blood from Arm, Left Updated: 09/21/22 1952     Sodium 136 mmol/L      Potassium 3 8 mmol/L      Chloride 101 mmol/L      CO2 20 mmol/L      ANION GAP 15 mmol/L      BUN 14 mg/dL Creatinine 1 05 mg/dL      Glucose 92 mg/dL      Calcium 9 4 mg/dL      eGFR 90 ml/min/1 73sq m     Narrative:      National Kidney Disease Foundation guidelines for Chronic Kidney Disease (CKD):     Stage 1 with normal or high GFR (GFR > 90 mL/min/1 73 square meters)    Stage 2 Mild CKD (GFR = 60-89 mL/min/1 73 square meters)    Stage 3A Moderate CKD (GFR = 45-59 mL/min/1 73 square meters)    Stage 3B Moderate CKD (GFR = 30-44 mL/min/1 73 square meters)    Stage 4 Severe CKD (GFR = 15-29 mL/min/1 73 square meters)    Stage 5 End Stage CKD (GFR <15 mL/min/1 73 square meters)  Note: GFR calculation is accurate only with a steady state creatinine    CBC and differential [[id number]] Collected: 09/21/22 1933    Lab Status: Final result Specimen: Blood from Arm, Left Updated: 09/21/22 1937     WBC 7 01 Thousand/uL      RBC 4 40 Million/uL      Hemoglobin 13 3 g/dL      Hematocrit 39 9 %      MCV 91 fL      MCH 30 2 pg      MCHC 33 3 g/dL      RDW 13 3 %      MPV 9 8 fL      Platelets 197 Thousands/uL      nRBC 0 /100 WBCs      Neutrophils Relative 71 %      Immat GRANS % 1 %      Lymphocytes Relative 17 %      Monocytes Relative 8 %      Eosinophils Relative 2 %      Basophils Relative 1 %      Neutrophils Absolute 5 01 Thousands/µL      Immature Grans Absolute 0 06 Thousand/uL      Lymphocytes Absolute 1 16 Thousands/µL      Monocytes Absolute 0 58 Thousand/µL      Eosinophils Absolute 0 16 Thousand/µL      Basophils Absolute 0 04 Thousands/µL     Levetiracetam level [[id number]] Collected: 09/21/22 1933    Lab Status:  In process Specimen: Blood from Arm, Left Updated: 09/21/22 1935                 No orders to display              Procedures  Procedures         ED Course  ED Course as of 09/23/22 1125   Wed Sep 21, 2022   2027 CO2(!): 20 2027 Anion Gap(!): 15  Likely secondary to seizure                               SBIRT 22yo+    Flowsheet Row Most Recent Value   SBIRT (23 yo +)    In order to provide better care to our patients, we are screening all of our patients for alcohol and drug use  Would it be okay to ask you these screening questions? No Filed at: 09/21/2022 9496                    MDM  Number of Diagnoses or Management Options  Breakthrough seizure [facility]): new and requires workup  Diagnosis management comments: Patient with seizure  Had recovery to near baseline  Signed out to night attending pending full recovery  Patient evaluated for electrolyte abnormality, neutropenia, continued failure to comply with anti-epileptic  Bloodwork with mild acidosis secondary to seizure activity  Will prescribe keppra  Amount and/or Complexity of Data Reviewed  Clinical lab tests: ordered and reviewed  Decide to obtain previous medical records or to obtain history from someone other than the patient: yes  Review and summarize past medical records: yes    Risk of Complications, Morbidity, and/or Mortality  Presenting problems: moderate  Diagnostic procedures: moderate  Management options: moderate        Disposition  Final diagnoses:   Breakthrough seizure ([address] )     Time reflects when diagnosis was documented in both MDM as applicable and the Disposition within this note     Time User Action Codes Description Comment    9/21/2022  9:07 PM Abbey Ruff Add [G40 919] Breakthrough seizure ([facility] )     9/21/2022  9:10 PM Abbey Ruff Add [G40 909] Seizure disorder [facility])       ED Disposition     ED Disposition   Discharge    Condition   Stable    Date/Time   Wed Sep 21, 2022  9:07 PM    Comment   Merry Westbrook discharge to home/self care                 Follow-up Information     Follow up With Specialties Details Why Contact Info Additional [address]  Emergency Department Emergency Medicine  If symptoms worsen [address] [facility],[address] [id number]  [address] [place] [address] Emergency Department, [address] [place] [address]    Neurology Oneyda Firecrow [facility] Neurology Schedule an appointment as soon as possible for a visit  For re-evaluation as soon as possible [address] [place] [address] [address] [facility] [id number] [phone] Neurology [facility], [address] [place] [address], [address] [facility]          Discharge Medication List as of 9/21/2022  9:11 PM      CONTINUE these medications which have CHANGED    Details   levETIRAcetam (Keppra) 750 mg tablet Take 1 tablet (750 mg total) by mouth 2 (two) times a day, Starting Wed 9/21/2022, Print         CONTINUE these medications which have NOT CHANGED    Details   !! bictegravir-emtricitab-tenofovir alafenamide (Biktarvy) -25 MG tablet Take 1 tablet by mouth daily, Starting Tue 3/22/2022, Historical Med      !! bictegravir-emtricitab-tenofovir alafenamide (Biktarvy) -25 MG tablet Take 1 tablet by mouth daily with breakfast, Starting Mon 3/28/2022, Normal      Cholecalciferol 25 MCG (1000 UT) tablet Take 1,000 Units by mouth, Historical Med      cyclobenzaprine (FLEXERIL) 10 mg tablet Take 1 tablet (10 mg total) by mouth 2 (two) times a day as needed for muscle spasms, Starting Sat 3/26/2022, Normal      ondansetron (Zofran ODT) 4 mg disintegrating tablet Take 1 tablet (4 mg total) by mouth every 6 (six) hours as needed for nausea or vomiting, Starting Wed 8/31/2022, Normal       !! - Potential duplicate medications found  Please discuss with provider  No discharge procedures on file      PDMP Review       Value Time User    PDMP Reviewed  Yes 5/27/2020 12:37 AM LEBRON Meyers          ED Provider  Electronically Signed by           Alejandro Patel DO  09/23/22 7125

## 2022-09-22 ENCOUNTER — HOSPITAL ENCOUNTER (EMERGENCY)
Facility: HOSPITAL | Age: 37
Discharge: HOME | End: 2022-09-22
Attending: EMERGENCY MEDICINE
Payer: MEDICARE

## 2022-09-22 ENCOUNTER — PATIENT OUTREACH (OUTPATIENT)
Dept: CASE MANAGEMENT | Facility: OTHER | Age: 37
End: 2022-09-22

## 2022-09-22 VITALS
SYSTOLIC BLOOD PRESSURE: 123 MMHG | DIASTOLIC BLOOD PRESSURE: 86 MMHG | BODY MASS INDEX: 25.71 KG/M2 | OXYGEN SATURATION: 99 % | RESPIRATION RATE: 15 BRPM | WEIGHT: 160 LBS | HEART RATE: 97 BPM | TEMPERATURE: 98.4 F | HEIGHT: 66 IN

## 2022-09-22 DIAGNOSIS — M25.511 ACUTE PAIN OF RIGHT SHOULDER: Primary | ICD-10-CM

## 2022-09-22 DIAGNOSIS — Z59.00 HOMELESSNESS: ICD-10-CM

## 2022-09-22 PROCEDURE — 99283 EMERGENCY DEPT VISIT LOW MDM: CPT

## 2022-09-22 PROCEDURE — 99282 EMERGENCY DEPT VISIT SF MDM: CPT | Performed by: EMERGENCY MEDICINE

## 2022-09-22 SDOH — ECONOMIC STABILITY - HOUSING INSECURITY: HOMELESSNESS UNSPECIFIED: Z59.00

## 2022-09-22 NOTE — ED TRIAGE NOTES
Via EMS/BLS found sleeping outside; pt admits to being homeless and "having no place to go"; pt offers no other complaints

## 2022-09-22 NOTE — DISCHARGE INSTRUCTIONS
Follow-up with neurology as soon as possible regarding her seizure  Start taking your Keppra as prescribed

## 2022-09-22 NOTE — PROGRESS NOTES
In the past 6 months the patient had 1 admission with [address] [facility] ED visits mainly secondary to complex social issues  Per chart review, the patient has an extensive history of high utilization  Called the patient & introduced complex care management  Offered assistance with housing & other social needs  Offered assistance accessing outpatient healthcare  Patient declined & is not interested in participating or getting calls at this time  He did agree to save this RN's number & call if he changes his mind  Forwarded the patient to the [address] to review & sent a [employer] to [employer]  Will follow the patient in surveillance pending responses from the above

## 2022-09-22 NOTE — ED PROVIDER NOTES
History  Chief Complaint   Patient presents with    Homeless     Via EMS/BLS found sleeping outside; pt admits to being homeless and "having no place to go"; pt offers no other complaints       Patient presents to the emergency department from the community stating that he had pain in his right shoulder  Patient is homeless and a frequent consumer of  This emergency department  Patient was at the while a 1 [employer] walked over to him because he was sleeping and he said he had right shoulder pain  Patient has a history of chronic right shoulder pain for which he takes Tylenol  He denies any direct trauma to his shoulder  He also did a denies any other associated systemic symptoms  Upon arrival to the emergency department   Patient states that he is very fatigued  And all he wants to do is sleep  Prior to Admission Medications   Prescriptions Last Dose Informant Patient Reported? Taking?    Cholecalciferol 25 MCG (1000 UT) tablet   Yes No   Sig: Take 1,000 Units by mouth   bictegravir-emtricitab-tenofovir alafenamide (Biktarvy) -25 MG tablet   Yes No   Sig: Take 1 tablet by mouth daily   bictegravir-emtricitab-tenofovir alafenamide (Biktarvy) -25 MG tablet   No No   Sig: Take 1 tablet by mouth daily with breakfast   cyclobenzaprine (FLEXERIL) 10 mg tablet   No No   Sig: Take 1 tablet (10 mg total) by mouth 2 (two) times a day as needed for muscle spasms   Patient not taking: Reported on 7/2/2022   levETIRAcetam (Keppra) 750 mg tablet   No No   Sig: Take 1 tablet (750 mg total) by mouth 2 (two) times a day   ondansetron (Zofran ODT) 4 mg disintegrating tablet   No No   Sig: Take 1 tablet (4 mg total) by mouth every 6 (six) hours as needed for nausea or vomiting   Patient not taking: Reported on 9/17/2022      Facility-Administered Medications: None       Past Medical History:   Diagnosis Date    HIV (human immunodeficiency virus infection) ([facility] )     Seizures ([facility] )     Smoker     Syphilis        Past Surgical History:   Procedure Laterality Date    HERNIA REPAIR         History reviewed  No pertinent family history  I have reviewed and agree with the history as documented  E-Cigarette/Vaping    E-Cigarette Use Never User      E-Cigarette/Vaping Substances     Social History     Tobacco Use    Smoking status: Current Every Day Smoker     Packs/day: 1 50     Types: Cigarettes    Smokeless tobacco: Never Used   Vaping Use    Vaping Use: Never used   Substance Use Topics    Alcohol use: Yes     Alcohol/week: 2 0 standard drinks     Types: 1 Glasses of wine, 1 Cans of beer per week     Comment: socially    Drug use: Yes     Frequency: 45 0 times per week     Types: Marijuana     Comment: last used08/3022       Review of Systems   Constitutional: Negative for fever  Respiratory: Negative for shortness of breath  Cardiovascular: Negative for chest pain  Gastrointestinal: Negative for nausea and vomiting  Musculoskeletal: Positive for myalgias  Negative for back pain  Skin: Negative for color change  Neurological: Negative for light-headedness  All other systems reviewed and are negative  Physical Exam  Physical Exam  Vitals and nursing note reviewed  Constitutional:       Appearance: He is well-developed  HENT:      Head: Normocephalic and atraumatic  Mouth/Throat:      Mouth: Mucous membranes are moist    Eyes:      Conjunctiva/sclera: Conjunctivae normal    Cardiovascular:      Rate and Rhythm: Normal rate and regular rhythm  Heart sounds: No murmur heard  Pulmonary:      Effort: Pulmonary effort is normal  No respiratory distress  Breath sounds: Normal breath sounds  Abdominal:      Palpations: Abdomen is soft  Tenderness: There is no abdominal tenderness  Musculoskeletal:      Cervical back: Neck supple  Skin:     General: Skin is warm and dry  Capillary Refill: Capillary refill takes less than 2 seconds     Neurological: General: No focal deficit present  Mental Status: He is alert and oriented to person, place, and time  Mental status is at baseline  Vital Signs  ED Triage Vitals [09/22/22 0026]   Temperature Pulse Respirations Blood Pressure SpO2   98 4 °F (36 9 °C) 97 15 123/86 99 %      Temp Source Heart Rate Source Patient Position - Orthostatic VS BP Location FiO2 (%)   Oral Monitor Lying Right arm --      Pain Score       No Pain           Vitals:    09/22/22 0026   BP: 123/86   Pulse: 97   Patient Position - Orthostatic VS: Lying         Visual Acuity      ED Medications  Medications - No data to display    Diagnostic Studies  Results Reviewed     None                 No orders to display              Procedures  Procedures         ED Course                               SBIRT 20yo+    Flowsheet Row Most Recent Value   SBIRT (25 yo +)    In order to provide better care to our patients, we are screening all of our patients for alcohol and drug use  Would it be okay to ask you these screening questions?  No Filed at: 09/22/2022 0152                    [facility]  Number of Diagnoses or Management Options     Amount and/or Complexity of Data Reviewed  Review and summarize past medical records: yes    Risk of Complications, Morbidity, and/or Mortality  Presenting problems: minimal  Diagnostic procedures: minimal  Management options: minimal    Patient Progress  Patient progress: stable      Disposition  Final diagnoses:   Acute pain of right shoulder   Homelessness     Time reflects when diagnosis was documented in both MDM as applicable and the Disposition within this note     Time User Action Codes Description Comment    9/22/2022  3:04 AM Patrice Contreras Add [M25 511] Acute pain of right shoulder     9/22/2022  3:08 AM Patrice Contreras Add [Z59 00] Homelessness       ED Disposition     ED Disposition   Discharge    Condition   Stable    Date/Time   Thu Sep 22, 2022 [address] discharge to home/self care  Follow-up Information     Follow up With Specialties Details Why Contact Info Additional Information    [facility] [address] [facility] Call in 1 day  [address]  [place] [id number]  [facility] [address] [place] [address] [address], [address] [place], [place], [address], [phone]          Patient's Medications   Discharge Prescriptions    No medications on file       No discharge procedures on file      PDMP Review       Value Time User    PDMP Reviewed  Yes 5/27/2020 12:37 AM LEBRON Pro          ED Provider  Electronically Signed by           Minnie Alvarez MD  09/22/22 5629

## 2022-09-27 LAB — LEVETIRACETAM SERPL-MCNC: <2 UG/ML (ref 10–40)

## 2022-09-30 ENCOUNTER — HOSPITAL ENCOUNTER (EMERGENCY)
Facility: HOSPITAL | Age: 37
Discharge: HOME | End: 2022-09-30
Attending: EMERGENCY MEDICINE
Payer: MEDICARE

## 2022-09-30 ENCOUNTER — APPOINTMENT (OUTPATIENT)
Dept: RADIOLOGY | Facility: HOSPITAL | Age: 37
End: 2022-09-30
Payer: MEDICARE

## 2022-09-30 VITALS
DIASTOLIC BLOOD PRESSURE: 86 MMHG | TEMPERATURE: 98.4 F | SYSTOLIC BLOOD PRESSURE: 123 MMHG | HEART RATE: 100 BPM | RESPIRATION RATE: 16 BRPM | OXYGEN SATURATION: 98 %

## 2022-09-30 DIAGNOSIS — G40.909 SEIZURE DISORDER (HCC): ICD-10-CM

## 2022-09-30 DIAGNOSIS — G89.29 CHRONIC RIGHT SHOULDER PAIN: ICD-10-CM

## 2022-09-30 DIAGNOSIS — Z91.14 NONCOMPLIANCE WITH MEDICATIONS: Primary | ICD-10-CM

## 2022-09-30 DIAGNOSIS — M25.511 CHRONIC RIGHT SHOULDER PAIN: ICD-10-CM

## 2022-09-30 LAB
ALBUMIN SERPL BCP-MCNC: 4.2 G/DL (ref 3.5–5)
ALP SERPL-CCNC: 51 U/L (ref 34–104)
ALT SERPL W P-5'-P-CCNC: 14 U/L (ref 7–52)
ANION GAP SERPL CALCULATED.3IONS-SCNC: 6 MMOL/L (ref 4–13)
AST SERPL W P-5'-P-CCNC: 26 U/L (ref 13–39)
BASOPHILS # BLD AUTO: 0.04 THOUSANDS/ΜL (ref 0–0.1)
BASOPHILS NFR BLD AUTO: 1 % (ref 0–1)
BILIRUB SERPL-MCNC: 0.64 MG/DL (ref 0.2–1)
BUN SERPL-MCNC: 14 MG/DL (ref 5–25)
CALCIUM SERPL-MCNC: 9.4 MG/DL (ref 8.4–10.2)
CHLORIDE SERPL-SCNC: 102 MMOL/L (ref 96–108)
CO2 SERPL-SCNC: 29 MMOL/L (ref 21–32)
CREAT SERPL-MCNC: 0.9 MG/DL (ref 0.6–1.3)
EOSINOPHIL # BLD AUTO: 0.06 THOUSAND/ΜL (ref 0–0.61)
EOSINOPHIL NFR BLD AUTO: 1 % (ref 0–6)
ERYTHROCYTE [DISTWIDTH] IN BLOOD BY AUTOMATED COUNT: 13.3 % (ref 11.6–15.1)
GFR SERPL CREATININE-BSD FRML MDRD: 109 ML/MIN/1.73SQ M
GLUCOSE SERPL-MCNC: 92 MG/DL (ref 65–140)
HCT VFR BLD AUTO: 43.2 % (ref 36.5–49.3)
HGB BLD-MCNC: 14.3 G/DL (ref 12–17)
IMM GRANULOCYTES # BLD AUTO: 0.01 THOUSAND/UL (ref 0–0.2)
IMM GRANULOCYTES NFR BLD AUTO: 0 % (ref 0–2)
LYMPHOCYTES # BLD AUTO: 1.8 THOUSANDS/ΜL (ref 0.6–4.47)
LYMPHOCYTES NFR BLD AUTO: 35 % (ref 14–44)
MCH RBC QN AUTO: 30.3 PG (ref 26.8–34.3)
MCHC RBC AUTO-ENTMCNC: 33.1 G/DL (ref 31.4–37.4)
MCV RBC AUTO: 92 FL (ref 82–98)
MONOCYTES # BLD AUTO: 0.53 THOUSAND/ΜL (ref 0.17–1.22)
MONOCYTES NFR BLD AUTO: 10 % (ref 4–12)
NEUTROPHILS # BLD AUTO: 2.75 THOUSANDS/ΜL (ref 1.85–7.62)
NEUTS SEG NFR BLD AUTO: 53 % (ref 43–75)
NRBC BLD AUTO-RTO: 0 /100 WBCS
PLATELET # BLD AUTO: 245 THOUSANDS/UL (ref 149–390)
PMV BLD AUTO: 10.1 FL (ref 8.9–12.7)
POTASSIUM SERPL-SCNC: 4.3 MMOL/L (ref 3.5–5.3)
PROT SERPL-MCNC: 8 G/DL (ref 6.4–8.4)
RBC # BLD AUTO: 4.72 MILLION/UL (ref 3.88–5.62)
SODIUM SERPL-SCNC: 137 MMOL/L (ref 135–147)
WBC # BLD AUTO: 5.19 THOUSAND/UL (ref 4.31–10.16)

## 2022-09-30 PROCEDURE — 99284 EMERGENCY DEPT VISIT MOD MDM: CPT | Performed by: PHYSICIAN ASSISTANT

## 2022-09-30 PROCEDURE — 80177 DRUG SCRN QUAN LEVETIRACETAM: CPT | Performed by: PHYSICIAN ASSISTANT

## 2022-09-30 PROCEDURE — 80053 COMPREHEN METABOLIC PANEL: CPT | Performed by: PHYSICIAN ASSISTANT

## 2022-09-30 PROCEDURE — 86360 T CELL ABSOLUTE COUNT/RATIO: CPT | Performed by: PHYSICIAN ASSISTANT

## 2022-09-30 PROCEDURE — 99284 EMERGENCY DEPT VISIT MOD MDM: CPT

## 2022-09-30 PROCEDURE — 36415 COLL VENOUS BLD VENIPUNCTURE: CPT | Performed by: PHYSICIAN ASSISTANT

## 2022-09-30 PROCEDURE — 85025 COMPLETE CBC W/AUTO DIFF WBC: CPT | Performed by: PHYSICIAN ASSISTANT

## 2022-09-30 PROCEDURE — 73030 X-RAY EXAM OF SHOULDER: CPT

## 2022-09-30 RX ORDER — LEVETIRACETAM 750 MG/1
750 TABLET ORAL 2 TIMES DAILY
Qty: 30 TABLET | Refills: 0 | Status: SHIPPED | OUTPATIENT
Start: 2022-09-30

## 2022-09-30 RX ORDER — ACETAMINOPHEN 500 MG
500 TABLET ORAL EVERY 6 HOURS PRN
Qty: 30 TABLET | Refills: 0 | Status: SHIPPED | OUTPATIENT
Start: 2022-09-30

## 2022-09-30 NOTE — ED PROVIDER NOTES
History  Chief Complaint   Patient presents with    Seizure - Prior Hx Of     Pt reports he had an unwitnessed seizure about 2 hours ago  C/o right shoulder pain  43-year-old male with past medical history significant for HIV and seizures presenting to the emergency department today for questionable seizure-like activity 2 hours prior to arrival   The patient notes that he had a seizure while he was outside approximately 2 hours prior to arrival   History of the same and has been questionably compliant on his anti epileptics  Well known to the emergency department  Patient is noting some right-sided shoulder pain but notes this is been ongoing for numerous years  He denies any dizziness, lightheadedness, or visual disturbances  He denies any chest pain or shortness of breath  He denies any falls or head strike  He denies any nausea, vomiting, diarrhea, constipation, abdominal pain, or urinary symptoms  Questionably compliant with HIV therapy  EMS notes patient was not post-ictal in the field and no seizure-like activity was witnessed  The patient denies other complaints at this time  History provided by:  Patient   used: No    Seizure - Prior Hx Of  Seizure activity on arrival: no    Seizure type:  Unable to specify  Initial focality: unable to specify  Episode characteristics: no confusion    Postictal symptoms comment:  No post-ictal symptoms  Return to baseline: yes    Severity:  Unable to specify  Duration: unabale to specify    Timing:  Unable to specify  Progression:  Unable to specify  Context: medical non-compliance    Context: not change in medication, not fever, not intracranial lesion, not intracranial shunt and not pregnant    Recent head injury:  No recent head injuries  PTA treatment:  None  History of seizures: yes    Severity:  Unable to specify  Seizure control level:  Poorly controlled  Current therapy:  Levetiracetam  Compliance with current therapy: Poor      Prior to Admission Medications   Prescriptions Last Dose Informant Patient Reported? Taking? Cholecalciferol 25 MCG (1000 UT) tablet   Yes No   Sig: Take 1,000 Units by mouth   bictegravir-emtricitab-tenofovir alafenamide (Biktarvy) -25 MG tablet   Yes No   Sig: Take 1 tablet by mouth daily   bictegravir-emtricitab-tenofovir alafenamide (Biktarvy) -25 MG tablet   No No   Sig: Take 1 tablet by mouth daily with breakfast   cyclobenzaprine (FLEXERIL) 10 mg tablet   No No   Sig: Take 1 tablet (10 mg total) by mouth 2 (two) times a day as needed for muscle spasms   Patient not taking: Reported on 7/2/2022   levETIRAcetam (Keppra) 750 mg tablet   No No   Sig: Take 1 tablet (750 mg total) by mouth 2 (two) times a day   levETIRAcetam (Keppra) 750 mg tablet   No Yes   Sig: Take 1 tablet (750 mg total) by mouth 2 (two) times a day   ondansetron (Zofran ODT) 4 mg disintegrating tablet   No No   Sig: Take 1 tablet (4 mg total) by mouth every 6 (six) hours as needed for nausea or vomiting   Patient not taking: Reported on 9/17/2022      Facility-Administered Medications: None       Past Medical History:   Diagnosis Date    HIV (human immunodeficiency virus infection) (HCC)     [facility]     Smoker     Syphilis        Past Surgical History:   Procedure Laterality Date    HERNIA REPAIR         History reviewed  No pertinent family history  I have reviewed and agree with the history as documented  E-Cigarette/Vaping    E-Cigarette Use Never User      E-Cigarette/Vaping Substances     Social History     Tobacco Use    Smoking status: Current Every Day Smoker     Packs/day: 1 50     Types: Cigarettes    Smokeless tobacco: Never Used   Vaping Use    Vaping Use: Never used   Substance Use Topics    Alcohol use:  Yes     Alcohol/week: 2 0 standard drinks     Types: 1 Glasses of wine, 1 Cans of beer per week     Comment: socially    Drug use: Yes     Frequency: 45 0 times per week     Types: Marijuana     Comment: last used08/3022       Review of Systems   Constitutional: Negative for appetite change, chills, diaphoresis, fatigue and fever  Eyes: Negative for visual disturbance  Respiratory: Negative for cough, chest tightness, shortness of breath and wheezing  Cardiovascular: Negative for chest pain, palpitations and leg swelling  Gastrointestinal: Negative for abdominal pain, constipation, diarrhea, nausea and vomiting  Musculoskeletal: Negative for neck pain and neck stiffness  Skin: Negative for rash and wound  Neurological: Positive for seizures  Negative for dizziness, tremors, syncope, weakness, light-headedness and headaches  Psychiatric/Behavioral: Negative for confusion  Physical Exam  Physical Exam  Vitals and nursing note reviewed  Constitutional:       General: He is not in acute distress  Appearance: Normal appearance  He is normal weight  He is not ill-appearing, toxic-appearing or diaphoretic  HENT:      Head: Normocephalic and atraumatic  Nose: Nose normal  No congestion or rhinorrhea  Mouth/Throat:      Mouth: Mucous membranes are moist       Pharynx: No oropharyngeal exudate or posterior oropharyngeal erythema  Eyes:      General: No scleral icterus  Right eye: No discharge  Left eye: No discharge  Conjunctiva/sclera: Conjunctivae normal    Neck:      Comments: Non-meningeal  Cardiovascular:      Rate and Rhythm: Normal rate and regular rhythm  Pulses: Normal pulses  Heart sounds: Normal heart sounds  No murmur heard  No friction rub  No gallop  Pulmonary:      Effort: Pulmonary effort is normal  No respiratory distress  Breath sounds: Normal breath sounds  No stridor  No wheezing, rhonchi or rales  Chest:      Chest wall: No tenderness  Abdominal:      General: Abdomen is flat  There is no distension  Palpations: Abdomen is soft  Tenderness: There is no abdominal tenderness   There is no right CVA tenderness, left CVA tenderness, guarding or rebound  Musculoskeletal:         General: Normal range of motion  Cervical back: Normal range of motion  No rigidity  Right lower leg: No edema  Left lower leg: No edema  Comments: No tenderness to palpation throughout the right shoulder; patient notes pain with active range of motion but has normal passive range of motion in all ranges of right shoulder   Skin:     General: Skin is warm and dry  Capillary Refill: Capillary refill takes less than 2 seconds  Coloration: Skin is not jaundiced or pale  Neurological:      General: No focal deficit present  Mental Status: He is alert and oriented to person, place, and time  Mental status is at baseline        Comments: No tremors or seizure-like activity noted while in the ER   Psychiatric:         Mood and Affect: Mood normal          Behavior: Behavior normal          Vital Signs  ED Triage Vitals [09/30/22 1030]   Temperature Pulse Respirations Blood Pressure SpO2   98 4 °F (36 9 °C) 100 16 123/86 98 %      Temp Source Heart Rate Source Patient Position - Orthostatic VS BP Location FiO2 (%)   Oral -- -- -- --      Pain Score       --           Vitals:    09/30/22 1030   BP: 123/86   Pulse: 100         Visual Acuity      ED Medications  Medications - No data to display    Diagnostic Studies  Results Reviewed     Procedure Component Value Units Date/Time    Comprehensive metabolic panel [[id number]] Collected: 09/30/22 1043    Lab Status: Final result Specimen: Blood from Arm, Right Updated: 09/30/22 1109     Sodium 137 mmol/L      Potassium 4 3 mmol/L      Chloride 102 mmol/L      CO2 29 mmol/L      ANION GAP 6 mmol/L      BUN 14 mg/dL      Creatinine 0 90 mg/dL      Glucose 92 mg/dL      Calcium 9 4 mg/dL      AST 26 U/L      ALT 14 U/L      Alkaline Phosphatase 51 U/L      Total Protein 8 0 g/dL      Albumin 4 2 g/dL      Total Bilirubin 0 64 mg/dL      eGFR 109 ml/min/1 73sq m     Narrative:      National Kidney Disease Foundation guidelines for Chronic Kidney Disease (CKD):     Stage 1 with normal or high GFR (GFR > 90 mL/min/1 73 square meters)    Stage 2 Mild CKD (GFR = 60-89 mL/min/1 73 square meters)    Stage 3A Moderate CKD (GFR = 45-59 mL/min/1 73 square meters)    Stage 3B Moderate CKD (GFR = 30-44 mL/min/1 73 square meters)    Stage 4 Severe CKD (GFR = 15-29 mL/min/1 73 square meters)    Stage 5 End Stage CKD (GFR <15 mL/min/1 73 square meters)  Note: GFR calculation is accurate only with a steady state creatinine    CBC and differential [[id number]] Collected: 09/30/22 1043    Lab Status: Final result Specimen: Blood from Arm, Right Updated: 09/30/22 1049     WBC 5 19 Thousand/uL      RBC 4 72 Million/uL      Hemoglobin 14 3 g/dL      Hematocrit 43 2 %      MCV 92 fL      MCH 30 3 pg      MCHC 33 1 g/dL      RDW 13 3 %      MPV 10 1 fL      Platelets 450 Thousands/uL      nRBC 0 /100 WBCs      Neutrophils Relative 53 %      Immat GRANS % 0 %      Lymphocytes Relative 35 %      Monocytes Relative 10 %      Eosinophils Relative 1 %      Basophils Relative 1 %      Neutrophils Absolute 2 75 Thousands/µL      Immature Grans Absolute 0 01 Thousand/uL      Lymphocytes Absolute 1 80 Thousands/µL      Monocytes Absolute 0 53 Thousand/µL      Eosinophils Absolute 0 06 Thousand/µL      Basophils Absolute 0 04 Thousands/µL     Levetiracetam level [[id number]] Collected: 09/30/22 1043    Lab Status: In process Specimen: Blood from Arm, Right Updated: 09/30/22 1046    T-helper cells CD4/CD8 % [[id number]] Collected: 09/30/22 1043    Lab Status: In process Specimen: Blood from Arm, Right Updated: 09/30/22 1046                 XR shoulder 2+ views RIGHT   Final Result by Ameya Redding MD (09/30 1202)      No acute osseous abnormality        Findings are stable      Workstation performed: [id number]                    Procedures  Procedures         ED Course MDM  Number of Diagnoses or Management Options  Chronic right shoulder pain: new and requires workup  Noncompliance with medications: new and requires workup  Diagnosis management comments: 70-year-old male presenting to the emergency department today for questionable seizure-like activity  Medication noncompliance noted  Well known to the ER  Also noting some chronic right-sided shoulder pain  Vitals are stable  Patient was not postictal on arrival   No seizure-like activity or tremors while here in the emergency department  Patient appears to be at his baseline  Non-meningeal   No acute osseous abnormalities on x-ray  The patient is stable for discharge at this time  Sent Keppra level and T helper cell level  Refill sent to the pharmacy for Keppra and also for Tylenol for shoulder pain  Encouraged follow-up with Neurology and Infectious Disease  Strict return precautions were given  Recommend PCP follow-up as soon as possible  The patient and/or patient's proxy verify their understanding and agree to the plan at this time  All questions answered to the patient and/or their proxy's satisfaction  All labs reviewed and utilized in the medical decision making process  All radiology studies independently viewed by me and interpreted by the radiologist  Portions of the record may have been created with voice recognition software   Occasional wrong word or "sound a like" substitutions may have occurred due to the inherent limitations of voice recognition software   Read the chart carefully and recognize, using context, where substitutions have occurred         Amount and/or Complexity of Data Reviewed  Clinical lab tests: ordered and reviewed  Tests in the radiology section of CPT®: ordered and reviewed  Independent visualization of images, tracings, or specimens: yes (XR Right Shoulder)    Patient Progress  Patient progress: stable      Disposition  Final diagnoses:   Noncompliance with medications   Chronic right shoulder pain     Time reflects when diagnosis was documented in both MDM as applicable and the Disposition within this note     Time User Action Codes Description Comment    9/30/2022 11:28 AM Geno Perez Add [Z91 14] Noncompliance with medication regimen     9/30/2022 11:29 AM Geno Perez Add [Z91 14] Noncompliance with medications     9/30/2022 11:29 AM Karen Neeta [Z91 14] Noncompliance with medication regimen     9/30/2022 11:29 AM Karen Neeta [Z91 14] Noncompliance with medications     9/30/2022 11:29 AM Ayo Hung Remove [Z91 14] Noncompliance with medication regimen     9/30/2022 11:29 AM Geno Perez Add [M25 511,  G89 29] Chronic right shoulder pain     9/30/2022 11:29 AM Ayo Hung Add [Q42 451] Seizure disorder [facility])       ED Disposition     ED Disposition   Discharge    Condition   Stable    Date/Time   Fri Sep 30, 2022 11:27 AM    Comment   Emerson Soto discharge to home/self care                 Follow-up Information     Follow up With Specialties Details Why Contact Info Additional [address] Emergency Department Emergency Medicine Go to  If symptoms worsen [address] [facility],[address] [address]  [address] [facility] Emergency Department, [address], [address] [facility] Family Medicine Schedule an appointment as soon as possible for a visit   [address] [place] [address] [phone] QC BVHFK RJGQHP JRVAQRQU [facility], [address], [place], [place], [address]   [address] [facility] [place] Schedule an appointment as soon as possible for a visit   Shae Garrison[address] [facility], [address] [facility], [facility] [address], [place], [place], [address] [facility] Call   [address] [place] [address]  [phone]             Discharge Medication List as of 9/30/2022 11:33 AM      START taking these medications    Details   acetaminophen (TYLENOL) 500 mg tablet Take 1 tablet (500 mg total) by mouth every 6 (six) hours as needed for mild pain or moderate pain, Starting Fri 9/30/2022, Print         CONTINUE these medications which have CHANGED    Details   levETIRAcetam (Keppra) 750 mg tablet Take 1 tablet (750 mg total) by mouth 2 (two) times a day, Starting Fri 9/30/2022, Print         CONTINUE these medications which have NOT CHANGED    Details   !! bictegravir-emtricitab-tenofovir alafenamide (Biktarvy) -25 MG tablet Take 1 tablet by mouth daily, Starting Tue 3/22/2022, Historical Med      !! bictegravir-emtricitab-tenofovir alafenamide (Biktarvy) -25 MG tablet Take 1 tablet by mouth daily with breakfast, Starting Mon 3/28/2022, Normal      Cholecalciferol 25 MCG (1000 UT) tablet Take 1,000 Units by mouth, Historical Med      cyclobenzaprine (FLEXERIL) 10 mg tablet Take 1 tablet (10 mg total) by mouth 2 (two) times a day as needed for muscle spasms, Starting Sat 3/26/2022, Normal      ondansetron (Zofran ODT) 4 mg disintegrating tablet Take 1 tablet (4 mg total) by mouth every 6 (six) hours as needed for nausea or vomiting, Starting Wed 8/31/2022, Normal       !! - Potential duplicate medications found  Please discuss with provider  No discharge procedures on file      PDMP Review       Value Time User    PDMP Reviewed  Yes 5/27/2020 12:37 AM LEBRON Arango          ED Provider  Electronically Signed by           Shanice Quispe PA-C  09/30/22 7090

## 2022-09-30 NOTE — DISCHARGE INSTRUCTIONS
Please return to the emergency department for worsening symptoms including chest pain, shortness of breath, dizziness, lightheadedness, fever greater than 103, severe pain, inability to walk, fainting episodes, etc  Please follow-up with your family practice provider as soon as possible  Please continue taking your seizure medications and your HIV medications  Please follow-up with neurology and your HIV doctors for outpatient management  Tylenol for shoulder pain

## 2022-10-01 LAB
BASOPHILS # BLD AUTO: 0 X10E3/UL (ref 0–0.2)
BASOPHILS NFR BLD AUTO: 1 %
CD3+CD4+ CELLS # BLD: 225 /UL (ref 359–1519)
CD3+CD4+ CELLS NFR BLD: 10.7 % (ref 30.8–58.5)
CD3+CD4+ CELLS/CD3+CD8+ CLL BLD: 0.25 % (ref 0.92–3.72)
CD3+CD8+ CELLS # BLD: 905 /UL (ref 109–897)
CD3+CD8+ CELLS NFR BLD: 43.1 % (ref 12–35.5)
EOSINOPHIL # BLD AUTO: 0.1 X10E3/UL (ref 0–0.4)
EOSINOPHIL NFR BLD AUTO: 1 %
ERYTHROCYTE [DISTWIDTH] IN BLOOD BY AUTOMATED COUNT: 13 % (ref 11.6–15.4)
HCT VFR BLD AUTO: 41.4 % (ref 37.5–51)
HGB BLD-MCNC: 13.6 G/DL (ref 13–17.7)
IMM GRANULOCYTES # BLD: 0 X10E3/UL (ref 0–0.1)
IMM GRANULOCYTES NFR BLD: 0 %
LYMPHOCYTES # BLD AUTO: 2.1 X10E3/UL (ref 0.7–3.1)
LYMPHOCYTES NFR BLD AUTO: 37 %
MCH RBC QN AUTO: 30.1 PG (ref 26.6–33)
MCHC RBC AUTO-ENTMCNC: 32.9 G/DL (ref 31.5–35.7)
MCV RBC AUTO: 92 FL (ref 79–97)
MONOCYTES # BLD AUTO: 0.6 X10E3/UL (ref 0.1–0.9)
MONOCYTES NFR BLD AUTO: 11 %
NEUTROPHILS # BLD AUTO: 2.8 X10E3/UL (ref 1.4–7)
NEUTROPHILS NFR BLD AUTO: 50 %
PLATELET # BLD AUTO: 243 X10E3/UL (ref 150–450)
RBC # BLD AUTO: 4.52 X10E6/UL (ref 4.14–5.8)
WBC # BLD AUTO: 5.6 X10E3/UL (ref 3.4–10.8)

## 2022-10-03 ENCOUNTER — HOSPITAL ENCOUNTER (EMERGENCY)
Facility: HOSPITAL | Age: 37
Discharge: HOME | End: 2022-10-04
Attending: EMERGENCY MEDICINE
Payer: MEDICARE

## 2022-10-03 DIAGNOSIS — M62.838 MUSCLE SPASMS OF NECK: Primary | ICD-10-CM

## 2022-10-03 LAB — LEVETIRACETAM SERPL-MCNC: 5.3 UG/ML (ref 10–40)

## 2022-10-03 PROCEDURE — 96372 THER/PROPH/DIAG INJ SC/IM: CPT

## 2022-10-03 PROCEDURE — 99283 EMERGENCY DEPT VISIT LOW MDM: CPT

## 2022-10-03 PROCEDURE — 99284 EMERGENCY DEPT VISIT MOD MDM: CPT | Performed by: EMERGENCY MEDICINE

## 2022-10-03 RX ORDER — KETOROLAC TROMETHAMINE 30 MG/ML
30 INJECTION, SOLUTION INTRAMUSCULAR; INTRAVENOUS ONCE
Status: COMPLETED | OUTPATIENT
Start: 2022-10-03 | End: 2022-10-03

## 2022-10-03 RX ORDER — ACETAMINOPHEN 325 MG/1
650 TABLET ORAL ONCE
Status: COMPLETED | OUTPATIENT
Start: 2022-10-03 | End: 2022-10-03

## 2022-10-03 RX ORDER — CYCLOBENZAPRINE HCL 10 MG
5 TABLET ORAL ONCE
Status: COMPLETED | OUTPATIENT
Start: 2022-10-03 | End: 2022-10-03

## 2022-10-03 RX ADMIN — KETOROLAC TROMETHAMINE 30 MG: 30 INJECTION, SOLUTION INTRAMUSCULAR at 23:53

## 2022-10-03 RX ADMIN — ACETAMINOPHEN 650 MG: 325 TABLET ORAL at 23:51

## 2022-10-03 RX ADMIN — CYCLOBENZAPRINE HYDROCHLORIDE 5 MG: 10 TABLET, FILM COATED ORAL at 23:51

## 2022-10-04 ENCOUNTER — PATIENT OUTREACH (OUTPATIENT)
Dept: CASE MANAGEMENT | Facility: OTHER | Age: 37
End: 2022-10-04

## 2022-10-04 VITALS
HEIGHT: 66 IN | WEIGHT: 160 LBS | HEART RATE: 105 BPM | OXYGEN SATURATION: 96 % | RESPIRATION RATE: 16 BRPM | SYSTOLIC BLOOD PRESSURE: 96 MMHG | BODY MASS INDEX: 25.71 KG/M2 | TEMPERATURE: 98 F | DIASTOLIC BLOOD PRESSURE: 56 MMHG

## 2022-10-04 RX ORDER — CYCLOBENZAPRINE HCL 10 MG
10 TABLET ORAL 2 TIMES DAILY PRN
Qty: 20 TABLET | Refills: 0 | Status: SHIPPED | OUTPATIENT
Start: 2022-10-04

## 2022-10-04 NOTE — PROGRESS NOTES
Notification received that the patient had 2 ED visits  Patient previously declined care management  Called the patient back to re-approach care management  He is open to continued outreach  Patient shares that he will be moving home to [place] soon  He agrees that establishing with a PCP & specialists would help him manage his health  He prefers a call back in 2 weeks after he's moved  In the meantime, he is aware he can go to an urgent care for non-emergent health concerns  He also has this RN's number to call if he has any questions

## 2022-10-04 NOTE — ED PROVIDER NOTES
History  Chief Complaint   Patient presents with    Neck Pain     Pt states he has been having L sided neck pain along with dizziness,        History provided by:  Patient   used: No    Neck Pain  Pain location:  L side  Quality:  Stiffness and aching  Stiffness is present:  All day  Pain radiates to:  Does not radiate  Pain severity:  Moderate  Pain is:  Same all the time  Onset quality:  Gradual  Duration:  2 hours  Timing:  Constant  Progression:  Unchanged  Chronicity:  New  Context: not fall and not recent injury    Relieved by:  Nothing  Worsened by:  Position and twisting  Ineffective treatments:  None tried  Associated symptoms: no chest pain, no fever, no headaches, no numbness, no photophobia, no syncope, no tingling and no weakness    Risk factors: no hx of head and neck radiation, no recent epidural and no recent head injury        Prior to Admission Medications   Prescriptions Last Dose Informant Patient Reported? Taking?    Cholecalciferol 25 MCG (1000 UT) tablet   Yes No   Sig: Take 1,000 Units by mouth   acetaminophen (TYLENOL) 500 mg tablet   No No   Sig: Take 1 tablet (500 mg total) by mouth every 6 (six) hours as needed for mild pain or moderate pain   bictegravir-emtricitab-tenofovir alafenamide (Biktarvy) -25 MG tablet   Yes No   Sig: Take 1 tablet by mouth daily   bictegravir-emtricitab-tenofovir alafenamide (Biktarvy) -25 MG tablet   No No   Sig: Take 1 tablet by mouth daily with breakfast   cyclobenzaprine (FLEXERIL) 10 mg tablet   No No   Sig: Take 1 tablet (10 mg total) by mouth 2 (two) times a day as needed for muscle spasms   Patient not taking: Reported on 7/2/2022   levETIRAcetam (Keppra) 750 mg tablet   No No   Sig: Take 1 tablet (750 mg total) by mouth 2 (two) times a day   ondansetron (Zofran ODT) 4 mg disintegrating tablet   No No   Sig: Take 1 tablet (4 mg total) by mouth every 6 (six) hours as needed for nausea or vomiting   Patient not taking: Reported on 9/17/2022      Facility-Administered Medications: None       Past Medical History:   Diagnosis Date    [facility]     Seizures ([facility] )     Smoker     Syphilis        Past Surgical History:   Procedure Laterality Date    HERNIA REPAIR         History reviewed  No pertinent family history  I have reviewed and agree with the history as documented  E-Cigarette/Vaping    E-Cigarette Use Never User      E-Cigarette/Vaping Substances     Social History     Tobacco Use    Smoking status: Current Every Day Smoker     Packs/day: 1 50     Types: Cigarettes    Smokeless tobacco: Never Used   Vaping Use    Vaping Use: Never used   Substance Use Topics    Alcohol use: Yes     Alcohol/week: 2 0 standard drinks     Types: 1 Glasses of wine, 1 Cans of beer per week     Comment: socially    Drug use: Yes     Frequency: 45 0 times per week     Types: Marijuana     Comment: last used08/3022       Review of Systems   Constitutional: Negative for fever  Eyes: Negative for photophobia and visual disturbance  Respiratory: Negative for shortness of breath  Cardiovascular: Negative for chest pain and syncope  Gastrointestinal: Negative for nausea and vomiting  Musculoskeletal: Positive for neck pain  Skin: Negative for color change  Neurological: Negative for tingling, weakness, numbness and headaches  All other systems reviewed and are negative  Physical Exam  Physical Exam  Vitals and nursing note reviewed  Constitutional:       Appearance: He is well-developed  HENT:      Head: Normocephalic and atraumatic  Eyes:      Conjunctiva/sclera: Conjunctivae normal    Neck:      Vascular: No carotid bruit  Trachea: Trachea normal      Cardiovascular:      Rate and Rhythm: Normal rate and regular rhythm  Heart sounds: No murmur heard  Pulmonary:      Effort: Pulmonary effort is normal  No respiratory distress        Breath sounds: Normal breath sounds  Abdominal:      Palpations: Abdomen is soft  Tenderness: There is no abdominal tenderness  Musculoskeletal:      Cervical back: Neck supple  No erythema, rigidity or torticollis  Pain with movement and muscular tenderness present  No spinous process tenderness  Normal range of motion  Skin:     General: Skin is warm and dry  Neurological:      Mental Status: He is alert  Vital Signs  ED Triage Vitals   Temperature Pulse Respirations Blood Pressure SpO2   10/03/22 2335 10/03/22 2335 10/03/22 2335 10/03/22 2335 10/03/22 2335   98 °F (36 7 °C) (!) 115 18 128/78 99 %      Temp Source Heart Rate Source Patient Position - Orthostatic VS BP Location FiO2 (%)   10/03/22 2335 10/03/22 2335 10/03/22 2335 10/03/22 2335 --   Oral Monitor Lying Right arm       Pain Score       10/03/22 2351       10 - Worst Possible Pain           Vitals:    10/03/22 2335 10/04/22 0315   BP: 128/78 96/56   Pulse: (!) 115 105   Patient Position - Orthostatic VS: Lying Lying         Visual Acuity      ED Medications  Medications   ketorolac (TORADOL) injection 30 mg (30 mg Intramuscular Given 10/3/22 2353)   cyclobenzaprine (FLEXERIL) tablet 5 mg (5 mg Oral Given 10/3/22 2351)   acetaminophen (TYLENOL) tablet 650 mg (650 mg Oral Given 10/3/22 2351)       Diagnostic Studies  Results Reviewed     None                 No orders to display              Procedures  Procedures         ED Course                               SBIRT 20yo+    Flowsheet Row Most Recent Value   SBIRT (25 yo +)    In order to provide better care to our patients, we are screening all of our patients for alcohol and drug use  Would it be okay to ask you these screening questions?  No Filed at: 10/03/2022 2340                    MDM  Number of Diagnoses or Management Options     Amount and/or Complexity of Data Reviewed  Clinical lab tests: ordered and reviewed  Review and summarize past medical records: yes        Disposition  Final diagnoses: Muscle spasms of neck     Time reflects when diagnosis was documented in both MDM as applicable and the Disposition within this note     Time User Action Codes Description Comment    10/4/2022  3:04 AM Alexei Garcia Add [U22 191] Muscle spasms of neck       ED Disposition     ED Disposition   Discharge    Condition   Stable    Date/Time   Tue Oct 4, 2022  3:04 AM    Comment   Salma Organ discharge to home/self care  Follow-up Information     Follow up With Specialties Details Why Contact Info    Your primary doctor  Call today            Discharge Medication List as of 10/4/2022  3:15 AM      START taking these medications    Details   !! cyclobenzaprine (FLEXERIL) 10 mg tablet Take 1 tablet (10 mg total) by mouth 2 (two) times a day as needed for muscle spasms, Starting Tue 10/4/2022, Normal       !! - Potential duplicate medications found  Please discuss with provider        CONTINUE these medications which have NOT CHANGED    Details   acetaminophen (TYLENOL) 500 mg tablet Take 1 tablet (500 mg total) by mouth every 6 (six) hours as needed for mild pain or moderate pain, Starting Fri 9/30/2022, Print      !! bictegravir-emtricitab-tenofovir alafenamide (Biktarvy) -25 MG tablet Take 1 tablet by mouth daily, Starting Tue 3/22/2022, Historical Med      !! bictegravir-emtricitab-tenofovir alafenamide (Biktarvy) -25 MG tablet Take 1 tablet by mouth daily with breakfast, Starting Mon 3/28/2022, Normal      Cholecalciferol 25 MCG (1000 UT) tablet Take 1,000 Units by mouth, Historical Med      !! cyclobenzaprine (FLEXERIL) 10 mg tablet Take 1 tablet (10 mg total) by mouth 2 (two) times a day as needed for muscle spasms, Starting Sat 3/26/2022, Normal      levETIRAcetam (Keppra) 750 mg tablet Take 1 tablet (750 mg total) by mouth 2 (two) times a day, Starting Fri 9/30/2022, Print      ondansetron (Zofran ODT) 4 mg disintegrating tablet Take 1 tablet (4 mg total) by mouth every 6 (six) hours as needed for nausea or vomiting, Starting Wed 8/31/2022, Normal       !! - Potential duplicate medications found  Please discuss with provider  No discharge procedures on file      PDMP Review       Value Time User    PDMP Reviewed  Yes 5/27/2020 12:37 AM LEBRON Bray          ED Provider  Electronically Signed by           Murphy Friedman MD  10/05/22 2040

## 2022-10-17 ENCOUNTER — HOSPITAL ENCOUNTER (EMERGENCY)
Facility: HOSPITAL | Age: 37
Discharge: HOME | End: 2022-10-17
Attending: INTERNAL MEDICINE
Payer: MEDICARE

## 2022-10-17 ENCOUNTER — APPOINTMENT (EMERGENCY)
Dept: CT IMAGING | Facility: HOSPITAL | Age: 37
End: 2022-10-17
Payer: MEDICARE

## 2022-10-17 VITALS
TEMPERATURE: 97.8 F | SYSTOLIC BLOOD PRESSURE: 126 MMHG | OXYGEN SATURATION: 100 % | WEIGHT: 160 LBS | DIASTOLIC BLOOD PRESSURE: 96 MMHG | HEART RATE: 99 BPM | BODY MASS INDEX: 25.82 KG/M2 | RESPIRATION RATE: 18 BRPM

## 2022-10-17 DIAGNOSIS — S01.81XA FACIAL LACERATION, INITIAL ENCOUNTER: ICD-10-CM

## 2022-10-17 DIAGNOSIS — S09.90XA CLOSED HEAD INJURY, INITIAL ENCOUNTER: ICD-10-CM
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
## 2022-10-17 PROCEDURE — 70450 CT HEAD/BRAIN W/O DYE: CPT

## 2022-10-17 PROCEDURE — 12001 RPR S/N/AX/GEN/TRNK 2.5CM/<: CPT | Performed by: PHYSICIAN ASSISTANT

## 2022-10-17 PROCEDURE — 99284 EMERGENCY DEPT VISIT MOD MDM: CPT

## 2022-10-17 PROCEDURE — G1004 CDSM NDSC: HCPCS

## 2022-10-17 PROCEDURE — 99282 EMERGENCY DEPT VISIT SF MDM: CPT | Performed by: PHYSICIAN ASSISTANT

## 2022-10-17 RX ORDER — BACITRACIN, NEOMYCIN, POLYMYXIN B 400; 3.5; 5 [USP'U]/G; MG/G; [USP'U]/G
1 OINTMENT TOPICAL ONCE
Status: COMPLETED | OUTPATIENT
Start: 2022-10-17 | End: 2022-10-17

## 2022-10-17 RX ORDER — LIDOCAINE HYDROCHLORIDE AND EPINEPHRINE 10; 10 MG/ML; UG/ML
10 INJECTION, SOLUTION INFILTRATION; PERINEURAL ONCE
Status: COMPLETED | OUTPATIENT
Start: 2022-10-17 | End: 2022-10-17

## 2022-10-17 RX ADMIN — NEOMYCIN AND POLYMYXIN B SULFATES AND BACITRACIN ZINC 1 SMALL APPLICATION: 400; 3.5; 5 OINTMENT TOPICAL at 11:26

## 2022-10-17 RX ADMIN — LIDOCAINE HYDROCHLORIDE,EPINEPHRINE BITARTRATE 10 ML: 10; .01 INJECTION, SOLUTION INFILTRATION; PERINEURAL at 11:26

## 2022-10-17 NOTE — ED PROVIDER NOTES
History  Chief Complaint   Patient presents with   • Yogesh Casas out of walker after falling asleep, no LOC, no thinners, lac to L eyebrow     Past Medical History: HIV, Seizures, Smoker, Syphilis, homelessness, neutropenia, non compliance w medication regimen, Metabolic acidosis  Past Surgical History: HERNIA REPAIR    Tetanus UTD 2021  PT homeless, states has a seated walker, bc h/o foot drop, which has resolved, but still uses it/also to sleep on; states fell asleep in chair and fell out, landing on concrete, sustaining left eyebrow laceration, immed PTA  No LOC, no blood thinners, no other known prodromal sx: no fever/cp/sob/NVD/visual changes/myalgias/abd pain; moving all extremities  Pt c/o laceration and feeling hungry  Prior to Admission Medications   Prescriptions Last Dose Informant Patient Reported? Taking?    Cholecalciferol 25 MCG (1000 UT) tablet   Yes No   Sig: Take 1,000 Units by mouth   acetaminophen (TYLENOL) 500 mg tablet   No No   Sig: Take 1 tablet (500 mg total) by mouth every 6 (six) hours as needed for mild pain or moderate pain   bictegravir-emtricitab-tenofovir alafenamide (Biktarvy) -25 MG tablet   Yes No   Sig: Take 1 tablet by mouth daily   bictegravir-emtricitab-tenofovir alafenamide (Biktarvy) -25 MG tablet   No No   Sig: Take 1 tablet by mouth daily with breakfast   cyclobenzaprine (FLEXERIL) 10 mg tablet   No No   Sig: Take 1 tablet (10 mg total) by mouth 2 (two) times a day as needed for muscle spasms   Patient not taking: Reported on 7/2/2022   cyclobenzaprine (FLEXERIL) 10 mg tablet   No No   Sig: Take 1 tablet (10 mg total) by mouth 2 (two) times a day as needed for muscle spasms   levETIRAcetam (Keppra) 750 mg tablet   No No   Sig: Take 1 tablet (750 mg total) by mouth 2 (two) times a day   ondansetron (Zofran ODT) 4 mg disintegrating tablet   No No   Sig: Take 1 tablet (4 mg total) by mouth every 6 (six) hours as needed for nausea or vomiting   Patient not taking: Reported on 9/17/2022      Facility-Administered Medications: None       Past Medical History:   Diagnosis Date   • HIV (human immunodeficiency virus infection) ([facility] )    • Seizures ([facility] )    • Smoker    • Syphilis        Past Surgical History:   Procedure Laterality Date   • HERNIA REPAIR         History reviewed  No pertinent family history  I have reviewed and agree with the history as documented  E-Cigarette/Vaping   • E-Cigarette Use Never User      E-Cigarette/Vaping Substances     Social History     Tobacco Use   • Smoking status: Current Every Day Smoker     Packs/day: 1 50     Types: Cigarettes   • Smokeless tobacco: Never Used   Vaping Use   • Vaping Use: Never used   Substance Use Topics   • Alcohol use: Yes     Alcohol/week: 2 0 standard drinks     Types: 1 Glasses of wine, 1 Cans of beer per week     Comment: socially   • Drug use: Yes     Frequency: 45 0 times per week     Types: Marijuana     Comment: last used08/3022       Review of Systems   Constitutional: Negative for chills and fever  HENT: Negative for hearing loss, sore throat and trouble swallowing  Eyes: Negative for discharge and visual disturbance  Respiratory: Negative for cough and shortness of breath  Cardiovascular: Negative for leg swelling  Gastrointestinal: Negative for abdominal pain, nausea and vomiting  Genitourinary: Negative for dysuria and frequency  Musculoskeletal: Negative for arthralgias and myalgias  Skin: Positive for wound  Negative for pallor  Neurological: Negative for weakness and headaches  All other systems reviewed and are negative  Physical Exam  Physical Exam  Vitals and nursing note reviewed  Constitutional:       General: He is not in acute distress  Appearance: He is well-developed  HENT:      Head: Normocephalic and atraumatic        Right Ear: External ear normal       Left Ear: External ear normal       Nose: Nose normal       Mouth/Throat:      Mouth: Mucous membranes are moist       Pharynx: Oropharynx is clear  Eyes:      Conjunctiva/sclera: Conjunctivae normal    Cardiovascular:      Rate and Rhythm: Normal rate and regular rhythm  Pulmonary:      Effort: Pulmonary effort is normal  No respiratory distress  Abdominal:      General: Bowel sounds are normal       Palpations: Abdomen is soft  Tenderness: There is no abdominal tenderness  Musculoskeletal:         General: Normal range of motion  Cervical back: Normal range of motion and neck supple  No tenderness  Lymphadenopathy:      Cervical: No cervical adenopathy  Skin:     General: Skin is warm and dry  Findings: Lesion present  Comments: + 1 5cm Y-shaped, irregular, subcutaneous laceration noted just superior to left lateral eyebrow, no fb, no active bleeding,no deep structure involvement   Neurological:      General: No focal deficit present  Mental Status: He is alert and oriented to person, place, and time  Motor: No weakness     Psychiatric:         Behavior: Behavior normal          Vital Signs  ED Triage Vitals   Temperature Pulse Respirations Blood Pressure SpO2   10/17/22 1053 10/17/22 1052 10/17/22 1052 10/17/22 1052 10/17/22 1052   97 8 °F (36 6 °C) 99 18 126/96 100 %      Temp Source Heart Rate Source Patient Position - Orthostatic VS BP Location FiO2 (%)   10/17/22 1052 10/17/22 1052 10/17/22 1052 10/17/22 1052 --   Oral Monitor Lying Right arm       Pain Score       --                  Vitals:    10/17/22 1052   BP: 126/96   Pulse: 99   Patient Position - Orthostatic VS: Lying         Visual Acuity  Visual Acuity    Flowsheet Row Most Recent Value   L Pupil Size (mm) 3   R Pupil Size (mm) 3          ED Medications  Medications   lidocaine-epinephrine (XYLOCAINE/EPINEPHRINE) 1 %-1:100,000 injection 10 mL (10 mL Infiltration Given by Other 10/17/22 1126)   neomycin-bacitracin-polymyxin b (NEOSPORIN) ointment 1 small application (1 small application Topical Given 10/17/22 1126)       Diagnostic Studies  Results Reviewed     None                 CT head without contrast   Final Result by Nicole Macario MD (10/17 1204)      Left supraorbital laceration without superficial collection, subjacent bony injury, or acute intracranial abnormalities  Workstation performed: [id number]                    Procedures  Laceration repair    Date/Time: 10/17/2022 11:50 AM  Performed by: Shanna Oviedo PA-C  Authorized by: Shanna Oviedo PA-C   Consent: Verbal consent obtained  Risks and benefits: risks, benefits and alternatives were discussed  Consent given by: patient  Patient understanding: patient states understanding of the procedure being performed  Patient identity confirmed: verbally with patient  Time out: Immediately prior to procedure a "time out" was called to verify the correct patient, procedure, equipment, support staff and site/side marked as required  Body area: head/neck  Location details: left eyebrow  Laceration length: 1 5 cm  Foreign bodies: no foreign bodies  Tendon involvement: none  Nerve involvement: none  Vascular damage: no  Anesthesia: local infiltration    Anesthesia:  Local Anesthetic: lidocaine 1% with epinephrine  Anesthetic total: 2 mL    Sedation:  Patient sedated: no      Wound Dehiscence:  Superficial Wound Dehiscence: simple closure      Procedure Details:  Preparation: Patient was prepped and draped in the usual sterile fashion  Irrigation solution: saline  Irrigation method: syringe  Amount of cleaning: standard  Skin closure: 6-0 Prolene  Number of sutures: 3  Technique: simple  Approximation: close  Approximation difficulty: simple  Dressing: steri strips    Patient tolerance: patient tolerated the procedure well with no immediate complications               ED Course                                             MDM  Number of Diagnoses or Management Options  Closed head injury, initial encounter  Facial laceration, initial encounter  Fall, initial encounter  Diagnosis management comments: 8619 pt remained stable throughout ED evaluation  Pt sleeping comfortably, awakes easy, full understand of visit, results  Will let pt rest, eat something in ED, stable and improved for dc       Amount and/or Complexity of Data Reviewed  Tests in the radiology section of CPT®: reviewed and ordered  Review and summarize past medical records: yes  Discuss the patient with other providers: yes        Disposition  Final diagnoses:   Fall, initial encounter   Closed head injury, initial encounter   Facial laceration, initial encounter     Time reflects when diagnosis was documented in both MDM as applicable and the Disposition within this note     Time User Action Codes Description Comment    10/17/2022 11:52 AM Ether Shania Add [R41  FTXL] Fall, initial encounter     10/17/2022 11:52 AM Ether Shania Add [S09 90XA] Closed head injury, initial encounter     10/17/2022 11:52 AM Ether Shania Add [S01 81XA] Facial laceration, initial encounter       ED Disposition     ED Disposition   Discharge    Condition   Stable    Date/Time   Mon Oct 17, 2022 12:14 PM    Comment   Lisa Dela Cruz discharge to home/self care                 Follow-up Information     Follow up With Specialties Details Why Contact Info    Your PCP   For suture removal 5-7 days           Discharge Medication List as of 10/17/2022 12:15 PM      CONTINUE these medications which have NOT CHANGED    Details   acetaminophen (TYLENOL) 500 mg tablet Take 1 tablet (500 mg total) by mouth every 6 (six) hours as needed for mild pain or moderate pain, Starting Fri 9/30/2022, Print      !! bictegravir-emtricitab-tenofovir alafenamide (Biktarvy) -25 MG tablet Take 1 tablet by mouth daily, Starting Tue 3/22/2022, Historical Med      !! bictegravir-emtricitab-tenofovir alafenamide (Biktarvy) -25 MG tablet Take 1 tablet by mouth daily with breakfast, Starting Mon 3/28/2022, Normal Cholecalciferol 25 MCG (1000 UT) tablet Take 1,000 Units by mouth, Historical Med      !! cyclobenzaprine (FLEXERIL) 10 mg tablet Take 1 tablet (10 mg total) by mouth 2 (two) times a day as needed for muscle spasms, Starting Sat 3/26/2022, Normal      !! cyclobenzaprine (FLEXERIL) 10 mg tablet Take 1 tablet (10 mg total) by mouth 2 (two) times a day as needed for muscle spasms, Starting Tue 10/4/2022, Normal      levETIRAcetam (Keppra) 750 mg tablet Take 1 tablet (750 mg total) by mouth 2 (two) times a day, Starting Fri 9/30/2022, Print      ondansetron (Zofran ODT) 4 mg disintegrating tablet Take 1 tablet (4 mg total) by mouth every 6 (six) hours as needed for nausea or vomiting, Starting Wed 8/31/2022, Normal       !! - Potential duplicate medications found  Please discuss with provider  No discharge procedures on file      PDMP Review       Value Time User    PDMP Reviewed  Yes 5/27/2020 12:37 AM LEBRON Chaudhry          ED Provider  Electronically Signed by           Shanna Oviedo PA-C  10/17/22 4446

## 2022-10-17 NOTE — DISCHARGE INSTRUCTIONS
Use Tylenol 650 mg every 4 hours or Motrin 600 mg every 6 hours; you can alternate the 2 medications taking something every 3 hours for pain as needed  Suture removal in 5-7 days  Wash area gently with soap and water pat drying keep covered with antibiotic ointment and dressing

## 2022-10-19 ENCOUNTER — PATIENT OUTREACH (OUTPATIENT)
Dept: CASE MANAGEMENT | Facility: OTHER | Age: 37
End: 2022-10-19

## 2022-10-19 NOTE — PROGRESS NOTES
Patient went to the ED after falling  He sustained a laceration at his left eyebrown that was closed with 3 sutures  Head CT was negative  Called the patient to follow up on his ED visit, discuss his plans for suture removal, & discuss establishing care with a PCP  There was no answer & no voicemail, unable to leave a message

## 2022-10-22 ENCOUNTER — HOSPITAL ENCOUNTER (EMERGENCY)
Facility: HOSPITAL | Age: 37
Discharge: HOME | End: 2022-10-22
Attending: EMERGENCY MEDICINE
Payer: MEDICARE

## 2022-10-22 ENCOUNTER — APPOINTMENT (EMERGENCY)
Dept: RADIOLOGY | Facility: HOSPITAL | Age: 37
End: 2022-10-22
Payer: MEDICARE

## 2022-10-22 VITALS
WEIGHT: 140 LBS | HEART RATE: 121 BPM | TEMPERATURE: 97.7 F | SYSTOLIC BLOOD PRESSURE: 129 MMHG | OXYGEN SATURATION: 100 % | HEIGHT: 62 IN | RESPIRATION RATE: 17 BRPM | BODY MASS INDEX: 25.76 KG/M2 | DIASTOLIC BLOOD PRESSURE: 80 MMHG

## 2022-10-22 DIAGNOSIS — M25.512 LEFT SHOULDER PAIN: ICD-10-CM
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
## 2022-10-22 PROCEDURE — 73030 X-RAY EXAM OF SHOULDER: CPT

## 2022-10-22 PROCEDURE — 99284 EMERGENCY DEPT VISIT MOD MDM: CPT | Performed by: STUDENT IN AN ORGANIZED HEALTH CARE EDUCATION/TRAINING PROGRAM

## 2022-10-22 RX ORDER — KETOROLAC TROMETHAMINE 30 MG/ML
15 INJECTION, SOLUTION INTRAMUSCULAR; INTRAVENOUS ONCE
Status: COMPLETED | OUTPATIENT
Start: 2022-10-22 | End: 2022-10-22

## 2022-10-22 RX ADMIN — KETOROLAC TROMETHAMINE 15 MG: 30 INJECTION, SOLUTION INTRAMUSCULAR at 01:40

## 2022-10-22 NOTE — DISCHARGE INSTRUCTIONS
Continue Tylenol or ibuprofen every 6 hours as needed for pain relief  Follow-up with primary care provider within the next week as needed for re-evaluation  Return emergency department any new or worsening symptoms including severe worsening pain, numbness or tingling in the hand, weakness, blue or pale discoloration

## 2022-10-22 NOTE — ED ATTENDING ATTESTATION
10/22/2022  Jennifer Antunez DO, saw and evaluated the patient  I have discussed the patient with the resident/non-physician practitioner and agree with the resident's/non-physician practitioner's findings, Plan of Care, and MDM as documented in the resident's/non-physician practitioner's note, except where noted  All available labs and Radiology studies were reviewed  I was present for key portions of any procedure(s) performed by the resident/non-physician practitioner and I was immediately available to provide assistance  At this point I agree with the current assessment done in the Emergency Department  I have conducted an independent evaluation of this patient a history and physical is as follows:  40year old male complaining of R shoulder pain  Possible fall  No other complaints  Will do xray     ED Course         Critical Care Time  Procedures

## 2022-10-22 NOTE — ED TRIAGE NOTES
Via EMS/BLS w/complaint of left shoulder pain; states "I fell on it crossing the street two hours ago"; denies any further injury; denies LOC

## 2022-10-22 NOTE — ED PROVIDER NOTES
History  Chief Complaint   Patient presents with   • Shoulder Pain     Via EMS/BLS w/complaint of left shoulder pain; states "I fell on it crossing the street two hours ago"; denies any further injury; denies LOC     Patient 78-year-old male presenting emergency department today with complaint of left shoulder pain x1 hour  Patient states he was walking and tripped over his foot falling onto his left shoulder  It is complaining of pain in the shoulder worse with movement  States “I want to make sure it is not dislocated”  He denies of dislocating his shoulder in the past   Currently rating pain 8/10, constant  Denies any numbness or tingling in the upper extremities, weakness, swelling, bruising, abrasions or lacerations  Denies any head injury or loss of consciousness, headache, neck or back pain, blurred vision, nausea or vomiting  Prior to Admission Medications   Prescriptions Last Dose Informant Patient Reported? Taking?    Cholecalciferol 25 MCG (1000 UT) tablet   Yes No   Sig: Take 1,000 Units by mouth   acetaminophen (TYLENOL) 500 mg tablet   No No   Sig: Take 1 tablet (500 mg total) by mouth every 6 (six) hours as needed for mild pain or moderate pain   bictegravir-emtricitab-tenofovir alafenamide (Biktarvy) -25 MG tablet   Yes No   Sig: Take 1 tablet by mouth daily   bictegravir-emtricitab-tenofovir alafenamide (Biktarvy) -25 MG tablet   No No   Sig: Take 1 tablet by mouth daily with breakfast   cyclobenzaprine (FLEXERIL) 10 mg tablet   No No   Sig: Take 1 tablet (10 mg total) by mouth 2 (two) times a day as needed for muscle spasms   Patient not taking: Reported on 7/2/2022   cyclobenzaprine (FLEXERIL) 10 mg tablet   No No   Sig: Take 1 tablet (10 mg total) by mouth 2 (two) times a day as needed for muscle spasms   levETIRAcetam (Keppra) 750 mg tablet   No No   Sig: Take 1 tablet (750 mg total) by mouth 2 (two) times a day   ondansetron (Zofran ODT) 4 mg disintegrating tablet   No No   Sig: Take 1 tablet (4 mg total) by mouth every 6 (six) hours as needed for nausea or vomiting   Patient not taking: Reported on 9/17/2022      Facility-Administered Medications: None       Past Medical History:   Diagnosis Date   • [facility] immunodeficiency virus [facility] ([facility] )    • Seizures ([facility] )    • Smoker    • Syphilis        Past Surgical History:   Procedure Laterality Date   • HERNIA REPAIR         History reviewed  No pertinent family history  I have reviewed and agree with the history as documented  E-Cigarette/Vaping   • E-Cigarette Use Never User      E-Cigarette/Vaping Substances   • Nicotine No    • THC No    • CBD No    • Flavoring No    • Other No    • Unknown No      Social History     Tobacco Use   • Smoking status: Current Every Day Smoker     Packs/day: 1 50     Types: Cigarettes   • Smokeless tobacco: Never Used   Vaping Use   • Vaping Use: Never used   Substance Use Topics   • Alcohol use: Yes     Alcohol/week: 2 0 standard drinks     Types: 1 Glasses of wine, 1 Cans of beer per week     Comment: socially   • Drug use: Yes     Frequency: 45 0 times per week     Types: Marijuana     Comment: last used08/3022       Review of Systems   Constitutional: Negative for chills and fever  HENT: Negative  Respiratory: Negative for chest tightness and shortness of breath  Cardiovascular: Negative for chest pain  Gastrointestinal: Negative for anal bleeding, nausea and vomiting  Musculoskeletal: Positive for arthralgias (left shoulder pain)  Negative for back pain and neck pain  Neurological: Negative for dizziness, seizures, weakness, light-headedness and headaches  All other systems reviewed and are negative  Physical Exam  Physical Exam  Vitals and nursing note reviewed  Constitutional:       General: He is not in acute distress  Appearance: Normal appearance  HENT:      Head: Normocephalic and atraumatic     Eyes:      Extraocular Movements: Extraocular movements intact  Conjunctiva/sclera: Conjunctivae normal       Pupils: Pupils are equal, round, and reactive to light  Cardiovascular:      Rate and Rhythm: Normal rate and regular rhythm  Heart sounds: Normal heart sounds  Pulmonary:      Effort: Pulmonary effort is normal       Breath sounds: Normal breath sounds  Musculoskeletal:      Left shoulder: Tenderness present  No swelling or bony tenderness  Normal range of motion  Normal strength  Left elbow: Normal       Left wrist: Normal       Cervical back: Normal       Comments: Tenderness over the left upper trapezius extending to the left shoulder, no obvious swelling or deformity, no bruising, no laceration or abrasion  Neurological:      General: No focal deficit present  Mental Status: He is alert and oriented to person, place, and time  Sensory: Sensation is intact  No sensory deficit  Motor: Motor function is intact  No weakness  Psychiatric:         Mood and Affect: Mood normal          Behavior: Behavior normal          Thought Content: Thought content normal          Judgment: Judgment normal          Vital Signs  ED Triage Vitals [10/22/22 0120]   Temperature Pulse Respirations Blood Pressure SpO2   97 7 °F (36 5 °C) (!) 121 17 129/80 100 %      Temp Source Heart Rate Source Patient Position - Orthostatic VS BP Location FiO2 (%)   Oral Monitor Lying Right arm --      Pain Score       8           Vitals:    10/22/22 0120   BP: 129/80   Pulse: (!) 121   Patient Position - Orthostatic VS: Lying         Visual Acuity      ED Medications  Medications   ketorolac (TORADOL) injection 15 mg (15 mg Intramuscular Given 10/22/22 0140)       Diagnostic Studies  Results Reviewed     None                 XR shoulder 2+ views LEFT   ED Interpretation by Rachelle Nieto PA-C (10/22 2212)   No acute cardiopulmonary abnormalities    Findings appear similar to prior shoulder x-ray performed in April 2022                 Procedures  ECG 12 Lead Documentation Only    Date/Time: 10/22/2022 7:13 AM  Performed by: Bang York PA-C  Authorized by: Bang York PA-C                ED Course                               SBIRT 22yo+    Flowsheet Row Most Recent Value   SBIRT (25 yo +)    In order to provide better care to our patients, we are screening all of our patients for alcohol and drug use  Would it be okay to ask you these screening questions? No Filed at: 10/22/2022 0143                    [facility]  Number of Diagnoses or Management Options  Fall, initial encounter  Left shoulder pain  Diagnosis management comments: Patient is a 20-year-old male presents emergency department today with complaint of injury to his left shoulder following a mechanical trip and fall  Examination showed some tenderness in the right upper trapezius extending to the right shoulder but was otherwise unremarkable  X-ray of the shoulder was obtained and compared to prior performed earlier this year in April 2022 and appeared consistent with out any acute fractures, separations or dislocations  On re-evaluation patient states pain was improved, he was able to move the arm freely without difficulty  He was discharged home advised follow-up primary care provider within the next week for re-evaluation to return emergency department any new or worsening symptoms as discussed  He verbalized understanding agreed to plan of care, all his questions were answered, stable at discharge         Amount and/or Complexity of Data Reviewed  Tests in the radiology section of CPT®: ordered and reviewed  Independent visualization of images, tracings, or specimens: yes    Patient Progress  Patient progress: stable      Disposition  Final diagnoses:   Fall, initial encounter   Left shoulder pain     Time reflects when diagnosis was documented in both MDM as applicable and the Disposition within this note     Time User Action Codes Description Comment    10/22/2022  2:26 AM Maria Luz Tello Add [W19  QLXD] Fall, initial encounter     10/22/2022  2:26 AM Dano Jones Add [P16 569] Left shoulder pain       ED Disposition     ED Disposition   Discharge    Condition   Stable    Date/Time   Sat Oct 22, 2022  2:26 AM    Comment   Wayne Singh discharge to home/self care                 Follow-up Information     Follow up With Specialties Details Why Contact Info Additional Information    [facility] [address] [facility]   [address]  [address] [address] [phone] IV JEGV[address] [facility] [address], [address] [facility], [place], [address], [address] [facility]  Emergency Department Emergency Medicine   [address] [facility] [address] [address]  [facility] Emergency Department, [address], [address]          Discharge Medication List as of 10/22/2022  2:27 AM      CONTINUE these medications which have NOT CHANGED    Details   acetaminophen (TYLENOL) 500 mg tablet Take 1 tablet (500 mg total) by mouth every 6 (six) hours as needed for mild pain or moderate pain, Starting Fri 9/30/2022, Print      !! bictegravir-emtricitab-tenofovir alafenamide (Biktarvy) -25 MG tablet Take 1 tablet by mouth daily, Starting Tue 3/22/2022, Historical Med      !! bictegravir-emtricitab-tenofovir alafenamide (Biktarvy) -25 MG tablet Take 1 tablet by mouth daily with breakfast, Starting Mon 3/28/2022, Normal      Cholecalciferol 25 MCG (1000 UT) tablet Take 1,000 Units by mouth, Historical Med      !! cyclobenzaprine (FLEXERIL) 10 mg tablet Take 1 tablet (10 mg total) by mouth 2 (two) times a day as needed for muscle spasms, Starting Sat 3/26/2022, Normal      !! cyclobenzaprine (FLEXERIL) 10 mg tablet Take 1 tablet (10 mg total) by mouth 2 (two) times a day as needed for muscle spasms, Starting Tue 10/4/2022, Normal      levETIRAcetam (Keppra) 750 mg tablet Take 1 tablet (750 mg total) by mouth 2 (two) times a day, Starting Fri 9/30/2022, Print      ondansetron (Zofran ODT) 4 mg disintegrating tablet Take 1 tablet (4 mg total) by mouth every 6 (six) hours as needed for nausea or vomiting, Starting Wed 8/31/2022, Normal       !! - Potential duplicate medications found  Please discuss with provider  No discharge procedures on file      PDMP Review       Value Time User    PDMP Reviewed  Yes 5/27/2020 12:37 AM LEBRON Mortensen          ED Provider  Electronically Signed by           Teodoro De La Cruz PA-C  10/22/22 2477

## 2022-10-27 ENCOUNTER — PATIENT OUTREACH (OUTPATIENT)
Dept: CASE MANAGEMENT | Facility: OTHER | Age: 37
End: 2022-10-27

## 2022-10-27 NOTE — PROGRESS NOTES
Attempted to follow up with the patient after his ED visit s/p fall with left shoulder pain  Call went straight to a message stating that the wireless customer is not available  Unable to leave a message  Patient does not have a mailing address to send an unable to reach letter  Will continue to follow in surveillance

## 2022-11-04 NOTE — ED NOTES
Patient apparently turned to L side and urinated on floor from stretcher  Urine cleaned from floor with bath towels and sanitizing wipes  EVS to mop floor as well       Kanu Duncan RN  06/29/22 7125 patient

## 2022-11-05 ENCOUNTER — HOSPITAL ENCOUNTER (EMERGENCY)
Facility: HOSPITAL | Age: 37
Discharge: HOME | End: 2022-11-05
Attending: EMERGENCY MEDICINE

## 2022-11-05 VITALS
HEART RATE: 108 BPM | BODY MASS INDEX: 25.61 KG/M2 | DIASTOLIC BLOOD PRESSURE: 70 MMHG | SYSTOLIC BLOOD PRESSURE: 121 MMHG | RESPIRATION RATE: 18 BRPM | WEIGHT: 140 LBS | OXYGEN SATURATION: 100 % | TEMPERATURE: 98.4 F

## 2022-11-05 DIAGNOSIS — R53.1 GENERALIZED WEAKNESS: Primary | ICD-10-CM

## 2022-11-05 DIAGNOSIS — Z87.898 HISTORY OF SEIZURES: ICD-10-CM

## 2022-11-05 RX ORDER — LORAZEPAM 1 MG/1
1 TABLET ORAL ONCE
Status: COMPLETED | OUTPATIENT
Start: 2022-11-05 | End: 2022-11-05

## 2022-11-05 RX ORDER — LEVETIRACETAM 500 MG/1
1000 TABLET ORAL ONCE
Status: COMPLETED | OUTPATIENT
Start: 2022-11-05 | End: 2022-11-05

## 2022-11-05 RX ORDER — LEVETIRACETAM 500 MG/1
500 TABLET ORAL EVERY 12 HOURS SCHEDULED
Qty: 28 TABLET | Refills: 0 | Status: SHIPPED | OUTPATIENT
Start: 2022-11-05 | End: 2022-11-19

## 2022-11-05 RX ADMIN — LEVETIRACETAM 1000 MG: 500 TABLET, FILM COATED ORAL at 03:00

## 2022-11-05 RX ADMIN — LORAZEPAM 1 MG: 1 TABLET ORAL at 03:00

## 2022-11-05 NOTE — ED PROVIDER NOTES
History  Chief Complaint   Patient presents with   • Dizziness     Brought in by EMS reports he feels like he is going to have a seizure  Patient is a 80-year-old male seen in the emergency department with concern for generalized weakness  Patient states that he feels like he might have a seizure  Patient states that he has history of seizure disorder, for which she is prescribed Keppra  Patient states that he has not taking Keppra in several days  Patient states that his last seizure was 2 weeks ago  Patient notes no chest pain, shortness of breath, vomiting, fever  Patient notes no definite clear known exacerbating or alleviating factors for his symptoms  Patient has no other acute medical complaints in the emergency department  Prior to Admission Medications   Prescriptions Last Dose Informant Patient Reported? Taking?    Cholecalciferol 25 MCG (1000 UT) tablet   Yes No   Sig: Take 1,000 Units by mouth   acetaminophen (TYLENOL) 500 mg tablet   No No   Sig: Take 1 tablet (500 mg total) by mouth every 6 (six) hours as needed for mild pain or moderate pain   bictegravir-emtricitab-tenofovir alafenamide (Biktarvy) -25 MG tablet   Yes No   Sig: Take 1 tablet by mouth daily   bictegravir-emtricitab-tenofovir alafenamide (Biktarvy) -25 MG tablet   No No   Sig: Take 1 tablet by mouth daily with breakfast   cyclobenzaprine (FLEXERIL) 10 mg tablet   No No   Sig: Take 1 tablet (10 mg total) by mouth 2 (two) times a day as needed for muscle spasms   Patient not taking: Reported on 7/2/2022   cyclobenzaprine (FLEXERIL) 10 mg tablet   No No   Sig: Take 1 tablet (10 mg total) by mouth 2 (two) times a day as needed for muscle spasms   levETIRAcetam (Keppra) 750 mg tablet   No No   Sig: Take 1 tablet (750 mg total) by mouth 2 (two) times a day   ondansetron (Zofran ODT) 4 mg disintegrating tablet   No No   Sig: Take 1 tablet (4 mg total) by mouth every 6 (six) hours as needed for nausea or vomiting Patient not taking: Reported on 9/17/2022      Facility-Administered Medications: None       Past Medical History:   Diagnosis Date   • HIV (human immunodeficiency virus infection) ([facility] )    • [facility]    • Smoker    • Syphilis        Past Surgical History:   Procedure Laterality Date   • HERNIA REPAIR         History reviewed  No pertinent family history  I have reviewed and agree with the history as documented  E-Cigarette/Vaping   • E-Cigarette Use Never User      E-Cigarette/Vaping Substances   • Nicotine No    • THC No    • CBD No    • Flavoring No    • Other No    • Unknown No      Social History     Tobacco Use   • Smoking status: Current Every Day Smoker     Packs/day: 1 50     Types: Cigarettes   • Smokeless tobacco: Never Used   Vaping Use   • Vaping Use: Never used   Substance Use Topics   • Alcohol use: Yes     Alcohol/week: 2 0 standard drinks     Types: 1 Glasses of wine, 1 Cans of beer per week     Comment: socially   • Drug use: Yes     Frequency: 45 0 times per week     Types: Marijuana     Comment: last used08/3022       Review of Systems   Constitutional: Negative for chills and fever  Generalized weakness   HENT: Negative for ear pain and sore throat  Eyes: Negative for pain and visual disturbance  Respiratory: Negative for cough and shortness of breath  Cardiovascular: Negative for chest pain and palpitations  Gastrointestinal: Negative for abdominal pain and vomiting  Genitourinary: Negative for decreased urine volume and difficulty urinating  Musculoskeletal: Negative for arthralgias and back pain  Skin: Negative for color change and rash  Neurological: Negative for syncope and headaches  Psychiatric/Behavioral: Negative for agitation and confusion  All other systems reviewed and are negative  Physical Exam  Physical Exam  Vitals and nursing note reviewed  Constitutional:       General: He is not in acute distress       Appearance: He is well-developed  HENT:      Head: Normocephalic and atraumatic  Right Ear: External ear normal       Left Ear: External ear normal       Nose: Nose normal       Mouth/Throat:      Pharynx: Oropharynx is clear  Eyes:      General: No scleral icterus  Conjunctiva/sclera: Conjunctivae normal    Cardiovascular:      Rate and Rhythm: Regular rhythm  Tachycardia present  Heart sounds: No murmur heard  Pulmonary:      Effort: Pulmonary effort is normal  No respiratory distress  Breath sounds: Normal breath sounds  Abdominal:      General: There is no distension  Palpations: Abdomen is soft  Tenderness: There is no abdominal tenderness  Musculoskeletal:         General: No deformity or signs of injury  Cervical back: Normal range of motion and neck supple  Skin:     General: Skin is warm and dry  Neurological:      General: No focal deficit present  Mental Status: He is alert  Cranial Nerves: No cranial nerve deficit  Sensory: No sensory deficit  Psychiatric:         Mood and Affect: Mood normal          Thought Content:  Thought content normal          Vital Signs  ED Triage Vitals [11/05/22 0242]   Temperature Pulse Respirations Blood Pressure SpO2   98 4 °F (36 9 °C) (!) 108 18 121/70 100 %      Temp Source Heart Rate Source Patient Position - Orthostatic VS BP Location FiO2 (%)   Oral Monitor Sitting Right arm --      Pain Score       --           Vitals:    11/05/22 0242   BP: 121/70   Pulse: (!) 108   Patient Position - Orthostatic VS: Sitting         Visual Acuity      ED Medications  Medications   LORazepam (ATIVAN) tablet 1 mg (1 mg Oral Given 11/5/22 0300)   levETIRAcetam (KEPPRA) tablet 1,000 mg (1,000 mg Oral Given 11/5/22 0300)       Diagnostic Studies  Results Reviewed     None                 No orders to display              Procedures  Procedures         ED Course                                             MDM  Number of Diagnoses or Management Options  Generalized weakness  History of seizures  Diagnosis management comments: Patient is a 59-year-old male seen in the emergency department with concern for generalized weakness, feeling like he might have a seizure  Patient was treated with medication for symptom control  Patient was provided an oral dose of Keppra  A new prescription for Keppra was sent to the pharmacy  Patient is afebrile in the emergency department  Plan to have patient follow up with PCP and neurology  Patient stable for discharge  Discharge instructions were reviewed with patient  Disposition  Final diagnoses:   Generalized weakness   History of seizures     Time reflects when diagnosis was documented in both MDM as applicable and the Disposition within this note     Time User Action Codes Description Comment    11/5/2022  2:49 AM Terry Ann Add [R53 1] Generalized weakness     11/5/2022  2:49 AM Terry Ann Add [J32 496] History of seizures       ED Disposition     ED Disposition   Discharge    Condition   Stable    Date/Time   Sat Nov 5, 2022  2:59 AM    Comment   Kyle Fletcher discharge to home/self care                 Follow-up Information     Follow up With Specialties Details Why Contact Info Additional Information    Your primary doctor  Call in 1 day       [place] Call  As needed [address] [address]  [address] [place], [place], [address] [facility] Call in 1 day  [address], [address] [address]  [address] [place] [address] Neurology [address] [place] [address], [address] [place] [address]          Patient's Medications   Discharge Prescriptions    LEVETIRACETAM (KEPPRA) 500 MG TABLET    Take 1 tablet (500 mg total) by mouth every 12 (twelve) hours for 14 days       Start Date: 11/5/2022 End Date: 11/19/2022       Order Dose: 500 mg       Quantity: 28 tablet    Refills: 0           PDMP Review       Value Time User    PDMP Reviewed  Yes 5/27/2020 12:37 AM LEBRON Mccarty          ED Provider  Electronically Signed by           Jerilyn Araiza MD  11/05/22 0786

## 2022-11-05 NOTE — DISCHARGE INSTRUCTIONS
Follow up with your primary doctor and with neurology, and return to the emergency department for new or worsening symptoms

## 2022-11-07 ENCOUNTER — PATIENT OUTREACH (OUTPATIENT)
Dept: CASE MANAGEMENT | Facility: HOSPITAL | Age: 37
End: 2022-11-07

## 2022-11-07 NOTE — PROGRESS NOTES
In basket ADT forward from [address] [facility] committee member   Patient was treated and released 11/5/22 from [facility]) Emergency Department with premonition of an oncoming seizure  Patient admits to not having taken his Keppra for several days  He was administered lorazepam 1 mg, Keppra 1000 mg and referred to neurology  Unsuccessful attemptx2 at reaching patient on preferred number ending [phone]  Recorded msg states: "your call cannot be completed at this time  Please try your call again later "     Contact made with patient's Woodford Snellen who reports she is not in contact with patient nor does she have an address or phone number for him  RN CM will attempt outreach again later this week

## 2022-11-09 ENCOUNTER — PATIENT OUTREACH (OUTPATIENT)
Dept: CASE MANAGEMENT | Facility: HOSPITAL | Age: 37
End: 2022-11-09

## 2022-11-09 NOTE — PROGRESS NOTES
2nd unsuccessful attempt at reaching patient on preferred number ending [phone]  Recorded msg states: "your call cannot be completed at this time  Please try your call again later "  Unable to send UTR letter as no valid address is on file  Will continue to monitor in surveillance

## 2022-11-25 ENCOUNTER — PATIENT OUTREACH (OUTPATIENT)
Dept: CASE MANAGEMENT | Facility: OTHER | Age: 37
End: 2022-11-25

## 2022-11-25 NOTE — PROGRESS NOTES
Attempted the patient again  Phone is still out of service  Patient does not have an address on file  Patient closed to care management at this time

## 2023-01-04 ENCOUNTER — HOSPITAL ENCOUNTER (EMERGENCY)
Facility: HOSPITAL | Age: 38
Discharge: HOME | End: 2023-01-04
Attending: EMERGENCY MEDICINE

## 2023-01-04 VITALS
TEMPERATURE: 97.5 F | OXYGEN SATURATION: 100 % | RESPIRATION RATE: 12 BRPM | BODY MASS INDEX: 27.44 KG/M2 | SYSTOLIC BLOOD PRESSURE: 117 MMHG | HEART RATE: 71 BPM | WEIGHT: 150 LBS | DIASTOLIC BLOOD PRESSURE: 81 MMHG

## 2023-01-04 DIAGNOSIS — Z86.69 HISTORY OF SEIZURE DISORDER: ICD-10-CM

## 2023-01-04 DIAGNOSIS — Z00.8 MEDICAL CLEARANCE FOR INCARCERATION: Primary | ICD-10-CM

## 2023-01-04 LAB
ALBUMIN SERPL BCP-MCNC: 4.3 G/DL (ref 3.5–5)
ALP SERPL-CCNC: 59.4 U/L (ref 34–104)
ALT SERPL W P-5'-P-CCNC: 22 U/L (ref 7–52)
ANION GAP SERPL CALCULATED.3IONS-SCNC: 8 MMOL/L (ref 4–13)
AST SERPL W P-5'-P-CCNC: 27 U/L (ref 13–39)
BACTERIA UR QL AUTO: ABNORMAL /HPF
BASOPHILS # BLD AUTO: 0.02 THOUSANDS/ÂΜL (ref 0–0.1)
BASOPHILS NFR BLD AUTO: 0 % (ref 0–1)
BILIRUB SERPL-MCNC: 0.38 MG/DL (ref 0.2–1)
BILIRUB UR QL STRIP: NEGATIVE
BUN SERPL-MCNC: 10 MG/DL (ref 5–25)
CALCIUM SERPL-MCNC: 9.3 MG/DL (ref 8.4–10.2)
CHLORIDE SERPL-SCNC: 104 MMOL/L (ref 96–108)
CLARITY UR: CLEAR
CO2 SERPL-SCNC: 26 MMOL/L (ref 21–32)
COLOR UR: YELLOW
CREAT SERPL-MCNC: 0.88 MG/DL (ref 0.6–1.3)
EOSINOPHIL # BLD AUTO: 0.08 THOUSAND/ÂΜL (ref 0–0.61)
EOSINOPHIL NFR BLD AUTO: 1 % (ref 0–6)
ERYTHROCYTE [DISTWIDTH] IN BLOOD BY AUTOMATED COUNT: 13.3 % (ref 11.6–15.1)
GFR SERPL CREATININE-BSD FRML MDRD: 109 ML/MIN/1.73SQ M
GLUCOSE SERPL-MCNC: 92 MG/DL (ref 65–140)
GLUCOSE UR STRIP-MCNC: NEGATIVE MG/DL
HCT VFR BLD AUTO: 48 % (ref 36.5–49.3)
HGB BLD-MCNC: 16.1 G/DL (ref 12–17)
HGB UR QL STRIP.AUTO: NEGATIVE
IMM GRANULOCYTES # BLD AUTO: 0.02 THOUSAND/UL (ref 0–0.2)
IMM GRANULOCYTES NFR BLD AUTO: 0 % (ref 0–2)
KETONES UR STRIP-MCNC: NEGATIVE MG/DL
LEUKOCYTE ESTERASE UR QL STRIP: ABNORMAL
LYMPHOCYTES # BLD AUTO: 2.22 THOUSANDS/ÂΜL (ref 0.6–4.47)
LYMPHOCYTES NFR BLD AUTO: 39 % (ref 14–44)
MCH RBC QN AUTO: 29.2 PG (ref 26.8–34.3)
MCHC RBC AUTO-ENTMCNC: 33.5 G/DL (ref 31.4–37.4)
MCV RBC AUTO: 87 FL (ref 82–98)
MONOCYTES # BLD AUTO: 0.47 THOUSAND/ÂΜL (ref 0.17–1.22)
MONOCYTES NFR BLD AUTO: 8 % (ref 4–12)
MUCOUS THREADS UR QL AUTO: ABNORMAL
NEUTROPHILS # BLD AUTO: 2.88 THOUSANDS/ÂΜL (ref 1.85–7.62)
NEUTS SEG NFR BLD AUTO: 52 % (ref 43–75)
NITRITE UR QL STRIP: NEGATIVE
NON-SQ EPI CELLS URNS QL MICRO: ABNORMAL /HPF
NRBC BLD AUTO-RTO: 0 /100 WBCS
PH UR STRIP.AUTO: 6 [PH]
PLATELET # BLD AUTO: 198 THOUSANDS/UL (ref 149–390)
PMV BLD AUTO: 10.3 FL (ref 8.9–12.7)
POTASSIUM SERPL-SCNC: 4.2 MMOL/L (ref 3.5–5.3)
PROT SERPL-MCNC: 8.1 G/DL (ref 6.4–8.4)
PROT UR STRIP-MCNC: NEGATIVE MG/DL
RBC # BLD AUTO: 5.51 MILLION/UL (ref 3.88–5.62)
RBC #/AREA URNS AUTO: ABNORMAL /HPF
SODIUM SERPL-SCNC: 138 MMOL/L (ref 135–147)
SP GR UR STRIP.AUTO: 1.01 (ref 1–1.03)
UROBILINOGEN UR QL STRIP.AUTO: 0.2 E.U./DL
WBC # BLD AUTO: 5.69 THOUSAND/UL (ref 4.31–10.16)
WBC #/AREA URNS AUTO: ABNORMAL /HPF

## 2023-01-04 RX ORDER — LEVETIRACETAM 10 MG/ML
1000 INJECTION INTRAVASCULAR ONCE
Status: COMPLETED | OUTPATIENT
Start: 2023-01-04 | End: 2023-01-04

## 2023-01-04 RX ADMIN — LEVETIRACETAM 1000 MG: 10 INJECTION INTRAVASCULAR at 09:09

## 2023-01-04 NOTE — DISCHARGE INSTRUCTIONS
Pt medically cleared for detention  Pt should be taking Keppra 500mg twice a day, given Keppra 1000mg IV dose here in ED

## 2023-01-04 NOTE — ED PROVIDER NOTES
History  Chief Complaint   Patient presents with   • Medical Problem     Found by PD sleeping in a building, told PD he felt as though he was going to have a seizure so was brought to the hospital, seizure history  Reports he has not taken his Keppra in 2 weeks  Past Medical History: HIV, Seizures-chronic non-compliance with medication, Smoker, Syphilis, homelessness  Past Surgical History: HERNIA REPAIR                      Tetanus UTD 2021  Pt homeless, was found by PD sleeping in a building, has a warrant so is in police custody, told Police he felt as though he was going to have a seizure so was brought to the hospital, for evaluation and medical clearance  Pt c/o chronic muscle cramping in neck and LLE, no new trauma, no new sx  Admits to not taking his seizure medication in over 2 weeks  Pt denies new trauma, no fever/cp/sob/NVD/; moving all extremities          Prior to Admission Medications   Prescriptions Last Dose Informant Patient Reported? Taking?    Cholecalciferol 25 MCG (1000 UT) tablet   Yes No   Sig: Take 1,000 Units by mouth   acetaminophen (TYLENOL) 500 mg tablet   No No   Sig: Take 1 tablet (500 mg total) by mouth every 6 (six) hours as needed for mild pain or moderate pain   bictegravir-emtricitab-tenofovir alafenamide (Biktarvy) -25 MG tablet   Yes No   Sig: Take 1 tablet by mouth daily   bictegravir-emtricitab-tenofovir alafenamide (Biktarvy) -25 MG tablet   No No   Sig: Take 1 tablet by mouth daily with breakfast   cyclobenzaprine (FLEXERIL) 10 mg tablet   No No   Sig: Take 1 tablet (10 mg total) by mouth 2 (two) times a day as needed for muscle spasms   Patient not taking: Reported on 7/2/2022   cyclobenzaprine (FLEXERIL) 10 mg tablet   No No   Sig: Take 1 tablet (10 mg total) by mouth 2 (two) times a day as needed for muscle spasms   levETIRAcetam (Keppra) 500 mg tablet   No No   Sig: Take 1 tablet (500 mg total) by mouth every 12 (twelve) hours for 14 days   ondansetron (Zofran ODT) 4 mg disintegrating tablet   No No   Sig: Take 1 tablet (4 mg total) by mouth every 6 (six) hours as needed for nausea or vomiting   Patient not taking: Reported on 9/17/2022      Facility-Administered Medications: None       Past Medical History:   Diagnosis Date   • HIV (human immunodeficiency virus infection) ([facility] )    • Seizures ([facility] )    • Smoker    • Syphilis        Past Surgical History:   Procedure Laterality Date   • HERNIA REPAIR         History reviewed  No pertinent family history  I have reviewed and agree with the history as documented  E-Cigarette/Vaping   • E-Cigarette Use Never User      E-Cigarette/Vaping Substances   • Nicotine No    • THC No    • CBD No    • Flavoring No    • Other No    • Unknown No      Social History     Tobacco Use   • Smoking status: Every Day     Packs/day: 2 00     Types: Cigarettes   • Smokeless tobacco: Never   Vaping Use   • Vaping Use: Never used   Substance Use Topics   • Alcohol use: Yes     Alcohol/week: 2 0 standard drinks     Types: 1 Glasses of wine, 1 Cans of beer per week     Comment: socially   • Drug use: Yes     Frequency: 45 0 times per week     Types: Marijuana     Comment: last used08/3022       Review of Systems   Constitutional: Negative for chills and fever  HENT: Negative for hearing loss, sore throat and trouble swallowing  Respiratory: Negative for cough and shortness of breath  Cardiovascular: Negative for chest pain and leg swelling  Gastrointestinal: Negative for abdominal pain, constipation, diarrhea and vomiting  Genitourinary: Negative for dysuria, frequency and hematuria  Musculoskeletal: Positive for myalgias  Negative for arthralgias  Skin: Negative for rash  Neurological: Positive for seizures  Negative for dizziness, weakness, light-headedness and headaches  Psychiatric/Behavioral: Negative for behavioral problems  All other systems reviewed and are negative        Physical Exam  Physical Exam  Vitals and nursing note reviewed  Constitutional:       General: He is in acute distress (mild)  Appearance: Normal appearance  He is well-developed  HENT:      Head: Normocephalic and atraumatic  Right Ear: External ear normal       Left Ear: External ear normal       Nose: Nose normal       Mouth/Throat:      Mouth: Mucous membranes are moist       Pharynx: Oropharynx is clear  No oropharyngeal exudate or posterior oropharyngeal erythema  Comments: Poor dentition  Eyes:      Conjunctiva/sclera: Conjunctivae normal    Cardiovascular:      Rate and Rhythm: Normal rate and regular rhythm  Pulmonary:      Effort: Pulmonary effort is normal  No respiratory distress  Breath sounds: Normal breath sounds  Abdominal:      General: Bowel sounds are normal       Palpations: Abdomen is soft  Tenderness: There is no abdominal tenderness  Musculoskeletal:         General: Normal range of motion  Cervical back: Normal range of motion and neck supple  No rigidity  Comments: Moving all extremities   Skin:     General: Skin is warm and dry  Capillary Refill: Capillary refill takes less than 2 seconds  Neurological:      General: No focal deficit present  Mental Status: He is alert and oriented to person, place, and time  Motor: No weakness     Psychiatric:         Behavior: Behavior normal          Vital Signs  ED Triage Vitals [01/04/23 0847]   Temperature Pulse Respirations Blood Pressure SpO2   97 5 °F (36 4 °C) 93 18 145/88 100 %      Temp Source Heart Rate Source Patient Position - Orthostatic VS BP Location FiO2 (%)   Oral Monitor Lying Left arm --      Pain Score       No Pain           Vitals:    01/04/23 0847 01/04/23 1210   BP: 145/88 117/81   Pulse: 93 71   Patient Position - Orthostatic VS: Lying Lying         Visual Acuity      ED Medications  Medications   levetiracetam in NaCl (KEPPRA) infusion 1,000 mg (1,000 mg Intravenous Given 1/4/23 0909)       Diagnostic Studies  Results Reviewed     Procedure Component Value Units Date/Time    Urine Microscopic [[id number]]  (Abnormal) Collected: 01/04/23 1100    Lab Status: Final result Specimen: Urine, Clean Catch Updated: 01/04/23 1140     RBC, UA None Seen /hpf      WBC, UA 0-5 /hpf      Epithelial Cells Moderate /hpf      Bacteria, UA Occasional /hpf      MUCUS THREADS Occasional    UA w Reflex to Microscopic w Reflex to Culture [[id number]]  (Abnormal) Collected: 01/04/23 1100    Lab Status: Final result Specimen: Urine, Clean Catch Updated: 01/04/23 1106     Color, UA Yellow     Clarity, UA Clear     Specific Gravity, UA 1 010     pH, UA 6 0     Leukocytes, UA Trace     Nitrite, UA Negative     Protein, UA Negative mg/dl      Glucose, UA Negative mg/dl      Ketones, UA Negative mg/dl      Urobilinogen, UA 0 2 E U /dl      Bilirubin, UA Negative     Occult Blood, UA Negative    Comprehensive metabolic panel [[id number]] Collected: 01/04/23 0902    Lab Status: Final result Specimen: Blood from Arm, Right Updated: 01/04/23 0944     Sodium 138 mmol/L      Potassium 4 2 mmol/L      Chloride 104 mmol/L      CO2 26 mmol/L      ANION GAP 8 mmol/L      BUN 10 mg/dL      Creatinine 0 88 mg/dL      Glucose 92 mg/dL      Calcium 9 3 mg/dL      AST 27 U/L      ALT 22 U/L      Alkaline Phosphatase 59 4 U/L      Total Protein 8 1 g/dL      Albumin 4 3 g/dL      Total Bilirubin 0 38 mg/dL      eGFR 109 ml/min/1 73sq m     Narrative:      [facility] guidelines for Chronic Kidney Disease (CKD):   •  Stage 1 with normal or high GFR (GFR > 90 mL/min/1 73 square meters)  •  Stage 2 Mild CKD (GFR = 60-89 mL/min/1 73 square meters)  •  Stage 3A Moderate CKD (GFR = 45-59 mL/min/1 73 square meters)  •  Stage 3B Moderate CKD (GFR = 30-44 mL/min/1 73 square meters)  •  Stage 4 Severe CKD (GFR = 15-29 mL/min/1 73 square meters)  •  Stage 5 End Stage CKD (GFR <15 mL/min/1 73 square meters)  Note: GFR calculation is accurate only with a steady state creatinine    CBC and differential [[id number]] Collected: 01/04/23 0902    Lab Status: Final result Specimen: Blood from Arm, Right Updated: 01/04/23 0906     WBC 5 69 Thousand/uL      RBC 5 51 Million/uL      Hemoglobin 16 1 g/dL      Hematocrit 48 0 %      MCV 87 fL      MCH 29 2 pg      MCHC 33 5 g/dL      RDW 13 3 %      MPV 10 3 fL      Platelets 605 Thousands/uL      nRBC 0 /100 WBCs      Neutrophils Relative 52 %      Immat GRANS % 0 %      Lymphocytes Relative 39 %      Monocytes Relative 8 %      Eosinophils Relative 1 %      Basophils Relative 0 %      Neutrophils Absolute 2 88 Thousands/µL      Immature Grans Absolute 0 02 Thousand/uL      Lymphocytes Absolute 2 22 Thousands/µL      Monocytes Absolute 0 47 Thousand/µL      Eosinophils Absolute 0 08 Thousand/µL      Basophils Absolute 0 02 Thousands/µL     Levetiracetam level [[id number]] Collected: 01/04/23 0902    Lab Status: In process Specimen: Blood from Arm, Right Updated: 01/04/23 0903                 No orders to display              Procedures  Procedures         ED Course                                             Medical Decision Making  Pt presents for medication clearance to be released to police custody after being found in building  Unknown last seizure, patient denies any recently, no incontinence no tongue trauma medication non-compliance with h/o same  No new complaints in ED  Well known to ER  Vitals are stable  Patient awake, alert, oriented on arrival     No seizure-like activity or tremors while here in the emergency department  Patient appears to be at his baseline  Non-meningeal    The patient is stable for discharge at this time  Labs: Within normal levels   Sent Keppra level   Recommend PCP follow-up as soon as possible  All labs reviewed and utilized in the medical decision making process   Patient discharged in custody of police        Amount and/or Complexity of [address]: parent     Details: ems, police  External Data Reviewed: labs  Labs: ordered  Details: No acute abnormalities; Keppra pending but likely to be low due to patient's noncompliance, even IV dose in emergency department      Risk  Prescription drug management  Disposition  Final diagnoses:   Medical clearance for incarceration   History of seizure disorder - Medication noncompliance     Time reflects when diagnosis was documented in both MDM as applicable and the Disposition within this note     Time User Action Codes Description Comment    1/4/2023 12:15 PM Sally Bojorquez [Z00 [id number]] Medical clearance for incarceration     1/4/2023 12:15 PM Sally Newman Add [Z86 [id number]] History of seizure disorder     1/4/2023 12:17 PM Sally Newman Modify [Z86 [id number]] History of seizure disorder Medication noncompliance      ED Disposition     ED Disposition   Discharge    Condition   Stable    Date/Time   Wed Jan 4, 2023 12:14 PM    Comment   Cecelia Gibbs discharge to home/self care  Follow-up Information     Follow up With Specialties Details Why Contact Info    Your PCP              Patient's Medications   Discharge Prescriptions    No medications on file       No discharge procedures on file      PDMP Review       Value Time User    PDMP Reviewed  Yes 5/27/2020 12:37 AM LEBRON Murdock          ED Provider  Electronically Signed by           Montez Bass PA-C  01/04/23 121

## 2023-01-05 NOTE — ED ATTENDING ATTESTATION
1/4/2023  Aliza Hall DO, saw and evaluated the patient  I have discussed the patient with the resident/non-physician practitioner and agree with the resident's/non-physician practitioner's findings, Plan of Care, and MDM as documented in the resident's/non-physician practitioner's note, except where noted  All available labs and Radiology studies were reviewed  I was present for key portions of any procedure(s) performed by the resident/non-physician practitioner and I was immediately available to provide assistance  At this point I agree with the current assessment done in the Emergency Department  I have conducted an independent evaluation of this patient a history and physical is as follows:  80-year-old homeless male with a history of seizures who presents to the emergency department for medical clearance for incarceration  He appears in no distress and is sleeping in the bed in the emergency department  Patient had basic laboratory evaluation and was monitored in the emergency department for over 2 hours  He was medically cleared for incarceration    ED Course         Critical Care Time  Procedures

## 2023-01-06 LAB — LEVETIRACETAM SERPL-MCNC: 6.2 UG/ML (ref 10–40)

## 2023-01-21 ENCOUNTER — HOSPITAL ENCOUNTER (EMERGENCY)
Facility: HOSPITAL | Age: 38
Discharge: HOME | End: 2023-01-22
Attending: EMERGENCY MEDICINE

## 2023-01-21 DIAGNOSIS — R56.9 SEIZURE (HCC): ICD-10-CM

## 2023-01-21 DIAGNOSIS — R94.31 PROLONGED Q-T INTERVAL ON ECG: Primary | ICD-10-CM

## 2023-01-21 LAB
ALBUMIN SERPL BCP-MCNC: 4.4 G/DL (ref 3.5–5)
ALP SERPL-CCNC: 73 U/L (ref 34–104)
ALT SERPL W P-5'-P-CCNC: 17 U/L (ref 7–52)
ANION GAP SERPL CALCULATED.3IONS-SCNC: 19 MMOL/L (ref 4–13)
AST SERPL W P-5'-P-CCNC: 25 U/L (ref 13–39)
BASOPHILS # BLD AUTO: 0.03 THOUSANDS/ÂΜL (ref 0–0.1)
BASOPHILS NFR BLD AUTO: 0 % (ref 0–1)
BILIRUB SERPL-MCNC: 0.35 MG/DL (ref 0.2–1)
BUN SERPL-MCNC: 10 MG/DL (ref 5–25)
CALCIUM SERPL-MCNC: 9.2 MG/DL (ref 8.4–10.2)
CHLORIDE SERPL-SCNC: 99 MMOL/L (ref 96–108)
CO2 SERPL-SCNC: 20 MMOL/L (ref 21–32)
CREAT SERPL-MCNC: 1.09 MG/DL (ref 0.6–1.3)
EOSINOPHIL # BLD AUTO: 0.06 THOUSAND/ÂΜL (ref 0–0.61)
EOSINOPHIL NFR BLD AUTO: 1 % (ref 0–6)
ERYTHROCYTE [DISTWIDTH] IN BLOOD BY AUTOMATED COUNT: 13 % (ref 11.6–15.1)
GFR SERPL CREATININE-BSD FRML MDRD: 86 ML/MIN/1.73SQ M
GLUCOSE SERPL-MCNC: 90 MG/DL (ref 65–140)
HCT VFR BLD AUTO: 48.1 % (ref 36.5–49.3)
HGB BLD-MCNC: 16.3 G/DL (ref 12–17)
IMM GRANULOCYTES # BLD AUTO: 0.01 THOUSAND/UL (ref 0–0.2)
IMM GRANULOCYTES NFR BLD AUTO: 0 % (ref 0–2)
LYMPHOCYTES # BLD AUTO: 4.01 THOUSANDS/ÂΜL (ref 0.6–4.47)
LYMPHOCYTES NFR BLD AUTO: 58 % (ref 14–44)
MCH RBC QN AUTO: 29.4 PG (ref 26.8–34.3)
MCHC RBC AUTO-ENTMCNC: 33.9 G/DL (ref 31.4–37.4)
MCV RBC AUTO: 87 FL (ref 82–98)
MONOCYTES # BLD AUTO: 0.66 THOUSAND/ÂΜL (ref 0.17–1.22)
MONOCYTES NFR BLD AUTO: 9 % (ref 4–12)
NEUTROPHILS # BLD AUTO: 2.25 THOUSANDS/ÂΜL (ref 1.85–7.62)
NEUTS SEG NFR BLD AUTO: 32 % (ref 43–75)
NRBC BLD AUTO-RTO: 0 /100 WBCS
PLATELET # BLD AUTO: 184 THOUSANDS/UL (ref 149–390)
PMV BLD AUTO: 11.4 FL (ref 8.9–12.7)
POTASSIUM SERPL-SCNC: 3.2 MMOL/L (ref 3.5–5.3)
PROT SERPL-MCNC: 8.1 G/DL (ref 6.4–8.4)
RBC # BLD AUTO: 5.54 MILLION/UL (ref 3.88–5.62)
SODIUM SERPL-SCNC: 138 MMOL/L (ref 135–147)
WBC # BLD AUTO: 7.02 THOUSAND/UL (ref 4.31–10.16)

## 2023-01-21 RX ORDER — LEVETIRACETAM 10 MG/ML
1000 INJECTION INTRAVASCULAR ONCE
Status: COMPLETED | OUTPATIENT
Start: 2023-01-21 | End: 2023-01-21

## 2023-01-21 RX ORDER — LIDOCAINE HYDROCHLORIDE 10 MG/ML
5 INJECTION, SOLUTION EPIDURAL; INFILTRATION; INTRACAUDAL; PERINEURAL ONCE
Status: COMPLETED | OUTPATIENT
Start: 2023-01-21 | End: 2023-01-21

## 2023-01-21 RX ORDER — LEVETIRACETAM 750 MG/1
750 TABLET ORAL 2 TIMES DAILY
Qty: 180 TABLET | Refills: 0 | Status: SHIPPED | OUTPATIENT
Start: 2023-01-21 | End: 2023-01-23 | Stop reason: SDUPTHER

## 2023-01-21 RX ADMIN — LEVETIRACETAM 1000 MG: 10 INJECTION INTRAVASCULAR at 19:16

## 2023-01-21 RX ADMIN — SODIUM CHLORIDE 1000 ML: 0.9 INJECTION, SOLUTION INTRAVENOUS at 19:16

## 2023-01-21 RX ADMIN — LIDOCAINE HYDROCHLORIDE 5 ML: 10 INJECTION, SOLUTION EPIDURAL; INFILTRATION; INTRACAUDAL; PERINEURAL at 19:16

## 2023-01-22 VITALS
SYSTOLIC BLOOD PRESSURE: 113 MMHG | OXYGEN SATURATION: 98 % | DIASTOLIC BLOOD PRESSURE: 56 MMHG | HEART RATE: 117 BPM | RESPIRATION RATE: 14 BRPM | TEMPERATURE: 98 F

## 2023-01-22 NOTE — ED PROVIDER NOTES
History  Chief Complaint   Patient presents with   • Seizure - Prior Hx Of     Pt presents to ED via EMS after pt had witnessed seizure on the streets  Pt (+) head strike, lact noted to nose, bleeding stopped at this time  Pt (+) hx of seizure, pt is homeless  Patient does not recall the details but had a witnessed seizure according to EMS  History had to be supplemented by EMS as patient cannot recall details of what happened  He reports he does have known history of seizure disorder  He stopped taking his Keppra about 2 days ago because he ran out of it  Patient tells me he feels drowsy but apart from that he feels like his normal self  He typically has the same drowsiness after seizures  He did bite his lip and has a laceration  His tetanus vaccine is up-to-date  He denies any joint pain  He denies any headache  He is not confused      History provided by:  EMS personnel      Prior to Admission Medications   Prescriptions Last Dose Informant Patient Reported? Taking?    Cholecalciferol 25 MCG (1000 UT) tablet   Yes No   Sig: Take 1,000 Units by mouth   acetaminophen (TYLENOL) 500 mg tablet   No No   Sig: Take 1 tablet (500 mg total) by mouth every 6 (six) hours as needed for mild pain or moderate pain   bictegravir-emtricitab-tenofovir alafenamide (Biktarvy) -25 MG tablet   Yes No   Sig: Take 1 tablet by mouth daily   bictegravir-emtricitab-tenofovir alafenamide (Biktarvy) -25 MG tablet   No No   Sig: Take 1 tablet by mouth daily with breakfast   cyclobenzaprine (FLEXERIL) 10 mg tablet   No No   Sig: Take 1 tablet (10 mg total) by mouth 2 (two) times a day as needed for muscle spasms   Patient not taking: Reported on 7/2/2022   cyclobenzaprine (FLEXERIL) 10 mg tablet   No No   Sig: Take 1 tablet (10 mg total) by mouth 2 (two) times a day as needed for muscle spasms   levETIRAcetam (Keppra) 500 mg tablet   No No   Sig: Take 1 tablet (500 mg total) by mouth every 12 (twelve) hours for 14 days   ondansetron (Zofran ODT) 4 mg disintegrating tablet   No No   Sig: Take 1 tablet (4 mg total) by mouth every 6 (six) hours as needed for nausea or vomiting   Patient not taking: Reported on 9/17/2022      Facility-Administered Medications: None       Past Medical History:   Diagnosis Date   • HIV (human immunodeficiency virus infection) ([facility] )    • Seizures ([facility] )    • Smoker    • Syphilis        Past Surgical History:   Procedure Laterality Date   • HERNIA REPAIR         History reviewed  No pertinent family history  I have reviewed and agree with the history as documented  E-Cigarette/Vaping   • E-Cigarette Use Never User      E-Cigarette/Vaping Substances   • Nicotine No    • THC No    • CBD No    • Flavoring No    • Other No    • Unknown No      Social History     Tobacco Use   • Smoking status: Every Day     Packs/day: 1 00     Types: Cigarettes   • Smokeless tobacco: Never   Vaping Use   • Vaping Use: Never used   Substance Use Topics   • Alcohol use: Yes     Alcohol/week: 2 0 standard drinks     Types: 1 Glasses of wine, 1 Cans of beer per week     Comment: socially   • Drug use: Yes     Frequency: 1 0 times per week     Types: Marijuana       Review of Systems   All other systems reviewed and are negative  Physical Exam  Physical Exam  Vitals and nursing note reviewed  Constitutional:       General: He is not in acute distress  Appearance: He is well-developed  HENT:      Head: Normocephalic and atraumatic  Eyes:      Conjunctiva/sclera: Conjunctivae normal    Cardiovascular:      Rate and Rhythm: Normal rate and regular rhythm  Heart sounds: No murmur heard  Pulmonary:      Effort: Pulmonary effort is normal  No respiratory distress  Breath sounds: Normal breath sounds  Abdominal:      Palpations: Abdomen is soft  Tenderness: There is no abdominal tenderness  Musculoskeletal:         General: No swelling  Cervical back: Neck supple     Skin:     General: Skin is warm and dry  Capillary Refill: Capillary refill takes less than 2 seconds  Comments: Stellate laceration left upper lip going into her mouth  This does not cross vermilion border  Combined length approximately 3 cm  Neurological:      Mental Status: He is alert     Psychiatric:         Mood and Affect: Mood normal          Vital Signs  ED Triage Vitals   Temperature Pulse Respirations Blood Pressure SpO2   01/21/23 1842 01/21/23 1842 01/21/23 1840 01/21/23 1840 01/21/23 1842   98 °F (36 7 °C) (!) 129 16 122/89 97 %      Temp Source Heart Rate Source Patient Position - Orthostatic VS BP Location FiO2 (%)   01/21/23 1842 01/21/23 2049 01/21/23 2049 01/21/23 2049 --   Oral Monitor Sitting Right arm       Pain Score       --                  Vitals:    01/21/23 1840 01/21/23 1842 01/21/23 2049   BP: 122/89  104/72   Pulse:  (!) 129 102   Patient Position - Orthostatic VS:   Sitting         Visual Acuity      ED Medications  Medications   sodium chloride 0 9 % bolus 1,000 mL (1,000 mL Intravenous New Bag 1/21/23 1916)   levetiracetam in NaCl (KEPPRA) infusion 1,000 mg (1,000 mg Intravenous Given 1/21/23 1916)   lidocaine (PF) (XYLOCAINE-MPF) 1 % injection 5 mL (5 mL Infiltration Given by Other 1/21/23 1916)       Diagnostic Studies  Results Reviewed     Procedure Component Value Units Date/Time    Comprehensive metabolic panel [[id number]]  (Abnormal) Collected: 01/21/23 1916    Lab Status: Final result Specimen: Blood from Arm, Right Updated: 01/21/23 1937     Sodium 138 mmol/L      Potassium 3 2 mmol/L      Chloride 99 mmol/L      CO2 20 mmol/L      ANION GAP 19 mmol/L      BUN 10 mg/dL      Creatinine 1 09 mg/dL      Glucose 90 mg/dL      Calcium 9 2 mg/dL      AST 25 U/L      ALT 17 U/L      Alkaline Phosphatase 73 U/L      Total Protein 8 1 g/dL      Albumin 4 4 g/dL      Total Bilirubin 0 35 mg/dL      eGFR 86 ml/min/1 73sq m     Narrative:      [place] guidelines for Chronic Kidney Disease (CKD):   •  Stage 1 with normal or high GFR (GFR > 90 mL/min/1 73 square meters)  •  Stage 2 Mild CKD (GFR = 60-89 mL/min/1 73 square meters)  •  Stage 3A Moderate CKD (GFR = 45-59 mL/min/1 73 square meters)  •  Stage 3B Moderate CKD (GFR = 30-44 mL/min/1 73 square meters)  •  Stage 4 Severe CKD (GFR = 15-29 mL/min/1 73 square meters)  •  Stage 5 End Stage CKD (GFR <15 mL/min/1 73 square meters)  Note: GFR calculation is accurate only with a steady state creatinine    CBC and differential [[id number]]  (Abnormal) Collected: 01/21/23 1916    Lab Status: Final result Specimen: Blood from Arm, Right Updated: 01/21/23 1920     WBC 7 02 Thousand/uL      RBC 5 54 Million/uL      Hemoglobin 16 3 g/dL      Hematocrit 48 1 %      MCV 87 fL      MCH 29 4 pg      MCHC 33 9 g/dL      RDW 13 0 %      MPV 11 4 fL      Platelets 882 Thousands/uL      nRBC 0 /100 WBCs      Neutrophils Relative 32 %      Immat GRANS % 0 %      Lymphocytes Relative 58 %      Monocytes Relative 9 %      Eosinophils Relative 1 %      Basophils Relative 0 %      Neutrophils Absolute 2 25 Thousands/µL      Immature Grans Absolute 0 01 Thousand/uL      Lymphocytes Absolute 4 01 Thousands/µL      Monocytes Absolute 0 66 Thousand/µL      Eosinophils Absolute 0 06 Thousand/µL      Basophils Absolute 0 03 Thousands/µL                  No orders to display              Procedures  Laceration repair    Date/Time: 1/21/2023 11:46 PM  Performed by: Marcin Guido MD  Authorized by: Marcin Guido MD   Consent given by: patient  Patient identity confirmed: verbally with patient  Body area: mouth  Location details: upper lip, interior  Laceration length: 3 cm  Tendon involvement: none  Nerve involvement: none  Vascular damage: no  Anesthesia: nerve block    Anesthesia:  Local Anesthetic: lidocaine 1% without epinephrine (Left infraorbital nerve block)  Anesthetic total: 3 mL    Sedation:  Patient sedated: no      Wound Dehiscence:  Superficial Wound Dehiscence: simple closure      Procedure Details:  Irrigation solution: saline and tap water  Irrigation method: syringe  Amount of cleaning: standard  Subcutaneous closure: 4-0 Vicryl  Number of sutures: 4  Technique: simple  Approximation: close  Approximation difficulty: simple  Patient tolerance: patient tolerated the procedure well with no immediate complications               ED Course  ED Course as of 01/21/23 2348   Sat Jan 21, 2023   1844 EKG: ST at 129 with artifact presnt, non-specific ST-t changes likely rate related, Qtc 609   2049 EKG: ST at 107 with normal QRS, non-specific ST-t, Qtc 432   2340 On reassessment, patient awake and alert  He is not postictal   His EKG with improved rate now has normal QTC  I will refill his seizure medication that he ran out of  Medical Decision Making  Patient has established history of seizures and reports having seizure after he stopped taking his medicine several days ago  Patient is eager to get back on his medicines  No red flags for intracranial hemorrhage  At this point I believe risks of radiation outweigh benefits for obtaining CT  Prolonged Q-T interval on ECG: complicated acute illness or injury  Seizure [facility]): complicated acute illness or injury  Amount and/or Complexity of Data Reviewed  Labs: ordered  Risk  Prescription drug management            Disposition  Final diagnoses:   Prolonged Q-T interval on ECG   Seizure [facility])     Time reflects when diagnosis was documented in both MDM as applicable and the Disposition within this note     Time User Action Codes Description Comment    1/21/2023  6:45 PM Marlo Bojorquez [R94 [id number]] Prolonged Q-T interval on ECG     1/21/2023  8:58 PM Marlo Bojorquez [[id number]] Seizure [facility])       ED Disposition     ED Disposition   Discharge    Condition   Stable    Date/Time   Sat Jan 21, 2023 11:27 PM    Comment   Vera Bosworth discharge to home/self care  Follow-up Information     Follow up With Specialties Details Why Contact Info Additional Information    [facility]   [address]  [place] [id number] [phone] QS [id number] [facility], [address], [place], [place], [address] [phone]          Patient's Medications   Discharge Prescriptions    LEVETIRACETAM (KEPPRA) 750 MG TABLET    Take 1 tablet (750 mg total) by mouth 2 (two) times a day       Start Date: 1/21/2023 End Date: 4/21/2023       Order Dose: 750 mg       Quantity: 180 tablet    Refills: 0       No discharge procedures on file      PDMP Review       Value Time User    PDMP Reviewed  Yes 5/27/2020 12:37 AM LEBRON Mcdaniel          ED Provider  Electronically Signed by           Yesica Parry MD  01/21/23 0531

## 2023-01-23 ENCOUNTER — HOSPITAL ENCOUNTER (EMERGENCY)
Facility: HOSPITAL | Age: 38
Discharge: HOME | End: 2023-01-23
Attending: EMERGENCY MEDICINE

## 2023-01-23 VITALS
SYSTOLIC BLOOD PRESSURE: 128 MMHG | TEMPERATURE: 98.9 F | RESPIRATION RATE: 16 BRPM | BODY MASS INDEX: 27.6 KG/M2 | OXYGEN SATURATION: 97 % | HEIGHT: 62 IN | HEART RATE: 90 BPM | DIASTOLIC BLOOD PRESSURE: 72 MMHG | WEIGHT: 150 LBS

## 2023-01-23 DIAGNOSIS — R56.9 SEIZURE (HCC): ICD-10-CM

## 2023-01-23 DIAGNOSIS — B20 HIV INFECTION, UNSPECIFIED SYMPTOM STATUS (HCC): ICD-10-CM

## 2023-01-23 LAB
ATRIAL RATE: 107 BPM
ATRIAL RATE: 129 BPM
P AXIS: 65 DEGREES
PR INTERVAL: 136 MS
PR INTERVAL: 88 MS
QRS AXIS: -11 DEGREES
QRS AXIS: 31 DEGREES
QRSD INTERVAL: 86 MS
QRSD INTERVAL: 88 MS
QT INTERVAL: 324 MS
QT INTERVAL: 416 MS
QTC INTERVAL: 432 MS
QTC INTERVAL: 609 MS
T WAVE AXIS: 16 DEGREES
T WAVE AXIS: 51 DEGREES
VENTRICULAR RATE: 107 BPM
VENTRICULAR RATE: 129 BPM

## 2023-01-23 RX ORDER — LEVETIRACETAM 500 MG/1
1000 TABLET ORAL ONCE
Status: COMPLETED | OUTPATIENT
Start: 2023-01-23 | End: 2023-01-23

## 2023-01-23 RX ORDER — BICTEGRAVIR SODIUM, EMTRICITABINE, AND TENOFOVIR ALAFENAMIDE FUMARATE 50; 200; 25 MG/1; MG/1; MG/1
1 TABLET ORAL
Qty: 30 TABLET | Refills: 0 | Status: SHIPPED | OUTPATIENT
Start: 2023-01-23

## 2023-01-23 RX ORDER — LEVETIRACETAM 750 MG/1
750 TABLET ORAL 2 TIMES DAILY
Qty: 180 TABLET | Refills: 0 | Status: SHIPPED | OUTPATIENT
Start: 2023-01-23 | End: 2023-04-23

## 2023-01-23 RX ADMIN — LEVETIRACETAM 1000 MG: 500 TABLET, FILM COATED ORAL at 11:23

## 2023-01-23 NOTE — ED PROVIDER NOTES
History  Chief Complaint   Patient presents with   • Altered Mental Status     Patient found "unresponsive" at [address], L hand twitching, woke up in ambulance asking where he was, BG 25     44-year-old male presenting with concern for breakthrough seizure  Patient has a well-documented history of seizures  He does admit that he is homeless  Patient remembers going to [facility] and then waking up in the ambulance  EMS states that patient appeared to fall asleep in [facility] and 911 was called  Immediately upon entering the ambulance, the patient woke up immediately without postictal signs  On my evaluation, patient is sleeping but makes me believe that he is awake  He is resisting movement of his hands or me opening his eyes  When I discussed getting an IV and obtaining blood work and leaving the room, patient woke up immediately without postictal signs  Patient states he does not have his Keppra prescription          Prior to Admission Medications   Prescriptions Last Dose Informant Patient Reported? Taking?    Cholecalciferol 25 MCG (1000 UT) tablet   Yes No   Sig: Take 1,000 Units by mouth   acetaminophen (TYLENOL) 500 mg tablet   No No   Sig: Take 1 tablet (500 mg total) by mouth every 6 (six) hours as needed for mild pain or moderate pain   bictegravir-emtricitab-tenofovir alafenamide (Biktarvy) -25 MG tablet   Yes No   Sig: Take 1 tablet by mouth daily   bictegravir-emtricitab-tenofovir alafenamide (Biktarvy) -25 MG tablet   No No   Sig: Take 1 tablet by mouth daily with breakfast   bictegravir-emtricitab-tenofovir alafenamide (Biktarvy) -25 MG tablet   No Yes   Sig: Take 1 tablet by mouth daily with breakfast   cyclobenzaprine (FLEXERIL) 10 mg tablet   No No   Sig: Take 1 tablet (10 mg total) by mouth 2 (two) times a day as needed for muscle spasms   Patient not taking: Reported on 7/2/2022   cyclobenzaprine (FLEXERIL) 10 mg tablet   No No   Sig: Take 1 tablet (10 mg total) by mouth 2 (two) times a day as needed for muscle spasms   levETIRAcetam (Keppra) 750 mg tablet   No No   Sig: Take 1 tablet (750 mg total) by mouth 2 (two) times a day   levETIRAcetam (Keppra) 750 mg tablet   No Yes   Sig: Take 1 tablet (750 mg total) by mouth 2 (two) times a day   ondansetron (Zofran ODT) 4 mg disintegrating tablet   No No   Sig: Take 1 tablet (4 mg total) by mouth every 6 (six) hours as needed for nausea or vomiting   Patient not taking: Reported on 9/17/2022      Facility-Administered Medications: None       Past Medical History:   Diagnosis Date   • [facility] immunodeficiency virus infection) [facility]    • Seizures ([facility] )    • Smoker    • Syphilis        Past Surgical History:   Procedure Laterality Date   • HERNIA REPAIR         History reviewed  No pertinent family history  I have reviewed and agree with the history as documented  E-Cigarette/Vaping   • E-Cigarette Use Never User      E-Cigarette/Vaping Substances   • Nicotine No    • THC No    • CBD No    • Flavoring No    • Other No    • Unknown No      Social History     Tobacco Use   • Smoking status: Every Day     Packs/day: 1 00     Types: Cigarettes   • Smokeless tobacco: Never   Vaping Use   • Vaping Use: Never used   Substance Use Topics   • Alcohol use: Yes     Alcohol/week: 2 0 standard drinks     Types: 1 Glasses of wine, 1 Cans of beer per week     Comment: socially   • Drug use: Yes     Frequency: 1 0 times per week     Types: Marijuana       Review of Systems   Constitutional: Negative for fever  HENT: Negative for congestion  Respiratory: Negative for shortness of breath  Cardiovascular: Negative for chest pain  Gastrointestinal: Negative for abdominal pain  Musculoskeletal: Negative for neck pain  Skin: Negative for rash  Neurological: Positive for seizures  Psychiatric/Behavioral: Positive for decreased concentration  Physical Exam  Physical Exam  Vitals and nursing note reviewed     Constitutional: General: He is not in acute distress  HENT:      Head: Normocephalic and atraumatic  Mouth/Throat:      Mouth: Mucous membranes are moist       Pharynx: No posterior oropharyngeal erythema  Eyes:      Extraocular Movements: Extraocular movements intact  Conjunctiva/sclera: Conjunctivae normal    Cardiovascular:      Rate and Rhythm: Regular rhythm  Pulmonary:      Effort: Pulmonary effort is normal  No respiratory distress  Abdominal:      General: There is no distension  Musculoskeletal:      Cervical back: No rigidity  Skin:     General: Skin is warm and dry  Neurological:      Mental Status: He is alert and oriented to person, place, and time  Mental status is at baseline  GCS: GCS eye subscore is 4  GCS verbal subscore is 5  GCS motor subscore is 6  Psychiatric:         Mood and Affect: Mood normal          Speech: Speech normal          Behavior: Behavior normal          Vital Signs  ED Triage Vitals [01/23/23 1109]   Temperature Pulse Respirations Blood Pressure SpO2   98 9 °F (37 2 °C) (!) 117 16 132/89 98 %      Temp Source Heart Rate Source Patient Position - Orthostatic VS BP Location FiO2 (%)   Axillary Monitor Lying Left arm --      Pain Score       --           Vitals:    01/23/23 1109   BP: 132/89   Pulse: (!) 117   Patient Position - Orthostatic VS: Lying         Visual Acuity      ED Medications  Medications   levETIRAcetam (KEPPRA) tablet 1,000 mg (1,000 mg Oral Given 1/23/23 1123)       Diagnostic Studies  Results Reviewed     None                 No orders to display              Procedures  Procedures         ED Course  ED Course as of 01/23/23 1257   Mon Jan 23, 2023   1246 On multiple reassessments, patient remains well-appearing nontoxic  No seizure activity  Patient does feel comfortable with discharge    Will prescribe his Keppra and [facility] Making  29-year-old male presenting with concern for breakthrough seizures  His exam is not consistent with grand mal seizures  There is no postictal episode noted  He appears to go in and out of consciousness while maintaining muscle tone  Regardless, he has documented seizures and self admits that he is not taking his Keppra  We will give dose of Keppra and continue to evaluate and monitor    Amount and/or Complexity of Data Reviewed  Independent Historian: EMS      Risk  Prescription drug management  Disposition  Final diagnoses:   Seizure ([facility] )   HIV infection, unspecified symptom status ([facility] )     Time reflects when diagnosis was documented in both MDM as applicable and the Disposition within this note     Time User Action Codes Description Comment    1/23/2023 12:53 PM Crow Royal Add [R56 9] Seizure ([facility] )     1/23/2023 12:56 PM Crow Hydes Add [B20] HIV infection, unspecified symptom status [facility])       ED Disposition     ED Disposition   Discharge    Condition   Stable    Date/Time   Mon Jan 23, 2023 12:53 PM    Comment   [place] Expose discharge to home/self care  Follow-up Information     Follow up With Specialties Details [facility] Schedule an appointment as soon as possible for a visit   [address] [place] [address]  [phone]            Current Discharge Medication List      CONTINUE these medications which have CHANGED    Details   !! bictegravir-emtricitab-tenofovir alafenamide (Biktarvy) -25 MG tablet Take 1 tablet by mouth daily with breakfast  Qty: 30 tablet, Refills: 0    Associated Diagnoses: HIV infection, unspecified symptom status ([facility])      levETIRAcetam (Keppra) 750 mg tablet Take 1 tablet (750 mg total) by mouth 2 (two) times a day  Qty: 180 tablet, Refills: 0    Associated Diagnoses: Seizure ([facility] )       ! ! - Potential duplicate medications found  Please discuss with provider        CONTINUE these medications which have NOT CHANGED Details   acetaminophen (TYLENOL) 500 mg tablet Take 1 tablet (500 mg total) by mouth every 6 (six) hours as needed for mild pain or moderate pain  Qty: 30 tablet, Refills: 0    Associated Diagnoses: Chronic right shoulder pain      !! bictegravir-emtricitab-tenofovir alafenamide (Biktarvy) -25 MG tablet Take 1 tablet by mouth daily      Cholecalciferol 25 MCG (1000 UT) tablet Take 1,000 Units by mouth      !! cyclobenzaprine (FLEXERIL) 10 mg tablet Take 1 tablet (10 mg total) by mouth 2 (two) times a day as needed for muscle spasms  Qty: 20 tablet, Refills: 0    Associated Diagnoses: Neck muscle spasm      !! cyclobenzaprine (FLEXERIL) 10 mg tablet Take 1 tablet (10 mg total) by mouth 2 (two) times a day as needed for muscle spasms  Qty: 20 tablet, Refills: 0    Associated Diagnoses: Muscle spasms of neck      ondansetron (Zofran ODT) 4 mg disintegrating tablet Take 1 tablet (4 mg total) by mouth every 6 (six) hours as needed for nausea or vomiting  Qty: 20 tablet, Refills: 0    Associated Diagnoses: Nausea       !! - Potential duplicate medications found  Please discuss with provider  No discharge procedures on file      PDMP Review       Value Time User    PDMP Reviewed  Yes 5/27/2020 12:37 AM LEBRON Cortez          ED Provider  Electronically Signed by           Joella Carrel, DO  01/23/23 1120 [address] [place] [address], DO  01/23/23 1257

## 2023-01-26 ENCOUNTER — HOSPITAL ENCOUNTER (EMERGENCY)
Facility: HOSPITAL | Age: 38
Discharge: HOME | End: 2023-01-26
Attending: INTERNAL MEDICINE

## 2023-01-26 VITALS
HEART RATE: 90 BPM | OXYGEN SATURATION: 99 % | SYSTOLIC BLOOD PRESSURE: 135 MMHG | TEMPERATURE: 98.5 F | DIASTOLIC BLOOD PRESSURE: 91 MMHG | RESPIRATION RATE: 18 BRPM

## 2023-01-26 DIAGNOSIS — T40.2X4A OPIOID OVERDOSE, UNDETERMINED INTENT, INITIAL ENCOUNTER (HCC): Primary | ICD-10-CM

## 2023-01-26 LAB
ALBUMIN SERPL BCP-MCNC: 4 G/DL (ref 3.5–5)
ALP SERPL-CCNC: 63 U/L (ref 34–104)
ALT SERPL W P-5'-P-CCNC: 26 U/L (ref 7–52)
ANION GAP SERPL CALCULATED.3IONS-SCNC: 11 MMOL/L (ref 4–13)
AST SERPL W P-5'-P-CCNC: 50 U/L (ref 13–39)
BASOPHILS # BLD AUTO: 0.01 THOUSANDS/ÂΜL (ref 0–0.1)
BASOPHILS NFR BLD AUTO: 0 % (ref 0–1)
BILIRUB SERPL-MCNC: 0.47 MG/DL (ref 0.2–1)
BUN SERPL-MCNC: 12 MG/DL (ref 5–25)
CALCIUM SERPL-MCNC: 9.2 MG/DL (ref 8.4–10.2)
CHLORIDE SERPL-SCNC: 99 MMOL/L (ref 96–108)
CO2 SERPL-SCNC: 25 MMOL/L (ref 21–32)
CREAT SERPL-MCNC: 0.94 MG/DL (ref 0.6–1.3)
EOSINOPHIL # BLD AUTO: 0.06 THOUSAND/ÂΜL (ref 0–0.61)
EOSINOPHIL NFR BLD AUTO: 1 % (ref 0–6)
ERYTHROCYTE [DISTWIDTH] IN BLOOD BY AUTOMATED COUNT: 13.3 % (ref 11.6–15.1)
GFR SERPL CREATININE-BSD FRML MDRD: 103 ML/MIN/1.73SQ M
GLUCOSE SERPL-MCNC: 115 MG/DL (ref 65–140)
HCT VFR BLD AUTO: 41.6 % (ref 36.5–49.3)
HGB BLD-MCNC: 14.2 G/DL (ref 12–17)
IMM GRANULOCYTES # BLD AUTO: 0.02 THOUSAND/UL (ref 0–0.2)
IMM GRANULOCYTES NFR BLD AUTO: 0 % (ref 0–2)
LYMPHOCYTES # BLD AUTO: 2.29 THOUSANDS/ÂΜL (ref 0.6–4.47)
LYMPHOCYTES NFR BLD AUTO: 27 % (ref 14–44)
MCH RBC QN AUTO: 28.9 PG (ref 26.8–34.3)
MCHC RBC AUTO-ENTMCNC: 34.1 G/DL (ref 31.4–37.4)
MCV RBC AUTO: 85 FL (ref 82–98)
MONOCYTES # BLD AUTO: 0.63 THOUSAND/ÂΜL (ref 0.17–1.22)
MONOCYTES NFR BLD AUTO: 8 % (ref 4–12)
NEUTROPHILS # BLD AUTO: 5.42 THOUSANDS/ÂΜL (ref 1.85–7.62)
NEUTS SEG NFR BLD AUTO: 64 % (ref 43–75)
NRBC BLD AUTO-RTO: 0 /100 WBCS
PLATELET # BLD AUTO: 197 THOUSANDS/UL (ref 149–390)
PMV BLD AUTO: 10.2 FL (ref 8.9–12.7)
POTASSIUM SERPL-SCNC: 3.6 MMOL/L (ref 3.5–5.3)
PROT SERPL-MCNC: 7.3 G/DL (ref 6.4–8.4)
RBC # BLD AUTO: 4.91 MILLION/UL (ref 3.88–5.62)
SODIUM SERPL-SCNC: 135 MMOL/L (ref 135–147)
WBC # BLD AUTO: 8.43 THOUSAND/UL (ref 4.31–10.16)

## 2023-01-26 RX ORDER — LEVETIRACETAM 10 MG/ML
1000 INJECTION INTRAVASCULAR ONCE
Status: COMPLETED | OUTPATIENT
Start: 2023-01-26 | End: 2023-01-26

## 2023-01-26 RX ADMIN — LEVETIRACETAM 1000 MG: 10 INJECTION INTRAVASCULAR at 10:18

## 2023-01-26 NOTE — ED PROVIDER NOTES
History  Chief Complaint   Patient presents with   • Overdose - Accidental     Pt was found unresponsive with pinpoint pupils  Pt received 4mg intranasal narcan by [facility] and became responsive  This is a 61-year-old male with past medical history significant for HIV and seizure disorder presenting to the emergency department today for an episode of unresponsiveness  The patient notes he has been noncompliant with his antiepileptic medications  He was found to be unresponsive and was given 4 mg of intranasal Narcan by the fire department  This caused the patient to rouse though he denies any illicit drug use whatsoever  The patient did not bite his tongue and had no urinary or bowel incontinence  He denies any pain  He does not believe he struck his head  "Under know where I was"  He was found by EMS with pinpoint pupils  He denies any chest pain or shortness of breath  He denies any areas of injury  He denies any numbness or tingling  He has no neck pain  He denies any suicidal or homicidal ideations  The patient is homeless and well-known to the emergency department for breakthrough seizures  The patient denies other complaints at this time  History provided by:  Patient   used: No    Overdose - Accidental  Ingested substance:  Illicit drugs  Illicit drug type:  Unable to specify  Time since incident: just PTA  Witnesses present: yes    Called poison control: no    Incident location: outside somewhere    Context: recreational and unsupervised    Context: not depression and not suicide attempt    Associated symptoms: unresponsiveness (resolved with Narcan)    Associated symptoms: no abdominal pain, no agitation, no anxiety, no chest pain, no cough, no diaphoresis, no diarrhea, no headaches, no lethargy, no nausea, no shortness of breath, no slurred speech, no visual changes and no vomiting        Prior to Admission Medications   Prescriptions Last Dose Informant Patient Reported? Taking? Cholecalciferol 25 MCG (1000 UT) tablet   Yes No   Sig: Take 1,000 Units by mouth   acetaminophen (TYLENOL) 500 mg tablet   No No   Sig: Take 1 tablet (500 mg total) by mouth every 6 (six) hours as needed for mild pain or moderate pain   bictegravir-emtricitab-tenofovir alafenamide (Biktarvy) -25 MG tablet   Yes No   Sig: Take 1 tablet by mouth daily   bictegravir-emtricitab-tenofovir alafenamide (Biktarvy) -25 MG tablet   No No   Sig: Take 1 tablet by mouth daily with breakfast   cyclobenzaprine (FLEXERIL) 10 mg tablet   No No   Sig: Take 1 tablet (10 mg total) by mouth 2 (two) times a day as needed for muscle spasms   Patient not taking: Reported on 7/2/2022   cyclobenzaprine (FLEXERIL) 10 mg tablet   No No   Sig: Take 1 tablet (10 mg total) by mouth 2 (two) times a day as needed for muscle spasms   levETIRAcetam (Keppra) 750 mg tablet   No No   Sig: Take 1 tablet (750 mg total) by mouth 2 (two) times a day   ondansetron (Zofran ODT) 4 mg disintegrating tablet   No No   Sig: Take 1 tablet (4 mg total) by mouth every 6 (six) hours as needed for nausea or vomiting   Patient not taking: Reported on 9/17/2022      Facility-Administered Medications: None       Past Medical History:   Diagnosis Date   • HIV (human immunodeficiency virus infection) (HCC)    • Seizures ([facility] )    • Smoker    • Syphilis        Past Surgical History:   Procedure Laterality Date   • HERNIA REPAIR         History reviewed  No pertinent family history  I have reviewed and agree with the history as documented  E-Cigarette/Vaping   • E-Cigarette Use Never User      E-Cigarette/Vaping Substances   • Nicotine No    • THC No    • CBD No    • Flavoring No    • Other No    • Unknown No      Social History     Tobacco Use   • Smoking status: Every Day     Packs/day: 1 00     Types: Cigarettes   • Smokeless tobacco: Never   Vaping Use   • Vaping Use: Never used   Substance Use Topics   • Alcohol use:  Yes Alcohol/week: 2 0 standard drinks     Types: 1 Glasses of wine, 1 Cans of beer per week     Comment: socially   • Drug use: Yes     Frequency: 1 0 times per week     Types: Marijuana       Review of Systems   Constitutional: Negative for appetite change, chills, diaphoresis, fatigue and fever  Eyes: Negative for visual disturbance  Respiratory: Negative for cough, chest tightness, shortness of breath and wheezing  Cardiovascular: Negative for chest pain, palpitations and leg swelling  Gastrointestinal: Negative for abdominal pain, constipation, diarrhea, nausea and vomiting  Musculoskeletal: Negative for neck pain and neck stiffness  Skin: Negative for rash and wound  Neurological: Negative for dizziness, light-headedness, numbness and headaches  Psychiatric/Behavioral: Negative for agitation, confusion, hallucinations and suicidal ideas  The patient is not nervous/anxious and is not hyperactive  All other systems reviewed and are negative  Physical Exam  Physical Exam  Vitals and nursing note reviewed  Constitutional:       General: He is not in acute distress  Appearance: Normal appearance  He is normal weight  He is not ill-appearing, toxic-appearing or diaphoretic  HENT:      Head: Normocephalic and atraumatic  Right Ear: Tympanic membrane, ear canal and external ear normal  There is no impacted cerumen  Left Ear: Tympanic membrane, ear canal and external ear normal  There is no impacted cerumen  Ears:      Comments: No german sign or hemotympanum bilaterally     Nose: Nose normal  No congestion or rhinorrhea  Mouth/Throat:      Mouth: Mucous membranes are moist       Pharynx: No oropharyngeal exudate or posterior oropharyngeal erythema  Eyes:      General: No scleral icterus  Right eye: No discharge  Left eye: No discharge        Conjunctiva/sclera: Conjunctivae normal       Comments: Negative raccoon eyes bilaterally  Pinpoint pupils; equal bilaterally   Neck:      Comments: No tenderness to palpation to the cervical spine or paracervical musculature bilaterally; no midline step-offs or deformities to the cervical spine  Cardiovascular:      Rate and Rhythm: Normal rate and regular rhythm  Pulses: Normal pulses  Heart sounds: Normal heart sounds  No murmur heard  No friction rub  No gallop  Pulmonary:      Effort: Pulmonary effort is normal  No respiratory distress  Breath sounds: Normal breath sounds  No stridor  No wheezing, rhonchi or rales  Chest:      Chest wall: No tenderness  Abdominal:      General: Abdomen is flat  There is no distension  Palpations: Abdomen is soft  Tenderness: There is no abdominal tenderness  There is no right CVA tenderness, left CVA tenderness, guarding or rebound  Musculoskeletal:         General: Normal range of motion  Cervical back: Normal range of motion  No rigidity  Right lower leg: No edema  Left lower leg: No edema  Skin:     General: Skin is warm and dry  Capillary Refill: Capillary refill takes less than 2 seconds  Coloration: Skin is not jaundiced or pale  Neurological:      General: No focal deficit present  Mental Status: He is alert and oriented to person, place, and time  Mental status is at baseline        Comments: Moving all extremities equally, independently, and without difficulty   Psychiatric:         Mood and Affect: Mood normal          Behavior: Behavior normal          Vital Signs  ED Triage Vitals [01/26/23 0948]   Temperature Pulse Respirations Blood Pressure SpO2   98 5 °F (36 9 °C) (!) 128 16 135/91 100 %      Temp Source Heart Rate Source Patient Position - Orthostatic VS BP Location FiO2 (%)   Oral Monitor Lying Left arm --      Pain Score       --           Vitals:    01/26/23 0948   BP: 135/91   Pulse: (!) 128   Patient Position - Orthostatic VS: Lying         Visual Acuity      ED Medications  Medications levetiracetam in NaCl (KEPPRA) infusion 1,000 mg (has no administration in time range)       Diagnostic Studies  Results Reviewed     Procedure Component Value Units Date/Time    CBC and differential [[id number]]     Lab Status: No result Specimen: Blood     Comprehensive metabolic panel [[id number]]     Lab Status: No result Specimen: Blood     Levetiracetam level [[id number]]     Lab Status: No result Specimen: Blood     Rapid drug screen, urine [[id number]]     Lab Status: No result Specimen: Urine                  No orders to display              Procedures  Procedures         ED Course  ED Course as of 01/26/23 1535   Thu Jan 26, 2023   1321 Patient has been observed for a total of 4 hours  He received Narcan approximately 20 minutes prior to arrival   No evidence of respiratory depression  Patient is still awake and has not had any seizure-like activity while here  Dispo planning  Medical Decision Making  This is a 40-year-old male presenting to the emergency department today status post opioid overdose  The patient was found unresponsive outside for a brief period of time and was given Narcan prior to arrival which resulted in him regaining consciousness  Vital signs are stable  He is well-known to the emergency department for seizure disorders  Physical exam is at the patient's baseline  He moves his bilateral upper and lower extremities without difficulty  He is alert and oriented to person, place, and time  He has no evidence of trauma throughout his body on examination  CBC and CMP are within normal limits  The patient was Keppra loaded while here in the emergency department    He had Keppra and Biktarvy sent to his pharmacy just a few days ago; patient notes he has not yet picked it up but it should still be at the pharmacy and therefore I will not resend additional   The patient was observed for 4 hours after given Narcan and had a normal respiratory status and was not lethargic  No indication to redose Narcan  The patient is stable for discharge at this time  Follow-up outpatient and  medications  Stop using drugs  Strict return precautions were given  Recommend PCP follow-up as soon as possible  The patient and/or patient's proxy verify their understanding and agree to the plan at this time  All questions answered to the patient and/or their proxy's satisfaction  All labs reviewed and utilized in the medical decision making process  All radiology studies independently viewed by me and interpreted by the radiologist  Portions of the record may have been created with voice recognition software   Occasional wrong word or "sound a like" substitutions may have occurred due to the inherent limitations of voice recognition software   Read the chart carefully and recognize, using context, where substitutions have occurred  Opioid overdose, undetermined intent, initial encounter [facility]): complicated acute illness or injury  Amount and/or Complexity of Data Reviewed  Labs: ordered  Decision-making details documented in ED Course  Risk  Prescription drug management  Disposition  Final diagnoses:   None     ED Disposition     None      Follow-up Information    None         Patient's Medications   Discharge Prescriptions    No medications on file       No discharge procedures on file      PDMP Review       Value Time User    PDMP Reviewed  Yes 5/27/2020 12:37 AM LEBRON Carpenter          ED Provider  Electronically Signed by           Joey Myers PA-C  01/26/23 8138

## 2023-01-26 NOTE — DISCHARGE INSTRUCTIONS
Please return to the emergency department for worsening symptoms including chest pain, shortness of breath, dizziness, lightheadedness, fever greater than 103, severe pain, inability to walk, fainting episodes, etc  Please follow-up with your family practice provider as soon as possible  Take your seizure medications and your HIV medications as directed  You are prescribed them recently by an emergency department physician  Please continue taking them or pick them up from the pharmacy as soon as possible

## 2023-01-30 LAB — LEVETIRACETAM SERPL-MCNC: NORMAL UG/ML

## 2023-02-01 ENCOUNTER — HOSPITAL ENCOUNTER (EMERGENCY)
Facility: HOSPITAL | Age: 38
Discharge: HOME | End: 2023-02-01
Attending: EMERGENCY MEDICINE

## 2023-02-01 VITALS
RESPIRATION RATE: 16 BRPM | HEART RATE: 96 BPM | TEMPERATURE: 98.4 F | BODY MASS INDEX: 27.6 KG/M2 | DIASTOLIC BLOOD PRESSURE: 57 MMHG | HEIGHT: 62 IN | OXYGEN SATURATION: 97 % | WEIGHT: 150 LBS | SYSTOLIC BLOOD PRESSURE: 98 MMHG

## 2023-02-01 DIAGNOSIS — R53.83 TIREDNESS: Primary | ICD-10-CM

## 2023-02-01 NOTE — ED NOTES
Discharge instructions reviewed with pt  Pt verbalized understanding  And has no further questions at this time  Pt ambulatory off unit with steady gait        Shari Allen RN  02/01/23 1329

## 2023-02-01 NOTE — ED NOTES
Pt remains sleepy, arouses to verbal and tactile stimuli   Continues to offer no c/o      Bang Radford RN  02/01/23 8726

## 2023-02-22 ENCOUNTER — HOSPITAL ENCOUNTER (EMERGENCY)
Facility: HOSPITAL | Age: 38
Discharge: HOME | End: 2023-02-22
Attending: EMERGENCY MEDICINE

## 2023-02-22 VITALS
SYSTOLIC BLOOD PRESSURE: 150 MMHG | TEMPERATURE: 98.4 F | DIASTOLIC BLOOD PRESSURE: 112 MMHG | RESPIRATION RATE: 18 BRPM | HEART RATE: 115 BPM | OXYGEN SATURATION: 97 %

## 2023-02-22 DIAGNOSIS — M62.838 MUSCLE SPASM: Primary | ICD-10-CM

## 2023-02-22 LAB
ANION GAP SERPL CALCULATED.3IONS-SCNC: 9 MMOL/L (ref 4–13)
BASOPHILS # BLD AUTO: 0.04 THOUSANDS/ÂΜL (ref 0–0.1)
BASOPHILS NFR BLD AUTO: 1 % (ref 0–1)
BUN SERPL-MCNC: 10 MG/DL (ref 5–25)
CALCIUM SERPL-MCNC: 9.4 MG/DL (ref 8.4–10.2)
CHLORIDE SERPL-SCNC: 102 MMOL/L (ref 96–108)
CK MB SERPL-MCNC: 13.4 NG/ML (ref 0.6–6.3)
CK MB SERPL-MCNC: 3.1 % (ref 0–2.5)
CK SERPL-CCNC: 430 U/L (ref 39–308)
CO2 SERPL-SCNC: 26 MMOL/L (ref 21–32)
CREAT SERPL-MCNC: 0.76 MG/DL (ref 0.6–1.3)
EOSINOPHIL # BLD AUTO: 0.04 THOUSAND/ÂΜL (ref 0–0.61)
EOSINOPHIL NFR BLD AUTO: 1 % (ref 0–6)
ERYTHROCYTE [DISTWIDTH] IN BLOOD BY AUTOMATED COUNT: 14.1 % (ref 11.6–15.1)
FLUAV RNA RESP QL NAA+PROBE: NEGATIVE
FLUBV RNA RESP QL NAA+PROBE: NEGATIVE
GFR SERPL CREATININE-BSD FRML MDRD: 116 ML/MIN/1.73SQ M
GLUCOSE SERPL-MCNC: 118 MG/DL (ref 65–140)
HCT VFR BLD AUTO: 45.7 % (ref 36.5–49.3)
HGB BLD-MCNC: 15.2 G/DL (ref 12–17)
IMM GRANULOCYTES # BLD AUTO: 0.01 THOUSAND/UL (ref 0–0.2)
IMM GRANULOCYTES NFR BLD AUTO: 0 % (ref 0–2)
LYMPHOCYTES # BLD AUTO: 2.04 THOUSANDS/ÂΜL (ref 0.6–4.47)
LYMPHOCYTES NFR BLD AUTO: 27 % (ref 14–44)
MCH RBC QN AUTO: 29 PG (ref 26.8–34.3)
MCHC RBC AUTO-ENTMCNC: 33.3 G/DL (ref 31.4–37.4)
MCV RBC AUTO: 87 FL (ref 82–98)
MONOCYTES # BLD AUTO: 0.56 THOUSAND/ÂΜL (ref 0.17–1.22)
MONOCYTES NFR BLD AUTO: 8 % (ref 4–12)
NEUTROPHILS # BLD AUTO: 4.81 THOUSANDS/ÂΜL (ref 1.85–7.62)
NEUTS SEG NFR BLD AUTO: 63 % (ref 43–75)
NRBC BLD AUTO-RTO: 0 /100 WBCS
PLATELET # BLD AUTO: 209 THOUSANDS/UL (ref 149–390)
PMV BLD AUTO: 10 FL (ref 8.9–12.7)
POTASSIUM SERPL-SCNC: 3.7 MMOL/L (ref 3.5–5.3)
RBC # BLD AUTO: 5.24 MILLION/UL (ref 3.88–5.62)
RSV RNA RESP QL NAA+PROBE: NEGATIVE
SARS-COV-2 RNA RESP QL NAA+PROBE: NEGATIVE
SODIUM SERPL-SCNC: 137 MMOL/L (ref 135–147)
WBC # BLD AUTO: 7.5 THOUSAND/UL (ref 4.31–10.16)

## 2023-02-23 NOTE — ED PROVIDER NOTES
History  Chief Complaint   Patient presents with   • Spasms     Patient presents for a few days of muscle spasms  Per EMS patient had no muscle spasms on the way to the hospital  Patient is homeless and doesn't get along with his family, has no place to stay  26-year-old male with a history of HIV, seizures, currently homeless, presents via EMS with complaint of generalized muscle cramping  States that he feels like he is going to have a seizure and also would like a COVID test   Denies recent fever chills cough  Cannot further characterize muscle spasms  States that he has had muscle spasms since 2018  Prior to Admission Medications   Prescriptions Last Dose Informant Patient Reported? Taking?    Cholecalciferol 25 MCG (1000 UT) tablet   Yes No   Sig: Take 1,000 Units by mouth   acetaminophen (TYLENOL) 500 mg tablet   No No   Sig: Take 1 tablet (500 mg total) by mouth every 6 (six) hours as needed for mild pain or moderate pain   bictegravir-emtricitab-tenofovir alafenamide (Biktarvy) -25 MG tablet   Yes No   Sig: Take 1 tablet by mouth daily   bictegravir-emtricitab-tenofovir alafenamide (Biktarvy) -25 MG tablet   No No   Sig: Take 1 tablet by mouth daily with breakfast   cyclobenzaprine (FLEXERIL) 10 mg tablet   No No   Sig: Take 1 tablet (10 mg total) by mouth 2 (two) times a day as needed for muscle spasms   Patient not taking: Reported on 7/2/2022   cyclobenzaprine (FLEXERIL) 10 mg tablet   No No   Sig: Take 1 tablet (10 mg total) by mouth 2 (two) times a day as needed for muscle spasms   levETIRAcetam (Keppra) 750 mg tablet   No No   Sig: Take 1 tablet (750 mg total) by mouth 2 (two) times a day   ondansetron (Zofran ODT) 4 mg disintegrating tablet   No No   Sig: Take 1 tablet (4 mg total) by mouth every 6 (six) hours as needed for nausea or vomiting   Patient not taking: Reported on 9/17/2022      Facility-Administered Medications: None       Past Medical History:   Diagnosis Date • HIV (human immunodeficiency virus infection) ([facility] )    • Seizures ([facility] )    • Smoker    • Syphilis        Past Surgical History:   Procedure Laterality Date   • HERNIA REPAIR         No family history on file  I have reviewed and agree with the history as documented  E-Cigarette/Vaping   • E-Cigarette Use Never User      E-Cigarette/Vaping Substances   • Nicotine No    • THC No    • CBD No    • Flavoring No    • Other No    • Unknown No      Social History     Tobacco Use   • Smoking status: Every Day     Packs/day: 2 00     Years: 8 00     Pack years: 16 00     Types: Cigarettes   • Smokeless tobacco: Never   Vaping Use   • Vaping Use: Never used   Substance Use Topics   • Alcohol use: Yes     Alcohol/week: 2 0 standard drinks     Types: 1 Glasses of wine, 1 Cans of beer per week     Comment: socially   • Drug use: Yes     Frequency: 1 0 times per week     Types: Marijuana       Review of Systems   Constitutional: Negative for chills and fever  Gastrointestinal: Negative for diarrhea and vomiting  Musculoskeletal: Positive for myalgias  Neurological: Positive for seizures  Physical Exam  Physical Exam  Constitutional:       Appearance: Normal appearance  HENT:      Head: Normocephalic  Nose: Nose normal       Mouth/Throat:      Mouth: Mucous membranes are moist       Pharynx: Oropharynx is clear  Eyes:      Conjunctiva/sclera: Conjunctivae normal       Pupils: Pupils are equal, round, and reactive to light  Cardiovascular:      Rate and Rhythm: Normal rate and regular rhythm  Heart sounds: Normal heart sounds  Pulmonary:      Effort: Pulmonary effort is normal    Abdominal:      Palpations: Abdomen is soft  Musculoskeletal:         General: No swelling or deformity  Normal range of motion  Cervical back: Normal range of motion  Skin:     General: Skin is warm and dry  Neurological:      General: No focal deficit present        Mental Status: He is alert and oriented to person, place, and time  Psychiatric:         Mood and Affect: Mood normal          Behavior: Behavior normal          Vital Signs  ED Triage Vitals [02/22/23 0243]   Temperature Pulse Respirations Blood Pressure SpO2   98 4 °F (36 9 °C) (!) 115 18 (!) 150/112 97 %      Temp Source Heart Rate Source Patient Position - Orthostatic VS BP Location FiO2 (%)   Oral Monitor Lying Left arm --      Pain Score       --           Vitals:    02/22/23 0243   BP: (!) 150/112   Pulse: (!) 115   Patient Position - Orthostatic VS: Lying         Visual Acuity      ED Medications  Medications - No data to display    Diagnostic Studies  Results Reviewed     Procedure Component Value Units Date/Time    CKMB [[id number]]  (Abnormal) Collected: 02/22/23 0319    Lab Status: Final result Specimen: Blood from Arm, Right Updated: 02/22/23 0426     CK-MB Index 3 1 %      CK-MB 13 4 ng/mL     CK Total with Reflex CKMB [[id number]]  (Abnormal) Collected: 02/22/23 0319    Lab Status: Final result Specimen: Blood from Arm, Right Updated: 02/22/23 0406     Total  U/L     FLU/RSV/COVID - if FLU/RSV clinically relevant [[id number]]  (Normal) Collected: 02/22/23 0319    Lab Status: Final result Specimen: Nares from Nose Updated: 02/22/23 0400     SARS-CoV-2 Negative     INFLUENZA A PCR Negative     INFLUENZA B PCR Negative     RSV PCR Negative    Narrative:      FOR PEDIATRIC PATIENTS - copy/paste COVID Guidelines URL to browser: [email or web address]    SARS-CoV-2 assay is a Nucleic Acid Amplification assay intended for the  qualitative detection of nucleic acid from SARS-CoV-2 in nasopharyngeal  swabs  Results are for the presumptive identification of SARS-CoV-2 RNA  Positive results are indicative of infection with SARS-CoV-2, the virus  causing COVID-19, but do not rule out bacterial infection or co-infection  with other viruses   Laboratories within the [place] and its  territories are required to report all positive results to the appropriate  public health authorities  Negative results do not preclude SARS-CoV-2  infection and should not be used as the sole basis for treatment or other  patient management decisions  Negative results must be combined with  clinical observations, patient history, and epidemiological information  This test has not been FDA cleared or approved  This test has been authorized by FDA under an Emergency Use Authorization  (EUA)  This test is only authorized for the duration of time the  declaration that circumstances exist justifying the authorization of the  emergency use of an in vitro diagnostic tests for detection of SARS-CoV-2  virus and/or diagnosis of COVID-19 infection under section 564(b)(1) of  the Act, 21 U  S C  633XBH-4(J)(6), unless the authorization is terminated  or revoked sooner  The test has been validated but independent review by FDA  and CLIA is pending  Test performed using [employer] GeneXpert: This RT-PCR assay targets N2,  a region unique to SARS-CoV-2  A conserved region in the E-gene was chosen  for pan-Sarbecovirus detection which includes SARS-CoV-2  According to CMS-2020-01-R, this platform meets the definition of high-throughput technology      Basic metabolic panel [[id number]] Collected: 02/22/23 0319    Lab Status: Final result Specimen: Blood from Arm, Right Updated: 02/22/23 0355     Sodium 137 mmol/L      Potassium 3 7 mmol/L      Chloride 102 mmol/L      CO2 26 mmol/L      ANION GAP 9 mmol/L      BUN 10 mg/dL      Creatinine 0 76 mg/dL      Glucose 118 mg/dL      Calcium 9 4 mg/dL      eGFR 116 ml/min/1 73sq m     Narrative:      [place] guidelines for Chronic Kidney Disease (CKD):   •  Stage 1 with normal or high GFR (GFR > 90 mL/min/1 73 square meters)  •  Stage 2 Mild CKD (GFR = 60-89 mL/min/1 73 square meters)  •  Stage 3A Moderate CKD (GFR = 45-59 mL/min/1 73 square meters)  •  Stage 3B Moderate CKD (GFR = 30-44 mL/min/1 73 square meters)  •  Stage 4 Severe CKD (GFR = 15-29 mL/min/1 73 square meters)  •  Stage 5 End Stage CKD (GFR <15 mL/min/1 73 square meters)  Note: GFR calculation is accurate only with a steady state creatinine    CBC and differential [[id number]] Collected: 02/22/23 0319    Lab Status: Final result Specimen: Blood from Arm, Right Updated: 02/22/23 0323     WBC 7 50 Thousand/uL      RBC 5 24 Million/uL      Hemoglobin 15 2 g/dL      Hematocrit 45 7 %      MCV 87 fL      MCH 29 0 pg      MCHC 33 3 g/dL      RDW 14 1 %      MPV 10 0 fL      Platelets 985 Thousands/uL      nRBC 0 /100 WBCs      Neutrophils Relative 63 %      Immat GRANS % 0 %      Lymphocytes Relative 27 %      Monocytes Relative 8 %      Eosinophils Relative 1 %      Basophils Relative 1 %      Neutrophils Absolute 4 81 Thousands/µL      Immature Grans Absolute 0 01 Thousand/uL      Lymphocytes Absolute 2 04 Thousands/µL      Monocytes Absolute 0 56 Thousand/µL      Eosinophils Absolute 0 04 Thousand/µL      Basophils Absolute 0 04 Thousands/µL                  No orders to display              Procedures  Procedures         ED Course       70-year-old male presenting with complaint of chronic generalized muscle cramping  Complicated by social situation of currently being homeless  Patient has no specific acute complaints  Screening labs obtained show normal CBC and electrolytes, CK of 430 which is well below the threshold for rhabdomyolysis  Patient resting comfortably during ED stay  Viral testing sent per patient request although he is not symptomatic  Stable for discharge                                        MDM    Disposition  Final diagnoses:   Muscle spasm     Time reflects when diagnosis was documented in both MDM as applicable and the Disposition within this note     Time User Action Codes Description Comment    2/22/2023  4:47 AM Opal Wiblurn Add [L24 089] Muscle spasm ED Disposition     ED Disposition   Discharge    Condition   Stable    Date/Time   Wed Feb 22, 2023  4:47 AM    Comment   Rashmi Chavez discharge to home/self care  Follow-up Information    None         Discharge Medication List as of 2/22/2023  4:49 AM      CONTINUE these medications which have NOT CHANGED    Details   acetaminophen (TYLENOL) 500 mg tablet Take 1 tablet (500 mg total) by mouth every 6 (six) hours as needed for mild pain or moderate pain, Starting Fri 9/30/2022, Print      !! bictegravir-emtricitab-tenofovir alafenamide (Biktarvy) -25 MG tablet Take 1 tablet by mouth daily, Starting Tue 3/22/2022, Historical Med      !! bictegravir-emtricitab-tenofovir alafenamide (Biktarvy) -25 MG tablet Take 1 tablet by mouth daily with breakfast, Starting Mon 1/23/2023, Normal      Cholecalciferol 25 MCG (1000 UT) tablet Take 1,000 Units by mouth, Historical Med      !! cyclobenzaprine (FLEXERIL) 10 mg tablet Take 1 tablet (10 mg total) by mouth 2 (two) times a day as needed for muscle spasms, Starting Sat 3/26/2022, Normal      !! cyclobenzaprine (FLEXERIL) 10 mg tablet Take 1 tablet (10 mg total) by mouth 2 (two) times a day as needed for muscle spasms, Starting Tue 10/4/2022, Normal      levETIRAcetam (Keppra) 750 mg tablet Take 1 tablet (750 mg total) by mouth 2 (two) times a day, Starting Mon 1/23/2023, Until Sun 4/23/2023, Normal      ondansetron (Zofran ODT) 4 mg disintegrating tablet Take 1 tablet (4 mg total) by mouth every 6 (six) hours as needed for nausea or vomiting, Starting Wed 8/31/2022, Normal       !! - Potential duplicate medications found  Please discuss with provider  No discharge procedures on file      PDMP Review       Value Time User    PDMP Reviewed  Yes 5/27/2020 12:37 AM LEBRON Bray          ED Provider  Electronically Signed by           Leigha Oswald MD  02/22/23 2045

## 2023-02-26 ENCOUNTER — HOSPITAL ENCOUNTER (EMERGENCY)
Facility: HOSPITAL | Age: 38
Discharge: HOME | End: 2023-02-27
Attending: EMERGENCY MEDICINE | Admitting: EMERGENCY MEDICINE

## 2023-02-26 VITALS
DIASTOLIC BLOOD PRESSURE: 82 MMHG | HEART RATE: 104 BPM | OXYGEN SATURATION: 99 % | TEMPERATURE: 98.3 F | RESPIRATION RATE: 16 BRPM | SYSTOLIC BLOOD PRESSURE: 133 MMHG

## 2023-02-26 DIAGNOSIS — M62.838 NECK MUSCLE SPASM: Primary | ICD-10-CM

## 2023-02-26 RX ORDER — ACETAMINOPHEN 325 MG/1
650 TABLET ORAL ONCE
Status: DISCONTINUED | OUTPATIENT
Start: 2023-02-27 | End: 2023-02-27 | Stop reason: HOSPADM

## 2023-03-01 NOTE — ED PROVIDER NOTES
history  Chief Complaint   Patient presents with   • Neck Pain     Pt presents to the ED with c/o B/L neck pain and feeling that he is going to have a seizure     40-year-old male presenting via EMS with complaint that he feels like he is going to have a seizure as well as complaint of chronic neck muscle spasms  No other acute complaints  Prior to Admission Medications   Prescriptions Last Dose Informant Patient Reported? Taking? Cholecalciferol 25 MCG (1000 UT) tablet   Yes No   Sig: Take 1,000 Units by mouth   acetaminophen (TYLENOL) 500 mg tablet   No No   Sig: Take 1 tablet (500 mg total) by mouth every 6 (six) hours as needed for mild pain or moderate pain   bictegravir-emtricitab-tenofovir alafenamide (Biktarvy) -25 MG tablet   Yes No   Sig: Take 1 tablet by mouth daily   bictegravir-emtricitab-tenofovir alafenamide (Biktarvy) -25 MG tablet   No No   Sig: Take 1 tablet by mouth daily with breakfast   cyclobenzaprine (FLEXERIL) 10 mg tablet   No No   Sig: Take 1 tablet (10 mg total) by mouth 2 (two) times a day as needed for muscle spasms   Patient not taking: Reported on 7/2/2022   cyclobenzaprine (FLEXERIL) 10 mg tablet   No No   Sig: Take 1 tablet (10 mg total) by mouth 2 (two) times a day as needed for muscle spasms   levETIRAcetam (Keppra) 750 mg tablet   No No   Sig: Take 1 tablet (750 mg total) by mouth 2 (two) times a day   ondansetron (Zofran ODT) 4 mg disintegrating tablet   No No   Sig: Take 1 tablet (4 mg total) by mouth every 6 (six) hours as needed for nausea or vomiting   Patient not taking: Reported on 9/17/2022      Facility-Administered Medications: None       Past Medical History:   Diagnosis Date   • HIV (human immunodeficiency virus infection) ([facility])    • Seizures ([facility] )    • Smoker    • Syphilis        Past Surgical History:   Procedure Laterality Date   • HERNIA REPAIR         History reviewed  No pertinent family history    I have reviewed and agree with the history as documented  E-Cigarette/Vaping   • E-Cigarette Use Never User      E-Cigarette/Vaping Substances   • Nicotine No    • THC No    • CBD No    • Flavoring No    • Other No    • Unknown No      Social History     Tobacco Use   • Smoking status: Every Day     Packs/day: 2 00     Years: 8 00     Pack years: 16 00     Types: Cigarettes   • Smokeless tobacco: Never   Vaping Use   • Vaping Use: Never used   Substance Use Topics   • Alcohol use: Yes     Alcohol/week: 2 0 standard drinks     Types: 1 Glasses of wine, 1 Cans of beer per week     Comment: socially   • Drug use: Yes     Frequency: 1 0 times per week     Types: Marijuana       Review of Systems   Constitutional: Negative for fever  Musculoskeletal: Positive for myalgias and neck pain  Neurological: Negative for seizures  Physical Exam  Physical Exam  Constitutional:       Appearance: Normal appearance  Comments: Sleeping with blanket over head but arouses easily to voice   HENT:      Head: Normocephalic and atraumatic  Nose: Nose normal       Mouth/Throat:      Mouth: Mucous membranes are moist    Eyes:      Conjunctiva/sclera: Conjunctivae normal       Pupils: Pupils are equal, round, and reactive to light  Cardiovascular:      Rate and Rhythm: Normal rate and regular rhythm  Pulmonary:      Effort: Pulmonary effort is normal    Abdominal:      General: Abdomen is flat  Musculoskeletal:         General: Normal range of motion  Cervical back: Normal range of motion and neck supple  Skin:     General: Skin is warm and dry  Neurological:      General: No focal deficit present  Mental Status: He is alert and oriented to person, place, and time     Psychiatric:         Mood and Affect: Mood normal          Vital Signs  ED Triage Vitals [02/26/23 2322]   Temperature Pulse Respirations Blood Pressure SpO2   98 3 °F (36 8 °C) 104 16 133/82 99 %      Temp Source Heart Rate Source Patient Position - Orthostatic VS BP Location FiO2 (%)   Oral Monitor Lying Right arm --      Pain Score       8           Vitals:    02/26/23 2322   BP: 133/82   Pulse: 104   Patient Position - Orthostatic VS: Lying         Visual Acuity      ED Medications  Medications - No data to display    Diagnostic Studies  Results Reviewed     None                 No orders to display              Procedures  Procedures         ED Course     80-year-old male with history of seizures presents with complaint that he "feels like he is going to have a seizure"  Patient also complaining of neck muscle spasms although he was sleeping comfortably when I entered the room and did not appear particularly interested in giving history of his symptoms  Suspect that patient primarily presented to the ED for a warm place to sleep as he has a long history of similar visits in the past   P o  analgesia given for neck muscle cramps  No work-up required for seizure complaint  Patient denies actually having any seizures recently and has an adequate supply of his antiepileptics  He is overall well-appearing with normal vitals  Stable for discharge  SBIRT 20yo+    Flowsheet Row Most Recent Value   SBIRT (23 yo +)    In order to provide better care to our patients, we are screening all of our patients for alcohol and drug use  Would it be okay to ask you these screening questions? No Filed at: 02/26/2023 2332                    MDM    Disposition  Final diagnoses:   Neck muscle spasm     Time reflects when diagnosis was documented in both MDM as applicable and the Disposition within this note     Time User Action Codes Description Comment    2/26/2023 11:45 PM Yazan Roberts Add [V22 331] Neck muscle spasm       ED Disposition     ED Disposition   Discharge    Condition   Stable    Date/Time   Sun Feb 26, 2023 2345 [id number] [facility] discharge to home/self care                 Follow-up Information    None         Discharge Medication List as of 2/26/2023 11:45 PM      CONTINUE these medications which have NOT CHANGED    Details   acetaminophen (TYLENOL) 500 mg tablet Take 1 tablet (500 mg total) by mouth every 6 (six) hours as needed for mild pain or moderate pain, Starting Fri 9/30/2022, Print      !! bictegravir-emtricitab-tenofovir alafenamide (Biktarvy) -25 MG tablet Take 1 tablet by mouth daily, Starting Tue 3/22/2022, Historical Med      !! bictegravir-emtricitab-tenofovir alafenamide (Biktarvy) -25 MG tablet Take 1 tablet by mouth daily with breakfast, Starting Mon 1/23/2023, Normal      Cholecalciferol 25 MCG (1000 UT) tablet Take 1,000 Units by mouth, Historical Med      !! cyclobenzaprine (FLEXERIL) 10 mg tablet Take 1 tablet (10 mg total) by mouth 2 (two) times a day as needed for muscle spasms, Starting Sat 3/26/2022, Normal      !! cyclobenzaprine (FLEXERIL) 10 mg tablet Take 1 tablet (10 mg total) by mouth 2 (two) times a day as needed for muscle spasms, Starting Tue 10/4/2022, Normal      levETIRAcetam (Keppra) 750 mg tablet Take 1 tablet (750 mg total) by mouth 2 (two) times a day, Starting Mon 1/23/2023, Until Sun 4/23/2023, Normal      ondansetron (Zofran ODT) 4 mg disintegrating tablet Take 1 tablet (4 mg total) by mouth every 6 (six) hours as needed for nausea or vomiting, Starting Wed 8/31/2022, Normal       !! - Potential duplicate medications found  Please discuss with provider  No discharge procedures on file      PDMP Review       Value Time User    PDMP Reviewed  Yes 5/27/2020 12:37 AM LEBRON Carbajal          ED Provider  Electronically Signed by           Jesus Olszewski, MD  03/01/23 3822

## 2023-03-05 ENCOUNTER — HOSPITAL ENCOUNTER (EMERGENCY)
Facility: HOSPITAL | Age: 38
Discharge: HOME | End: 2023-03-05
Attending: EMERGENCY MEDICINE

## 2023-03-05 VITALS
OXYGEN SATURATION: 99 % | HEART RATE: 91 BPM | SYSTOLIC BLOOD PRESSURE: 141 MMHG | RESPIRATION RATE: 20 BRPM | DIASTOLIC BLOOD PRESSURE: 99 MMHG | TEMPERATURE: 97.8 F

## 2023-03-05 DIAGNOSIS — T69.9XXA COLD EXPOSURE, INITIAL ENCOUNTER: Primary | ICD-10-CM

## 2023-03-05 DIAGNOSIS — Z87.898 HISTORY OF SEIZURES: ICD-10-CM

## 2023-03-05 RX ORDER — LEVETIRACETAM 500 MG/1
500 TABLET ORAL ONCE
Status: COMPLETED | OUTPATIENT
Start: 2023-03-05 | End: 2023-03-05

## 2023-03-05 RX ORDER — LEVETIRACETAM 750 MG/1
750 TABLET ORAL EVERY 12 HOURS SCHEDULED
Qty: 42 TABLET | Refills: 0 | Status: SHIPPED | OUTPATIENT
Start: 2023-03-05 | End: 2023-03-26

## 2023-03-05 RX ADMIN — LEVETIRACETAM 500 MG: 500 TABLET, FILM COATED ORAL at 07:06

## 2023-03-05 NOTE — ED PROVIDER NOTES
History  Chief Complaint   Patient presents with   • Medical Problem     Patient is homeless, arrives via ems complaining of not being able to feel his toes because he thinks they are cold  Expresses concern that he thinks the cold will trigger a seizure  Patient is a 63-year-old male seen in the emergency department complaining of cold feet  Per EMS, patient was found outside [facility] this evening  Patient states that he was concerned that he might have a seizure  Patient states that he has not had his seizure medication in approximately 3 weeks  Patient notes no headache, chest pain, shortness of breath, abdominal pain, nausea, vomiting, weakness  Patient notes no definite clear exacerbating or alleviating factors for his symptoms  Prior to Admission Medications   Prescriptions Last Dose Informant Patient Reported? Taking?    Cholecalciferol 25 MCG (1000 UT) tablet   Yes No   Sig: Take 1,000 Units by mouth   acetaminophen (TYLENOL) 500 mg tablet   No No   Sig: Take 1 tablet (500 mg total) by mouth every 6 (six) hours as needed for mild pain or moderate pain   bictegravir-emtricitab-tenofovir alafenamide (Biktarvy) -25 MG tablet   Yes No   Sig: Take 1 tablet by mouth daily   bictegravir-emtricitab-tenofovir alafenamide (Biktarvy) -25 MG tablet   No No   Sig: Take 1 tablet by mouth daily with breakfast   cyclobenzaprine (FLEXERIL) 10 mg tablet   No No   Sig: Take 1 tablet (10 mg total) by mouth 2 (two) times a day as needed for muscle spasms   Patient not taking: Reported on 7/2/2022   cyclobenzaprine (FLEXERIL) 10 mg tablet   No No   Sig: Take 1 tablet (10 mg total) by mouth 2 (two) times a day as needed for muscle spasms   levETIRAcetam (Keppra) 750 mg tablet   No No   Sig: Take 1 tablet (750 mg total) by mouth 2 (two) times a day   ondansetron (Zofran ODT) 4 mg disintegrating tablet   No No   Sig: Take 1 tablet (4 mg total) by mouth every 6 (six) hours as needed for nausea or vomiting Patient not taking: Reported on 9/17/2022      Facility-Administered Medications: None       Past Medical History:   Diagnosis Date   • HIV (human immunodeficiency virus infection) ([facility] )    • Seizures ([facility] )    • Smoker    • Syphilis        Past Surgical History:   Procedure Laterality Date   • HERNIA REPAIR         History reviewed  No pertinent family history  I have reviewed and agree with the history as documented  E-Cigarette/Vaping   • E-Cigarette Use Never User      E-Cigarette/Vaping Substances   • Nicotine No    • THC No    • CBD No    • Flavoring No    • Other No    • Unknown No      Social History     Tobacco Use   • Smoking status: Every Day     Packs/day: 2 00     Years: 8 00     Pack years: 16 00     Types: Cigarettes   • Smokeless tobacco: Never   Vaping Use   • Vaping Use: Never used   Substance Use Topics   • Alcohol use: Yes     Alcohol/week: 2 0 standard drinks     Types: 1 Glasses of wine, 1 Cans of beer per week     Comment: socially   • Drug use: Yes     Frequency: 1 0 times per week     Types: Marijuana       Review of Systems   Constitutional: Negative for activity change and fever  HENT: Negative for ear pain and sore throat  Eyes: Negative for pain and visual disturbance  Respiratory: Negative for cough and shortness of breath  Cardiovascular: Negative for chest pain and palpitations  Gastrointestinal: Negative for abdominal pain and vomiting  Genitourinary: Negative for decreased urine volume and difficulty urinating  Musculoskeletal: Negative for gait problem and neck stiffness  Cold feet   Skin: Negative for color change and rash  Neurological: Negative for syncope and weakness  History of seizures   All other systems reviewed and are negative  Physical Exam  Physical Exam  Vitals and nursing note reviewed  Constitutional:       General: He is not in acute distress  Appearance: He is well-developed     HENT:      Head: Normocephalic and atraumatic  Right Ear: External ear normal       Left Ear: External ear normal       Nose: Nose normal       Mouth/Throat:      Pharynx: Oropharynx is clear  Eyes:      General: No scleral icterus  Conjunctiva/sclera: Conjunctivae normal    Cardiovascular:      Rate and Rhythm: Normal rate and regular rhythm  Heart sounds: No murmur heard  Pulmonary:      Effort: Pulmonary effort is normal  No respiratory distress  Breath sounds: Normal breath sounds  Abdominal:      General: There is no distension  Palpations: Abdomen is soft  Tenderness: There is no abdominal tenderness  Musculoskeletal:         General: No swelling, deformity or signs of injury  Cervical back: Normal range of motion and neck supple  Comments: cool extremities, normal capillary refill   Skin:     General: Skin is dry  Capillary Refill: Capillary refill takes less than 2 seconds  Coloration: Skin is not jaundiced  Neurological:      Mental Status: He is alert  Mental status is at baseline  Cranial Nerves: No cranial nerve deficit  Motor: No weakness  Psychiatric:         Mood and Affect: Mood normal          Thought Content:  Thought content normal          Vital Signs  ED Triage Vitals [03/05/23 0645]   Temperature Pulse Respirations Blood Pressure SpO2   97 8 °F (36 6 °C) 91 20 141/99 99 %      Temp Source Heart Rate Source Patient Position - Orthostatic VS BP Location FiO2 (%)   Oral Monitor -- Right arm --      Pain Score       7           Vitals:    03/05/23 0645   BP: 141/99   Pulse: 91         Visual Acuity      ED Medications  Medications   levETIRAcetam (KEPPRA) tablet 500 mg (500 mg Oral Given 3/5/23 0706)       Diagnostic Studies  Results Reviewed     None                 No orders to display              Procedures  Procedures         ED Course                                             Medical Decision Making  Patient is a 71-year-old male seen in the emergency department with concern for exposure to the cold, stating that he has not had a seizure medication in approximately 3 weeks  Patient was restarted on his seizure medication  Patient was provided warm blankets, and allowed to rest in the emergency department  Patient declined warm soaks for his feet  Plan to have patient follow up with PCP/outpatient providers  Patient stable for discharge home  Discharge instructions were reviewed with patient  Risk  Prescription drug management  Disposition  Final diagnoses:   Cold exposure, initial encounter   History of seizures     Time reflects when diagnosis was documented in both MDM as applicable and the Disposition within this note     Time User Action Codes Description Comment    3/5/2023  6:47 AM Heather Parson  [id number]] Cold exposure, initial encounter     3/5/2023  6:57 AM Jean Ayala Add [Y47 008] History of seizures       ED Disposition     ED Disposition   Discharge    Condition   Stable    Date/Time   Sun Mar 5, 2023  6:47 AM    Comment   Vera Bosworth discharge to home/self care                 Follow-up Information     Follow up With Specialties Details Why Contact Info Additional Information    Your primary doctor  Call in 1 day       [place] Call  As needed [address] [id number]  [address]  , [place], [place], [address] [place] [facility]  As needed [address], [address] [address]  [address] [place] [address] Neurology [address] [place] [address], [address] [place] [address], [address] [place] [address]          Patient's Medications   Discharge Prescriptions    LEVETIRACETAM (KEPPRA) 750 MG TABLET    Take 1 tablet (750 mg total) by mouth every 12 (twelve) hours for 21 days       Start Date: 3/5/2023  End Date: 3/26/2023       Order Dose: 750 mg       Quantity: 42 tablet    Refills: 0       No discharge procedures on file      PDMP Review       Value Time User    PDMP Reviewed  Yes 5/27/2020 12:37 AM LEBRON Noel          ED Provider  Electronically Signed by           Joie Archibald MD  03/05/23 4963

## 2023-03-19 ENCOUNTER — HOSPITAL ENCOUNTER (EMERGENCY)
Facility: HOSPITAL | Age: 38
Discharge: HOME | End: 2023-03-19
Attending: EMERGENCY MEDICINE

## 2023-03-19 VITALS
SYSTOLIC BLOOD PRESSURE: 116 MMHG | TEMPERATURE: 97.9 F | DIASTOLIC BLOOD PRESSURE: 63 MMHG | HEART RATE: 99 BPM | RESPIRATION RATE: 18 BRPM | OXYGEN SATURATION: 99 %

## 2023-03-19 DIAGNOSIS — Z59.00 HOMELESSNESS: ICD-10-CM

## 2023-03-19 DIAGNOSIS — R56.9 SEIZURE-LIKE ACTIVITY (HCC): Primary | ICD-10-CM

## 2023-03-19 DIAGNOSIS — Z91.14 NON COMPLIANCE W MEDICATION REGIMEN: ICD-10-CM

## 2023-03-19 RX ORDER — LEVETIRACETAM 10 MG/ML
1000 INJECTION INTRAVASCULAR ONCE
Status: COMPLETED | OUTPATIENT
Start: 2023-03-19 | End: 2023-03-19

## 2023-03-19 RX ADMIN — SODIUM CHLORIDE 1000 ML: 0.9 INJECTION, SOLUTION INTRAVENOUS at 09:58

## 2023-03-19 RX ADMIN — LEVETIRACETAM 1000 MG: 10 INJECTION INTRAVASCULAR at 09:41

## 2023-03-19 SDOH — ECONOMIC STABILITY - HOUSING INSECURITY: HOMELESSNESS UNSPECIFIED: Z59.00

## 2023-03-19 NOTE — ED PROVIDER NOTES
History  Chief Complaint   Patient presents with   • Seizure - Prior Hx Of     EMS was called for seizure  Pt was at [facility], was sitting in a rosales and then just leaned over and slid onto the floor and was found sleeping  This is a 17-year-old male with past medical history significant for seizure disorder and HIV presenting to the emergency department today for witnessed seizure-like events  The patient was at a fast food restaurant earlier today and claims to have had a seizure  The patient has a long history of seizures and is well-known to the emergency department  "It was like all of my other seizures"  The patient denies any headache, dizziness, or lightheadedness  He denies hitting his head  He had no tongue biting or urinary incontinence  He denies any chest pain or shortness of breath  He has no nausea, vomiting, diarrhea, constipation, or urinary symptoms  He notes he has been taking his antiepileptics but not as prescribed and HAART therapy as prescribed  He denies any lower extremity swelling  He has no numbness or tingling  The patient denies other complaints at this time  History provided by:  Patient   used: No    Medical Problem  Quality:  Seizure-Like Activity  Associated symptoms: no abdominal pain, no chest pain, no congestion, no cough, no diarrhea, no ear pain, no fatigue, no fever, no headaches, no loss of consciousness, no myalgias, no nausea, no rash, no rhinorrhea, no shortness of breath, no sore throat, no vomiting and no wheezing        Prior to Admission Medications   Prescriptions Last Dose Informant Patient Reported? Taking?    Cholecalciferol 25 MCG (1000 UT) tablet   Yes No   Sig: Take 1,000 Units by mouth   acetaminophen (TYLENOL) 500 mg tablet   No No   Sig: Take 1 tablet (500 mg total) by mouth every 6 (six) hours as needed for mild pain or moderate pain   bictegravir-emtricitab-tenofovir alafenamide (Biktarvy) -25 MG tablet   Yes No   Sig: Take 1 tablet by mouth daily   bictegravir-emtricitab-tenofovir alafenamide (Biktarvy) -25 MG tablet   No No   Sig: Take 1 tablet by mouth daily with breakfast   cyclobenzaprine (FLEXERIL) 10 mg tablet   No No   Sig: Take 1 tablet (10 mg total) by mouth 2 (two) times a day as needed for muscle spasms   Patient not taking: Reported on 7/2/2022   cyclobenzaprine (FLEXERIL) 10 mg tablet   No No   Sig: Take 1 tablet (10 mg total) by mouth 2 (two) times a day as needed for muscle spasms   levETIRAcetam (Keppra) 750 mg tablet   No No   Sig: Take 1 tablet (750 mg total) by mouth every 12 (twelve) hours for 21 days   ondansetron (Zofran ODT) 4 mg disintegrating tablet   No No   Sig: Take 1 tablet (4 mg total) by mouth every 6 (six) hours as needed for nausea or vomiting   Patient not taking: Reported on 9/17/2022      Facility-Administered Medications: None       Past Medical History:   Diagnosis Date   • [facility]    • Seizures ([facility] )    • Smoker    • Syphilis        Past Surgical History:   Procedure Laterality Date   • HERNIA REPAIR         No family history on file  I have reviewed and agree with the history as documented  E-Cigarette/Vaping   • E-Cigarette Use Never User      E-Cigarette/Vaping Substances   • Nicotine No    • THC No    • CBD No    • Flavoring No    • Other No    • Unknown No      Social History     Tobacco Use   • Smoking status: Every Day     Packs/day: 2 00     Years: 8 00     Pack years: 16 00     Types: Cigarettes   • Smokeless tobacco: Never   • Tobacco comments:     "At least 2 packs a day" of cigarettes  Vaping Use   • Vaping Use: Never used   Substance Use Topics   • Alcohol use:  Yes     Alcohol/week: 2 0 standard drinks     Types: 1 Glasses of wine, 1 Cans of beer per week     Comment: socially   • Drug use: Yes     Frequency: 1 0 times per week     Types: Marijuana       Review of Systems   Constitutional: Negative for appetite change, chills, diaphoresis, fatigue and fever  HENT: Negative for congestion, ear pain, rhinorrhea and sore throat  Eyes: Negative for visual disturbance  Respiratory: Negative for cough, chest tightness, shortness of breath and wheezing  Cardiovascular: Negative for chest pain, palpitations and leg swelling  Gastrointestinal: Negative for abdominal pain, constipation, diarrhea, nausea and vomiting  Musculoskeletal: Negative for myalgias, neck pain and neck stiffness  Skin: Negative for rash and wound  Neurological: Positive for seizures  Negative for dizziness, loss of consciousness, light-headedness, numbness and headaches  Psychiatric/Behavioral: Negative for confusion  All other systems reviewed and are negative  Physical Exam  Physical Exam  Vitals and nursing note reviewed  Constitutional:       General: He is not in acute distress  Appearance: Normal appearance  He is normal weight  He is not ill-appearing, toxic-appearing or diaphoretic  HENT:      Head: Normocephalic and atraumatic  Right Ear: Tympanic membrane, ear canal and external ear normal  There is no impacted cerumen  Left Ear: Tympanic membrane, ear canal and external ear normal  There is no impacted cerumen  Ears:      Comments: Negative hemotympanum and german sign bilaterally     Nose: Nose normal  No congestion or rhinorrhea  Mouth/Throat:      Mouth: Mucous membranes are moist       Pharynx: No oropharyngeal exudate or posterior oropharyngeal erythema  Eyes:      General: No scleral icterus  Right eye: No discharge  Left eye: No discharge  Conjunctiva/sclera: Conjunctivae normal       Comments: Negative raccoon eyes bilaterally   Neck:      Comments: Nonmeningeal  Cardiovascular:      Rate and Rhythm: Normal rate and regular rhythm  Pulses: Normal pulses  Heart sounds: Normal heart sounds  No murmur heard  No friction rub  No gallop     Pulmonary: Effort: Pulmonary effort is normal  No respiratory distress  Breath sounds: Normal breath sounds  No stridor  No wheezing, rhonchi or rales  Chest:      Chest wall: No tenderness  Abdominal:      General: Abdomen is flat  There is no distension  Palpations: Abdomen is soft  Tenderness: There is no abdominal tenderness  There is no right CVA tenderness, left CVA tenderness, guarding or rebound  Musculoskeletal:         General: Normal range of motion  Cervical back: Normal range of motion  No rigidity  Right lower leg: No edema  Left lower leg: No edema  Skin:     General: Skin is warm and dry  Capillary Refill: Capillary refill takes less than 2 seconds  Coloration: Skin is not jaundiced or pale  Neurological:      General: No focal deficit present  Mental Status: He is alert and oriented to person, place, and time  Mental status is at baseline  Comments:  The patient is not postictal; he is alert and oriented to person, place, and time  5/5 strength in bilateral upper and lower extremities  Normal sensation of bilateral upper and lower extremities  The patient is able to smile, frown, puff out cheeks, and raise eyebrows bilaterally symmetrically without difficulty  Normal finger-to-nose examination  No cerebellar signs are dysdiadochokinesia  Overall, a normal neurologic assessment   Psychiatric:         Mood and Affect: Mood normal          Behavior: Behavior normal          Vital Signs  ED Triage Vitals [03/19/23 0921]   Temperature Pulse Respirations Blood Pressure SpO2   97 9 °F (36 6 °C) (!) 118 18 116/63 99 %      Temp Source Heart Rate Source Patient Position - Orthostatic VS BP Location FiO2 (%)   Oral Monitor -- -- --      Pain Score       No Pain           Vitals:    03/19/23 0921 03/19/23 1029   BP: 116/63    Pulse: (!) 118 99         Visual Acuity      ED Medications  Medications   levetiracetam in NaCl (KEPPRA) infusion 1,000 mg (1,000 mg Intravenous Given 3/19/23 0941)   sodium chloride 0 9 % bolus 1,000 mL (0 mL Intravenous Stopped 3/19/23 1044)       Diagnostic Studies  Results Reviewed     Procedure Component Value Units Date/Time    Levetiracetam level [[id number]] Collected: 03/19/23 0933    Lab Status: In process Specimen: Blood from Arm, Right Updated: 03/19/23 1007                 No orders to display              Procedures  Procedures         ED Course                               SBIRT 20yo+    Flowsheet Row Most Recent Value   SBIRT (23 yo +)    In order to provide better care to our patients, we are screening all of our patients for alcohol and drug use  Would it be okay to ask you these screening questions? Yes Filed at: 03/19/2023 0941   Initial Alcohol Screen: US AUDIT-C     1  How often do you have a drink containing alcohol? 0 Filed at: 03/19/2023 0941   2  How many drinks containing alcohol do you have on a typical day you are drinking? 0 Filed at: 03/19/2023 0941   3a  Male UNDER 65: How often do you have five or more drinks on one occasion? 0 Filed at: 03/19/2023 0941   3b  FEMALE Any Age, or MALE 65+: How often do you have 4 or more drinks on one occassion? 0 Filed at: 03/19/2023 0941   Audit-C Score 0 Filed at: 03/19/2023 4183   CALI: How many times in the past year have you    Used an illegal drug or used a prescription medication for non-medical reasons? Never Filed at: 03/19/2023 0941                    Medical Decision Making  This is a 40-year-old male presenting to the emergency department today for seizure-like activity  He is well-known to the emergency department for seizure-like activity  He notes he has been taking his medication but has not been taking it as prescribed  Vital signs of tachycardia  He is alert and oriented to person, place, and time on initial evaluation  Patient had no postictal period per EMS  He has a reassuring neurologic assessment  He is nonmeningeal without trauma to the head  Keppra level checked  Patient was given a dose of intravenous fluids and Keppra while here in the emergency department  Tachycardia improved with intravenous fluids  The patient is stable for discharge at this time  Continue taking medications only as prescribed  Follow-up outpatient  Strict return precautions were given  Recommend PCP follow-up as soon as possible  The patient and/or patient's proxy verify their understanding and agree to the plan at this time  All questions answered to the patient and/or their proxy's satisfaction  All labs reviewed and utilized in the medical decision making process (if labs were ordered)  Portions of the record may have been created with voice recognition software   Occasional wrong word or "sound a like" substitutions may have occurred due to the inherent limitations of voice recognition software   Read the chart carefully and recognize, using context, where substitutions have occurred  Non compliance w medication regimen: chronic illness or injury that poses a threat to life or bodily functions  Seizure-like activity [facility]): complicated acute illness or injury  Amount and/or Complexity of Data Reviewed  Independent Historian: EMS  External Data Reviewed: notes  Labs: ordered  Decision-making details documented in ED Course  Risk  Prescription drug management  Diagnosis or treatment significantly limited by social determinants of health             Disposition  Final diagnoses:   Seizure-like activity ([facility] )   Non compliance w medication regimen     Time reflects when diagnosis was documented in both MDM as applicable and the Disposition within this note     Time User Action Codes Description Comment    3/19/2023 10:30 AM Jens Pill Add [R56 9] Seizure-like activity ([facility] [address] )     3/19/2023 10:30 AM Gavi Perez Add [Z91 14] Non compliance w medication regimen       ED Disposition     ED Disposition   Discharge    Condition   Stable Date/Time   Sun Mar 19, 2023 [address] discharge to home/self care                 Follow-up Information     Follow up With Specialties Details Why Contact Info Additional [address]  Emergency Department Emergency Medicine Go to  If symptoms worsen [address] [facility],[address] [address]  [address] [facility] Emergency Department, [address], [address] Family Medicine Schedule an appointment as soon as possible for a visit   [address] [facility] [address] [phone]  [facility] PE[facility] PM[facility], [address], [place], [address] [place] [address], [address] [phone]          Discharge Medication List as of 3/19/2023 10:31 AM      CONTINUE these medications which have NOT CHANGED    Details   acetaminophen (TYLENOL) 500 mg tablet Take 1 tablet (500 mg total) by mouth every 6 (six) hours as needed for mild pain or moderate pain, Starting Fri 9/30/2022, Print      !! bictegravir-emtricitab-tenofovir alafenamide (Biktarvy) -25 MG tablet Take 1 tablet by mouth daily, Starting Tue 3/22/2022, Historical Med      !! bictegravir-emtricitab-tenofovir alafenamide (Biktarvy) -25 MG tablet Take 1 tablet by mouth daily with breakfast, Starting Mon 1/23/2023, Normal      Cholecalciferol 25 MCG (1000 UT) tablet Take 1,000 Units by mouth, Historical Med      !! cyclobenzaprine (FLEXERIL) 10 mg tablet Take 1 tablet (10 mg total) by mouth 2 (two) times a day as needed for muscle spasms, Starting Sat 3/26/2022, Normal      !! cyclobenzaprine (FLEXERIL) 10 mg tablet Take 1 tablet (10 mg total) by mouth 2 (two) times a day as needed for muscle spasms, Starting Tue 10/4/2022, Normal      levETIRAcetam (Keppra) 750 mg tablet Take 1 tablet (750 mg total) by mouth every 12 (twelve) hours for 21 days, Starting Sun 3/5/2023, Until Sun 3/26/2023, Normal      ondansetron (Zofran ODT) 4 mg disintegrating tablet Take 1 tablet (4 mg total) by mouth every 6 (six) hours as needed for nausea or vomiting, Starting Wed 8/31/2022, Normal       !! - Potential duplicate medications found  Please discuss with provider  No discharge procedures on file      PDMP Review       Value Time User    PDMP Reviewed  Yes 5/27/2020 12:37 AM LEBRON Aleman          ED Provider  Electronically Signed by           Tameka Flores PA-C  03/19/23 [address] Hunter Cunningham PA-C  03/19/23 9698

## 2023-03-22 ENCOUNTER — APPOINTMENT (EMERGENCY)
Dept: RADIOLOGY | Facility: HOSPITAL | Age: 38
End: 2023-03-22

## 2023-03-22 ENCOUNTER — HOSPITAL ENCOUNTER (EMERGENCY)
Facility: HOSPITAL | Age: 38
Discharge: HOME | End: 2023-03-22
Attending: EMERGENCY MEDICINE | Admitting: EMERGENCY MEDICINE

## 2023-03-22 VITALS
TEMPERATURE: 98.6 F | DIASTOLIC BLOOD PRESSURE: 67 MMHG | OXYGEN SATURATION: 98 % | RESPIRATION RATE: 17 BRPM | HEART RATE: 118 BPM | SYSTOLIC BLOOD PRESSURE: 113 MMHG

## 2023-03-22 DIAGNOSIS — M53.3 SACRAL PAIN: Primary | ICD-10-CM

## 2023-03-22 LAB — LEVETIRACETAM SERPL-MCNC: <2 UG/ML (ref 10–40)

## 2023-03-22 RX ORDER — ACETAMINOPHEN 325 MG/1
650 TABLET ORAL ONCE
Status: COMPLETED | OUTPATIENT
Start: 2023-03-22 | End: 2023-03-22

## 2023-03-22 RX ADMIN — ACETAMINOPHEN 650 MG: 325 TABLET, FILM COATED ORAL at 02:33

## 2023-03-22 NOTE — ED PROVIDER NOTES
History  Chief Complaint   Patient presents with   • Back Pain     PT C/o lower back pain near the sacrum  Pt states falling by losing balance because he received a new walker  51-year-old male brought in by EMS for evaluation of sacral pain after a fall  The patient has a history of ambulatory dysfunction and reports that he lost his balance while using his new walker  He denies head strike, loss of consciousness or using any antiplatelet or anticoagulation medications  He reports having sharp pain to his tailbone  He denies any other complaints at this time  He did not take any medications to treat his symptoms prior to arrival             Prior to Admission Medications   Prescriptions Last Dose Informant Patient Reported? Taking?    Cholecalciferol 25 MCG (1000 UT) tablet   Yes No   Sig: Take 1,000 Units by mouth   acetaminophen (TYLENOL) 500 mg tablet   No No   Sig: Take 1 tablet (500 mg total) by mouth every 6 (six) hours as needed for mild pain or moderate pain   bictegravir-emtricitab-tenofovir alafenamide (Biktarvy) -25 MG tablet   Yes No   Sig: Take 1 tablet by mouth daily   bictegravir-emtricitab-tenofovir alafenamide (Biktarvy) -25 MG tablet   No No   Sig: Take 1 tablet by mouth daily with breakfast   cyclobenzaprine (FLEXERIL) 10 mg tablet   No No   Sig: Take 1 tablet (10 mg total) by mouth 2 (two) times a day as needed for muscle spasms   Patient not taking: Reported on 7/2/2022   cyclobenzaprine (FLEXERIL) 10 mg tablet   No No   Sig: Take 1 tablet (10 mg total) by mouth 2 (two) times a day as needed for muscle spasms   levETIRAcetam (Keppra) 750 mg tablet   No No   Sig: Take 1 tablet (750 mg total) by mouth every 12 (twelve) hours for 21 days   ondansetron (Zofran ODT) 4 mg disintegrating tablet   No No   Sig: Take 1 tablet (4 mg total) by mouth every 6 (six) hours as needed for nausea or vomiting   Patient not taking: Reported on 9/17/2022      Facility-Administered Medications: None       Past Medical History:   Diagnosis Date   • HIV (human immunodeficiency virus infection) ([facility] [address] )    • Seizures ([facility] )    • Smoker    • Syphilis        Past Surgical History:   Procedure Laterality Date   • HERNIA REPAIR         History reviewed  No pertinent family history  I have reviewed and agree with the history as documented  E-Cigarette/Vaping   • E-Cigarette Use Current Every Day User      E-Cigarette/Vaping Substances   • Nicotine No    • THC No    • CBD No    • Flavoring Yes    • Other No    • Unknown No      Social History     Tobacco Use   • Smoking status: Every Day     Packs/day: 2 00     Years: 8 00     Pack years: 16 00     Types: Cigarettes   • Smokeless tobacco: Never   • Tobacco comments:     "At least 2 packs a day" of cigarettes  Vaping Use   • Vaping Use: Every day   • Substances: Flavoring   Substance Use Topics   • Alcohol use: Yes     Alcohol/week: 2 0 standard drinks     Types: 1 Glasses of wine, 1 Cans of beer per week     Comment: socially   • Drug use: Yes     Frequency: 1 0 times per week     Types: Marijuana       Review of Systems   Constitutional: Negative for chills and fever  HENT: Negative for ear pain and sore throat  Eyes: Negative for pain and visual disturbance  Respiratory: Negative for cough and shortness of breath  Cardiovascular: Negative for chest pain and palpitations  Gastrointestinal: Negative for abdominal pain and vomiting  Genitourinary: Negative for dysuria and hematuria  Musculoskeletal: Positive for back pain and gait problem (chronic)  Negative for arthralgias  Skin: Negative for color change and rash  Neurological: Negative for seizures and syncope  All other systems reviewed and are negative  Physical Exam  Physical Exam  Vitals and nursing note reviewed  Constitutional:       General: He is not in acute distress  Appearance: He is well-developed  HENT:      Head: Normocephalic and atraumatic   No raccoon eyes or Bernard's sign  Eyes:      Conjunctiva/sclera: Conjunctivae normal       Pupils: Pupils are equal, round, and reactive to light  Cardiovascular:      Rate and Rhythm: Regular rhythm  Tachycardia present  Heart sounds: No murmur heard  Pulmonary:      Effort: Pulmonary effort is normal  No respiratory distress  Breath sounds: Normal breath sounds  Abdominal:      Palpations: Abdomen is soft  Tenderness: There is no abdominal tenderness  Musculoskeletal:         General: Tenderness present  No swelling  Cervical back: Normal and neck supple  No spinous process tenderness  Thoracic back: Normal       Lumbar back: Tenderness present  Back:    Skin:     General: Skin is warm and dry  Capillary Refill: Capillary refill takes less than 2 seconds  Neurological:      Mental Status: He is alert and oriented to person, place, and time  GCS: GCS eye subscore is 4  GCS verbal subscore is 5  GCS motor subscore is 6     Psychiatric:         Mood and Affect: Mood normal          Vital Signs  ED Triage Vitals   Temperature Pulse Respirations Blood Pressure SpO2   03/22/23 0223 03/22/23 0223 03/22/23 0223 03/22/23 0223 03/22/23 0223   97 8 °F (36 6 °C) (!) 117 18 129/90 100 %      Temp Source Heart Rate Source Patient Position - Orthostatic VS BP Location FiO2 (%)   03/22/23 0223 03/22/23 0223 03/22/23 0223 03/22/23 0223 --   Oral Monitor Lying Left arm       Pain Score       03/22/23 0233       9           Vitals:    03/22/23 0223 03/22/23 0312   BP: 129/90 113/67   Pulse: (!) 117 (!) 118   Patient Position - Orthostatic VS: Lying Lying         Visual Acuity      ED Medications  Medications   acetaminophen (TYLENOL) tablet 650 mg (650 mg Oral Given 3/22/23 0233)       Diagnostic Studies  Results Reviewed     None                 XR pelvis ap only 1 or 2 views   ED Interpretation by Tracie Brady MD (03/22 9186)   No acute fracture or dislocation Procedures  Procedures         ED Course                   Medical Decision Making  26-year-old male presented to the emergency department for evaluation after a fall  On arrival the patient was awake, alert, oriented and in no acute distress  Imaging interpreted by me with no acute fractures or dislocation  Patient treated symptomatically with Tylenol and on reevaluation reported improvement of symptoms  All diagnostic studies discussed with the patient in detail  He is appropriate for discharge  Return precautions were discussed  Patient agrees with the plan for discharge and feels comfortable to go home with proper f/u  Advised to return for worsening or additional problems  Diagnostic tests were reviewed and questions answered  Diagnosis, care plan and treatment options were discussed  The patient understands instructions and will follow up as directed  Sacral pain: acute illness or injury  Amount and/or Complexity of Data Reviewed  Radiology: ordered and independent interpretation performed  Risk  OTC drugs  Disposition  Final diagnoses:   Sacral pain     Time reflects when diagnosis was documented in both MDM as applicable and the Disposition within this note     Time User Action Codes Description Comment    3/22/2023  3:07 AM Celester Ovens Add [M53 3] Sacral pain       ED Disposition     ED Disposition   Discharge    Condition   Stable    Date/Time   Wed Mar 22, 2023  3:07 AM    Comment   Noé Siegel discharge to home/self care                 Follow-up Information     Follow up With Specialties Details Why Contact Info Additional Information    Alyssa Pastrana  Schedule an appointment as soon as possible for a visit   [facility] - Internal Medicine    [phone]     [address] Emergency Department Emergency Medicine Go to  If symptoms worsen [address] [facility],[address] [id number]  [address] [place] [address] Emergency Department, [address] [place] [address] Rd          Discharge Medication List as of 3/22/2023  3:13 AM      CONTINUE these medications which have NOT CHANGED    Details   acetaminophen (TYLENOL) 500 mg tablet Take 1 tablet (500 mg total) by mouth every 6 (six) hours as needed for mild pain or moderate pain, Starting Fri 9/30/2022, Print      !! bictegravir-emtricitab-tenofovir alafenamide (Biktarvy) -25 MG tablet Take 1 tablet by mouth daily, Starting Tue 3/22/2022, Historical Med      !! bictegravir-emtricitab-tenofovir alafenamide (Biktarvy) -25 MG tablet Take 1 tablet by mouth daily with breakfast, Starting Mon 1/23/2023, Normal      Cholecalciferol 25 MCG (1000 UT) tablet Take 1,000 Units by mouth, Historical Med      !! cyclobenzaprine (FLEXERIL) 10 mg tablet Take 1 tablet (10 mg total) by mouth 2 (two) times a day as needed for muscle spasms, Starting Sat 3/26/2022, Normal      !! cyclobenzaprine (FLEXERIL) 10 mg tablet Take 1 tablet (10 mg total) by mouth 2 (two) times a day as needed for muscle spasms, Starting Tue 10/4/2022, Normal      levETIRAcetam (Keppra) 750 mg tablet Take 1 tablet (750 mg total) by mouth every 12 (twelve) hours for 21 days, Starting Sun 3/5/2023, Until Sun 3/26/2023, Normal      ondansetron (Zofran ODT) 4 mg disintegrating tablet Take 1 tablet (4 mg total) by mouth every 6 (six) hours as needed for nausea or vomiting, Starting Wed 8/31/2022, Normal       !! - Potential duplicate medications found  Please discuss with provider  No discharge procedures on file      PDMP Review       Value Time User    PDMP Reviewed  Yes 5/27/2020 12:37 AM LEBRON Moy          ED Provider  Electronically Signed by           Nydia Grey MD  03/22/23 0265

## 2023-03-28 ENCOUNTER — HOSPITAL ENCOUNTER (EMERGENCY)
Facility: HOSPITAL | Age: 38
Discharge: HOME | End: 2023-03-28
Attending: EMERGENCY MEDICINE

## 2023-03-28 VITALS
RESPIRATION RATE: 14 BRPM | DIASTOLIC BLOOD PRESSURE: 86 MMHG | BODY MASS INDEX: 27.6 KG/M2 | HEIGHT: 62 IN | HEART RATE: 104 BPM | SYSTOLIC BLOOD PRESSURE: 106 MMHG | WEIGHT: 150 LBS | OXYGEN SATURATION: 96 % | TEMPERATURE: 97.8 F

## 2023-03-28 DIAGNOSIS — G89.29 OTHER CHRONIC PAIN: Primary | ICD-10-CM

## 2023-03-28 DIAGNOSIS — Z59.00 HOMELESS: ICD-10-CM

## 2023-03-28 RX ORDER — IBUPROFEN 400 MG/1
400 TABLET ORAL ONCE
Status: COMPLETED | OUTPATIENT
Start: 2023-03-28 | End: 2023-03-28

## 2023-03-28 RX ADMIN — IBUPROFEN 400 MG: 400 TABLET ORAL at 02:04

## 2023-03-28 SDOH — ECONOMIC STABILITY - HOUSING INSECURITY: HOMELESSNESS UNSPECIFIED: Z59.00

## 2023-03-28 NOTE — ED PROVIDER NOTES
History  Chief Complaint   Patient presents with   • Medical Problem     Brought in by EMS from [facility], pt reports pain in neck, hands, and bilateral ankles  Pt states he slid down from his walker earlier but did not injure himself on the way down  Pt states his ankles have been swollen more than usual     Patient reports chronic pain in his neck, hands, and feet  He reports he slumped down from a walker but did not injure himself  The pain preceded the fall  He denies any other injuries  Patient tells me he is homeless and has no place to go and would like to spend the night in the emergency department  Medical Problem      Prior to Admission Medications   Prescriptions Last Dose Informant Patient Reported? Taking?    Cholecalciferol 25 MCG (1000 UT) tablet   Yes No   Sig: Take 1,000 Units by mouth   acetaminophen (TYLENOL) 500 mg tablet   No No   Sig: Take 1 tablet (500 mg total) by mouth every 6 (six) hours as needed for mild pain or moderate pain   bictegravir-emtricitab-tenofovir alafenamide (Biktarvy) -25 MG tablet   Yes No   Sig: Take 1 tablet by mouth daily   bictegravir-emtricitab-tenofovir alafenamide (Biktarvy) -25 MG tablet   No No   Sig: Take 1 tablet by mouth daily with breakfast   cyclobenzaprine (FLEXERIL) 10 mg tablet   No No   Sig: Take 1 tablet (10 mg total) by mouth 2 (two) times a day as needed for muscle spasms   Patient not taking: Reported on 7/2/2022   cyclobenzaprine (FLEXERIL) 10 mg tablet   No No   Sig: Take 1 tablet (10 mg total) by mouth 2 (two) times a day as needed for muscle spasms   levETIRAcetam (Keppra) 750 mg tablet   No No   Sig: Take 1 tablet (750 mg total) by mouth every 12 (twelve) hours for 21 days   ondansetron (Zofran ODT) 4 mg disintegrating tablet   No No   Sig: Take 1 tablet (4 mg total) by mouth every 6 (six) hours as needed for nausea or vomiting   Patient not taking: Reported on 9/17/2022      Facility-Administered Medications: None       Past "Medical History:   Diagnosis Date   • HIV (human immunodeficiency virus infection) ([facility] [address] )    • Seizures ([facility] )    • Smoker    • Syphilis        Past Surgical History:   Procedure Laterality Date   • HERNIA REPAIR         History reviewed  No pertinent family history  I have reviewed and agree with the history as documented  E-Cigarette/Vaping   • E-Cigarette Use Current Every Day User      E-Cigarette/Vaping Substances   • Nicotine No    • THC No    • CBD No    • Flavoring Yes    • Other No    • Unknown No      Social History     Tobacco Use   • Smoking status: Every Day     Packs/day: 2 00     Years: 8 00     Pack years: 16 00     Types: Cigarettes   • Smokeless tobacco: Never   • Tobacco comments: \"At least 2 packs a day\" of cigarettes  Vaping Use   • Vaping Use: Every day   • Substances: Flavoring   Substance Use Topics   • Alcohol use: Yes     Alcohol/week: 2 0 standard drinks     Types: 1 Glasses of wine, 1 Cans of beer per week     Comment: socially   • Drug use: Yes     Frequency: 1 0 times per week     Types: Marijuana       Review of Systems   All other systems reviewed and are negative  Physical Exam  Physical Exam  Vitals and nursing note reviewed  Constitutional:       General: He is not in acute distress  Appearance: He is well-developed  HENT:      Head: Normocephalic and atraumatic  Mouth/Throat:      Comments: Generalized poor dentition but no trismus  Eyes:      Conjunctiva/sclera: Conjunctivae normal    Cardiovascular:      Rate and Rhythm: Normal rate and regular rhythm  Heart sounds: No murmur heard  Pulmonary:      Effort: Pulmonary effort is normal  No respiratory distress  Breath sounds: Normal breath sounds  Abdominal:      Palpations: Abdomen is soft  Tenderness: There is no abdominal tenderness  Musculoskeletal:         General: No swelling  Cervical back: Neck supple  Skin:     General: Skin is warm and dry        Capillary Refill: " Capillary refill takes less than 2 seconds  Neurological:      Mental Status: He is alert  Psychiatric:         Mood and Affect: Mood normal          Vital Signs  ED Triage Vitals   Temperature Pulse Respirations Blood Pressure SpO2   03/28/23 0145 03/28/23 0145 03/28/23 0145 03/28/23 0145 03/28/23 0145   97 8 °F (36 6 °C) 104 14 106/86 96 %      Temp Source Heart Rate Source Patient Position - Orthostatic VS BP Location FiO2 (%)   03/28/23 0145 -- -- -- --   Oral          Pain Score       03/28/23 0204       5           Vitals:    03/28/23 0145   BP: 106/86   Pulse: 104         Visual Acuity      ED Medications  Medications   ibuprofen (MOTRIN) tablet 400 mg (400 mg Oral Given 3/28/23 0204)       Diagnostic Studies  Results Reviewed     None                 No orders to display              Procedures  Procedures         ED Course  ED Course as of 03/28/23 0317   Tue Mar 28, 2023   0316 Patient requested discharge and ambulated with walker out of department  SBIRT 20yo+    Flowsheet Row Most Recent Value   SBIRT (25 yo +)    In order to provide better care to our patients, we are screening all of our patients for alcohol and drug use  Would it be okay to ask you these screening questions? No Filed at: 03/28/2023 0146                    Medical Decision Making  I have the patient who has no acute medical complaints  He tells me he just wants to be discharged in the morning  Patient is homeless but not interested in additional resources at this time  Risk  Prescription drug management  Diagnosis or treatment significantly limited by social determinants of health            Disposition  Final diagnoses:   Other chronic pain   Homeless     Time reflects when diagnosis was documented in both MDM as applicable and the Disposition within this note     Time User Action Codes Description Comment    3/28/2023  3:03 AM Ari Schreiber Add [G89 29] Other chronic pain     3/28/2023  3:03 BRYANT Stewart Add [Z59 00] Homeless       ED Disposition     ED Disposition   Discharge    Condition   Stable    Date/Time   Tue Mar 28, 2023  3:03 AM    Comment   Erin Ranks discharge to home/self care  Follow-up Information     Follow up With Specialties Details Why Contact Info Additional Information    [facility] Medicine   [address]  [place] [address]  [phone]  [id number], [address], [place], [address] [facility], [address] [phone]          Patient's Medications   Discharge Prescriptions    No medications on file       No discharge procedures on file      PDMP Review       Value Time User    PDMP Reviewed  Yes 5/27/2020 12:37 AM LEBRON Romero          ED Provider  Electronically Signed by           Vincent Childers MD  03/28/23 6736

## 2023-04-02 ENCOUNTER — HOSPITAL ENCOUNTER (EMERGENCY)
Facility: HOSPITAL | Age: 38
Discharge: HOME | End: 2023-04-02
Attending: EMERGENCY MEDICINE | Admitting: EMERGENCY MEDICINE
Payer: MEDICARE

## 2023-04-02 VITALS
SYSTOLIC BLOOD PRESSURE: 119 MMHG | OXYGEN SATURATION: 98 % | HEART RATE: 91 BPM | DIASTOLIC BLOOD PRESSURE: 69 MMHG | TEMPERATURE: 98.2 F | RESPIRATION RATE: 18 BRPM

## 2023-04-02 DIAGNOSIS — Z13.9 ENCOUNTER FOR MEDICAL SCREENING EXAMINATION: ICD-10-CM

## 2023-04-02 DIAGNOSIS — Z87.898 HISTORY OF SEIZURES: Primary | ICD-10-CM

## 2023-04-02 LAB
ALBUMIN SERPL BCP-MCNC: 4.4 G/DL (ref 3.5–5)
ALP SERPL-CCNC: 75 U/L (ref 34–104)
ALT SERPL W P-5'-P-CCNC: 19 U/L (ref 7–52)
ANION GAP SERPL CALCULATED.3IONS-SCNC: 7 MMOL/L (ref 4–13)
AST SERPL W P-5'-P-CCNC: 25 U/L (ref 13–39)
BASOPHILS # BLD AUTO: 0.05 THOUSANDS/ÂΜL (ref 0–0.1)
BASOPHILS NFR BLD AUTO: 1 % (ref 0–1)
BILIRUB SERPL-MCNC: 0.34 MG/DL (ref 0.2–1)
BUN SERPL-MCNC: 11 MG/DL (ref 5–25)
CALCIUM SERPL-MCNC: 9.2 MG/DL (ref 8.4–10.2)
CHLORIDE SERPL-SCNC: 100 MMOL/L (ref 96–108)
CO2 SERPL-SCNC: 29 MMOL/L (ref 21–32)
CREAT SERPL-MCNC: 0.93 MG/DL (ref 0.6–1.3)
EOSINOPHIL # BLD AUTO: 0.07 THOUSAND/ÂΜL (ref 0–0.61)
EOSINOPHIL NFR BLD AUTO: 1 % (ref 0–6)
ERYTHROCYTE [DISTWIDTH] IN BLOOD BY AUTOMATED COUNT: 14.5 % (ref 11.6–15.1)
GFR SERPL CREATININE-BSD FRML MDRD: 104 ML/MIN/1.73SQ M
GLUCOSE SERPL-MCNC: 99 MG/DL (ref 65–140)
HCT VFR BLD AUTO: 43.2 % (ref 36.5–49.3)
HGB BLD-MCNC: 14.6 G/DL (ref 12–17)
IMM GRANULOCYTES # BLD AUTO: 0.01 THOUSAND/UL (ref 0–0.2)
IMM GRANULOCYTES NFR BLD AUTO: 0 % (ref 0–2)
LYMPHOCYTES # BLD AUTO: 2.73 THOUSANDS/ÂΜL (ref 0.6–4.47)
LYMPHOCYTES NFR BLD AUTO: 33 % (ref 14–44)
MAGNESIUM SERPL-MCNC: 1.9 MG/DL (ref 1.9–2.7)
MCH RBC QN AUTO: 30.2 PG (ref 26.8–34.3)
MCHC RBC AUTO-ENTMCNC: 33.8 G/DL (ref 31.4–37.4)
MCV RBC AUTO: 89 FL (ref 82–98)
MONOCYTES # BLD AUTO: 0.6 THOUSAND/ÂΜL (ref 0.17–1.22)
MONOCYTES NFR BLD AUTO: 7 % (ref 4–12)
NEUTROPHILS # BLD AUTO: 4.91 THOUSANDS/ÂΜL (ref 1.85–7.62)
NEUTS SEG NFR BLD AUTO: 58 % (ref 43–75)
NRBC BLD AUTO-RTO: 0 /100 WBCS
PHOSPHATE SERPL-MCNC: 3.2 MG/DL (ref 2.7–4.5)
PLATELET # BLD AUTO: 190 THOUSANDS/UL (ref 149–390)
PMV BLD AUTO: 10.1 FL (ref 8.9–12.7)
POTASSIUM SERPL-SCNC: 3.5 MMOL/L (ref 3.5–5.3)
PROT SERPL-MCNC: 7.8 G/DL (ref 6.4–8.4)
RBC # BLD AUTO: 4.84 MILLION/UL (ref 3.88–5.62)
SODIUM SERPL-SCNC: 136 MMOL/L (ref 135–147)
WBC # BLD AUTO: 8.37 THOUSAND/UL (ref 4.31–10.16)

## 2023-04-02 PROCEDURE — 36415 COLL VENOUS BLD VENIPUNCTURE: CPT | Performed by: EMERGENCY MEDICINE

## 2023-04-02 PROCEDURE — 84100 ASSAY OF PHOSPHORUS: CPT | Performed by: EMERGENCY MEDICINE

## 2023-04-02 PROCEDURE — 80053 COMPREHEN METABOLIC PANEL: CPT | Performed by: EMERGENCY MEDICINE

## 2023-04-02 PROCEDURE — 85025 COMPLETE CBC W/AUTO DIFF WBC: CPT | Performed by: EMERGENCY MEDICINE

## 2023-04-02 PROCEDURE — 99283 EMERGENCY DEPT VISIT LOW MDM: CPT

## 2023-04-02 PROCEDURE — 99284 EMERGENCY DEPT VISIT MOD MDM: CPT | Performed by: EMERGENCY MEDICINE

## 2023-04-02 PROCEDURE — 80177 DRUG SCRN QUAN LEVETIRACETAM: CPT | Performed by: EMERGENCY MEDICINE

## 2023-04-02 PROCEDURE — 83735 ASSAY OF MAGNESIUM: CPT | Performed by: EMERGENCY MEDICINE

## 2023-04-02 PROCEDURE — 96374 THER/PROPH/DIAG INJ IV PUSH: CPT

## 2023-04-02 RX ORDER — LEVETIRACETAM 10 MG/ML
1000 INJECTION INTRAVASCULAR ONCE
Status: COMPLETED | OUTPATIENT
Start: 2023-04-02 | End: 2023-04-02

## 2023-04-02 RX ORDER — LEVETIRACETAM 750 MG/1
750 TABLET ORAL EVERY 12 HOURS
Qty: 42 TABLET | Refills: 0 | Status: SHIPPED | OUTPATIENT
Start: 2023-04-02 | End: 2023-04-23

## 2023-04-02 RX ADMIN — LEVETIRACETAM 1000 MG: 10 INJECTION INTRAVASCULAR at 03:04

## 2023-04-02 NOTE — ED PROVIDER NOTES
"History  Chief Complaint   Patient presents with   • Medical Problem     Pt presents to the ED stating \"I'm epileptic and feel a seizure coming on\"     Patient is a 40-year-old male seen in the emergency department with concern for feeling like he might have a seizure  Patient states that he has not taken his Keppra medication in approximately 2 weeks  Patient notes no headache, chest pain, nausea, vomiting, diarrhea, weakness, numbness, tingling  Patient has no other acute medical complaints in the emergency department  Per EMS, patient was found outside a local convenience store  Prior to Admission Medications   Prescriptions Last Dose Informant Patient Reported? Taking?    Cholecalciferol 25 MCG (1000 UT) tablet   Yes No   Sig: Take 1,000 Units by mouth   acetaminophen (TYLENOL) 500 mg tablet   No No   Sig: Take 1 tablet (500 mg total) by mouth every 6 (six) hours as needed for mild pain or moderate pain   bictegravir-emtricitab-tenofovir alafenamide (Biktarvy) -25 MG tablet   Yes No   Sig: Take 1 tablet by mouth daily   bictegravir-emtricitab-tenofovir alafenamide (Biktarvy) -25 MG tablet   No No   Sig: Take 1 tablet by mouth daily with breakfast   cyclobenzaprine (FLEXERIL) 10 mg tablet   No No   Sig: Take 1 tablet (10 mg total) by mouth 2 (two) times a day as needed for muscle spasms   Patient not taking: Reported on 7/2/2022   cyclobenzaprine (FLEXERIL) 10 mg tablet   No No   Sig: Take 1 tablet (10 mg total) by mouth 2 (two) times a day as needed for muscle spasms   levETIRAcetam (Keppra) 750 mg tablet   No No   Sig: Take 1 tablet (750 mg total) by mouth every 12 (twelve) hours for 21 days   ondansetron (Zofran ODT) 4 mg disintegrating tablet   No No   Sig: Take 1 tablet (4 mg total) by mouth every 6 (six) hours as needed for nausea or vomiting   Patient not taking: Reported on 9/17/2022      Facility-Administered Medications: None       Past Medical History:   Diagnosis Date   • HIV " "(human immunodeficiency virus infection) ([facility] )    • Seizures ([facility] )    • Smoker    • Syphilis        Past Surgical History:   Procedure Laterality Date   • HERNIA REPAIR         History reviewed  No pertinent family history  I have reviewed and agree with the history as documented  E-Cigarette/Vaping   • E-Cigarette Use Current Every Day User      E-Cigarette/Vaping Substances   • Nicotine No    • THC No    • CBD No    • Flavoring Yes    • Other No    • Unknown No      Social History     Tobacco Use   • Smoking status: Every Day     Packs/day: 2 00     Years: 8 00     Pack years: 16 00     Types: Cigarettes   • Smokeless tobacco: Never   • Tobacco comments: \"At least 2 packs a day\" of cigarettes  Vaping Use   • Vaping Use: Every day   • Substances: Flavoring   Substance Use Topics   • Alcohol use: Yes     Alcohol/week: 2 0 standard drinks     Types: 1 Glasses of wine, 1 Cans of beer per week     Comment: socially   • Drug use: Yes     Frequency: 1 0 times per week     Types: Marijuana       Review of Systems   Constitutional: Negative for chills and fever  HENT: Negative for ear pain and sore throat  Eyes: Negative for pain and visual disturbance  Respiratory: Negative for cough and shortness of breath  Cardiovascular: Negative for chest pain and palpitations  Gastrointestinal: Negative for abdominal pain and vomiting  Genitourinary: Negative for decreased urine volume and difficulty urinating  Musculoskeletal: Negative for gait problem and neck stiffness  Skin: Negative for color change and rash  Neurological: Negative for weakness and numbness  Psychiatric/Behavioral: Negative for agitation and confusion  All other systems reviewed and are negative  Physical Exam  Physical Exam  Vitals and nursing note reviewed  Constitutional:       General: He is not in acute distress  Appearance: He is well-developed  HENT:      Head: Normocephalic and atraumatic        Right Ear: " External ear normal       Left Ear: External ear normal       Nose: Nose normal       Mouth/Throat:      Pharynx: Oropharynx is clear  Eyes:      General: No scleral icterus  Conjunctiva/sclera: Conjunctivae normal    Cardiovascular:      Rate and Rhythm: Normal rate and regular rhythm  Heart sounds: No murmur heard  Pulmonary:      Effort: Pulmonary effort is normal  No respiratory distress  Breath sounds: Normal breath sounds  Abdominal:      General: There is no distension  Palpations: Abdomen is soft  Tenderness: There is no abdominal tenderness  Musculoskeletal:         General: No deformity or signs of injury  Cervical back: Normal range of motion and neck supple  Skin:     General: Skin is warm and dry  Neurological:      General: No focal deficit present  Mental Status: He is alert  Cranial Nerves: No cranial nerve deficit  Sensory: No sensory deficit  Psychiatric:         Mood and Affect: Mood normal          Thought Content:  Thought content normal          Vital Signs  ED Triage Vitals [04/02/23 0253]   Temperature Pulse Respirations Blood Pressure SpO2   98 2 °F (36 8 °C) 99 18 140/95 98 %      Temp Source Heart Rate Source Patient Position - Orthostatic VS BP Location FiO2 (%)   Oral Monitor Lying Right arm --      Pain Score       8           Vitals:    04/02/23 0253   BP: 140/95   Pulse: 99   Patient Position - Orthostatic VS: Lying         Visual Acuity      ED Medications  Medications   levetiracetam in NaCl (KEPPRA) infusion 1,000 mg (1,000 mg Intravenous Given 4/2/23 0304)       Diagnostic Studies  Results Reviewed     Procedure Component Value Units Date/Time    Comprehensive metabolic panel [[id number]] Collected: 04/02/23 0306    Lab Status: Final result Specimen: Blood from Arm, Right Updated: 04/02/23 0344     Sodium 136 mmol/L      Potassium 3 5 mmol/L      Chloride 100 mmol/L      CO2 29 mmol/L      ANION GAP 7 mmol/L      BUN 11 mg/dL Creatinine 0 93 mg/dL      Glucose 99 mg/dL      Calcium 9 2 mg/dL      AST 25 U/L      ALT 19 U/L      Alkaline Phosphatase 75 U/L      Total Protein 7 8 g/dL      Albumin 4 4 g/dL      Total Bilirubin 0 34 mg/dL      eGFR 104 ml/min/1 73sq m     Narrative:      National [employer] guidelines for Chronic Kidney Disease (CKD):   •  Stage 1 with normal or high GFR (GFR > 90 mL/min/1 73 square meters)  •  Stage 2 Mild CKD (GFR = 60-89 mL/min/1 73 square meters)  •  Stage 3A Moderate CKD (GFR = 45-59 mL/min/1 73 square meters)  •  Stage 3B Moderate CKD (GFR = 30-44 mL/min/1 73 square meters)  •  Stage 4 Severe CKD (GFR = 15-29 mL/min/1 73 square meters)  •  Stage 5 End Stage CKD (GFR <15 mL/min/1 73 square meters)  Note: GFR calculation is accurate only with a steady state creatinine    Magnesium [[id number]]  (Normal) Collected: 04/02/23 0306    Lab Status: Final result Specimen: Blood from Arm, Right Updated: 04/02/23 0344     Magnesium 1 9 mg/dL     Phosphorus [[id number]]  (Normal) Collected: 04/02/23 0306    Lab Status: Final result Specimen: Blood from Arm, Right Updated: 04/02/23 0344     Phosphorus 3 2 mg/dL     CBC and differential [[id number]] Collected: 04/02/23 0306    Lab Status: Final result Specimen: Blood from Arm, Right Updated: 04/02/23 0317     WBC 8 37 Thousand/uL      RBC 4 84 Million/uL      Hemoglobin 14 6 g/dL      Hematocrit 43 2 %      MCV 89 fL      MCH 30 2 pg      MCHC 33 8 g/dL      RDW 14 5 %      MPV 10 1 fL      Platelets 491 Thousands/uL      nRBC 0 /100 WBCs      Neutrophils Relative 58 %      Immat GRANS % 0 %      Lymphocytes Relative 33 %      Monocytes Relative 7 %      Eosinophils Relative 1 %      Basophils Relative 1 %      Neutrophils Absolute 4 91 Thousands/µL      Immature Grans Absolute 0 01 Thousand/uL      Lymphocytes Absolute 2 73 Thousands/µL      Monocytes Absolute 0 60 Thousand/µL      Eosinophils Absolute 0 07 Thousand/µL      Basophils Absolute 0 05 Thousands/µL     Levetiracetam level [[id number]] Collected: 04/02/23 0306    Lab Status: In process Specimen: Blood from Arm, Right Updated: 04/02/23 0315                 No orders to display              Procedures  Procedures         ED Course                                             Medical Decision Making  Patient is a 80-year-old male seen in the emergency department with concern for history of seizures, feeling like he might have a seizure  Patient was treated with IV Keppra  Laboratory evaluation is unremarkable  Patient had no evidence of seizure activity in the emergency department  Patient will be provided Keppra prescription  Plan to have patient follow up with PCP/outpatient providers  Patient stable for discharge home  Discharge instructions were reviewed with patient  Encounter for medical screening examination: acute illness or injury  History of seizures: chronic illness or injury  Amount and/or Complexity of Data Reviewed  Labs: ordered  Risk  Prescription drug management  Disposition  Final diagnoses:   History of seizures   Encounter for medical screening examination     Time reflects when diagnosis was documented in both MDM as applicable and the Disposition within this note     Time User Action Codes Description Comment    4/2/2023  2:53 AM Eliceo Thornton Add [C83 [id number]] History of seizures     4/2/2023  2:53 AM Eliceo Bojorquez [Z13 9] Encounter for medical screening examination       ED Disposition     ED Disposition   Discharge    Condition   Stable    Date/Time   Sun Apr 2, 2023  3:50 AM    Comment   Bertha Hammonds discharge to home/self care                 Follow-up Information     Follow up With Specialties Details Why Contact Info Additional Information    Your primary doctor  Call in 1 day       [place] Call  As needed [address] [id number]  [address] [place], [place] [address], [place], [place], [address] [facility]  As needed [address], [address] [facility] [address] [facility], [address] [place] [address] [facility]          Patient's Medications   Discharge Prescriptions    LEVETIRACETAM (KEPPRA) 750 MG TABLET    Take 1 tablet (750 mg total) by mouth every 12 (twelve) hours for 21 days       Start Date: 4/2/2023  End Date: 4/23/2023       Order Dose: 750 mg       Quantity: 42 tablet    Refills: 0       No discharge procedures on file      PDMP Review       Value Time User    PDMP Reviewed  Yes 5/27/2020 12:37 AM LEBRON Farrell          ED Provider  Electronically Signed by           Yogesh Ferguson MD  04/02/23 7646

## 2023-04-03 ENCOUNTER — HOSPITAL ENCOUNTER (EMERGENCY)
Facility: HOSPITAL | Age: 38
Discharge: HOME | End: 2023-04-03
Attending: EMERGENCY MEDICINE

## 2023-04-03 VITALS
HEART RATE: 92 BPM | HEIGHT: 62 IN | SYSTOLIC BLOOD PRESSURE: 122 MMHG | TEMPERATURE: 97.3 F | RESPIRATION RATE: 18 BRPM | WEIGHT: 150 LBS | BODY MASS INDEX: 27.6 KG/M2 | OXYGEN SATURATION: 99 % | DIASTOLIC BLOOD PRESSURE: 87 MMHG

## 2023-04-03 DIAGNOSIS — R56.9 OBSERVED SEIZURE-LIKE ACTIVITY (HCC): Primary | ICD-10-CM

## 2023-04-03 DIAGNOSIS — G40.909 RECURRENT SEIZURES (HCC): ICD-10-CM

## 2023-04-03 LAB
ALBUMIN SERPL BCP-MCNC: 4.2 G/DL (ref 3.5–5)
ALP SERPL-CCNC: 73 U/L (ref 34–104)
ALT SERPL W P-5'-P-CCNC: 22 U/L (ref 7–52)
ANION GAP SERPL CALCULATED.3IONS-SCNC: 6 MMOL/L (ref 4–13)
AST SERPL W P-5'-P-CCNC: 24 U/L (ref 13–39)
ATRIAL RATE: 86 BPM
BASOPHILS # BLD AUTO: 0.02 THOUSANDS/ÂΜL (ref 0–0.1)
BASOPHILS NFR BLD AUTO: 0 % (ref 0–1)
BILIRUB SERPL-MCNC: 0.36 MG/DL (ref 0.2–1)
BUN SERPL-MCNC: 10 MG/DL (ref 5–25)
CALCIUM SERPL-MCNC: 9.2 MG/DL (ref 8.4–10.2)
CHLORIDE SERPL-SCNC: 101 MMOL/L (ref 96–108)
CO2 SERPL-SCNC: 28 MMOL/L (ref 21–32)
CREAT SERPL-MCNC: 0.79 MG/DL (ref 0.6–1.3)
EOSINOPHIL # BLD AUTO: 0.07 THOUSAND/ÂΜL (ref 0–0.61)
EOSINOPHIL NFR BLD AUTO: 1 % (ref 0–6)
ERYTHROCYTE [DISTWIDTH] IN BLOOD BY AUTOMATED COUNT: 14.6 % (ref 11.6–15.1)
GFR SERPL CREATININE-BSD FRML MDRD: 114 ML/MIN/1.73SQ M
GLUCOSE SERPL-MCNC: 101 MG/DL (ref 65–140)
GLUCOSE SERPL-MCNC: 95 MG/DL (ref 65–140)
HCT VFR BLD AUTO: 43.4 % (ref 36.5–49.3)
HGB BLD-MCNC: 14.5 G/DL (ref 12–17)
IMM GRANULOCYTES # BLD AUTO: 0.01 THOUSAND/UL (ref 0–0.2)
IMM GRANULOCYTES NFR BLD AUTO: 0 % (ref 0–2)
LYMPHOCYTES # BLD AUTO: 2.33 THOUSANDS/ÂΜL (ref 0.6–4.47)
LYMPHOCYTES NFR BLD AUTO: 36 % (ref 14–44)
MAGNESIUM SERPL-MCNC: 1.9 MG/DL (ref 1.9–2.7)
MCH RBC QN AUTO: 29.8 PG (ref 26.8–34.3)
MCHC RBC AUTO-ENTMCNC: 33.4 G/DL (ref 31.4–37.4)
MCV RBC AUTO: 89 FL (ref 82–98)
MONOCYTES # BLD AUTO: 0.56 THOUSAND/ÂΜL (ref 0.17–1.22)
MONOCYTES NFR BLD AUTO: 9 % (ref 4–12)
NEUTROPHILS # BLD AUTO: 3.54 THOUSANDS/ÂΜL (ref 1.85–7.62)
NEUTS SEG NFR BLD AUTO: 54 % (ref 43–75)
NRBC BLD AUTO-RTO: 0 /100 WBCS
P AXIS: 38 DEGREES
PHOSPHATE SERPL-MCNC: 3.4 MG/DL (ref 2.7–4.5)
PLATELET # BLD AUTO: 180 THOUSANDS/UL (ref 149–390)
PMV BLD AUTO: 9.9 FL (ref 8.9–12.7)
POTASSIUM SERPL-SCNC: 3.7 MMOL/L (ref 3.5–5.3)
PR INTERVAL: 144 MS
PROT SERPL-MCNC: 7.7 G/DL (ref 6.4–8.4)
QRS AXIS: -17 DEGREES
QRSD INTERVAL: 92 MS
QT INTERVAL: 382 MS
QTC INTERVAL: 457 MS
RBC # BLD AUTO: 4.86 MILLION/UL (ref 3.88–5.62)
SODIUM SERPL-SCNC: 135 MMOL/L (ref 135–147)
T WAVE AXIS: 2 DEGREES
VENTRICULAR RATE: 86 BPM
WBC # BLD AUTO: 6.53 THOUSAND/UL (ref 4.31–10.16)

## 2023-04-03 RX ORDER — LEVETIRACETAM 10 MG/ML
1000 INJECTION INTRAVASCULAR ONCE
Status: COMPLETED | OUTPATIENT
Start: 2023-04-03 | End: 2023-04-03

## 2023-04-03 RX ORDER — LORAZEPAM 2 MG/ML
0.5 INJECTION INTRAMUSCULAR ONCE
Status: COMPLETED | OUTPATIENT
Start: 2023-04-03 | End: 2023-04-03

## 2023-04-03 RX ADMIN — LEVETIRACETAM 1000 MG: 10 INJECTION INTRAVASCULAR at 03:44

## 2023-04-03 RX ADMIN — LORAZEPAM 0.5 MG: 2 INJECTION INTRAMUSCULAR; INTRAVENOUS at 03:43

## 2023-04-03 RX ADMIN — SODIUM CHLORIDE 1000 ML: 0.9 INJECTION, SOLUTION INTRAVENOUS at 03:50

## 2023-04-03 NOTE — DISCHARGE INSTRUCTIONS
Take seizure medication as prescribed  Follow up with neurology and with your primary doctor, and return to the emergency department for new or worsening symptoms

## 2023-04-03 NOTE — ED PROVIDER NOTES
History  Chief Complaint   Patient presents with   • Seizure - Prior Hx Of     Patient arrives via EMS reporting someone saw patient on the ground outside of [facility]  Patient reports he had a seizure  Received Keppra in ED yesterday and has not taken anymore since  Patient is a 41-year-old male seen in the emergency department brought by EMS with concern for observed seizure-like activity this evening  Per EMS, an individual at a local convenience store noticed the patient have what appeared to be a seizure outside prior to evaluation  Patient states that he was incontinent of urine during the episode  Patient notes history of seizures, and is supposed to be on Keppra  Review of records shows the patient was seen in the emergency department 1 day ago, but patient states that he has not taken his Keppra following discharge  Patient notes no headache, chest pain, shortness of breath, nausea, vomiting, abdominal pain, weakness  Prior to Admission Medications   Prescriptions Last Dose Informant Patient Reported? Taking?    Cholecalciferol 25 MCG (1000 UT) tablet   Yes No   Sig: Take 1,000 Units by mouth   acetaminophen (TYLENOL) 500 mg tablet   No No   Sig: Take 1 tablet (500 mg total) by mouth every 6 (six) hours as needed for mild pain or moderate pain   bictegravir-emtricitab-tenofovir alafenamide (Biktarvy) -25 MG tablet   Yes No   Sig: Take 1 tablet by mouth daily   bictegravir-emtricitab-tenofovir alafenamide (Biktarvy) -25 MG tablet   No No   Sig: Take 1 tablet by mouth daily with breakfast   cyclobenzaprine (FLEXERIL) 10 mg tablet   No No   Sig: Take 1 tablet (10 mg total) by mouth 2 (two) times a day as needed for muscle spasms   Patient not taking: Reported on 7/2/2022   cyclobenzaprine (FLEXERIL) 10 mg tablet   No No   Sig: Take 1 tablet (10 mg total) by mouth 2 (two) times a day as needed for muscle spasms   levETIRAcetam (Keppra) 750 mg tablet   No No   Sig: Take 1 tablet (750 mg "total) by mouth every 12 (twelve) hours for 21 days   levETIRAcetam (Keppra) 750 mg tablet   No No   Sig: Take 1 tablet (750 mg total) by mouth every 12 (twelve) hours for 21 days   ondansetron (Zofran ODT) 4 mg disintegrating tablet   No No   Sig: Take 1 tablet (4 mg total) by mouth every 6 (six) hours as needed for nausea or vomiting   Patient not taking: Reported on 9/17/2022      Facility-Administered Medications: None       Past Medical History:   Diagnosis Date   • HIV (human immunodeficiency virus infection) ([facility] )    • Seizures ([facility] )    • Smoker    • Syphilis        Past Surgical History:   Procedure Laterality Date   • HERNIA REPAIR         History reviewed  No pertinent family history  I have reviewed and agree with the history as documented  E-Cigarette/Vaping   • E-Cigarette Use Current Every Day User      E-Cigarette/Vaping Substances   • Nicotine No    • THC No    • CBD No    • Flavoring Yes    • Other No    • Unknown No      Social History     Tobacco Use   • Smoking status: Every Day     Packs/day: 2 00     Years: 8 00     Pack years: 16 00     Types: Cigarettes   • Smokeless tobacco: Never   • Tobacco comments: \"At least 2 packs a day\" of cigarettes  Vaping Use   • Vaping Use: Every day   • Substances: Flavoring   Substance Use Topics   • Alcohol use: Yes     Alcohol/week: 2 0 standard drinks     Types: 1 Glasses of wine, 1 Cans of beer per week     Comment: socially   • Drug use: Yes     Frequency: 1 0 times per week     Types: Marijuana       Review of Systems   Constitutional: Negative for chills and fever  HENT: Negative for ear pain and sore throat  Eyes: Negative for pain and visual disturbance  Respiratory: Negative for cough and shortness of breath  Cardiovascular: Negative for chest pain and palpitations  Gastrointestinal: Negative for abdominal pain and vomiting  Genitourinary: Negative for decreased urine volume and difficulty urinating          Urinary incontinence " Musculoskeletal: Negative for gait problem and neck stiffness  Skin: Negative for color change and rash  Neurological: Positive for seizures  Negative for syncope  Psychiatric/Behavioral: Negative for agitation and confusion  All other systems reviewed and are negative  Physical Exam  Physical Exam  Vitals and nursing note reviewed  Constitutional:       General: He is not in acute distress  Appearance: He is well-developed  HENT:      Head: Normocephalic and atraumatic  Right Ear: External ear normal       Left Ear: External ear normal       Nose: Nose normal       Mouth/Throat:      Pharynx: Oropharynx is clear  Eyes:      General: No scleral icterus  Conjunctiva/sclera: Conjunctivae normal       Pupils: Pupils are equal, round, and reactive to light  Cardiovascular:      Rate and Rhythm: Normal rate and regular rhythm  Heart sounds: No murmur heard  Pulmonary:      Effort: Pulmonary effort is normal  No respiratory distress  Breath sounds: Normal breath sounds  Abdominal:      General: There is no distension  Palpations: Abdomen is soft  Tenderness: There is no abdominal tenderness  Musculoskeletal:         General: No deformity or signs of injury  Cervical back: Normal range of motion and neck supple  Skin:     General: Skin is warm and dry  Capillary Refill: Capillary refill takes less than 2 seconds  Neurological:      General: No focal deficit present  Mental Status: He is alert  Cranial Nerves: No cranial nerve deficit  Sensory: No sensory deficit  Psychiatric:         Mood and Affect: Mood normal          Thought Content:  Thought content normal          Vital Signs  ED Triage Vitals [04/03/23 0326]   Temperature Pulse Respirations Blood Pressure SpO2   (!) 97 3 °F (36 3 °C) 92 18 122/87 99 %      Temp Source Heart Rate Source Patient Position - Orthostatic VS BP Location FiO2 (%)   Oral Monitor Lying Right arm -- Pain Score       --           Vitals:    04/03/23 0326   BP: 122/87   Pulse: 92   Patient Position - Orthostatic VS: Lying         Visual Acuity      ED Medications  Medications   sodium chloride 0 9 % bolus 1,000 mL (1,000 mL Intravenous New Bag 4/3/23 0350)   levetiracetam in NaCl (KEPPRA) infusion 1,000 mg (1,000 mg Intravenous Given 4/3/23 0344)   LORazepam (ATIVAN) injection 0 5 mg (0 5 mg Intravenous Given 4/3/23 0343)       Diagnostic Studies  Results Reviewed     Procedure Component Value Units Date/Time    Comprehensive metabolic panel [[id number]] Collected: 04/03/23 0342    Lab Status: Final result Specimen: Blood from Arm, Left Updated: 04/03/23 0420     Sodium 135 mmol/L      Potassium 3 7 mmol/L      Chloride 101 mmol/L      CO2 28 mmol/L      ANION GAP 6 mmol/L      BUN 10 mg/dL      Creatinine 0 79 mg/dL      Glucose 101 mg/dL      Calcium 9 2 mg/dL      AST 24 U/L      ALT 22 U/L      Alkaline Phosphatase 73 U/L      Total Protein 7 7 g/dL      Albumin 4 2 g/dL      Total Bilirubin 0 36 mg/dL      eGFR 114 ml/min/1 73sq m     Narrative:      [facility] guidelines for Chronic Kidney Disease (CKD):   •  Stage 1 with normal or high GFR (GFR > 90 mL/min/1 73 square meters)  •  Stage 2 Mild CKD (GFR = 60-89 mL/min/1 73 square meters)  •  Stage 3A Moderate CKD (GFR = 45-59 mL/min/1 73 square meters)  •  Stage 3B Moderate CKD (GFR = 30-44 mL/min/1 73 square meters)  •  Stage 4 Severe CKD (GFR = 15-29 mL/min/1 73 square meters)  •  Stage 5 End Stage CKD (GFR <15 mL/min/1 73 square meters)  Note: GFR calculation is accurate only with a steady state creatinine    Magnesium [[id number]]  (Normal) Collected: 04/03/23 0342    Lab Status: Final result Specimen: Blood from Arm, Left Updated: 04/03/23 0420     Magnesium 1 9 mg/dL     Phosphorus [[id number]]  (Normal) Collected: 04/03/23 0342    Lab Status: Final result Specimen: Blood from Arm, Left Updated: 04/03/23 0420     Phosphorus 3 4 mg/dL     Fingerstick Glucose (POCT) [[id number]]  (Normal) Collected: 04/03/23 0406    Lab Status: Final result Updated: 04/03/23 0408     POC Glucose 95 mg/dl     CBC and differential [[id number]] Collected: 04/03/23 0342    Lab Status: Final result Specimen: Blood from Arm, Left Updated: 04/03/23 0358     WBC 6 53 Thousand/uL      RBC 4 86 Million/uL      Hemoglobin 14 5 g/dL      Hematocrit 43 4 %      MCV 89 fL      MCH 29 8 pg      MCHC 33 4 g/dL      RDW 14 6 %      MPV 9 9 fL      Platelets 313 Thousands/uL      nRBC 0 /100 WBCs      Neutrophils Relative 54 %      Immat GRANS % 0 %      Lymphocytes Relative 36 %      Monocytes Relative 9 %      Eosinophils Relative 1 %      Basophils Relative 0 %      Neutrophils Absolute 3 54 Thousands/µL      Immature Grans Absolute 0 01 Thousand/uL      Lymphocytes Absolute 2 33 Thousands/µL      Monocytes Absolute 0 56 Thousand/µL      Eosinophils Absolute 0 07 Thousand/µL      Basophils Absolute 0 02 Thousands/µL     Levetiracetam level [[id number]] Collected: 04/03/23 0342    Lab Status:  In process Specimen: Blood from Arm, Left Updated: 04/03/23 0355                 No orders to display              Procedures  ECG 12 Lead Documentation Only    Date/Time: 4/3/2023 3:45 AM  Performed by: Tristen Parks MD  Authorized by: Tristen Parks MD     Indications / Diagnosis:  Seizure  ECG reviewed by me, the ED Provider: yes    Patient location:  ED  Rate:     ECG rate:  86    ECG rate assessment: normal    Rhythm:     Rhythm: sinus rhythm    QRS:     QRS axis:  Left  ST segments:     ST segments:  Normal  T waves:     T waves: normal    Comments:      Normal sinus rhythm at 86, left axis deviation, , QRS 92, QTc 457, no ST-T wave abnormality, no evidence of acute ischemia             ED Course                                             Medical Decision Making  Patient is a 77-year-old male seen in the emergency department seen in the emergency department with concern for observed seizure-like activity, likely seizure episode  Fingerstick blood glucose was within normal limits  EKG was obtained and noted  Patient was treated with IV Keppra and medication for symptom control  Laboratory evaluation unremarkable  Patient had normal mental status in the emergency department with a nonfocal neurologic exam   CT imaging is not indicated at this time  Patient was prescribed Keppra medication following emergency department visit 1 day ago  Plan to have patient follow up with neurology/outpatient providers  Patient stable for discharge home  Discharge instructions were reviewed with patient  Observed seizure-like activity ([facility] ): acute illness or injury  Recurrent seizures ([facility] ): acute illness or injury  Amount and/or Complexity of Data Reviewed  Labs: ordered  Decision-making details documented in ED Course  ECG/medicine tests: ordered and independent interpretation performed  Decision-making details documented in ED Course  Risk  Prescription drug management  Disposition  Final diagnoses:   Observed seizure-like activity ([facility] )   Recurrent seizures ([facility] )     Time reflects when diagnosis was documented in both MDM as applicable and the Disposition within this note     Time User Action Codes Description Comment    4/3/2023  3:29 AM Yasmine Matthewsry Add [R56 9] Observed seizure-like activity ([facility] )     4/3/2023  3:43 AM Yasmine Fabry Add [G40 [id number]] Recurrent seizures [facility])       ED Disposition     ED Disposition   Discharge    Condition   Stable    Date/Time   Mon Apr 3, 2023  4:32 AM    Comment   Sheeba Arizmendi discharge to home/self care                 Follow-up Information     Follow up With Specialties Details Why Contact Info Additional Information    Your neurologist  Call in 1 day       [facility] Call  As needed [address], [address] [facility], [address] [facility], [address] [place], [place], [address] [facility] Call  As needed [address] [phone]  [address] , [place], [place], [address]          Patient's Medications   Discharge Prescriptions    No medications on file       No discharge procedures on file      PDMP Review       Value Time User    PDMP Reviewed  Yes 5/27/2020 12:37 AM LEBRON Siddiqui          ED Provider  Electronically Signed by           Erika Starks MD  04/03/23 3207

## 2023-04-05 LAB — LEVETIRACETAM SERPL-MCNC: 7.1 UG/ML (ref 10–40)

## 2023-04-06 LAB — LEVETIRACETAM SERPL-MCNC: 2 UG/ML (ref 10–40)

## 2023-04-17 NOTE — ED ADULT NURSE NOTE - CAS EDP DISCH TYPE
[Dear  ___] : Dear  [unfilled], [Consult Letter:] : I had the pleasure of evaluating your patient, [unfilled]. [Please see my note below.] : Please see my note below. [Consult Closing:] : Thank you very much for allowing me to participate in the care of this patient.  If you have any questions, please do not hesitate to contact me. [Sincerely,] : Sincerely, [FreeTextEntry3] : Carmen Tamayo MD\par Urologic Oncology\par [facility]\par [facility] [place]\par [facility]\par  Home

## 2023-04-19 ENCOUNTER — HOSPITAL ENCOUNTER (EMERGENCY)
Facility: HOSPITAL | Age: 38
Discharge: HOME | End: 2023-04-20
Attending: EMERGENCY MEDICINE

## 2023-04-19 ENCOUNTER — APPOINTMENT (EMERGENCY)
Dept: RADIOLOGY | Facility: HOSPITAL | Age: 38
End: 2023-04-19

## 2023-04-19 DIAGNOSIS — S30.0XXA CONTUSION OF COCCYX, INITIAL ENCOUNTER: Primary | ICD-10-CM

## 2023-04-19 RX ORDER — IBUPROFEN 600 MG/1
600 TABLET ORAL ONCE
Status: COMPLETED | OUTPATIENT
Start: 2023-04-20 | End: 2023-04-20

## 2023-04-20 VITALS
SYSTOLIC BLOOD PRESSURE: 127 MMHG | HEART RATE: 102 BPM | DIASTOLIC BLOOD PRESSURE: 79 MMHG | TEMPERATURE: 98.6 F | OXYGEN SATURATION: 98 % | RESPIRATION RATE: 16 BRPM

## 2023-04-20 RX ADMIN — IBUPROFEN 600 MG: 600 TABLET ORAL at 00:10

## 2023-04-20 NOTE — ED PROVIDER NOTES
History  Chief Complaint   Patient presents with   • Tailbone Pain     Pt presents to the ed after tripping over his walker and landing on his tailbone about 20mins ago, reports tailbone pain, -blood thinners, -head strike, -LOC     Patient reports he lost his balance due to having a low quality walker and fell on his tailbone  He now complains of tailbone pain  He fell into a seated position did not hit his head, did not hurt anything else  Apart from tailbone pain, he has no other complaints  He denies feeling suicidal   He tells me he is in a great mood  Prior to Admission Medications   Prescriptions Last Dose Informant Patient Reported? Taking?    Cholecalciferol 25 MCG (1000 UT) tablet   Yes No   Sig: Take 1,000 Units by mouth   acetaminophen (TYLENOL) 500 mg tablet   No No   Sig: Take 1 tablet (500 mg total) by mouth every 6 (six) hours as needed for mild pain or moderate pain   bictegravir-emtricitab-tenofovir alafenamide (Biktarvy) -25 MG tablet   Yes No   Sig: Take 1 tablet by mouth daily   bictegravir-emtricitab-tenofovir alafenamide (Biktarvy) -25 MG tablet   No No   Sig: Take 1 tablet by mouth daily with breakfast   cyclobenzaprine (FLEXERIL) 10 mg tablet   No No   Sig: Take 1 tablet (10 mg total) by mouth 2 (two) times a day as needed for muscle spasms   Patient not taking: Reported on 7/2/2022   cyclobenzaprine (FLEXERIL) 10 mg tablet   No No   Sig: Take 1 tablet (10 mg total) by mouth 2 (two) times a day as needed for muscle spasms   levETIRAcetam (Keppra) 750 mg tablet   No No   Sig: Take 1 tablet (750 mg total) by mouth every 12 (twelve) hours for 21 days   ondansetron (Zofran ODT) 4 mg disintegrating tablet   No No   Sig: Take 1 tablet (4 mg total) by mouth every 6 (six) hours as needed for nausea or vomiting   Patient not taking: Reported on 9/17/2022      Facility-Administered Medications: None       Past Medical History:   Diagnosis Date   • HIV (human immunodeficiency "virus infection) ([facility] )    • Seizures ([facility] )    • Smoker    • Syphilis        Past Surgical History:   Procedure Laterality Date   • HERNIA REPAIR         History reviewed  No pertinent family history  I have reviewed and agree with the history as documented  E-Cigarette/Vaping   • E-Cigarette Use Current Every Day User      E-Cigarette/Vaping Substances   • Nicotine No    • THC No    • CBD No    • Flavoring Yes    • Other No    • Unknown No      Social History     Tobacco Use   • Smoking status: Every Day     Packs/day: 2 00     Years: 8 00     Pack years: 16 00     Types: Cigarettes   • Smokeless tobacco: Never   • Tobacco comments: \"At least 2 packs a day\" of cigarettes  Vaping Use   • Vaping Use: Every day   • Substances: Flavoring   Substance Use Topics   • Alcohol use: Yes     Alcohol/week: 2 0 standard drinks     Types: 1 Glasses of wine, 1 Cans of beer per week     Comment: socially   • Drug use: Yes     Frequency: 1 0 times per week     Types: Marijuana       Review of Systems   All other systems reviewed and are negative  Physical Exam  Physical Exam  Vitals and nursing note reviewed  Constitutional:       General: He is not in acute distress  Appearance: He is well-developed  HENT:      Head: Normocephalic and atraumatic  Eyes:      Conjunctiva/sclera: Conjunctivae normal    Cardiovascular:      Rate and Rhythm: Normal rate and regular rhythm  Heart sounds: No murmur heard  Pulmonary:      Effort: Pulmonary effort is normal  No respiratory distress  Breath sounds: Normal breath sounds  Abdominal:      Palpations: Abdomen is soft  Tenderness: There is no abdominal tenderness  Musculoskeletal:         General: Tenderness (Mild to moderate tenderness of sacrum and coccyx ) present  No swelling  Cervical back: Neck supple  Skin:     General: Skin is warm and dry  Capillary Refill: Capillary refill takes less than 2 seconds     Neurological:      Mental " Status: He is alert  Psychiatric:         Mood and Affect: Mood normal          Vital Signs  ED Triage Vitals   Temperature Pulse Respirations Blood Pressure SpO2   04/19/23 2323 04/19/23 2323 04/19/23 2323 04/19/23 2323 04/19/23 2323   98 6 °F (37 °C) (!) 115 18 94/75 99 %      Temp Source Heart Rate Source Patient Position - Orthostatic VS BP Location FiO2 (%)   04/19/23 2323 04/19/23 2323 04/19/23 2323 04/19/23 2323 --   Oral Monitor Lying Right arm       Pain Score       04/20/23 0010       6           Vitals:    04/19/23 2323 04/20/23 0207   BP: 94/75 127/79   Pulse: (!) 115 102   Patient Position - Orthostatic VS: Lying Lying         Visual Acuity      ED Medications  Medications   ibuprofen (MOTRIN) tablet 600 mg (600 mg Oral Given During Downtime 4/20/23 0010)       Diagnostic Studies  Results Reviewed     None                 XR sacrum and coccyx   ED Interpretation by Ani Roque MD (04/20 5693)   No acute fracture as read by me                 Procedures  Procedures         ED Course  ED Course as of 04/20/23 0654   Thu Apr 20, 2023   0654 On reexamination, patient stable without complaints  SBIRT 22yo+    Flowsheet Row Most Recent Value   Initial Alcohol Screen: US AUDIT-C     1  How often do you have a drink containing alcohol? 0 Filed at: 04/19/2023 2326   Audit-C Score 0 Filed at: 04/19/2023 2326                    Medical Decision Making  Will obtain x-rays to rule out fracture  Patient pleasant and cooperative  Normal neurovascular exam     Amount and/or Complexity of Data Reviewed  Radiology: ordered and independent interpretation performed  Risk  Prescription drug management            Disposition  Final diagnoses:   Contusion of coccyx, initial encounter     Time reflects when diagnosis was documented in both MDM as applicable and the Disposition within this note     Time User Action Codes Description Comment    4/19/2023 11:50 PM Ely Bojorquez [S30 0XXA] Contusion of coccyx, initial encounter     4/20/2023  2:07 AM Adeline Bojorquez [M53 3] Coccyx pain     4/20/2023  2:07 AM Jonyniru Arian [M53 3] Coccyx pain       ED Disposition     ED Disposition   Discharge    Condition   Stable    Date/Time   Thu Apr 20, 2023  2:07 AM    Olivia Aleman discharge to home/self care                 Follow-up Information     Follow up With Specialties Details Why Contact Info Additional Information    [facility] [place]   [address]  [place] [id number] [phone] II QROBD HOTZUB MFEKIXGN [facility], [address], [place], [place], [address] [phone]          Discharge Medication List as of 4/19/2023 11:51 PM      CONTINUE these medications which have NOT CHANGED    Details   acetaminophen (TYLENOL) 500 mg tablet Take 1 tablet (500 mg total) by mouth every 6 (six) hours as needed for mild pain or moderate pain, Starting Fri 9/30/2022, Print      !! bictegravir-emtricitab-tenofovir alafenamide (Biktarvy) -25 MG tablet Take 1 tablet by mouth daily, Starting Tue 3/22/2022, Historical Med      !! bictegravir-emtricitab-tenofovir alafenamide (Biktarvy) -25 MG tablet Take 1 tablet by mouth daily with breakfast, Starting Mon 1/23/2023, Normal      Cholecalciferol 25 MCG (1000 UT) tablet Take 1,000 Units by mouth, Historical Med      !! cyclobenzaprine (FLEXERIL) 10 mg tablet Take 1 tablet (10 mg total) by mouth 2 (two) times a day as needed for muscle spasms, Starting Sat 3/26/2022, Normal      !! cyclobenzaprine (FLEXERIL) 10 mg tablet Take 1 tablet (10 mg total) by mouth 2 (two) times a day as needed for muscle spasms, Starting Tue 10/4/2022, Normal      levETIRAcetam (Keppra) 750 mg tablet Take 1 tablet (750 mg total) by mouth every 12 (twelve) hours for 21 days, Starting Fri 4/14/2023, Until Fri 5/5/2023, Normal      ondansetron (Zofran ODT) 4 mg disintegrating tablet Take 1 tablet (4 mg total) by mouth every 6 (six) hours as needed for nausea or vomiting, Starting Wed 8/31/2022, Normal       !! - Potential duplicate medications found  Please discuss with provider  No discharge procedures on file      PDMP Review       Value Time User    PDMP Reviewed  Yes 5/27/2020 12:37 AM LEBRON Simon          ED Provider  Electronically Signed by           Scar Holder MD  04/20/23 9205

## 2023-04-21 ENCOUNTER — APPOINTMENT (EMERGENCY)
Dept: CT IMAGING | Facility: HOSPITAL | Age: 38
End: 2023-04-21

## 2023-04-21 ENCOUNTER — HOSPITAL ENCOUNTER (EMERGENCY)
Facility: HOSPITAL | Age: 38
Discharge: HOME | End: 2023-04-21
Attending: EMERGENCY MEDICINE

## 2023-04-21 VITALS
HEART RATE: 93 BPM | OXYGEN SATURATION: 100 % | BODY MASS INDEX: 22.9 KG/M2 | SYSTOLIC BLOOD PRESSURE: 141 MMHG | DIASTOLIC BLOOD PRESSURE: 101 MMHG | WEIGHT: 160 LBS | TEMPERATURE: 98.3 F | RESPIRATION RATE: 16 BRPM | HEIGHT: 70 IN

## 2023-04-21 VITALS
SYSTOLIC BLOOD PRESSURE: 147 MMHG | TEMPERATURE: 97.9 F | RESPIRATION RATE: 18 BRPM | DIASTOLIC BLOOD PRESSURE: 78 MMHG | HEART RATE: 95 BPM | OXYGEN SATURATION: 99 %

## 2023-04-21 DIAGNOSIS — S09.90XA CLOSED HEAD INJURY, INITIAL ENCOUNTER: ICD-10-CM

## 2023-04-21 DIAGNOSIS — G40.919 BREAKTHROUGH SEIZURE (HCC): Primary | ICD-10-CM

## 2023-04-21 LAB
ALBUMIN SERPL BCP-MCNC: 4 G/DL (ref 3.5–5)
ALP SERPL-CCNC: 70 U/L (ref 34–104)
ALT SERPL W P-5'-P-CCNC: 12 U/L (ref 7–52)
ANION GAP SERPL CALCULATED.3IONS-SCNC: 8 MMOL/L (ref 4–13)
AST SERPL W P-5'-P-CCNC: 22 U/L (ref 13–39)
ATRIAL RATE: 92 BPM
BASOPHILS # BLD AUTO: 0.02 THOUSANDS/ΜL (ref 0–0.1)
BASOPHILS NFR BLD AUTO: 0 % (ref 0–1)
BILIRUB SERPL-MCNC: 0.37 MG/DL (ref 0.2–1)
BUN SERPL-MCNC: 8 MG/DL (ref 5–25)
CALCIUM SERPL-MCNC: 8.7 MG/DL (ref 8.4–10.2)
CHLORIDE SERPL-SCNC: 103 MMOL/L (ref 96–108)
CO2 SERPL-SCNC: 26 MMOL/L (ref 21–32)
CREAT SERPL-MCNC: 0.93 MG/DL (ref 0.6–1.3)
EOSINOPHIL # BLD AUTO: 0.11 THOUSAND/ΜL (ref 0–0.61)
EOSINOPHIL NFR BLD AUTO: 2 % (ref 0–6)
ERYTHROCYTE [DISTWIDTH] IN BLOOD BY AUTOMATED COUNT: 13.9 % (ref 11.6–15.1)
GFR SERPL CREATININE-BSD FRML MDRD: 104 ML/MIN/1.73SQ M
GLUCOSE SERPL-MCNC: 93 MG/DL (ref 65–140)
GLUCOSE SERPL-MCNC: 95 MG/DL (ref 65–140)
HCT VFR BLD AUTO: 43.3 % (ref 36.5–49.3)
HGB BLD-MCNC: 14 G/DL (ref 12–17)
IMM GRANULOCYTES # BLD AUTO: 0.01 THOUSAND/UL (ref 0–0.2)
IMM GRANULOCYTES NFR BLD AUTO: 0 % (ref 0–2)
LYMPHOCYTES # BLD AUTO: 2.01 THOUSANDS/ΜL (ref 0.6–4.47)
LYMPHOCYTES NFR BLD AUTO: 39 % (ref 14–44)
MAGNESIUM SERPL-MCNC: 1.7 MG/DL (ref 1.9–2.7)
MCH RBC QN AUTO: 29.8 PG (ref 26.8–34.3)
MCHC RBC AUTO-ENTMCNC: 32.3 G/DL (ref 31.4–37.4)
MCV RBC AUTO: 92 FL (ref 82–98)
MONOCYTES # BLD AUTO: 0.54 THOUSAND/ΜL (ref 0.17–1.22)
MONOCYTES NFR BLD AUTO: 11 % (ref 4–12)
NEUTROPHILS # BLD AUTO: 2.46 THOUSANDS/ΜL (ref 1.85–7.62)
NEUTS SEG NFR BLD AUTO: 48 % (ref 43–75)
NRBC BLD AUTO-RTO: 0 /100 WBCS
P AXIS: 42 DEGREES
PHOSPHATE SERPL-MCNC: 3.4 MG/DL (ref 2.7–4.5)
PLATELET # BLD AUTO: 180 THOUSANDS/UL (ref 149–390)
PMV BLD AUTO: 10 FL (ref 8.9–12.7)
POTASSIUM SERPL-SCNC: 3.6 MMOL/L (ref 3.5–5.3)
PR INTERVAL: 156 MS
PROT SERPL-MCNC: 7.2 G/DL (ref 6.4–8.4)
QRS AXIS: -21 DEGREES
QRSD INTERVAL: 114 MS
QT INTERVAL: 370 MS
QTC INTERVAL: 457 MS
RBC # BLD AUTO: 4.7 MILLION/UL (ref 3.88–5.62)
SODIUM SERPL-SCNC: 137 MMOL/L (ref 135–147)
T WAVE AXIS: 22 DEGREES
VENTRICULAR RATE: 92 BPM
WBC # BLD AUTO: 5.15 THOUSAND/UL (ref 4.31–10.16)

## 2023-04-21 RX ORDER — LANOLIN ALCOHOL/MO/W.PET/CERES
800 CREAM (GRAM) TOPICAL ONCE
Status: COMPLETED | OUTPATIENT
Start: 2023-04-21 | End: 2023-04-21

## 2023-04-21 RX ORDER — LORAZEPAM 2 MG/ML
0.5 INJECTION INTRAMUSCULAR ONCE
Status: COMPLETED | OUTPATIENT
Start: 2023-04-21 | End: 2023-04-21

## 2023-04-21 RX ORDER — ACETAMINOPHEN 325 MG/1
650 TABLET ORAL ONCE
Status: COMPLETED | OUTPATIENT
Start: 2023-04-21 | End: 2023-04-21

## 2023-04-21 RX ADMIN — ACETAMINOPHEN 650 MG: 325 TABLET ORAL at 00:51

## 2023-04-21 RX ADMIN — LORAZEPAM 0.5 MG: 2 INJECTION INTRAMUSCULAR; INTRAVENOUS at 00:51

## 2023-04-21 RX ADMIN — MAGNESIUM OXIDE TAB 400 MG (241.3 MG ELEMENTAL MG) 800 MG: 400 (241.3 MG) TAB at 01:28

## 2023-04-21 RX ADMIN — LEVETIRACETAM 750 MG: 250 TABLET, FILM COATED ORAL at 01:28

## 2023-04-21 NOTE — ED PROVIDER NOTES
History  Chief Complaint   Patient presents with   • Seizure - Prior Hx Of     Pt presents via EMS for possible seizure at [facility]  Pt states he did have a seizure, A & O x4  Last dose of medication earlier today  Patient is a 40-year-old male seen in the emergency department with concern for possible seizure episode  Patient states that he had a seizure in the area of wall this evening prior to evaluation in the emergency department  Patient states that his last seizure was approximately 1 week ago  Patient notes occipital headache, and states that he believes that he fell and struck his head during the episode  Patient denies bowel and bladder incontinence during the episode  Patient states that he did take his Keppra over the past day  Patient notes no abdominal pain, chest pain, shortness of breath, nausea, vomiting, diarrhea, weakness, numbness, tingling  Review of records shows that the patient has history of multiple emergency department visits in the past with similar complaints  Prior to Admission Medications   Prescriptions Last Dose Informant Patient Reported? Taking?    Cholecalciferol 25 MCG (1000 UT) tablet   Yes No   Sig: Take 1,000 Units by mouth   acetaminophen (TYLENOL) 500 mg tablet   No No   Sig: Take 1 tablet (500 mg total) by mouth every 6 (six) hours as needed for mild pain or moderate pain   bictegravir-emtricitab-tenofovir alafenamide (Biktarvy) -25 MG tablet   Yes No   Sig: Take 1 tablet by mouth daily   bictegravir-emtricitab-tenofovir alafenamide (Biktarvy) -25 MG tablet   No No   Sig: Take 1 tablet by mouth daily with breakfast   cyclobenzaprine (FLEXERIL) 10 mg tablet   No No   Sig: Take 1 tablet (10 mg total) by mouth 2 (two) times a day as needed for muscle spasms   Patient not taking: Reported on 7/2/2022   cyclobenzaprine (FLEXERIL) 10 mg tablet   No No   Sig: Take 1 tablet (10 mg total) by mouth 2 (two) times a day as needed for muscle spasms "  levETIRAcetam (Keppra) 750 mg tablet   No No   Sig: Take 1 tablet (750 mg total) by mouth every 12 (twelve) hours for 21 days   ondansetron (Zofran ODT) 4 mg disintegrating tablet   No No   Sig: Take 1 tablet (4 mg total) by mouth every 6 (six) hours as needed for nausea or vomiting   Patient not taking: Reported on 9/17/2022      Facility-Administered Medications: None       Past Medical History:   Diagnosis Date   • HIV (human immunodeficiency virus infection) ([facility] )    • Seizures ([facility] )    • Smoker    • Syphilis        Past Surgical History:   Procedure Laterality Date   • HERNIA REPAIR         History reviewed  No pertinent family history  I have reviewed and agree with the history as documented  E-Cigarette/Vaping   • E-Cigarette Use Current Every Day User      E-Cigarette/Vaping Substances   • Nicotine No    • THC No    • CBD No    • Flavoring Yes    • Other No    • Unknown No      Social History     Tobacco Use   • Smoking status: Every Day     Packs/day: 2 00     Years: 8 00     Pack years: 16 00     Types: Cigarettes   • Smokeless tobacco: Never   • Tobacco comments: \"At least 2 packs a day\" of cigarettes  Vaping Use   • Vaping Use: Every day   • Substances: Flavoring   Substance Use Topics   • Alcohol use: Yes     Alcohol/week: 2 0 standard drinks     Types: 1 Glasses of wine, 1 Cans of beer per week     Comment: socially   • Drug use: Yes     Frequency: 1 0 times per week     Types: Marijuana       Review of Systems   Constitutional: Negative for chills and fever  HENT: Negative for ear pain and sore throat  Eyes: Negative for pain and visual disturbance  Respiratory: Negative for cough and shortness of breath  Cardiovascular: Negative for chest pain and palpitations  Gastrointestinal: Negative for abdominal pain and vomiting  Genitourinary: Negative for decreased urine volume and difficulty urinating  Musculoskeletal: Negative for neck pain and neck stiffness     Skin: Negative " for color change and rash  Neurological: Positive for seizures and headaches  Negative for weakness  Psychiatric/Behavioral: Negative for agitation and confusion  All other systems reviewed and are negative  Physical Exam  Physical Exam  Vitals and nursing note reviewed  Constitutional:       General: He is not in acute distress  Appearance: He is well-developed  HENT:      Head: Normocephalic  Comments: occipital tenderness to palpation     Right Ear: External ear normal       Left Ear: External ear normal       Nose: Nose normal       Mouth/Throat:      Pharynx: Oropharynx is clear  Eyes:      General: No scleral icterus  Conjunctiva/sclera: Conjunctivae normal    Neck:      Comments: No midline C-spine tenderness or step-offs noted  Cardiovascular:      Rate and Rhythm: Normal rate and regular rhythm  Heart sounds: No murmur heard  Pulmonary:      Effort: Pulmonary effort is normal  No respiratory distress  Breath sounds: Normal breath sounds  Abdominal:      General: There is no distension  Palpations: Abdomen is soft  Tenderness: There is no abdominal tenderness  Musculoskeletal:         General: No swelling or deformity  Cervical back: Normal range of motion and neck supple  No tenderness  Skin:     General: Skin is warm and dry  Neurological:      General: No focal deficit present  Mental Status: He is alert  Cranial Nerves: No cranial nerve deficit  Sensory: No sensory deficit  Psychiatric:         Mood and Affect: Mood normal          Thought Content:  Thought content normal          Vital Signs  ED Triage Vitals   Temperature Pulse Respirations Blood Pressure SpO2   04/21/23 0020 04/21/23 0019 04/21/23 0017 04/21/23 0017 04/21/23 0017   97 9 °F (36 6 °C) 95 18 147/78 99 %      Temp Source Heart Rate Source Patient Position - Orthostatic VS BP Location FiO2 (%)   04/21/23 0020 04/21/23 0019 04/21/23 0017 04/21/23 0017 --   Oral Monitor Lying Right arm       Pain Score       04/21/23 0051       6           Vitals:    04/21/23 0017 04/21/23 0019   BP: 147/78    Pulse:  95   Patient Position - Orthostatic VS: Lying          Visual Acuity      ED Medications  Medications   magnesium Oxide (MAG-OX) tablet 800 mg (has no administration in time range)   levETIRAcetam (KEPPRA) tablet 750 mg (has no administration in time range)   acetaminophen (TYLENOL) tablet 650 mg (650 mg Oral Given 4/21/23 0051)   LORazepam (ATIVAN) injection 0 5 mg (0 5 mg Intravenous Given 4/21/23 0051)       Diagnostic Studies  Results Reviewed     Procedure Component Value Units Date/Time    Comprehensive metabolic panel [[id number]] Collected: 04/21/23 0050    Lab Status: Final result Specimen: Blood from Arm, Left Updated: 04/21/23 0115     Sodium 137 mmol/L      Potassium 3 6 mmol/L      Chloride 103 mmol/L      CO2 26 mmol/L      ANION GAP 8 mmol/L      BUN 8 mg/dL      Creatinine 0 93 mg/dL      Glucose 95 mg/dL      Calcium 8 7 mg/dL      AST 22 U/L      ALT 12 U/L      Alkaline Phosphatase 70 U/L      Total Protein 7 2 g/dL      Albumin 4 0 g/dL      Total Bilirubin 0 37 mg/dL      eGFR 104 ml/min/1 73sq m     Narrative:      [place] guidelines for Chronic Kidney Disease (CKD):   •  Stage 1 with normal or high GFR (GFR > 90 mL/min/1 73 square meters)  •  Stage 2 Mild CKD (GFR = 60-89 mL/min/1 73 square meters)  •  Stage 3A Moderate CKD (GFR = 45-59 mL/min/1 73 square meters)  •  Stage 3B Moderate CKD (GFR = 30-44 mL/min/1 73 square meters)  •  Stage 4 Severe CKD (GFR = 15-29 mL/min/1 73 square meters)  •  Stage 5 End Stage CKD (GFR <15 mL/min/1 73 square meters)  Note: GFR calculation is accurate only with a steady state creatinine    Magnesium [[id number]]  (Abnormal) Collected: 04/21/23 0050    Lab Status: Final result Specimen: Blood from Arm, Left Updated: 04/21/23 0115     Magnesium 1 7 mg/dL     Phosphorus [[id number]]  (Normal) Collected: 04/21/23 0050    Lab Status: Final result Specimen: Blood from Arm, Left Updated: 04/21/23 0115     Phosphorus 3 4 mg/dL     CBC and differential [[id number]] Collected: 04/21/23 0050    Lab Status: Final result Specimen: Blood from Arm, Left Updated: 04/21/23 0101     WBC 5 15 Thousand/uL      RBC 4 70 Million/uL      Hemoglobin 14 0 g/dL      Hematocrit 43 3 %      MCV 92 fL      MCH 29 8 pg      MCHC 32 3 g/dL      RDW 13 9 %      MPV 10 0 fL      Platelets 446 Thousands/uL      nRBC 0 /100 WBCs      Neutrophils Relative 48 %      Immat GRANS % 0 %      Lymphocytes Relative 39 %      Monocytes Relative 11 %      Eosinophils Relative 2 %      Basophils Relative 0 %      Neutrophils Absolute 2 46 Thousands/µL      Immature Grans Absolute 0 01 Thousand/uL      Lymphocytes Absolute 2 01 Thousands/µL      Monocytes Absolute 0 54 Thousand/µL      Eosinophils Absolute 0 11 Thousand/µL      Basophils Absolute 0 02 Thousands/µL     Levetiracetam level [[id number]] Collected: 04/21/23 0050    Lab Status: In process Specimen: Blood from Arm, Left Updated: 04/21/23 0057    Fingerstick Glucose (POCT) [[id number]]  (Normal) Collected: 04/21/23 0047    Lab Status: Final result Updated: 04/21/23 0056     POC Glucose 93 mg/dl                  CT head without contrast   Final Result by Keith Black MD (04/21 0117)      No acute intracranial abnormality                    Workstation performed: [id number]                    Procedures  ECG 12 Lead Documentation Only    Date/Time: 4/21/2023 12:31 AM  Performed by: Alka Verduzco MD  Authorized by: Alka Verduzco MD     Indications / Diagnosis:  Seizure  ECG reviewed by me, the ED Provider: yes    Patient location:  ED  Rate:     ECG rate:  92    ECG rate assessment: normal    Rhythm:     Rhythm: sinus rhythm    QRS:     QRS axis:  Left  ST segments:     ST segments:  Normal  T waves:     T waves: normal    Comments:      Normal sinus rhythm at 92, left axis deviation, , , QTc 457, no ST-T wave abnormality, no evidence of acute ischemia             ED Course  ED Course as of 04/21/23 0129   Fri Apr 21, 2023   0125 CT head-    IMPRESSION:     No acute intracranial abnormality  SBIRT 22yo+    Flowsheet Row Most Recent Value   Initial Alcohol Screen: US AUDIT-C     1  How often do you have a drink containing alcohol? 0 Filed at: 04/21/2023 0018   2  How many drinks containing alcohol do you have on a typical day you are drinking? 0 Filed at: 04/21/2023 0018   3a  Male UNDER 65: How often do you have five or more drinks on one occasion? 0 Filed at: 04/21/2023 0018   3b  FEMALE Any Age, or MALE 65+: How often do you have 4 or more drinks on one occassion? 0 Filed at: 04/21/2023 0018   Audit-C Score 0 Filed at: 04/21/2023 7036   CALI: How many times in the past year have you    Used an illegal drug or used a prescription medication for non-medical reasons? Never Filed at: 04/21/2023 0018                    Medical Decision Making  Patient is a 71-year-old male seen in the emergency department with concern for possible breakthrough seizure episode, with possible head injury  Fingerstick blood glucose was 93, within normal limits  EKG was obtained and noted  CT head showed no acute intracranial abnormality or fracture  Patient was treated with medication for symptom control  Laboratory evaluation remarkable for low magnesium of 1 7  Patient was at baseline mental status in the emergency department  Evaluation is consistent with likely breakthrough seizure, closed head injury  Plan to have patient follow up with neurology/outpatient providers  Patient states that he does have his Keppra medication  Patient stable for discharge  Discharge instructions were reviewed with patient      Breakthrough seizure [facility]): acute illness or injury  Closed head injury, initial encounter: acute illness or injury  Amount and/or Complexity of Data Reviewed  Labs: ordered  Decision-making details documented in ED Course  Radiology: ordered  Decision-making details documented in ED Course  ECG/medicine tests: ordered and independent interpretation performed  Decision-making details documented in ED Course  Risk  OTC drugs  Prescription drug management  Disposition  Final diagnoses:   Breakthrough seizure ([address] )   Closed head injury, initial encounter     Time reflects when diagnosis was documented in both MDM as applicable and the Disposition within this note     Time User Action Codes Description Comment    4/21/2023 12:27 AM Young Agent Add [G40 919] Breakthrough seizure ([address] )     4/21/2023  1:25 AM Young Agent Add [S09 90XA] Closed head injury, initial encounter       ED Disposition     ED Disposition   Discharge    Condition   Stable    Date/Time   Fri Apr 21, 2023  1:25 AM    Comment   Griselda Reis discharge to home/self care  Follow-up Information     Follow up With Specialties Details Why Contact Info Additional Information    Your neurologist  Call in 1 day       [facility] [place] [facility] Call  As needed Shae [address] [place] [id number]  [address] [place] [address] Neurology [address] [place] [address] [facility] [place], [place], [address] [place] [address]    Your primary doctor  Call in 1 day       [place] Call  As needed [address] [address]  [address]  , [place], [place] NEURORE[facility] [place], [place], [address] [phone]          Patient's Medications   Discharge Prescriptions    No medications on file       No discharge procedures on file      PDMP Review       Value Time User    PDMP Reviewed  Yes 5/27/2020 12:37 AM LEBRON Burnette          ED Provider  Electronically Signed by           Bharat Martínez MD  04/21/23 4569

## 2023-04-21 NOTE — DISCHARGE INSTRUCTIONS
Follow up with your neurologist and with your primary doctor, and return to the emergency department for new or worsening symptoms  CT BRAIN - WITHOUT CONTRAST     INDICATION:   seizure, head injury  COMPARISON:  10/17/2022  TECHNIQUE:  CT examination of the brain was performed  Multiplanar 2D reformatted images were created from the source data  Radiation dose length product (DLP) for this visit:  871 7 mGy-cm   This examination, like all CT scans performed in the [facility], was performed utilizing techniques to minimize radiation dose exposure, including the use of iterative   reconstruction and automated exposure control  IMAGE QUALITY:  Diagnostic  FINDINGS:     PARENCHYMA:  No intracranial mass, mass effect or midline shift  No CT signs of acute infarction  No acute parenchymal hemorrhage  VENTRICLES AND EXTRA-AXIAL SPACES:  Normal for the patient's age  VISUALIZED ORBITS: Normal visualized orbits  PARANASAL SINUSES: Normal visualized paranasal sinuses  CALVARIUM AND EXTRACRANIAL SOFT TISSUES:  Normal      IMPRESSION:     No acute intracranial abnormality

## 2023-04-22 NOTE — ED PROVIDER NOTES
History  Chief Complaint   Patient presents with    Seizure - Prior Hx Of     Patient arrives via EMS with reports of having a seizure  This is a 40-year-old male who presents to the emergency department with a possible seizure  The patient was brought in by EMS  Bystanders called for the patient having a possible seizure  As per EMS, the patient was awake alert and oriented on their arrival   He showed no seizure activity or signs of having a seizure  The patient has no complaints on my evaluation          Prior to Admission Medications   Prescriptions Last Dose Informant Patient Reported? Taking?    Cholecalciferol 25 MCG (1000 UT) tablet   Yes No   Sig: Take 1,000 Units by mouth   acetaminophen (TYLENOL) 500 mg tablet   No No   Sig: Take 1 tablet (500 mg total) by mouth every 6 (six) hours as needed for mild pain or moderate pain   bictegravir-emtricitab-tenofovir alafenamide (Biktarvy) -25 MG tablet   Yes No   Sig: Take 1 tablet by mouth daily   bictegravir-emtricitab-tenofovir alafenamide (Biktarvy) -25 MG tablet   No No   Sig: Take 1 tablet by mouth daily with breakfast   cyclobenzaprine (FLEXERIL) 10 mg tablet   No No   Sig: Take 1 tablet (10 mg total) by mouth 2 (two) times a day as needed for muscle spasms   Patient not taking: Reported on 7/2/2022   cyclobenzaprine (FLEXERIL) 10 mg tablet   No No   Sig: Take 1 tablet (10 mg total) by mouth 2 (two) times a day as needed for muscle spasms   levETIRAcetam (Keppra) 750 mg tablet   No No   Sig: Take 1 tablet (750 mg total) by mouth every 12 (twelve) hours for 21 days   ondansetron (Zofran ODT) 4 mg disintegrating tablet   No No   Sig: Take 1 tablet (4 mg total) by mouth every 6 (six) hours as needed for nausea or vomiting   Patient not taking: Reported on 9/17/2022      Facility-Administered Medications: None       Past Medical History:   Diagnosis Date    [facility])     Seizures ([facility] )     Smoker "   Syphilis        Past Surgical History:   Procedure Laterality Date    HERNIA REPAIR         History reviewed  No pertinent family history  I have reviewed and agree with the history as documented  E-Cigarette/Vaping    E-Cigarette Use Current Every Day User      E-Cigarette/Vaping Substances    Nicotine No     THC No     CBD No     Flavoring Yes     Other No     Unknown No      Social History     Tobacco Use    Smoking status: Every Day     Packs/day: 2 00     Years: 8 00     Pack years: 16 00     Types: Cigarettes    Smokeless tobacco: Never    Tobacco comments: \"At least 2 packs a day\" of cigarettes  Vaping Use    Vaping Use: Every day    Substances: Flavoring   Substance Use Topics    Alcohol use: Yes     Alcohol/week: 2 0 standard drinks     Types: 1 Glasses of wine, 1 Cans of beer per week     Comment: socially    Drug use: Yes     Frequency: 1 0 times per week     Types: Marijuana       Review of Systems   All other systems reviewed and are negative        Physical Exam  Physical Exam  Constitutional:  Vital signs reviewed, patient appears nontoxic, no acute distress  Eyes: Pupils equal round reactive to light and accommodation, extraocular muscles intact  HEENT: trachea midline, no JVD, moist mucous membranes  Respiratory: lung sounds clear throughout, no rhonchi, no rales  Cardiovascular: regular rate rhythm, no murmurs or rubs  Abdomen: soft, nontender, nondistended, no rebound or guarding  Back: no CVA tenderness, normal inspection  Extremities: no edema, pulses equal in all 4 extremities  Neuro: awake, alert, oriented, no focal weakness  Skin: warm, dry, intact, no rashes noted    Vital Signs  ED Triage Vitals [04/21/23 2316]   Temperature Pulse Respirations Blood Pressure SpO2   98 3 °F (36 8 °C) 93 16 (!) 141/101 100 %      Temp Source Heart Rate Source Patient Position - Orthostatic VS BP Location FiO2 (%)   Oral Monitor Sitting Left arm --      Pain Score       --     " Vitals:    04/21/23 2316   BP: (!) 141/101   Pulse: 93   Patient Position - Orthostatic VS: Sitting         Visual Acuity      ED Medications  Medications - No data to display    Diagnostic Studies  Results Reviewed     None                 No orders to display              Procedures  Procedures         ED Course                                             Medical Decision Making  This is a 44-year-old male who presents to the emergency department for seizures  As per chart review, the patient has multiple visits for seizures in the past   The patient is well-known to the emergency department and does not appear to have had seizures  He is in good spirits and has no complaints tonight  He will be discharged with follow-up to his primary care physician    Breakthrough seizure [facility]): acute illness or injury      Disposition  Final diagnoses:   Breakthrough seizure ([address] )     Time reflects when diagnosis was documented in both MDM as applicable and the Disposition within this note     Time User Action Codes Description Comment    4/21/2023 11:19 PM Stanton De Luna Add [G40 919] Breakthrough seizure [facility])       ED Disposition     ED Disposition   Discharge    Condition   Stable    Date/Time   Fri Apr 21, 2023 11:19 PM    Olivia Anderson Seen discharge to home/self care                 Follow-up Information     Follow up With Specialties Details Why Contact Info Additional [facility] Schedule an appointment as soon as possible for a visit in 2 days  [address] [id number]  [address] , [place], [place], [address] [facility] Emergency Department Emergency Medicine  If symptoms worsen [address] [facility],[address] [id number]  [address] [facility] Emergency Department, [address] Tammy [address]orman Discharge Medication List as of 4/21/2023 11:47 PM      CONTINUE these medications which have NOT CHANGED    Details   acetaminophen (TYLENOL) 500 mg tablet Take 1 tablet (500 mg total) by mouth every 6 (six) hours as needed for mild pain or moderate pain, Starting Fri 9/30/2022, Print      !! bictegravir-emtricitab-tenofovir alafenamide (Biktarvy) -25 MG tablet Take 1 tablet by mouth daily, Starting Tue 3/22/2022, Historical Med      !! bictegravir-emtricitab-tenofovir alafenamide (Biktarvy) -25 MG tablet Take 1 tablet by mouth daily with breakfast, Starting Mon 1/23/2023, Normal      Cholecalciferol 25 MCG (1000 UT) tablet Take 1,000 Units by mouth, Historical Med      !! cyclobenzaprine (FLEXERIL) 10 mg tablet Take 1 tablet (10 mg total) by mouth 2 (two) times a day as needed for muscle spasms, Starting Sat 3/26/2022, Normal      !! cyclobenzaprine (FLEXERIL) 10 mg tablet Take 1 tablet (10 mg total) by mouth 2 (two) times a day as needed for muscle spasms, Starting Tue 10/4/2022, Normal      levETIRAcetam (Keppra) 750 mg tablet Take 1 tablet (750 mg total) by mouth every 12 (twelve) hours for 21 days, Starting Fri 4/14/2023, Until Fri 5/5/2023, Normal      ondansetron (Zofran ODT) 4 mg disintegrating tablet Take 1 tablet (4 mg total) by mouth every 6 (six) hours as needed for nausea or vomiting, Starting Wed 8/31/2022, Normal       !! - Potential duplicate medications found  Please discuss with provider  No discharge procedures on file      PDMP Review       Value Time User    PDMP Reviewed  Yes 5/27/2020 12:37 AM LEBRON Rees          ED Provider  Electronically Signed by           Frank Dawson DO  04/21/23 9898

## 2023-04-24 LAB — LEVETIRACETAM SERPL-MCNC: 10.3 UG/ML (ref 10–40)

## 2023-05-01 ENCOUNTER — HOSPITAL ENCOUNTER (EMERGENCY)
Facility: HOSPITAL | Age: 38
Discharge: HOME | End: 2023-05-02
Attending: EMERGENCY MEDICINE

## 2023-05-01 DIAGNOSIS — M25.551 RIGHT HIP PAIN: Primary | ICD-10-CM
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
## 2023-05-01 DIAGNOSIS — Z59.00 HOMELESSNESS: ICD-10-CM

## 2023-05-01 SDOH — ECONOMIC STABILITY - HOUSING INSECURITY: HOMELESSNESS UNSPECIFIED: Z59.00

## 2023-05-02 ENCOUNTER — APPOINTMENT (EMERGENCY)
Dept: RADIOLOGY | Facility: HOSPITAL | Age: 38
End: 2023-05-02

## 2023-05-02 VITALS
DIASTOLIC BLOOD PRESSURE: 84 MMHG | TEMPERATURE: 98.1 F | OXYGEN SATURATION: 100 % | HEART RATE: 98 BPM | SYSTOLIC BLOOD PRESSURE: 174 MMHG | RESPIRATION RATE: 18 BRPM

## 2023-05-02 RX ORDER — ACETAMINOPHEN 325 MG/1
650 TABLET ORAL ONCE
Status: COMPLETED | OUTPATIENT
Start: 2023-05-02 | End: 2023-05-02

## 2023-05-02 RX ADMIN — LEVETIRACETAM 750 MG: 250 TABLET, FILM COATED ORAL at 00:53

## 2023-05-02 RX ADMIN — ACETAMINOPHEN 650 MG: 325 TABLET ORAL at 00:53

## 2023-05-02 NOTE — DISCHARGE INSTRUCTIONS
Follow-up with your primary care physician  Take your prescribed medications as directed  Please return to the emergency department if you develop worsening symptoms, severe pain, or anything else concerning to you

## 2023-05-02 NOTE — ED PROVIDER NOTES
History  Chief Complaint   Patient presents with   • Medical Problem     Presents to the ed via ems after bystander called ems after they found him laying by the [place]     59-year-old male well-known to this emergency department with history of seizures, HIV, homelessness who presents for right hip pain  Patient was reportedly noted to be laying on the ground at [facility] by laila's who subsequently called 911  Patient reports that he was on the ground because he tripped and fell  He also states that he may have had a seizure but is not sure  No seizure-like activity witnessed by EMS or on arrival to the emergency department  Patient states that he has not taken his Keppra for the past 3 days  His only complaint is right hip pain  He otherwise feels at baseline  Prior to Admission Medications   Prescriptions Last Dose Informant Patient Reported? Taking?    Cholecalciferol 25 MCG (1000 UT) tablet   Yes No   Sig: Take 1,000 Units by mouth   acetaminophen (TYLENOL) 500 mg tablet   No No   Sig: Take 1 tablet (500 mg total) by mouth every 6 (six) hours as needed for mild pain or moderate pain   bictegravir-emtricitab-tenofovir alafenamide (Biktarvy) -25 MG tablet   Yes No   Sig: Take 1 tablet by mouth daily   bictegravir-emtricitab-tenofovir alafenamide (Biktarvy) -25 MG tablet   No No   Sig: Take 1 tablet by mouth daily with breakfast   cyclobenzaprine (FLEXERIL) 10 mg tablet   No No   Sig: Take 1 tablet (10 mg total) by mouth 2 (two) times a day as needed for muscle spasms   Patient not taking: Reported on 7/2/2022   cyclobenzaprine (FLEXERIL) 10 mg tablet   No No   Sig: Take 1 tablet (10 mg total) by mouth 2 (two) times a day as needed for muscle spasms   levETIRAcetam (Keppra) 750 mg tablet   No No   Sig: Take 1 tablet (750 mg total) by mouth every 12 (twelve) hours for 21 days   ondansetron (Zofran ODT) 4 mg disintegrating tablet   No No   Sig: Take 1 tablet (4 mg total) by mouth every 6 "(six) hours as needed for nausea or vomiting   Patient not taking: Reported on 9/17/2022      Facility-Administered Medications: None       Past Medical History:   Diagnosis Date   • HIV (human immunodeficiency virus infection) ([facility] )    • Seizures ([facility] )    • Smoker    • Syphilis        Past Surgical History:   Procedure Laterality Date   • HERNIA REPAIR         History reviewed  No pertinent family history  I have reviewed and agree with the history as documented  E-Cigarette/Vaping   • E-Cigarette Use Current Every Day User      E-Cigarette/Vaping Substances   • Nicotine No    • THC No    • CBD No    • Flavoring Yes    • Other No    • Unknown No      Social History     Tobacco Use   • Smoking status: Every Day     Packs/day: 2 00     Years: 8 00     Pack years: 16 00     Types: Cigarettes   • Smokeless tobacco: Never   • Tobacco comments: \"At least 2 packs a day\" of cigarettes  Vaping Use   • Vaping Use: Every day   • Substances: Flavoring   Substance Use Topics   • Alcohol use: Yes     Alcohol/week: 2 0 standard drinks     Types: 1 Glasses of wine, 1 Cans of beer per week     Comment: socially   • Drug use: Yes     Frequency: 1 0 times per week     Types: Marijuana       Review of Systems   Constitutional: Negative for chills and fever  Respiratory: Negative for shortness of breath  Cardiovascular: Negative for chest pain  Gastrointestinal: Negative for abdominal pain, nausea and vomiting  Genitourinary: Negative for flank pain  Musculoskeletal: Positive for arthralgias  Negative for gait problem  Skin: Negative for rash  Neurological: Negative for weakness and light-headedness  All other systems reviewed and are negative  Physical Exam  Physical Exam  Vitals and nursing note reviewed  Constitutional:       General: He is not in acute distress  HENT:      Head: Normocephalic and atraumatic        Nose: Nose normal       Mouth/Throat:      Mouth: Mucous membranes are moist    Eyes: " Extraocular Movements: Extraocular movements intact  Conjunctiva/sclera: Conjunctivae normal       Pupils: Pupils are equal, round, and reactive to light  Cardiovascular:      Rate and Rhythm: Normal rate and regular rhythm  Heart sounds: No murmur heard  No friction rub  No gallop  Comments: 2+ DP pulses bilaterally  Pulmonary:      Effort: Pulmonary effort is normal       Breath sounds: Normal breath sounds  No wheezing, rhonchi or rales  Abdominal:      General: There is no distension  Palpations: Abdomen is soft  Tenderness: There is no abdominal tenderness  Musculoskeletal:         General: No swelling  Normal range of motion  Cervical back: Normal range of motion and neck supple  No tenderness  Comments: No tenderness of the right hip  There is some pain with range of motion  All other joints with full, painless range of motion and nontender  No T or L-spine tenderness  Skin:     General: Skin is warm and dry  Findings: No rash  Comments: No external signs of trauma  Neurological:      General: No focal deficit present  Mental Status: He is alert and oriented to person, place, and time  Cranial Nerves: No cranial nerve deficit  Sensory: No sensory deficit  Motor: No weakness     Psychiatric:         Behavior: Behavior normal          Vital Signs  ED Triage Vitals [05/01/23 2359]   Temperature Pulse Respirations Blood Pressure SpO2   98 1 °F (36 7 °C) 98 18 (!) 174/84 100 %      Temp Source Heart Rate Source Patient Position - Orthostatic VS BP Location FiO2 (%)   Oral Monitor Lying Right arm --      Pain Score       --           Vitals:    05/01/23 2359   BP: (!) 174/84   Pulse: 98   Patient Position - Orthostatic VS: Lying         Visual Acuity      ED Medications  Medications   levETIRAcetam (KEPPRA) tablet 750 mg (750 mg Oral Given 5/2/23 0053)   acetaminophen (TYLENOL) tablet 650 mg (650 mg Oral Given 5/2/23 0053) Diagnostic Studies  Results Reviewed     None                 XR hip/pelv 2-3 vws right   ED Interpretation by Jeni Velasquez MD (05/02 4078)   No acute fracture or dislocation  Independently interpreted by me  Procedures  Procedures         ED Course                               SBIRT 22yo+    Flowsheet Row Most Recent Value   Initial Alcohol Screen: US AUDIT-C     1  How often do you have a drink containing alcohol? 0 Filed at: 05/02/2023 0017   2  How many drinks containing alcohol do you have on a typical day you are drinking? 0 Filed at: 05/02/2023 0017   3a  Male UNDER 65: How often do you have five or more drinks on one occasion? 0 Filed at: 05/02/2023 0017   3b  FEMALE Any Age, or MALE 65+: How often do you have 4 or more drinks on one occassion? 0 Filed at: 05/02/2023 0017   Audit-C Score 0 Filed at: 05/02/2023 3814   CALI: How many times in the past year have you    Used an illegal drug or used a prescription medication for non-medical reasons? Never Filed at: 05/02/2023 0017                    Medical Decision Making  31-year-old male presenting for evaluation of right hip pain after fall  Patient otherwise with no traumatic injuries noted on exam   X-ray of the right hip without fracture or dislocation  Patient provided with a dose of his Keppra and Tylenol for pain relief  Patient able to ambulate without difficulty  Advised follow-up with primary care  Return precautions discussed  Fall, initial encounter: acute illness or injury  Homelessness: acute illness or injury  Right hip pain: acute illness or injury  Amount and/or Complexity of Data Reviewed  Radiology: ordered and independent interpretation performed  Risk  OTC drugs            Disposition  Final diagnoses:   Right hip pain   Fall, initial encounter   Homelessness     Time reflects when diagnosis was documented in both MDM as applicable and the Disposition within this note     Time User Action Codes Description Comment    5/2/2023 12:59 AM Willard Flakes Add [M25 551] Right hip pain     5/2/2023 12:59 AM Willard Flakes Add [O35  NVLI] Fall, initial encounter     5/2/2023  1:38 AM Willard Flakes Add [Z59 00] Homelessness       ED Disposition     ED Disposition   Discharge    Condition   Stable    Date/Time   Tue May 2, 2023 12:59 AM    Comment   Nonda Levers discharge to home/self care  Follow-up Information    None         Discharge Medication List as of 5/2/2023  1:00 AM      CONTINUE these medications which have NOT CHANGED    Details   acetaminophen (TYLENOL) 500 mg tablet Take 1 tablet (500 mg total) by mouth every 6 (six) hours as needed for mild pain or moderate pain, Starting Fri 9/30/2022, Print      !! bictegravir-emtricitab-tenofovir alafenamide (Biktarvy) -25 MG tablet Take 1 tablet by mouth daily, Starting Tue 3/22/2022, Historical Med      !! bictegravir-emtricitab-tenofovir alafenamide (Biktarvy) -25 MG tablet Take 1 tablet by mouth daily with breakfast, Starting Mon 1/23/2023, Normal      Cholecalciferol 25 MCG (1000 UT) tablet Take 1,000 Units by mouth, Historical Med      !! cyclobenzaprine (FLEXERIL) 10 mg tablet Take 1 tablet (10 mg total) by mouth 2 (two) times a day as needed for muscle spasms, Starting Sat 3/26/2022, Normal      !! cyclobenzaprine (FLEXERIL) 10 mg tablet Take 1 tablet (10 mg total) by mouth 2 (two) times a day as needed for muscle spasms, Starting Tue 10/4/2022, Normal      levETIRAcetam (Keppra) 750 mg tablet Take 1 tablet (750 mg total) by mouth every 12 (twelve) hours for 21 days, Starting Fri 4/14/2023, Until Fri 5/5/2023, Normal      ondansetron (Zofran ODT) 4 mg disintegrating tablet Take 1 tablet (4 mg total) by mouth every 6 (six) hours as needed for nausea or vomiting, Starting Wed 8/31/2022, Normal       !! - Potential duplicate medications found  Please discuss with provider  No discharge procedures on file      PDMP Review Value Time User    PDMP Reviewed  Yes 5/27/2020 12:37 AM LEBRON Wagner          ED Provider  Electronically Signed by           Leisa Jimenez MD  05/02/23 9142

## 2023-05-05 NOTE — ED ADULT NURSE NOTE - ATTEMPT TO OOB
There was no bleeding on exam. Be gentle when using the medication applicator. Your urinalysis was negative for infection. See you gyn for further evaluation. no

## 2023-05-31 NOTE — ED NOTES
Upon discharge patient reporting erection lasting since he woke up this morning        Domitila Sanches RN  01/12/20 4701 Strong peripheral pulses

## 2023-06-13 ENCOUNTER — HOSPITAL ENCOUNTER (EMERGENCY)
Facility: HOSPITAL | Age: 38
Discharge: HOME | End: 2023-06-13
Attending: EMERGENCY MEDICINE
Payer: COMMERCIAL

## 2023-06-13 VITALS
OXYGEN SATURATION: 97 % | DIASTOLIC BLOOD PRESSURE: 87 MMHG | TEMPERATURE: 98.4 F | WEIGHT: 160 LBS | SYSTOLIC BLOOD PRESSURE: 141 MMHG | BODY MASS INDEX: 22.96 KG/M2 | RESPIRATION RATE: 16 BRPM | HEART RATE: 85 BPM

## 2023-06-13 DIAGNOSIS — Z76.0 MEDICATION REFILL: Primary | ICD-10-CM

## 2023-06-13 RX ORDER — BICTEGRAVIR SODIUM, EMTRICITABINE, AND TENOFOVIR ALAFENAMIDE FUMARATE 50; 200; 25 MG/1; MG/1; MG/1
1 TABLET ORAL DAILY
Qty: 30 TABLET | Refills: 0 | Status: SHIPPED | OUTPATIENT
Start: 2023-06-13 | End: 2023-07-13

## 2023-06-13 RX ORDER — LEVETIRACETAM 1000 MG/1
1000 TABLET ORAL 2 TIMES DAILY
Qty: 60 TABLET | Refills: 0 | Status: SHIPPED | OUTPATIENT
Start: 2023-06-13 | End: 2023-07-13

## 2023-06-13 RX ORDER — LEVETIRACETAM 500 MG/1
1000 TABLET ORAL ONCE
Status: COMPLETED | OUTPATIENT
Start: 2023-06-13 | End: 2023-06-13

## 2023-06-13 RX ADMIN — LEVETIRACETAM 1000 MG: 500 TABLET, FILM COATED ORAL at 19:28

## 2023-06-14 ENCOUNTER — HOSPITAL ENCOUNTER (EMERGENCY)
Facility: HOSPITAL | Age: 38
Discharge: HOME | End: 2023-06-14
Attending: EMERGENCY MEDICINE | Admitting: EMERGENCY MEDICINE
Payer: COMMERCIAL

## 2023-06-14 VITALS
RESPIRATION RATE: 18 BRPM | OXYGEN SATURATION: 98 % | HEART RATE: 71 BPM | DIASTOLIC BLOOD PRESSURE: 82 MMHG | TEMPERATURE: 97.8 F | SYSTOLIC BLOOD PRESSURE: 122 MMHG

## 2023-06-14 DIAGNOSIS — R20.2 PARESTHESIAS: Primary | ICD-10-CM

## 2023-06-14 DIAGNOSIS — Z59.02 UNSHELTERED HOMELESSNESS: ICD-10-CM

## 2023-06-14 LAB — GLUCOSE SERPL-MCNC: 98 MG/DL (ref 65–140)

## 2023-06-14 PROCEDURE — 82948 REAGENT STRIP/BLOOD GLUCOSE: CPT

## 2023-06-14 SDOH — ECONOMIC STABILITY - HOUSING INSECURITY: UNSHELTERED HOMELESSNESS: Z59.02

## 2023-06-14 NOTE — ED PROVIDER NOTES
History  Chief Complaint   Patient presents with   • Numbness     Pt reports numbness in bilateral lower extremities from mid-shin down that he says happened all of a sudden, says he sat down right after  Pt was at [place] earlier for seizure, which he has hx of  Pt walks with walker at baseline  Patient is a 72-year-old male with history of seizures, HIV on Biktarvy, and homelessness presenting for evaluation of 2 hours of bilateral lower extremity coldness/numbness  The patient notes that he has been homeless for 6 months and resides outside of a [address]  This evening while sitting outside he developed bilateral leg coldness and tingling/numbness to the lower legs, feet, and toes  He notes he has been able to walk with his walker since this all started but given it is not improving he comes in for further evaluation  He denies falls and trauma  He denies fevers, chills, headache, lightheadedness/dizziness, difficulty with speech, neck pain/stiffness, chest pain/shortness of breath, abdominal pain, N/V/D, saddle anesthesia, urinary retention, urinary/bowel incontinence, prior back surgery, IVDA, and wounds  History provided by:  Patient and EMS personnel   used: No        Prior to Admission Medications   Prescriptions Last Dose Informant Patient Reported? Taking?    Cholecalciferol 25 MCG (1000 UT) tablet   Yes No   Sig: Take 1,000 Units by mouth   acetaminophen (TYLENOL) 500 mg tablet   No No   Sig: Take 1 tablet (500 mg total) by mouth every 6 (six) hours as needed for mild pain or moderate pain   bictegravir-emtricitab-tenofovir alafenamide (Biktarvy) -25 MG tablet   No No   Sig: Take 1 tablet by mouth daily with breakfast   bictegravir-emtricitab-tenofovir alafenamide (Biktarvy) -25 MG tablet   No No   Sig: Take 1 tablet by mouth daily   cyclobenzaprine (FLEXERIL) 10 mg tablet   No No   Sig: Take 1 tablet (10 mg total) by mouth 2 (two) times a day as needed for muscle "spasms   Patient not taking: Reported on 7/2/2022   cyclobenzaprine (FLEXERIL) 10 mg tablet   No No   Sig: Take 1 tablet (10 mg total) by mouth 2 (two) times a day as needed for muscle spasms   levETIRAcetam (Keppra) 1000 MG tablet   No No   Sig: Take 1 tablet (1,000 mg total) by mouth 2 (two) times a day   levETIRAcetam (Keppra) 750 mg tablet   No No   Sig: Take 1 tablet (750 mg total) by mouth every 12 (twelve) hours for 21 days   ondansetron (Zofran ODT) 4 mg disintegrating tablet   No No   Sig: Take 1 tablet (4 mg total) by mouth every 6 (six) hours as needed for nausea or vomiting   Patient not taking: Reported on 9/17/2022      Facility-Administered Medications: None       Past Medical History:   Diagnosis Date   • [facility]    • Seizures ([facility] )    • Smoker    • Syphilis        Past Surgical History:   Procedure Laterality Date   • HERNIA REPAIR         History reviewed  No pertinent family history  I have reviewed and agree with the history as documented  E-Cigarette/Vaping   • E-Cigarette Use Current Every Day User      E-Cigarette/Vaping Substances   • Nicotine No    • THC No    • CBD No    • Flavoring Yes    • Other No    • Unknown No      Social History     Tobacco Use   • Smoking status: Every Day     Packs/day: 2 00     Years: 8 00     Total pack years: 16 00     Types: Cigarettes   • Smokeless tobacco: Never   • Tobacco comments: \"At least 2 packs a day\" of cigarettes  Vaping Use   • Vaping Use: Every day   • Substances: Flavoring   Substance Use Topics   • Alcohol use: Yes     Alcohol/week: 2 0 standard drinks of alcohol     Types: 1 Glasses of wine, 1 Cans of beer per week     Comment: socially   • Drug use: Yes     Frequency: 1 0 times per week     Types: Marijuana       Review of Systems   Constitutional: Negative for appetite change, chills and fever  HENT: Negative for congestion, sore throat and trouble swallowing      Respiratory: Negative for " cough and shortness of breath  Cardiovascular: Negative for chest pain, palpitations and leg swelling  Gastrointestinal: Negative for abdominal pain, diarrhea, nausea and vomiting  Genitourinary: Negative  Musculoskeletal: Negative for arthralgias, back pain, gait problem, joint swelling, myalgias, neck pain and neck stiffness  Skin: Negative for color change, pallor, rash and wound  Allergic/Immunologic: Positive for immunocompromised state (HIV on biktarvy)  Neurological: Positive for numbness (Bilateral lower legs below knees to feet x2 hours)  Negative for dizziness, tremors, seizures, syncope, facial asymmetry, speech difficulty, weakness, light-headedness and headaches  All other systems reviewed and are negative  Physical Exam  Physical Exam  Vitals and nursing note reviewed  Constitutional:       General: He is not in acute distress  Appearance: Normal appearance  He is well-developed and normal weight  He is not ill-appearing, toxic-appearing or diaphoretic  HENT:      Head: Normocephalic and atraumatic  Jaw: There is normal jaw occlusion  Nose: Nose normal       Mouth/Throat:      Lips: Pink  Mouth: Mucous membranes are moist       Pharynx: Oropharynx is clear  Uvula midline  Eyes:      General: Lids are normal  Vision grossly intact  Gaze aligned appropriately  No visual field deficit  Extraocular Movements: Extraocular movements intact  Right eye: Normal extraocular motion and no nystagmus  Left eye: Normal extraocular motion and no nystagmus  Conjunctiva/sclera: Conjunctivae normal       Pupils: Pupils are equal, round, and reactive to light  Visual Fields: Right eye visual fields normal and left eye visual fields normal    Neck:      Trachea: Trachea and phonation normal  No abnormal tracheal secretions  Cardiovascular:      Rate and Rhythm: Normal rate        Pulses:           Radial pulses are 2+ on the right side and 2+ on the left side  Dorsalis pedis pulses are 1+ on the right side and 1+ on the left side  Posterior tibial pulses are 1+ on the right side and 1+ on the left side  Heart sounds: Normal heart sounds, S1 normal and S2 normal  No murmur heard  Pulmonary:      Effort: Pulmonary effort is normal  No tachypnea or respiratory distress  Breath sounds: Normal breath sounds and air entry  No stridor, decreased air movement or transmitted upper airway sounds  No decreased breath sounds  Abdominal:      General: There is no distension  Palpations: Abdomen is soft  Tenderness: There is no abdominal tenderness  There is no guarding or rebound  Negative signs include Marcum's sign  Musculoskeletal:         General: Normal range of motion  Cervical back: Full passive range of motion without pain, normal range of motion and neck supple  No swelling, edema, deformity, erythema or bony tenderness  No spinous process tenderness  Thoracic back: No swelling, edema, deformity, signs of trauma or bony tenderness  Normal range of motion  Lumbar back: No swelling, edema, deformity, signs of trauma or bony tenderness  Normal range of motion  Right hip: Normal range of motion  Normal strength  Left hip: Normal range of motion  Normal strength  Right knee: Normal       Left knee: Normal       Right lower leg: Normal  No swelling or bony tenderness  No edema  Left lower leg: Normal  No swelling or bony tenderness  No edema  Right ankle: No swelling or deformity  Normal range of motion  Normal pulse  Left ankle: No swelling or deformity  Normal range of motion  Normal pulse  Right foot: Normal range of motion and normal capillary refill  No swelling, deformity or bony tenderness  Normal pulse  Left foot: Normal range of motion and normal capillary refill  No swelling, deformity or bony tenderness  Normal pulse  Skin:     General: Skin is warm and dry  Capillary Refill: Capillary refill takes less than 2 seconds  Findings: No rash or wound  Comments: Distal lower extremities slightly cool to the touch, dry  Scant dry skin noted withut wounds  Cap refill less than 2 seconds and pulses 1+ DP and PT patient with full sensation of bilateral lower extremities and strength  Neurological:      General: No focal deficit present  Mental Status: He is alert and oriented to person, place, and time  Mental status is at baseline  GCS: GCS eye subscore is 4  GCS verbal subscore is 5  GCS motor subscore is 6  Cranial Nerves: Cranial nerves 2-12 are intact  Sensory: Sensation is intact  Motor: Motor function is intact  Coordination: Coordination is intact  Gait: Gait is intact  Deep Tendon Reflexes: Reflexes are normal and symmetric  Vital Signs  ED Triage Vitals [06/14/23 0046]   Temperature Pulse Respirations Blood Pressure SpO2   97 8 °F (36 6 °C) 89 18 (!) 137/101 97 %      Temp Source Heart Rate Source Patient Position - Orthostatic VS BP Location FiO2 (%)   Oral Monitor Lying Left arm --      Pain Score       --           Vitals:    06/14/23 0046 06/14/23 0230   BP: (!) 137/101 122/82   Pulse: 89 71   Patient Position - Orthostatic VS: Lying Lying         Visual Acuity      ED Medications  Medications - No data to display    Diagnostic Studies  Results Reviewed     Procedure Component Value Units Date/Time    Fingerstick Glucose (POCT) [[id number]]  (Normal) Collected: 06/14/23 0102    Lab Status: Final result Updated: 06/14/23 0103     POC Glucose 98 mg/dl                  No orders to display              Procedures  Procedures         ED Course  ED Course as of 06/14/23 0356   Wed Jun 14, 2023   0109 POC Glucose: 98  WDL   0128 On reassessment, bilateral lower extremities are more warm after warm blankets were applied    Patient continues to note he cannot wiggle his toes but repeat examination evidences full "strength and sensation to bilateral lower extremities  We will apply new warm blankets  Patient requesting food which will be provided  Likely disposition home given suspect cause of patient's coldness/tingling to lower extremities is him wearing shorts, crocs, and being out in the cold this evening without appropriate layers   0203 On reassessment, patient sleeping comfortably  He is turned to a side-lying position with legs crossed  Repeat examination of lower extremities are with 2+ pulses DP and PT  Cap refill <2 seconds  Pt with full sensation and strength  VS remain WNL and stable  Pt ate turkey sandwich while here and continues to deny pain  Patient medically stable for discharge at this time  We will provide resources for shelters given he is currently undomiciled  SBIRT 20yo+    Flowsheet Row Most Recent Value   Initial Alcohol Screen: US AUDIT-C     1  How often do you have a drink containing alcohol? 0 Filed at: 06/14/2023 0048   2  How many drinks containing alcohol do you have on a typical day you are drinking? 0 Filed at: 06/14/2023 0048   3a  Male UNDER 65: How often do you have five or more drinks on one occasion? 1 Filed at: 06/14/2023 0048   Audit-C Score 1 Filed at: 06/14/2023 8730   CALI: How many times in the past year have you    Used an illegal drug or used a prescription medication for non-medical reasons? Weekly Filed at: 06/14/2023 0048   DAST-10: In the past 12 months       1  Have you used drugs other than those required for medical reasons? 1 Filed at: 06/14/2023 0048   2  Do you use more than one drug at a time? 0 Filed at: 06/14/2023 0048   3  Have you had medical problems as a result of your drug use (e g , memory loss, hepatitis, convulsions, bleeding, etc )? 0 Filed at: 06/14/2023 0048   4  Have you had \"blackouts\" or \"flashbacks\" as a result of drug use? YesNo 0 Filed at: 06/14/2023 0048   5   Do you ever feel bad or guilty about your drug " use? 0 Filed at: 06/14/2023 0048   6  Does your spouse (or parent) ever complain about your involvement with drugs? 0 Filed at: 06/14/2023 0048   7  Have you neglected your family because of your use of drugs? 0 Filed at: 06/14/2023 0048   8  Have you engaged in illegal activities in order to obtain drugs? 0 Filed at: 06/14/2023 0048   9  Have you ever experienced withdrawal symptoms (felt sick) when you stopped taking drugs? 0 Filed at: 06/14/2023 0048   10  Are you always able to stop using drugs when you want to? 0 Filed at: 06/14/2023 0048   DAST-10 Score 1 Filed at: 06/14/2023 0048                    Medical Decision Making  DDx including but not limited to: Cold exposure, anxiety, panic attack; considered but doubt metabolic abnormality, peripheral neuropathy, intracranial process including stroke or TIA, infectious process, cauda equina, Guillain-Barré syndrome, or central cord compression    Patient is a 49-year-old male brought in by EMS for 2 hours of bilateral lower leg coldness and tingling/numbness  Patient's triage vital signs are stable  On initial examination patient is resting comfortably in bed in no acute distress  His head to toe physical examination is with finding of cool shins, ankles, and feet  He is neurovascularly intact, with cap refill less than 2 seconds, with sensation intact to dull and sharp touch  Expresses that he is unable to wiggle his toes which is concerning to him  Plan made for point-of-care blood sugar and application of warm blankets to the  lower extremities  On serial reassessments, patient noted to be very comfortable and sleeping  His legs are more warm and he notes that they are less cold/tingly  He continues to have sensation and motor function to bilateral lower extremities  He is without red flag symptoms of fever, back pain, saddle anesthesia, urinary retention, urinary/bowel incontinence, or IVDA    Point-of-care blood glucose normal   Patient ate and slept comfortably while here  Suspicion highest for paresthesias secondary to cold exposure as patient was wearing shorts and sandals and has been living outside  Ambulatory trial completed with his walker and patient's walking with steady gait at baseline with walker  At this time, no indication for further work-up  Will discharge with resources provided for shelters  DC paperwork reviewed emphasis on follow-up with primary care and strict return precautions  Patient is well-appearing, in no acute distress, ambulatory with walker at time of discharge  Paresthesias: acute illness or injury  Unsheltered homelessness: chronic illness or injury  Amount and/or Complexity of Data Reviewed  Labs: ordered  Decision-making details documented in ED Course  Disposition  Final diagnoses:   Paresthesias   Unsheltered homelessness     Time reflects when diagnosis was documented in both MDM as applicable and the Disposition within this note     Time User Action Codes Description Comment    6/14/2023  2:05 AM Jaclyn Bojorquez [R20 2] Paresthesias     6/14/2023  2:05 AM Jaclyn Bojorquez [V58 [id number]] Unsheltered homelessness       ED Disposition     ED Disposition   Discharge    Condition   Stable    Date/Time   Wed Jun 14, 2023  2:05 AM    Comment   Sarah Singh discharge to home/self care                 Follow-up Information     Follow up With Specialties Details Why Contact Info Additional [address] [facility] Schedule an appointment as soon as possible for a visit in 1 week to establish care and discuss your elevated BP readings [address] [facility] [address]  [address] [facility] [address], [place], [place] Sar[place]a [place] [address] [phone]          Discharge Medication List as of 6/14/2023  2:09 AM      CONTINUE these medications which have NOT CHANGED    Details   acetaminophen (TYLENOL) 500 mg tablet Take 1 tablet (500 mg total) by mouth every 6 (six) hours as needed for mild pain or moderate pain, Starting Fri 9/30/2022, Print      !! bictegravir-emtricitab-tenofovir alafenamide (Biktarvy) -25 MG tablet Take 1 tablet by mouth daily with breakfast, Starting Mon 1/23/2023, Normal      !! bictegravir-emtricitab-tenofovir alafenamide (Biktarvy) -25 MG tablet Take 1 tablet by mouth daily, Starting Tue 6/13/2023, Until Thu 7/13/2023, Normal      Cholecalciferol 25 MCG (1000 UT) tablet Take 1,000 Units by mouth, Historical Med      !! cyclobenzaprine (FLEXERIL) 10 mg tablet Take 1 tablet (10 mg total) by mouth 2 (two) times a day as needed for muscle spasms, Starting Sat 3/26/2022, Normal      !! cyclobenzaprine (FLEXERIL) 10 mg tablet Take 1 tablet (10 mg total) by mouth 2 (two) times a day as needed for muscle spasms, Starting Tue 10/4/2022, Normal      levETIRAcetam (Keppra) 1000 MG tablet Take 1 tablet (1,000 mg total) by mouth 2 (two) times a day, Starting Tue 6/13/2023, Until Thu 7/13/2023, Normal      ondansetron (Zofran ODT) 4 mg disintegrating tablet Take 1 tablet (4 mg total) by mouth every 6 (six) hours as needed for nausea or vomiting, Starting Wed 8/31/2022, Normal       !! - Potential duplicate medications found  Please discuss with provider  No discharge procedures on file      PDMP Review       Value Time User    PDMP Reviewed  Yes 5/27/2020 12:37 AM LEBRON Pang          ED Provider  Electronically Signed by           Una Ferrer  06/14/23 5472

## 2023-06-14 NOTE — DISCHARGE INSTRUCTIONS
There is no primary care doctor listed in your chart  I provided the group here at [address] [facility] so that you may call to establish primary care  We have also included a list of shelters so that you may seek out shelter from the cold weather  Return to the ER if you develop severe pain, weakness, vomiting, fever, difficulty breathing, confusion, or lethargy

## 2023-06-15 ENCOUNTER — APPOINTMENT (EMERGENCY)
Dept: CT IMAGING | Facility: HOSPITAL | Age: 38
End: 2023-06-15
Payer: MEDICARE

## 2023-06-15 ENCOUNTER — APPOINTMENT (EMERGENCY)
Dept: RADIOLOGY | Facility: HOSPITAL | Age: 38
End: 2023-06-15
Payer: MEDICARE

## 2023-06-15 ENCOUNTER — HOSPITAL ENCOUNTER (OUTPATIENT)
Facility: HOSPITAL | Age: 38
Setting detail: OBSERVATION
Discharge: HOME | End: 2023-06-17
Attending: SURGERY | Admitting: INTERNAL MEDICINE
Payer: MEDICARE
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
## 2023-06-15 DIAGNOSIS — G40.919 BREAKTHROUGH SEIZURE (HCC): ICD-10-CM

## 2023-06-15 PROBLEM — R79.89 ELEVATED LACTIC ACID LEVEL: Status: ACTIVE | Noted: 2023-06-15

## 2023-06-15 LAB
ALBUMIN SERPL BCP-MCNC: 4.4 G/DL (ref 3.5–5)
ALP SERPL-CCNC: 75 U/L (ref 34–104)
ALT SERPL W P-5'-P-CCNC: 17 U/L (ref 7–52)
AMPHETAMINES SERPL QL SCN: NEGATIVE
ANION GAP SERPL CALCULATED.3IONS-SCNC: 17 MMOL/L (ref 4–13)
AST SERPL W P-5'-P-CCNC: 28 U/L (ref 13–39)
BARBITURATES UR QL: NEGATIVE
BASOPHILS # BLD AUTO: 0.02 THOUSANDS/ÂΜL (ref 0–0.1)
BASOPHILS NFR BLD AUTO: 0 % (ref 0–1)
BENZODIAZ UR QL: NEGATIVE
BILIRUB SERPL-MCNC: 0.37 MG/DL (ref 0.2–1)
BUN SERPL-MCNC: 9 MG/DL (ref 5–25)
CALCIUM SERPL-MCNC: 9 MG/DL (ref 8.4–10.2)
CHLORIDE SERPL-SCNC: 102 MMOL/L (ref 96–108)
CK SERPL-CCNC: 342 U/L (ref 39–308)
CO2 SERPL-SCNC: 17 MMOL/L (ref 21–32)
COCAINE UR QL: NEGATIVE
CREAT SERPL-MCNC: 1.03 MG/DL (ref 0.6–1.3)
EOSINOPHIL # BLD AUTO: 0.05 THOUSAND/ÂΜL (ref 0–0.61)
EOSINOPHIL NFR BLD AUTO: 1 % (ref 0–6)
ERYTHROCYTE [DISTWIDTH] IN BLOOD BY AUTOMATED COUNT: 13 % (ref 11.6–15.1)
GFR SERPL CREATININE-BSD FRML MDRD: 92 ML/MIN/1.73SQ M
GLUCOSE SERPL-MCNC: 101 MG/DL (ref 65–140)
HCT VFR BLD AUTO: 47.1 % (ref 36.5–49.3)
HGB BLD-MCNC: 15.2 G/DL (ref 12–17)
IMM GRANULOCYTES # BLD AUTO: 0.01 THOUSAND/UL (ref 0–0.2)
IMM GRANULOCYTES NFR BLD AUTO: 0 % (ref 0–2)
LACTATE SERPL-SCNC: 1.6 MMOL/L (ref 0.5–2)
LACTATE SERPL-SCNC: 11 MMOL/L (ref 0.5–2)
LYMPHOCYTES # BLD AUTO: 5.42 THOUSANDS/ÂΜL (ref 0.6–4.47)
LYMPHOCYTES NFR BLD AUTO: 65 % (ref 14–44)
MCH RBC QN AUTO: 29.3 PG (ref 26.8–34.3)
MCHC RBC AUTO-ENTMCNC: 32.3 G/DL (ref 31.4–37.4)
MCV RBC AUTO: 91 FL (ref 82–98)
METHADONE UR QL: NEGATIVE
MONOCYTES # BLD AUTO: 0.86 THOUSAND/ÂΜL (ref 0.17–1.22)
MONOCYTES NFR BLD AUTO: 10 % (ref 4–12)
NEUTROPHILS # BLD AUTO: 1.98 THOUSANDS/ÂΜL (ref 1.85–7.62)
NEUTS SEG NFR BLD AUTO: 24 % (ref 43–75)
NRBC BLD AUTO-RTO: 0 /100 WBCS
OPIATES UR QL SCN: NEGATIVE
OXYCODONE+OXYMORPHONE UR QL SCN: NEGATIVE
PCP UR QL: NEGATIVE
PLATELET # BLD AUTO: 147 THOUSANDS/UL (ref 149–390)
PMV BLD AUTO: 11.1 FL (ref 8.9–12.7)
POTASSIUM SERPL-SCNC: 3.4 MMOL/L (ref 3.5–5.3)
PROT SERPL-MCNC: 8.2 G/DL (ref 6.4–8.4)
RBC # BLD AUTO: 5.18 MILLION/UL (ref 3.88–5.62)
SODIUM SERPL-SCNC: 136 MMOL/L (ref 135–147)
THC UR QL: NEGATIVE
WBC # BLD AUTO: 8.34 THOUSAND/UL (ref 4.31–10.16)

## 2023-06-15 PROCEDURE — 93308 TTE F-UP OR LMTD: CPT | Performed by: PHYSICIAN ASSISTANT

## 2023-06-15 PROCEDURE — 85014 HEMATOCRIT: CPT

## 2023-06-15 PROCEDURE — 36415 COLL VENOUS BLD VENIPUNCTURE: CPT | Performed by: SURGERY

## 2023-06-15 PROCEDURE — 70450 CT HEAD/BRAIN W/O DYE: CPT

## 2023-06-15 PROCEDURE — 73020 X-RAY EXAM OF SHOULDER: CPT

## 2023-06-15 PROCEDURE — 71260 CT THORAX DX C+: CPT

## 2023-06-15 PROCEDURE — 72125 CT NECK SPINE W/O DYE: CPT

## 2023-06-15 PROCEDURE — 82330 ASSAY OF CALCIUM: CPT

## 2023-06-15 PROCEDURE — 99417 PROLNG OP E/M EACH 15 MIN: CPT | Performed by: PHYSICIAN ASSISTANT

## 2023-06-15 PROCEDURE — 84132 ASSAY OF SERUM POTASSIUM: CPT

## 2023-06-15 PROCEDURE — 80053 COMPREHEN METABOLIC PANEL: CPT | Performed by: SURGERY

## 2023-06-15 PROCEDURE — 90715 TDAP VACCINE 7 YRS/> IM: CPT | Performed by: SURGERY

## 2023-06-15 PROCEDURE — 82550 ASSAY OF CK (CPK): CPT | Performed by: PHYSICIAN ASSISTANT

## 2023-06-15 PROCEDURE — 82947 ASSAY GLUCOSE BLOOD QUANT: CPT

## 2023-06-15 PROCEDURE — 83605 ASSAY OF LACTIC ACID: CPT | Performed by: SURGERY

## 2023-06-15 PROCEDURE — 76705 ECHO EXAM OF ABDOMEN: CPT | Performed by: PHYSICIAN ASSISTANT

## 2023-06-15 PROCEDURE — 99223 1ST HOSP IP/OBS HIGH 75: CPT | Performed by: INTERNAL MEDICINE

## 2023-06-15 PROCEDURE — 82803 BLOOD GASES ANY COMBINATION: CPT

## 2023-06-15 PROCEDURE — 83605 ASSAY OF LACTIC ACID: CPT | Performed by: PHYSICIAN ASSISTANT

## 2023-06-15 PROCEDURE — 90471 IMMUNIZATION ADMIN: CPT

## 2023-06-15 PROCEDURE — 96374 THER/PROPH/DIAG INJ IV PUSH: CPT

## 2023-06-15 PROCEDURE — 85025 COMPLETE CBC W/AUTO DIFF WBC: CPT | Performed by: SURGERY

## 2023-06-15 PROCEDURE — 80307 DRUG TEST PRSMV CHEM ANLYZR: CPT | Performed by: PHYSICIAN ASSISTANT

## 2023-06-15 PROCEDURE — 71045 X-RAY EXAM CHEST 1 VIEW: CPT

## 2023-06-15 PROCEDURE — 84295 ASSAY OF SERUM SODIUM: CPT

## 2023-06-15 PROCEDURE — 99284 EMERGENCY DEPT VISIT MOD MDM: CPT

## 2023-06-15 PROCEDURE — 74177 CT ABD & PELVIS W/CONTRAST: CPT

## 2023-06-15 PROCEDURE — 99205 OFFICE O/P NEW HI 60 MIN: CPT | Performed by: PHYSICIAN ASSISTANT

## 2023-06-15 RX ORDER — SODIUM CHLORIDE, SODIUM LACTATE, POTASSIUM CHLORIDE, CALCIUM CHLORIDE 600; 310; 30; 20 MG/100ML; MG/100ML; MG/100ML; MG/100ML
75 INJECTION, SOLUTION INTRAVENOUS CONTINUOUS
Status: DISCONTINUED | OUTPATIENT
Start: 2023-06-15 | End: 2023-06-15

## 2023-06-15 RX ORDER — ACETAMINOPHEN 325 MG/1
975 TABLET ORAL EVERY 8 HOURS
Status: DISCONTINUED | OUTPATIENT
Start: 2023-06-15 | End: 2023-06-17 | Stop reason: HOSPADM

## 2023-06-15 RX ORDER — LIDOCAINE 50 MG/G
1 PATCH TOPICAL DAILY
Status: DISCONTINUED | OUTPATIENT
Start: 2023-06-15 | End: 2023-06-17 | Stop reason: HOSPADM

## 2023-06-15 RX ORDER — SODIUM CHLORIDE, SODIUM GLUCONATE, SODIUM ACETATE, POTASSIUM CHLORIDE, MAGNESIUM CHLORIDE, SODIUM PHOSPHATE, DIBASIC, AND POTASSIUM PHOSPHATE .53; .5; .37; .037; .03; .012; .00082 G/100ML; G/100ML; G/100ML; G/100ML; G/100ML; G/100ML; G/100ML
125 INJECTION, SOLUTION INTRAVENOUS CONTINUOUS
Status: DISCONTINUED | OUTPATIENT
Start: 2023-06-15 | End: 2023-06-17

## 2023-06-15 RX ORDER — LEVETIRACETAM 500 MG/1
1000 TABLET ORAL 2 TIMES DAILY
Status: DISCONTINUED | OUTPATIENT
Start: 2023-06-15 | End: 2023-06-17 | Stop reason: HOSPADM

## 2023-06-15 RX ORDER — FENTANYL CITRATE 50 UG/ML
25 INJECTION, SOLUTION INTRAMUSCULAR; INTRAVENOUS ONCE
Status: COMPLETED | OUTPATIENT
Start: 2023-06-15 | End: 2023-06-15

## 2023-06-15 RX ORDER — SODIUM CHLORIDE, SODIUM GLUCONATE, SODIUM ACETATE, POTASSIUM CHLORIDE, MAGNESIUM CHLORIDE, SODIUM PHOSPHATE, DIBASIC, AND POTASSIUM PHOSPHATE .53; .5; .37; .037; .03; .012; .00082 G/100ML; G/100ML; G/100ML; G/100ML; G/100ML; G/100ML; G/100ML
1000 INJECTION, SOLUTION INTRAVENOUS ONCE
Status: COMPLETED | OUTPATIENT
Start: 2023-06-15 | End: 2023-06-15

## 2023-06-15 RX ORDER — DIPHENHYDRAMINE HYDROCHLORIDE, ZINC ACETATE 2; .1 G/100G; G/100G
CREAM TOPICAL 3 TIMES DAILY PRN
Status: DISCONTINUED | OUTPATIENT
Start: 2023-06-15 | End: 2023-06-17 | Stop reason: HOSPADM

## 2023-06-15 RX ORDER — NICOTINE 21 MG/24HR
1 PATCH, TRANSDERMAL 24 HOURS TRANSDERMAL DAILY
Status: DISCONTINUED | OUTPATIENT
Start: 2023-06-16 | End: 2023-06-17 | Stop reason: HOSPADM

## 2023-06-15 RX ORDER — ONDANSETRON 2 MG/ML
4 INJECTION INTRAMUSCULAR; INTRAVENOUS ONCE AS NEEDED
Status: DISCONTINUED | OUTPATIENT
Start: 2023-06-15 | End: 2023-06-17 | Stop reason: HOSPADM

## 2023-06-15 RX ADMIN — SODIUM CHLORIDE, SODIUM GLUCONATE, SODIUM ACETATE, POTASSIUM CHLORIDE, MAGNESIUM CHLORIDE, SODIUM PHOSPHATE, DIBASIC, AND POTASSIUM PHOSPHATE 1000 ML: .53; .5; .37; .037; .03; .012; .00082 INJECTION, SOLUTION INTRAVENOUS at 15:34

## 2023-06-15 RX ADMIN — ACETAMINOPHEN 975 MG: 325 TABLET, FILM COATED ORAL at 15:39

## 2023-06-15 RX ADMIN — SODIUM CHLORIDE, SODIUM GLUCONATE, SODIUM ACETATE, POTASSIUM CHLORIDE, MAGNESIUM CHLORIDE, SODIUM PHOSPHATE, DIBASIC, AND POTASSIUM PHOSPHATE 125 ML/HR: .53; .5; .37; .037; .03; .012; .00082 INJECTION, SOLUTION INTRAVENOUS at 16:49

## 2023-06-15 RX ADMIN — LEVETIRACETAM 1000 MG: 100 INJECTION, SOLUTION INTRAVENOUS at 17:51

## 2023-06-15 RX ADMIN — LIDOCAINE 5% 1 PATCH: 700 PATCH TOPICAL at 15:39

## 2023-06-15 RX ADMIN — TETANUS TOXOID, REDUCED DIPHTHERIA TOXOID AND ACELLULAR PERTUSSIS VACCINE, ADSORBED 0.5 ML: 5; 2.5; 8; 8; 2.5 SUSPENSION INTRAMUSCULAR at 14:21

## 2023-06-15 RX ADMIN — IOHEXOL 85 ML: 350 INJECTION, SOLUTION INTRAVENOUS at 14:07

## 2023-06-15 RX ADMIN — ACETAMINOPHEN 975 MG: 325 TABLET, FILM COATED ORAL at 22:47

## 2023-06-15 RX ADMIN — FENTANYL CITRATE 25 MCG: 50 INJECTION INTRAMUSCULAR; INTRAVENOUS at 14:00

## 2023-06-15 RX ADMIN — LEVETIRACETAM 1000 MG: 500 TABLET, FILM COATED ORAL at 21:59

## 2023-06-15 NOTE — PROCEDURES
POC FAST US    Date/Time: 6/15/2023 1:56 PM    Performed by: Catina Klein PA-C  Authorized by:  Catina Klein PA-C    Patient location:  Trauma  Procedure details:     Exam Type:  Diagnostic    Indications: blunt abdominal trauma and blunt chest trauma      Assess for:  Intra-abdominal fluid and pericardial effusion    Technique: FAST      Views obtained:  Heart - Pericardial sac, LUQ - Splenorenal space, Suprapubic - Pouch of Alon and RUQ - Almazan's Pouch    Image quality: diagnostic      Image availability:  Images available in PACS and video obtained  FAST Findings:     RUQ (Hepatorenal) free fluid: absent      LUQ (Splenorenal) free fluid: absent      Suprapubic free fluid: absent      Cardiac wall motion: identified      Pericardial effusion: absent    Interpretation:     Impressions: negative

## 2023-06-15 NOTE — ASSESSMENT & PLAN NOTE
· Trauma evaluated patient in the ED  · All trauma imaging without acute posttraumatic injuries  · Local wound care to forehead abrasion, healing well  · Pt feels this is all due to seizure (similar instances have occurred multiple times)  · No complaints of pain or discomfort

## 2023-06-15 NOTE — ED PROVIDER NOTES
History  Chief Complaint   Patient presents with   • Medication Refill     Patient presents for evaluation of medication refill  Patient states he is out of his Keppra and is Biktarvy  Patient states he is on Keppra 1000 mg twice daily  No other complaints at this time  History provided by:  Patient and EMS personnel   used: No    Medication Refill      Prior to Admission Medications   Prescriptions Last Dose Informant Patient Reported? Taking?    Cholecalciferol 25 MCG (1000 UT) tablet   Yes No   Sig: Take 1,000 Units by mouth   acetaminophen (TYLENOL) 500 mg tablet   No No   Sig: Take 1 tablet (500 mg total) by mouth every 6 (six) hours as needed for mild pain or moderate pain   bictegravir-emtricitab-tenofovir alafenamide (Biktarvy) -25 MG tablet   Yes No   Sig: Take 1 tablet by mouth daily   bictegravir-emtricitab-tenofovir alafenamide (Biktarvy) -25 MG tablet   No No   Sig: Take 1 tablet by mouth daily with breakfast   bictegravir-emtricitab-tenofovir alafenamide (Biktarvy) -25 MG tablet   No Yes   Sig: Take 1 tablet by mouth daily   cyclobenzaprine (FLEXERIL) 10 mg tablet   No No   Sig: Take 1 tablet (10 mg total) by mouth 2 (two) times a day as needed for muscle spasms   Patient not taking: Reported on 7/2/2022   cyclobenzaprine (FLEXERIL) 10 mg tablet   No No   Sig: Take 1 tablet (10 mg total) by mouth 2 (two) times a day as needed for muscle spasms   levETIRAcetam (Keppra) 750 mg tablet   No No   Sig: Take 1 tablet (750 mg total) by mouth every 12 (twelve) hours for 21 days   ondansetron (Zofran ODT) 4 mg disintegrating tablet   No No   Sig: Take 1 tablet (4 mg total) by mouth every 6 (six) hours as needed for nausea or vomiting   Patient not taking: Reported on 9/17/2022      Facility-Administered Medications: None       Past Medical History:   Diagnosis Date   • HIV (human immunodeficiency virus infection) ([facility])    • Seizures ([address] )    • Smoker    • Syphilis "      Past Surgical History:   Procedure Laterality Date   • HERNIA REPAIR         History reviewed  No pertinent family history  I have reviewed and agree with the history as documented  E-Cigarette/Vaping   • E-Cigarette Use Current Every Day User      E-Cigarette/Vaping Substances   • Nicotine No    • THC No    • CBD No    • Flavoring Yes    • Other No    • Unknown No      Social History     Tobacco Use   • Smoking status: Every Day     Packs/day: 2 00     Years: 8 00     Total pack years: 16 00     Types: Cigarettes   • Smokeless tobacco: Never   • Tobacco comments: \"At least 2 packs a day\" of cigarettes  Vaping Use   • Vaping Use: Every day   • Substances: Flavoring   Substance Use Topics   • Alcohol use: Yes     Alcohol/week: 2 0 standard drinks of alcohol     Types: 1 Glasses of wine, 1 Cans of beer per week     Comment: socially   • Drug use: Yes     Frequency: 1 0 times per week     Types: Marijuana       Review of Systems   All other systems reviewed and are negative  Physical Exam  Physical Exam  Vitals and nursing note reviewed  Constitutional:       General: He is not in acute distress  Cardiovascular:      Rate and Rhythm: Normal rate and regular rhythm  Pulmonary:      Effort: Pulmonary effort is normal  No respiratory distress  Breath sounds: Normal breath sounds  Neurological:      General: No focal deficit present  Mental Status: He is alert and oriented to person, place, and time           Vital Signs  ED Triage Vitals [06/13/23 1923]   Temperature Pulse Respirations Blood Pressure SpO2   98 4 °F (36 9 °C) 85 16 141/87 97 %      Temp Source Heart Rate Source Patient Position - Orthostatic VS BP Location FiO2 (%)   Oral Monitor Sitting Left arm --      Pain Score       No Pain           Vitals:    06/13/23 1923   BP: 141/87   Pulse: 85   Patient Position - Orthostatic VS: Sitting         Visual Acuity      ED Medications  Medications   levETIRAcetam (KEPPRA) tablet " 1,000 mg (1,000 mg Oral Given 6/13/23 1928)       Diagnostic Studies  Results Reviewed     None                 No orders to display              Procedures  Procedures         ED Course                                             Medical Decision Making  Pulse ox 97% on room air indicating adequate oxygenation  Patient was given a dose of his Keppra in the ER and given a refill on his prescriptions for next 30 days encouraged to follow-up with his primary care or prescribing physician  Risk  Prescription drug management  Disposition  Final diagnoses:   Medication refill     Time reflects when diagnosis was documented in both MDM as applicable and the Disposition within this note     Time User Action Codes Description Comment    6/13/2023  7:23 PM Jerry Manjarrez Add [Z76 0] Medication refill       ED Disposition     ED Disposition   Discharge    Condition   Stable    Date/Time   Tue Jun 13, 2023  7:50 PM    Comment   Amandeep  discharge to home/self care  Follow-up Information     Follow up With Specialties Details Why Contact Info    [employer]  In 1 week [phone]            Discharge Medication List as of 6/13/2023  7:50 PM      CONTINUE these medications which have CHANGED    Details   !! bictegravir-emtricitab-tenofovir alafenamide (Biktarvy) -25 MG tablet Take 1 tablet by mouth daily, Starting Tue 6/13/2023, Until Thu 7/13/2023, Normal      levETIRAcetam (Keppra) 1000 MG tablet Take 1 tablet (1,000 mg total) by mouth 2 (two) times a day, Starting Tue 6/13/2023, Until Thu 7/13/2023, Normal       !! - Potential duplicate medications found  Please discuss with provider        CONTINUE these medications which have NOT CHANGED    Details   acetaminophen (TYLENOL) 500 mg tablet Take 1 tablet (500 mg total) by mouth every 6 (six) hours as needed for mild pain or moderate pain, Starting Fri 9/30/2022, Print      !! bictegravir-emtricitab-tenofovir alafenamide (Biktarvy) -25 MG tablet Take 1 tablet by mouth daily with breakfast, Starting Mon 1/23/2023, Normal      Cholecalciferol 25 MCG (1000 UT) tablet Take 1,000 Units by mouth, Historical Med      !! cyclobenzaprine (FLEXERIL) 10 mg tablet Take 1 tablet (10 mg total) by mouth 2 (two) times a day as needed for muscle spasms, Starting Sat 3/26/2022, Normal      !! cyclobenzaprine (FLEXERIL) 10 mg tablet Take 1 tablet (10 mg total) by mouth 2 (two) times a day as needed for muscle spasms, Starting Tue 10/4/2022, Normal      ondansetron (Zofran ODT) 4 mg disintegrating tablet Take 1 tablet (4 mg total) by mouth every 6 (six) hours as needed for nausea or vomiting, Starting Wed 8/31/2022, Normal       !! - Potential duplicate medications found  Please discuss with provider  No discharge procedures on file      PDMP Review       Value Time User    PDMP Reviewed  Yes 5/27/2020 12:37 AM LEBRON Garcia          ED Provider  Electronically Signed by           Justino Olivier DO  06/15/23 1365

## 2023-06-15 NOTE — PROGRESS NOTES
Progress Note - Trauma Tertiary [facility]   Albertina Mendoza 40 y o  male [id number]   Unit/Bed#: S -01 Encounter: [id number]     Assessment & Plan   - Acute encephalopathy, suspected secondary to seizure  -Superficial forehead abrasion  -Seizure disorder  -Left shoulder pain  -Elevated lactic    PLAN:   -No acute traumatic injuries identified on imaging  -Pain control per primary team  -Continue home Keppra  -Seizure precautions  -PT/OT evaluation and treatment as indicated  -Trauma will sign off at this time  Please reconsult with any further questions    -Rest of care per primary team    VTE Prophylaxis:Reason for no pharmacologic prophylaxis Deferred to primary team     Disposition: Continue current level of care    Code status:  Level 1 - Full Code    Consultants: None     Subjective   Transfer from: N/A    Mechanism of Injury:Other: Unclear mechanism as story has changed, possible fall     Chief Complaint: No complaints    HPI/Last 24 hour events: Patient states he is doing well this morning and denies any complaints  States his left shoulder pain has resolved  Tolerating a diet, denies abdominal pain, nausea, vomiting  Objective   Vitals:   Temp:  [98 °F (36 7 °C)-99 °F (37 2 °C)] 98 °F (36 7 °C)  HR:  [] 104  Resp:  [16-19] 16  BP: ()/(59-80) 132/80    I/O       06/13 0701  06/14 0700 06/14 0701  06/15 0700 06/15 0701  06/16 0700    Urine (mL/kg/hr)   550    Total Output   550    Net   -550                  Physical Exam:   GENERAL APPEARANCE: Patient in no acute distress  HEENT: forehead abrasion; PERRL, EOMs intact; Mucous membranes moist  NECK / BACK: ROM normal  CV: Regular rate and rhythm; no murmur/gallops/rubs appreciated  CHEST / LUNGS: Clear to auscultation; no wheezes/rales/rhonci  ABD: NABS; soft; non-distended; non-tender  : voiding  EXT: +2 pulses bilaterally upper & lower extremities; no edema    NEURO: GCS 15; no focal neurologic deficits; neurovascularly intact  SKIN: Warm, dry and well perfused; no rash; no jaundice  Invasive Devices     Peripheral Intravenous Line  Duration           Peripheral IV 06/15/23 Right Antecubital <1 day                        Lab Results:   Results: I have personally reviewed all pertinent laboratory/tests results, BMP/CMP:   Lab Results   Component Value Date    SODIUM 142 06/16/2023    K 4 1 06/16/2023     (H) 06/16/2023    CO2 30 06/16/2023    BUN 8 06/16/2023    CREATININE 0 86 06/16/2023    CALCIUM 8 2 (L) 06/16/2023    AST 22 06/16/2023    ALT 15 06/16/2023    ALKPHOS 56 06/16/2023    EGFR 110 06/16/2023    and CBC:   Lab Results   Component Value Date    WBC 8 34 06/15/2023    HGB 15 2 06/15/2023    HCT 47 1 06/15/2023    MCV 91 06/15/2023     (L) 06/15/2023    RBC 5 18 06/15/2023    MCH 29 3 06/15/2023    MCHC 32 3 06/15/2023    RDW 13 0 06/15/2023    MPV 11 1 06/15/2023    NRBC 0 06/15/2023       Imaging Results: I have personally reviewed pertinent reports      Chest Xray(s): negative for acute findings   FAST exam(s): negative for acute findings   CT Scan(s): negative for acute findings   Additional Xray(s): negative for acute findings     Other Studies: None

## 2023-06-15 NOTE — ASSESSMENT & PLAN NOTE
· Patient is homeless and appears to get Keppra refills from the ED  · Issues noted upon chart review with noncompliance with antiepileptics  · Reports his HIV physician prescribes his AED's  · Loaded with 1g IV Keppra then resumed Keppra PO 1g BID  · Patient appears to be approaching his baseline mental status  · Refer to Neurology on discharge  · CM will provide information about local shelters in the area
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
## 2023-06-15 NOTE — H&P
[facility]  H&P  Name: Jose Guadalupe Severe 40 y o  male I MRN: [id number]  Unit/Bed#: S -01 I Date of Admission: 6/15/2023   Date of Service: 6/15/2023 I Hospital Day: 0      Assessment/Plan   Fall  Assessment & Plan  · Trauma evaluated patient in the ED  · All trauma imaging without acute posttraumatic injuries  · Local wound care to forehead abrasion  · Pt feels this is all due to seizure (similar instances have occurred multiple times)     Recurrent seizures ([facility] )  Assessment & Plan  · Patient is homeless and appears to get Keppra refills from the ED  · Issues noted upon chart review with noncompliance with antiepileptics  · Reports his HIV physician prescribes his AED's  · Refer to Neurology on discharge  · Load with 1g IV Keppra then resume Keppra PO 1g BID     Tobacco dependence  Assessment & Plan  · Reports 2-3 PPD  · Offer nicotine patch    HIV (human immunodeficiency virus infection) ([facility] )  Assessment & Plan  · Continue Biktarvy    * Elevated lactic acid level  Assessment & Plan  · After being found down, lactate noted to be 11 on arrival  · Anion gap elevated, likely due to lactic acidosis  · Tachycardic on arrival which resolved, no other SIRS met  · Concern for seizure as the etiology for patient being found down in setting of elevated lactate  · CT head negative  · Pt reports non compliance with Keppra and specifically missed a dose this AM  · Denies tongue bite, bowel/bladder incontinence, endorses post-ictal confusion  · Will check UDS, denies recent ETOH/drug use with exception of MJ use yesterday  · Receiving 1L IVF currently, follow up repeat lactate  · Continue IVF until lactate clears   · Will load with 1g IV Keppra then resume home regimen  · No indication for Neurology involvement/EEG at this juncture given likely all due to non compliance but if seizure recurs would consider consult   · NPO until passes bedside swallow eval  · Check CK      VTE Pharmacologic "Prophylaxis: VTE Score: 1 Low Risk (Score 0-2) - Encourage Ambulation  Code Status: Full code  Discussion with family: Patient declined call to   Anticipated Length of Stay: Patient will be admitted on an observation basis with an anticipated length of stay of less than 2 midnights secondary to elevated lactate requiring IVF  Total Time Spent on Date of Encounter in care of patient: 55 minutes This time was spent on one or more of the following: performing physical exam; counseling and coordination of care; obtaining or reviewing history; documenting in the medical record; reviewing/ordering tests, medications or procedures; communicating with other healthcare professionals and discussing with patient's family/caregivers  Chief Complaint: Found down    History of Present Illness:  Fortunato Camargo is a 40 y o  male with a PMH of seizures, HIV who presents after being found down  Patient tells me he only remembers waking up in the hospital   He does not remember any of the events leading up to being found down or just after the event  He says he felt well leading up to the event  He says he did not take his Keppra this morning because he \"felt lazy\"  His last dose of Keppra was last evening  He otherwise denies any fevers, nausea, vomiting, abdominal pain or diarrhea leading up to this  Denies chest pain or shortness of breath this morning  Denies headache or dizziness this morning  Currently he is denying any pain or headache  He is oriented x3  He states he occasionally drinks 2 [employer] ice teas at a time but says the last time he drank was Mother's Day  He denies illicit drug use  States he does marijuana infrequently although he did do marijuana yesterday  He smokes 2 to 3 packs per day  He says his HIV doctor prescribes his antiepileptics  He denies any tongue bite, urinary or bowel incontinence, denies recalling any vomitus at the scene  No local lysing symptoms    " Remainder of review of systems is negative  Review of Systems:  Review of Systems   Constitutional: Negative  HENT: Negative  Eyes: Negative  Respiratory: Negative  Cardiovascular: Negative  Gastrointestinal: Negative  Endocrine: Negative  Genitourinary: Negative  Musculoskeletal: Negative  Skin: Negative  Allergic/Immunologic: Negative  Neurological: Positive for seizures  Hematological: Negative  Psychiatric/Behavioral: Positive for confusion  Past Medical and Surgical History:   Past Medical History:   Diagnosis Date   • HIV (human immunodeficiency virus infection) ([facility] )    • Seizures ([facility] )    • Smoker    • Syphilis        Past Surgical History:   Procedure Laterality Date   • HERNIA REPAIR         Meds/Allergies:  Prior to Admission medications    Medication Sig Start Date End Date Taking?  Authorizing Provider   acetaminophen (TYLENOL) 500 mg tablet Take 1 tablet (500 mg total) by mouth every 6 (six) hours as needed for mild pain or moderate pain 9/30/22   Fly Perez PA-C   bictegravir-emtricitab-tenofovir alafenamide (Biktarvy) -25 MG tablet Take 1 tablet by mouth daily with breakfast 1/23/23   Marysol Briscoe DO   bictegravir-emtricitab-tenofovir alafenamide (Biktarvy) -29 MG tablet Take 1 tablet by mouth daily 6/13/23 7/13/23  Anoop Locus, DO   Cholecalciferol 25 MCG (1000 UT) tablet Take 1,000 Units by mouth    Historical Provider, MD   cyclobenzaprine (FLEXERIL) 10 mg tablet Take 1 tablet (10 mg total) by mouth 2 (two) times a day as needed for muscle spasms  Patient not taking: Reported on 7/2/2022 3/26/22   Sridhar Hugo MD   cyclobenzaprine (FLEXERIL) 10 mg tablet Take 1 tablet (10 mg total) by mouth 2 (two) times a day as needed for muscle spasms 10/4/22   Duc Alba MD   levETIRAcetam (Keppra) 1000 MG tablet Take 1 tablet (1,000 mg total) by mouth 2 (two) times a day 6/13/23 7/13/23  Anoop Locus, DO "  levETIRAcetam (Keppra) 750 mg tablet Take 1 tablet (750 mg total) by mouth every 12 (twelve) hours for 21 days 4/14/23 5/5/23  Misty Dhillon MD   ondansetron (Zofran ODT) 4 mg disintegrating tablet Take 1 tablet (4 mg total) by mouth every 6 (six) hours as needed for nausea or vomiting  Patient not taking: Reported on 9/17/2022 8/31/22   Yanely Valdez MD     I have reviewed home medications with patient personally  Allergies: No Known Allergies    Social History:  Marital Status: Single   Occupation: not discussed  Patient Pre-hospital Living Situation: ?homeless  Patient Pre-hospital Level of Mobility: walks  Patient Pre-hospital Diet Restrictions: none  Substance Use History:   Social History     Substance and Sexual Activity   Alcohol Use Yes   • Alcohol/week: 2 0 standard drinks of alcohol   • Types: 1 Glasses of wine, 1 Cans of beer per week    Comment: socially     Social History     Tobacco Use   Smoking Status Every Day   • Packs/day: 2 00   • Years: 8 00   • Total pack years: 16 00   • Types: Cigarettes   Smokeless Tobacco Never   Tobacco Comments    \"At least 2 packs a day\" of cigarettes  Social History     Substance and Sexual Activity   Drug Use Yes   • Frequency: 1 0 times per week   • Types: Marijuana       Family History:  No family history on file  Physical Exam:     Vitals:   Blood Pressure: 132/80 (06/15/23 1628)  Pulse: 104 (06/15/23 1628)  Temperature: 98 °F (36 7 °C) (06/15/23 1628)  Temp Source: Oral (06/15/23 1350)  Respirations: 16 (06/15/23 1628)  Weight - Scale: 82 2 kg (181 lb 3 5 oz) (06/15/23 1350)  SpO2: 97 % (06/15/23 1628)    Physical Exam  Vitals and nursing note reviewed  Constitutional:       General: He is not in acute distress  Appearance: He is not ill-appearing or toxic-appearing  Comments: Somnolent but easily arousable   HENT:      Head: Normocephalic and atraumatic        Comments: R forehead abrasian     Right Ear: External ear normal       " Left Ear: External ear normal       Nose: Nose normal       Mouth/Throat:      Comments: Poor dentition, multiple caries noted  Eyes:      Conjunctiva/sclera: Conjunctivae normal       Pupils: Pupils are equal, round, and reactive to light  Cardiovascular:      Rate and Rhythm: Normal rate and regular rhythm  Pulses: Normal pulses  Heart sounds: Normal heart sounds  No murmur heard  No friction rub  No gallop  Pulmonary:      Effort: Pulmonary effort is normal  No respiratory distress  Breath sounds: Normal breath sounds  No stridor  No wheezing, rhonchi or rales  Abdominal:      General: Abdomen is flat  Bowel sounds are normal  There is no distension  Palpations: Abdomen is soft  There is no mass  Tenderness: There is no abdominal tenderness  There is no guarding or rebound  Hernia: No hernia is present  Musculoskeletal:      Cervical back: Normal range of motion  Right lower leg: No edema  Left lower leg: No edema  Skin:     General: Skin is warm and dry  Neurological:      Mental Status: He is oriented to person, place, and time        Comments: Horizontal nystagmus          Additional Data:     Lab Results:  Results from last 7 days   Lab Units 06/15/23  1400   EOS PCT % 1   HEMATOCRIT % 47 1   HEMOGLOBIN g/dL 15 2   LYMPHS PCT % 65*   MONOS PCT % 10   NEUTROS PCT % 24*   PLATELETS Thousands/uL 147*   WBC Thousand/uL 8 34     Results from last 7 days   Lab Units 06/15/23  1400   ANION GAP mmol/L 17*   ALBUMIN g/dL 4 4   ALK PHOS U/L 75   ALT U/L 17   AST U/L 28   BUN mg/dL 9   CALCIUM mg/dL 9 0   CHLORIDE mmol/L 102   CO2 mmol/L 17*   CREATININE mg/dL 1 03   GLUCOSE RANDOM mg/dL 101   POTASSIUM mmol/L 3 4*   SODIUM mmol/L 136   TOTAL BILIRUBIN mg/dL 0 37         Results from last 7 days   Lab Units 06/14/23  0102   POC GLUCOSE mg/dl 98         Results from last 7 days   Lab Units 06/15/23  1402   LACTIC ACID mmol/L 11 0*       Lines/Drains:  Invasive Devices     Peripheral Intravenous Line  Duration           Peripheral IV 06/15/23 Right Antecubital <1 day                    Imaging: Reviewed radiology reports from this admission including: CT head  TRAUMA - CT head wo contrast   Final Result by Rolando Vásquez MD (06/15 1459)      No acute intracranial abnormality  I personally discussed this study with Earnestine Daily TO on 6/15/2023 2:25 PM                         Workstation performed: [id number]         TRAUMA - CT spine cervical wo contrast   Final Result by Rolando Vásquez MD (06/15 1459)      No cervical spine fracture or traumatic malalignment  I personally discussed this study with Earnestine Daily TO on 6/15/2023 2:58 PM                         Workstation performed: [id number]         TRAUMA - CT chest abdomen pelvis w contrast   Final Result by Rolando Vásquez MD (06/15 8603)      1  Multilevel mild thoracolumbar compression deformities appear stable compared with prior studies as above  2  No suspected acute posttraumatic injury throughout the chest, abdomen or pelvis  I personally discussed this study with Earnestine Daily TO on 6/15/2023 2:37 PM                      Workstation performed: [id number]         XR Trauma multiple (SLB/SLRA trauma bay ONLY)   Final Result by Gianna Hicks MD (06/15 1422)      No acute cardiopulmonary disease within limitations of supine imaging  Unremarkable AP view of the left shoulder  Workstation performed: [id number]         XR shoulder 1 vw left    (Results Pending)   XR chest 1 view    (Results Pending)       EKG and Other Studies Reviewed on Admission:   · EKG: NSR  HR 92     ** Please Note: This note has been constructed using a voice recognition system   **

## 2023-06-15 NOTE — ASSESSMENT & PLAN NOTE
· Patient is homeless and appears to get Keppra refills from the ED  · Issues noted upon chart review with noncompliance with antiepileptics  · Reports his HIV physician prescribes his AED's  · Refer to Neurology on discharge  · Load with 1g IV Keppra then resume Keppra PO 1g BID

## 2023-06-15 NOTE — ASSESSMENT & PLAN NOTE
· After being found down, lactate noted to be 11 on arrival  · Anion gap elevated, likely due to lactic acidosis  · Tachycardic on arrival which resolved, no other SIRS met  · Concern for seizure as the etiology for patient being found down in setting of elevated lactate  · CT head negative  · Pt reports non compliance with Keppra and specifically missed a dose this AM  · Denies tongue bite, bowel/bladder incontinence, endorses post-ictal confusion  · Will check UDS, denies recent ETOH/drug use with exception of MJ use yesterday  · Receiving 1L IVF currently, follow up repeat lactate  · Continue IVF until lactate clears   · Will load with 1g IV Keppra then resume home regimen  · No indication for Neurology involvement/EEG at this juncture given likely all due to non compliance but if seizure recurs would consider consult   · NPO until passes bedside swallow eval  · Check CK

## 2023-06-15 NOTE — ASSESSMENT & PLAN NOTE
· Trauma evaluated patient in the ED  · All trauma imaging without acute posttraumatic injuries  · Pt feels this is all due to seizure (similar instances have occurred multiple times)

## 2023-06-15 NOTE — QUICK NOTE
Cervical Collar Clearance: The patient had a CT scan of the cervical spine demonstrating no acute injury  On exam, the patient had no midline point tenderness or paresthesias/numbness/weakness in the extremities  The patient had full range of motion (was then able to flex, extend, and rotate head laterally) without pain  There were no distracting injuries and the patient was not intoxicated  The patient's cervical spine was cleared radiologically and clinically  Cervical collar removed at this time       Veverly FRENCH Seo  6/15/2023 2:58 PM

## 2023-06-16 PROBLEM — R79.89 ELEVATED LACTIC ACID LEVEL: Status: RESOLVED | Noted: 2023-06-15 | Resolved: 2023-06-16
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
## 2023-06-16 LAB
ALBUMIN SERPL BCP-MCNC: 3.7 G/DL (ref 3.5–5)
ALP SERPL-CCNC: 56 U/L (ref 34–104)
ALT SERPL W P-5'-P-CCNC: 15 U/L (ref 7–52)
ANION GAP SERPL CALCULATED.3IONS-SCNC: 2 MMOL/L (ref 4–13)
AST SERPL W P-5'-P-CCNC: 22 U/L (ref 13–39)
BASOPHILS # BLD MANUAL: 0 THOUSAND/UL (ref 0–0.1)
BASOPHILS NFR MAR MANUAL: 0 % (ref 0–1)
BILIRUB SERPL-MCNC: 0.46 MG/DL (ref 0.2–1)
BUN SERPL-MCNC: 8 MG/DL (ref 5–25)
CALCIUM SERPL-MCNC: 8.2 MG/DL (ref 8.4–10.2)
CHLORIDE SERPL-SCNC: 110 MMOL/L (ref 96–108)
CO2 SERPL-SCNC: 30 MMOL/L (ref 21–32)
CREAT SERPL-MCNC: 0.86 MG/DL (ref 0.6–1.3)
EOSINOPHIL # BLD MANUAL: 0.05 THOUSAND/UL (ref 0–0.4)
EOSINOPHIL NFR BLD MANUAL: 1 % (ref 0–6)
ERYTHROCYTE [DISTWIDTH] IN BLOOD BY AUTOMATED COUNT: 13.3 % (ref 11.6–15.1)
GFR SERPL CREATININE-BSD FRML MDRD: 110 ML/MIN/1.73SQ M
GLUCOSE SERPL-MCNC: 106 MG/DL (ref 65–140)
HCT VFR BLD AUTO: 42.9 % (ref 36.5–49.3)
HGB BLD-MCNC: 14 G/DL (ref 12–17)
LYMPHOCYTES # BLD AUTO: 2.91 THOUSAND/UL (ref 0.6–4.47)
LYMPHOCYTES # BLD AUTO: 59 % (ref 14–44)
MCH RBC QN AUTO: 29.5 PG (ref 26.8–34.3)
MCHC RBC AUTO-ENTMCNC: 32.6 G/DL (ref 31.4–37.4)
MCV RBC AUTO: 90 FL (ref 82–98)
MONOCYTES # BLD AUTO: 0.44 THOUSAND/UL (ref 0–1.22)
MONOCYTES NFR BLD: 9 % (ref 4–12)
NEUTROPHILS # BLD MANUAL: 1.53 THOUSAND/UL (ref 1.85–7.62)
NEUTS SEG NFR BLD AUTO: 31 % (ref 43–75)
PLATELET # BLD AUTO: 137 THOUSANDS/UL (ref 149–390)
PLATELET BLD QL SMEAR: ABNORMAL
PMV BLD AUTO: 11 FL (ref 8.9–12.7)
POTASSIUM SERPL-SCNC: 4.1 MMOL/L (ref 3.5–5.3)
PROT SERPL-MCNC: 6.9 G/DL (ref 6.4–8.4)
RBC # BLD AUTO: 4.75 MILLION/UL (ref 3.88–5.62)
RBC MORPH BLD: NORMAL
SODIUM SERPL-SCNC: 142 MMOL/L (ref 135–147)
WBC # BLD AUTO: 4.94 THOUSAND/UL (ref 4.31–10.16)

## 2023-06-16 PROCEDURE — 80053 COMPREHEN METABOLIC PANEL: CPT | Performed by: PHYSICIAN ASSISTANT

## 2023-06-16 PROCEDURE — 99232 SBSQ HOSP IP/OBS MODERATE 35: CPT | Performed by: INTERNAL MEDICINE

## 2023-06-16 PROCEDURE — 85007 BL SMEAR W/DIFF WBC COUNT: CPT | Performed by: PHYSICIAN ASSISTANT

## 2023-06-16 PROCEDURE — 85027 COMPLETE CBC AUTOMATED: CPT | Performed by: PHYSICIAN ASSISTANT

## 2023-06-16 PROCEDURE — 99214 OFFICE O/P EST MOD 30 MIN: CPT | Performed by: SURGERY

## 2023-06-16 PROCEDURE — 97163 PT EVAL HIGH COMPLEX 45 MIN: CPT

## 2023-06-16 RX ADMIN — ACETAMINOPHEN 975 MG: 325 TABLET, FILM COATED ORAL at 14:31

## 2023-06-16 RX ADMIN — ACETAMINOPHEN 975 MG: 325 TABLET, FILM COATED ORAL at 22:35

## 2023-06-16 RX ADMIN — SODIUM CHLORIDE, SODIUM GLUCONATE, SODIUM ACETATE, POTASSIUM CHLORIDE, MAGNESIUM CHLORIDE, SODIUM PHOSPHATE, DIBASIC, AND POTASSIUM PHOSPHATE 125 ML/HR: .53; .5; .37; .037; .03; .012; .00082 INJECTION, SOLUTION INTRAVENOUS at 00:57

## 2023-06-16 RX ADMIN — LEVETIRACETAM 1000 MG: 500 TABLET, FILM COATED ORAL at 08:18

## 2023-06-16 RX ADMIN — BICTEGRAVIR SODIUM, EMTRICITABINE, AND TENOFOVIR ALAFENAMIDE FUMARATE 1 TABLET: 50; 200; 25 TABLET ORAL at 08:18

## 2023-06-16 RX ADMIN — LEVETIRACETAM 1000 MG: 500 TABLET, FILM COATED ORAL at 17:26

## 2023-06-16 RX ADMIN — SODIUM CHLORIDE, SODIUM GLUCONATE, SODIUM ACETATE, POTASSIUM CHLORIDE, MAGNESIUM CHLORIDE, SODIUM PHOSPHATE, DIBASIC, AND POTASSIUM PHOSPHATE 125 ML/HR: .53; .5; .37; .037; .03; .012; .00082 INJECTION, SOLUTION INTRAVENOUS at 17:26

## 2023-06-16 NOTE — PLAN OF CARE
Problem: PHYSICAL THERAPY ADULT  Goal: Performs mobility at highest level of function for planned discharge setting  See evaluation for individualized goals  Description: Treatment/Interventions: Functional transfer training, Therapeutic exercise, Endurance training, Cognitive reorientation, Patient/family training, Equipment eval/education, Bed mobility, Gait training, LE strengthening/ROM, Spoke to nursing, Spoke to case management  Equipment Recommended: (S) Roslyn Holbrook (Has one but wheel is broken-CM notified)       See flowsheet documentation for full assessment, interventions and recommendations  Note: Prognosis: Guarded  Problem List: Decreased strength, Decreased range of motion, Decreased endurance, Impaired balance, Decreased mobility, Impaired judgement, Decreased safety awareness, Pain (limited flexibility, gait deviations)  Assessment: Pt is a 40 y o  male seen for PT evaluation s/p admit to [facility] on 6/15/2023 w/ Elevated lactic acid level, possible seizure  Order placed for PT  Prior to admission: Pt was homeless, and recently obtained a new walker which he uses for amb  He reports 7 falls and 3 seizures this year, and reports medication non compliance for all 3 seizures although he states he can  medication @ [facility]  Upon evaluation: Pt needed no physical A for bed mobility nor transfers, and only S for amb with walker for household distances  Pt displays gait deviations including foot drop, which he reports is premorbid  Pt's clinical presentation is currently unstable/unpredictable given the functional mobility deficits above, especially (but not limited to) weakness, decreased ROM, pain and decreased functional mobility tolerance, and combined with medical complications of tachycardia and readmission to hospital   He is at risk for falls based on hx of falls, impulsivity, impaired balance, impaired judgement, decreased cognition and foot drop         During this admission, pt would benefit from continued skilled inpatient PT in the acute care setting in order to address deficits as defined above to maximize function and mobility  Recommendations:    From a PT standpoint, recommend next several sessions focus on continued gait training w/rolling walker, curb trials w/walker, BLE stretching @ heel cords  Nursing Recommendations:   Mobility Plan as of 06/16/23: Pt is S Assist with RW to/from bathroom  Barriers to Discharge: Other (Comment), Decreased caregiver support (homeless)     PT Discharge Recommendation: (S) No rehabilitation needs (pending mobility progress here including w/walker, and pt's ability to minimize stairs performance)    See flowsheet documentation for full assessment

## 2023-06-16 NOTE — DISCHARGE INSTR - AVS FIRST PAGE
Dear Paulo Luna,     It was our pleasure to care for you here at [facility]  It is our hope that we were always able to exceed the expected standards for your care during your stay  You were hospitalized due to seizure  You were cared for on the general medicine floor by Willian Denis MD under the service of Ryan Everett MD with the [facility] who covers for your primary care physician (PCP), No primary care provider on file  , while you were hospitalized  If you have any questions or concerns related to this hospitalization, you may contact us at [phone]  For follow up as well as any medication refills, we recommend that you follow up with your primary care physician  A registered nurse will reach out to you by phone within a few days after your discharge to answer any additional questions that you may have after going home  However, at this time we provide for you here, the most important instructions / recommendations at discharge:     Notable Medication Adjustments -   None  Testing Required after Discharge -   None  Important follow up information -   Referral provided for Neurology  Please call the number documented in the referral to set up an appointment  Establish care with a PCP for management of chronic medical problems  Other Instructions -   None  Please review this entire after visit summary as additional general instructions including medication list, appointments, activity, diet, any pertinent wound care, and other additional recommendations from your care team that may be provided for you        Sincerely,     Willian Denis MD

## 2023-06-16 NOTE — UTILIZATION REVIEW
"Initial Clinical Review    Admission: Date/Time/Statement:   Admission Orders (From admission, onward)     Ordered        06/15/23 1525  Place in Observation  Once                      Orders Placed This Encounter   Procedures   • Place in Observation     Standing Status:   Standing     Number of Occurrences:   1     Order Specific Question:   Level of Care     Answer:   Med Surg [16]     ED Arrival Information     Expected   -    Arrival   6/15/2023 13:48    Acuity   Emergent            Means of arrival   Ambulance    Escorted by   [facility]    Service   Hospitalist    Admission type   Emergency            Arrival complaint   -           Chief Complaint   Patient presents with   • Trauma       Initial Presentation: 40 y o  male  PMH of seizures, HIV who presents to ED as trauma alert via EMS after being found down  Patient reoprts he only remembers waking up in the hospital   He does not remember any of the events leading up to being found down or just after the event  He says he felt well leading up to the event  Pt did not take his Keppra this morning because he \"felt lazy\"  His last dose of Keppra was last evening   Pt reports L shoulder pain  On exam, pt tacycardic, lethargic, post ictal confusion ,GCS 14   KWON, has R forehead abrasion   Horizontal nystagmus   Labs LA 11, elevated anion gap  Trauma work up was neg with CT head neg  Pt given IVF, IV analgesic in ED  Pt admitted as OBS with elevated lactic acid, recurrent seizures, s/p fall  Plan - Continue IVF, Keppra Load with 1 G IV then resume Keppra PO 1g BID, Check UDS and CK, repeat LA  Consider neuro consult if seizure recurs  Date: 6/16  , LA down to 1 6 on repeat yesterday with IVF infusing   GCS 15   Pt appears to be approaching his baseline mental status   No recurrence of seizure activity   UDS neg   Reports 3/10 back pain this am   Pt states his left shoulder pain has resolved  Tolerating a diet  Per nsg, pt AOx4, " swallowing solids w/o issue  fatigued, tremors noted   Telemetry Sinus   Date -6/17   Pt remains on IVFovernight  GCS 15  PT eval pt 6/16- no rehab needs pending mobility progress here including w/walker, and pt's ability to minimize stairs performance  Pt is homeless, IPCM provided homeless resources       ED Triage Vitals   Temperature Pulse Respirations Blood Pressure SpO2   06/15/23 1350 06/15/23 1350 06/15/23 1350 06/15/23 1350 06/15/23 1350   99 °F (37 2 °C) (!) 132 19 119/79 96 %      Temp Source Heart Rate Source Patient Position - Orthostatic VS BP Location FiO2 (%)   06/15/23 1350 06/15/23 1350 06/15/23 1350 06/15/23 1500 --   Oral Monitor Lying Right arm       Pain Score       06/15/23 1400       7          Wt Readings from Last 1 Encounters:   06/15/23 82 2 kg (181 lb 3 5 oz)     Additional Vital Signs:   Date/Time Temp Pulse Resp BP MAP (mmHg) SpO2 O2 Device Patient Position - Orthostatic VS   06/17/23 06:56:44 99 9 °F (37 7 °C) 96 18 136/104 Abnormal  115 97 % -- --   06/17/23 0140 -- -- -- -- -- -- None (Room air) --   06/16/23 22:04:17 98 3 °F (36 8 °C) 96 15 125/82 96 99 % -- --   06/16/23 14:11:30 98 4 °F (36 9 °C) 94 15 133/81 98 98 % -- --   06/16/23 09:26:49 98 3 °F (36 8 °C) 99 16 117/73 88 97 % --      Date/Time Temp Pulse Resp BP MAP (mmHg) SpO2   06/16/23 07:45:14 98 3 °F (36 8 °C) 101 16 122/80 94 97 %   06/15/23 21:35:51 97 5 °F (36 4 °C) 67 18 103/69 80 98 %   06/15/23 1700 -- -- -- -- -- --   06/15/23 16:28:19 98 °F (36 7 °C) 104 16 132/80 97 97 %   06/15/23 1500 -- 86 18 96/62 74 93 %   06/15/23 1445 -- 94 18 101/59 -- 93 %   06/15/23 1430 -- 98 18 110/62 -- 91 %   06/15/23 1415 -- 112 Abnormal  19 106/66 -- 92 %     Date and Time Eye Opening Best Verbal Response Best Motor Response Moyers Coma Scale Score   06/17/23 0140 4 5 6 15   06/16/23 0800 4 5 6 15   06/15/23 1700 4 5 6 15   06/15/23 1500 4 5 6 15   06/15/23 1445 4 5 6 15   06/15/23 1430 4 5 6 15   06/15/23 1415 4 5 6 15 06/15/23 1400 4 5 6 15   06/15/23 1350 4 5 6 15     Date and Time Eye Opening Best Verbal Response Best Motor Response   06/15/23 1700 4 5 6   06/15/23 1500 4 5 6   06/15/23 1445 4 5 6   06/15/23 1430 4 5 6   06/15/23 1415 4 5 6   06/15/23 1400 4 5 6   06/15/23 1350 4 5 6     Pertinent Labs/Diagnostic Test Results:   TRAUMA - CT head wo contrast   Final Result by Sylvester Bazan MD (06/15 1459)      No acute intracranial abnormality  I personally discussed this study with Christophe Boateng TO on 6/15/2023 2:25 PM                         Workstation performed: [id number]         TRAUMA - CT spine cervical wo contrast   Final Result by Sylvester Bazan MD (06/15 1459)      No cervical spine fracture or traumatic malalignment  I personally discussed this study with Christophe Boateng TO on 6/15/2023 2:58 PM                         Workstation performed: [id number]         TRAUMA - CT chest abdomen pelvis w contrast   Final Result by Sylvester Bazan MD (06/15 9879)      1  Multilevel mild thoracolumbar compression deformities appear stable compared with prior studies as above  2  No suspected acute posttraumatic injury throughout the chest, abdomen or pelvis  I personally discussed this study with Christophe Boateng TO on 6/15/2023 2:37 PM                      Workstation performed: [id number]         XR shoulder 1 vw left   Final Result by Kelly Bolivar MD (06/16 7668)      No acute cardiopulmonary disease within limitations of supine imaging  Unremarkable AP view of the left shoulder  Workstation performed: [id number]         XR chest 1 view   Final Result by Kelly Bolivar MD (06/16 6753)      No acute cardiopulmonary disease within limitations of supine imaging  Unremarkable AP view of the left shoulder           Workstation performed: [id number]         XR Trauma multiple (SLB/SLRA trauma bay ONLY)   Final Result by Kelly Bolivar MD (06/15 4740)      No acute cardiopulmonary disease within limitations of supine "imaging  Unremarkable AP view of the left shoulder           Workstation performed: [id number]               Results from last 7 days   Lab Units 06/16/23  0446 06/15/23  1400   WBC Thousand/uL 4 94 8 34   HEMOGLOBIN g/dL 14 0 15 2   HEMATOCRIT % 42 9 47 1   PLATELETS Thousands/uL 137* 147*   NEUTROS ABS Thousands/µL  --  1 98         Results from last 7 days   Lab Units 06/17/23  0549 06/16/23  0446 06/15/23  1400   SODIUM mmol/L 140 142 136   POTASSIUM mmol/L 3 9 4 1 3 4*   CHLORIDE mmol/L 108 110* 102   CO2 mmol/L 28 30 17*   ANION GAP mmol/L 4 2* 17*   BUN mg/dL 9 8 9   CREATININE mg/dL 0 88 0 86 1 03   EGFR ml/min/1 73sq m 109 110 92   CALCIUM mg/dL 8 4 8 2* 9 0     Results from last 7 days   Lab Units 06/16/23  0446 06/15/23  1400   AST U/L 22 28   ALT U/L 15 17   ALK PHOS U/L 56 75   TOTAL PROTEIN g/dL 6 9 8 2   ALBUMIN g/dL 3 7 4 4   TOTAL BILIRUBIN mg/dL 0 46 0 37     Results from last 7 days   Lab Units 06/14/23  0102   POC GLUCOSE mg/dl 98     Results from last 7 days   Lab Units 06/17/23  0549 06/16/23  0446 06/15/23  1400   GLUCOSE RANDOM mg/dL 85 106 101             No results found for: \"BETA-HYDROXYBUTYRATE\"               Results from last 7 days   Lab Units 06/15/23  1400   CK TOTAL U/L 342*                         Results from last 7 days   Lab Units 06/15/23  1625 06/15/23  1402   LACTIC ACID mmol/L 1 6 11 0*                                                             Results from last 7 days   Lab Units 06/15/23  2035   AMPH/METH  Negative   BARBITURATE UR  Negative   BENZODIAZEPINE UR  Negative   COCAINE UR  Negative   METHADONE URINE  Negative   OPIATE UR  Negative   PCP UR  Negative   THC UR  Negative                                       ED Treatment:   Medication Administration from 06/15/2023 1348 to 06/15/2023 1608       Date/Time Order Dose Route Action     06/15/2023 1400 EDT fentanyl citrate (PF) 100 MCG/2ML 25 mcg 25 mcg Intravenous Given     06/15/2023 1421 EDT " tetanus-diphtheria-acellular pertussis (BOOSTRIX) IM injection 0 5 mL 0 5 mL Intramuscular Given     06/15/2023 1407 EDT iohexol (OMNIPAQUE) 350 MG/ML injection (SINGLE-DOSE) 85 mL 85 mL Intravenous Given     06/15/2023 1534 EDT multi-electrolyte (ISOLYTE-S PH 7 4) bolus 1,000 mL 1,000 mL Intravenous New Bag     06/15/2023 1539 EDT acetaminophen (TYLENOL) tablet 975 mg 975 mg Oral Given     06/15/2023 1539 EDT lidocaine (LIDODERM) 5 % patch 1 patch 1 patch Topical Medication Applied        Past Medical History:   Diagnosis Date   • HIV (human immunodeficiency virus infection) ([facility] )    • Seizures ([facility] )    • Smoker    • Syphilis      Present on Admission:  • Tobacco dependence  • Recurrent seizures ([facility] )  • HIV (human immunodeficiency virus infection) ([place] [address] )      Admitting Diagnosis: Head pain [R51 9]  Age/Sex: 40 y o  male  Admission Orders:  Scheduled Medications:  acetaminophen, 975 mg, Oral, Q8H  bictegravir-emtricitab-tenofovir alafenamide, 1 tablet, Oral, Daily  levETIRAcetam, 1,000 mg, Oral, BID  lidocaine, 1 patch, Topical, Daily  nicotine, 1 patch, Transdermal, Daily    levETIRAcetam (KEPPRA) 1,000 mg in sodium chloride 0 9 % 100 mL IVPB  Dose: 1,000 mg  Freq: Every 12 hours scheduled Route: IV  Last Dose: 1,000 mg (06/15/23 1751)  Start: 06/15/23 1715 End: 06/15/23 1806    Continuous IV Infusions:  multi-electrolyte, 125 mL/hr, Intravenous, Continuous      PRN Meds:  diphenhydrAMINE-zinc acetate, , Topical, TID PRN  ondansetron, 4 mg, Intravenous, Once PRN      Telemetry   seizure monitoring   OOB as don w/ assist      Network Utilization Review Department  ATTENTION: Please call with any questions or concerns to [phone] and carefully listen to the prompts so that you are directed to the right person   All voicemails are confidential   Tyrone Side all requests for admission clinical reviews, approved or denied determinations and any other requests to dedicated fax number below belonging to the campus where the patient is receiving treatment   List of dedicated fax numbers for the Facilities:  [address] [place] [address] DENIALS (Administrative/Medical Necessity) [phone]   [address]  (Maternity/NICU/Pediatrics) [phone]   [address] [phone]   Jeremiah Chawla [address] [phone]   [address] [place] [address] [facility] [phone]   [address] [facility] [place] [address] 134 815 [facility] [phone]

## 2023-06-16 NOTE — CASE MANAGEMENT
Case Management Discharge Planning Note    Patient name Rebeka Gamez  Location S /S -68 MRN [id number]  : 1985 Date 2023       Current Admission Date: 6/15/2023  Current Admission Diagnosis:Elevated lactic acid level   Patient Active Problem List    Diagnosis Date Noted   • Elevated lactic acid level 06/15/2023   • Fall 06/15/2023   • COVID-19    • Metabolic acidosis    • Frequent patient in emergency department 2021   • Dehydration 2020   • Recurrent seizures ([facility] ) 2020   • Non compliance w medication regimen 2020   • Other neutropenia ([facility] ) 2020   • Tachycardia 2020   • Ankle edema, bilateral 2020   • Breakthrough seizure ([facility] ) 04/10/2020   • HIV (human immunodeficiency virus infection) ([facility] ) 04/10/2020   • Numbness in both legs 04/10/2020   • Homelessness 04/10/2020   • Tobacco dependence 04/10/2020   • Syphilis in male 04/10/2020   • Torticollis, acquired 04/10/2020      LOS (days): 0  Geometric Mean LOS (GMLOS) (days):   Days to GMLOS:     OBJECTIVE:            Current admission status: Observation   Preferred Pharmacy:   [facility] [address] [place] [address] [address] [place] [address]  Phone: [phone] Fax: [address] [address]  Phone: [phone] Fax: [phone]    Primary Care Provider: No primary care provider on file  Primary Insurance: [facility] [address]  JALEN ROMOO  Secondary Insurance:     DISCHARGE DETAILS:  CM attempted to speak with patient at bedside  Patient sleeping at bedside  CM left copy of homeless resources at bedside  CM will f/u in morning  Nursing aware

## 2023-06-16 NOTE — PROGRESS NOTES
[facility]  Progress Note  Name: Luis F Ace  MRN: [id number]  Unit/Bed#: S -78 I Date of Admission: 6/15/2023   Date of Service: 6/16/2023 I Hospital Day: 0    Assessment/Plan   Fall  Assessment & Plan  · Trauma evaluated patient in the ED  · All trauma imaging without acute posttraumatic injuries  · Local wound care to forehead abrasion, healing well  · Pt feels this is all due to seizure (similar instances have occurred multiple times)  · No complaints of pain or discomfort    Recurrent seizures ([facility] )  Assessment & Plan  · Patient is homeless and appears to get Keppra refills from the ED  · Issues noted upon chart review with noncompliance with antiepileptics  · Reports his HIV physician prescribes his AED's  · Loaded with 1g IV Keppra then resumed Keppra PO 1g BID  · Patient appears to be approaching his baseline mental status  · Refer to Neurology on discharge  · CM will provide information about local shelters in the area    Tobacco dependence  Assessment & Plan  · Reports 2-3 PPD  · Offer nicotine patch    HIV (human immunodeficiency virus infection) ([facility] [address] )  Assessment & Plan  · Continue Biktarvy    * Elevated lactic acid level  Assessment & Plan  · After being found down, lactate noted to be 11 on arrival  · Anion gap elevated, likely due to lactic acidosis  · Tachycardic on arrival which resolved, no other SIRS met  · Concern for seizure as the etiology for patient being found down in setting of elevated lactate  · CT head negative  · Pt reports non compliance with Keppra and specifically missed a dose this AM  · Denies tongue bite, bowel/bladder incontinence, endorses post-ictal confusion  · Received 1L IVF currently and lactate cleared  · loaded with 1g IV Keppra then resumed home regimen  · No indication for Neurology involvement/EEG at this juncture given likely all due to non compliance but if seizure recurs would consider consult   · CK unremarkable       VTE Pharmacologic Prophylaxis: VTE Score: 1 Low Risk (Score 0-2) - Encourage Ambulation  Patient Centered Rounds: I performed bedside rounds with nursing staff today  Discussions with Specialists or Other Care Team Provider: Dr Sheree Stinson    Education and Discussions with Family / Patient: Patient declined call to   Current Length of Stay: 0 day(s)  Current Patient Status: Observation   Discharge Plan: Anticipate discharge tomorrow to homeless shelter  Patient is homeless and will be given information about local shelters in the area    Code Status: Level 1 - Full Code    Subjective:   Patient examined at bedside  He states that he is doing well  No recurrence of seizure activity  Denies any pain, SOB, HA, dizziness, abdominal pain, N/V ro other complaints at this time  Objective:     Vitals:   Temp (24hrs), Av 1 °F (36 7 °C), Min:97 5 °F (36 4 °C), Max:98 4 °F (36 9 °C)    Temp:  [97 5 °F (36 4 °C)-98 4 °F (36 9 °C)] 98 4 °F (36 9 °C)  HR:  [] 94  Resp:  [15-18] 15  BP: (103-133)/(69-81) 133/81  SpO2:  [97 %-98 %] 98 %  Body mass index is 26 kg/m²  Input and Output Summary (last 24 hours): Intake/Output Summary (Last 24 hours) at 2023 1535  Last data filed at 2023 1401  Gross per 24 hour   Intake --   Output 1850 ml   Net -1850 ml       Physical Exam:   Physical Exam  Vitals and nursing note reviewed  Constitutional:       General: He is not in acute distress  Appearance: He is not ill-appearing or toxic-appearing  HENT:      Head: Normocephalic and atraumatic  Comments: R forehead abrasian     Right Ear: External ear normal       Left Ear: External ear normal       Nose: Nose normal       Mouth/Throat:      Comments: Poor dentition, multiple caries noted  Eyes:      Conjunctiva/sclera: Conjunctivae normal       Pupils: Pupils are equal, round, and reactive to light  Cardiovascular:      Rate and Rhythm: Normal rate and regular rhythm        Pulses: Normal pulses  Heart sounds: Normal heart sounds  No murmur heard  No friction rub  No gallop  Pulmonary:      Effort: Pulmonary effort is normal  No respiratory distress  Breath sounds: Normal breath sounds  No stridor  No wheezing, rhonchi or rales  Abdominal:      General: Abdomen is flat  Bowel sounds are normal  There is no distension  Palpations: Abdomen is soft  There is no mass  Tenderness: There is no abdominal tenderness  There is no guarding or rebound  Hernia: No hernia is present  Musculoskeletal:      Cervical back: Normal range of motion  Right lower leg: No edema  Left lower leg: No edema  Skin:     General: Skin is warm and dry  Neurological:      Mental Status: He is alert and oriented to person, place, and time           Additional Data:     Labs:  Results from last 7 days   Lab Units 06/16/23  0446 06/15/23  1400   WBC Thousand/uL 4 94 8 34   HEMOGLOBIN g/dL 14 0 15 2   HEMATOCRIT % 42 9 47 1   PLATELETS Thousands/uL 137* 147*   NEUTROS PCT %  --  24*   LYMPHS PCT %  --  65*   LYMPHO PCT % 59*  --    MONOS PCT %  --  10   MONO PCT % 9  --    EOS PCT % 1 1     Results from last 7 days   Lab Units 06/16/23  0446   SODIUM mmol/L 142   POTASSIUM mmol/L 4 1   CHLORIDE mmol/L 110*   CO2 mmol/L 30   BUN mg/dL 8   CREATININE mg/dL 0 86   ANION GAP mmol/L 2*   CALCIUM mg/dL 8 2*   ALBUMIN g/dL 3 7   TOTAL BILIRUBIN mg/dL 0 46   ALK PHOS U/L 56   ALT U/L 15   AST U/L 22   GLUCOSE RANDOM mg/dL 106         Results from last 7 days   Lab Units 06/14/23  0102   POC GLUCOSE mg/dl 98         Results from last 7 days   Lab Units 06/15/23  1625 06/15/23  1402   LACTIC ACID mmol/L 1 6 11 0*       Lines/Drains:  Invasive Devices     Peripheral Intravenous Line  Duration           Peripheral IV 06/15/23 Right Antecubital 1 day                         Imaging: Reviewed radiology reports from this admission including: chest xray, chest CT scan, abdominal/pelvic CT, CT head, xray(s) and ultrasound(s)    Recent Cultures (last 7 days):         Last 24 Hours Medication List:   Current Facility-Administered Medications   Medication Dose Route Frequency Provider Last Rate   • acetaminophen  975 mg Oral Q8H Kurt Kulkarni PA-C     • bictegravir-emtricitab-tenofovir alafenamide  1 tablet Oral Daily Ernestene FRENCH Kulkarni     • diphenhydrAMINE-zinc acetate   Topical TID PRN Kurt Kulkarni PA-C     • levETIRAcetam  1,000 mg Oral BID Kurt Kulkarni PA-C     • lidocaine  1 patch Topical Daily Kurt Kulkarni PA-C     • multi-electrolyte  125 mL/hr Intravenous Continuous Kurt Kulkarni PA-C 125 mL/hr (06/16/23 0057)   • nicotine  1 patch Transdermal Daily Kurt Kulkarni PA-C     • ondansetron  4 mg Intravenous Once PRN Kurt Kulkarni PA-C          Today, Patient Was Seen By: Silvano Barrett MD    **Please Note: This note may have been constructed using a voice recognition system  **

## 2023-06-16 NOTE — PHYSICAL THERAPY NOTE
"PHYSICAL THERAPY EVALUATION  NAME:  Romario Marroquin  DATE: 06/16/23    AGE:   40 y o  Mrn:   [id number]  Principal problem: Principal Problem:    Elevated lactic acid level  Active Problems:    HIV (human immunodeficiency virus infection) ([facility])    Tobacco dependence    Recurrent seizures ([facility] [address] )    Fall      Vitals:    06/15/23 2135 06/16/23 0745 06/16/23 0926 06/16/23 1411   BP: 103/69 122/80 117/73 133/81   BP Location:       Pulse: 67 101 99 94   Resp: 18 16 16 15   Temp: 97 5 °F (36 4 °C) 98 3 °F (36 8 °C) 98 3 °F (36 8 °C) 98 4 °F (36 9 °C)   TempSrc:       SpO2: 98% 97% 97% 98%   Weight:           Length Of Stay: 0  Performed at least 2 patient identifiers during session: Name and Birthday  PHYSICAL THERAPY EVALUATION :    06/16/23 1440   PT Last Visit   PT Visit Date 06/16/23   Note Type   Note type Evaluation   Pain Assessment   Pain Assessment Tool 0-10   Pain Score 9   Pain Location/Orientation (S)  Orientation: Upper; Location: Back;Orientation: Right  (between back and scapula @ musculature---pain on RIGHT side not left)   Effect of Pain on Daily Activities needs encouragement to particpate   Patient's Stated Pain Goal No pain   Hospital Pain Intervention(s) Repositioned; Ambulation/increased activity; Medication (See MAR)  (Pt received medication prior to mobility portion of session via nursing)   Restrictions/Precautions   Weight Bearing Precautions Per Order Yes   LUE Weight Bearing Per Order WBAT   RLE Weight Bearing Per Order   (per chart)   Other Precautions Bed Alarm; Chair Alarm;Cognitive;Multiple lines; Fall Risk;Seizure;Pain   Home Living   Type of Home Homeless  (Pt reportedly stays near \"the [place]\", reports of him being near a [address] )   [address]  (recently received new walker 4 weeks ago, but R wheel on walker is split in 2--CM notified)   Prior Function   Level of [place] Independent with functional mobility; Independent with ADLs   Lives With Alone   Falls in the last 6 months 5 " "to 10  (reports 7 falls this year (usually backwards), and 3 seizures)   Vocational Unemployed   Comments Pt reports the reason that he had his last 3 seizures was due to med  non compliance  He states he did not get to the [facility] [address] from where he stays  General   Family/Caregiver Present No   Cognition   Overall Cognitive Status Impaired   Arousal/Participation Lethargic   Orientation Level Oriented to place   Following Commands Follows one step commands with increased time or repetition   Subjective   Subjective \" Are you seriously asking me to walk during the Friends Episode? \"  Pt initially lehtargic, and needs encouragement for mobility  However pt express later in the session that he is fearful of \"dying by an epileptic seizure\"   RUE Assessment   RUE Assessment X  (reports limited @ R shoulder due to upper \"back pain\")   RLE Overall PROM   R Ankle Dorsiflexion limited from neutral DF   Strength RLE   R Hip Flexion 4-/5   R Knee Extension 4/5   R Ankle Dorsiflexion 3/5   LLE Overall PROM   L Ankle Dorsiflexion limited from neutral DF   Strength LLE   L Hip Flexion 4-/5   L Knee Extension 4/5   L Ankle Dorsiflexion 3/5   Light Touch   RLE Light Touch Grossly intact  (self reported and grossly assessed)   LLE Light Touch Grossly intact  (self reported and grossly assessed)   Bed Mobility   Supine to Sit 6  Modified independent   Additional items HOB elevated; Increased time required   Sit to Supine 6  Modified independent   Additional items Increased time required; Bedrails;HOB elevated   Additional Comments Pt reports lightheadedness w/ upright positioning, but reports not being OOB since admission   Transfers   Sit to Stand 5  Supervision   Additional items Increased time required;Armrests   Stand to Sit 5  Supervision   Additional items Increased time required;Armrests   Additional Comments wide KRYSTAL in standing   Ambulation/Elevation   Gait pattern Step through pattern;Excessively slow;L Foot drag;R " Foot drag  (using 3 point gait; pt reports foot drop premorbid)   Gait Assistance 5  Supervision   Additional items Assist x 1   Assistive Device Rolling walker   Distance 50'   Stair Management Assistance Not tested   Balance   Static Sitting Good   Static Standing Fair -  (w/o device)   Ambulatory Fair -   Endurance Deficit   Endurance Deficit Yes   Endurance Deficit Description reports of lightheadedness, elf reported fatigue   Activity Tolerance   Activity Tolerance Patient limited by fatigue;Patient limited by pain   Medical Staff Made Aware spoke to [facility] from case management   Nurse Made Aware spoke to RNs     Assessment:   Pt is a 40 y o  male seen for PT evaluation s/p admit to [facility] on 6/15/2023 w/ Elevated lactic acid level, possible seizure  Order placed for PT  Prior to admission: Pt was homeless, and recently obtained a new walker which he uses for amb  He reports 7 falls and 3 seizures this year, and reports medication non compliance for all 3 seizures although he states he can  medication @ [facility]  Upon evaluation: Pt needed no physical A for bed mobility nor transfers, and only S for amb with walker for household distances  Pt displays gait deviations including foot drop, which he reports is premorbid  Pt's clinical presentation is currently unstable/unpredictable given the functional mobility deficits above, especially (but not limited to) weakness, decreased ROM, pain and decreased functional mobility tolerance, and combined with medical complications of tachycardia and readmission to hospital   He is at risk for falls based on hx of falls, impulsivity, impaired balance, impaired judgement, decreased cognition and foot drop  During this admission, pt would benefit from continued skilled inpatient PT in the acute care setting in order to address deficits as defined above to maximize function and mobility        Recommendations:    From a PT standpoint, recommend "next several sessions focus on continued gait training w/rolling walker, curb trials w/walker, BLE stretching @ heel cords  Nursing Recommendations:   Mobility Plan as of 06/16/23: Pt is S Assist with RW to/from bathroom  Prognosis Guarded   Problem List Decreased strength;Decreased range of motion;Decreased endurance; Impaired balance;Decreased mobility; Impaired judgement;Decreased safety awareness;Pain  (limited flexibility, gait deviations)   Barriers to Discharge Other (Comment); Decreased caregiver support  (homeless)   Goals   Patient Goals less pain, \"to not die by epileptic seizure\"   STG Expiration Date 06/26/23   Short Term Goal #1 Pt will: Perform bed mobility tasks with consistent modified I to prepare for transfers and reposition in bed  Perform transfers with S to decrease risk for falls  Perform ambulation with rolling walker for > 50' with S to improve gait quality and promote proper use of assistive device  Perform curb step w/rolling walker and S to promote pt to return to prior environment  PT Treatment Day 0   Plan   Treatment/Interventions Functional transfer training; Therapeutic exercise; Endurance training;Cognitive reorientation;Patient/family training;Equipment eval/education; Bed mobility;Gait training;LE strengthening/ROM;Spoke to nursing;Spoke to case management   PT Frequency 4-6x/wk   Recommendation   PT Discharge Recommendation (S)  No rehabilitation needs  (pending mobility progress here including w/walker, and pt's ability to minimize stairs performance)   Equipment Recommended (S)  Walker  (Has one but wheel is broken-CM notified)   [address] walker   Additional Comments Co-morbidities affecting pt's physical performance at time of assessment include but are not limited to: HIV, seizures, smoker, syphilis, + marijuana use, + Tobacco   Personal factors affecting pt at time of IE include: hx of non-compliance, limited home support, past experience, " "behavioral pattern, limited insight into impairments and recent fall(s)  [facility] Basic Mobility Inpatient   Turning in Flat Bed Without Bedrails 4   Lying on Back to Sitting on Edge of Flat Bed Without Bedrails 4   Moving Bed to Chair 3   Standing Up From Chair Using Arms 3   Walk in Room 3   Climb 3-5 Stairs With Railing 1   Basic Mobility Inpatient Raw Score 18   Basic Mobility Standardized Score 41 05   Highest Level Of Mobility   JH-HLM Goal 6: Walk 10 steps or more   JH-HLM Achieved 7: Walk 25 feet or more   End of Consult   Patient Position at End of Consult All needs within reach; Supine;Bed/Chair alarm activated  (Pt's preference to return to bed)   (Please find full objective findings from PT assessment regarding body systems outlined above)  The patient's [facility] Basic Mobility Inpatient Short Form Raw Score is 18  A Raw score of greater than 16 suggests the patient may benefit from discharge to home, which DOES coincide with CURRENT above PT recommendations  However please refer to therapist recommendation for discharge planning given other factors that may influence destination  Adapted from [employer] [facility] “6-Clicks” Basic Mobility and Daily Activity Scores With Discharge Destination  Physical Therapy, 2021;101:1-9  DOI: [id number]    Portions of the record may have been created with voice recognition software  Occasional wrong word or \"sound a like\" substitutions may have occurred due to the inherent limitations of voice recognition software    Read the chart carefully and recognize, using context, where substitutions have occurred    "

## 2023-06-16 NOTE — DISCHARGE SUMMARY
[facility]  Discharge- [place] 1985, 40 y o  male MRN: [id number]  Unit/Bed#: S -01 Encounter: [id number]  Primary Care Provider: No primary care provider on file     Date and time admitted to hospital: 6/15/2023  1:48 PM    * [facility] )  Assessment & Plan  · Patient is homeless and appears to get Keppra refills from the ED  · Issues noted upon chart review with noncompliance with antiepileptics  · Reports his HIV physician prescribes his AED's  · Loaded with 1g IV Keppra then resumed Keppra PO 1g BID  · Patient appears to be approaching his baseline mental status  · Refer to Neurology on discharge  · CM will provide information about local shelters in the area    Tobacco dependence  Assessment & Plan  · Reports 2-3 PPD  · Offer nicotine patch    HIV (human immunodeficiency virus infection) ([facility] )  Assessment & Plan  · Continue Sarah Najera as of 6/16/2023  Assessment & Plan  · Trauma evaluated patient in the ED  · All trauma imaging without acute posttraumatic injuries  · Local wound care to forehead abrasion, healing well  · Pt feels this is all due to seizure (similar instances have occurred multiple times)  · No complaints of pain or discomfort    Elevated lactic acid level-resolved as of 6/16/2023  Assessment & Plan  · After being found down, lactate noted to be 11 on arrival  · Anion gap elevated, likely due to lactic acidosis  · Tachycardic on arrival which resolved, no other SIRS met  · Concern for seizure as the etiology for patient being found down in setting of elevated lactate  · CT head negative  · Pt reports non compliance with Keppra and specifically missed a dose this AM  · Denies tongue bite, bowel/bladder incontinence, endorses post-ictal confusion  · Received 1L IVF currently and lactate cleared  · loaded with 1g IV Keppra then resumed home regimen  · No indication for Neurology involvement/EEG at this juncture given likely all due to non compliance but if seizure recurs would consider consult   · CK unremarkable    Medical Problems     Resolved Problems  Date Reviewed: 6/17/2023          Resolved    Elevated lactic acid level 6/16/2023     Resolved by  Foster Cabral MD    Fall 6/16/2023     Resolved by  Sveta Cullen MD        Discharging Resident: Jose Alejandro Patel MD  Discharging Attending: Nikkie Power MD  PCP: No primary care provider on file  Admission Date:   Admission Orders (From admission, onward)     Ordered        06/15/23 1525  Place in Observation  Once                      Discharge Date: 06/17/23    Consultations During Hospital Stay:  · None    Procedures Performed:   · None    Significant Findings / Test Results:   Recent Labs     06/15/23  1402 06/15/23  1625   LACTICACID 11 0* 1 6   ·   Recent Labs     06/15/23  1400   CKTOTAL 342*   ·   ·     Incidental Findings:   · None     Test Results Pending at Discharge (will require follow up): · None     Outpatient Tests Requested:  · None    Complications:  None    Reason for Admission: Seizure activity    Hospital Course:   Argentina Lundborg is a 40 y o  male patient with a PMH of seizures and HIV who originally presented to the hospital on 6/15/2023 after being found down on the side of the road with confusion  He was transported to the hospital via EMS, during which time he became more alert  He reported that he did not take his dose of Keppra that morning  It was unwitnessed, however the patient likely had a seizure  He was not able to remember any immediate events surrounding the episode  He was noted to have an abrasion on his forehead  Of note, the patient is homeless  Upon arrival to the ED, the patient was noted to have an elevated LA of 11 0  CT head was normal and UDS was negative  He was started on fluids and the LA cleared quickly  He received a loading dose of Keppra in the ED, then was resumed on his home regimen   Throughout the duration of his stay, the patient continued to improved, returning back to his baseline  The abrasion on his forehead has started to heal  He has been eating/drinking without issues  He was provided with information for homeless shelters in the area  The patient has been medically optimized for discharge with referral to Neurology placed so that the patient has a number to call and set up an appointment  The patient was strongly encouraged to be compliant with his medications and to quit smoking  Please see above list of diagnoses and related plan for additional information  Condition at Discharge: stable    Discharge Day Visit / Exam:   Subjective: Patient initially asleep this morning  When awoken he is alert and pleasant  He denies dizziness or lightheadedness and reports some pain associated with laceration  Vitals: Blood Pressure: (!) 136/104 (06/17/23 0656)  Pulse: 96 (06/17/23 0656)  Temperature: 99 9 °F (37 7 °C) (06/17/23 0656)  Temp Source: Oral (06/15/23 1350)  Respirations: 18 (06/17/23 0656)  Weight - Scale: 82 2 kg (181 lb 3 5 oz) (06/15/23 1350)  SpO2: 97 % (06/17/23 0656)  Exam:   Physical Exam  Vitals and nursing note reviewed  Constitutional:       General: He is not in acute distress  Appearance: He is not ill-appearing or toxic-appearing  HENT:      Head: Normocephalic and atraumatic  Comments: R forehead abrasian     Right Ear: External ear normal       Left Ear: External ear normal       Nose: Nose normal       Mouth/Throat:      Comments: Poor dentition, multiple caries noted  Eyes:      Conjunctiva/sclera: Conjunctivae normal       Pupils: Pupils are equal, round, and reactive to light  Cardiovascular:      Rate and Rhythm: Normal rate and regular rhythm  Pulses: Normal pulses  Heart sounds: Normal heart sounds  No murmur heard  No friction rub  No gallop  Pulmonary:      Effort: Pulmonary effort is normal  No respiratory distress        Breath sounds: Normal breath sounds  No stridor  No wheezing, rhonchi or rales  Abdominal:      General: Abdomen is flat  Bowel sounds are normal  There is no distension  Palpations: Abdomen is soft  There is no mass  Tenderness: There is no abdominal tenderness  There is no guarding or rebound  Hernia: No hernia is present  Musculoskeletal:      Cervical back: Normal range of motion  Right lower leg: No edema  Left lower leg: No edema  Skin:     General: Skin is warm and dry  Neurological:      Mental Status: He is alert and oriented to person, place, and time  Discussion with Family: Patient declined call to   Discharge instructions/Information to patient and family:   See after visit summary for information provided to patient and family  Provisions for Follow-Up Care:  See after visit summary for information related to follow-up care and any pertinent home health orders  Disposition:   Other: homeless, directed to shelters by CM    Planned Readmission: no    Discharge Medications:  See after visit summary for reconciled discharge medications provided to patient and/or family        **Please Note: This note may have been constructed using a voice recognition system**

## 2023-06-16 NOTE — DISCHARGE INSTRUCTIONS
Epilepsy   WHAT YOU NEED TO KNOW:   Epilepsy is a brain disorder that causes seizures  It is also called a seizure disorder  A seizure means an abnormal area in your brain sometimes sends bursts of electrical activity  A seizure may start in one part of your brain, or both sides may be affected  Depending on the type of seizure, you may have movements you cannot control, lose consciousness, or stare straight ahead  You may be confused or tired after the seizure  A seizure may last a few seconds or longer than 5 minutes  A birth defect, tumor, stroke, dementia, injury, or infection may cause epilepsy  The cause of your epilepsy may not be known  If your seizures are not controlled, epilepsy may become life-threatening  DISCHARGE INSTRUCTIONS:   Call your local emergency number (911 in the [place]), or have someone else call, for any of the following: Your seizure lasts longer than 5 minutes  You have trouble breathing after a seizure  You have diabetes or are pregnant and have a seizure  You have a seizure in water, such as a swimming pool or bathtub  Call your doctor if:   You have a second seizure within 24 hours of the first      You are injured during a seizure  You feel you are not able to cope with your condition  Your seizures start to happen more often  You are confused longer than usual after a seizure  You are planning to get pregnant or are currently pregnant  You have questions or concerns about your condition or care  Medicines:   Antiseizure medicine  may control or prevent another seizure  Do not stop taking this medicine  Another person may need to give you rescue medicine to stop a seizure at home  Ask your healthcare provider for more information about rescue medicine  Take your medicine as directed  Contact your healthcare provider if you think your medicine is not helping or if you have side effects  Tell your provider if you are allergic to any medicine   Keep a list of the medicines, vitamins, and herbs you take  Include the amounts, and when and why you take them  Bring the list or the pill bottles to follow-up visits  Carry your medicine list with you in case of an emergency  Follow up with your neurologist as directed: You may need tests to check the level of antiseizure medicine in your blood  Your neurologist may need to change or adjust your medicine  Write down your questions so you remember to ask them during your visits  Prevent a complication of epilepsy:   Sudden unexplained death in epilepsy (SUDEP) is a rare complication of epilepsy  In 1 year, 1 adult in 1,000 adults with epilepsy will have this complication  The risk of SUDEP increases if you have 3 or more generalized tonic-clonic seizures in 1 year  Your risk also increases if you have nocturnal seizures (seizures during sleep)  After a nocturnal seizure, your breathing can become shallow  Your healthcare provider may recommend a change in medicine to decrease the number of seizures  For nocturnal seizures, he or she may recommend that someone sleep near you  The person must be older than 10 years  The person must also be close enough to know that you are having a seizure  What you can do to prevent a seizure: You may not be able to prevent every seizure  The following can help you manage triggers that may make a seizure start:  Take your medicine every day at the same time  This will also help prevent medicine side effects  Set an alarm to help remind you to take your medicine every day  Manage stress  Stress can be a trigger for epilepsy  Exercise can help you reduce stress  Talk to your healthcare provider about exercise that is safe for you  Illness can be a form of stress  Eat a variety of healthy foods and drink plenty of liquids during an illness  Talk to your healthcare provider about other ways to manage stress  Set a regular sleep schedule    A lack of sleep can trigger a seizure  Try to go to sleep and wake up at the same time every day  Keep your bedroom quiet and dark  Talk to your healthcare provider if you are having trouble sleeping  Limit or do not drink alcohol as directed  Alcohol can trigger a seizure, especially if you drink a large amount at one time  A drink of alcohol is 12 ounces of beer, 1½ ounces of liquor, or 5 ounces of wine  Talk to your healthcare provider about a safe amount of alcohol for you  Your provider may recommend that you do not drink any alcohol  Tell him or her if you need help to quit drinking  What you can do to manage epilepsy:   Keep a seizure diary  This can help you find your triggers and avoid them  Write down the dates of your seizures, where you were, and what you were doing  Include how you felt before and after  Possible triggers include illness, lack of sleep, hormonal changes, alcohol, drugs, lights, or stress  Record any auras you have before a seizure  An aura is a sign that you are about to have a seizure  Auras happen before certain types of seizures that are in only 1 part of the brain  The aura may happen seconds before a seizure, or up to an hour before  You may feel, see, hear, or smell something  Examples include part of your body becoming hot  You may see a flash of light or hear something  You may have anxiety or déjà vu  If you have an aura, include it in your seizure diary  Create a care plan  Tell family, friends, and coworkers about your epilepsy  Give them instructions that tell them how they can keep you safe if you have a seizure  Find support  You may be referred to a psychologist or   Ask your healthcare provider about support groups for people with epilepsy  Ask what safety precautions you should take  Talk with your healthcare provider about driving  You may not be able to drive until you are seizure-free for a period of time  You will need to check the law where you live   Also talk to your healthcare provider about swimming and bathing  You may drown or develop life-threatening heart or lung damage if you have a seizure in water  Carry medical alert identification  Wear medical alert jewelry or carry a card that says you have epilepsy  Ask your healthcare provider where to get these items  How others can keep you safe during a seizure:  Give the following instructions to family, friends, and coworkers:  Do not panic  Do not hold me down or put anything in my mouth  Gently guide me to the floor or a soft surface  Place me on my side to help prevent me from swallowing saliva or vomit  Protect me from injury  Remove sharp or hard objects from the area surrounding me, or cushion my head  Loosen the clothing around my head and neck  Time how long my seizure lasts  Call 911 if my seizure lasts longer than 5 minutes or if I have a second seizure  Stay with me until my seizure ends  Let me rest until I am fully awake  Perform CPR if I stop breathing or you cannot feel my pulse  Do not give me anything to eat or drink until I am fully awake  © Copyright [employer] 2022 Information is for End User's use only and may not be sold, redistributed or otherwise used for commercial purposes  The above information is an  only  It is not intended as medical advice for individual conditions or treatments  Talk to your doctor, nurse or pharmacist before following any medical regimen to see if it is safe and effective for you

## 2023-06-17 VITALS
BODY MASS INDEX: 26 KG/M2 | TEMPERATURE: 99.9 F | SYSTOLIC BLOOD PRESSURE: 136 MMHG | DIASTOLIC BLOOD PRESSURE: 104 MMHG | RESPIRATION RATE: 18 BRPM | OXYGEN SATURATION: 97 % | WEIGHT: 181.22 LBS | HEART RATE: 96 BPM

## 2023-06-17 LAB
ANION GAP SERPL CALCULATED.3IONS-SCNC: 4 MMOL/L (ref 4–13)
BUN SERPL-MCNC: 9 MG/DL (ref 5–25)
CALCIUM SERPL-MCNC: 8.4 MG/DL (ref 8.4–10.2)
CHLORIDE SERPL-SCNC: 108 MMOL/L (ref 96–108)
CO2 SERPL-SCNC: 28 MMOL/L (ref 21–32)
CREAT SERPL-MCNC: 0.88 MG/DL (ref 0.6–1.3)
ERYTHROCYTE [DISTWIDTH] IN BLOOD BY AUTOMATED COUNT: 13.5 % (ref 11.6–15.1)
GFR SERPL CREATININE-BSD FRML MDRD: 109 ML/MIN/1.73SQ M
GLUCOSE SERPL-MCNC: 85 MG/DL (ref 65–140)
HCT VFR BLD AUTO: 43.1 % (ref 36.5–49.3)
HGB BLD-MCNC: 13.9 G/DL (ref 12–17)
MCH RBC QN AUTO: 29.3 PG (ref 26.8–34.3)
MCHC RBC AUTO-ENTMCNC: 32.3 G/DL (ref 31.4–37.4)
MCV RBC AUTO: 91 FL (ref 82–98)
PLATELET # BLD AUTO: 142 THOUSANDS/UL (ref 149–390)
PMV BLD AUTO: 11.2 FL (ref 8.9–12.7)
POTASSIUM SERPL-SCNC: 3.9 MMOL/L (ref 3.5–5.3)
RBC # BLD AUTO: 4.75 MILLION/UL (ref 3.88–5.62)
SODIUM SERPL-SCNC: 140 MMOL/L (ref 135–147)
WBC # BLD AUTO: 4.91 THOUSAND/UL (ref 4.31–10.16)

## 2023-06-17 PROCEDURE — 80048 BASIC METABOLIC PNL TOTAL CA: CPT

## 2023-06-17 PROCEDURE — 85027 COMPLETE CBC AUTOMATED: CPT

## 2023-06-17 PROCEDURE — 99239 HOSP IP/OBS DSCHRG MGMT >30: CPT | Performed by: INTERNAL MEDICINE

## 2023-06-17 RX ADMIN — BICTEGRAVIR SODIUM, EMTRICITABINE, AND TENOFOVIR ALAFENAMIDE FUMARATE 1 TABLET: 50; 200; 25 TABLET ORAL at 08:10

## 2023-06-17 RX ADMIN — ACETAMINOPHEN 975 MG: 325 TABLET, FILM COATED ORAL at 16:10

## 2023-06-17 RX ADMIN — ACETAMINOPHEN 975 MG: 325 TABLET, FILM COATED ORAL at 07:11

## 2023-06-17 RX ADMIN — SODIUM CHLORIDE, SODIUM GLUCONATE, SODIUM ACETATE, POTASSIUM CHLORIDE, MAGNESIUM CHLORIDE, SODIUM PHOSPHATE, DIBASIC, AND POTASSIUM PHOSPHATE 125 ML/HR: .53; .5; .37; .037; .03; .012; .00082 INJECTION, SOLUTION INTRAVENOUS at 08:10

## 2023-06-17 RX ADMIN — LEVETIRACETAM 1000 MG: 500 TABLET, FILM COATED ORAL at 08:09

## 2023-06-17 RX ADMIN — LEVETIRACETAM 1000 MG: 500 TABLET, FILM COATED ORAL at 17:00

## 2023-06-17 NOTE — CASE MANAGEMENT
Case Management Discharge Planning Note    Patient name [facility]  Location S /S -47 MRN [id number]  : 1985 Date 2023       Current Admission Date: 6/15/2023  Current Admission Diagnosis:Recurrent seizures [facility])   Patient Active Problem List    Diagnosis Date Noted   • COVID-19    • Metabolic acidosis    • Frequent patient in emergency department 2021   • Dehydration 2020   • Recurrent seizures ([facility] ) 2020   • Non compliance w medication regimen 2020   • Other neutropenia ([facility] ) 2020   • Tachycardia 2020   • Ankle edema, bilateral 2020   • Breakthrough seizure ([facility] ) 04/10/2020   • HIV (human immunodeficiency virus infection) ([facility] ) 04/10/2020   • Numbness in both legs 04/10/2020   • Homelessness 04/10/2020   • Tobacco dependence 04/10/2020   • Syphilis in male 04/10/2020   • Torticollis, acquired 04/10/2020      LOS (days): 0  Geometric Mean LOS (GMLOS) (days):   Days to GMLOS:     OBJECTIVE:            Current admission status: Observation   Preferred Pharmacy:   [place] [address] [address] [address]  Phone: [phone] Fax: [address] [phone]  Phone: [phone] Fax: [place] [address] ([place] [address]  Phone: [phone] Fax: [phone]    Primary Care Provider: No primary care provider on file  Primary Insurance: [facility] [address]  [place]  Secondary Insurance:     DISCHARGE DETAILS:  CM spoke with patient at the bedside  CM introduced self and role  Patient requesting transport to [address]  Patient updated CM will set up LYFT once patient is ready  Nursing updated       CM sent referral via [employer] for ANA LUISA guido at [employer]

## 2023-06-17 NOTE — PLAN OF CARE
Problem: MOBILITY - ADULT  Goal: Maintain or return to baseline ADL function  Description: INTERVENTIONS:  -  Assess patient's ability to carry out ADLs; assess patient's baseline for ADL function and identify physical deficits which impact ability to perform ADLs (bathing, care of mouth/teeth, toileting, grooming, dressing, etc )  - Assess/evaluate cause of self-care deficits   - Assess range of motion  - Assess patient's mobility; develop plan if impaired  - Assess patient's need for assistive devices and provide as appropriate  - Encourage maximum independence but intervene and supervise when necessary  - Involve family in performance of ADLs  - Assess for home care needs following discharge   - Consider OT consult to assist with ADL evaluation and planning for discharge  - Provide patient education as appropriate  Outcome: Progressing  Goal: Maintains/Returns to pre admission functional level  Description: INTERVENTIONS:  - Perform BMAT or MOVE assessment daily    - Set and communicate daily mobility goal to care team and patient/family/caregiver  - Collaborate with rehabilitation services on mobility goals if consulted  - Perform Range of Motion  times a day  - Reposition patient every  hours    - Dangle patient  times a day  - Stand patient  times a day  - Ambulate patient  times a day  - Out of bed to chair  times a day   - Out of bed for meal times a day  - Out of bed for toileting  - Record patient progress and toleration of activity level   Outcome: Progressing     Problem: Prexisting or High Potential for Compromised Skin Integrity  Goal: Skin integrity is maintained or improved  Description: INTERVENTIONS:  - Identify patients at risk for skin breakdown  - Assess and monitor skin integrity  - Assess and monitor nutrition and hydration status  - Monitor labs   - Assess for incontinence   - Turn and reposition patient  - Assist with mobility/ambulation  - Relieve pressure over bony prominences  - Avoid friction and shearing  - Provide appropriate hygiene as needed including keeping skin clean and dry  - Evaluate need for skin moisturizer/barrier cream  - Collaborate with interdisciplinary team   - Patient/family teaching  - Consider wound care consult   Outcome: Progressing

## 2023-06-21 ENCOUNTER — HOSPITAL ENCOUNTER (EMERGENCY)
Facility: HOSPITAL | Age: 38
Discharge: HOME | End: 2023-06-22
Attending: EMERGENCY MEDICINE | Admitting: EMERGENCY MEDICINE
Payer: COMMERCIAL

## 2023-06-21 VITALS
RESPIRATION RATE: 14 BRPM | DIASTOLIC BLOOD PRESSURE: 105 MMHG | TEMPERATURE: 97.9 F | HEART RATE: 101 BPM | SYSTOLIC BLOOD PRESSURE: 145 MMHG | OXYGEN SATURATION: 95 %

## 2023-06-21 DIAGNOSIS — F10.929 ALCOHOL INTOXICATION (HCC): Primary | ICD-10-CM

## 2023-06-21 PROCEDURE — 99284 EMERGENCY DEPT VISIT MOD MDM: CPT

## 2023-06-21 PROCEDURE — 99283 EMERGENCY DEPT VISIT LOW MDM: CPT | Performed by: EMERGENCY MEDICINE

## 2023-06-22 LAB
BASE EXCESS BLDA CALC-SCNC: -9 MMOL/L (ref -2–3)
CA-I BLD-SCNC: 1.16 MMOL/L (ref 1.12–1.32)
GLUCOSE SERPL-MCNC: 102 MG/DL (ref 65–140)
HCO3 BLDA-SCNC: 16.9 MMOL/L (ref 24–30)
HCT VFR BLD CALC: 47 % (ref 36.5–49.3)
HGB BLDA-MCNC: 16 G/DL (ref 12–17)
PCO2 BLD: 18 MMOL/L (ref 21–32)
PCO2 BLD: 37.6 MM HG (ref 42–50)
PH BLD: 7.26 [PH] (ref 7.3–7.4)
PO2 BLD: 35 MM HG (ref 35–45)
POTASSIUM BLD-SCNC: 3.4 MMOL/L (ref 3.5–5.3)
SAO2 % BLD FROM PO2: 60 % (ref 60–85)
SODIUM BLD-SCNC: 140 MMOL/L (ref 136–145)
SPECIMEN SOURCE: ABNORMAL

## 2023-06-22 NOTE — ED PROVIDER NOTES
History  Chief Complaint   Patient presents with   • Medical Problem     Brought in by EMS, pt found by bystander after having a few [address] ice teas at a local bar, states "I feel weird"     60-year-old male well-known to this ED history of seizure disorder, HIV, homelessness, presents via EMS for intoxication. Patient was sitting outside a grocery store and bystanders called 911. Patient endorses being at a bar tonight and drinking [place] ice tea. Denies additional medical complaints. Prior to Admission Medications   Prescriptions Last Dose Informant Patient Reported? Taking?    Cholecalciferol 25 MCG (1000 UT) tablet   Yes No   Sig: Take 1,000 Units by mouth   Patient not taking: Reported on 6/15/2023   acetaminophen (TYLENOL) 500 mg tablet   No No   Sig: Take 1 tablet (500 mg total) by mouth every 6 (six) hours as needed for mild pain or moderate pain   bictegravir-emtricitab-tenofovir alafenamide (Biktarvy) -25 MG tablet   No No   Sig: Take 1 tablet by mouth daily   cyclobenzaprine (FLEXERIL) 10 mg tablet   No No   Sig: Take 1 tablet (10 mg total) by mouth 2 (two) times a day as needed for muscle spasms   Patient not taking: Reported on 6/15/2023   cyclobenzaprine (FLEXERIL) 10 mg tablet   No No   Sig: Take 1 tablet (10 mg total) by mouth 2 (two) times a day as needed for muscle spasms   Patient not taking: Reported on 6/15/2023   levETIRAcetam (Keppra) 1000 MG tablet   No No   Sig: Take 1 tablet (1,000 mg total) by mouth 2 (two) times a day   ondansetron (Zofran ODT) 4 mg disintegrating tablet   No No   Sig: Take 1 tablet (4 mg total) by mouth every 6 (six) hours as needed for nausea or vomiting   Patient not taking: Reported on 9/17/2022      Facility-Administered Medications: None       Past Medical History:   Diagnosis Date   • HIV (human immunodeficiency virus infection) ([address])    • Seizures ([address])    • Smoker    • Syphilis        Past Surgical History:   Procedure Laterality Date • HERNIA REPAIR         History reviewed. No pertinent family history. I have reviewed and agree with the history as documented. E-Cigarette/Vaping   • E-Cigarette Use Current Every Day User      E-Cigarette/Vaping Substances   • Nicotine No    • THC No    • CBD No    • Flavoring Yes    • Other No    • Unknown No      Social History     Tobacco Use   • Smoking status: Every Day     Packs/day: 2.00     Years: 8.00     Total pack years: 16.00     Types: Cigarettes   • Smokeless tobacco: Never   • Tobacco comments:     "At least 2 packs a day" of cigarettes. Vaping Use   • Vaping Use: Every day   • Substances: Flavoring   Substance Use Topics   • Alcohol use: Yes     Alcohol/week: 2.0 standard drinks of alcohol     Types: 1 Glasses of wine, 1 Cans of beer per week     Comment: socially   • Drug use: Yes     Frequency: 1.0 times per week     Types: Marijuana       Review of Systems   Constitutional: Negative for fever. Physical Exam  Physical Exam  Constitutional:       General: He is not in acute distress. Comments: Somnolent but arouses to voice   HENT:      Head: Normocephalic and atraumatic. Mouth/Throat:      Mouth: Mucous membranes are moist.   Eyes:      Conjunctiva/sclera: Conjunctivae normal.      Pupils: Pupils are equal, round, and reactive to light. Cardiovascular:      Rate and Rhythm: Normal rate. Pulmonary:      Effort: Pulmonary effort is normal.   Abdominal:      Palpations: Abdomen is soft. Musculoskeletal:         General: Normal range of motion. Cervical back: Normal range of motion. Skin:     General: Skin is warm and dry. Neurological:      General: No focal deficit present. Comments:  Answering questions appropriately         Vital Signs  ED Triage Vitals   Temperature Pulse Respirations Blood Pressure SpO2   06/21/23 2329 06/21/23 2328 06/21/23 2328 06/21/23 2328 06/21/23 2328   97.9 °F (36.6 °C) 101 14 (!) 145/105 95 %      Temp Source Heart Rate Source Patient Position - Orthostatic VS BP Location FiO2 (%)   06/21/23 2329 -- -- -- --   Oral          Pain Score       --                  Vitals:    06/21/23 2328   BP: (!) 145/105   Pulse: 101         Visual Acuity      ED Medications  Medications - No data to display    Diagnostic Studies  Results Reviewed     None                 No orders to display              Procedures  Procedures         ED Course       24-year-old male history of seizure disorder well-known to this ED presenting with alcohol intoxication. Patient has no other complaints. He has normal vitals on arrival and arouses easily to voice. No evidence of acute trauma on physical exam.  Will observe for clinical sobriety prior to discharge. On reevaluation in the morning patient is clinically sober and appears to be in his usual state of health. Stable for discharge. MDM    Disposition  Final diagnoses:   Alcohol intoxication ([address])     Time reflects when diagnosis was documented in both MDM as applicable and the Disposition within this note     Time User Action Codes Description Comment    6/22/2023  5:30 AM Neva Timmons Add [F10.929] Alcohol intoxication [facility])       ED Disposition     ED Disposition   Discharge    Condition   Stable    Date/Time   Thu Jun 22, 2023  5:30 AM    Comment   Britt Huggins discharge to home/self care. Follow-up Information    None         Patient's Medications   Discharge Prescriptions    No medications on file       No discharge procedures on file.     PDMP Review       Value Time User    PDMP Reviewed  Yes 6/17/2023 11:20 AM Becca Chen MD          ED Provider  Electronically Signed by           Stepahny Gregg MD  06/22/23 5883

## 2023-06-22 NOTE — ED NOTES
Discharge instructions reviewed with pt. Pt verbalized understanding. And has no further questions at this time. Pt ambulatory off unit with steady gait.       Brandi Aggarwal RN  06/22/23 0515

## 2023-06-24 ENCOUNTER — HOSPITAL ENCOUNTER (EMERGENCY)
Facility: HOSPITAL | Age: 38
Discharge: HOME | End: 2023-06-24
Attending: EMERGENCY MEDICINE
Payer: MEDICARE

## 2023-06-24 VITALS
BODY MASS INDEX: 28.25 KG/M2 | WEIGHT: 180 LBS | SYSTOLIC BLOOD PRESSURE: 155 MMHG | RESPIRATION RATE: 20 BRPM | OXYGEN SATURATION: 99 % | TEMPERATURE: 97.8 F | HEIGHT: 67 IN | DIASTOLIC BLOOD PRESSURE: 98 MMHG | HEART RATE: 110 BPM

## 2023-06-24 DIAGNOSIS — Z71.1 FEARED COMPLAINT WITHOUT DIAGNOSIS: ICD-10-CM

## 2023-06-24 DIAGNOSIS — G89.29 CHRONIC NECK PAIN: Primary | ICD-10-CM

## 2023-06-24 DIAGNOSIS — M54.2 CHRONIC NECK PAIN: Primary | ICD-10-CM

## 2023-06-24 PROCEDURE — 99283 EMERGENCY DEPT VISIT LOW MDM: CPT

## 2023-06-24 PROCEDURE — 99284 EMERGENCY DEPT VISIT MOD MDM: CPT | Performed by: EMERGENCY MEDICINE

## 2023-06-24 RX ORDER — KETOROLAC TROMETHAMINE 30 MG/ML
30 INJECTION, SOLUTION INTRAMUSCULAR; INTRAVENOUS ONCE
Status: COMPLETED | OUTPATIENT
Start: 2023-06-24 | End: 2023-06-24

## 2023-06-24 RX ORDER — LEVETIRACETAM 500 MG/1
1000 TABLET ORAL ONCE
Status: COMPLETED | OUTPATIENT
Start: 2023-06-24 | End: 2023-06-24

## 2023-06-24 RX ADMIN — KETOROLAC TROMETHAMINE 30 MG: 30 INJECTION, SOLUTION INTRAMUSCULAR at 23:14

## 2023-06-24 RX ADMIN — LEVETIRACETAM 1000 MG: 500 TABLET, FILM COATED ORAL at 23:14

## 2023-06-25 NOTE — ED PROVIDER NOTES
History  Chief Complaint   Patient presents with   • Headache     Brought in by EMS because he feels like he is going to have a seizure, history of same     80-year-old male, well-known to the emergency department, presents after he was in a [employer] and told them that he felt like he was going to have a seizure, he did not have a seizure, he has not had any fever, nausea, vomiting, chest pain, cough, shortness of breath, dizziness, abdominal pain, constipation or diarrhea, dysuria, hematuria, he reports that he did fall 2 days ago and has some mild left hip pain but he has been walking on it with a normal gait, and he reports that he has been taking all of his medications including his HIV medications and his seizure medications as prescribed  The patient reports that he has not drank today, he did use marijuana today, and has some chronic neck pain for which she is requesting Toradol  Prior to Admission Medications   Prescriptions Last Dose Informant Patient Reported? Taking?    Cholecalciferol 25 MCG (1000 UT) tablet   Yes No   Sig: Take 1,000 Units by mouth   Patient not taking: Reported on 6/15/2023   acetaminophen (TYLENOL) 500 mg tablet   No No   Sig: Take 1 tablet (500 mg total) by mouth every 6 (six) hours as needed for mild pain or moderate pain   bictegravir-emtricitab-tenofovir alafenamide (Biktarvy) -25 MG tablet   No No   Sig: Take 1 tablet by mouth daily   cyclobenzaprine (FLEXERIL) 10 mg tablet   No No   Sig: Take 1 tablet (10 mg total) by mouth 2 (two) times a day as needed for muscle spasms   Patient not taking: Reported on 6/15/2023   cyclobenzaprine (FLEXERIL) 10 mg tablet   No No   Sig: Take 1 tablet (10 mg total) by mouth 2 (two) times a day as needed for muscle spasms   Patient not taking: Reported on 6/15/2023   levETIRAcetam (Keppra) 1000 MG tablet   No No   Sig: Take 1 tablet (1,000 mg total) by mouth 2 (two) times a day   ondansetron (Zofran ODT) 4 mg disintegrating tablet "  No No   Sig: Take 1 tablet (4 mg total) by mouth every 6 (six) hours as needed for nausea or vomiting   Patient not taking: Reported on 9/17/2022      Facility-Administered Medications: None       Past Medical History:   Diagnosis Date   • HIV (human immunodeficiency virus infection) ([facility] )    • Seizures ([facility] )    • Smoker    • Syphilis        Past Surgical History:   Procedure Laterality Date   • HERNIA REPAIR         History reviewed  No pertinent family history  I have reviewed and agree with the history as documented  E-Cigarette/Vaping   • E-Cigarette Use Current Every Day User      E-Cigarette/Vaping Substances   • Nicotine No    • THC No    • CBD No    • Flavoring Yes    • Other No    • Unknown No      Social History     Tobacco Use   • Smoking status: Every Day     Packs/day: 2 00     Years: 8 00     Total pack years: 16 00     Types: Cigarettes   • Smokeless tobacco: Never   • Tobacco comments: \"At least 2 packs a day\" of cigarettes  Vaping Use   • Vaping Use: Every day   • Substances: Flavoring   Substance Use Topics   • Alcohol use: Yes     Alcohol/week: 2 0 standard drinks of alcohol     Types: 1 Glasses of wine, 1 Cans of beer per week     Comment: socially   • Drug use: Yes     Frequency: 1 0 times per week     Types: Marijuana       Review of Systems   Constitutional: Negative for fever  HENT: Negative for congestion  Eyes: Negative for visual disturbance  Respiratory: Negative for cough and shortness of breath  Cardiovascular: Negative for chest pain  Gastrointestinal: Negative for abdominal pain, diarrhea and vomiting  Endocrine: Negative for polyuria  Genitourinary: Negative for dysuria and hematuria  Musculoskeletal: Positive for neck pain  Negative for myalgias  Neurological: Negative for dizziness and headaches  Physical Exam  Physical Exam  Vitals and nursing note reviewed  Constitutional:       General: He is not in acute distress       Appearance: Normal " appearance  HENT:      Head: Normocephalic and atraumatic  Right Ear: External ear normal       Left Ear: External ear normal       Mouth/Throat:      Mouth: Mucous membranes are moist       Pharynx: Oropharynx is clear  Eyes:      Conjunctiva/sclera: Conjunctivae normal       Pupils: Pupils are equal, round, and reactive to light  Cardiovascular:      Rate and Rhythm: Tachycardia present  Pulmonary:      Effort: Pulmonary effort is normal  No respiratory distress  Abdominal:      General: Abdomen is flat  There is no distension  Musculoskeletal:         General: No deformity  Normal range of motion  Cervical back: Normal range of motion  Skin:     General: Skin is warm and dry  Neurological:      General: No focal deficit present  Mental Status: He is alert and oriented to person, place, and time  Psychiatric:         Mood and Affect: Mood normal          Behavior: Behavior normal          Vital Signs  ED Triage Vitals [06/24/23 2247]   Temperature Pulse Respirations Blood Pressure SpO2   97 8 °F (36 6 °C) (!) 110 20 155/98 99 %      Temp Source Heart Rate Source Patient Position - Orthostatic VS BP Location FiO2 (%)   Oral Monitor Sitting Left arm --      Pain Score       --           Vitals:    06/24/23 2247   BP: 155/98   Pulse: (!) 110   Patient Position - Orthostatic VS: Sitting         Visual Acuity      ED Medications  Medications - No data to display    Diagnostic Studies  Results Reviewed     None                 No orders to display              Procedures  Procedures         ED Course         Medical Decision Making  The patient reports saying he feels like he was going to have a seizure, has difficulty describing what the feeling is, but denies any other complaints aside from his chronic neck pain  He is well-known to the emergency department and frequently comes in saying he feels like he might have a seizure    He will be given an additional dose of his Keppra and will be given a dose of IM Toradol for his neck pain, and he will be discharged home  He had blood work less than a week ago which was unremarkable  Risk  Prescription drug management  Disposition  Final diagnoses:   None     ED Disposition     None      Follow-up Information    None         Patient's Medications   Discharge Prescriptions    No medications on file       No discharge procedures on file      PDMP Review       Value Time User    PDMP Reviewed  Yes 6/17/2023 11:20 AM Rocio Grigsby MD          ED Provider  Electronically Signed by MG tablet Take 1 tablet by mouth daily, Starting Tue 6/13/2023, Until Thu 7/13/2023, Normal      levETIRAcetam (Keppra) 1000 MG tablet Take 1 tablet (1,000 mg total) by mouth 2 (two) times a day, Starting Tue 6/13/2023, Until Thu 7/13/2023, Normal      acetaminophen (TYLENOL) 500 mg tablet Take 1 tablet (500 mg total) by mouth every 6 (six) hours as needed for mild pain or moderate pain, Starting Fri 9/30/2022, Print      Cholecalciferol 25 MCG (1000 UT) tablet Take 1,000 Units by mouth, Historical Med      !! cyclobenzaprine (FLEXERIL) 10 mg tablet Take 1 tablet (10 mg total) by mouth 2 (two) times a day as needed for muscle spasms, Starting Sat 3/26/2022, Normal      !! cyclobenzaprine (FLEXERIL) 10 mg tablet Take 1 tablet (10 mg total) by mouth 2 (two) times a day as needed for muscle spasms, Starting Tue 10/4/2022, Normal      ondansetron (Zofran ODT) 4 mg disintegrating tablet Take 1 tablet (4 mg total) by mouth every 6 (six) hours as needed for nausea or vomiting, Starting Wed 8/31/2022, Normal       !! - Potential duplicate medications found. Please discuss with provider. No discharge procedures on file.     PDMP Review       Value Time User    PDMP Reviewed  Yes 6/17/2023 11:20 AM Agnieszka Mares MD          ED Provider  Electronically Signed by           Collins Phelps MD  07/05/23 9581

## 2023-07-01 ENCOUNTER — HOSPITAL ENCOUNTER (EMERGENCY)
Facility: HOSPITAL | Age: 38
Discharge: HOME | End: 2023-07-01
Attending: EMERGENCY MEDICINE
Payer: MEDICARE

## 2023-07-01 VITALS
OXYGEN SATURATION: 99 % | TEMPERATURE: 98.1 F | RESPIRATION RATE: 20 BRPM | HEART RATE: 99 BPM | DIASTOLIC BLOOD PRESSURE: 91 MMHG | SYSTOLIC BLOOD PRESSURE: 142 MMHG

## 2023-07-01 DIAGNOSIS — R29.0 CARPOPEDAL SPASM: ICD-10-CM

## 2023-07-01 DIAGNOSIS — R60.0 PEDAL EDEMA: Primary | ICD-10-CM

## 2023-07-01 LAB
ALBUMIN SERPL BCP-MCNC: 4 G/DL (ref 3.5–5)
ALP SERPL-CCNC: 72 U/L (ref 34–104)
ALT SERPL W P-5'-P-CCNC: 23 U/L (ref 7–52)
ANION GAP SERPL CALCULATED.3IONS-SCNC: 4 MMOL/L
AST SERPL W P-5'-P-CCNC: 24 U/L (ref 13–39)
BASOPHILS # BLD AUTO: 0.02 THOUSANDS/ÂΜL (ref 0–0.1)
BASOPHILS NFR BLD AUTO: 0 % (ref 0–1)
BILIRUB SERPL-MCNC: 0.36 MG/DL (ref 0.2–1)
BUN SERPL-MCNC: 11 MG/DL (ref 5–25)
CA-I BLD-SCNC: 1.13 MMOL/L (ref 1.12–1.32)
CALCIUM SERPL-MCNC: 8.8 MG/DL (ref 8.4–10.2)
CHLORIDE SERPL-SCNC: 105 MMOL/L (ref 96–108)
CO2 SERPL-SCNC: 29 MMOL/L (ref 21–32)
CREAT SERPL-MCNC: 1 MG/DL (ref 0.6–1.3)
EOSINOPHIL # BLD AUTO: 0.3 THOUSAND/ÂΜL (ref 0–0.61)
EOSINOPHIL NFR BLD AUTO: 5 % (ref 0–6)
ERYTHROCYTE [DISTWIDTH] IN BLOOD BY AUTOMATED COUNT: 13.6 % (ref 11.6–15.1)
GFR SERPL CREATININE-BSD FRML MDRD: 95 ML/MIN/1.73SQ M
GLUCOSE SERPL-MCNC: 96 MG/DL (ref 65–140)
HCT VFR BLD AUTO: 42.2 % (ref 36.5–49.3)
HGB BLD-MCNC: 14.1 G/DL (ref 12–17)
IMM GRANULOCYTES # BLD AUTO: 0.02 THOUSAND/UL (ref 0–0.2)
IMM GRANULOCYTES NFR BLD AUTO: 0 % (ref 0–2)
LYMPHOCYTES # BLD AUTO: 2.44 THOUSANDS/ÂΜL (ref 0.6–4.47)
LYMPHOCYTES NFR BLD AUTO: 38 % (ref 14–44)
MAGNESIUM SERPL-MCNC: 2 MG/DL (ref 1.9–2.7)
MCH RBC QN AUTO: 29.6 PG (ref 26.8–34.3)
MCHC RBC AUTO-ENTMCNC: 33.4 G/DL (ref 31.4–37.4)
MCV RBC AUTO: 89 FL (ref 82–98)
MONOCYTES # BLD AUTO: 0.59 THOUSAND/ÂΜL (ref 0.17–1.22)
MONOCYTES NFR BLD AUTO: 9 % (ref 4–12)
NEUTROPHILS # BLD AUTO: 3.03 THOUSANDS/ÂΜL (ref 1.85–7.62)
NEUTS SEG NFR BLD AUTO: 48 % (ref 43–75)
NRBC BLD AUTO-RTO: 0 /100 WBCS
PLATELET # BLD AUTO: 172 THOUSANDS/UL (ref 149–390)
PMV BLD AUTO: 10 FL (ref 8.9–12.7)
POTASSIUM SERPL-SCNC: 3.5 MMOL/L (ref 3.5–5.3)
PROT SERPL-MCNC: 7.4 G/DL (ref 6.4–8.4)
RBC # BLD AUTO: 4.76 MILLION/UL (ref 3.88–5.62)
SODIUM SERPL-SCNC: 138 MMOL/L (ref 135–147)
WBC # BLD AUTO: 6.4 THOUSAND/UL (ref 4.31–10.16)

## 2023-07-01 PROCEDURE — 85025 COMPLETE CBC W/AUTO DIFF WBC: CPT | Performed by: EMERGENCY MEDICINE

## 2023-07-01 PROCEDURE — 82330 ASSAY OF CALCIUM: CPT | Performed by: EMERGENCY MEDICINE

## 2023-07-01 PROCEDURE — 36415 COLL VENOUS BLD VENIPUNCTURE: CPT | Performed by: EMERGENCY MEDICINE

## 2023-07-01 PROCEDURE — 99283 EMERGENCY DEPT VISIT LOW MDM: CPT

## 2023-07-01 PROCEDURE — 80053 COMPREHEN METABOLIC PANEL: CPT | Performed by: EMERGENCY MEDICINE

## 2023-07-01 PROCEDURE — 83735 ASSAY OF MAGNESIUM: CPT | Performed by: EMERGENCY MEDICINE

## 2023-07-01 NOTE — DISCHARGE INSTRUCTIONS
Elevate your feet as possible. Apply the Ace bandages as needed to compress the fluid out of your feet. Come back for new or worsening symptoms such as leg swelling shortness of breath.

## 2023-07-01 NOTE — ED PROVIDER NOTES
History  Chief Complaint   Patient presents with   • Foot Pain     Patient arrives to the Ed with complain of foot pain after passer calls ems due to inability to understand him per ems. 59-year-old male well-known to this emergency department presents to the emergency department with bilateral pedal edema, bilateral foot pain. No trauma. History of HIV, syphilis, smoker, seizures. Ankle Swelling  Location:  Foot  Foot location:  L foot and R foot  Pain details:     Quality:  Aching    Radiates to:  Does not radiate    Severity:  Moderate    Onset quality:  Gradual    Timing:  Constant    Progression:  Unchanged  Chronicity:  Chronic      Prior to Admission Medications   Prescriptions Last Dose Informant Patient Reported? Taking?    Cholecalciferol 25 MCG (1000 UT) tablet   Yes No   Sig: Take 1,000 Units by mouth   Patient not taking: Reported on 6/15/2023   acetaminophen (TYLENOL) 500 mg tablet   No No   Sig: Take 1 tablet (500 mg total) by mouth every 6 (six) hours as needed for mild pain or moderate pain   bictegravir-emtricitab-tenofovir alafenamide (Biktarvy) -25 MG tablet   No No   Sig: Take 1 tablet by mouth daily   cyclobenzaprine (FLEXERIL) 10 mg tablet   No No   Sig: Take 1 tablet (10 mg total) by mouth 2 (two) times a day as needed for muscle spasms   Patient not taking: Reported on 6/15/2023   cyclobenzaprine (FLEXERIL) 10 mg tablet   No No   Sig: Take 1 tablet (10 mg total) by mouth 2 (two) times a day as needed for muscle spasms   Patient not taking: Reported on 6/15/2023   levETIRAcetam (Keppra) 1000 MG tablet   No No   Sig: Take 1 tablet (1,000 mg total) by mouth 2 (two) times a day   ondansetron (Zofran ODT) 4 mg disintegrating tablet   No No   Sig: Take 1 tablet (4 mg total) by mouth every 6 (six) hours as needed for nausea or vomiting   Patient not taking: Reported on 9/17/2022      Facility-Administered Medications: None       Past Medical History:   Diagnosis Date   • HIV (human immunodeficiency virus infection) ([address])    • Seizures ([address])    • Smoker    • Syphilis        Past Surgical History:   Procedure Laterality Date   • HERNIA REPAIR         History reviewed. No pertinent family history. I have reviewed and agree with the history as documented. E-Cigarette/Vaping   • E-Cigarette Use Current Every Day User      E-Cigarette/Vaping Substances   • Nicotine No    • THC No    • CBD No    • Flavoring Yes    • Other No    • Unknown No      Social History     Tobacco Use   • Smoking status: Every Day     Packs/day: 2.00     Years: 8.00     Total pack years: 16.00     Types: Cigarettes   • Smokeless tobacco: Never   • Tobacco comments:     "At least 2 packs a day" of cigarettes. Vaping Use   • Vaping Use: Every day   • Substances: Flavoring   Substance Use Topics   • Alcohol use: Yes     Alcohol/week: 2.0 standard drinks of alcohol     Types: 1 Glasses of wine, 1 Cans of beer per week     Comment: socially   • Drug use: Yes     Frequency: 1.0 times per week     Types: Marijuana       Review of Systems   Musculoskeletal:        Foot pain   All other systems reviewed and are negative. Physical Exam  Physical Exam  Vitals and nursing note reviewed. Constitutional:       General: He is not in acute distress. Appearance: He is well-developed. He is not diaphoretic. HENT:      Head: Normocephalic and atraumatic. Right Ear: External ear normal.      Left Ear: External ear normal.   Eyes:      Conjunctiva/sclera: Conjunctivae normal.   Neck:      Trachea: No tracheal deviation. Cardiovascular:      Rate and Rhythm: Normal rate and regular rhythm. Heart sounds: Normal heart sounds. No murmur heard. Pulmonary:      Effort: No respiratory distress. Breath sounds: Normal breath sounds. No stridor. No wheezing or rales. Abdominal:      General: Bowel sounds are normal. There is no distension. Palpations: Abdomen is soft. There is no mass.       Tenderness: There is no abdominal tenderness. There is no guarding or rebound. Musculoskeletal:         General: No tenderness or deformity. Comments: Pedal edema. Does not go into the calves. No point tenderness in the feet. Skin:     General: Skin is dry. Findings: No rash. Neurological:      Motor: No abnormal muscle tone. Coordination: Coordination normal.      Comments:  decreased sensation on the lateral dorsum of the right foot. Otherwise normal sensation throughout the lower extremities bilaterally. Normal muscular strength in lower extremities bilaterally. Psychiatric:         Behavior: Behavior normal.         Thought Content:  Thought content normal.         Judgment: Judgment normal.         Vital Signs  ED Triage Vitals [07/01/23 0206]   Temperature Pulse Respirations Blood Pressure SpO2   98.1 °F (36.7 °C) 99 20 142/91 99 %      Temp Source Heart Rate Source Patient Position - Orthostatic VS BP Location FiO2 (%)   Oral Monitor Lying -- --      Pain Score       --           Vitals:    07/01/23 0206   BP: 142/91   Pulse: 99   Patient Position - Orthostatic VS: Lying         Visual Acuity      ED Medications  Medications - No data to display    Diagnostic Studies  Results Reviewed     Procedure Component Value Units Date/Time    Comprehensive metabolic panel [[id number]] Collected: 07/01/23 0309    Lab Status: Final result Specimen: Blood from Arm, Left Updated: 07/01/23 0336     Sodium 138 mmol/L      Potassium 3.5 mmol/L      Chloride 105 mmol/L      CO2 29 mmol/L      ANION GAP 4 mmol/L      BUN 11 mg/dL      Creatinine 1.00 mg/dL      Glucose 96 mg/dL      Calcium 8.8 mg/dL      AST 24 U/L      ALT 23 U/L      Alkaline Phosphatase 72 U/L      Total Protein 7.4 g/dL      Albumin 4.0 g/dL      Total Bilirubin 0.36 mg/dL      eGFR 95 ml/min/1.73sq m     Narrative:      [place] guidelines for Chronic Kidney Disease (CKD):   •  Stage 1 with normal or high GFR (GFR > 90 mL/min/1.73 square meters)  •  Stage 2 Mild CKD (GFR = 60-89 mL/min/1.73 square meters)  •  Stage 3A Moderate CKD (GFR = 45-59 mL/min/1.73 square meters)  •  Stage 3B Moderate CKD (GFR = 30-44 mL/min/1.73 square meters)  •  Stage 4 Severe CKD (GFR = 15-29 mL/min/1.73 square meters)  •  Stage 5 End Stage CKD (GFR <15 mL/min/1.73 square meters)  Note: GFR calculation is accurate only with a steady state creatinine    Magnesium [[id number]]  (Normal) Collected: 07/01/23 0309    Lab Status: Final result Specimen: Blood from Arm, Left Updated: 07/01/23 0336     Magnesium 2.0 mg/dL     Calcium, ionized [[id number]]  (Normal) Collected: 07/01/23 0309    Lab Status: Final result Specimen: Blood from Arm, Left Updated: 07/01/23 0319     Calcium, Ionized 1.13 mmol/L     CBC and differential [[id number]] Collected: 07/01/23 0309    Lab Status: Final result Specimen: Blood from Arm, Left Updated: 07/01/23 0318     WBC 6.40 Thousand/uL      RBC 4.76 Million/uL      Hemoglobin 14.1 g/dL      Hematocrit 42.2 %      MCV 89 fL      MCH 29.6 pg      MCHC 33.4 g/dL      RDW 13.6 %      MPV 10.0 fL      Platelets 653 Thousands/uL      nRBC 0 /100 WBCs      Neutrophils Relative 48 %      Immat GRANS % 0 %      Lymphocytes Relative 38 %      Monocytes Relative 9 %      Eosinophils Relative 5 %      Basophils Relative 0 %      Neutrophils Absolute 3.03 Thousands/µL      Immature Grans Absolute 0.02 Thousand/uL      Lymphocytes Absolute 2.44 Thousands/µL      Monocytes Absolute 0.59 Thousand/µL      Eosinophils Absolute 0.30 Thousand/µL      Basophils Absolute 0.02 Thousands/µL                  No orders to display              Procedures  Procedures         ED Course                                             Medical Decision Making  Patient with pedal edema. When patient's blood pressure was being checked he started to have carpopedal spasms.   We will evaluate for hypomagnesemia, hypocalcemia, electrolyte derangement, hypoalbuminemia. Work-up without significant findings. Will discharge. Pedal edema: chronic illness or injury  Amount and/or Complexity of Data Reviewed  Labs: ordered. Disposition  Final diagnoses:   Pedal edema   Carpopedal spasm     Time reflects when diagnosis was documented in both MDM as applicable and the Disposition within this note     Time User Action Codes Description Comment    7/1/2023  3:56 AM Arvis Louder Add [R60.0] Pedal edema     7/1/2023  4:48 AM Arvis Louder Add [R29.0] Carpopedal spasm       ED Disposition     ED Disposition   Discharge    Condition   Stable    Date/Time   Sat Jul 1, 2023  3:56 AM    Comment   Olga Mcgee discharge to home/self care.                Follow-up Information     Follow up With Specialties Details Why Contact Info Additional [address] [facility],[address] Emergency Department Emergency Medicine  If symptoms worsen [address] [place] [phone] [id number] [facility] Emergency Department, [address] [place] [address], [address] [facility]          Discharge Medication List as of 7/1/2023  3:56 AM      CONTINUE these medications which have NOT CHANGED    Details   acetaminophen (TYLENOL) 500 mg tablet Take 1 tablet (500 mg total) by mouth every 6 (six) hours as needed for mild pain or moderate pain, Starting Fri 9/30/2022, Print      bictegravir-emtricitab-tenofovir alafenamide (Biktarvy) -25 MG tablet Take 1 tablet by mouth daily, Starting Tue 6/13/2023, Until Thu 7/13/2023, Normal      Cholecalciferol 25 MCG (1000 UT) tablet Take 1,000 Units by mouth, Historical Med      !! cyclobenzaprine (FLEXERIL) 10 mg tablet Take 1 tablet (10 mg total) by mouth 2 (two) times a day as needed for muscle spasms, Starting Sat 3/26/2022, Normal      !! cyclobenzaprine (FLEXERIL) 10 mg tablet Take 1 tablet (10 mg total) by mouth 2 (two) times a day as needed for muscle spasms, Starting Tue 10/4/2022, Normal levETIRAcetam (Keppra) 1000 MG tablet Take 1 tablet (1,000 mg total) by mouth 2 (two) times a day, Starting Tue 6/13/2023, Until Thu 7/13/2023, Normal      ondansetron (Zofran ODT) 4 mg disintegrating tablet Take 1 tablet (4 mg total) by mouth every 6 (six) hours as needed for nausea or vomiting, Starting Wed 8/31/2022, Normal       !! - Potential duplicate medications found. Please discuss with provider. No discharge procedures on file.     PDMP Review       Value Time User    PDMP Reviewed  Yes 6/17/2023 11:20 AM Pooja Carter MD          ED Provider  Electronically Signed by           Delia [place] DO  07/01/23 0441

## 2023-07-04 ENCOUNTER — HOSPITAL ENCOUNTER (EMERGENCY)
Facility: HOSPITAL | Age: 38
Discharge: HOME | End: 2023-07-04
Attending: EMERGENCY MEDICINE
Payer: MEDICARE

## 2023-07-04 VITALS
RESPIRATION RATE: 17 BRPM | SYSTOLIC BLOOD PRESSURE: 127 MMHG | DIASTOLIC BLOOD PRESSURE: 74 MMHG | HEART RATE: 103 BPM | TEMPERATURE: 98.1 F | OXYGEN SATURATION: 99 %

## 2023-07-04 DIAGNOSIS — Z59.00 HOMELESSNESS: Primary | ICD-10-CM

## 2023-07-04 DIAGNOSIS — R60.0 PEDAL EDEMA: ICD-10-CM

## 2023-07-04 PROCEDURE — 99282 EMERGENCY DEPT VISIT SF MDM: CPT

## 2023-07-04 PROCEDURE — 99284 EMERGENCY DEPT VISIT MOD MDM: CPT | Performed by: EMERGENCY MEDICINE

## 2023-07-04 SDOH — ECONOMIC STABILITY - HOUSING INSECURITY: HOMELESSNESS UNSPECIFIED: Z59.00

## 2023-07-04 NOTE — ED PROVIDER NOTES
History  Chief Complaint   Patient presents with   • Foot Swelling     Pt c/o swollen feet and unable to feel them started 2-3 hrs ago. No meds PTA     40-year-old male with history of HIV, seizures and homelessness presents to the emergency department for evaluation of feet swelling. The patient reports symptoms started 2 to 3 hours ago. Reports associated decreased sensation. The patient states that he has only been seen once in the past for similar symptoms. Per chart review the patient has been seen numerous times in the past for both lower leg swelling and numbness. The patient has had extensive work-ups in the past including imaging and blood work with no significant findings. The patient was just seen here 3 days ago for evaluation of similar symptoms. Work-up done at that time was unremarkable. I discussed this with the patient at which point he reported that he just needed a place to sleep for the night because he is homeless. The patient reports that he has been compliant with his medications including Keppra and Biktarvy. Denies fevers, chills, nausea, vomiting, diarrhea, chest pain and shortness of breath. No new trauma or falls since evaluation 3 days ago. Prior to Admission Medications   Prescriptions Last Dose Informant Patient Reported? Taking?    Cholecalciferol 25 MCG (1000 UT) tablet Not Taking  Yes No   Sig: Take 1,000 Units by mouth   Patient not taking: Reported on 6/15/2023   acetaminophen (TYLENOL) 500 mg tablet Not Taking  No No   Sig: Take 1 tablet (500 mg total) by mouth every 6 (six) hours as needed for mild pain or moderate pain   Patient not taking: Reported on 7/4/2023   bictegravir-emtricitab-tenofovir alafenamide (Biktarvy) -25 MG tablet 7/3/2023  No Yes   Sig: Take 1 tablet by mouth daily   cyclobenzaprine (FLEXERIL) 10 mg tablet Not Taking  No No   Sig: Take 1 tablet (10 mg total) by mouth 2 (two) times a day as needed for muscle spasms   Patient not taking: Reported on 6/15/2023   cyclobenzaprine (FLEXERIL) 10 mg tablet Not Taking  No No   Sig: Take 1 tablet (10 mg total) by mouth 2 (two) times a day as needed for muscle spasms   Patient not taking: Reported on 6/15/2023   levETIRAcetam (Keppra) 1000 MG tablet 7/3/2023  No Yes   Sig: Take 1 tablet (1,000 mg total) by mouth 2 (two) times a day   ondansetron (Zofran ODT) 4 mg disintegrating tablet Not Taking  No No   Sig: Take 1 tablet (4 mg total) by mouth every 6 (six) hours as needed for nausea or vomiting   Patient not taking: Reported on 9/17/2022      Facility-Administered Medications: None       Past Medical History:   Diagnosis Date   • HIV (human immunodeficiency virus infection) ([address])    • Seizures ([address])    • Smoker    • Syphilis        Past Surgical History:   Procedure Laterality Date   • HERNIA REPAIR         History reviewed. No pertinent family history. I have reviewed and agree with the history as documented. E-Cigarette/Vaping   • E-Cigarette Use Current Every Day User      E-Cigarette/Vaping Substances   • Nicotine No    • THC No    • CBD No    • Flavoring Yes    • Other No    • Unknown No      Social History     Tobacco Use   • Smoking status: Every Day     Packs/day: 2.00     Years: 8.00     Total pack years: 16.00     Types: Cigarettes   • Smokeless tobacco: Never   • Tobacco comments:     "At least 2 packs a day" of cigarettes. Vaping Use   • Vaping Use: Every day   • Substances: Flavoring   Substance Use Topics   • Alcohol use: Yes     Alcohol/week: 2.0 standard drinks of alcohol     Types: 1 Glasses of wine, 1 Cans of beer per week     Comment: socially   • Drug use: Yes     Frequency: 1.0 times per week     Types: Marijuana       Review of Systems   Constitutional: Negative for chills and fever. HENT: Negative for ear pain and sore throat. Eyes: Negative for pain and visual disturbance. Respiratory: Negative for cough and shortness of breath.     Cardiovascular: Positive for leg swelling (chronic). Negative for chest pain and palpitations. Gastrointestinal: Negative for abdominal pain and vomiting. Genitourinary: Negative for dysuria and hematuria. Musculoskeletal: Negative for arthralgias and back pain. Skin: Negative for color change and rash. Neurological: Positive for numbness (chronic). Negative for seizures and syncope. All other systems reviewed and are negative. Physical Exam  Physical Exam  Vitals and nursing note reviewed. Constitutional:       General: He is not in acute distress. Appearance: He is well-developed. HENT:      Head: Normocephalic and atraumatic. Mouth/Throat:      Dentition: Abnormal dentition. Dental caries present. Pharynx: Oropharynx is clear. Eyes:      Conjunctiva/sclera: Conjunctivae normal.   Cardiovascular:      Rate and Rhythm: Normal rate and regular rhythm. Pulses:           Radial pulses are 2+ on the right side and 2+ on the left side. Dorsalis pedis pulses are 1+ on the right side and 1+ on the left side. Heart sounds: No murmur heard. Pulmonary:      Effort: Pulmonary effort is normal. No respiratory distress. Breath sounds: Normal breath sounds. Abdominal:      Palpations: Abdomen is soft. Tenderness: There is no abdominal tenderness. Musculoskeletal:         General: No swelling. Cervical back: Neck supple. Right lower leg: No swelling or tenderness. Left lower leg: No swelling or tenderness. Right ankle: Swelling present. Left ankle: Swelling present. Right foot: Swelling present. Left foot: Swelling present. Skin:     General: Skin is warm and dry. Capillary Refill: Capillary refill takes less than 2 seconds. Neurological:      Mental Status: He is alert. Sensory: Sensation is intact. Motor: Motor function is intact. Comments: Gross lower extremity pulse, motor and sensation intact. No signs of infection. Psychiatric:         Mood and Affect: Mood normal.         Vital Signs  ED Triage Vitals [07/04/23 0408]   Temperature Pulse Respirations Blood Pressure SpO2   98.1 °F (36.7 °C) 103 17 127/74 99 %      Temp Source Heart Rate Source Patient Position - Orthostatic VS BP Location FiO2 (%)   Oral -- Lying Left arm --      Pain Score       No Pain           Vitals:    07/04/23 0408   BP: 127/74   Pulse: 103   Patient Position - Orthostatic VS: Lying         Visual Acuity      ED Medications  Medications - No data to display    Diagnostic Studies  Results Reviewed     None                 No orders to display              Procedures  Procedures         ED Course                               SBIRT 22yo+    Flowsheet Row Most Recent Value   Initial Alcohol Screen: US AUDIT-C     1. How often do you have a drink containing alcohol? 2 Filed at: 07/04/2023 0406   2. How many drinks containing alcohol do you have on a typical day you are drinking? 1 Filed at: 07/04/2023 0406   3a. Male UNDER 65: How often do you have five or more drinks on one occasion? 0 Filed at: 07/04/2023 0406   3b. FEMALE Any Age, or MALE 65+: How often do you have 4 or more drinks on one occassion? 0 Filed at: 07/04/2023 0406   Audit-C Score 3 Filed at: 07/04/2023 1031   CALI: How many times in the past year have you. .. Used an illegal drug or used a prescription medication for non-medical reasons? Daily or Almost Daily Filed at: 07/04/2023 0406   DAST-10: In the past 12 months. ..    1. Have you used drugs other than those required for medical reasons? 1 Filed at: 07/04/2023 0406   2. Do you use more than one drug at a time? 0 Filed at: 07/04/2023 0406   3. Have you had medical problems as a result of your drug use (e.g., memory loss, hepatitis, convulsions, bleeding, etc.)? 0 Filed at: 07/04/2023 0406   4. Have you had "blackouts" or "flashbacks" as a result of drug use? YesNo 0 Filed at: 07/04/2023 0406   5.  Do you ever feel bad or guilty about your drug use? 0 Filed at: 07/04/2023 0406   6. Does your spouse (or parent) ever complain about your involvement with drugs? 0 Filed at: 07/04/2023 0406   7. Have you neglected your family because of your use of drugs? 0 Filed at: 07/04/2023 0406   8. Have you engaged in illegal activities in order to obtain drugs? 0 Filed at: 07/04/2023 0406   9. Have you ever experienced withdrawal symptoms (felt sick) when you stopped taking drugs? 0 Filed at: 07/04/2023 0406   10. Are you always able to stop using drugs when you want to? 0 Filed at: 07/04/2023 0406   DAST-10 Score 1 Filed at: 07/04/2023 0406                    Medical Decision Making  80-year-old male presented to the emergency department for evaluation of bilateral feet swelling. On arrival the patient was awake, alert and in no acute distress. Physical exam was consistent with the patient's chronic swelling. Prior notes, labs and imaging were reviewed. No indication at this point to repeat blood work or imaging. The patient is appropriate for discharge. Recommendation was made for the patient to follow-up outpatient. Return precautions were discussed. Patient agrees with the plan for discharge and feels comfortable to go home with proper f/u. Advised to return for worsening or additional problems. All questions answered. Diagnosis, care plan and treatment options were discussed. The patient understands instructions and will follow up as directed.           Disposition  Final diagnoses:   Homelessness   Pedal edema - chronic     Time reflects when diagnosis was documented in both MDM as applicable and the Disposition within this note     Time User Action Codes Description Comment    7/4/2023  4:17 AM Georgina Fraction Add [Z59.00] Homelessness     7/4/2023  4:18 AM Georgina Fraction Add [R60.0] Pedal edema     7/4/2023  4:19 AM Georgina Fraction Modify [R60.0] Pedal edema chronic      ED Disposition     ED Disposition   Discharge    Condition Stable    Date/Time   Tue Jul 4, 2023  4:17 AM    Comment   Presley Schaumann discharge to home/self care. Follow-up Information     Follow up With Specialties Details Why Contact Info Additional Information    Yo Cabrera  Schedule an appointment as soon as possible for a visit   [facility] - Internal Medicine    [phone] [id number] Emergency Department Emergency Medicine Go to  If symptoms worsen [address] [phone] [id number] [facility] Emergency Department, [facility] [address], [address]          Discharge Medication List as of 7/4/2023  4:29 AM      CONTINUE these medications which have NOT CHANGED    Details   bictegravir-emtricitab-tenofovir alafenamide (Biktarvy) -25 MG tablet Take 1 tablet by mouth daily, Starting Tue 6/13/2023, Until Thu 7/13/2023, Normal      levETIRAcetam (Keppra) 1000 MG tablet Take 1 tablet (1,000 mg total) by mouth 2 (two) times a day, Starting Tue 6/13/2023, Until Thu 7/13/2023, Normal      acetaminophen (TYLENOL) 500 mg tablet Take 1 tablet (500 mg total) by mouth every 6 (six) hours as needed for mild pain or moderate pain, Starting Fri 9/30/2022, Print      Cholecalciferol 25 MCG (1000 UT) tablet Take 1,000 Units by mouth, Historical Med      !! cyclobenzaprine (FLEXERIL) 10 mg tablet Take 1 tablet (10 mg total) by mouth 2 (two) times a day as needed for muscle spasms, Starting Sat 3/26/2022, Normal      !! cyclobenzaprine (FLEXERIL) 10 mg tablet Take 1 tablet (10 mg total) by mouth 2 (two) times a day as needed for muscle spasms, Starting Tue 10/4/2022, Normal      ondansetron (Zofran ODT) 4 mg disintegrating tablet Take 1 tablet (4 mg total) by mouth every 6 (six) hours as needed for nausea or vomiting, Starting Wed 8/31/2022, Normal       !! - Potential duplicate medications found. Please discuss with provider. No discharge procedures on file.     PDMP Review       Value Time User    PDMP Reviewed  Yes 6/17/2023 11:20 AM Kimberly Ruano MD          ED Provider  Electronically Signed by           Aston Grier MD  07/04/23 6235

## 2023-07-11 ENCOUNTER — HOSPITAL ENCOUNTER (EMERGENCY)
Facility: HOSPITAL | Age: 38
Discharge: HOME | End: 2023-07-11
Attending: EMERGENCY MEDICINE
Payer: MEDICARE

## 2023-07-11 VITALS
RESPIRATION RATE: 18 BRPM | SYSTOLIC BLOOD PRESSURE: 134 MMHG | TEMPERATURE: 98.1 F | DIASTOLIC BLOOD PRESSURE: 84 MMHG | OXYGEN SATURATION: 99 % | HEART RATE: 108 BPM

## 2023-07-11 DIAGNOSIS — M62.838 MUSCLE SPASM: ICD-10-CM

## 2023-07-11 DIAGNOSIS — R60.9 PERIPHERAL EDEMA: Primary | ICD-10-CM

## 2023-07-11 RX ORDER — DIPHENHYDRAMINE HCL 25 MG
25 TABLET ORAL ONCE
Status: COMPLETED | OUTPATIENT
Start: 2023-07-11 | End: 2023-07-11

## 2023-07-11 RX ADMIN — DIPHENHYDRAMINE HYDROCHLORIDE 25 MG: 25 TABLET ORAL at 19:11

## 2023-07-11 NOTE — DISCHARGE INSTRUCTIONS
Drink plenty of water  Avoid alcohol  Keep legs elevated    Continue to take all your medications    Follow-up with a primary care provider for ongoing care of your chronic medication concerns

## 2023-07-11 NOTE — ED PROVIDER NOTES
History  Chief Complaint   Patient presents with   • Foot Pain     Pt arrives via EMS, c/o bilateral foot numbness since "smoking a blunt" an hour ago. Past Medical History: HIV, Seizures, Smoker, Syphilis   Past Surgical History: HERNIA REPAIR     Pt presents to ED c/o numbness to B/L feet, chronic swelling and transient numbness/spasm to "left side of body" that happened in ambulance on ride over, that pt states is chronic; after smoking marijuana. Pt homeless, states he walks around a lot, has chronic problems, asked friend to call 911 bc feet numbness, that is nearly completely resolved in ED. Pt just asking for pillow and soda. Pt has had similar sx in the past.  Per chart review the patient has been seen numerous times in the past for both lower leg swelling and numbness. The patient has had extensive work-ups in the past including imaging and blood work with no significant findings. Denies fevers, chills, nausea, vomiting, diarrhea and shortness of breath. No new trauma or falls since evaluation 3 days ago. Pt states is taking medications like he should            Prior to Admission Medications   Prescriptions Last Dose Informant Patient Reported? Taking?    Cholecalciferol 25 MCG (1000 UT) tablet   Yes No   Sig: Take 1,000 Units by mouth   Patient not taking: Reported on 6/15/2023   acetaminophen (TYLENOL) 500 mg tablet   No No   Sig: Take 1 tablet (500 mg total) by mouth every 6 (six) hours as needed for mild pain or moderate pain   Patient not taking: Reported on 7/4/2023   bictegravir-emtricitab-tenofovir alafenamide (Biktarvy) -25 MG tablet   No No   Sig: Take 1 tablet by mouth daily   cyclobenzaprine (FLEXERIL) 10 mg tablet   No No   Sig: Take 1 tablet (10 mg total) by mouth 2 (two) times a day as needed for muscle spasms   Patient not taking: Reported on 6/15/2023   cyclobenzaprine (FLEXERIL) 10 mg tablet   No No   Sig: Take 1 tablet (10 mg total) by mouth 2 (two) times a day as needed for muscle spasms   Patient not taking: Reported on 6/15/2023   levETIRAcetam (Keppra) 1000 MG tablet   No No   Sig: Take 1 tablet (1,000 mg total) by mouth 2 (two) times a day   ondansetron (Zofran ODT) 4 mg disintegrating tablet   No No   Sig: Take 1 tablet (4 mg total) by mouth every 6 (six) hours as needed for nausea or vomiting   Patient not taking: Reported on 9/17/2022      Facility-Administered Medications: None       Past Medical History:   Diagnosis Date   • HIV (human immunodeficiency virus infection) ([address])    • Seizures ([address])    • Smoker    • Syphilis        Past Surgical History:   Procedure Laterality Date   • HERNIA REPAIR         History reviewed. No pertinent family history. I have reviewed and agree with the history as documented. E-Cigarette/Vaping   • E-Cigarette Use Current Every Day User      E-Cigarette/Vaping Substances   • Nicotine No    • THC No    • CBD No    • Flavoring Yes    • Other No    • Unknown No      Social History     Tobacco Use   • Smoking status: Every Day     Packs/day: 2.00     Years: 8.00     Total pack years: 16.00     Types: Cigarettes   • Smokeless tobacco: Never   • Tobacco comments:     "At least 2 packs a day" of cigarettes. Vaping Use   • Vaping Use: Every day   • Substances: Flavoring   Substance Use Topics   • Alcohol use: Yes     Alcohol/week: 2.0 standard drinks of alcohol     Types: 1 Glasses of wine, 1 Cans of beer per week     Comment: socially   • Drug use: Yes     Frequency: 1.0 times per week     Types: Marijuana       Review of Systems   Constitutional: Negative for fever. HENT: Negative for hearing loss and sore throat. Respiratory: Negative for cough and shortness of breath. Cardiovascular: Positive for leg swelling. Negative for chest pain. Gastrointestinal: Negative for abdominal pain, diarrhea and vomiting. Musculoskeletal: Positive for gait problem and myalgias. Negative for arthralgias. Skin: Negative for wound. Neurological: Positive for numbness. Negative for dizziness and weakness. Psychiatric/Behavioral: Negative for behavioral problems. All other systems reviewed and are negative. Physical Exam  Physical Exam  Vitals and nursing note reviewed. Constitutional:       General: He is not in acute distress. Appearance: Normal appearance. He is well-developed. HENT:      Head: Normocephalic and atraumatic. Right Ear: External ear normal.      Left Ear: External ear normal.      Nose: Nose normal.      Mouth/Throat:      Mouth: Mucous membranes are moist.      Dentition: Dental caries present. Pharynx: Oropharynx is clear. Comments: Poor dentition, multiple broken, decaying teeth  Eyes:      Conjunctiva/sclera: Conjunctivae normal.   Neck:      Comments: Neck spasms lasting few seconds, then FROM returns  Cardiovascular:      Rate and Rhythm: Normal rate and regular rhythm. Pulmonary:      Effort: Pulmonary effort is normal. No respiratory distress. Breath sounds: Normal breath sounds. Abdominal:      General: Bowel sounds are normal.      Palpations: Abdomen is soft. Musculoskeletal:         General: Normal range of motion. Cervical back: Normal range of motion and neck supple. No tenderness. Comments: Pt with intermittent LUE carpal spasms, lasting seconds.  + B/L pedal edema, distal pulse intact, no warmth, no other skin changes   Skin:     General: Skin is warm and dry. Capillary Refill: Capillary refill takes less than 2 seconds. Findings: No erythema. Neurological:      General: No focal deficit present. Mental Status: He is alert. Mental status is at baseline. Cranial Nerves: No cranial nerve deficit. Motor: No weakness.    Psychiatric:         Mood and Affect: Mood normal.         Behavior: Behavior normal.         Vital Signs  ED Triage Vitals [07/11/23 1836]   Temperature Pulse Respirations Blood Pressure SpO2   98.1 °F (36.7 °C) (!) 108 18 134/84 99 %      Temp Source Heart Rate Source Patient Position - Orthostatic VS BP Location FiO2 (%)   Oral Monitor Lying Right arm --      Pain Score       --           Vitals:    07/11/23 1836   BP: 134/84   Pulse: (!) 108   Patient Position - Orthostatic VS: Lying         Visual Acuity      ED Medications  Medications   diphenhydrAMINE (BENADRYL) tablet 25 mg (25 mg Oral Given 7/11/23 1911)       Diagnostic Studies  Results Reviewed     None                 No orders to display              Procedures  Procedures         ED Course                                             Medical Decision Making  Pt here with chronic pedal bilateral edema; now with numbness. On arrival the patient was awake, alert and in no acute distress. Physical exam was consistent with the patient's chronic swelling. Prior notes, labs and imaging were reviewed. No indication at this point to repeat blood work or imaging. The patient is appropriate for discharge. Will give Benadryl to help with spasm, if dystonia/sedation. Pt drinking in ED without difficulty. Pt also asking for food. Pt resting comfortably; results discussed with pt, agrees with plan to ED, chronic issues need PCP FU; pt states his PCP is in [facility] he really likes them, but has hard time getting there. Will refer to more local providers         Amount and/or Complexity of Data Reviewed  External Data Reviewed: labs, radiology and notes.      Details: ct head, chest abd pelvis done 6/15 all show no acute process  Discussion of management or test interpretation with external provider(s): Discussed with attending, who agrees with plan        Disposition  Final diagnoses:   Peripheral edema - Chronic   Muscle spasm     Time reflects when diagnosis was documented in both MDM as applicable and the Disposition within this note     Time User Action Codes Description Comment    7/11/2023  7:21 PM Delmy Mcdonnell Add [R60.9] Peripheral edema     7/11/2023 7:21 PM Stevie Jaimes Modify [R60.9] Peripheral edema Chronic    7/11/2023  7:21 PM Stevie Jaimes Add [B21.356] Muscle spasm       ED Disposition     ED Disposition   Discharge    Condition   Stable    Date/Time   Tue Jul 11, 2023  7:28 PM    Comment   José Manuel Eugene discharge to home/self care. Follow-up Information     Follow up With Specialties Details Why Contact Info Additional Information    [facility]   [address] [phone]  [address] [place] [address] [address] [address] [facility],[address], [address] [facility], [address] [place] [address], [phone], [address], [address] [facility] [address]          Patient's Medications   Discharge Prescriptions    No medications on file       No discharge procedures on file.     PDMP Review       Value Time User    PDMP Reviewed  Yes 6/17/2023 11:20 AM Andra Leblanc MD          ED Provider  Electronically Signed by           Rubina Mac PA-C  07/11/23 2610

## 2023-07-16 ENCOUNTER — HOSPITAL ENCOUNTER (EMERGENCY)
Facility: HOSPITAL | Age: 38
End: 2023-07-17
Attending: EMERGENCY MEDICINE
Payer: MEDICARE

## 2023-07-16 DIAGNOSIS — R45.851 SUICIDAL IDEATION: Primary | ICD-10-CM

## 2023-07-16 LAB
AMPHETAMINES SERPL QL SCN: NEGATIVE
BARBITURATES UR QL: NEGATIVE
BENZODIAZ UR QL: NEGATIVE
COCAINE UR QL: NEGATIVE
ETHANOL EXG-MCNC: 0 MG/DL
METHADONE UR QL: NEGATIVE
OPIATES UR QL SCN: NEGATIVE
OXYCODONE+OXYMORPHONE UR QL SCN: NEGATIVE
PCP UR QL: NEGATIVE
THC UR QL: POSITIVE

## 2023-07-16 PROCEDURE — 80307 DRUG TEST PRSMV CHEM ANLYZR: CPT | Performed by: EMERGENCY MEDICINE

## 2023-07-16 PROCEDURE — 82075 ASSAY OF BREATH ETHANOL: CPT | Performed by: EMERGENCY MEDICINE

## 2023-07-16 PROCEDURE — 99285 EMERGENCY DEPT VISIT HI MDM: CPT

## 2023-07-16 PROCEDURE — G0426 INPT/ED TELECONSULT50: HCPCS | Performed by: STUDENT IN AN ORGANIZED HEALTH CARE EDUCATION/TRAINING PROGRAM

## 2023-07-16 PROCEDURE — 99285 EMERGENCY DEPT VISIT HI MDM: CPT | Performed by: EMERGENCY MEDICINE

## 2023-07-16 RX ORDER — DIPHENHYDRAMINE HYDROCHLORIDE 50 MG/ML
50 INJECTION INTRAMUSCULAR; INTRAVENOUS EVERY 6 HOURS PRN
Status: CANCELLED | OUTPATIENT
Start: 2023-07-16

## 2023-07-16 RX ORDER — MAGNESIUM HYDROXIDE/ALUMINUM HYDROXICE/SIMETHICONE 120; 1200; 1200 MG/30ML; MG/30ML; MG/30ML
30 SUSPENSION ORAL EVERY 4 HOURS PRN
Status: CANCELLED | OUTPATIENT
Start: 2023-07-16

## 2023-07-16 RX ORDER — HALOPERIDOL 5 MG/1
5 TABLET ORAL
Status: CANCELLED | OUTPATIENT
Start: 2023-07-16

## 2023-07-16 RX ORDER — POLYETHYLENE GLYCOL 3350 17 G/17G
17 POWDER, FOR SOLUTION ORAL DAILY PRN
Status: CANCELLED | OUTPATIENT
Start: 2023-07-16

## 2023-07-16 RX ORDER — HALOPERIDOL 5 MG/ML
2.5 INJECTION INTRAMUSCULAR
Status: CANCELLED | OUTPATIENT
Start: 2023-07-16

## 2023-07-16 RX ORDER — AMOXICILLIN 250 MG
1 CAPSULE ORAL DAILY PRN
Status: CANCELLED | OUTPATIENT
Start: 2023-07-16

## 2023-07-16 RX ORDER — LEVETIRACETAM 500 MG/1
1000 TABLET ORAL EVERY 12 HOURS SCHEDULED
Status: DISCONTINUED | OUTPATIENT
Start: 2023-07-16 | End: 2023-07-17 | Stop reason: HOSPADM

## 2023-07-16 RX ORDER — LORAZEPAM 2 MG/ML
2 INJECTION INTRAMUSCULAR
Status: CANCELLED | OUTPATIENT
Start: 2023-07-16

## 2023-07-16 RX ORDER — BENZTROPINE MESYLATE 1 MG/ML
1 INJECTION INTRAMUSCULAR; INTRAVENOUS
Status: CANCELLED | OUTPATIENT
Start: 2023-07-16

## 2023-07-16 RX ORDER — HALOPERIDOL 1 MG/1
1 TABLET ORAL EVERY 6 HOURS PRN
Status: CANCELLED | OUTPATIENT
Start: 2023-07-16

## 2023-07-16 RX ORDER — ACETAMINOPHEN 325 MG/1
650 TABLET ORAL EVERY 4 HOURS PRN
Status: CANCELLED | OUTPATIENT
Start: 2023-07-16

## 2023-07-16 RX ORDER — LANOLIN ALCOHOL/MO/W.PET/CERES
3 CREAM (GRAM) TOPICAL
Status: CANCELLED | OUTPATIENT
Start: 2023-07-16

## 2023-07-16 RX ORDER — BISACODYL 10 MG
10 SUPPOSITORY, RECTAL RECTAL DAILY PRN
Status: CANCELLED | OUTPATIENT
Start: 2023-07-16

## 2023-07-16 RX ORDER — HALOPERIDOL 5 MG/1
2.5 TABLET ORAL
Status: CANCELLED | OUTPATIENT
Start: 2023-07-16

## 2023-07-16 RX ORDER — BENZTROPINE MESYLATE 1 MG/1
1 TABLET ORAL
Status: CANCELLED | OUTPATIENT
Start: 2023-07-16

## 2023-07-16 RX ORDER — ACETAMINOPHEN 325 MG/1
650 TABLET ORAL EVERY 6 HOURS PRN
Status: CANCELLED | OUTPATIENT
Start: 2023-07-16

## 2023-07-16 RX ORDER — ACETAMINOPHEN 325 MG/1
975 TABLET ORAL EVERY 6 HOURS PRN
Status: CANCELLED | OUTPATIENT
Start: 2023-07-16

## 2023-07-16 RX ORDER — HALOPERIDOL 5 MG/ML
5 INJECTION INTRAMUSCULAR
Status: CANCELLED | OUTPATIENT
Start: 2023-07-16

## 2023-07-16 RX ORDER — HYDROXYZINE HYDROCHLORIDE 25 MG/1
100 TABLET, FILM COATED ORAL
Status: CANCELLED | OUTPATIENT
Start: 2023-07-16

## 2023-07-16 RX ORDER — LORAZEPAM 2 MG/ML
2 INJECTION INTRAMUSCULAR EVERY 6 HOURS PRN
Status: CANCELLED | OUTPATIENT
Start: 2023-07-16

## 2023-07-16 RX ORDER — LORAZEPAM 1 MG/1
1 TABLET ORAL ONCE
Status: COMPLETED | OUTPATIENT
Start: 2023-07-16 | End: 2023-07-16

## 2023-07-16 RX ORDER — HYDROXYZINE HYDROCHLORIDE 25 MG/1
25 TABLET, FILM COATED ORAL
Status: CANCELLED | OUTPATIENT
Start: 2023-07-16

## 2023-07-16 RX ORDER — TRAZODONE HYDROCHLORIDE 50 MG/1
50 TABLET ORAL
Status: CANCELLED | OUTPATIENT
Start: 2023-07-16

## 2023-07-16 RX ORDER — BENZTROPINE MESYLATE 1 MG/ML
0.5 INJECTION INTRAMUSCULAR; INTRAVENOUS
Status: CANCELLED | OUTPATIENT
Start: 2023-07-16

## 2023-07-16 RX ORDER — HYDROXYZINE HYDROCHLORIDE 25 MG/1
50 TABLET, FILM COATED ORAL
Status: CANCELLED | OUTPATIENT
Start: 2023-07-16

## 2023-07-16 RX ORDER — LORAZEPAM 2 MG/ML
1 INJECTION INTRAMUSCULAR
Status: CANCELLED | OUTPATIENT
Start: 2023-07-16

## 2023-07-16 RX ADMIN — BICTEGRAVIR SODIUM, EMTRICITABINE, AND TENOFOVIR ALAFENAMIDE FUMARATE 1 TABLET: 50; 200; 25 TABLET ORAL at 19:16

## 2023-07-16 RX ADMIN — LEVETIRACETAM 1000 MG: 500 TABLET, FILM COATED ORAL at 19:15

## 2023-07-16 RX ADMIN — LORAZEPAM 1 MG: 1 TABLET ORAL at 02:24

## 2023-07-16 NOTE — CONSULTS
TeleConsultation - [place] Ben 40 y.o. male MRN: [id number]  Unit/Bed#: [address] Encounter: [id number]        REQUIRED DOCUMENTATION:     1. This service was provided via Telemedicine. 2. Provider located at [place]. 3. TeleMed provider: Yue Ramos MD.  4. Identify all parties in room with patient during tele consult:  Patient  5. Patient was then informed that this was a Telemedicine visit and that the exam was being conducted confidentially over secure lines. My office door was closed. No one else was in the room. Patient acknowledged consent and understanding of privacy and security of the Telemedicine visit, and gave us permission to have the assistant stay in the room in order to assist with the history and to conduct the exam.  I informed the patient that I have reviewed their record in Epic and presented the opportunity for them to ask any questions regarding the visit today. The patient agreed to participate. Assessment/Plan     Active Problems: There are no active Hospital Problems. Assessment:  -Unspecified mood d/o, r/o Bipolar d/o, current episode mixed  -Suicidal Ideation    Recommendations & Treatment Plan:  -Inpatient psychiatric admission is indicated for suicidal ideation. Patient agreeable to 201. Patient agreeable to start Seroquel for mood stabilization and sleep, discussed risks/benefits/alternatives, would start with 50mg qhs, and Ativan 0.5mg PRN for anxiety. Defer further management to inpatient BHU        Current Medications:       Risks / Benefits of Treatment:  Risks, benefits, and possible side effects of medications explained to patient and patient verbalizes understanding.       Consults  Physician Requesting Consult: Altaf Medina MD  Principal Problem:<principal problem not specified>    Reason for Consult: suicidal ideation      History of Present Illness:  44yo M with history of HIV and seizure d/o who presented to ED reporting suicidal ideation with plan to jump off a bridge. Patient was oddly related, elated during interview, distractible, and limited historian. He reported feeling suicidal since yesterday without any particular triggers or stressors. States that he wants to kill himself and that he is depressed. However, he also reported labile mood and decreased need for sleep recently. Per chart, patient is positive for THC in UDS. Patient also reported having anxiety, and per chart was previously seen for "panic disorder" symptoms. Patient HI or AH/VH. He would respond oddly by smiling and at times, appeared to be engaged in self dialogue in beginning of the interview. Patient denies taking any psychotropics, but does take Keppra for seizures d/o.        Psychiatric Review Of Systems:  Negative except as reported or endorsed in HPI    Historical Information     Past Psychiatric History:     Psychiatric Hospitalizations:   Denies  Outpatient Treatment History:   • Denies  Suicide Attempts:   • Unknown, pt did not answer  Current Psychotropic regimen:Denies  Past Psychiatric medication trials: Unknown    Substance Abuse History:  UDS +THC    Family Psychiatric History:    Unknown    Social History:  Patient is currently homeless        Past Medical History:   Diagnosis Date   • HIV (human immunodeficiency virus infection) ([address])    • Seizures ([address])    • Smoker    • Syphilis        Medical Review Of Systems:    Review of Systems    Meds/Allergies     all current active meds have been reviewed  No Known Allergies    Objective     Vital signs in last 24 hours:  Temp:  [98 °F (36.7 °C)] 98 °F (36.7 °C)  HR:  [91] 91  Resp:  [20] 20  BP: (149)/(96) 149/96    No intake or output data in the 24 hours ending 07/16/23 0204    Mental Status Evaluation:    Appearance:  age appropriate   Behavior:  Distracted, oddly related   Speech:  normal volume, normal pitch   Mood:  euphoric and labile   Affect:  labile   Thought Process:  goal directed and logical Associations circumstantial associations   Thought Content:  normal   Perceptual Disturbances: None   Risk Potential: Suicidal Ideations with plan   Homicidal Ideations none   Sensorium:  person, place and situation   Cognition:  recent and remote memory grossly intact   Consciousness:  alert and awake    Attention: attention span appeared shorter than expected for age   Insight:  fair   Judgment: fair       Lab Results: I have personally reviewed all pertinent laboratory/tests results. Most Recent Labs: @RESUFAST(WBC,RBC,HGB,HCT,PLT, RBC,RDW,NEUTROABS,SODIUM,K,CL,CO2,BUN,CREATININE,GLUC,GLUF,CALCIUM,AST,ALT,ALKPHOS,TP,ALB,TBILI,CHOLESTEROL,HDL,TRIG,LDLCALC,NONHDLC,VALPROICTOT,CARBAMAZEPIN,LITHIUM,AMMONIA,RIP6PASMEHEI,FREET4,T3FREE,EXTPREGUR,PREGSERUM,HCG,HCGQUANT,RPR,HGBA1C,EAG)@    Imaging Studies: No results found. EKG/Pathology/Other Studies:   Lab Results   Component Value Date    VENTRATE 92 04/21/2023    ATRIALRATE 92 04/21/2023    PRINT 156 04/21/2023    QRSDINT 114 04/21/2023    QTINT 370 04/21/2023    QTCINT 457 04/21/2023    PAXIS 42 04/21/2023    QRSAXIS -21 04/21/2023    TWAVEAXIS 22 04/21/2023        Code Status: Prior  Advance Directive and Living Will:      Power of :    POLST:      Screenings:    1. Nutrition Screening  Nutrition Assessment (completed by Staff): Nutrition  Appetite: Good    2. Pain Screening  Not available on chart    3. Suicide Screening  ED Crisis Suicide Risk Assessment: Suicide Risk Assessment  Violence Risk to Self: Yes- Within the past 6 months  Description of self harming behaviors or thoughts[de-identified] SI with plan to jump off a bridge  Protective Factors: The patient does not want to die    C-SSRS Screening (Nursing Assessment - recent):    C-SSRS Screening (Nursing Assessment - since last contact):        •   Counseling / Coordination of Care: Total floor / unit time spent today 50 minutes.  Greater than 50% of total time was spent with the patient and / or family counseling and / or coordination of care.  A description of the counseling / coordination of care: Direct Patient Care, Chart Review, and Documentation

## 2023-07-16 NOTE — ED NOTES
Upon inventorying patients belongings a "blunt roller" and a bag of marijuana was confiscated and placed in a biohazard bag awaiting disposal. Biohazard bag placed in locker #1. Security was notified.      Georgie Brooks RN  07/16/23 0702

## 2023-07-16 NOTE — ED NOTES
Pt's drug paraphernalia was disposed of with security witnessing.      Christine Laguna, RN  07/16/23 5820

## 2023-07-16 NOTE — ED NOTES
Met with patient to complete the crisis intake assessment and the safety risk assessment. Patient came to ER voluntarily with complaint of SI with plan. Patient was woken for assessment. He kept his eyes closed throughout the assessment. Patient was a poor historian but coherent. He spoke softly and was oriented x3. Patient endorsed SI with a plan to jump off a bridge. He denied HI, AVH, paranoia, and anxiety. Patient was unable to identify a trigger for depression and SI. Patient is homeless. Hygiene was poor, dirt on the sheets from his body and dirt under his nails. Patient denied current OP services and denied previous inpatient behavioral health hospitalization. He was unable to contract for safety. Patient was agreeable to signing a 201 and expressed understanding of rights.

## 2023-07-16 NOTE — ED CARE HANDOFF
Emergency Department Sign Out Note        Sign out and transfer of care from Dr. Cristi Bravo. See Separate Emergency Department note. The patient, Susan Parks, was evaluated by the previous provider for suicidal ideations. Workup Completed:  Labs Reviewed   RAPID DRUG SCREEN, URINE - Abnormal       Result Value Ref Range Status    Amph/Meth UR Negative  Negative Final    Barbiturate Ur Negative  Negative Final    Benzodiazepine Urine Negative  Negative Final    Cocaine Urine Negative  Negative Final    Methadone Urine Negative  Negative Final    Opiate Urine Negative  Negative Final    PCP Ur Negative  Negative Final    THC Urine Positive (*) Negative Final    Oxycodone Urine Negative  Negative Final    Narrative:     Presumptive report. If requested, specimen will be sent to reference lab for confirmation. FOR MEDICAL PURPOSES ONLY. IF CONFIRMATION NEEDED PLEASE CONTACT THE LAB WITHIN 5 DAYS. Drug Screen Cutoff Levels:  AMPHETAMINE/METHAMPHETAMINES  1000 ng/mL  BARBITURATES     200 ng/mL  BENZODIAZEPINES     200 ng/mL  COCAINE      300 ng/mL  METHADONE      300 ng/mL  OPIATES      300 ng/mL  PHENCYCLIDINE     25 ng/mL  THC       50 ng/mL  OXYCODONE      100 ng/mL   POCT ALCOHOL BREATH TEST - Normal    EXTBreath Alcohol 0.00   Final       ED Course / Workup Pending (followup):      ED Course as of 07/16/23 1833   Sun Jul 16, 2023   0659 SO: SI with plan to jump off bridge. Got ativan for anxiety. [ ] crisis + dispo   [id number] completed. 26 Notified by Rahul Coombs from crisis that Lashell Stewart is requesting [facility] psych consult as they fell patient may be malingering. Consult ordered. [id number] Patient evaluated by Dr. Stephane Alvarado from psych. Recommending continuation of pursuing inpatient treatment at this time. Crisis worker Braxton Opitz updated, will continue bed search. Procedures  Medical Decision Making  27-year-old male previously evaluated for suicidal ideations.  Patient signed out to me pending evaluation by crisis. Patient evaluated by Jhony Greenwood from crisis. Continues to endorse SI with plan to jump off a bridge. 201 completed. During intake evaluation, [facility] requested a psych consult out of concern for malingering. Psych consult placed. Recommending continuing inpatient psych treatment. Signed out to Dr. Carine Marvin pending placement. Suicidal ideation: acute illness or injury  Amount and/or Complexity of Data Reviewed  Labs: ordered. Risk  Prescription drug management. Decision regarding hospitalization. Disposition  Final diagnoses:   Suicidal ideation     Time reflects when diagnosis was documented in both MDM as applicable and the Disposition within this note     Time User Action Codes Description Comment    7/16/2023  1:54 AM Anayeli João Add [N64.054] Suicidal ideation       ED Disposition     ED Disposition   Transfer to [address] [place] [address]   --    Date/Time   Sun Jul 16, 2023  1:54 AM    Comment   Saeid Dodson should be transferred to behavioral health, and has been medically cleared. MD Documentation    Ling Chinchilla Most Recent Value   Sending MD Dr. Monica Ordaz    None       Patient's Medications   Discharge Prescriptions    No medications on file     No discharge procedures on file.        ED Provider  Electronically Signed by     Mars Julian MD  07/16/23 5729

## 2023-07-16 NOTE — ED PROVIDER NOTES
History  Chief Complaint   Patient presents with   • Psychiatric Evaluation     Pt presents to the ED for a psych evaluation     Patient is a 55-year-old male seen in the emergency department with concern for depression/suicidal ideation. Patient notes suicidal ideation with a plan to jump off a bridge. Patient notes symptoms worsening over the past day. Patient notes no chest pain, shortness of breath, abdominal pain, nausea, vomiting, weakness, numbness, tingling. Patient notes no definite clear exacerbating or alleviating factors for his symptoms. Patient requests medication for anxiety. Patient has no other acute medical complaints in the emergency department. Prior to Admission Medications   Prescriptions Last Dose Informant Patient Reported? Taking?    Cholecalciferol 25 MCG (1000 UT) tablet Not Taking  Yes No   Sig: Take 1,000 Units by mouth   Patient not taking: Reported on 6/15/2023   acetaminophen (TYLENOL) 500 mg tablet Not Taking  No No   Sig: Take 1 tablet (500 mg total) by mouth every 6 (six) hours as needed for mild pain or moderate pain   Patient not taking: Reported on 7/4/2023   bictegravir-emtricitab-tenofovir alafenamide (Biktarvy) -25 MG tablet   No No   Sig: Take 1 tablet by mouth daily   cyclobenzaprine (FLEXERIL) 10 mg tablet Not Taking  No No   Sig: Take 1 tablet (10 mg total) by mouth 2 (two) times a day as needed for muscle spasms   Patient not taking: Reported on 6/15/2023   cyclobenzaprine (FLEXERIL) 10 mg tablet Not Taking  No No   Sig: Take 1 tablet (10 mg total) by mouth 2 (two) times a day as needed for muscle spasms   Patient not taking: Reported on 6/15/2023   levETIRAcetam (Keppra) 1000 MG tablet   No No   Sig: Take 1 tablet (1,000 mg total) by mouth 2 (two) times a day   ondansetron (Zofran ODT) 4 mg disintegrating tablet Not Taking  No No   Sig: Take 1 tablet (4 mg total) by mouth every 6 (six) hours as needed for nausea or vomiting   Patient not taking: Reported on 9/17/2022      Facility-Administered Medications: None       Past Medical History:   Diagnosis Date   • HIV (human immunodeficiency virus infection) ([address])    • Seizures ([address])    • Smoker    • Syphilis        Past Surgical History:   Procedure Laterality Date   • HERNIA REPAIR         History reviewed. No pertinent family history. I have reviewed and agree with the history as documented. E-Cigarette/Vaping   • E-Cigarette Use Current Every Day User      E-Cigarette/Vaping Substances   • Nicotine No    • THC No    • CBD No    • Flavoring Yes    • Other No    • Unknown No      Social History     Tobacco Use   • Smoking status: Every Day     Packs/day: 2.00     Years: 8.00     Total pack years: 16.00     Types: Cigarettes   • Smokeless tobacco: Never   • Tobacco comments:     "At least 2 packs a day" of cigarettes. Vaping Use   • Vaping Use: Every day   • Substances: Flavoring   Substance Use Topics   • Alcohol use: Yes     Alcohol/week: 2.0 standard drinks of alcohol     Types: 1 Glasses of wine, 1 Cans of beer per week     Comment: socially   • Drug use: Yes     Frequency: 1.0 times per week     Types: Marijuana       Review of Systems   Constitutional: Negative for chills and fever. HENT: Negative for ear pain and sore throat. Eyes: Negative for pain and visual disturbance. Respiratory: Negative for cough and shortness of breath. Cardiovascular: Negative for chest pain and palpitations. Gastrointestinal: Negative for abdominal pain and vomiting. Genitourinary: Negative for decreased urine volume and difficulty urinating. Musculoskeletal: Negative for arthralgias and back pain. Skin: Negative for color change and rash. Neurological: Negative for weakness and numbness. Psychiatric/Behavioral: Positive for dysphoric mood and suicidal ideas. The patient is nervous/anxious. All other systems reviewed and are negative.       Physical Exam  Physical Exam  Vitals and nursing note reviewed. Constitutional:       General: He is not in acute distress. Appearance: He is well-developed. HENT:      Head: Normocephalic and atraumatic. Right Ear: External ear normal.      Left Ear: External ear normal.      Nose: Nose normal.      Mouth/Throat:      Pharynx: Oropharynx is clear. Eyes:      General: No scleral icterus. Conjunctiva/sclera: Conjunctivae normal.   Cardiovascular:      Rate and Rhythm: Normal rate and regular rhythm. Heart sounds: No murmur heard. Pulmonary:      Effort: Pulmonary effort is normal. No respiratory distress. Breath sounds: Normal breath sounds. Abdominal:      General: There is no distension. Palpations: Abdomen is soft. Tenderness: There is no abdominal tenderness. Musculoskeletal:         General: No deformity or signs of injury. Cervical back: Normal range of motion and neck supple. Skin:     General: Skin is warm and dry. Neurological:      General: No focal deficit present. Mental Status: He is alert. Cranial Nerves: No cranial nerve deficit. Sensory: No sensory deficit. Psychiatric:         Behavior: Behavior normal.         Thought Content:  Thought content normal.      Comments: depressed, suicidal ideation with plan         Vital Signs  ED Triage Vitals [07/16/23 0210]   Temperature Pulse Respirations Blood Pressure SpO2   98 °F (36.7 °C) 91 20 149/96 100 %      Temp Source Heart Rate Source Patient Position - Orthostatic VS BP Location FiO2 (%)   Oral Monitor Lying Right arm --      Pain Score       --           Vitals:    07/16/23 0210   BP: 149/96   Pulse: 91   Patient Position - Orthostatic VS: Lying         Visual Acuity      ED Medications  Medications   LORazepam (ATIVAN) tablet 1 mg (1 mg Oral Given 7/16/23 0224)       Diagnostic Studies  Results Reviewed     Procedure Component Value Units Date/Time    Rapid drug screen, urine [[id number]]  (Abnormal) Collected: 07/16/23 0219    Lab Status: Final result Specimen: Urine, Other Updated: 07/16/23 0242     Amph/Meth UR Negative     Barbiturate Ur Negative     Benzodiazepine Urine Negative     Cocaine Urine Negative     Methadone Urine Negative     Opiate Urine Negative     PCP Ur Negative     THC Urine Positive     Oxycodone Urine Negative    Narrative:      Presumptive report. If requested, specimen will be sent to reference lab for confirmation. FOR MEDICAL PURPOSES ONLY. IF CONFIRMATION NEEDED PLEASE CONTACT THE LAB WITHIN 5 DAYS. Drug Screen Cutoff Levels:  AMPHETAMINE/METHAMPHETAMINES  1000 ng/mL  BARBITURATES     200 ng/mL  BENZODIAZEPINES     200 ng/mL  COCAINE      300 ng/mL  METHADONE      300 ng/mL  OPIATES      300 ng/mL  PHENCYCLIDINE     25 ng/mL  THC       50 ng/mL  OXYCODONE      100 ng/mL    POCT alcohol breath test [[id number]]  (Normal) Resulted: 07/16/23 0204    Lab Status: Final result Updated: 07/16/23 0204     EXTBreath Alcohol 0.00                 No orders to display              Procedures  Procedures         ED Course                                             Medical Decision Making  Patient is a 66-year-old male seen in the emergency department with concern for depression, suicidal ideation. Patient was placed on a safety watch in the emergency department. Alcohol breath test was noted at 0.000. Urine drug screen was positive for THC. Patient is medically cleared for evaluation by crisis team.  Medication for anxiety was ordered. Patient is to be signed out to my colleague at change of shift, with plan for evaluation by crisis team.    Suicidal ideation: acute illness or injury  Amount and/or Complexity of Data Reviewed  Labs: ordered. Decision-making details documented in ED Course. Risk  Prescription drug management. Decision regarding hospitalization.           Disposition  Final diagnoses:   Suicidal ideation     Time reflects when diagnosis was documented in both MDM as applicable and the Disposition within this note     Time User Action Codes Description Comment    7/16/2023  1:54 AM Morena Cyndiedaquan Add [P16.161] Suicidal ideation       ED Disposition     ED Disposition   Transfer to [address] [place] [address]    Condition   --    Date/Time   Sun Jul 16, 2023  1:54 AM    Comment   Celia Done should be transferred to behavioral health, and has been medically cleared. Follow-up Information    None         Patient's Medications   Discharge Prescriptions    No medications on file       No discharge procedures on file.     PDMP Review       Value Time User    PDMP Reviewed  Yes 6/17/2023 11:20 AM Deidre Fernandes MD          ED Provider  Electronically Signed by           Claudia Colunga MD  07/16/23 2662       Claudia Colunga MD  07/16/23 7444

## 2023-07-16 NOTE — ED CARE HANDOFF
Emergency Department Sign Out Note        Signout and transfer of care from my colleague, Dr. Bandar Chow. See Separate Emergency Department note. The patient, Presley Schaumann, was evaluated for suicidal ideation. Labs Reviewed   RAPID DRUG SCREEN, URINE - Abnormal       Result Value Ref Range Status    Amph/Meth UR Negative  Negative Final    Barbiturate Ur Negative  Negative Final    Benzodiazepine Urine Negative  Negative Final    Cocaine Urine Negative  Negative Final    Methadone Urine Negative  Negative Final    Opiate Urine Negative  Negative Final    PCP Ur Negative  Negative Final    THC Urine Positive (*) Negative Final    Oxycodone Urine Negative  Negative Final    Narrative:     Presumptive report. If requested, specimen will be sent to reference lab for confirmation. FOR MEDICAL PURPOSES ONLY. IF CONFIRMATION NEEDED PLEASE CONTACT THE LAB WITHIN 5 DAYS. Drug Screen Cutoff Levels:  AMPHETAMINE/METHAMPHETAMINES  1000 ng/mL  BARBITURATES     200 ng/mL  BENZODIAZEPINES     200 ng/mL  COCAINE      300 ng/mL  METHADONE      300 ng/mL  OPIATES      300 ng/mL  PHENCYCLIDINE     25 ng/mL  THC       50 ng/mL  OXYCODONE      100 ng/mL   POCT ALCOHOL BREATH TEST - Normal    EXTBreath Alcohol 0.00   Final       Patient is medically cleared, on bed search(201) for psychiatric admission. Patient is to be transferred to [facility](Dr. Kg Azevedo) for further evaluation and treatment. Patient to be signed out to my colleague at change of shift, with plan for transfer.          Procedures  MDM        Disposition  Final diagnoses:   Suicidal ideation     Time reflects when diagnosis was documented in both MDM as applicable and the Disposition within this note     Time User Action Codes Description Comment    7/16/2023  1:54 AM Burton Nino Add [I81.610] Suicidal ideation       ED Disposition     ED Disposition   Transfer to [address] [place] [address]   --    Date/Time   Mon Jul 17, 2023  4:15 AM Comment   Rafa May should be transferred to [facility](Dr. Ann Jacobs) and has been medically cleared. MD Documentation    Magdaleno Canseco Most Recent Value   Patient Condition The patient has been stabilized such that within reasonable medical probability, no material deterioration of the patient condition or the condition of the unborn child(lina) is likely to result from the transfer   Reason for Transfer No bed available at level of patient's needs  [Psychiatry]   Benefits of Transfer Specialized equipment and/or services available at the receiving facility (Include comment)________________________  [Psychiatry]   Risks of Transfer Increased discomfort during transfer, Potential for delay in receiving treatment   Accepting Physician Dr. Sharolyn Curling Name, [place]   Sending MD Dr. Roman Willett   Provider Certification General risk, such as traffic hazards, adverse weather conditions, rough terrain or turbulence, possible failure of equipment (including vehicle or aircraft), or consequences of actions of persons outside the control of the transport personnel, Unanticipated needs of medical equipment and personnel during transport      RN Documentation    [address] Name, [place]      Follow-up Information    None       Patient's Medications   Discharge Prescriptions    No medications on file     No discharge procedures on file.        ED Provider  Electronically Signed by     Amanda Oneal MD  07/16/23 Kaisre Casper MD  07/17/23 9768

## 2023-07-17 ENCOUNTER — HOSPITAL ENCOUNTER (INPATIENT)
Facility: HOSPITAL | Age: 38
LOS: 8 days | Discharge: HOME | DRG: 881 | End: 2023-07-25
Attending: STUDENT IN AN ORGANIZED HEALTH CARE EDUCATION/TRAINING PROGRAM | Admitting: PSYCHIATRY & NEUROLOGY
Payer: MEDICARE

## 2023-07-17 VITALS
HEART RATE: 82 BPM | DIASTOLIC BLOOD PRESSURE: 87 MMHG | SYSTOLIC BLOOD PRESSURE: 137 MMHG | OXYGEN SATURATION: 98 % | TEMPERATURE: 97.6 F | RESPIRATION RATE: 16 BRPM

## 2023-07-17 DIAGNOSIS — R45.851 SUICIDAL IDEATION: ICD-10-CM

## 2023-07-17 DIAGNOSIS — Z78.9 ALCOHOL USE: ICD-10-CM

## 2023-07-17 DIAGNOSIS — G40.909 RECURRENT SEIZURES (HCC): ICD-10-CM

## 2023-07-17 DIAGNOSIS — B20 HIV INFECTION, UNSPECIFIED SYMPTOM STATUS (HCC): Primary | ICD-10-CM

## 2023-07-17 DIAGNOSIS — F32.9 MAJOR DEPRESSIVE DISORDER: ICD-10-CM

## 2023-07-17 PROBLEM — F43.20 ADJUSTMENT DISORDER: Status: ACTIVE | Noted: 2023-07-17

## 2023-07-17 PROBLEM — F12.90 CANNABIS USE DISORDER: Status: ACTIVE | Noted: 2023-07-17

## 2023-07-17 PROBLEM — F19.94 SUBSTANCE INDUCED MOOD DISORDER (HCC): Status: ACTIVE | Noted: 2023-07-17

## 2023-07-17 PROCEDURE — 99223 1ST HOSP IP/OBS HIGH 75: CPT | Performed by: STUDENT IN AN ORGANIZED HEALTH CARE EDUCATION/TRAINING PROGRAM

## 2023-07-17 PROCEDURE — 87081 CULTURE SCREEN ONLY: CPT | Performed by: STUDENT IN AN ORGANIZED HEALTH CARE EDUCATION/TRAINING PROGRAM

## 2023-07-17 RX ORDER — ACETAMINOPHEN 325 MG/1
975 TABLET ORAL EVERY 6 HOURS PRN
Status: DISCONTINUED | OUTPATIENT
Start: 2023-07-17 | End: 2023-07-25 | Stop reason: HOSPADM

## 2023-07-17 RX ORDER — BENZTROPINE MESYLATE 1 MG/ML
0.5 INJECTION INTRAMUSCULAR; INTRAVENOUS
Status: DISCONTINUED | OUTPATIENT
Start: 2023-07-17 | End: 2023-07-25 | Stop reason: HOSPADM

## 2023-07-17 RX ORDER — TRAZODONE HYDROCHLORIDE 50 MG/1
50 TABLET ORAL
Status: DISCONTINUED | OUTPATIENT
Start: 2023-07-17 | End: 2023-07-25 | Stop reason: HOSPADM

## 2023-07-17 RX ORDER — HYDROXYZINE HYDROCHLORIDE 25 MG/1
25 TABLET, FILM COATED ORAL
Status: DISCONTINUED | OUTPATIENT
Start: 2023-07-17 | End: 2023-07-25 | Stop reason: HOSPADM

## 2023-07-17 RX ORDER — POLYETHYLENE GLYCOL 3350 17 G/17G
17 POWDER, FOR SOLUTION ORAL DAILY PRN
Status: DISCONTINUED | OUTPATIENT
Start: 2023-07-17 | End: 2023-07-25 | Stop reason: HOSPADM

## 2023-07-17 RX ORDER — ACETAMINOPHEN 325 MG/1
650 TABLET ORAL EVERY 4 HOURS PRN
Status: DISCONTINUED | OUTPATIENT
Start: 2023-07-17 | End: 2023-07-25 | Stop reason: HOSPADM

## 2023-07-17 RX ORDER — BISACODYL 10 MG
10 SUPPOSITORY, RECTAL RECTAL DAILY PRN
Status: DISCONTINUED | OUTPATIENT
Start: 2023-07-17 | End: 2023-07-25 | Stop reason: HOSPADM

## 2023-07-17 RX ORDER — LORAZEPAM 2 MG/ML
1 INJECTION INTRAMUSCULAR
Status: DISCONTINUED | OUTPATIENT
Start: 2023-07-17 | End: 2023-07-25 | Stop reason: HOSPADM

## 2023-07-17 RX ORDER — BENZTROPINE MESYLATE 1 MG/ML
1 INJECTION INTRAMUSCULAR; INTRAVENOUS
Status: DISCONTINUED | OUTPATIENT
Start: 2023-07-17 | End: 2023-07-25 | Stop reason: HOSPADM

## 2023-07-17 RX ORDER — LORAZEPAM 2 MG/ML
2 INJECTION INTRAMUSCULAR
Status: DISCONTINUED | OUTPATIENT
Start: 2023-07-17 | End: 2023-07-25 | Stop reason: HOSPADM

## 2023-07-17 RX ORDER — HYDROXYZINE 50 MG/1
50 TABLET, FILM COATED ORAL
Status: DISCONTINUED | OUTPATIENT
Start: 2023-07-17 | End: 2023-07-25 | Stop reason: HOSPADM

## 2023-07-17 RX ORDER — HALOPERIDOL 5 MG/ML
2.5 INJECTION INTRAMUSCULAR
Status: DISCONTINUED | OUTPATIENT
Start: 2023-07-17 | End: 2023-07-25 | Stop reason: HOSPADM

## 2023-07-17 RX ORDER — MAGNESIUM HYDROXIDE/ALUMINUM HYDROXICE/SIMETHICONE 120; 1200; 1200 MG/30ML; MG/30ML; MG/30ML
30 SUSPENSION ORAL EVERY 4 HOURS PRN
Status: DISCONTINUED | OUTPATIENT
Start: 2023-07-17 | End: 2023-07-25 | Stop reason: HOSPADM

## 2023-07-17 RX ORDER — HALOPERIDOL 5 MG/1
2.5 TABLET ORAL
Status: DISCONTINUED | OUTPATIENT
Start: 2023-07-17 | End: 2023-07-25 | Stop reason: HOSPADM

## 2023-07-17 RX ORDER — HALOPERIDOL 5 MG/ML
5 INJECTION INTRAMUSCULAR
Status: DISCONTINUED | OUTPATIENT
Start: 2023-07-17 | End: 2023-07-25 | Stop reason: HOSPADM

## 2023-07-17 RX ORDER — DIPHENHYDRAMINE HYDROCHLORIDE 50 MG/ML
50 INJECTION INTRAMUSCULAR; INTRAVENOUS EVERY 6 HOURS PRN
Status: DISCONTINUED | OUTPATIENT
Start: 2023-07-17 | End: 2023-07-25 | Stop reason: HOSPADM

## 2023-07-17 RX ORDER — LEVETIRACETAM 500 MG/1
1000 TABLET ORAL EVERY 12 HOURS SCHEDULED
Status: DISCONTINUED | OUTPATIENT
Start: 2023-07-17 | End: 2023-07-25 | Stop reason: HOSPADM

## 2023-07-17 RX ORDER — LANOLIN ALCOHOL/MO/W.PET/CERES
100 CREAM (GRAM) TOPICAL DAILY
Status: DISCONTINUED | OUTPATIENT
Start: 2023-07-17 | End: 2023-07-25 | Stop reason: HOSPADM

## 2023-07-17 RX ORDER — BISACODYL 10 MG
10 SUPPOSITORY, RECTAL RECTAL DAILY PRN
Status: DISCONTINUED | OUTPATIENT
Start: 2023-07-17 | End: 2023-07-17

## 2023-07-17 RX ORDER — FOLIC ACID 1 MG/1
1 TABLET ORAL DAILY
Status: DISCONTINUED | OUTPATIENT
Start: 2023-07-17 | End: 2023-07-25 | Stop reason: HOSPADM

## 2023-07-17 RX ORDER — LANOLIN ALCOHOL/MO/W.PET/CERES
3 CREAM (GRAM) TOPICAL
Status: DISCONTINUED | OUTPATIENT
Start: 2023-07-17 | End: 2023-07-17

## 2023-07-17 RX ORDER — BENZTROPINE MESYLATE 1 MG/1
1 TABLET ORAL
Status: DISCONTINUED | OUTPATIENT
Start: 2023-07-17 | End: 2023-07-25 | Stop reason: HOSPADM

## 2023-07-17 RX ORDER — ACETAMINOPHEN 325 MG/1
650 TABLET ORAL EVERY 6 HOURS PRN
Status: DISCONTINUED | OUTPATIENT
Start: 2023-07-17 | End: 2023-07-25 | Stop reason: HOSPADM

## 2023-07-17 RX ORDER — AMOXICILLIN 250 MG
1 CAPSULE ORAL DAILY PRN
Status: DISCONTINUED | OUTPATIENT
Start: 2023-07-17 | End: 2023-07-25 | Stop reason: HOSPADM

## 2023-07-17 RX ORDER — HYDROXYZINE 50 MG/1
100 TABLET, FILM COATED ORAL
Status: DISCONTINUED | OUTPATIENT
Start: 2023-07-17 | End: 2023-07-25 | Stop reason: HOSPADM

## 2023-07-17 RX ORDER — LORAZEPAM 2 MG/ML
2 INJECTION INTRAMUSCULAR EVERY 6 HOURS PRN
Status: DISCONTINUED | OUTPATIENT
Start: 2023-07-17 | End: 2023-07-25 | Stop reason: HOSPADM

## 2023-07-17 RX ORDER — HALOPERIDOL 1 MG/1
1 TABLET ORAL EVERY 6 HOURS PRN
Status: DISCONTINUED | OUTPATIENT
Start: 2023-07-17 | End: 2023-07-25 | Stop reason: HOSPADM

## 2023-07-17 RX ORDER — HALOPERIDOL 5 MG/1
5 TABLET ORAL
Status: DISCONTINUED | OUTPATIENT
Start: 2023-07-17 | End: 2023-07-25 | Stop reason: HOSPADM

## 2023-07-17 RX ADMIN — FOLIC ACID 1 MG: 1 TABLET ORAL at 13:15

## 2023-07-17 RX ADMIN — LEVETIRACETAM 1000 MG: 500 TABLET, FILM COATED ORAL at 08:56

## 2023-07-17 RX ADMIN — THIAMINE HCL TAB 100 MG 100 MG: 100 TAB at 13:15

## 2023-07-17 RX ADMIN — LEVETIRACETAM 1000 MG: 500 TABLET, FILM COATED ORAL at 21:33

## 2023-07-17 RX ADMIN — BICTEGRAVIR SODIUM, EMTRICITABINE, AND TENOFOVIR ALAFENAMIDE FUMARATE 1 TABLET: 50; 200; 25 TABLET ORAL at 09:01

## 2023-07-17 NOTE — ED NOTES
[employer] contacted [facility] to complete insurance authorization at [phone]. Spoke with Judith. She advised that the patient has a dual policy Medicare/Medicaid; Medicare A/B. At this time [facility] is unable to proceed with the authorization/COB until they receive a letter indicating exhaustion of Medicare days. Moe Allen advised that Medicare members are allotted 180 lifetime days for inpatient mental health. This writer does not have the ability to verify Medicare days.

## 2023-07-17 NOTE — NURSING NOTE
Pt is a 201 from [facility] ED presenting with SI to jump off a bridge. Pt reports drinking one cocktail and smoking marjuana pta. Reports having SI "for like 10 minutes". Never had HI or hallucintations. History of HIV and SZ. Takes [employer] and [employer] Homeless by choice. Pt walks with walker. Reports neck muscle spasms. Pt malodorous and poor hygiene. Encouraged shower, declined at this time. Pt comfortable on unit. Walking the halls and laughing with peers. Pt denies history of MRSA. Per chart, pt has history of MRSA. Single room ordered for isolation.

## 2023-07-17 NOTE — NURSING NOTE
Patient is visible on the unit throughout the evening. Easily engaged, brightens upon interaction. Attends and participates in groups with positive attitude. Reports mood improvement throughout the day. Denies SI/HI and AH/VH. Maintained on Q 7 minute checks.

## 2023-07-17 NOTE — ED CARE HANDOFF
Emergency Department Sign Out Note        Sign out and transfer of care from Dr. Otf Michaels. See Separate Emergency Department note. The patient, Gerardo Henriquez, was evaluated by the previous provider for suicidal ideation. Workup Completed:  Labs Reviewed   RAPID DRUG SCREEN, URINE - Abnormal       Result Value Ref Range Status    Amph/Meth UR Negative  Negative Final    Barbiturate Ur Negative  Negative Final    Benzodiazepine Urine Negative  Negative Final    Cocaine Urine Negative  Negative Final    Methadone Urine Negative  Negative Final    Opiate Urine Negative  Negative Final    PCP Ur Negative  Negative Final    THC Urine Positive (*) Negative Final    Oxycodone Urine Negative  Negative Final    Narrative:     Presumptive report. If requested, specimen will be sent to reference lab for confirmation. FOR MEDICAL PURPOSES ONLY. IF CONFIRMATION NEEDED PLEASE CONTACT THE LAB WITHIN 5 DAYS. Drug Screen Cutoff Levels:  AMPHETAMINE/METHAMPHETAMINES  1000 ng/mL  BARBITURATES     200 ng/mL  BENZODIAZEPINES     200 ng/mL  COCAINE      300 ng/mL  METHADONE      300 ng/mL  OPIATES      300 ng/mL  PHENCYCLIDINE     25 ng/mL  THC       50 ng/mL  OXYCODONE      100 ng/mL   POCT ALCOHOL BREATH TEST - Normal    EXTBreath Alcohol 0.00   Final       ED Course / Workup Pending (followup):      ED Course as of 07/17/23 1348   Sun Jul 16, 2023   0659 SO: SI with plan to jump off bridge. Got ativan for anxiety. [ ] crisis + dispo   [id number] completed. 26 Notified by Liban Tello from crisis that [address] is requesting [facility] psych consult as they fell patient may be malingering. Consult ordered. [id number] Patient evaluated by Dr. Nikolai George from psych. Recommending continuation of pursuing inpatient treatment at this time. Crisis worker Mir Henning updated, will continue bed search. Mon Jul 17, 2023   0702 SO: to dusty at 10 am     Procedures  Medical Decision Making  70-year-old male previously evaluated for suicidal ideation. 201 in place. Accepted to [facility]. Signed out to me pending transport. No acute issues at this time. Patient remained stable in the emergency department and transferred in stable condition. Suicidal ideation: acute illness or injury  Amount and/or Complexity of Data Reviewed  Labs: ordered. Risk  Prescription drug management. Decision regarding hospitalization. Disposition  Final diagnoses:   Suicidal ideation     Time reflects when diagnosis was documented in both MDM as applicable and the Disposition within this note     Time User Action Codes Description Comment    7/16/2023  1:54 AM Daniel Inman Add [M62.037] Suicidal ideation       ED Disposition     ED Disposition   Transfer to [address] [place] [address]   --    Date/Time   Mon Jul 17, 2023  4:15 AM    Comment   Abby Villanueva should be transferred to [facility](Dr. Sherryle Matte) and has been medically cleared.             MD Documentation    Tamia Warren Most Recent Value   Patient Condition The patient has been stabilized such that within reasonable medical probability, no material deterioration of the patient condition or the condition of the unborn child(lina) is likely to result from the transfer   Reason for Transfer No bed available at level of patient's needs  [Psychiatry]   Benefits of Transfer Specialized equipment and/or services available at the receiving facility (Include comment)________________________  [Psychiatry]   Risks of Transfer Increased discomfort during transfer, Potential for delay in receiving treatment   Accepting Physician Dr. Chris Márquez [facility], [place] Ang, [place]   Sending MD Dr. Glynn Sorensen   Provider Certification General risk, such as traffic hazards, adverse weather conditions, rough terrain or turbulence, possible failure of equipment (including vehicle or aircraft), or consequences of actions of persons outside the control of the transport personnel, Unanticipated needs of medical equipment and personnel during transport      RN Documentation    [address] Name, [place], [facility], [address] [place] [address] Road      Follow-up Information    None       Discharge Medication List as of 7/17/2023 10:16 AM      CONTINUE these medications which have NOT CHANGED    Details   acetaminophen (TYLENOL) 500 mg tablet Take 1 tablet (500 mg total) by mouth every 6 (six) hours as needed for mild pain or moderate pain, Starting Fri 9/30/2022, Print      bictegravir-emtricitab-tenofovir alafenamide (Biktarvy) -25 MG tablet Take 1 tablet by mouth daily, Starting Tue 6/13/2023, Until Thu 7/13/2023, Normal      Cholecalciferol 25 MCG (1000 UT) tablet Take 1,000 Units by mouth, Historical Med      !! cyclobenzaprine (FLEXERIL) 10 mg tablet Take 1 tablet (10 mg total) by mouth 2 (two) times a day as needed for muscle spasms, Starting Sat 3/26/2022, Normal      !! cyclobenzaprine (FLEXERIL) 10 mg tablet Take 1 tablet (10 mg total) by mouth 2 (two) times a day as needed for muscle spasms, Starting Tue 10/4/2022, Normal      levETIRAcetam (Keppra) 1000 MG tablet Take 1 tablet (1,000 mg total) by mouth 2 (two) times a day, Starting Tue 6/13/2023, Until Thu 7/13/2023, Normal      ondansetron (Zofran ODT) 4 mg disintegrating tablet Take 1 tablet (4 mg total) by mouth every 6 (six) hours as needed for nausea or vomiting, Starting Wed 8/31/2022, Normal       !! - Potential duplicate medications found. Please discuss with provider. No discharge procedures on file.        ED Provider  Electronically Signed by     Mars Julian MD  07/17/23 3226

## 2023-07-17 NOTE — ED NOTES
Report called to [facility].   Spoke with Malcolm SALAZAR  Pt left department  at this time under care  of [address], RN  07/17/23 1017 Tommy Geo Blackwell  07/17/23 1015

## 2023-07-17 NOTE — EMTALA/ACUTE CARE TRANSFER
formerly [facility] EMERGENCY DEPARTMENT  [address] [facility] [address]  Dept: [phone]      EMTALA TRANSFER CONSENT    NAME Patrick Joyner                                         1985                              MRN [id number]    I have been informed of my rights regarding examination, treatment, and transfer   by Dr. Ced Neri MD    Benefits: Specialized equipment and/or services available at the receiving facility (Include comment)________________________ (Psychiatry)    Risks: Increased discomfort during transfer, Potential for delay in receiving treatment      Consent for Transfer:  I acknowledge that my medical condition has been evaluated and explained to me by the emergency department physician or other qualified medical person and/or my attending physician, who has recommended that I be transferred to the service of  Accepting Physician: Dr. Cat Espitia at [address] [place] [address] [address] Name, [address] [place] [address] Street : [facility]. The above potential benefits of such transfer, the potential risks associated with such transfer, and the probable risks of not being transferred have been explained to me, and I fully understand them. The doctor has explained that, in my case, the benefits of transfer outweigh the risks. I agree to be transferred. I authorize the performance of emergency medical procedures and treatments upon me in both transit and upon arrival at the receiving facility. Additionally, I authorize the release of any and all medical records to the receiving facility and request they be transported with me, if possible. I understand that the safest mode of transportation during a medical emergency is an ambulance and that the Hospital advocates the use of this mode of transport.  Risks of traveling to the receiving facility by car, including absence of medical control, life sustaining equipment, such as oxygen, and medical personnel has been explained to me and I fully understand them. (RAFIA CORRECT BOX BELOW)  [  ]  I consent to the stated transfer and to be transported by ambulance/helicopter. [  ]  I consent to the stated transfer, but refuse transportation by ambulance and accept full responsibility for my transportation by car. I understand the risks of non-ambulance transfers and I exonerate the Hospital and its staff from any deterioration in my condition that results from this refusal.    X___________________________________________    DATE  23  TIME________  Signature of patient or legally responsible individual signing on patient behalf           RELATIONSHIP TO PATIENT_________________________          Provider Certification    NAME Gerardo Henriquez                                         1985                              MRN [id number]    A medical screening exam was performed on the above named patient. Based on the examination:    Condition Necessitating Transfer The encounter diagnosis was Suicidal ideation. Patient Condition: The patient has been stabilized such that within reasonable medical probability, no material deterioration of the patient condition or the condition of the unborn child(lina) is likely to result from the transfer    Reason for Transfer: No bed available at level of patient's needs (Psychiatry)    Transfer Requirements: [address] [place], [place]   · Space available and qualified personnel available for treatment as acknowledged by    · Agreed to accept transfer and to provide appropriate medical treatment as acknowledged by       Dr. Joseph Linton  · Appropriate medical records of the examination and treatment of the patient are provided at the time of transfer   [address] [facility] [address] _______  · Transfer will be performed by qualified personnel from    and appropriate transfer equipment as required, including the use of necessary and appropriate life support measures.     Provider Certification: I have examined the patient and explained the following risks and benefits of being transferred/refusing transfer to the patient/family:  General risk, such as traffic hazards, adverse weather conditions, rough terrain or turbulence, possible failure of equipment (including vehicle or aircraft), or consequences of actions of persons outside the control of the transport personnel, Unanticipated needs of medical equipment and personnel during transport      Based on these reasonable risks and benefits to the patient and/or the unborn child(lina), and based upon the information available at the time of the patient’s examination, I certify that the medical benefits reasonably to be expected from the provision of appropriate medical treatments at another medical facility outweigh the increasing risks, if any, to the individual’s medical condition, and in the case of labor to the unborn child, from effecting the transfer.     X____________________________________________ DATE 07/17/23        TIME_______      ORIGINAL - SEND TO MEDICAL RECORDS   COPY - SEND WITH PATIENT DURING TRANSFER

## 2023-07-17 NOTE — ED NOTES
Patient updated. He was somnolent but voiced understanding and agreement agreement with transfer to [facility] for inpatient treatment. Patient was asked about insurance information. Patient was unable to identify current payors  and replied with "No" in regard to having copies of the cards. EMTALA done and placed on transfer packet on chart.

## 2023-07-17 NOTE — NURSING NOTE
Bin   Jacket rugrats   Pants gray strings   Tote bag   Water bottles   Empty liquor bottle   Empty arleth cup   chinese noodles in bag   Empty cony sandwith container    battery   Bag from the ed 15 bucks in it and bracelets , neckles's, rings    vape bar  Yellow   Pillow case in grocery bag ear buds   cellphone  Ed bag   With loose change   Paper from ed     Bedside   [employer] t shirt yellow and red   biggie fabian sweat shirt crew neck   Clueless t shirt   Crocs on   Boxers

## 2023-07-17 NOTE — H&P
Psychiatric Evaluation - Behavioral Health   Keith Cunningham 40 y.o. male MRN: [id number]  Unit/Bed#: U 207-01 Encounter: [id number]    Assessment/Plan   Principal Problem:    Substance induced mood disorder ([address] [facility])  Active Problems:    HIV (human immunodeficiency virus infection) ([facility])    Homelessness    Tobacco dependence    Recurrent seizures ([facility])    Cannabis use disorder    Alcohol use    Adjustment disorder    Plan:   Continue Keppra and Biktarvy  Psychotherapy  Alcohol withdrawal protocol  Check for MRSA  Admit to [address] on 201 status for safety and treatment  No 1:1 continuous observation needed at this time, as patient feels safe on the unit. Check admission labs. Get collaterals. Collaborate with family for baseline assessment and disposition planning. Case discussed with treatment team.    Treatment options and alternatives were reviewed with the patient, who concurs with the above plan. Risks, benefits, and possible side effects of medications were explained to the patient, and he verbalizes understanding.      -----------------------------------    Chief Complaint: "I wanted to die"    History of Present Illness     Per ED provider on 7/16:"Patient is a 49-year-old male seen in the emergency department with concern for depression/suicidal ideation. Patient notes suicidal ideation with a plan to jump off a bridge. Patient notes symptoms worsening over the past day. Patient notes no chest pain, shortness of breath, abdominal pain, nausea, vomiting, weakness, numbness, tingling. Patient notes no definite clear exacerbating or alleviating factors for his symptoms. Patient requests medication for anxiety. Patient has no other acute medical complaints in the emergency department."    Per Crisis worker on 7/16:"Met with patient to complete the crisis intake assessment and the safety risk assessment. Patient came to ER voluntarily with complaint of SI with plan.   Patient was woken for assessment. He kept his eyes closed throughout the assessment. Patient was a poor historian but coherent. He spoke softly and was oriented x3. Patient endorsed SI with a plan to jump off a bridge. He denied HI, AVH, paranoia, and anxiety. Patient was unable to identify a trigger for depression and SI. Patient is homeless. Hygiene was poor, dirt on the sheets from his body and dirt under his nails. Patient denied current OP services and denied previous inpatient behavioral health hospitalization. He was unable to contract for safety. Patient was agreeable to signing a 201 and expressed understanding of rights."      This is 39 yo male with hx of HIV/seizures and alcohol/cannabis use admitted to inpatient unit on voluntary status for worsening of mood and suicidal ideation with plan in the context of psychosocial stressors and non compliance with treatment. Patient reports while smoking cannabis and drinking a shot of alcohol he had thought to kill himself so brought to hospital. Patient appears disheveled, malodorous, unkempt and ambulates with walker. Denies any symptoms of depression or abhishek or psychosis currently. Denies any thoughts to hurt self or others. Psychiatric Review Of Systems:  Medication side effects: none  Sleep: Good  Appetite: no change  Hygiene: able to tend to instrumental and basic ADLs  Anxiety and panic attacks: denies  Psychotic Symptoms: denies  Depression Symptoms: denies  Manic Symptoms: denies  PTSD Symptoms: denies  Suicidal Thoughts: denies  Homicidal Thoughts: denies    Medical Review Of Systems:   Complete ROS is negative, except as noted above. Historical Information     Psychiatric History:   Psychiatry diagnosis:None  Inpatient Hx: This is the first one  Suicidal Hx:Denies  Self harming behavior Hx:Denies  Violent behavior Hx:Denies  Outpatient Hx: None  Medications/Trials: None    Substance Abuse History:  Daily cannabis use and occassional alcohol use.  UDs + THC  I spent time with Brynn Marylou in counseling and education on risk of substance abuse. I assessed motivation and encouraged him for treatment as appropriate. Family Psychiatric History:   Patient denies any known family history of psychiatric illness, suicide attempt, or substance abuse    Social History:  Education: HS  Learning Disabilities:Denies  Living arrangement: Single  Occupational History: unemployed  Functioning Relationships: Mother  Other Pertinent History:    Hx: Denies  Legal Hx: Denies    Traumatic History:   Denies    Past Medical History:  HIV. [place] vs neck muscle spasms? History of Seizures: Yes  History of Head injury with loss of consciousness: no    Past Medical History:   Past Medical History:   Diagnosis Date   • HIV (human immunodeficiency virus infection) ([address])    • Seizures ([address])    • Smoker    • Syphilis         -----------------------------------  Objective    Temp:  [97.6 °F (36.4 °C)-98.6 °F (37 °C)] 97.8 °F (36.6 °C)  HR:  [] 81  Resp:  [16-18] 17  BP: (122-158)/() 135/82    Mental Status Evaluation:    Appearance:  disheveled and older than stated age   Behavior:  cooperative   Speech:  normal pitch and normal volume   Mood:  "ok"   Affect:  mood-congruent   Thought Process:  goal directed and logical   Thought Content:  normal   Perceptual Disturbances: None   Risk Potential: Suicidal Ideations none  Homicidal Ideations none  Potential for Aggression No   Sensorium:  person, place and time/date   Memory:  recent and remote memory grossly intact   Consciousness:  alert and awake    Attention: attention span appeared shorter than expected for age   Insight:  limited   Judgment: limited   Gait/Station: normal gait/station   Motor Activity: no abnormal movements       Meds/Allergies   No Known Allergies  all current active meds have been reviewed    Behavioral Health Medications: all current active meds have been reviewed. Changes as above.     Laboratory results:  I have personally reviewed all pertinent laboratory/tests results. Recent Results (from the past 48 hour(s))   POCT alcohol breath test    Collection Time: 07/16/23  2:04 AM   Result Value Ref Range    EXTBreath Alcohol 0.00    Rapid drug screen, urine    Collection Time: 07/16/23  2:19 AM   Result Value Ref Range    Amph/Meth UR Negative Negative    Barbiturate Ur Negative Negative    Benzodiazepine Urine Negative Negative    Cocaine Urine Negative Negative    Methadone Urine Negative Negative    Opiate Urine Negative Negative    PCP Ur Negative Negative    THC Urine Positive (A) Negative    Oxycodone Urine Negative Negative              -----------------------------------    Risks / Benefits of Treatment:     Risks, benefits, and possible side effects of medications explained to patient. The patient verbalizes understanding and agreement for treatment. Counseling / Coordination of Care:     Patient's presentation on admission and proposed treatment plan were discussed with the treatment team.  Diagnosis, medication changes and treatment plan were reviewed with the patient. Recent stressors were discussed with the patient. Events leading to admission were reviewed with the patient. Importance of medication and treatment compliance was reviewed with the patient.           Inpatient Psychiatric Certification:     Certification: Based upon physical, mental and social evaluations, I certify that inpatient psychiatric services are medically necessary for this patient for a duration of 7 midnights for the treatment of Substance induced mood disorder ([address])

## 2023-07-17 NOTE — ED NOTES
Chart reviewed. 201 signed. Psychiatrist (Dr. Norris Osullivan) recommends inpatient treatment. Patient is accepted at [address] [facility]. CTS at 10.    Nurse report is to be called to [phone] prior to patient transfer.

## 2023-07-17 NOTE — TREATMENT PLAN
[facility] RE[facility] 40 [facility] 1985 [facility] MRN: [id number]    [place] Veronica Room / Bed: [facility]- Encounter: [id number]          Admit Date/Time:  7/17/2023 11:02 AM    Treatment Team: Attending Provider: Carlton Erwin MD; Registered Nurse: Rock Thea RN; Occupational Therapy Assistant: Sammie Holman IV    Diagnosis: Active Problems:    HIV (human immunodeficiency virus infection) ([facility])    Homelessness    Tobacco dependence    Recurrent seizures ([address] [facility])    Cannabis use disorder    Alcohol use      Patient Strengths/Assets: cooperative, motivation for treatment/growth, patient is on a voluntary commitment    Patient Barriers/Limitations: homeless, limited support system, poor physical health, substance abuse    Short Term Goals: decrease in depressive symptoms, decrease in anxiety symptoms, decrease in suicidal thoughts, improvement in insight, sleep improvement, improvement in appetite, mood stabilization    Long Term Goals: improvement in depression, improvement in anxiety, resolution of depressive symptoms, stabilization of mood, free of suicidal thoughts, improved insight, acceptance of need for psychiatric medications, acceptance of need for psychiatric treatment, adequate self care, adequate sleep, adequate appetite    Progress Towards Goals: starting psychiatric medications as prescribed, starting alcohol detoxification protocol, progressing, mood is stabilizing, less anxious    Recommended Treatment: medication management, patient medication education, group therapy, milieu therapy, continued Behavioral Health psychiatric evaluation/assessment process    Treatment Frequency: daily medication monitoring, group and milieu therapy daily, monitoring through interdisciplinary rounds, monitoring through weekly patient care conferences    Expected Discharge Date:  5-7 days    Discharge Plan: referral for outpatient drug and alcohol counseling, referral for outpatient medication management with a psychiatrist, referral for outpatient psychotherapy    Treatment Plan Created/Updated By: Arianna Judge MD

## 2023-07-17 NOTE — ED NOTES
Patient is accepted at [facility]  Patient is accepted by Dr. Sherryle Matte per Sven Santillan. Intake/Crisis    Transportation is arranged with TBD logged in round trip  Transportation is scheduled for **. Patient may go to the floor after 10am 7/17/23          Nurse report is to be called to [phone] prior to patient transfer.

## 2023-07-17 NOTE — ED NOTES
Patient requesting dinner tray, explained to patient dinner tray was provided to him at 1702 and kitchen is currently closed. Provided patient a turkey sandwich.      Janak Bay RN  07/16/23 2120

## 2023-07-18 PROBLEM — M54.2 NECK PAIN: Status: ACTIVE | Noted: 2023-07-18

## 2023-07-18 PROBLEM — Z00.8 MEDICAL CLEARANCE FOR PSYCHIATRIC ADMISSION: Status: ACTIVE | Noted: 2023-07-18

## 2023-07-18 LAB
25(OH)D3 SERPL-MCNC: 28.3 NG/ML (ref 30–100)
ALBUMIN SERPL BCP-MCNC: 4.4 G/DL (ref 3.5–5)
ALP SERPL-CCNC: 64 U/L (ref 34–104)
ALT SERPL W P-5'-P-CCNC: 14 U/L (ref 7–52)
ANION GAP SERPL CALCULATED.3IONS-SCNC: 6 MMOL/L
AST SERPL W P-5'-P-CCNC: 23 U/L (ref 13–39)
BACTERIA UR QL AUTO: ABNORMAL /HPF
BASOPHILS # BLD AUTO: 0.04 THOUSANDS/ÂΜL (ref 0–0.1)
BASOPHILS NFR BLD AUTO: 1 % (ref 0–1)
BILIRUB SERPL-MCNC: 0.67 MG/DL (ref 0.2–1)
BILIRUB UR QL STRIP: NEGATIVE
BUN SERPL-MCNC: 14 MG/DL (ref 5–25)
CALCIUM SERPL-MCNC: 9.3 MG/DL (ref 8.4–10.2)
CHLORIDE SERPL-SCNC: 104 MMOL/L (ref 96–108)
CHOLEST SERPL-MCNC: 158 MG/DL
CLARITY UR: CLEAR
CO2 SERPL-SCNC: 26 MMOL/L (ref 21–32)
COLOR UR: YELLOW
CREAT SERPL-MCNC: 0.91 MG/DL (ref 0.6–1.3)
EOSINOPHIL # BLD AUTO: 0.3 THOUSAND/ÂΜL (ref 0–0.61)
EOSINOPHIL NFR BLD AUTO: 6 % (ref 0–6)
ERYTHROCYTE [DISTWIDTH] IN BLOOD BY AUTOMATED COUNT: 13.5 % (ref 11.6–15.1)
FOLATE SERPL-MCNC: 15.1 NG/ML
GFR SERPL CREATININE-BSD FRML MDRD: 107 ML/MIN/1.73SQ M
GLUCOSE P FAST SERPL-MCNC: 86 MG/DL (ref 65–99)
GLUCOSE SERPL-MCNC: 86 MG/DL (ref 65–140)
GLUCOSE UR STRIP-MCNC: NEGATIVE MG/DL
HCT VFR BLD AUTO: 45.8 % (ref 36.5–49.3)
HDLC SERPL-MCNC: 30 MG/DL
HGB BLD-MCNC: 15.3 G/DL (ref 12–17)
HGB UR QL STRIP.AUTO: NEGATIVE
IMM GRANULOCYTES # BLD AUTO: 0.01 THOUSAND/UL (ref 0–0.2)
IMM GRANULOCYTES NFR BLD AUTO: 0 % (ref 0–2)
KETONES UR STRIP-MCNC: NEGATIVE MG/DL
LDLC SERPL CALC-MCNC: 104 MG/DL (ref 0–100)
LEUKOCYTE ESTERASE UR QL STRIP: NEGATIVE
LYMPHOCYTES # BLD AUTO: 2.38 THOUSANDS/ÂΜL (ref 0.6–4.47)
LYMPHOCYTES NFR BLD AUTO: 43 % (ref 14–44)
MCH RBC QN AUTO: 29.5 PG (ref 26.8–34.3)
MCHC RBC AUTO-ENTMCNC: 33.4 G/DL (ref 31.4–37.4)
MCV RBC AUTO: 88 FL (ref 82–98)
MONOCYTES # BLD AUTO: 0.58 THOUSAND/ÂΜL (ref 0.17–1.22)
MONOCYTES NFR BLD AUTO: 11 % (ref 4–12)
MRSA NOSE QL CULT: NORMAL
NEUTROPHILS # BLD AUTO: 2.09 THOUSANDS/ÂΜL (ref 1.85–7.62)
NEUTS SEG NFR BLD AUTO: 39 % (ref 43–75)
NITRITE UR QL STRIP: NEGATIVE
NON-SQ EPI CELLS URNS QL MICRO: ABNORMAL /HPF
NONHDLC SERPL-MCNC: 128 MG/DL
NRBC BLD AUTO-RTO: 0 /100 WBCS
PH UR STRIP.AUTO: 5.5 [PH]
PLATELET # BLD AUTO: 178 THOUSANDS/UL (ref 149–390)
PMV BLD AUTO: 10.4 FL (ref 8.9–12.7)
POTASSIUM SERPL-SCNC: 3.8 MMOL/L (ref 3.5–5.3)
PROT SERPL-MCNC: 7.9 G/DL (ref 6.4–8.4)
PROT UR STRIP-MCNC: ABNORMAL MG/DL
RBC # BLD AUTO: 5.19 MILLION/UL (ref 3.88–5.62)
RBC #/AREA URNS AUTO: ABNORMAL /HPF
SODIUM SERPL-SCNC: 136 MMOL/L (ref 135–147)
SP GR UR STRIP.AUTO: >=1.03 (ref 1–1.03)
TRIGL SERPL-MCNC: 120 MG/DL
TSH SERPL DL<=0.05 MIU/L-ACNC: 1.05 UIU/ML (ref 0.45–4.5)
UROBILINOGEN UR STRIP-ACNC: <2 MG/DL
VIT B12 SERPL-MCNC: 413 PG/ML (ref 180–914)
WBC # BLD AUTO: 5.4 THOUSAND/UL (ref 4.31–10.16)
WBC #/AREA URNS AUTO: ABNORMAL /HPF

## 2023-07-18 PROCEDURE — 85025 COMPLETE CBC W/AUTO DIFF WBC: CPT | Performed by: STUDENT IN AN ORGANIZED HEALTH CARE EDUCATION/TRAINING PROGRAM

## 2023-07-18 PROCEDURE — 82306 VITAMIN D 25 HYDROXY: CPT | Performed by: STUDENT IN AN ORGANIZED HEALTH CARE EDUCATION/TRAINING PROGRAM

## 2023-07-18 PROCEDURE — 81001 URINALYSIS AUTO W/SCOPE: CPT | Performed by: PSYCHIATRY & NEUROLOGY

## 2023-07-18 PROCEDURE — 99253 IP/OBS CNSLTJ NEW/EST LOW 45: CPT | Performed by: PHYSICIAN ASSISTANT

## 2023-07-18 PROCEDURE — 82746 ASSAY OF FOLIC ACID SERUM: CPT | Performed by: STUDENT IN AN ORGANIZED HEALTH CARE EDUCATION/TRAINING PROGRAM

## 2023-07-18 PROCEDURE — 82607 VITAMIN B-12: CPT | Performed by: STUDENT IN AN ORGANIZED HEALTH CARE EDUCATION/TRAINING PROGRAM

## 2023-07-18 PROCEDURE — 99232 SBSQ HOSP IP/OBS MODERATE 35: CPT | Performed by: STUDENT IN AN ORGANIZED HEALTH CARE EDUCATION/TRAINING PROGRAM

## 2023-07-18 PROCEDURE — 80061 LIPID PANEL: CPT | Performed by: STUDENT IN AN ORGANIZED HEALTH CARE EDUCATION/TRAINING PROGRAM

## 2023-07-18 PROCEDURE — 84443 ASSAY THYROID STIM HORMONE: CPT | Performed by: STUDENT IN AN ORGANIZED HEALTH CARE EDUCATION/TRAINING PROGRAM

## 2023-07-18 PROCEDURE — 80053 COMPREHEN METABOLIC PANEL: CPT | Performed by: STUDENT IN AN ORGANIZED HEALTH CARE EDUCATION/TRAINING PROGRAM

## 2023-07-18 RX ADMIN — THIAMINE HCL TAB 100 MG 100 MG: 100 TAB at 09:14

## 2023-07-18 RX ADMIN — FOLIC ACID 1 MG: 1 TABLET ORAL at 09:14

## 2023-07-18 RX ADMIN — LEVETIRACETAM 1000 MG: 500 TABLET, FILM COATED ORAL at 21:56

## 2023-07-18 RX ADMIN — BICTEGRAVIR SODIUM, EMTRICITABINE, AND TENOFOVIR ALAFENAMIDE FUMARATE 1 TABLET: 50; 200; 25 TABLET ORAL at 09:14

## 2023-07-18 RX ADMIN — LEVETIRACETAM 1000 MG: 500 TABLET, FILM COATED ORAL at 09:14

## 2023-07-18 NOTE — NURSING NOTE
Pt pleasant during interaction. Smiling and laughing during interaction. Denies SI/HI or hallucinations. Reports "I'm having a great day". Pt quoting movie and TV shows throughout the day. Denies any question or concern at this time.

## 2023-07-18 NOTE — CONSULTS
[address] [facility]  Consult  Name: Susie Byrd 40 y.o. male I MRN: [id number]  Unit/Bed#: -01 I Date of Admission: 7/17/2023   Date of Service: 7/18/2023 I Hospital Day: 1    Inpatient consult for Medical Clearance for [facility] patient  Consult performed by: Dalia Forbes PA-C  Consult ordered by: Marciano Hamman, MD          Assessment/Plan   Medical clearance for psychiatric admission  Assessment & Plan  • At this time, patient appears medically stable to continue inpatient psychiatric management. • Current labs, nursing notes and imaging reviewed. o Pending Labs: Folate, B12, vitamin D, MRSA culture  o Reviewed labs: TSH, lipid panel (), CMP, CBC, UA  • Recent EKG: NSR. 92 bpm. . Artifact. HIV (human immunodeficiency virus infection) ([address])  Assessment & Plan  · Continue biktarvy   · Recommend patient follow-up as an outpatient for routine follow up  · Encourage medication compliance    Tobacco dependence  Assessment & Plan  · Counseled on cessation  ·  Nicorette gum    Recurrent seizures ([address])  Assessment & Plan  · Recent admission for recurrent seizures  · Continue Keppra twice daily    Neck pain  Assessment & Plan  · Patient with neck spasm intermittently for years. · Noted history of acquired torticollis  · Patient reports that he takes muscle relaxers as needed however this makes him very sleepy and therefore typically avoids these and uses warm compresses instead. Recommendations for Discharge:  · SLIM will continue to be available for any questions or concerns. · Follow up with PCP upon discharge. Counseling / Coordination of Care Time: 30 minutes. Greater than 50% of total time spent on patient counseling and coordination of care. Collaboration of Care:  Were Recommendations Directly Discussed with Primary Treatment Team? - Yes     History of Present Illness:    Susie Byrd is a 40 y.o. male who is originally admitted to the psychiatric service due to suicidal ideation. We are consulted for medical clearance for psychiatric hospitalization and medical management. Patient reports that he is sometimes noncompliant with his medications however has tried to be better recently. Patient has past medical history of recurrent seizures, tobacco abuse HIV. Recently admitted last month for breakthrough seizures with medication noncompliance. Patient reports that his neck has been spasming for some time and typically feels better if he has  in 1 position. He uses warm compresses. Review of Systems:    Review of Systems   Constitutional: Negative for chills, diaphoresis, fatigue, fever and unexpected weight change. HENT: Negative for sore throat. Respiratory: Negative for cough, chest tightness and shortness of breath. Cardiovascular: Negative for chest pain, palpitations and leg swelling. Gastrointestinal: Negative for abdominal pain, diarrhea, nausea and vomiting. Musculoskeletal: Positive for myalgias, neck pain and neck stiffness ( spasm). Neurological: Positive for weakness. Negative for dizziness, syncope, numbness and headaches. Psychiatric/Behavioral: Positive for suicidal ideas. All other systems reviewed and are negative. Past Medical and Surgical History:     Past Medical History:   Diagnosis Date   • HIV (human immunodeficiency virus infection) ([address])    • Seizures ([address])    • Smoker    • Syphilis        Past Surgical History:   Procedure Laterality Date   • HERNIA REPAIR         Meds/Allergies:    PTA meds:   Prior to Admission Medications   Prescriptions Last Dose Informant Patient Reported? Taking?    Cholecalciferol 25 MCG (1000 UT) tablet   Yes No   Sig: Take 1,000 Units by mouth   Patient not taking: Reported on 6/15/2023   acetaminophen (TYLENOL) 500 mg tablet   No No   Sig: Take 1 tablet (500 mg total) by mouth every 6 (six) hours as needed for mild pain or moderate pain   Patient not taking: Reported on 7/4/2023 bictegravir-emtricitab-tenofovir alafenamide (Biktarvy) -25 MG tablet   No No   Sig: Take 1 tablet by mouth daily   cyclobenzaprine (FLEXERIL) 10 mg tablet   No No   Sig: Take 1 tablet (10 mg total) by mouth 2 (two) times a day as needed for muscle spasms   Patient not taking: Reported on 6/15/2023   cyclobenzaprine (FLEXERIL) 10 mg tablet   No No   Sig: Take 1 tablet (10 mg total) by mouth 2 (two) times a day as needed for muscle spasms   Patient not taking: Reported on 6/15/2023   levETIRAcetam (Keppra) 1000 MG tablet   No No   Sig: Take 1 tablet (1,000 mg total) by mouth 2 (two) times a day   ondansetron (Zofran ODT) 4 mg disintegrating tablet   No No   Sig: Take 1 tablet (4 mg total) by mouth every 6 (six) hours as needed for nausea or vomiting   Patient not taking: Reported on 9/17/2022      Facility-Administered Medications: None       Allergies: No Known Allergies    Social History:     Marital Status: Single    Substance Use History:   Social History     Substance and Sexual Activity   Alcohol Use Yes   • Alcohol/week: 2.0 standard drinks of alcohol   • Types: 1 Glasses of wine, 1 Cans of beer per week    Comment: socially     Social History     Tobacco Use   Smoking Status Every Day   • Packs/day: 2.00   • Years: 8.00   • Total pack years: 16.00   • Types: Cigarettes   Smokeless Tobacco Never   Tobacco Comments    "At least 2 packs a day" of cigarettes. Social History     Substance and Sexual Activity   Drug Use Yes   • Frequency: 1.0 times per week   • Types: Marijuana       Family History:    History reviewed. No pertinent family history.     Physical Exam:     Vitals:   Blood Pressure: 137/100 (07/18/23 0753)  Pulse: 83 (07/18/23 0753)  Temperature: (!) 97 °F (36.1 °C) (07/18/23 0753)  Temp Source: Tympanic (07/18/23 0753)  Respirations: 17 (07/18/23 0753)  Height: 5' 7" (170.2 cm) (07/17/23 1105)  Weight - Scale: 74.8 kg (165 lb) (07/17/23 1240)  SpO2: 100 % (07/18/23 0753)    Physical Exam  Vitals and nursing note reviewed. Constitutional:       General: He is not in acute distress. Appearance: Normal appearance. He is not diaphoretic. HENT:      Head: Normocephalic and atraumatic. Mouth/Throat:      Mouth: Mucous membranes are moist.      Comments: Poor dentition  Cardiovascular:      Rate and Rhythm: Normal rate and regular rhythm. Pulmonary:      Effort: Pulmonary effort is normal.      Breath sounds: Normal breath sounds. No stridor. No wheezing, rhonchi or rales. Abdominal:      General: Bowel sounds are normal.      Palpations: Abdomen is soft. There is no mass. Tenderness: There is no abdominal tenderness. There is no guarding. Musculoskeletal:      Right lower leg: No edema. Left lower leg: No edema. Comments: Sitting with neck turned towards the left. Able to move neck forward and down without difficult but reports more comfortable in prior positon. NTTP   Skin:     General: Skin is warm and dry. Neurological:      Mental Status: He is alert and oriented to person, place, and time. Cranial Nerves: No cranial nerve deficit. Comments: CN 2-12 grossly intact.            Additional Data:     Lab Results:     Results from last 7 days   Lab Units 07/18/23  0815   WBC Thousand/uL 5.40   HEMOGLOBIN g/dL 15.3   HEMATOCRIT % 45.8   PLATELETS Thousands/uL 178   NEUTROS PCT % 39*   LYMPHS PCT % 43   MONOS PCT % 11   EOS PCT % 6     Results from last 7 days   Lab Units 07/18/23  0815   SODIUM mmol/L 136   POTASSIUM mmol/L 3.8   CHLORIDE mmol/L 104   CO2 mmol/L 26   BUN mg/dL 14   CREATININE mg/dL 0.91   ANION GAP mmol/L 6   CALCIUM mg/dL 9.3   ALBUMIN g/dL 4.4   TOTAL BILIRUBIN mg/dL 0.67   ALK PHOS U/L 64   ALT U/L 14   AST U/L 23   GLUCOSE RANDOM mg/dL 86             No results found for: "HGBA1C"            Imaging: no recent imaging    No orders to display       EKG, Pathology, and Other Studies Reviewed on Admission:   · EKG: see above    ** Please Note: This note has been constructed using a voice recognition system.  **

## 2023-07-18 NOTE — PROGRESS NOTES
Status: Pt is a 89 from [facility], who presented with increased depression & SI to jump off a bridge. Pt is homeless, & frequently presents to the ER; he has been to the ER 31 times since January 1, 2023. Pt reported he his homeless by choice. He reported he had 1 cocktail & smoke THC prior to going to the ER & had SI "for like 10 minutes". He is malodorous & did get in the shower, but came out in the same condition. Pt uses a walker to ambulate. He has a history of MRSA & was swabbed. Pt appeared to have slept overnight. Reviewed readmit score: 23(RED), AUDIT: 2, PAWSS: 1, BAT: 0, UDS: + THC  Medication: to be reviewed / no PRNs  D/C: TBD  / new admission      07/18/23 0750   Team Meeting   Meeting Type Daily Rounds   Team Members Present   Team Members Present Physician;Nurse;; Other (Discipline and Name)   Physician Team Member Dr. Brandon Ordaz / Dr. Becki Nino / Ciera Ernandez / Thuy Lyons / Ildefonso Hutton Team Member Maura Canchola / Jose Guadalupe Morgan Management Team Member Charlene Sales / Joyce Morocho   Other (Discipline and Name) Jose L(art therapy)   Patient/Family Present   Patient Present No   Patient's Family Present No

## 2023-07-18 NOTE — TREATMENT TEAM
07/18/23 1230   Activity/Group Checklist   Group Other (Comment)  (art therapy)   Attendance Attended   Attendance Duration (min) 16-30  (left session early)   Interactions Interacted appropriately  (singing and quoting movies throughout with peers. redirected once for an inappropriate question. easily redirectable)   Affect/Mood Appropriate   Goals Achieved Other (Comment); Identified feelings; Able to listen to others; Able to engage in interactions  (authentic, spontaneous self expression, connection and insight. utilized coloring pages and kinetic sand)

## 2023-07-18 NOTE — PROGRESS NOTES
Progress Note - Behavioral Health   Susie Byrd 40 y.o. male MRN: [id number]  Unit/Bed#: [facility] 205-01 Encounter: [id number]    Assessment/Plan   Principal Problem:    Substance induced mood disorder ([address])  Active Problems:    HIV (human immunodeficiency virus infection) ([address])    Homelessness    Tobacco dependence    Recurrent seizures ([address])    Cannabis use disorder    Alcohol use    Adjustment disorder    Medical clearance for psychiatric admission      Subjective: Patient was seen, chart was reviewed, and case was discussed with the team. Patient appears unkempt, malodorous and pleasant. He is visible in groups and activities. Mood is good. Anxiety is manageable. Sleep and appetite are good. He is compliant with medications. Denies any side effects.   Behavior over the last 24 hours:  unchanged  Sleep: normal  Appetite: normal  Medication side effects: No    Medical ROS: Pertinent items are noted in HPI.all other systems are negative    Current Medications:  Current Facility-Administered Medications   Medication Dose Route Frequency   • acetaminophen (TYLENOL) tablet 650 mg  650 mg Oral Q6H PRN   • acetaminophen (TYLENOL) tablet 650 mg  650 mg Oral Q4H PRN   • acetaminophen (TYLENOL) tablet 975 mg  975 mg Oral Q6H PRN   • aluminum-magnesium hydroxide-simethicone (MAALOX) oral suspension 30 mL  30 mL Oral Q4H PRN   • haloperidol lactate (HALDOL) injection 2.5 mg  2.5 mg Intramuscular Q4H PRN Max 4/day    And   • LORazepam (ATIVAN) injection 1 mg  1 mg Intramuscular Q4H PRN Max 4/day    And   • benztropine (COGENTIN) injection 0.5 mg  0.5 mg Intramuscular Q4H PRN Max 4/day   • haloperidol lactate (HALDOL) injection 5 mg  5 mg Intramuscular Q4H PRN Max 4/day    And   • LORazepam (ATIVAN) injection 2 mg  2 mg Intramuscular Q4H PRN Max 4/day    And   • benztropine (COGENTIN) injection 1 mg  1 mg Intramuscular Q4H PRN Max 4/day   • benztropine (COGENTIN) tablet 1 mg  1 mg Oral Q4H PRN Max 6/day   • bictegravir-emtricitab-tenofovir alafenamide (BIKTARVY) -25 MG tablet 1 tablet  1 tablet Oral Daily With Breakfast   • bisacodyl (DULCOLAX) rectal suppository 10 mg  10 mg Rectal Daily PRN   • hydrOXYzine HCL (ATARAX) tablet 50 mg  50 mg Oral Q6H PRN Max 4/day    Or   • diphenhydrAMINE (BENADRYL) injection 50 mg  50 mg Intramuscular X6R PRN   • folic acid (FOLVITE) tablet 1 mg  1 mg Oral Daily   • haloperidol (HALDOL) tablet 1 mg  1 mg Oral Q6H PRN   • haloperidol (HALDOL) tablet 2.5 mg  2.5 mg Oral Q4H PRN Max 4/day   • haloperidol (HALDOL) tablet 5 mg  5 mg Oral Q4H PRN Max 4/day   • hydrOXYzine HCL (ATARAX) tablet 100 mg  100 mg Oral Q6H PRN Max 4/day    Or   • LORazepam (ATIVAN) injection 2 mg  2 mg Intramuscular Q6H PRN   • hydrOXYzine HCL (ATARAX) tablet 25 mg  25 mg Oral Q6H PRN Max 4/day   • levETIRAcetam (KEPPRA) tablet 1,000 mg  1,000 mg Oral Q12H MARJ   • nicotine polacrilex (NICORETTE) gum 4 mg  4 mg Oral Q2H PRN   • polyethylene glycol (MIRALAX) packet 17 g  17 g Oral Daily PRN   • senna-docusate sodium (SENOKOT S) 8.6-50 mg per tablet 1 tablet  1 tablet Oral Daily PRN   • thiamine tablet 100 mg  100 mg Oral Daily   • traZODone (DESYREL) tablet 50 mg  50 mg Oral HS PRN       Behavioral Health Medications:   all current active meds have been reviewed. Vitals:  Vitals:    07/18/23 0753   BP: 137/100   Pulse: 83   Resp: 17   Temp: (!) 97 °F (36.1 °C)   SpO2: 100%       Laboratory results:    I have personally reviewed all pertinent laboratory/tests results.     Mental Status Evaluation:    Appearance:  disheveled and older than stated age   Behavior:  cooperative   Speech:  normal pitch and normal volume   Mood:  "good"   Affect:  mood-congruent   Thought Process:  goal directed and logical   Thought Content:  normal   Perceptual Disturbances: None   Risk Potential: Suicidal Ideations none  Homicidal Ideations none  Potential for Aggression No   Sensorium:  person, place, time/date, situation, day of week, month of year and year   Memory:  recent and remote memory grossly intact   Consciousness:  alert and awake    Attention: attention span appeared shorter than expected for age   Insight:  limited   Judgment: limited   Gait/Station: normal gait/station   Motor Activity: no abnormal movements       Progress Toward Goals: Progressing    Recommended Treatment: Continue with pharmacotherapy, group therapy, milieu therapy and occupational therapy. Risks, benefits and possible side effects of Medications:   Risks, benefits, alternatives, and possible side effects of patient's psychiatric medications were discussed with patient.

## 2023-07-18 NOTE — NURSING NOTE
Pt napping at times this afternoon. Very bright and social with peers and staff. Visualized attending groups and playing card games with peers. Denies SI/HI/AVH. Pt malodorous; encouraged to shower.

## 2023-07-18 NOTE — PROGRESS NOTES
Treatment Plan Meeting:  Diagnosis of Homelessness, Tobacco dependence, Cannabis use disorder, and Alcohol use reviewed. Discussed short term goals for decrease in depressive symptoms, decrease in anxiety symptoms, decrease in suicidal thoughts, improvement in insight, sleep improvement, improvement in appetite, and mood stabilization. At this time, no discharge date is set. All parties in agreement & treatment plan was signed. 07/18/23 0835   Team Meeting   Meeting Type Tx Team Meeting   Initial Conference Date 07/18/23   Next Conference Date 08/16/23   Team Members Present   Team Members Present Physician;Nurse;; Other (Discipline and Name)   Physician Team Member Dr. Markus Mazariegos Team Member [facility] Management Team Member Denisse   Patient/Family Present   Patient Present No  (Pt declined to meet with Treatment Team.)   Patient's Family Present No

## 2023-07-18 NOTE — TREATMENT TEAM
07/18/23 0930   Activity/Group Checklist   Group Exercise   Attendance Attended   Attendance Duration (min) 16-30   Interactions Interacted appropriately   Affect/Mood Appropriate   Goals Achieved Able to listen to others; Able to engage in interactions; Other (Comment)  (participated in stretches and light workout with some modifications when needed)

## 2023-07-18 NOTE — PROGRESS NOTES
[facility] Group Note     07/18/23 3890   Activity/Group Checklist   Group Anger management  (Anger Triggers and Coping Skills)   Attendance Attended   Attendance Duration (min) 31-45  (arrived late to group)   Interactions Interacted appropriately   Affect/Mood Appropriate;Bright  (Focused and motivated)   Goals Achieved Identified feelings; Identified triggers; Discussed coping strategies; Able to listen to others; Able to engage in interactions; Able to reflect/comment on own behavior;Able to self-disclose; Able to recieve feedback; Able to give feedback to another

## 2023-07-18 NOTE — ASSESSMENT & PLAN NOTE
• At this time, patient appears medically stable to continue inpatient psychiatric management. • Current labs, nursing notes and imaging reviewed. o Pending Labs: Folate, B12, vitamin D, MRSA culture  o Reviewed labs: TSH, lipid panel (), CMP, CBC, UA  • Recent EKG: NSR. 92 bpm. . Artifact.

## 2023-07-18 NOTE — ASSESSMENT & PLAN NOTE
· Continue biktarvy   · Recommend patient follow-up as an outpatient for routine follow up  · Encourage medication compliance

## 2023-07-18 NOTE — TREATMENT TEAM
07/18/23 1300   Activity/Group Checklist   Group Admission/Discharge   Attendance Attended   Attendance Duration (min) 0-15   Interactions Interacted appropriately   Affect/Mood Appropriate   Goals Achieved Able to listen to others; Able to engage in interactions; Identified relapse prevention strategies; Discussed coping strategies; Identified resources and support systems; Other (Comment)  (pt filled out the relapse prevention plan form with this therapist's assistance in reading and writing. once completed, pt had no questions or concerns.  crisis #s highlighted and copy placed in DC folder.)

## 2023-07-18 NOTE — ASSESSMENT & PLAN NOTE
· Patient with neck spasm intermittently for years. · Noted history of acquired torticollis  · Patient reports that he takes muscle relaxers as needed however this makes him very sleepy and therefore typically avoids these and uses warm compresses instead.

## 2023-07-18 NOTE — PLAN OF CARE
Problem: SELF HARM/SUICIDALITY  Goal: Will have no self-injury during hospital stay  Description: INTERVENTIONS:  - Q 15 MINUTES: Routine safety checks  - Q WAKING SHIFT & PRN: Assess risk to determine if routine checks are adequate to maintain patient safety  - Encourage patient to participate actively in care by formulating a plan to combat response to suicidal ideation, identify supports and resources  Outcome: Progressing     Problem: DEPRESSION  Goal: Will be euthymic at discharge  Description: INTERVENTIONS:  - Administer medication as ordered  - Provide emotional support via 1:1 interaction with staff  - Encourage involvement in milieu/groups/activities  - Monitor for social isolation  Outcome: Progressing     Problem: ANXIETY  Goal: Will report anxiety at manageable levels  Description: INTERVENTIONS:  - Administer medication as ordered  - Teach and encourage coping skills  - Provide emotional support  - Assess patient/family for anxiety and ability to cope  Outcome: Progressing  Goal: By discharge: Patient will verbalize 2 strategies to deal with anxiety  Description: Interventions:  - Identify any obvious source/trigger to anxiety  - Staff will assist patient in applying identified coping technique/skills  - Encourage attendance of scheduled groups and activities  Outcome: Progressing     Problem: Ineffective Coping  Goal: Participates in unit activities  Description: Interventions:  - Provide therapeutic environment   - Provide required programming   - Redirect inappropriate behaviors   Outcome: Progressing     Problem: SAFETY ADULT  Goal: Patient will remain free of falls  Description: INTERVENTIONS:  - Educate patient/family on patient safety including physical limitations  - Instruct patient to call for assistance with activity   - Consult OT/PT to assist with strengthening/mobility   - Keep Call bell within reach  - Keep bed low and locked with side rails adjusted as appropriate  - Keep care items and personal belongings within reach  - Initiate and maintain comfort rounds  - Make Fall Risk Sign visible to staff  - Apply yellow socks and bracelet for high fall risk patients  - Consider moving patient to room near nurses station  Outcome: Progressing  Goal: Maintain or return to baseline ADL function  Description: INTERVENTIONS:  -  Assess patient's ability to carry out ADLs; assess patient's baseline for ADL function and identify physical deficits which impact ability to perform ADLs (bathing, care of mouth/teeth, toileting, grooming, dressing, etc.)  - Assess/evaluate cause of self-care deficits   - Assess range of motion  - Assess patient's mobility; develop plan if impaired  - Assess patient's need for assistive devices and provide as appropriate  - Encourage maximum independence but intervene and supervise when necessary  - Involve family in performance of ADLs  - Assess for home care needs following discharge   - Consider OT consult to assist with ADL evaluation and planning for discharge  - Provide patient education as appropriate  Outcome: Progressing  Goal: Maintains/Returns to pre admission functional level  Description: INTERVENTIONS:  - Perform BMAT or MOVE assessment daily.   - Set and communicate daily mobility goal to care team and patient/family/caregiver.    - Collaborate with rehabilitation services on mobility goals if consulted  - Out of bed for toileting  - Record patient progress and toleration of activity level   Outcome: Progressing

## 2023-07-18 NOTE — CASE MANAGEMENT
CM attempted to meet with Pt twice this afternoon. Pt kept his eyes shut & didn't respond the first time CM attempted to meet with him. The second time, CM announced she was case management & need to meet with him & he shook his head no but kept his eyes closed.

## 2023-07-19 PROCEDURE — 99232 SBSQ HOSP IP/OBS MODERATE 35: CPT | Performed by: STUDENT IN AN ORGANIZED HEALTH CARE EDUCATION/TRAINING PROGRAM

## 2023-07-19 RX ORDER — MIRTAZAPINE 15 MG/1
15 TABLET, FILM COATED ORAL
Status: DISCONTINUED | OUTPATIENT
Start: 2023-07-19 | End: 2023-07-21

## 2023-07-19 RX ADMIN — TRAZODONE HYDROCHLORIDE 50 MG: 50 TABLET ORAL at 02:21

## 2023-07-19 RX ADMIN — FOLIC ACID 1 MG: 1 TABLET ORAL at 09:40

## 2023-07-19 RX ADMIN — MIRTAZAPINE 15 MG: 15 TABLET, FILM COATED ORAL at 21:02

## 2023-07-19 RX ADMIN — THIAMINE HCL TAB 100 MG 100 MG: 100 TAB at 09:40

## 2023-07-19 RX ADMIN — LEVETIRACETAM 1000 MG: 500 TABLET, FILM COATED ORAL at 21:02

## 2023-07-19 RX ADMIN — LEVETIRACETAM 1000 MG: 500 TABLET, FILM COATED ORAL at 09:40

## 2023-07-19 RX ADMIN — BICTEGRAVIR SODIUM, EMTRICITABINE, AND TENOFOVIR ALAFENAMIDE FUMARATE 1 TABLET: 50; 200; 25 TABLET ORAL at 09:40

## 2023-07-19 NOTE — PROGRESS NOTES
Progress Note - Behavioral Health   Gertrude Palacios 40 y.o. male MRN: [id number]  Unit/Bed#: [facility] 205-01 Encounter: [id number]    Assessment/Plan   Principal Problem:    Substance induced mood disorder ([address] [facility])  Active Problems:    HIV (human immunodeficiency virus infection) ([address] [facility])    Homelessness    Tobacco dependence    Recurrent seizures ([address] [facility])    Cannabis use disorder    Alcohol use    Adjustment disorder    Medical clearance for psychiatric admission    Neck pain      Subjective: Patient was seen, chart was reviewed, and case was discussed with the team. Patient is malodorous, withdrawn and sleeping till lunch. Endorses mild depression, poor sleep and anxiety. He is willing to try medication. Denies any thoughts to hurt self. Denies AH/VH.    Behavior over the last 24 hours:  unchanged  Sleep: insomnia  Appetite: normal  Medication side effects: No    Medical ROS: Pertinent items are noted in HPI.all other systems are negative    Current Medications:  Current Facility-Administered Medications   Medication Dose Route Frequency   • acetaminophen (TYLENOL) tablet 650 mg  650 mg Oral Q6H PRN   • acetaminophen (TYLENOL) tablet 650 mg  650 mg Oral Q4H PRN   • acetaminophen (TYLENOL) tablet 975 mg  975 mg Oral Q6H PRN   • aluminum-magnesium hydroxide-simethicone (MAALOX) oral suspension 30 mL  30 mL Oral Q4H PRN   • haloperidol lactate (HALDOL) injection 2.5 mg  2.5 mg Intramuscular Q4H PRN Max 4/day    And   • LORazepam (ATIVAN) injection 1 mg  1 mg Intramuscular Q4H PRN Max 4/day    And   • benztropine (COGENTIN) injection 0.5 mg  0.5 mg Intramuscular Q4H PRN Max 4/day   • haloperidol lactate (HALDOL) injection 5 mg  5 mg Intramuscular Q4H PRN Max 4/day    And   • LORazepam (ATIVAN) injection 2 mg  2 mg Intramuscular Q4H PRN Max 4/day    And   • benztropine (COGENTIN) injection 1 mg  1 mg Intramuscular Q4H PRN Max 4/day   • benztropine (COGENTIN) tablet 1 mg  1 mg Oral Q4H PRN Max 6/day   • bictegravir-emtricitab-tenofovir alafenamide (BIKTARVY) -25 MG tablet 1 tablet  1 tablet Oral Daily With Breakfast   • bisacodyl (DULCOLAX) rectal suppository 10 mg  10 mg Rectal Daily PRN   • hydrOXYzine HCL (ATARAX) tablet 50 mg  50 mg Oral Q6H PRN Max 4/day    Or   • diphenhydrAMINE (BENADRYL) injection 50 mg  50 mg Intramuscular S9K PRN   • folic acid (FOLVITE) tablet 1 mg  1 mg Oral Daily   • haloperidol (HALDOL) tablet 1 mg  1 mg Oral Q6H PRN   • haloperidol (HALDOL) tablet 2.5 mg  2.5 mg Oral Q4H PRN Max 4/day   • haloperidol (HALDOL) tablet 5 mg  5 mg Oral Q4H PRN Max 4/day   • hydrOXYzine HCL (ATARAX) tablet 100 mg  100 mg Oral Q6H PRN Max 4/day    Or   • LORazepam (ATIVAN) injection 2 mg  2 mg Intramuscular Q6H PRN   • hydrOXYzine HCL (ATARAX) tablet 25 mg  25 mg Oral Q6H PRN Max 4/day   • levETIRAcetam (KEPPRA) tablet 1,000 mg  1,000 mg Oral Q12H MARJ   • nicotine polacrilex (NICORETTE) gum 4 mg  4 mg Oral Q2H PRN   • polyethylene glycol (MIRALAX) packet 17 g  17 g Oral Daily PRN   • senna-docusate sodium (SENOKOT S) 8.6-50 mg per tablet 1 tablet  1 tablet Oral Daily PRN   • thiamine tablet 100 mg  100 mg Oral Daily   • traZODone (DESYREL) tablet 50 mg  50 mg Oral HS PRN       Behavioral Health Medications:   all current active meds have been reviewed. Vitals:  Vitals:    07/19/23 0734   BP: 127/91   Pulse: 64   Resp: 18   Temp: 97.5 °F (36.4 °C)   SpO2:        Laboratory results:    I have personally reviewed all pertinent laboratory/tests results.     Mental Status Evaluation:    Appearance:  older than stated age   Behavior:  cooperative   Speech:  soft   Mood:  anxious   Affect:  constricted and mood-congruent   Thought Process:  goal directed and logical   Thought Content:  normal   Perceptual Disturbances: None   Risk Potential: Suicidal Ideations none  Homicidal Ideations none  Potential for Aggression No   Sensorium:  person, place and time/date   Memory:  recent and remote memory grossly intact   Consciousness:  alert and awake    Attention: attention span appeared shorter than expected for age   Insight:  limited   Judgment: limited   Gait/Station: normal gait/station   Motor Activity: no abnormal movements       Progress Toward Goals: Progressing    Recommended Treatment: Continue with pharmacotherapy, group therapy, milieu therapy and occupational therapy. 1.Start remeron 15 mg PO HS  Risks, benefits and possible side effects of Medications:   Risks, benefits, alternatives, and possible side effects of patient's psychiatric medications were discussed with patient. DISCHARGE

## 2023-07-19 NOTE — NURSING NOTE
Patient expressing difficulty falling asleep and initially declined PRN. Pt up talking to staff at times, requested and given a snack and water. Encouraged relaxation. At [address] pt requested PRN for sleep and was given Trazodone 50mg as ordered. At this time patient is observed sleeping without signs of distress, arouses easily, respirations regular and non-labored, PRN effective.

## 2023-07-19 NOTE — NURSING NOTE
Patient remained asleep until approximately 0445, is OOB now, watching TV in dayroom. Denies any needs presently.

## 2023-07-19 NOTE — PROGRESS NOTES
[facility] Group Note     07/19/23 9960   Activity/Group Checklist   Group Other (Comment)  (Open Discussion About Control, Perspectives, and Mindful Self-Awareness)   Attendance Attended   Attendance Duration (min) 46-60   Interactions Interacted appropriately  (but superficial responses)   Affect/Mood Other (Comment)  (Bright at times, but also lethargic at times)   Goals Achieved Discussed coping strategies; Able to listen to others; Able to engage in interactions; Able to reflect/comment on own behavior;Able to recieve feedback; Able to give feedback to another

## 2023-07-19 NOTE — NURSING NOTE
Pt remains pleasant in conversation. Denies SI/HI, reports feeling a significant improvement in mood. Mild anxiety and depression. Pt states having a poor night sleep due to roommate sleeping despite utilizing earplugs. Pt resting in room during the day. Social with peers on the unit.

## 2023-07-20 PROCEDURE — 99232 SBSQ HOSP IP/OBS MODERATE 35: CPT | Performed by: STUDENT IN AN ORGANIZED HEALTH CARE EDUCATION/TRAINING PROGRAM

## 2023-07-20 RX ADMIN — FOLIC ACID 1 MG: 1 TABLET ORAL at 09:16

## 2023-07-20 RX ADMIN — THIAMINE HCL TAB 100 MG 100 MG: 100 TAB at 09:16

## 2023-07-20 RX ADMIN — NICOTINE POLACRILEX 4 MG: 4 GUM, CHEWING BUCCAL at 19:00

## 2023-07-20 RX ADMIN — TRAZODONE HYDROCHLORIDE 50 MG: 50 TABLET ORAL at 21:33

## 2023-07-20 RX ADMIN — BICTEGRAVIR SODIUM, EMTRICITABINE, AND TENOFOVIR ALAFENAMIDE FUMARATE 1 TABLET: 50; 200; 25 TABLET ORAL at 09:16

## 2023-07-20 RX ADMIN — LEVETIRACETAM 1000 MG: 500 TABLET, FILM COATED ORAL at 09:16

## 2023-07-20 RX ADMIN — MIRTAZAPINE 15 MG: 15 TABLET, FILM COATED ORAL at 21:31

## 2023-07-20 RX ADMIN — LEVETIRACETAM 1000 MG: 500 TABLET, FILM COATED ORAL at 21:31

## 2023-07-20 NOTE — PLAN OF CARE
Problem: DISCHARGE PLANNING  Goal: Discharge to home or other facility with appropriate resources  Description: INTERVENTIONS:  - Identify barriers to discharge w/patient and caregiver  - Arrange for needed discharge resources and transportation as appropriate  - Identify discharge learning needs (meds, wound care, etc.)  - Arrange for interpretive services to assist at discharge as needed  - Refer to Case Management Department for coordinating discharge planning if the patient needs post-hospital services based on physician/advanced practitioner order or complex needs related to functional status, cognitive ability, or social support system  Outcome: Progressing  - CM met with patient and went over Intake and ROIs.

## 2023-07-20 NOTE — NURSING NOTE
Rolanda Myles is pleasant upon approach, visualized ambulating in the halls and socializing with peers and staff. Patient exhibits a bright affect and denies current SI/HI/AH/VH. Rolanda Myles is malodorous and was encouraged to perform ADLs multiple times this evening, but adamantly declined. Importance of hygiene explained to patient, who appears non-receptive at this time. Patient is observed singing loudly at times and quoting movies and television shows. Rolanda Myles is receptive to staff's redirection when being disruptive. Patient reports, "I'm doing great. I feel so happy today." Rolanda Myles remains compliant with prescribed medication and attends scheduled groups on the unit with appropriate participation. Patient encouraged to seek staff with any questions and/or concerns.

## 2023-07-20 NOTE — PROGRESS NOTES
[facility] Group Note     07/20/23 1000   Activity/Group Checklist   Group Community meeting  (Self-Awareness - I Am Someone Who…)   Attendance Attended   Attendance Duration (min) 46-60   Interactions Interacted appropriately  (Pt required redirection for cross talk, but mostly appropriate throughout group)   Affect/Mood Calm  (Bright at times, but also lethargic at times)   Goals Achieved Identified feelings; Discussed coping strategies; Able to listen to others; Able to engage in interactions; Able to reflect/comment on own behavior;Able to recieve feedback; Able to give feedback to another

## 2023-07-20 NOTE — CASE MANAGEMENT
Intake        : 1985   Admit Date/Time:   23 11:02 am Discharge Date: TBD   Treatment Plan:   Reviewed and signed    Confirmed Address:    [place]: Patient is homeless   Resides in the home with:   Patient is homeless and reports living in [facility]) (not in a shelter does not have ID)   Will Return Home at Discharge:   Patient wants to discharge to [address] [place] [address]    Confirmed Phone Number:   Unable to provide phone number    Confirmed Email Address:   n/a     Marital Status:  Children: Single  No kids    Family History:            Parents:                       Siblings: no history reported       4 siblings    Commitment Status:    Status Changes:  201    n/a    Admitted from:   [facility]    Presenting C/O:         Patient reported having thoughts of jumping off a bridge. As per ED Note:    Patient is a 42-year-old male seen in the emergency department with concern for depression/suicidal ideation. Patient notes suicidal ideation with a plan to jump off a bridge. Patient notes symptoms worsening over the past day. Patient notes no chest pain, shortness of breath, abdominal pain, nausea, vomiting, weakness, numbness, tingling. Patient notes no definite clear exacerbating or alleviating factors for his symptoms. Patient requests medication for anxiety. Patient has no other acute medical complaints in the emergency department.      Past Inpatient Tx:   Patient reports this is his 1st inpatient    Past Suicide Attempts:   no    Current outpatient: None in place (patient reports he goes to the ER to get his prescriptions)    Psychiatrist:   None    Therapist:   None    ACT/ICM/CPS/WRT/SC:   Declined    PCP:   Dr. Aric Prather Hx/Concern:   None reported  Patient does use a walker to ambulate    Medications:   Haldol 1 mg PRN  Remeron 15 mg   Pharmacy:   [address] [place] [address]   Spirituality/[facility]aritan:   Patient reports he believes in God Education:   n/a    Work/Income:    Preferred time for appts: SSD - cannot recall amount received monthly    Legal:     Probation/[place] [facility]: No    Access to Firearms:   No    Transportation:   Public    Strength:   Cooperative and pleasant    Coping Skills:   Singing    Goal:   To no longer have suicidal thoughts    Referrals Needed:     [facility] and Shelter (Homeless with no ID)    Transport at Discharge:   Hospital to arrange discharge to   [address] [place] [address] Road    IMM: 7/20/23 Maggie Text JESSICA:   Emergency Contact:    No emergency contact    ROIs obtained:     Patient signed for [facility], referral to be made    Insurance:    [facility] ([facility])  Medicare    Audit:       2 PAWSS: 1   Substance Abuse: Freq.  BAT: 0.00 UDS: positive   Heroin Denies  Amount Last Use Notes:   Amp/Meth Denies       Alcohol Denies       Cocaine Denies      Cannabis Daily       Benzodiazepine Denies       Barbituartes Denies       Other Denies       Tobacco Denies

## 2023-07-20 NOTE — PROGRESS NOTES
[facility] Group Note     07/20/23 1230   Activity/Group Checklist   Group Life Skills  (Anger/Anxiety/Depression Board Game)   Attendance Attended   Attendance Duration (min) 0-15  (arrived late to group)   Interactions Interacted appropriately   Affect/Mood Appropriate;Bright  (Motivated)   Goals Achieved Identified feelings; Identified triggers; Discussed coping strategies; Able to listen to others; Able to engage in interactions; Able to reflect/comment on own behavior;Able to recieve feedback; Able to give feedback to another     Pt insisted on standing during the game. While attempting to eat jello pt let go of walker and slowly fell backwards, landing on his bottom. NEGRETE/L and peer assisted pt back up to standing position and pt finished the game. Did not appear in distress, will notify nursing.

## 2023-07-20 NOTE — PROGRESS NOTES
Progress Note - Behavioral Health   Lissett Aguilar 40 y.o. male MRN: [id number]  Unit/Bed#: [facility] 205-01 Encounter: [id number]    Assessment/Plan   Principal Problem:    Substance induced mood disorder ([address] [facility])  Active Problems:    HIV (human immunodeficiency virus infection) ([address] [facility])    Homelessness    Tobacco dependence    Recurrent seizures ([address] [facility])    Cannabis use disorder    Alcohol use    Adjustment disorder    Medical clearance for psychiatric admission    Neck pain      Subjective: Patient was seen, chart was reviewed, and case was discussed with the team. Patient appears resting in day room. Endorses tiredness. Reports improvement in mood and anxiety is fluctuating. Patient appears malodorous, guarded, soft, no eye contact and depressed. He is complaint with medications and attending some groups.    Behavior over the last 24 hours:  unchanged  Sleep: normal  Appetite: normal  Medication side effects: Yes tiredness    Medical ROS: Pertinent items are noted in HPI.all other systems are negative    Current Medications:  Current Facility-Administered Medications   Medication Dose Route Frequency   • acetaminophen (TYLENOL) tablet 650 mg  650 mg Oral Q6H PRN   • acetaminophen (TYLENOL) tablet 650 mg  650 mg Oral Q4H PRN   • acetaminophen (TYLENOL) tablet 975 mg  975 mg Oral Q6H PRN   • aluminum-magnesium hydroxide-simethicone (MAALOX) oral suspension 30 mL  30 mL Oral Q4H PRN   • haloperidol lactate (HALDOL) injection 2.5 mg  2.5 mg Intramuscular Q4H PRN Max 4/day    And   • LORazepam (ATIVAN) injection 1 mg  1 mg Intramuscular Q4H PRN Max 4/day    And   • benztropine (COGENTIN) injection 0.5 mg  0.5 mg Intramuscular Q4H PRN Max 4/day   • haloperidol lactate (HALDOL) injection 5 mg  5 mg Intramuscular Q4H PRN Max 4/day    And   • LORazepam (ATIVAN) injection 2 mg  2 mg Intramuscular Q4H PRN Max 4/day    And   • benztropine (COGENTIN) injection 1 mg  1 mg Intramuscular Q4H PRN Max 4/day   • benztropine (COGENTIN) tablet 1 mg  1 mg Oral Q4H PRN Max 6/day   • bictegravir-emtricitab-tenofovir alafenamide (BIKTARVY) -25 MG tablet 1 tablet  1 tablet Oral Daily With Breakfast   • bisacodyl (DULCOLAX) rectal suppository 10 mg  10 mg Rectal Daily PRN   • hydrOXYzine HCL (ATARAX) tablet 50 mg  50 mg Oral Q6H PRN Max 4/day    Or   • diphenhydrAMINE (BENADRYL) injection 50 mg  50 mg Intramuscular V7L PRN   • folic acid (FOLVITE) tablet 1 mg  1 mg Oral Daily   • haloperidol (HALDOL) tablet 1 mg  1 mg Oral Q6H PRN   • haloperidol (HALDOL) tablet 2.5 mg  2.5 mg Oral Q4H PRN Max 4/day   • haloperidol (HALDOL) tablet 5 mg  5 mg Oral Q4H PRN Max 4/day   • hydrOXYzine HCL (ATARAX) tablet 100 mg  100 mg Oral Q6H PRN Max 4/day    Or   • LORazepam (ATIVAN) injection 2 mg  2 mg Intramuscular Q6H PRN   • hydrOXYzine HCL (ATARAX) tablet 25 mg  25 mg Oral Q6H PRN Max 4/day   • levETIRAcetam (KEPPRA) tablet 1,000 mg  1,000 mg Oral Q12H MARJ   • mirtazapine (REMERON) tablet 15 mg  15 mg Oral HS   • nicotine polacrilex (NICORETTE) gum 4 mg  4 mg Oral Q2H PRN   • polyethylene glycol (MIRALAX) packet 17 g  17 g Oral Daily PRN   • senna-docusate sodium (SENOKOT S) 8.6-50 mg per tablet 1 tablet  1 tablet Oral Daily PRN   • thiamine tablet 100 mg  100 mg Oral Daily   • traZODone (DESYREL) tablet 50 mg  50 mg Oral HS PRN       Behavioral Health Medications:   all current active meds have been reviewed. Vitals:  Vitals:    07/20/23 0738   BP: 117/82   Pulse: 84   Resp: 18   Temp: 97.5 °F (36.4 °C)   SpO2:        Laboratory results:    I have personally reviewed all pertinent laboratory/tests results.     Mental Status Evaluation:    Appearance:  age appropriate   Behavior:  guarded   Speech:  soft   Mood:  depressed   Affect:  constricted and mood-congruent   Thought Process:  goal directed and logical   Thought Content:  normal   Perceptual Disturbances: None   Risk Potential: Suicidal Ideations none  Homicidal Ideations none  Potential for Aggression No Sensorium:  person, place and time/date   Memory:  recent and remote memory grossly intact   Consciousness:  alert and awake    Attention: attention span appeared shorter than expected for age   Insight:  limited   Judgment: limited   Gait/Station: normal gait/station   Motor Activity: no abnormal movements       Progress Toward Goals: Progressing    Recommended Treatment: Continue with pharmacotherapy, group therapy, milieu therapy and occupational therapy. Risks, benefits and possible side effects of Medications:   Risks, benefits, alternatives, and possible side effects of patient's psychiatric medications were discussed with patient.

## 2023-07-20 NOTE — TREATMENT TEAM
07/20/23 1045   Activity/Group Checklist   Group Other (Comment)  (art therapy)   Attendance Attended   Attendance Duration (min) 0-15   Interactions Interacted appropriately   Affect/Mood Appropriate   Goals Achieved Able to listen to others; Able to engage in interactions; Other (Comment)  (authentic spontaneous self expression, connection, and insight)

## 2023-07-20 NOTE — PROGRESS NOTES
Pt loud, malodorous. Denies SI. Reported mood has improved. DC: Next week (?)      07/20/23 7532   Team Meeting   Meeting Type Daily Rounds   Team Members Present   Team Members Present Physician;Nurse;; Other (Discipline and Name)   Physician Team Member Dr. Katie Chen / Dr. Donna Mai / Ruthie Cisse / Frank Yeboah Team Member [facility] Management Team Member Tobi Winkler / Sierra Cheung   Other (Discipline and Name) Ramon Saxena - Art Therapy   Patient/Family Present   Patient Present No   Patient's Family Present No

## 2023-07-20 NOTE — TREATMENT TEAM
07/20/23 1330   Activity/Group Checklist   Group Other (Comment)  (structured journaling)   Attendance Attended   Attendance Duration (min) 16-30   Interactions Interacted appropriately   Affect/Mood Appropriate  (pt was more quiet in this session, compared to previous sessions.)   Goals Achieved Able to listen to others; Able to engage in interactions; Other (Comment)  (participated in structured journaling either through written or artistic journaling, with prompts being available if needed)

## 2023-07-20 NOTE — PLAN OF CARE
Problem: SELF HARM/SUICIDALITY  Goal: Will have no self-injury during hospital stay  Description: INTERVENTIONS:  - Q 15 MINUTES: Routine safety checks  - Q WAKING SHIFT & PRN: Assess risk to determine if routine checks are adequate to maintain patient safety  - Encourage patient to participate actively in care by formulating a plan to combat response to suicidal ideation, identify supports and resources  Outcome: Progressing     Problem: ANXIETY  Goal: Will report anxiety at manageable levels  Description: INTERVENTIONS:  - Administer medication as ordered  - Teach and encourage coping skills  - Provide emotional support  - Assess patient/family for anxiety and ability to cope  Outcome: Progressing     Problem: Ineffective Coping  Goal: Participates in unit activities  Description: Interventions:  - Provide therapeutic environment   - Provide required programming   - Redirect inappropriate behaviors   Outcome: Progressing     Problem: SAFETY ADULT  Goal: Patient will remain free of falls  Description: INTERVENTIONS:  - Educate patient/family on patient safety including physical limitations  - Instruct patient to call for assistance with activity   - Consult OT/PT to assist with strengthening/mobility   - Keep Call bell within reach  - Keep bed low and locked with side rails adjusted as appropriate  - Keep care items and personal belongings within reach  - Initiate and maintain comfort rounds  - Make Fall Risk Sign visible to staff  - Apply yellow socks and bracelet for high fall risk patients  - Consider moving patient to room near nurses station  Outcome: Progressing

## 2023-07-20 NOTE — NURSING NOTE
Pt remains pleasant and social with peers. Reports sleeping well overnight however does feel tired during the day and observed resting in room at times. Denies SI/HI, reports mood overall has been improving and denies any questions or concerns.

## 2023-07-21 PROCEDURE — 99232 SBSQ HOSP IP/OBS MODERATE 35: CPT | Performed by: STUDENT IN AN ORGANIZED HEALTH CARE EDUCATION/TRAINING PROGRAM

## 2023-07-21 RX ORDER — MIRTAZAPINE 15 MG/1
30 TABLET, FILM COATED ORAL
Status: DISCONTINUED | OUTPATIENT
Start: 2023-07-21 | End: 2023-07-25 | Stop reason: HOSPADM

## 2023-07-21 RX ADMIN — THIAMINE HCL TAB 100 MG 100 MG: 100 TAB at 08:54

## 2023-07-21 RX ADMIN — FOLIC ACID 1 MG: 1 TABLET ORAL at 08:54

## 2023-07-21 RX ADMIN — MIRTAZAPINE 30 MG: 15 TABLET, FILM COATED ORAL at 21:45

## 2023-07-21 RX ADMIN — BICTEGRAVIR SODIUM, EMTRICITABINE, AND TENOFOVIR ALAFENAMIDE FUMARATE 1 TABLET: 50; 200; 25 TABLET ORAL at 08:53

## 2023-07-21 RX ADMIN — LEVETIRACETAM 1000 MG: 500 TABLET, FILM COATED ORAL at 21:45

## 2023-07-21 RX ADMIN — LEVETIRACETAM 1000 MG: 500 TABLET, FILM COATED ORAL at 08:54

## 2023-07-21 NOTE — NURSING NOTE
Pt went to bed after 2300 hrs after prior administration of Trazodone at 2133 hrs. He appeared to have slept mostly throughout the shift. No complaint voiced.

## 2023-07-21 NOTE — PROGRESS NOTES
Progress Note - Behavioral Health   Camacho Garza 40 y.o. male MRN: [id number]  Unit/Bed#: [facility] 205-01 Encounter: [id number]    Assessment/Plan   Principal Problem:    Substance induced mood disorder ([facility])  Active Problems:    HIV (human immunodeficiency virus infection) ([facility])    Homelessness    Tobacco dependence    Recurrent seizures ([facility])    Cannabis use disorder    Alcohol use    Adjustment disorder    Medical clearance for psychiatric admission    Neck pain      Subjective: Patient was seen, chart was reviewed, and case was discussed with the team. Patient appears withdrawn, soft, no eye contact, unkempt, malodorous and depressed. Reports improvement in mood. Sleep and appetite are ok. He received Trazodone last night. He is compliant with medications. Denies any side effects.    Behavior over the last 24 hours:  unchanged  Sleep: normal  Appetite: normal  Medication side effects: No    Medical ROS: Pertinent items are noted in HPI.all other systems are negative    Current Medications:  Current Facility-Administered Medications   Medication Dose Route Frequency   • acetaminophen (TYLENOL) tablet 650 mg  650 mg Oral Q6H PRN   • acetaminophen (TYLENOL) tablet 650 mg  650 mg Oral Q4H PRN   • acetaminophen (TYLENOL) tablet 975 mg  975 mg Oral Q6H PRN   • aluminum-magnesium hydroxide-simethicone (MAALOX) oral suspension 30 mL  30 mL Oral Q4H PRN   • haloperidol lactate (HALDOL) injection 2.5 mg  2.5 mg Intramuscular Q4H PRN Max 4/day    And   • LORazepam (ATIVAN) injection 1 mg  1 mg Intramuscular Q4H PRN Max 4/day    And   • benztropine (COGENTIN) injection 0.5 mg  0.5 mg Intramuscular Q4H PRN Max 4/day   • haloperidol lactate (HALDOL) injection 5 mg  5 mg Intramuscular Q4H PRN Max 4/day    And   • LORazepam (ATIVAN) injection 2 mg  2 mg Intramuscular Q4H PRN Max 4/day    And   • benztropine (COGENTIN) injection 1 mg  1 mg Intramuscular Q4H PRN Max 4/day   • benztropine (COGENTIN) tablet 1 mg  1 mg Oral Q4H PRN Max 6/day   • bictegravir-emtricitab-tenofovir alafenamide (BIKTARVY) -25 MG tablet 1 tablet  1 tablet Oral Daily With Breakfast   • bisacodyl (DULCOLAX) rectal suppository 10 mg  10 mg Rectal Daily PRN   • hydrOXYzine HCL (ATARAX) tablet 50 mg  50 mg Oral Q6H PRN Max 4/day    Or   • diphenhydrAMINE (BENADRYL) injection 50 mg  50 mg Intramuscular U6T PRN   • folic acid (FOLVITE) tablet 1 mg  1 mg Oral Daily   • haloperidol (HALDOL) tablet 1 mg  1 mg Oral Q6H PRN   • haloperidol (HALDOL) tablet 2.5 mg  2.5 mg Oral Q4H PRN Max 4/day   • haloperidol (HALDOL) tablet 5 mg  5 mg Oral Q4H PRN Max 4/day   • hydrOXYzine HCL (ATARAX) tablet 100 mg  100 mg Oral Q6H PRN Max 4/day    Or   • LORazepam (ATIVAN) injection 2 mg  2 mg Intramuscular Q6H PRN   • hydrOXYzine HCL (ATARAX) tablet 25 mg  25 mg Oral Q6H PRN Max 4/day   • levETIRAcetam (KEPPRA) tablet 1,000 mg  1,000 mg Oral Q12H [facility]   • mirtazapine (REMERON) tablet 15 mg  15 mg Oral HS   • nicotine polacrilex (NICORETTE) gum 4 mg  4 mg Oral Q2H PRN   • polyethylene glycol (MIRALAX) packet 17 g  17 g Oral Daily PRN   • senna-docusate sodium (SENOKOT S) 8.6-50 mg per tablet 1 tablet  1 tablet Oral Daily PRN   • thiamine tablet 100 mg  100 mg Oral Daily   • traZODone (DESYREL) tablet 50 mg  50 mg Oral HS PRN       Behavioral Health Medications:   all current active meds have been reviewed. Vitals:  Vitals:    07/21/23 0808   BP: 136/92   Pulse: 91   Resp: 18   Temp: 97.5 °F (36.4 °C)   SpO2: 100%       Laboratory results:    I have personally reviewed all pertinent laboratory/tests results.     Mental Status Evaluation:    Appearance:  disheveled and older than stated age   Behavior:  guarded   Speech:  soft   Mood:  depressed   Affect:  constricted and flat   Thought Process:  goal directed and logical   Thought Content:  normal   Perceptual Disturbances: None   Risk Potential: Suicidal Ideations none  Homicidal Ideations none  Potential for Aggression No   Sensorium: person, place and time/date   Memory:  recent and remote memory grossly intact   Consciousness:  alert and awake    Attention: attention span appeared shorter than expected for age   Insight:  limited   Judgment: limited   Gait/Station: normal gait/station   Motor Activity: no abnormal movements       Progress Toward Goals: Progressing    Recommended Treatment: Continue with pharmacotherapy, group therapy, milieu therapy and occupational therapy. 1.Increase remeron to 30 mg PO Hs  Risks, benefits and possible side effects of Medications:   Risks, benefits, alternatives, and possible side effects of patient's psychiatric medications were discussed with patient.

## 2023-07-21 NOTE — PLAN OF CARE
Problem: SELF HARM/SUICIDALITY  Goal: Will have no self-injury during hospital stay  Description: INTERVENTIONS:  - Q 15 MINUTES: Routine safety checks  - Q WAKING SHIFT & PRN: Assess risk to determine if routine checks are adequate to maintain patient safety  - Encourage patient to participate actively in care by formulating a plan to combat response to suicidal ideation, identify supports and resources  Outcome: Progressing     Problem: DEPRESSION  Goal: Will be euthymic at discharge  Description: INTERVENTIONS:  - Administer medication as ordered  - Provide emotional support via 1:1 interaction with staff  - Encourage involvement in milieu/groups/activities  - Monitor for social isolation  Outcome: Progressing     Problem: ANXIETY  Goal: Will report anxiety at manageable levels  Description: INTERVENTIONS:  - Administer medication as ordered  - Teach and encourage coping skills  - Provide emotional support  - Assess patient/family for anxiety and ability to cope  Outcome: Progressing  Goal: By discharge: Patient will verbalize 2 strategies to deal with anxiety  Description: Interventions:  - Identify any obvious source/trigger to anxiety  - Staff will assist patient in applying identified coping technique/skills  - Encourage attendance of scheduled groups and activities  Outcome: Progressing     Problem: Ineffective Coping  Goal: Participates in unit activities  Description: Interventions:  - Provide therapeutic environment   - Provide required programming   - Redirect inappropriate behaviors   Outcome: Progressing     Problem: SAFETY ADULT  Goal: Patient will remain free of falls  Description: INTERVENTIONS:  - Educate patient/family on patient safety including physical limitations  - Instruct patient to call for assistance with activity   - Consult OT/PT to assist with strengthening/mobility   - Keep Call bell within reach  - Keep bed low and locked with side rails adjusted as appropriate  - Keep care items and personal belongings within reach  - Initiate and maintain comfort rounds  - Make Fall Risk Sign visible to staff  - Apply yellow socks and bracelet for high fall risk patients  - Consider moving patient to room near nurses station  Outcome: Progressing  Goal: Maintain or return to baseline ADL function  Description: INTERVENTIONS:  -  Assess patient's ability to carry out ADLs; assess patient's baseline for ADL function and identify physical deficits which impact ability to perform ADLs (bathing, care of mouth/teeth, toileting, grooming, dressing, etc.)  - Assess/evaluate cause of self-care deficits   - Assess range of motion  - Assess patient's mobility; develop plan if impaired  - Assess patient's need for assistive devices and provide as appropriate  - Encourage maximum independence but intervene and supervise when necessary  - Involve family in performance of ADLs  - Assess for home care needs following discharge   - Consider OT consult to assist with ADL evaluation and planning for discharge  - Provide patient education as appropriate  Outcome: Progressing  Goal: Maintains/Returns to pre admission functional level  Description: INTERVENTIONS:  - Perform BMAT or MOVE assessment daily.   - Set and communicate daily mobility goal to care team and patient/family/caregiver.    - Collaborate with rehabilitation services on mobility goals if consulted  - Out of bed for meals 4 times a day  - Out of bed for toileting  - Record patient progress and toleration of activity level   Outcome: Progressing

## 2023-07-21 NOTE — NURSING NOTE
F/U to Trazodone administration for sleep aid at 2133 hrs> Patient observed socializing in day room with other peers.

## 2023-07-21 NOTE — NURSING NOTE
Pt remains pleasant and calm. Denies SI/HI, reports mood has improved and remains social with peers. Pt states he slept well overnight. Encouraged ADL's/shower, pt declined however states he plans to shower this evening.

## 2023-07-21 NOTE — NURSING NOTE
Patient was pleasant and cooperative, very jovial, currently denies HI, SI, anxiety or depression. Patient states he slept well last night after receiving sleep medication (Trazodone). He further states he enjoys the groups here because he loves to entertain others and it provides a medium for him to do so. When asked about his personal hygiene, he states he showered yesterday and does so independently.

## 2023-07-21 NOTE — NURSING NOTE
Pt reports his mood is always good, he is always kind and cheerful and was happy to be watching Friends. He discussed circumstances of admission and reports he had been smoking marijuana and drinking at the same time and had a thought that he could jump off a bridge. Pt was encouraged not to combine substances in the future. He denies ever having had suicidal thoughts before. He denies SI since admission. Denies anxiety or hallucinations. Reports eating and sleeping well. Frequently quotes television and songs in conversation. Pleasant and cooperative, out in milieu throughout shift.

## 2023-07-22 PROCEDURE — 99232 SBSQ HOSP IP/OBS MODERATE 35: CPT | Performed by: PSYCHIATRY & NEUROLOGY

## 2023-07-22 RX ADMIN — NICOTINE POLACRILEX 4 MG: 4 GUM, CHEWING BUCCAL at 19:13

## 2023-07-22 RX ADMIN — LEVETIRACETAM 1000 MG: 500 TABLET, FILM COATED ORAL at 09:03

## 2023-07-22 RX ADMIN — MIRTAZAPINE 30 MG: 15 TABLET, FILM COATED ORAL at 22:14

## 2023-07-22 RX ADMIN — THIAMINE HCL TAB 100 MG 100 MG: 100 TAB at 09:03

## 2023-07-22 RX ADMIN — BICTEGRAVIR SODIUM, EMTRICITABINE, AND TENOFOVIR ALAFENAMIDE FUMARATE 1 TABLET: 50; 200; 25 TABLET ORAL at 09:03

## 2023-07-22 RX ADMIN — LEVETIRACETAM 1000 MG: 500 TABLET, FILM COATED ORAL at 21:37

## 2023-07-22 RX ADMIN — FOLIC ACID 1 MG: 1 TABLET ORAL at 09:03

## 2023-07-22 NOTE — PROGRESS NOTES
Attended group alongside other people on the unit, participated in discussion around recovery-centered topic, and contributed their own lived experience toward connection between group members. 07/21/23 1230 07/21/23 1530   Activity/Group Checklist   Group Life Skills Other (Comment)  (3:30 PM Goal Accomplishments)   Attendance Attended  (Arrived to this group late, stayed until the end of this group.) Attended   Attendance Duration (min) 46-60 46-60   Interactions Interacted appropriately Interacted appropriately   Affect/Mood Appropriate Appropriate   Goals Achieved Other (Comment)  (Engaged with CPS and others in providing lived experience of systems utilization, to compliment article encouraging honesty with therapists.) Other (Comment)  (Matched alcaldes to activism in sports heroes.  Examined advocacy vs. activism.)

## 2023-07-22 NOTE — PLAN OF CARE
Problem: SELF HARM/SUICIDALITY  Goal: Will have no self-injury during hospital stay  Description: INTERVENTIONS:  - Q 15 MINUTES: Routine safety checks  - Q WAKING SHIFT & PRN: Assess risk to determine if routine checks are adequate to maintain patient safety  - Encourage patient to participate actively in care by formulating a plan to combat response to suicidal ideation, identify supports and resources  Outcome: Progressing     Problem: DEPRESSION  Goal: Will be euthymic at discharge  Description: INTERVENTIONS:  - Administer medication as ordered  - Provide emotional support via 1:1 interaction with staff  - Encourage involvement in milieu/groups/activities  - Monitor for social isolation  Outcome: Progressing     Problem: ANXIETY  Goal: Will report anxiety at manageable levels  Description: INTERVENTIONS:  - Administer medication as ordered  - Teach and encourage coping skills  - Provide emotional support  - Assess patient/family for anxiety and ability to cope  Outcome: Progressing  Goal: By discharge: Patient will verbalize 2 strategies to deal with anxiety  Description: Interventions:  - Identify any obvious source/trigger to anxiety  - Staff will assist patient in applying identified coping technique/skills  - Encourage attendance of scheduled groups and activities  Outcome: Progressing     Problem: DISCHARGE PLANNING  Goal: Discharge to home or other facility with appropriate resources  Description: INTERVENTIONS:  - Identify barriers to discharge w/patient and caregiver  - Arrange for needed discharge resources and transportation as appropriate  - Identify discharge learning needs (meds, wound care, etc.)  - Arrange for interpretive services to assist at discharge as needed  - Refer to Case Management Department for coordinating discharge planning if the patient needs post-hospital services based on physician/advanced practitioner order or complex needs related to functional status, cognitive ability, or social support system  Outcome: Progressing     Problem: Ineffective Coping  Goal: Participates in unit activities  Description: Interventions:  - Provide therapeutic environment   - Provide required programming   - Redirect inappropriate behaviors   Outcome: Progressing     Problem: SAFETY ADULT  Goal: Patient will remain free of falls  Description: INTERVENTIONS:  - Educate patient/family on patient safety including physical limitations  - Instruct patient to call for assistance with activity   - Consult OT/PT to assist with strengthening/mobility   - Keep Call bell within reach  - Keep bed low and locked with side rails adjusted as appropriate  - Keep care items and personal belongings within reach  - Initiate and maintain comfort rounds  - Make Fall Risk Sign visible to staff  - Apply yellow socks and bracelet for high fall risk patients  - Consider moving patient to room near nurses station  Outcome: Progressing  Goal: Maintain or return to baseline ADL function  Description: INTERVENTIONS:  -  Assess patient's ability to carry out ADLs; assess patient's baseline for ADL function and identify physical deficits which impact ability to perform ADLs (bathing, care of mouth/teeth, toileting, grooming, dressing, etc.)  - Assess/evaluate cause of self-care deficits   - Assess range of motion  - Assess patient's mobility; develop plan if impaired  - Assess patient's need for assistive devices and provide as appropriate  - Encourage maximum independence but intervene and supervise when necessary  - Involve family in performance of ADLs  - Assess for home care needs following discharge   - Consider OT consult to assist with ADL evaluation and planning for discharge  - Provide patient education as appropriate  Outcome: Progressing  Goal: Maintains/Returns to pre admission functional level  Description: INTERVENTIONS:  - Perform BMAT or MOVE assessment daily.   - Set and communicate daily mobility goal to care team and patient/family/caregiver.    - Collaborate with rehabilitation services on mobility goals if consulted  - Out of bed for toileting  - Record patient progress and toleration of activity level   Outcome: Progressing

## 2023-07-22 NOTE — NURSING NOTE
Pt ambulates with rolling walker. Pleasant and social, dozing off in Day Room, waking when peers/staff enter. Much of dialect is song lyrics or reciting TV shows. Bright affect, loud.

## 2023-07-22 NOTE — NURSING NOTE
Pt is visible, social, denies anxiety, depression. Pt is singing loudly in halls, putting on "show" for peers. Needs redirection for yelling/signing loudly in lópez outside peers rooms. Pt encouraged to shower and use deodorant, change/wash clothes, complete ADLs. Denies SI, HI, AVH. Pt often looking up and to the Left, or down. During interaction pt appeared to have contraction and relaxation of Left hand, appears involuntary. Relaxed within 30 seconds, no c/o pain, pt states this happens often.

## 2023-07-22 NOTE — TREATMENT TEAM
07/22/23 0900   Team Meeting   Meeting Type Daily Rounds   Team Members Present   Team Members Present Physician;Nurse   Physician Team Member [facility]   Nursing Team Member Mandy   Patient/Family Present   Patient Present No   Patient's Family Present No       AM Rounds: HIV+, FR, SZ, ambulate with rolling walker, pleasant, loud, social, malodorous. Pt reports showering, attends some groups, nap at times.  Denies SI

## 2023-07-22 NOTE — PROGRESS NOTES
Progress Note - Behavioral Health   Mile Campos 40 y.o. male MRN: [id number]  Unit/Bed#: [facility] 205-01 Encounter: [id number]    Assessment:  Principal Problem:    Substance induced mood disorder ([facility])  Active Problems:    HIV (human immunodeficiency virus infection) ([facility])    Homelessness    Tobacco dependence    Recurrent seizures ([facility])    Cannabis use disorder    Alcohol use    Adjustment disorder    Medical clearance for psychiatric admission    Neck pain      Plan:  --Continue with psychiatric hospitalization  --Continue with individual, group, and milieu therapy  --Continue the following medications:  Current Facility-Administered Medications   Medication Dose Route Frequency   • acetaminophen (TYLENOL) tablet 650 mg  650 mg Oral Q6H PRN   • acetaminophen (TYLENOL) tablet 650 mg  650 mg Oral Q4H PRN   • acetaminophen (TYLENOL) tablet 975 mg  975 mg Oral Q6H PRN   • aluminum-magnesium hydroxide-simethicone (MAALOX) oral suspension 30 mL  30 mL Oral Q4H PRN   • haloperidol lactate (HALDOL) injection 2.5 mg  2.5 mg Intramuscular Q4H PRN Max 4/day    And   • LORazepam (ATIVAN) injection 1 mg  1 mg Intramuscular Q4H PRN Max 4/day    And   • benztropine (COGENTIN) injection 0.5 mg  0.5 mg Intramuscular Q4H PRN Max 4/day   • haloperidol lactate (HALDOL) injection 5 mg  5 mg Intramuscular Q4H PRN Max 4/day    And   • LORazepam (ATIVAN) injection 2 mg  2 mg Intramuscular Q4H PRN Max 4/day    And   • benztropine (COGENTIN) injection 1 mg  1 mg Intramuscular Q4H PRN Max 4/day   • benztropine (COGENTIN) tablet 1 mg  1 mg Oral Q4H PRN Max 6/day   • bictegravir-emtricitab-tenofovir alafenamide (BIKTARVY) -25 MG tablet 1 tablet  1 tablet Oral Daily With Breakfast   • bisacodyl (DULCOLAX) rectal suppository 10 mg  10 mg Rectal Daily PRN   • hydrOXYzine HCL (ATARAX) tablet 50 mg  50 mg Oral Q6H PRN Max 4/day    Or   • diphenhydrAMINE (BENADRYL) injection 50 mg  50 mg Intramuscular X5I PRN   • folic acid (FOLVITE) tablet 1 mg  1 mg Oral Daily   • haloperidol (HALDOL) tablet 1 mg  1 mg Oral Q6H PRN   • haloperidol (HALDOL) tablet 2.5 mg  2.5 mg Oral Q4H PRN Max 4/day   • haloperidol (HALDOL) tablet 5 mg  5 mg Oral Q4H PRN Max 4/day   • hydrOXYzine HCL (ATARAX) tablet 100 mg  100 mg Oral Q6H PRN Max 4/day    Or   • LORazepam (ATIVAN) injection 2 mg  2 mg Intramuscular Q6H PRN   • hydrOXYzine HCL (ATARAX) tablet 25 mg  25 mg Oral Q6H PRN Max 4/day   • levETIRAcetam (KEPPRA) tablet 1,000 mg  1,000 mg Oral Q12H [facility]   • mirtazapine (REMERON) tablet 30 mg  30 mg Oral HS   • nicotine polacrilex (NICORETTE) gum 4 mg  4 mg Oral Q2H PRN   • polyethylene glycol (MIRALAX) packet 17 g  17 g Oral Daily PRN   • senna-docusate sodium (SENOKOT S) 8.6-50 mg per tablet 1 tablet  1 tablet Oral Daily PRN   • thiamine tablet 100 mg  100 mg Oral Daily   • traZODone (DESYREL) tablet 50 mg  50 mg Oral HS PRN       Subjective: Patient was seen for continuation of care. Chart was reviewed and discussed with treatment team.     No acute behavioral events over the past 24 hours. Today, patient was seen and examined at bedside for continuation of care. Patient denied adverse effects to their psychiatric medication regimen. Patient denied other new or worsening psychiatric symptoms/complaints at this time. Discussed the importance of continuing to take medications as prescribed, as well as the importance of continuing to attend groups on the unit. Psychiatric Review of Systems:  Medication adverse effects: none  Sleep: unchanged  Appetite: unchanged  Behavior over the past 24 hours: as per above    Vitals:  Vitals:    07/22/23 0749   BP: 136/99   Pulse: 80   Resp: 18   Temp: 97.7 °F (36.5 °C)   SpO2:        Laboratory results:    I have personally reviewed all pertinent laboratory/tests results  No results found for this or any previous visit (from the past 48 hour(s)). Current Medications:  Current medications as per above.  All medications have been reviewed. Risks, benefits, alternatives, and possible side effects of patient's psychiatric medications were discussed with patient. Mental Status Evaluation:  Appearance: disheveled, appears consistent with stated age, using walker, head is turned to left, malodorous  Motor: no psychomotor disturbances, no gait abnormalities  Behavior: superficially cooperative  Speech: normal rate and rhythm  Mood: "good"  Affect: bizarre, psychotic-appearing  Thought Process: disorganized, circumstantial at times  Thought Content: denies auditory hallucinations, denies visual hallucinations, denies delusions  Risk Potential: denies suicidal ideation, plan, or intent. Denies homicidal ideation  Sensorium: Oriented to person, place, time, and situation  Cognition: cognitive ability appears intact but was not quantitatively tested  Consciousness: alert and awake  Attention: currently impaired  Insight: limited  Judgement: limited      Progress Toward Goals & Illness Status: Patient is not at goal. They are not yet ready for discharge. The patient's condition currently requires active psychopharmacological medication management, interdisciplinary coordination with case management, and the utilization of adjunctive milieu and group therapy to augment psychopharmacological efficacy. The patient's risk of morbidity, and progression or decompensation of psychiatric disease, is higher without this current treatment. This note has been constructed using a voice recognition system. There may be translation, syntax, or grammatical errors. If you have any questions, please contact the dictating provider.

## 2023-07-23 PROCEDURE — 99232 SBSQ HOSP IP/OBS MODERATE 35: CPT | Performed by: PSYCHIATRY & NEUROLOGY

## 2023-07-23 RX ADMIN — BICTEGRAVIR SODIUM, EMTRICITABINE, AND TENOFOVIR ALAFENAMIDE FUMARATE 1 TABLET: 50; 200; 25 TABLET ORAL at 09:03

## 2023-07-23 RX ADMIN — THIAMINE HCL TAB 100 MG 100 MG: 100 TAB at 09:03

## 2023-07-23 RX ADMIN — LEVETIRACETAM 1000 MG: 500 TABLET, FILM COATED ORAL at 21:23

## 2023-07-23 RX ADMIN — MIRTAZAPINE 30 MG: 15 TABLET, FILM COATED ORAL at 21:23

## 2023-07-23 RX ADMIN — FOLIC ACID 1 MG: 1 TABLET ORAL at 09:03

## 2023-07-23 RX ADMIN — LEVETIRACETAM 1000 MG: 500 TABLET, FILM COATED ORAL at 09:03

## 2023-07-23 NOTE — NURSING NOTE
While rounding, patient was found with peer in shared bathroom with door closed. Both patients insisted they were talking. Staff discussed the importance of maintaining appropriate boundaries with peers and only one person utilizing the bathroom at a time. Patient verbalized understanding and returned to bed.

## 2023-07-23 NOTE — NURSING NOTE
Pt appeared to be sleeping in bed with even respirations and easily aroused throughout night, as observed during continuous rounding.

## 2023-07-23 NOTE — PROGRESS NOTES
Progress Note - Behavioral Health   Benjie Penaloza 40 y.o. male MRN: [id number]  Unit/Bed#: [facility] 205-01 Encounter: [id number]    Assessment:  Principal Problem:    Substance induced mood disorder ([address])  Active Problems:    HIV (human immunodeficiency virus infection) ([address])    Homelessness    Tobacco dependence    Recurrent seizures ([address])    Cannabis use disorder    Alcohol use    Adjustment disorder    Medical clearance for psychiatric admission    Neck pain      Plan:  --Continue with psychiatric hospitalization  --Continue with individual, group, and milieu therapy  --Continue the following medications:  Current Facility-Administered Medications   Medication Dose Route Frequency   • acetaminophen (TYLENOL) tablet 650 mg  650 mg Oral Q6H PRN   • acetaminophen (TYLENOL) tablet 650 mg  650 mg Oral Q4H PRN   • acetaminophen (TYLENOL) tablet 975 mg  975 mg Oral Q6H PRN   • aluminum-magnesium hydroxide-simethicone (MAALOX) oral suspension 30 mL  30 mL Oral Q4H PRN   • haloperidol lactate (HALDOL) injection 2.5 mg  2.5 mg Intramuscular Q4H PRN Max 4/day    And   • LORazepam (ATIVAN) injection 1 mg  1 mg Intramuscular Q4H PRN Max 4/day    And   • benztropine (COGENTIN) injection 0.5 mg  0.5 mg Intramuscular Q4H PRN Max 4/day   • haloperidol lactate (HALDOL) injection 5 mg  5 mg Intramuscular Q4H PRN Max 4/day    And   • LORazepam (ATIVAN) injection 2 mg  2 mg Intramuscular Q4H PRN Max 4/day    And   • benztropine (COGENTIN) injection 1 mg  1 mg Intramuscular Q4H PRN Max 4/day   • benztropine (COGENTIN) tablet 1 mg  1 mg Oral Q4H PRN Max 6/day   • bictegravir-emtricitab-tenofovir alafenamide (BIKTARVY) -25 MG tablet 1 tablet  1 tablet Oral Daily With Breakfast   • bisacodyl (DULCOLAX) rectal suppository 10 mg  10 mg Rectal Daily PRN   • hydrOXYzine HCL (ATARAX) tablet 50 mg  50 mg Oral Q6H PRN Max 4/day    Or   • diphenhydrAMINE (BENADRYL) injection 50 mg  50 mg Intramuscular S1G PRN   • folic acid (FOLVITE) tablet 1 mg  1 mg Oral Daily   • haloperidol (HALDOL) tablet 1 mg  1 mg Oral Q6H PRN   • haloperidol (HALDOL) tablet 2.5 mg  2.5 mg Oral Q4H PRN Max 4/day   • haloperidol (HALDOL) tablet 5 mg  5 mg Oral Q4H PRN Max 4/day   • hydrOXYzine HCL (ATARAX) tablet 100 mg  100 mg Oral Q6H PRN Max 4/day    Or   • LORazepam (ATIVAN) injection 2 mg  2 mg Intramuscular Q6H PRN   • hydrOXYzine HCL (ATARAX) tablet 25 mg  25 mg Oral Q6H PRN Max 4/day   • levETIRAcetam (KEPPRA) tablet 1,000 mg  1,000 mg Oral Q12H [address]   • mirtazapine (REMERON) tablet 30 mg  30 mg Oral HS   • nicotine polacrilex (NICORETTE) gum 4 mg  4 mg Oral Q2H PRN   • polyethylene glycol (MIRALAX) packet 17 g  17 g Oral Daily PRN   • senna-docusate sodium (SENOKOT S) 8.6-50 mg per tablet 1 tablet  1 tablet Oral Daily PRN   • thiamine tablet 100 mg  100 mg Oral Daily   • traZODone (DESYREL) tablet 50 mg  50 mg Oral HS PRN       Subjective: Patient was seen for continuation of care. Chart was reviewed and discussed with treatment team.     Overnight, patient was found in a locked bathroom with another male peer. Patient and peer both insisted they were just talking. Patients were reminded about appropriate boundaries and milieu rules. Today, patient was seen and examined at bedside for continuation of care. Patient denied adverse effects to their psychiatric medication regimen. Patient denied other new or worsening psychiatric symptoms/complaints at this time. Discussed the importance of continuing to take medications as prescribed, as well as the importance of continuing to attend groups on the unit.      Psychiatric Review of Systems:  Medication adverse effects: none  Sleep: unchanged  Appetite: unchanged  Behavior over the past 24 hours: as per above    Vitals:  Vitals:    07/23/23 0742   BP: 133/83   Pulse: 82   Resp: 16   Temp: (!) 97 °F (36.1 °C)   SpO2:        Laboratory results:    I have personally reviewed all pertinent laboratory/tests results  No results found for this or any previous visit (from the past 48 hour(s)). Current Medications:  Current medications as per above. All medications have been reviewed. Risks, benefits, alternatives, and possible side effects of patient's psychiatric medications were discussed with patient. Mental Status Evaluation:  Appearance: disheveled, appears consistent with stated age, using walker, head is turned to left, malodorous  Motor: no psychomotor disturbances, no gait abnormalities  Behavior: superficially cooperative  Speech: normal rate and rhythm  Mood: "okay"  Affect: bizarre, psychotic-appearing  Thought Process: disorganized, circumstantial at times  Thought Content: denies auditory hallucinations, denies visual hallucinations, denies delusions  Risk Potential: denies suicidal ideation, plan, or intent. Denies homicidal ideation  Sensorium: Oriented to person, place, time, and situation  Cognition: cognitive ability appears intact but was not quantitatively tested  Consciousness: alert and awake  Attention: currently impaired  Insight: limited  Judgement: limited    Progress Toward Goals & Illness Status: Patient is not at goal. They are not yet ready for discharge. The patient's condition currently requires active psychopharmacological medication management, interdisciplinary coordination with case management, and the utilization of adjunctive milieu and group therapy to augment psychopharmacological efficacy. The patient's risk of morbidity, and progression or decompensation of psychiatric disease, is higher without this current treatment. This note has been constructed using a voice recognition system. There may be translation, syntax, or grammatical errors. If you have any questions, please contact the dictating provider.

## 2023-07-23 NOTE — NURSING NOTE
Pt is watching tv on approach this morning. Pleasant and cooperative during interaction. Reports "always feeling good."  Reports sleeping well overnight. Denies any questions or concerns.

## 2023-07-23 NOTE — TREATMENT TEAM
07/23/23 0800   Team Meeting   Meeting Type Daily Rounds   Team Members Present   Team Members Present Physician;Nurse   Physician Team Member [facility] Team Member [facility]   Patient/Family Present   Patient Present No   Patient's Family Present No     Daily Rounds: Pt reports improved mood. Per staff, pt found in shared bathroom with peer overnight, states they were talking. negative...

## 2023-07-24 PROCEDURE — 99232 SBSQ HOSP IP/OBS MODERATE 35: CPT | Performed by: STUDENT IN AN ORGANIZED HEALTH CARE EDUCATION/TRAINING PROGRAM

## 2023-07-24 RX ADMIN — LEVETIRACETAM 1000 MG: 500 TABLET, FILM COATED ORAL at 08:57

## 2023-07-24 RX ADMIN — BICTEGRAVIR SODIUM, EMTRICITABINE, AND TENOFOVIR ALAFENAMIDE FUMARATE 1 TABLET: 50; 200; 25 TABLET ORAL at 08:30

## 2023-07-24 RX ADMIN — MIRTAZAPINE 30 MG: 15 TABLET, FILM COATED ORAL at 21:59

## 2023-07-24 RX ADMIN — THIAMINE HCL TAB 100 MG 100 MG: 100 TAB at 08:57

## 2023-07-24 RX ADMIN — TRAZODONE HYDROCHLORIDE 50 MG: 50 TABLET ORAL at 01:54

## 2023-07-24 RX ADMIN — TRAZODONE HYDROCHLORIDE 50 MG: 50 TABLET ORAL at 22:00

## 2023-07-24 RX ADMIN — LEVETIRACETAM 1000 MG: 500 TABLET, FILM COATED ORAL at 21:16

## 2023-07-24 RX ADMIN — FOLIC ACID 1 MG: 1 TABLET ORAL at 08:57

## 2023-07-24 NOTE — NURSING NOTE
Pt needed extra encouragement to shower. Pt showered this evening. Pt social with peers. Watching TV show friends and quizzing staff with questions. Pt needs verbal redirection to lower voice. Denies SI/HI or hallucinations.

## 2023-07-24 NOTE — CASE MANAGEMENT
CM met with patient and discussed discharge plans. Patient reports that he is discharging to [facility]. CM provided patient with information for [facility]), [address] [facility]) [address] [facility], to follow up with medical & mental concerns. Patient currently has [place] Medicaid. CM provided patient with information for housing program [facility], located at [address], [address] [address], [phone], to follow up with for housing services and food pantry.

## 2023-07-24 NOTE — CASE MANAGEMENT
CM provided patient with information for housing assistance. Patient will be discharging to [facility] [place].      [facility] TREATMENT [address] [facility] [place], [address] [facility]

## 2023-07-24 NOTE — NURSING NOTE
Pt loudly singings in the halls. Needed redirection multiple times. Pt bright and social with peers/staff. Denies all psychiatric symptoms. Appetite is good. Encouraged to brush teeth and shower.

## 2023-07-24 NOTE — NURSING NOTE
Patient requested medication for insomnia, given Trazodone 50mg at 0154. On reassessment patient is resting with eyes closed, no signs of distress, respirations regular and non-labored; medication appears to be effective.

## 2023-07-24 NOTE — PROGRESS NOTES
[facility] Group Note     07/24/23 1000 07/24/23 1330   Activity/Group Checklist   Group Community meeting  (Goals/Positive Affirmations) Life Skills  (Control and Change - Personal Serenity Prayer)   Attendance Attended Attended   Attendance Duration (min) 16-30  (arrived late to group) 16-30  (arrived late/left group early)   Interactions Did not interact Interacted appropriately   Affect/Mood Other (Comment)  (Bright at times, but also lethargic at times) Bright  (Impartial)   Goals Achieved Able to listen to others  (benefited from social presence of the group) Able to listen to others; Able to engage in interactions; Able to recieve feedback; Able to give feedback to another  (benefited from social presence of the group)

## 2023-07-24 NOTE — PROGRESS NOTES
Progress Note - Behavioral Health   Dulce Bush 40 y.o. male MRN: [id number]  Unit/Bed#: [facility] 205-01 Encounter: [id number]    Assessment/Plan   Principal Problem:    Substance induced mood disorder ([address])  Active Problems:    HIV (human immunodeficiency virus infection) ([address])    Homelessness    Tobacco dependence    Recurrent seizures ([address])    Cannabis use disorder    Alcohol use    Adjustment disorder    Medical clearance for psychiatric admission    Neck pain      Subjective: Patient was seen, chart was reviewed, and case was discussed with the team. Patient appears calm, cooperative and visible in groups. Reports improvement in mood. Anxiety is manageable. Sleep and appetite are good. He is compliant with medications. Denies any side effects.    Behavior over the last 24 hours:  unchanged  Sleep: normal  Appetite: normal  Medication side effects: No    Medical ROS: Pertinent items are noted in HPI.all other systems are negative    Current Medications:  Current Facility-Administered Medications   Medication Dose Route Frequency   • acetaminophen (TYLENOL) tablet 650 mg  650 mg Oral Q6H PRN   • acetaminophen (TYLENOL) tablet 650 mg  650 mg Oral Q4H PRN   • acetaminophen (TYLENOL) tablet 975 mg  975 mg Oral Q6H PRN   • aluminum-magnesium hydroxide-simethicone (MAALOX) oral suspension 30 mL  30 mL Oral Q4H PRN   • haloperidol lactate (HALDOL) injection 2.5 mg  2.5 mg Intramuscular Q4H PRN Max 4/day    And   • LORazepam (ATIVAN) injection 1 mg  1 mg Intramuscular Q4H PRN Max 4/day    And   • benztropine (COGENTIN) injection 0.5 mg  0.5 mg Intramuscular Q4H PRN Max 4/day   • haloperidol lactate (HALDOL) injection 5 mg  5 mg Intramuscular Q4H PRN Max 4/day    And   • LORazepam (ATIVAN) injection 2 mg  2 mg Intramuscular Q4H PRN Max 4/day    And   • benztropine (COGENTIN) injection 1 mg  1 mg Intramuscular Q4H PRN Max 4/day   • benztropine (COGENTIN) tablet 1 mg  1 mg Oral Q4H PRN Max 6/day   • bictegravir-emtricitab-tenofovir alafenamide (BIKTARVY) -25 MG tablet 1 tablet  1 tablet Oral Daily With Breakfast   • bisacodyl (DULCOLAX) rectal suppository 10 mg  10 mg Rectal Daily PRN   • hydrOXYzine HCL (ATARAX) tablet 50 mg  50 mg Oral Q6H PRN Max 4/day    Or   • diphenhydrAMINE (BENADRYL) injection 50 mg  50 mg Intramuscular N9F PRN   • folic acid (FOLVITE) tablet 1 mg  1 mg Oral Daily   • haloperidol (HALDOL) tablet 1 mg  1 mg Oral Q6H PRN   • haloperidol (HALDOL) tablet 2.5 mg  2.5 mg Oral Q4H PRN Max 4/day   • haloperidol (HALDOL) tablet 5 mg  5 mg Oral Q4H PRN Max 4/day   • hydrOXYzine HCL (ATARAX) tablet 100 mg  100 mg Oral Q6H PRN Max 4/day    Or   • LORazepam (ATIVAN) injection 2 mg  2 mg Intramuscular Q6H PRN   • hydrOXYzine HCL (ATARAX) tablet 25 mg  25 mg Oral Q6H PRN Max 4/day   • levETIRAcetam (KEPPRA) tablet 1,000 mg  1,000 mg Oral Q12H [facility]J   • mirtazapine (REMERON) tablet 30 mg  30 mg Oral HS   • nicotine polacrilex (NICORETTE) gum 4 mg  4 mg Oral Q2H PRN   • polyethylene glycol (MIRALAX) packet 17 g  17 g Oral Daily PRN   • senna-docusate sodium (SENOKOT S) 8.6-50 mg per tablet 1 tablet  1 tablet Oral Daily PRN   • thiamine tablet 100 mg  100 mg Oral Daily   • traZODone (DESYREL) tablet 50 mg  50 mg Oral HS PRN       Behavioral Health Medications:   all current active meds have been reviewed. Vitals:  Vitals:    07/24/23 0747   BP: 137/100   Pulse: 101   Resp: 17   Temp: 97.6 °F (36.4 °C)   SpO2:        Laboratory results:    I have personally reviewed all pertinent laboratory/tests results.     Mental Status Evaluation:    Appearance:  age appropriate   Behavior:  guarded   Speech:  normal pitch and normal volume   Mood:  "better"   Affect:  mood-congruent   Thought Process:  goal directed and logical   Thought Content:  normal   Perceptual Disturbances: None   Risk Potential: Suicidal Ideations none  Homicidal Ideations none  Potential for Aggression No   Sensorium:  person, place and time/date   Memory:  recent and remote memory grossly intact   Consciousness:  alert and awake    Attention: attention span appeared shorter than expected for age   Insight:  limited   Judgment: limited   Gait/Station: normal gait/station   Motor Activity: no abnormal movements       Progress Toward Goals: Progressing    Recommended Treatment: Continue with pharmacotherapy, group therapy, milieu therapy and occupational therapy. 1.Discahrge planning  Risks, benefits and possible side effects of Medications:   Risks, benefits, alternatives, and possible side effects of patient's psychiatric medications were discussed with patient.

## 2023-07-24 NOTE — PLAN OF CARE
Problem: SELF HARM/SUICIDALITY  Goal: Will have no self-injury during hospital stay  Description: INTERVENTIONS:  - Q 15 MINUTES: Routine safety checks  - Q WAKING SHIFT & PRN: Assess risk to determine if routine checks are adequate to maintain patient safety  - Encourage patient to participate actively in care by formulating a plan to combat response to suicidal ideation, identify supports and resources  Outcome: Progressing     Problem: DEPRESSION  Goal: Will be euthymic at discharge  Description: INTERVENTIONS:  - Administer medication as ordered  - Provide emotional support via 1:1 interaction with staff  - Encourage involvement in milieu/groups/activities  - Monitor for social isolation  Outcome: Progressing     Problem: ANXIETY  Goal: Will report anxiety at manageable levels  Description: INTERVENTIONS:  - Administer medication as ordered  - Teach and encourage coping skills  - Provide emotional support  - Assess patient/family for anxiety and ability to cope  Outcome: Progressing  Goal: By discharge: Patient will verbalize 2 strategies to deal with anxiety  Description: Interventions:  - Identify any obvious source/trigger to anxiety  - Staff will assist patient in applying identified coping technique/skills  - Encourage attendance of scheduled groups and activities  Outcome: Progressing     Problem: SAFETY ADULT  Goal: Patient will remain free of falls  Description: INTERVENTIONS:  - Educate patient/family on patient safety including physical limitations  - Instruct patient to call for assistance with activity   - Consult OT/PT to assist with strengthening/mobility   - Keep Call bell within reach  - Keep bed low and locked with side rails adjusted as appropriate  - Keep care items and personal belongings within reach  - Initiate and maintain comfort rounds  - Make Fall Risk Sign visible to staff  - Apply yellow socks and bracelet for high fall risk patients  - Consider moving patient to room near nurses station  Outcome: Progressing  Goal: Maintain or return to baseline ADL function  Description: INTERVENTIONS:  -  Assess patient's ability to carry out ADLs; assess patient's baseline for ADL function and identify physical deficits which impact ability to perform ADLs (bathing, care of mouth/teeth, toileting, grooming, dressing, etc.)  - Assess/evaluate cause of self-care deficits   - Assess range of motion  - Assess patient's mobility; develop plan if impaired  - Assess patient's need for assistive devices and provide as appropriate  - Encourage maximum independence but intervene and supervise when necessary  - Involve family in performance of ADLs  - Assess for home care needs following discharge   - Consider OT consult to assist with ADL evaluation and planning for discharge  - Provide patient education as appropriate  Outcome: Progressing  Goal: Maintains/Returns to pre admission functional level  Description: INTERVENTIONS:  - Perform BMAT or MOVE assessment daily.   - Set and communicate daily mobility goal to care team and patient/family/caregiver.    - Collaborate with rehabilitation services on mobility goals if consulted  - Out of bed for toileting  - Record patient progress and toleration of activity level   Outcome: Progressing

## 2023-07-24 NOTE — DISCHARGE INSTR - APPOINTMENTS
You are being discharged to your address of [address] [place] [address]. You will be contacted at your confirmed phone number of [phone]. Lance Bullock or Judith, our Robert and [employer]ink[employer], will be calling you after your discharge, on the phone number that you provided. They will be available as an additional support, if needed. If you wish to speak with one of them, you may contact Lance Bullock at [phone] or Inna Huerta at [phone].

## 2023-07-24 NOTE — DISCHARGE INSTR - OTHER ORDERS
[facility]  [address], [address] [place] [address]    Transitional Housing Program-[facility] is a transitional program for homeless and near-homeless families residing in [facility]. There are five housing units available to families. Under the rules of the program, families may stay in the program for up to a year while they deal with the issues that led to their homelessness, and save money for their first month's rent and security deposit. The families save by [address] with [facility]. Supportive counseling, financial literacy training, life skills education and assistance finding permanent housing are available to the families. The financial literacy training includes participating in a mandatory class conducted by [employer]. The [facility] refers participants in the program after they leave [facility] to assist the families. This also gives [facility] accurate information about the families' progress after they depart the program.       Food Program Combating Hunger    Emergency Food Program  In collaboration with the [employer], emergency food bags are available at [facility]. These bags offer help to individuals who find themselves in need of food when accessing a food pantry is not an option. Crisis Intervention services are available 24 hours a day by calling [phone]. The crisis unit is located at [address] [place] [address]. Services include telephone counseling, mobile crisis, walk-in crisis, and assistance in accessing inpatient care and short-term crisis residential services. The PEER [employer] is a toll-free phone number for people in [facility] who are seeking a listening ear for additional support in their recovery from mental illness. The PEER LINE is peer-run and peer-friendly.  Callers to the [address] [place] [address] will speak directly to other individuals who have experienced the mental health recovery process. Hours of operation: 8:30 am - 4:30 pm, Monday through Friday    [phone] ([phone])    Recovery Partnership   [place]   [address]      Text CONNECT to [phone] from anywhere in the [facility], anytime, about any type of crisis. A live, trained Crisis Counselor receives the text and lets you know that they are here to listen. The volunteer Crisis Counselor will help you move from a hot moment to a cool moment. Warm Line: [phone], [phone], [phone]   If it is not quite a crisis, but you want to talk to someone, 24 hours/day, 7 days/week:   Someone to listen; someone who cares. The [facility] Mental Illness ([facility]) offers various education & support groups for you & your family. For more information visit their website at    http://www.[employer].      Dial 2-1-1 to get connected/get help. Free, confidential information & referral available 24/7: Aging Services, Child & Youth Services, Counseling, Education/Training, Food/Shelter/Clothing, Health Services, Parenting, Substance Abuse, Support Groups, Volunteer Opportunities, & much more. Phone: 2-1-1 or [phone], Web: Mary Ellen Lopez, Email: [email or web address]      The [place] are open to all mental health consumers in [facility] who are interested in meeting people and making new friends. They provide a friendly social atmosphere with scheduled daily activities including games, arts & crafts, discussion and education groups, vocational activities, and much more. Light refreshments are served daily. The locations are:      [facility] - operated by [address]    [address] [place] [address]    Phone: [phone]    Fax: [phone]    E-mail: [email or web address]   Hours of Operation:   Monday 3:00 PM - 8:00 PM   Tuesday 12:00 PM - 8:00 PM    Wednesday 3:00 PM - 8:00 PM   Thursday 12:00 PM - 8:00 PM Friday 3:00 PM - 8:00 PM    [facility] - operated by [facility]? [address] [place] [address]   Phone: [phone]   Fax: [phone]   Hours of Operation   Monday 12:00 PM - 8:00 PM   Tuesday 12:00 PM - 8:00 PM   Wednesday 12:00 PM - 8:00 PM?   Thursday 12:00 PM - 8:00 PM   Friday 12:00 PM - 9:00 PM?   Saturday 11:00 AM - 4:00 PM   [employer] transportation is available on scheduled days for transportation. Psych Rehab Program:   Modeled after the [facility] [address]  program in [place], the [employer] offers consumers interested in fulfilling work a guaranteed place to come, to belong, and to enjoy meaningful relationships as they seek the confidence and skills necessary to lead vocationally productive and socially satisfying lives. Here is their contact information:   [facility]   [address], [address]   Phone: [phone]   [employer]   This site is open Monday thru Thursday from 8:30 am till 4:00 pm and Fridays from 8:30 am till 3:00 pm. Consumers are encouraged to stop by for a visit. After-hour social activities are also scheduled. Support & Referral Helpline   Support and Referral Helpline is a 24-hour, 7 days-a-week, listening and referral service provided to all of [place]. Please call [phone] or tty [phone] to speak with one of our specialists. The helpline is possible through partnerships with the [address] [place] [address] [employer]. The [address] [place] [address] (formerly known as the [employer]) provides free and confidential emotional support to people in suicidal crisis or emotional distress 24 hours a day, 7 days a week, across the [facility].  The [employer] is comprised of a national network of over 200 local crisis centers, combining custom local care and resources with national standards and best practices.

## 2023-07-24 NOTE — PROGRESS NOTES
Patient is described as cooperative and social with peers and staff. Patient is medication compliant and meal compliant.      Discharge Date: 7/25/23 07/24/23 2421   Team Meeting   Meeting Type Daily Rounds   Team Members Present   Team Members Present Physician;Nurse;   Physician Team Member Lorna Herrera/ PA Student   Nursing Team Member [facility] Management Team Member Julián/ Meek/ Myrna Dewey   Patient/Family Present   Patient Present No   Patient's Family Present No

## 2023-07-25 VITALS
DIASTOLIC BLOOD PRESSURE: 92 MMHG | BODY MASS INDEX: 26.65 KG/M2 | HEART RATE: 85 BPM | RESPIRATION RATE: 16 BRPM | OXYGEN SATURATION: 98 % | SYSTOLIC BLOOD PRESSURE: 151 MMHG | TEMPERATURE: 96.7 F | WEIGHT: 169.8 LBS | HEIGHT: 67 IN

## 2023-07-25 PROBLEM — F32.9 MAJOR DEPRESSIVE DISORDER: Status: ACTIVE | Noted: 2023-07-25

## 2023-07-25 PROCEDURE — 99239 HOSP IP/OBS DSCHRG MGMT >30: CPT | Performed by: STUDENT IN AN ORGANIZED HEALTH CARE EDUCATION/TRAINING PROGRAM

## 2023-07-25 RX ORDER — FOLIC ACID 1 MG/1
1 TABLET ORAL DAILY
Qty: 30 TABLET | Refills: 0 | Status: SHIPPED | OUTPATIENT
Start: 2023-07-25

## 2023-07-25 RX ORDER — MIRTAZAPINE 30 MG/1
30 TABLET, FILM COATED ORAL
Qty: 30 TABLET | Refills: 0 | Status: SHIPPED | OUTPATIENT
Start: 2023-07-25

## 2023-07-25 RX ORDER — THIAMINE MONONITRATE (VIT B1) 100 MG
100 TABLET ORAL DAILY
Qty: 30 TABLET | Refills: 0 | Status: SHIPPED | OUTPATIENT
Start: 2023-07-25

## 2023-07-25 RX ORDER — LEVETIRACETAM 1000 MG/1
1000 TABLET ORAL EVERY 12 HOURS SCHEDULED
Qty: 30 TABLET | Refills: 0 | Status: SHIPPED | OUTPATIENT
Start: 2023-07-25

## 2023-07-25 RX ADMIN — BICTEGRAVIR SODIUM, EMTRICITABINE, AND TENOFOVIR ALAFENAMIDE FUMARATE 1 TABLET: 50; 200; 25 TABLET ORAL at 08:28

## 2023-07-25 RX ADMIN — THIAMINE HCL TAB 100 MG 100 MG: 100 TAB at 08:28

## 2023-07-25 RX ADMIN — FOLIC ACID 1 MG: 1 TABLET ORAL at 08:28

## 2023-07-25 RX ADMIN — LEVETIRACETAM 1000 MG: 500 TABLET, FILM COATED ORAL at 08:28

## 2023-07-25 NOTE — PROGRESS NOTES
Attended group alongside other people on the unit, participated in discussion around recovery-centered topic, and contributed their own lived experience toward connection between group members. 07/24/23 1230 07/24/23 1530 07/24/23 1730   Activity/Group Checklist   Group Life Skills Other (Comment)  (3:30 PM Goal Accomplishments) Other (Comment)  (5:30 PM 12 Step Groups)   Attendance Attended Attended Attended   Attendance Duration (min) 46-60 Greater than 60 Greater than 60  (> 75 min)   Interactions Interacted appropriately Interacted appropriately Interacted appropriately   Affect/Mood Appropriate Appropriate Appropriate   Goals Achieved Other (Comment)  (Engaged with CPS and peers on unit in safely examining trauma, with activators in mind. Viewed recovery from trauma through honesty on family, community and public levels. Reading article about actor's and a politician’s honesty about inpatient.) Other (Comment)  (Engaged with CPS and peers on unit in creatively building song lyrics into images of popular musicians. Listened to music chosen by self and peers. Examined details of online support groups accessible following graduation from [facility].) Other (Comment)  (Engaged with CPS and peers on unit in discovering dozens of different 12 Step fellowships, each addressing a community recovering from addiction.  Shared personal experience with addiction, and considered perceptions of people who experience addiction.)

## 2023-07-25 NOTE — NURSING NOTE
Pt received prn Trazodone 50 mg at 2200 for sleep. At this time, 2300, pt is observed in bed with eyes closed, seemingly asleep. Medication effective.

## 2023-07-25 NOTE — CASE MANAGEMENT
CM met with patient and went over discharge. IMM Form reviewed and signed. LYFT Waiver reviewed and signed.

## 2023-07-25 NOTE — NURSING NOTE
Pt is pleasant and cooperative, bright affect. Expresses readiness for discharge today. Pt has been visible on unit and attending groups. Denies any questions or concerns at this time.

## 2023-07-25 NOTE — BH TRANSITION RECORD
Contact Information: If you have any questions, concerns, pended studies, tests and/or procedures, or emergencies regarding your inpatient behavioral health visit. Please contact [place] [facility] [phone] and ask to speak to a , nurse or physician. A contact is available 24 hours/ 7 days a week at this number. Summary of Procedures Performed During your Stay:  Below is a list of major procedures performed during your hospital stay and a summary of results:  - No major procedures performed. Pending Studies (From admission, onward)    None        Please follow up on the above pending studies with your PCP and/or referring provider.

## 2023-07-25 NOTE — DISCHARGE SUMMARY
Discharge Summary - Magdiel Contreras 40 y.o. male MRN: [id number]  Unit/Bed#: -01 Encounter: [id number]     Admission Date:   Admission Orders (From admission, onward)     Ordered        07/17/23 1109  ED TO DIFFERENT CAMPUS IP [place] UNIT or INPATIENT MEDICAL UNIT to [facility] (using Discharge Readmit Navigator) - Admit Patient to [facility] Unit  Once                            Discharge Date: No discharge date for patient encounter. Attending Psychiatrist: Ilya Montenegro*    Reason for Admission/HPI:   History of Present Illness     As per H&P on 7/17:"Per ED provider on 7/16:"Patient is a 75-year-old male seen in the emergency department with concern for depression/suicidal ideation. Hank Butler notes suicidal ideation with a plan to jump off a bridge.  Patient notes symptoms worsening over the past day.  Patient notes no chest pain, shortness of breath, abdominal pain, nausea, vomiting, weakness, numbness, tingling.  Patient notes no definite clear exacerbating or alleviating factors for his symptoms.  Patient requests medication for anxiety.  Patient has no other acute medical complaints in the emergency department."     Per Crisis worker on 7/16:"Met with patient to complete the crisis intake assessment and the safety risk assessment.  Patient came to ER voluntarily with complaint of SI with plan.  Patient was woken for assessment.  He kept his eyes closed throughout the assessment.  Patient was a poor historian but coherent. Mary Alice Weems spoke softly and was oriented x3.  Patient endorsed SI with a plan to jump off a bridge.  He denied HI, AVH, paranoia, and anxiety.  Patient was unable to identify a trigger for depression and Ginny Franklin is homeless. [place] [facility] was poor, dirt on the sheets from his body and dirt under his nails.  Patient denied current OP services and denied previous inpatient behavioral health hospitalization. He was unable to contract for safety.   Patient was agreeable to signing a 201 and expressed understanding of rights."        This is 39 yo male with hx of HIV/seizures and alcohol/cannabis use admitted to inpatient unit on voluntary status for worsening of mood and suicidal ideation with plan in the context of psychosocial stressors and non compliance with treatment. Patient reports while smoking cannabis and drinking a shot of alcohol he had thought to kill himself so brought to hospital. Patient appears disheveled, malodorous, unkempt and ambulates with walker. Denies any symptoms of depression or abhishek or psychosis currently. Denies any thoughts to hurt self or others. Psychiatric Review Of Systems:  Medication side effects: none  Sleep: Good  Appetite: no change  Hygiene: able to tend to instrumental and basic ADLs  Anxiety and panic attacks: denies  Psychotic Symptoms: denies  Depression Symptoms: denies  Manic Symptoms: denies  PTSD Symptoms: denies  Suicidal Thoughts: denies  Homicidal Thoughts: denies     Medical Review Of Systems:   Complete ROS is negative, except as noted above."    Hospital Course:   Patient was admitted to the inpatient psychiatric unit and started on Behavioral Health checks every 7 minutes. During the hospitalization patient was encouraged to attend individual therapy, group therapy, milieu therapy and occupational therapy. To address mood symptoms, patient was treated with antidepressant Remeron. Prior to beginning of treatment medications risks and benefits and possible side effects were reviewed. Patient verbalized understanding and agreement for treatment. Upon admission patient was seen by medical service for medical clearance for inpatient treatment and medical follow up. Patient’s symptoms resolved over the hospital course With adjustment of medications to Remeron 30 mg PO HS and therapeutic milieu, his symptoms were resolved.  At the end of treatment patient was improved and mood was more stable at the time of discharge. Patient denied suicidal ideation, intent or plan at the time of discharge and denied homicidal ideation, intent or plan at the time of discharge. There was no overt psychosis at the time of discharge. Patient was participating appropriately in milieu at the time of discharge. Behavior was appropriate on the unit at the time of discharge. Sleep and appetite were improved. Patient was tolerating medications and was not reporting any significant side effects at the time of discharge. Since patient was doing well at the end of the hospitalization, treatment team felt that he could be safely discharged to outpatient care. Patient also felt stable and ready for discharge at the end of the hospital stay. Mental Status at time of Discharge:     Appearance:  age appropriate   Behavior:  cooperative   Speech:  normal pitch and normal volume   Mood:  "better"   Affect:  mood-congruent   Thought Process:  goal directed and logical   Associations: intact associations   Thought Content:  normal   Perceptual Disturbances: None   Risk Potential: Suicidal Ideations none, Homicidal Ideations none and Potential for Aggression No   Sensorium:  person, place and time/date   Cognition:  recent and remote memory grossly intact   Consciousness:  alert and awake    Attention: attention span appeared shorter than expected for age   Insight:  limited   Judgment: limited   Gait/Station: normal gait/station   Motor Activity: no abnormal movements       Discharge Diagnosis:   Major depressive disorder  Cannabis use disorder  Alcohol use    Medical Problems     Resolved Problems  Date Reviewed: 7/25/2023   None             Discharge Medications:  See after visit summary for reconciled discharge medications provided to patient and family. Discharge instructions/Information to patient and family:   See after visit summary for information provided to patient and family.       Provisions for Follow-Up Care:  See after visit summary for information related to follow-up care and any pertinent home health orders. Discharge Statement   I spent 33 minutes discharging the patient. This time was spent on the day of discharge. I had direct contact with the patient on the day of discharge. Additional documentation is required if more than 30 minutes were spent on discharge.

## 2023-07-25 NOTE — NURSING NOTE
AVS reviewed and prescriptions have been e-prescribed. Pt expresses understanding. Denies any questions or concerns. Pt discharged from unit via [address].

## 2023-08-04 NOTE — PROGRESS NOTES
07/19/23 0750   Team Meeting   Meeting Type Daily Rounds   Team Members Present   Team Members Present Physician;Nurse; Other (Discipline and Name);    Physician Team Member Dr. Wynema Canavan / Dr. Gela Soto / Teddy Dutton / Raysa Escamilla Team Member Valentin Swartz / [facility] Management Team Member Clark Yang / Mai Rea   Other (Discipline and Name) Jose L(art therapy)   Patient/Family Present   Patient Present No   Patient's Family Present No

## 2023-09-16 NOTE — DISCHARGE INSTRUCTIONS
You do not yet have AIDS but this may develop if you do not take your HIV medications every single day as the virus mutated becomes resistant to medicines that recognizes very quickly and we can eventually ran out of medicines that will treat it 
Other

## 2023-11-01 ENCOUNTER — PATIENT OUTREACH (OUTPATIENT)
Dept: CASE MANAGEMENT | Facility: OTHER | Age: 38
End: 2023-11-01

## 2023-11-01 DIAGNOSIS — Z71.89 COMPLEX CARE COORDINATION: Primary | ICD-10-CM

## 2023-11-01 NOTE — PROGRESS NOTES
Referral received for the patient for complex care management for 8 ED visits & 2 admissions in the past 6 months. Patient's last noted utilization was in July when he was admitted to inpatient behavioral health in [place]. He discharged home & was referred to the [facility] for housing assistance. On chart review, the patient had an appointment with [address] [place] [address] in [facility] for HIV managment. They continued Biktarvy & Vitamin D. Bactrim is on hold as CD4 is improved. They gave a temporary Keppra refill, but was referred to neurology for follow up. Previous attempts to reach the patient for care management were unsuccessful. Noted new number on file. Called the patient to assess status & re-attempt engagement. Patient declined care management services. He stated no needs or questions, but did not go into further detail. He did share that he is still in the [facility] area. Provided the patient with my direct number so that he could call with any future needs or questions including with community resources.

## 2024-01-11 ENCOUNTER — HOSPITAL ENCOUNTER (EMERGENCY)
Facility: HOSPITAL | Age: 39
Discharge: HOME | End: 2024-01-11
Attending: EMERGENCY MEDICINE
Payer: MEDICARE

## 2024-01-11 VITALS
DIASTOLIC BLOOD PRESSURE: 72 MMHG | HEART RATE: 98 BPM | RESPIRATION RATE: 18 BRPM | OXYGEN SATURATION: 97 % | SYSTOLIC BLOOD PRESSURE: 105 MMHG | TEMPERATURE: 97.3 F

## 2024-01-11 DIAGNOSIS — G40.909 EPILEPSY (HCC): ICD-10-CM

## 2024-01-11 DIAGNOSIS — Z59.00 HOMELESSNESS: Primary | ICD-10-CM

## 2024-01-11 PROCEDURE — 99283 EMERGENCY DEPT VISIT LOW MDM: CPT

## 2024-01-11 PROCEDURE — 99284 EMERGENCY DEPT VISIT MOD MDM: CPT | Performed by: EMERGENCY MEDICINE

## 2024-01-11 RX ORDER — LEVETIRACETAM 500 MG/1
1000 TABLET ORAL ONCE
Status: COMPLETED | OUTPATIENT
Start: 2024-01-11 | End: 2024-01-11

## 2024-01-11 RX ORDER — METHOCARBAMOL 500 MG/1
500 TABLET, FILM COATED ORAL ONCE
Status: COMPLETED | OUTPATIENT
Start: 2024-01-11 | End: 2024-01-11

## 2024-01-11 RX ADMIN — METHOCARBAMOL TABLETS 500 MG: 500 TABLET, COATED ORAL at 04:20

## 2024-01-11 RX ADMIN — LEVETIRACETAM 1000 MG: 500 TABLET, FILM COATED ORAL at 04:20

## 2024-01-11 SDOH — ECONOMIC STABILITY - HOUSING INSECURITY: HOMELESSNESS UNSPECIFIED: Z59.00

## 2024-01-11 NOTE — DISCHARGE INSTRUCTIONS
Take your medications as prescribed. It is important not to miss a dose.    Follow up with your primary care doctor.

## 2024-01-11 NOTE — ED PROVIDER NOTES
History  Chief Complaint   Patient presents with    Medical Problem     Pt sts he came in tonight due to spasms of l hand and leg. Pt reports not taking his keppra last night. Pt able to move all extremities. No spasms noted at this time. Pt holding plastic fork and singing to Rns. Pt had fight with mom and was kicked out    Homeless     38-year-old male past medical history significant for epilepsy on Keppra that presents ED today for muscle spasm in his neck.  He states that this started prior to arrival and he was concerned because of the cold.  He also missed his Keppra dose.  He is denying any complaints at this time otherwise.  No fevers or chills.  No headache. No seizure-like activity today.  Upon review of the chart, he has had multiple visits to the [facility] for various nonspecific complaints        Prior to Admission Medications   Prescriptions Last Dose Informant Patient Reported? Taking?   bictegravir-emtricitab-tenofovir alafenamide (BIKTARVY) -25 MG tablet   No No   Sig: Take 1 tablet by mouth daily with breakfast   folic acid (FOLVITE) 1 mg tablet   No No   Sig: Take 1 tablet (1 mg total) by mouth daily   levETIRAcetam (KEPPRA) 1000 MG tablet   No No   Sig: Take 1 tablet (1,000 mg total) by mouth every 12 (twelve) hours   mirtazapine (REMERON) 30 mg tablet   No No   Sig: Take 1 tablet (30 mg total) by mouth daily at bedtime   thiamine (VITAMIN B1) 100 mg tablet   No No   Sig: Take 1 tablet (100 mg total) by mouth daily      Facility-Administered Medications: None       Past Medical History:   Diagnosis Date    HIV (human immunodeficiency virus infection) (HCC)     Seizures (HCC)     Smoker     Syphilis        Past Surgical History:   Procedure Laterality Date    HERNIA REPAIR         History reviewed. No pertinent family history.  I have reviewed and agree with the history as documented.    E-Cigarette/Vaping    E-Cigarette Use Current Every Day User      E-Cigarette/Vaping Substances     "Nicotine No     THC No     CBD No     Flavoring Yes     Other No     Unknown No      Social History     Tobacco Use    Smoking status: Every Day     Current packs/day: 2.00     Average packs/day: 2.0 packs/day for 8.0 years (16.0 ttl pk-yrs)     Types: Cigarettes    Smokeless tobacco: Never    Tobacco comments:     \"At least 2 packs a day\" of cigarettes.   Vaping Use    Vaping status: Every Day    Substances: Flavoring   Substance Use Topics    Alcohol use: Yes     Alcohol/week: 2.0 standard drinks of alcohol     Types: 1 Glasses of wine, 1 Cans of beer per week     Comment: socially    Drug use: Yes     Frequency: 1.0 times per week     Types: Marijuana       Review of Systems   Constitutional:  Negative for chills and fever.   HENT:  Negative for hearing loss.    Eyes:  Negative for visual disturbance.   Respiratory:  Negative for shortness of breath.    Cardiovascular:  Negative for chest pain.   Gastrointestinal:  Negative for abdominal pain, constipation, diarrhea, nausea and vomiting.   Genitourinary:  Negative for difficulty urinating.   Musculoskeletal:  Negative for myalgias.   Skin:  Negative for rash.   Neurological:  Negative for dizziness.   Psychiatric/Behavioral:  Negative for agitation.    All other systems reviewed and are negative.      Physical Exam  Physical Exam  Vitals and nursing note reviewed.   Constitutional:       General: He is not in acute distress.     Appearance: Normal appearance. He is not ill-appearing.   HENT:      Head: Normocephalic and atraumatic.      Right Ear: External ear normal.      Left Ear: External ear normal.      Nose: Nose normal. No congestion.      Mouth/Throat:      Mouth: Mucous membranes are moist.      Pharynx: Oropharynx is clear. No oropharyngeal exudate.   Eyes:      Extraocular Movements: Extraocular movements intact.      Conjunctiva/sclera: Conjunctivae normal.      Pupils: Pupils are equal, round, and reactive to light.   Cardiovascular:      Rate and " Rhythm: Normal rate and regular rhythm.      Pulses: Normal pulses.      Heart sounds: Normal heart sounds. No murmur heard.     No gallop.   Pulmonary:      Effort: Pulmonary effort is normal. No respiratory distress.      Breath sounds: Normal breath sounds. No wheezing or rales.   Abdominal:      General: Abdomen is flat. Bowel sounds are normal. There is no distension.      Palpations: Abdomen is soft.      Tenderness: There is no abdominal tenderness.   Musculoskeletal:         General: No swelling. Normal range of motion.      Cervical back: Normal range of motion. No rigidity.   Skin:     General: Skin is warm and dry.      Capillary Refill: Capillary refill takes less than 2 seconds.   Neurological:      General: No focal deficit present.      Mental Status: He is alert and oriented to person, place, and time. Mental status is at baseline.      Cranial Nerves: No cranial nerve deficit.      Motor: No weakness.      Gait: Gait normal.   Psychiatric:         Mood and Affect: Mood normal.         Behavior: Behavior normal.         Vital Signs  ED Triage Vitals   Temperature Pulse Respirations Blood Pressure SpO2   01/11/24 0409 01/11/24 0408 01/11/24 0408 01/11/24 0409 01/11/24 0408   (!) 97.3 °F (36.3 °C) 98 18 105/72 97 %      Temp Source Heart Rate Source Patient Position - Orthostatic VS BP Location FiO2 (%)   01/11/24 0409 01/11/24 0408 -- -- --   Tympanic Monitor         Pain Score       --                  Vitals:    01/11/24 0408 01/11/24 0409   BP:  105/72   Pulse: 98          Visual Acuity      ED Medications  Medications   levETIRAcetam (KEPPRA) tablet 1,000 mg (has no administration in time range)   methocarbamol (ROBAXIN) tablet 500 mg (has no administration in time range)       Diagnostic Studies  Results Reviewed       None                   No orders to display              Procedures  Procedures         ED Course                                             Medical Decision Making  38-year-old  male presenting to ED today for his neck pain.  At this time he has no nuchal rigidity to suggest meningitis.  Likely muscle spasm.  Will treat him with Robaxin.  Will give him his nighttime dose of Keppra as he missed that.  Likely some element of homelessness which drove him to come to our ER today.  Will discharge patient.  Encouraged him to continue his medications.  Encouraged outpatient follow-up with primary care provider as well.    Risk  Prescription drug management.             Disposition  Final diagnoses:   Homelessness   Epilepsy (HCC)     Time reflects when diagnosis was documented in both MDM as applicable and the Disposition within this note       Time User Action Codes Description Comment    1/11/2024  4:13 AM Kaveh Willams [Z59.00] Homelessness     1/11/2024  4:13 AM Kaveh Willams [G40.909] Epilepsy (HCC)           ED Disposition       ED Disposition   Discharge    Condition   Stable    Date/Time   u Jan 11, 2024  4:13 AM    Comment   John Sanders discharge to home/self care.                   Follow-up Information       Follow up With Specialties Details Why Contact Info Additional Information    Follow up with your PCP         [facility] Emergency Department Emergency Medicine Go to  If symptoms worsen, As needed [address] [place] [id number]  [phone] [facility] Emergency Department, [address] [place] [address], [id number]            Patient's Medications   Discharge Prescriptions    No medications on file       No discharge procedures on file.    PDMP Review         Value Time User    PDMP Reviewed  Yes 6/17/2023 11:20 AM Sammy Roberts MD            ED Provider  Electronically Signed by             Kaveh Willams MD  01/11/24 0417

## 2024-01-13 ENCOUNTER — APPOINTMENT (EMERGENCY)
Dept: RADIOLOGY | Facility: HOSPITAL | Age: 39
End: 2024-01-13
Payer: MEDICARE

## 2024-01-13 ENCOUNTER — HOSPITAL ENCOUNTER (EMERGENCY)
Facility: HOSPITAL | Age: 39
Discharge: HOME | End: 2024-01-13
Attending: EMERGENCY MEDICINE | Admitting: EMERGENCY MEDICINE
Payer: MEDICARE

## 2024-01-13 VITALS
HEART RATE: 92 BPM | RESPIRATION RATE: 20 BRPM | OXYGEN SATURATION: 99 % | SYSTOLIC BLOOD PRESSURE: 127 MMHG | DIASTOLIC BLOOD PRESSURE: 84 MMHG | TEMPERATURE: 96.6 F

## 2024-01-13 DIAGNOSIS — G40.909 RECURRENT SEIZURES (HCC): ICD-10-CM

## 2024-01-13 DIAGNOSIS — R56.9 SEIZURE (HCC): Primary | ICD-10-CM

## 2024-01-13 LAB
ALBUMIN SERPL BCP-MCNC: 4.2 G/DL (ref 3.5–5)
ALP SERPL-CCNC: 47 U/L (ref 34–104)
ALT SERPL W P-5'-P-CCNC: 10 U/L (ref 7–52)
ANION GAP SERPL CALCULATED.3IONS-SCNC: 9 MMOL/L
AST SERPL W P-5'-P-CCNC: 18 U/L (ref 13–39)
BASOPHILS # BLD AUTO: 0.04 THOUSANDS/ÂΜL (ref 0–0.1)
BASOPHILS NFR BLD AUTO: 1 % (ref 0–1)
BILIRUB SERPL-MCNC: 0.62 MG/DL (ref 0.2–1)
BUN SERPL-MCNC: 17 MG/DL (ref 5–25)
CALCIUM SERPL-MCNC: 8.9 MG/DL (ref 8.4–10.2)
CHLORIDE SERPL-SCNC: 109 MMOL/L (ref 96–108)
CO2 SERPL-SCNC: 23 MMOL/L (ref 21–32)
CREAT SERPL-MCNC: 0.97 MG/DL (ref 0.6–1.3)
EOSINOPHIL # BLD AUTO: 0.03 THOUSAND/ÂΜL (ref 0–0.61)
EOSINOPHIL NFR BLD AUTO: 0 % (ref 0–6)
ERYTHROCYTE [DISTWIDTH] IN BLOOD BY AUTOMATED COUNT: 14.7 % (ref 11.6–15.1)
GFR SERPL CREATININE-BSD FRML MDRD: 98 ML/MIN/1.73SQ M
GLUCOSE SERPL-MCNC: 106 MG/DL (ref 65–140)
HCT VFR BLD AUTO: 43.8 % (ref 36.5–49.3)
HGB BLD-MCNC: 14.6 G/DL (ref 12–17)
IMM GRANULOCYTES # BLD AUTO: 0.01 THOUSAND/UL (ref 0–0.2)
IMM GRANULOCYTES NFR BLD AUTO: 0 % (ref 0–2)
LYMPHOCYTES # BLD AUTO: 3.11 THOUSANDS/ÂΜL (ref 0.6–4.47)
LYMPHOCYTES NFR BLD AUTO: 44 % (ref 14–44)
MCH RBC QN AUTO: 30.7 PG (ref 26.8–34.3)
MCHC RBC AUTO-ENTMCNC: 33.3 G/DL (ref 31.4–37.4)
MCV RBC AUTO: 92 FL (ref 82–98)
MONOCYTES # BLD AUTO: 0.51 THOUSAND/ÂΜL (ref 0.17–1.22)
MONOCYTES NFR BLD AUTO: 7 % (ref 4–12)
NEUTROPHILS # BLD AUTO: 3.31 THOUSANDS/ÂΜL (ref 1.85–7.62)
NEUTS SEG NFR BLD AUTO: 48 % (ref 43–75)
NRBC BLD AUTO-RTO: 0 /100 WBCS
PLATELET # BLD AUTO: 160 THOUSANDS/UL (ref 149–390)
PMV BLD AUTO: 10.6 FL (ref 8.9–12.7)
POTASSIUM SERPL-SCNC: 4.1 MMOL/L (ref 3.5–5.3)
PROT SERPL-MCNC: 7.7 G/DL (ref 6.4–8.4)
RBC # BLD AUTO: 4.75 MILLION/UL (ref 3.88–5.62)
SODIUM SERPL-SCNC: 141 MMOL/L (ref 135–147)
WBC # BLD AUTO: 7.01 THOUSAND/UL (ref 4.31–10.16)

## 2024-01-13 PROCEDURE — 99285 EMERGENCY DEPT VISIT HI MDM: CPT

## 2024-01-13 PROCEDURE — 90471 IMMUNIZATION ADMIN: CPT

## 2024-01-13 PROCEDURE — 90715 TDAP VACCINE 7 YRS/> IM: CPT | Performed by: EMERGENCY MEDICINE

## 2024-01-13 PROCEDURE — 85025 COMPLETE CBC W/AUTO DIFF WBC: CPT | Performed by: EMERGENCY MEDICINE

## 2024-01-13 PROCEDURE — 73030 X-RAY EXAM OF SHOULDER: CPT

## 2024-01-13 PROCEDURE — 96365 THER/PROPH/DIAG IV INF INIT: CPT

## 2024-01-13 PROCEDURE — 36415 COLL VENOUS BLD VENIPUNCTURE: CPT | Performed by: EMERGENCY MEDICINE

## 2024-01-13 PROCEDURE — 80053 COMPREHEN METABOLIC PANEL: CPT | Performed by: EMERGENCY MEDICINE

## 2024-01-13 PROCEDURE — 93005 ELECTROCARDIOGRAM TRACING: CPT

## 2024-01-13 PROCEDURE — 99285 EMERGENCY DEPT VISIT HI MDM: CPT | Performed by: EMERGENCY MEDICINE

## 2024-01-13 PROCEDURE — 70450 CT HEAD/BRAIN W/O DYE: CPT

## 2024-01-13 RX ORDER — LEVETIRACETAM 1000 MG/1
1000 TABLET ORAL EVERY 12 HOURS SCHEDULED
Qty: 60 TABLET | Refills: 0 | Status: SHIPPED | OUTPATIENT
Start: 2024-01-13 | End: 2024-02-12

## 2024-01-13 RX ORDER — ACETAMINOPHEN 325 MG/1
650 TABLET ORAL ONCE
Status: COMPLETED | OUTPATIENT
Start: 2024-01-13 | End: 2024-01-13

## 2024-01-13 RX ORDER — IBUPROFEN 600 MG/1
600 TABLET ORAL ONCE
Status: COMPLETED | OUTPATIENT
Start: 2024-01-13 | End: 2024-01-13

## 2024-01-13 RX ADMIN — ACETAMINOPHEN 650 MG: 325 TABLET ORAL at 11:56

## 2024-01-13 RX ADMIN — LEVETIRACETAM 1500 MG: 100 INJECTION, SOLUTION INTRAVENOUS at 11:57

## 2024-01-13 RX ADMIN — SODIUM CHLORIDE 1000 ML: 0.9 INJECTION, SOLUTION INTRAVENOUS at 11:52

## 2024-01-13 RX ADMIN — IBUPROFEN 600 MG: 600 TABLET, FILM COATED ORAL at 13:46

## 2024-01-13 RX ADMIN — TETANUS TOXOID, REDUCED DIPHTHERIA TOXOID AND ACELLULAR PERTUSSIS VACCINE, ADSORBED 0.5 ML: 5; 2.5; 8; 8; 2.5 SUSPENSION INTRAMUSCULAR at 12:00

## 2024-01-13 NOTE — ED PROVIDER NOTES
History  Chief Complaint   Patient presents with    Seizure - Prior Hx Of     Partially witnessed seizure  today, fell on his left shoulder. Ran out seizure meds 4 days ago. Last seizure last week.     Patient presents for evaluation after seizure today.  He fell onto his left shoulder and is complaining of some left shoulder pain.  He has a history of seizures and states he ran out of his medications 4 days ago and his last seizure was when he was in the ER 3 to 4 days ago.      History provided by:  Patient   used: No    Seizure - Prior Hx Of      Prior to Admission Medications   Prescriptions Last Dose Informant Patient Reported? Taking?   bictegravir-emtricitab-tenofovir alafenamide (BIKTARVY) -25 MG tablet   No No   Sig: Take 1 tablet by mouth daily with breakfast   folic acid (FOLVITE) 1 mg tablet   No No   Sig: Take 1 tablet (1 mg total) by mouth daily   levETIRAcetam (KEPPRA) 1000 MG tablet   No No   Sig: Take 1 tablet (1,000 mg total) by mouth every 12 (twelve) hours   levETIRAcetam (KEPPRA) 1000 MG tablet   No Yes   Sig: Take 1 tablet (1,000 mg total) by mouth every 12 (twelve) hours   mirtazapine (REMERON) 30 mg tablet   No No   Sig: Take 1 tablet (30 mg total) by mouth daily at bedtime   thiamine (VITAMIN B1) 100 mg tablet   No No   Sig: Take 1 tablet (100 mg total) by mouth daily      Facility-Administered Medications: None       Past Medical History:   Diagnosis Date    HIV (human immunodeficiency virus infection) (HCC)     Seizures (HCC)     Smoker     Syphilis        Past Surgical History:   Procedure Laterality Date    HERNIA REPAIR         History reviewed. No pertinent family history.  I have reviewed and agree with the history as documented.    E-Cigarette/Vaping    E-Cigarette Use Current Every Day User      E-Cigarette/Vaping Substances    Nicotine No     THC No     CBD No     Flavoring Yes     Other No     Unknown No      Social History     Tobacco Use    Smoking  "status: Every Day     Current packs/day: 2.00     Average packs/day: 2.0 packs/day for 8.0 years (16.0 ttl pk-yrs)     Types: Cigarettes    Smokeless tobacco: Never    Tobacco comments:     \"At least 2 packs a day\" of cigarettes.   Vaping Use    Vaping status: Every Day    Substances: Flavoring   Substance Use Topics    Alcohol use: Yes     Alcohol/week: 2.0 standard drinks of alcohol     Types: 1 Glasses of wine, 1 Cans of beer per week     Comment: socially    Drug use: Yes     Frequency: 1.0 times per week     Types: Marijuana       Review of Systems   All other systems reviewed and are negative.      Physical Exam  Physical Exam  Vitals and nursing note reviewed.   Constitutional:       General: He is not in acute distress.  Eyes:      Extraocular Movements: Extraocular movements intact.      Conjunctiva/sclera: Conjunctivae normal.      Pupils: Pupils are equal, round, and reactive to light.   Cardiovascular:      Rate and Rhythm: Normal rate and regular rhythm.   Pulmonary:      Effort: Pulmonary effort is normal. No respiratory distress.      Breath sounds: Normal breath sounds.   Musculoskeletal:         General: Tenderness present. No deformity. Normal range of motion.        Arms:       Cervical back: Normal range of motion. No tenderness.   Neurological:      General: No focal deficit present.      Mental Status: He is alert and oriented to person, place, and time.         Vital Signs  ED Triage Vitals   Temperature Pulse Respirations Blood Pressure SpO2   01/13/24 1132 01/13/24 1132 01/13/24 1132 01/13/24 1132 01/13/24 1132   (!) 96.6 °F (35.9 °C) (!) 111 20 125/85 99 %      Temp Source Heart Rate Source Patient Position - Orthostatic VS BP Location FiO2 (%)   01/13/24 1132 01/13/24 1132 01/13/24 1132 01/13/24 1132 --   Oral Monitor Lying Left arm       Pain Score       01/13/24 1156       9           Vitals:    01/13/24 1132 01/13/24 1200 01/13/24 1230 01/13/24 1315   BP: 125/85 121/83 123/86 127/84 "   Pulse: (!) 111 (!) 108 (!) 106 92   Patient Position - Orthostatic VS: Lying   Lying         Visual Acuity  Visual Acuity      Flowsheet Row Most Recent Value   L Pupil Size (mm) 2   R Pupil Size (mm) 2            ED Medications  Medications   sodium chloride 0.9 % bolus 1,000 mL (0 mL Intravenous Stopped 1/13/24 1309)   levETIRAcetam (KEPPRA) 1,500 mg in sodium chloride 0.9 % 100 mL IVPB (0 mg Intravenous Stopped 1/13/24 1309)   tetanus-diphtheria-acellular pertussis (BOOSTRIX) IM injection 0.5 mL (0.5 mL Intramuscular Given 1/13/24 1200)   acetaminophen (TYLENOL) tablet 650 mg (650 mg Oral Given 1/13/24 1156)   ibuprofen (MOTRIN) tablet 600 mg (600 mg Oral Given 1/13/24 1346)       Diagnostic Studies  Results Reviewed       Procedure Component Value Units Date/Time    Comprehensive metabolic panel [[id number]]  (Abnormal) Collected: 01/13/24 1150    Lab Status: Final result Specimen: Blood from Arm, Left Updated: 01/13/24 1215     Sodium 141 mmol/L      Potassium 4.1 mmol/L      Chloride 109 mmol/L      CO2 23 mmol/L      ANION GAP 9 mmol/L      BUN 17 mg/dL      Creatinine 0.97 mg/dL      Glucose 106 mg/dL      Calcium 8.9 mg/dL      AST 18 U/L      ALT 10 U/L      Alkaline Phosphatase 47 U/L      Total Protein 7.7 g/dL      Albumin 4.2 g/dL      Total Bilirubin 0.62 mg/dL      eGFR 98 ml/min/1.73sq m     Narrative:      National Kid[employer] guidelines for Chronic Kidney Disease (CKD):     Stage 1 with normal or high GFR (GFR > 90 mL/min/1.73 square meters)    Stage 2 Mild CKD (GFR = 60-89 mL/min/1.73 square meters)    Stage 3A Moderate CKD (GFR = 45-59 mL/min/1.73 square meters)    Stage 3B Moderate CKD (GFR = 30-44 mL/min/1.73 square meters)    Stage 4 Severe CKD (GFR = 15-29 mL/min/1.73 square meters)    Stage 5 End Stage CKD (GFR <15 mL/min/1.73 square meters)  Note: GFR calculation is accurate only with a steady state creatinine    CBC and differential [[id number]] Collected: 01/13/24 1150     Lab Status: Final result Specimen: Blood from Arm, Left Updated: 01/13/24 1157     WBC 7.01 Thousand/uL      RBC 4.75 Million/uL      Hemoglobin 14.6 g/dL      Hematocrit 43.8 %      MCV 92 fL      MCH 30.7 pg      MCHC 33.3 g/dL      RDW 14.7 %      MPV 10.6 fL      Platelets 160 Thousands/uL      nRBC 0 /100 WBCs      Neutrophils Relative 48 %      Immat GRANS % 0 %      Lymphocytes Relative 44 %      Monocytes Relative 7 %      Eosinophils Relative 0 %      Basophils Relative 1 %      Neutrophils Absolute 3.31 Thousands/µL      Immature Grans Absolute 0.01 Thousand/uL      Lymphocytes Absolute 3.11 Thousands/µL      Monocytes Absolute 0.51 Thousand/µL      Eosinophils Absolute 0.03 Thousand/µL      Basophils Absolute 0.04 Thousands/µL                    CT head without contrast   Final Result by Alejo Can MD (01/13 1325)      No acute intracranial abnormality.                  Workstation performed: [id number]         XR shoulder 2+ views LEFT    (Results Pending)              Procedures  ECG 12 Lead Documentation Only    Date/Time: 1/13/2024 11:42 AM    Performed by: Jose Javed DO  Authorized by: Jose Javed DO    ECG reviewed by me, the ED Provider: yes    Patient location:  ED  Interpretation:     Interpretation: abnormal    Rate:     ECG rate:  103    ECG rate assessment: tachycardic    Rhythm:     Rhythm: sinus rhythm    Ectopy:     Ectopy: none    ST segments:     ST segments:  Normal  T waves:     T waves: normal    Other findings:     Other findings: LVH             ED Course                               SBIRT 20yo+      Flowsheet Row Most Recent Value   Initial Alcohol Screen: US AUDIT-C     1. How often do you have a drink containing alcohol? 0 Filed at: 01/13/2024 1135   Audit-C Score 0 Filed at: 01/13/2024 1135                      Medical Decision Making  Pulse ox 99% on room air indicating adequate oxygenation.  Xray L shoulder: No fracture or dislocation as read by me      Patient  loaded with Keppra in the ER no further seizure activity.  Workup was unremarkable will send a refill for his Keppra to the pharmacy.    Amount and/or Complexity of Data Reviewed  Labs: ordered.  Radiology: ordered and independent interpretation performed.  ECG/medicine tests: ordered and independent interpretation performed.    Risk  OTC drugs.  Prescription drug management.             Disposition  Final diagnoses:   Seizure (HCC)     Time reflects when diagnosis was documented in both MDM as applicable and the Disposition within this note       Time User Action Codes Description Comment    1/13/2024  1:29 PM Jose Javed Add [G40.909] Recurrent seizures (HCC)     1/13/2024  1:30 PM Jose Javed Remove [G40.909] Recurrent seizures (HCC)     1/13/2024  1:30 PM Jose Javed Add [R56.9] Seizure (HCC)     1/13/2024  1:30 PM Jose Javed Add [G40.909] Recurrent seizures (HCC)           ED Disposition       ED Disposition   Discharge    Condition   Stable    Date/Time   Sat Jan 13, 2024 1329    Comment   John Sanders discharge to home/self care.                   Follow-up Information       Follow up With Specialties Details Why Contact Info Additional Information    Linda Rankin MD Neurology In 1 week  [address] [facility] [address]  [phone]       [facility] Emergency Department Emergency Medicine  If symptoms worsen [address] [facility] [address]  [phone] [facility], [address], [place], [address]            Discharge Medication List as of 1/13/2024  1:30 PM        CONTINUE these medications which have CHANGED    Details   levETIRAcetam (KEPPRA) 1000 MG tablet Take 1 tablet (1,000 mg total) by mouth every 12 (twelve) hours, Starting Sat 1/13/2024, Until Mon 2/12/2024, Normal           CONTINUE these medications which have NOT CHANGED    Details   bictegravir-emtricitab-tenofovir alafenamide  (BIKTARVY) -25 MG tablet Take 1 tablet by mouth daily with breakfast, Starting Tue 7/25/2023, Normal      folic acid (FOLVITE) 1 mg tablet Take 1 tablet (1 mg total) by mouth daily, Starting Tue 7/25/2023, Normal      mirtazapine (REMERON) 30 mg tablet Take 1 tablet (30 mg total) by mouth daily at bedtime, Starting Tue 7/25/2023, Normal      thiamine (VITAMIN B1) 100 mg tablet Take 1 tablet (100 mg total) by mouth daily, Starting Tue 7/25/2023, Normal             No discharge procedures on file.    PDMP Review         Value Time User    PDMP Reviewed  Yes 6/17/2023 11:20 AM Sammy Roberts MD            ED Provider  Electronically Signed by             Jose Javed DO  01/13/24 1899

## 2024-01-15 LAB
ATRIAL RATE: 103 BPM
P AXIS: 43 DEGREES
PR INTERVAL: 148 MS
QRS AXIS: -11 DEGREES
QRSD INTERVAL: 94 MS
QT INTERVAL: 354 MS
QTC INTERVAL: 463 MS
T WAVE AXIS: 15 DEGREES
VENTRICULAR RATE: 103 BPM

## 2024-02-21 PROBLEM — E86.0 DEHYDRATION: Status: RESOLVED | Noted: 2020-08-08 | Resolved: 2024-02-21

## 2024-04-08 ENCOUNTER — HOSPITAL ENCOUNTER (EMERGENCY)
Facility: HOSPITAL | Age: 39
Discharge: HOME | End: 2024-04-08
Attending: EMERGENCY MEDICINE
Payer: MEDICARE

## 2024-04-08 VITALS
RESPIRATION RATE: 17 BRPM | DIASTOLIC BLOOD PRESSURE: 95 MMHG | HEART RATE: 85 BPM | SYSTOLIC BLOOD PRESSURE: 138 MMHG | TEMPERATURE: 97.6 F | OXYGEN SATURATION: 94 %

## 2024-04-08 DIAGNOSIS — F32.9 MAJOR DEPRESSIVE DISORDER: ICD-10-CM

## 2024-04-08 DIAGNOSIS — G40.909 RECURRENT SEIZURES (HCC): Primary | ICD-10-CM

## 2024-04-08 DIAGNOSIS — B20 HIV INFECTION, UNSPECIFIED SYMPTOM STATUS (HCC): ICD-10-CM

## 2024-04-08 PROCEDURE — 99284 EMERGENCY DEPT VISIT MOD MDM: CPT | Performed by: EMERGENCY MEDICINE

## 2024-04-08 RX ORDER — LEVETIRACETAM 1000 MG/1
1000 TABLET ORAL 2 TIMES DAILY
Qty: 60 TABLET | Refills: 0 | Status: SHIPPED | OUTPATIENT
Start: 2024-04-08 | End: 2024-05-08

## 2024-04-08 RX ORDER — LEVETIRACETAM 500 MG/1
1000 TABLET ORAL ONCE
Status: COMPLETED | OUTPATIENT
Start: 2024-04-08 | End: 2024-04-08

## 2024-04-08 RX ORDER — MIRTAZAPINE 30 MG/1
30 TABLET, FILM COATED ORAL
Qty: 30 TABLET | Refills: 0 | Status: SHIPPED | OUTPATIENT
Start: 2024-04-08

## 2024-04-08 RX ADMIN — LEVETIRACETAM 1000 MG: 500 TABLET, FILM COATED ORAL at 04:05

## 2024-04-08 NOTE — ED PROVIDER NOTES
History  Chief Complaint   Patient presents with    Medical Problem     Patient arrives to the Ed with complain of feeling like he is maybe going to have seizure. Patient states he has not been able to take his seizure med's for 2 or 3 days.      38-year-old male history of HIV, seizure disorder, homelessness, presents seeking medication refills.  He states it has been 1 or 2 months since he had all of his medications including his HIV and seizure medications.  He is requesting refills of Biktarvy, Keppra, and Remeron.  I sent 1 month supplies.  Advised PCP follow-up for ongoing prescriptions.  Patient is otherwise feeling well.        Prior to Admission Medications   Prescriptions Last Dose Informant Patient Reported? Taking?   bictegravir-emtricitab-tenofovir alafenamide (BIKTARVY) -25 MG tablet Past Month  No Yes   Sig: Take 1 tablet by mouth daily with breakfast   bictegravir-emtricitab-tenofovir alafenamide (BIKTARVY) -25 MG tablet   No Yes   Sig: Take 1 tablet by mouth daily with breakfast   folic acid (FOLVITE) 1 mg tablet Not Taking  No No   Sig: Take 1 tablet (1 mg total) by mouth daily   Patient not taking: Reported on 4/8/2024   levETIRAcetam (KEPPRA) 1000 MG tablet   No No   Sig: Take 1 tablet (1,000 mg total) by mouth every 12 (twelve) hours   mirtazapine (REMERON) 30 mg tablet Not Taking  No No   Sig: Take 1 tablet (30 mg total) by mouth daily at bedtime   Patient not taking: Reported on 4/8/2024   mirtazapine (REMERON) 30 mg tablet   No Yes   Sig: Take 1 tablet (30 mg total) by mouth daily at bedtime   thiamine (VITAMIN B1) 100 mg tablet Not Taking  No No   Sig: Take 1 tablet (100 mg total) by mouth daily   Patient not taking: Reported on 4/8/2024      Facility-Administered Medications: None       Past Medical History:   Diagnosis Date    HIV (human immunodeficiency virus infection) (HCC)     Seizures (HCC)     Smoker     Syphilis        Past Surgical History:   Procedure Laterality Date  "   HERNIA REPAIR         History reviewed. No pertinent family history.  I have reviewed and agree with the history as documented.    E-Cigarette/Vaping    E-Cigarette Use Current Every Day User      E-Cigarette/Vaping Substances    Nicotine No     THC No     CBD No     Flavoring Yes     Other No     Unknown No      Social History     Tobacco Use    Smoking status: Every Day     Current packs/day: 2.00     Average packs/day: 2.0 packs/day for 8.0 years (16.0 ttl pk-yrs)     Types: Cigarettes    Smokeless tobacco: Never    Tobacco comments:     \"At least 2 packs a day\" of cigarettes.   Vaping Use    Vaping status: Every Day    Substances: Flavoring   Substance Use Topics    Alcohol use: Yes     Alcohol/week: 2.0 standard drinks of alcohol     Types: 1 Glasses of wine, 1 Cans of beer per week     Comment: socially    Drug use: Yes     Frequency: 1.0 times per week     Types: Marijuana       Review of Systems   All other systems reviewed and are negative.      Physical Exam  Physical Exam  Constitutional:       General: He is not in acute distress.  HENT:      Nose: Nose normal.      Mouth/Throat:      Mouth: Mucous membranes are moist.   Eyes:      Conjunctiva/sclera: Conjunctivae normal.   Cardiovascular:      Rate and Rhythm: Normal rate and regular rhythm.   Pulmonary:      Effort: Pulmonary effort is normal.   Musculoskeletal:         General: Normal range of motion.      Cervical back: Neck supple.   Skin:     General: Skin is dry.   Neurological:      General: No focal deficit present.      Mental Status: He is alert and oriented to person, place, and time.   Psychiatric:         Mood and Affect: Mood normal.         Behavior: Behavior normal.         Vital Signs  ED Triage Vitals [04/08/24 0348]   Temperature Pulse Respirations Blood Pressure SpO2   97.6 °F (36.4 °C) 85 17 138/95 94 %      Temp Source Heart Rate Source Patient Position - Orthostatic VS BP Location FiO2 (%)   Oral Monitor Lying Left arm --    "   Pain Score       --           Vitals:    04/08/24 0348   BP: 138/95   Pulse: 85   Patient Position - Orthostatic VS: Lying         Visual Acuity      ED Medications  Medications   levETIRAcetam (KEPPRA) tablet 1,000 mg (1,000 mg Oral Given 4/8/24 0405)       Diagnostic Studies  Results Reviewed       None                   No orders to display              Procedures  Procedures         ED Course       38-year-old male well-known to this ED with frequent presentations for seizures and homelessness presents primarily seeking medication refill.  He is well-appearing on arrival with normal vitals and no acute neurologic abnormalities.  It has been sometime since patient has meds so we gave him an initial dose of p.o. Keppra in the ED.  I sent refills of Biktarvy Keppra and Remeron to the pharmacy.  Patient stable at time of discharge.                                      Medical Decision Making  Risk  Prescription drug management.             Disposition  Final diagnoses:   Recurrent seizures (HCC)     Time reflects when diagnosis was documented in both MDM as applicable and the Disposition within this note       Time User Action Codes Description Comment    4/8/2024  4:04 AM Lorrie Yung [B20] HIV infection, unspecified symptom status (HCC)     4/8/2024  4:06 AM Lorrie Yung [F32.9] Major depressive disorder     4/8/2024  4:07 AM Lorrie Yung [G40.909] Recurrent seizures (HCC)           ED Disposition       ED Disposition   Discharge    Condition   Stable    Date/Time   Mon Apr 8, 2024  4:08 AM    Comment   John Sanders discharge to home/self care.                   Follow-up Information    None         Discharge Medication List as of 4/8/2024  4:09 AM        CONTINUE these medications which have CHANGED    Details   bictegravir-emtricitab-tenofovir alafenamide (BIKTARVY) -25 MG tablet Take 1 tablet by mouth daily with breakfast, Starting Mon 4/8/2024, Normal      levETIRAcetam (Keppra) 1000  MG tablet Take 1 tablet (1,000 mg total) by mouth 2 (two) times a day, Starting Mon 4/8/2024, Until Wed 5/8/2024, Normal      mirtazapine (REMERON) 30 mg tablet Take 1 tablet (30 mg total) by mouth daily at bedtime, Starting Mon 4/8/2024, Normal           CONTINUE these medications which have NOT CHANGED    Details   folic acid (FOLVITE) 1 mg tablet Take 1 tablet (1 mg total) by mouth daily, Starting Tue 7/25/2023, Normal      thiamine (VITAMIN B1) 100 mg tablet Take 1 tablet (100 mg total) by mouth daily, Starting Tue 7/25/2023, Normal             No discharge procedures on file.    PDMP Review         Value Time User    PDMP Reviewed  Yes 6/17/2023 11:20 AM Sammy Roberts MD            ED Provider  Electronically Signed by             Lorrie Yung MD  04/08/24 2397

## 2024-04-11 ENCOUNTER — HOSPITAL ENCOUNTER (EMERGENCY)
Facility: HOSPITAL | Age: 39
Discharge: HOME | End: 2024-04-11
Attending: EMERGENCY MEDICINE
Payer: MEDICARE

## 2024-04-11 VITALS
DIASTOLIC BLOOD PRESSURE: 84 MMHG | OXYGEN SATURATION: 100 % | SYSTOLIC BLOOD PRESSURE: 131 MMHG | RESPIRATION RATE: 18 BRPM | HEART RATE: 98 BPM | TEMPERATURE: 98.2 F

## 2024-04-11 DIAGNOSIS — R55 NEAR SYNCOPE: ICD-10-CM

## 2024-04-11 DIAGNOSIS — R42 LIGHTHEADEDNESS: Primary | ICD-10-CM

## 2024-04-11 LAB
ANION GAP SERPL CALCULATED.3IONS-SCNC: 7 MMOL/L (ref 4–13)
ATRIAL RATE: 102 BPM
BASOPHILS # BLD AUTO: 0.02 THOUSANDS/ÂΜL (ref 0–0.1)
BASOPHILS NFR BLD AUTO: 0 % (ref 0–1)
BUN SERPL-MCNC: 11 MG/DL (ref 5–25)
CALCIUM SERPL-MCNC: 9.1 MG/DL (ref 8.4–10.2)
CHLORIDE SERPL-SCNC: 103 MMOL/L (ref 96–108)
CO2 SERPL-SCNC: 29 MMOL/L (ref 21–32)
CREAT SERPL-MCNC: 0.75 MG/DL (ref 0.6–1.3)
EOSINOPHIL # BLD AUTO: 0.04 THOUSAND/ÂΜL (ref 0–0.61)
EOSINOPHIL NFR BLD AUTO: 1 % (ref 0–6)
ERYTHROCYTE [DISTWIDTH] IN BLOOD BY AUTOMATED COUNT: 13.2 % (ref 11.6–15.1)
GFR SERPL CREATININE-BSD FRML MDRD: 116 ML/MIN/1.73SQ M
GLUCOSE SERPL-MCNC: 86 MG/DL (ref 65–140)
HCT VFR BLD AUTO: 41.5 % (ref 36.5–49.3)
HGB BLD-MCNC: 13.6 G/DL (ref 12–17)
IMM GRANULOCYTES # BLD AUTO: 0.03 THOUSAND/UL (ref 0–0.2)
IMM GRANULOCYTES NFR BLD AUTO: 0 % (ref 0–2)
LYMPHOCYTES # BLD AUTO: 3.19 THOUSANDS/ÂΜL (ref 0.6–4.47)
LYMPHOCYTES NFR BLD AUTO: 42 % (ref 14–44)
MCH RBC QN AUTO: 29.9 PG (ref 26.8–34.3)
MCHC RBC AUTO-ENTMCNC: 32.8 G/DL (ref 31.4–37.4)
MCV RBC AUTO: 91 FL (ref 82–98)
MONOCYTES # BLD AUTO: 0.89 THOUSAND/ÂΜL (ref 0.17–1.22)
MONOCYTES NFR BLD AUTO: 12 % (ref 4–12)
NEUTROPHILS # BLD AUTO: 3.43 THOUSANDS/ÂΜL (ref 1.85–7.62)
NEUTS SEG NFR BLD AUTO: 45 % (ref 43–75)
NRBC BLD AUTO-RTO: 0 /100 WBCS
P AXIS: 57 DEGREES
PLATELET # BLD AUTO: 157 THOUSANDS/UL (ref 149–390)
PMV BLD AUTO: 11 FL (ref 8.9–12.7)
POTASSIUM SERPL-SCNC: 3.8 MMOL/L (ref 3.5–5.3)
PR INTERVAL: 152 MS
QRS AXIS: 3 DEGREES
QRSD INTERVAL: 90 MS
QT INTERVAL: 336 MS
QTC INTERVAL: 437 MS
RBC # BLD AUTO: 4.55 MILLION/UL (ref 3.88–5.62)
SODIUM SERPL-SCNC: 139 MMOL/L (ref 135–147)
T WAVE AXIS: 65 DEGREES
VENTRICULAR RATE: 102 BPM
WBC # BLD AUTO: 7.6 THOUSAND/UL (ref 4.31–10.16)

## 2024-04-11 PROCEDURE — 85025 COMPLETE CBC W/AUTO DIFF WBC: CPT | Performed by: EMERGENCY MEDICINE

## 2024-04-11 PROCEDURE — 93010 ELECTROCARDIOGRAM REPORT: CPT | Performed by: INTERNAL MEDICINE

## 2024-04-11 PROCEDURE — 99284 EMERGENCY DEPT VISIT MOD MDM: CPT

## 2024-04-11 PROCEDURE — 96360 HYDRATION IV INFUSION INIT: CPT

## 2024-04-11 PROCEDURE — 96361 HYDRATE IV INFUSION ADD-ON: CPT

## 2024-04-11 PROCEDURE — 36415 COLL VENOUS BLD VENIPUNCTURE: CPT | Performed by: EMERGENCY MEDICINE

## 2024-04-11 PROCEDURE — 99285 EMERGENCY DEPT VISIT HI MDM: CPT | Performed by: EMERGENCY MEDICINE

## 2024-04-11 PROCEDURE — 93005 ELECTROCARDIOGRAM TRACING: CPT

## 2024-04-11 PROCEDURE — 80048 BASIC METABOLIC PNL TOTAL CA: CPT | Performed by: EMERGENCY MEDICINE

## 2024-04-11 RX ADMIN — SODIUM CHLORIDE 1000 ML: 0.9 INJECTION, SOLUTION INTRAVENOUS at 02:08

## 2024-04-11 NOTE — ED PROVIDER NOTES
History  Chief Complaint   Patient presents with    Medical Problem     Pt presents to the ed via ems, well known homeless patient, reports dizziness      37 y/o male with hx of seizures (on Keppra) and HIV (on Biktarvy) presents to the ED via EMS.. The patient is homeless and states that tonight he started experiencing an episode of lightheadedness and near syncope just prior to arrival, approximately 1 hour ago.  He has had similar episodes in the past.  He reports compliance with his Keppra and his Biktarvy.  He denies any fever, chills, cough, dyspnea, chest pain, palpitations, abdominal pain, nausea, vomiting, diarrhea, back pain, neck pain, headache, numbness, or focal weakness.        Prior to Admission Medications   Prescriptions Last Dose Informant Patient Reported? Taking?   bictegravir-emtricitab-tenofovir alafenamide (BIKTARVY) -25 MG tablet   No No   Sig: Take 1 tablet by mouth daily with breakfast   folic acid (FOLVITE) 1 mg tablet   No No   Sig: Take 1 tablet (1 mg total) by mouth daily   Patient not taking: Reported on 4/8/2024   levETIRAcetam (Keppra) 1000 MG tablet   No No   Sig: Take 1 tablet (1,000 mg total) by mouth 2 (two) times a day   mirtazapine (REMERON) 30 mg tablet   No No   Sig: Take 1 tablet (30 mg total) by mouth daily at bedtime   thiamine (VITAMIN B1) 100 mg tablet   No No   Sig: Take 1 tablet (100 mg total) by mouth daily   Patient not taking: Reported on 4/8/2024      Facility-Administered Medications: None       Past Medical History:   Diagnosis Date    HIV (human immunodeficiency virus infection) (HCC)     Seizures (HCC)     Smoker     Syphilis        Past Surgical History:   Procedure Laterality Date    HERNIA REPAIR         History reviewed. No pertinent family history.  I have reviewed and agree with the history as documented.    E-Cigarette/Vaping    E-Cigarette Use Current Every Day User      E-Cigarette/Vaping Substances    Nicotine No     THC No     CBD No      "Flavoring Yes     Other No     Unknown No      Social History     Tobacco Use    Smoking status: Every Day     Current packs/day: 2.00     Average packs/day: 2.0 packs/day for 8.0 years (16.0 ttl pk-yrs)     Types: Cigarettes    Smokeless tobacco: Never    Tobacco comments:     \"At least 2 packs a day\" of cigarettes.   Vaping Use    Vaping status: Every Day    Substances: Flavoring   Substance Use Topics    Alcohol use: Yes     Alcohol/week: 2.0 standard drinks of alcohol     Types: 1 Glasses of wine, 1 Cans of beer per week     Comment: socially    Drug use: Yes     Frequency: 1.0 times per week     Types: Marijuana       Review of Systems   Constitutional:  Negative for chills and fever.   HENT:  Negative for congestion, rhinorrhea and sore throat.    Respiratory:  Negative for cough and shortness of breath.    Cardiovascular:  Negative for chest pain and palpitations.   Gastrointestinal:  Negative for abdominal pain, diarrhea, nausea and vomiting.   Genitourinary:  Negative for dysuria and hematuria.   Musculoskeletal:  Negative for back pain and neck pain.   Neurological:  Positive for light-headedness. Negative for weakness, numbness and headaches.   All other systems reviewed and are negative.      Physical Exam  Physical Exam  Vitals and nursing note reviewed.   Constitutional:       General: He is not in acute distress.     Appearance: Normal appearance. He is normal weight. He is not ill-appearing, toxic-appearing or diaphoretic.   HENT:      Head: Normocephalic and atraumatic.      Right Ear: External ear normal.      Left Ear: External ear normal.      Nose: Nose normal.      Mouth/Throat:      Mouth: Mucous membranes are moist.      Pharynx: Oropharynx is clear. No oropharyngeal exudate or posterior oropharyngeal erythema.   Eyes:      Extraocular Movements: Extraocular movements intact.      Conjunctiva/sclera: Conjunctivae normal.      Pupils: Pupils are equal, round, and reactive to light. "   Cardiovascular:      Rate and Rhythm: Normal rate and regular rhythm.      Pulses: Normal pulses.      Heart sounds: Normal heart sounds.   Pulmonary:      Effort: Pulmonary effort is normal. No respiratory distress.      Breath sounds: Normal breath sounds. No wheezing or rales.   Abdominal:      General: Abdomen is flat. Bowel sounds are normal. There is no distension.      Palpations: Abdomen is soft.      Tenderness: There is no abdominal tenderness. There is no right CVA tenderness, left CVA tenderness or guarding.   Musculoskeletal:         General: No swelling or tenderness. Normal range of motion.      Cervical back: Normal range of motion and neck supple. No tenderness.   Skin:     General: Skin is warm and dry.   Neurological:      General: No focal deficit present.      Mental Status: He is alert and oriented to person, place, and time.      Cranial Nerves: No cranial nerve deficit.      Sensory: No sensory deficit.      Motor: No weakness.         Vital Signs  ED Triage Vitals [04/11/24 0125]   Temperature Pulse Respirations Blood Pressure SpO2   98.2 °F (36.8 °C) (!) 110 (!) 24 131/84 100 %      Temp Source Heart Rate Source Patient Position - Orthostatic VS BP Location FiO2 (%)   Oral Monitor Lying Left arm --      Pain Score       --           Vitals:    04/11/24 0125 04/11/24 0315   BP: 131/84    Pulse: (!) 110 98   Patient Position - Orthostatic VS: Lying          Visual Acuity      ED Medications  Medications   sodium chloride 0.9 % bolus 1,000 mL (1,000 mL Intravenous New Bag 4/11/24 0208)       Diagnostic Studies  Results Reviewed       Procedure Component Value Units Date/Time    Basic metabolic panel [[id number]] Collected: 04/11/24 0207    Lab Status: Final result Specimen: Blood from Arm, Left Updated: 04/11/24 0232     Sodium 139 mmol/L      Potassium 3.8 mmol/L      Chloride 103 mmol/L      CO2 29 mmol/L      ANION GAP 7 mmol/L      BUN 11 mg/dL      Creatinine 0.75 mg/dL      Glucose 86  mg/dL      Calcium 9.1 mg/dL      eGFR 116 ml/min/1.73sq m     Narrative:      National Kidney Disease Foundation guidelines for Chronic Kidney Disease (CKD):     Stage 1 with normal or high GFR (GFR > 90 mL/min/1.73 square meters)    Stage 2 Mild CKD (GFR = 60-89 mL/min/1.73 square meters)    Stage 3A Moderate CKD (GFR = 45-59 mL/min/1.73 square meters)    Stage 3B Moderate CKD (GFR = 30-44 mL/min/1.73 square meters)    Stage 4 Severe CKD (GFR = 15-29 mL/min/1.73 square meters)    Stage 5 End Stage CKD (GFR <15 mL/min/1.73 square meters)  Note: GFR calculation is accurate only with a steady state creatinine    CBC and differential [[id number]] Collected: 04/11/24 0207    Lab Status: Final result Specimen: Blood from Arm, Left Updated: 04/11/24 0217     WBC 7.60 Thousand/uL      RBC 4.55 Million/uL      Hemoglobin 13.6 g/dL      Hematocrit 41.5 %      MCV 91 fL      MCH 29.9 pg      MCHC 32.8 g/dL      RDW 13.2 %      MPV 11.0 fL      Platelets 157 Thousands/uL      nRBC 0 /100 WBCs      Segmented % 45 %      Immature Grans % 0 %      Lymphocytes % 42 %      Monocytes % 12 %      Eosinophils Relative 1 %      Basophils Relative 0 %      Absolute Neutrophils 3.43 Thousands/µL      Absolute Immature Grans 0.03 Thousand/uL      Absolute Lymphocytes 3.19 Thousands/µL      Absolute Monocytes 0.89 Thousand/µL      Eosinophils Absolute 0.04 Thousand/µL      Basophils Absolute 0.02 Thousands/µL                    No orders to display              Procedures  Procedures         ED Course  ED Course as of 04/11/24 0400   Thu Apr 11, 2024   0210 Procedure Note: EKG  Date/Time: 04/11/24 2:08 AM   Interpreted by: Giuseppe Rasheed MD  Indications / Diagnosis: Lightheadedness  ECG reviewed by me, the ED Physician: yes   The EKG demonstrates:  Rhythm: sinus tachycardia 102 bpm  Intervals: normal intervals  Axis: normal axis  QRS/Blocks: normal QRS  ST Changes: No acute ST-T wave changes.  No STEMI.  Otherwise normal ECG.  No  significant change compared to prior study from 1/13/2024.   0233 CBC and differential  All within normal limits   0233 Basic metabolic panel  All within normal limits                               SBIRT 22yo+      Flowsheet Row Most Recent Value   Initial Alcohol Screen: US AUDIT-C     1. How often do you have a drink containing alcohol? 0 Filed at: 04/11/2024 0124   2. How many drinks containing alcohol do you have on a typical day you are drinking?  0 Filed at: 04/11/2024 0124   3a. Male UNDER 65: How often do you have five or more drinks on one occasion? 0 Filed at: 04/11/2024 0124   3b. FEMALE Any Age, or MALE 65+: How often do you have 4 or more drinks on one occassion? 0 Filed at: 04/11/2024 0124   Audit-C Score 0 Filed at: 04/11/2024 0124   CALI: How many times in the past year have you...    Used an illegal drug or used a prescription medication for non-medical reasons? Never Filed at: 04/11/2024 0124                      Medical Decision Making  39 y/o male with hx of seizures (on Keppra) and HIV (on Biktarvy) presents to the ED via EMS.. The patient is homeless and states that tonight he started experiencing an episode of lightheadedness and near syncope just prior to arrival, approximately 1 hour ago.  He has had similar episodes in the past.  He reports compliance with his Keppra and his Biktarvy.  He denies any fever, chills, cough, dyspnea, chest pain, palpitations, abdominal pain, nausea, vomiting, diarrhea, back pain, neck pain, headache, numbness, or focal weakness.    Vital signs reviewed.  See physical exam documentation for exam findings.  Differential diagnosis includes but is not limited to vasovagal episode, arrhythmia, anemia, and/or electrolyte disturbance.  Will evaluate with CBC, chemistry, and ECG.  See ED course for independent interpretation of results.  Workup overall negative.  Patient's symptoms improved. I discussed all findings, treatment, red flags/return precautions, and  outpatient follow-up and the patient/family understand and agree. Stable for discharge.    Amount and/or Complexity of Data Reviewed  Labs: ordered. Decision-making details documented in ED Course.             Disposition  Final diagnoses:   Lightheadedness   Near syncope     Time reflects when diagnosis was documented in both MDM as applicable and the Disposition within this note       Time User Action Codes Description Comment    4/11/2024  3:41 AM Giuseppe Rasheed [R42] Lightheadedness     4/11/2024  3:41 AM Giuseppe Rasheed [R55] Near syncope           ED Disposition       ED Disposition   Discharge    Condition   Stable    Date/Time   Thu Apr 11, 2024 0354    Comment   John Sanders discharge to home/self care.                   Follow-up Information       Follow up With Specialties Details Why Contact Info Additional Information    [facility] Family Medicine Call in 1 week For follow-up [address] [place] [id number] [phone] [facility], [address] [place] [address], [id number] [phone]    [facility] Emergency Department Emergency Medicine Go to  If symptoms worsen [address] [place] [id number] [phone] [facility] Emergency Department, [address] [place] [phone]            Patient's Medications   Discharge Prescriptions    No medications on file       No discharge procedures on file.    PDMP Review         Value Time User    PDMP Reviewed  Yes 6/17/2023 11:20 AM Sammy Roberts MD            ED Provider  Electronically Signed by             Giuseppe Rasheed MD  04/11/24 0407

## 2024-04-18 ENCOUNTER — HOSPITAL ENCOUNTER (EMERGENCY)
Facility: HOSPITAL | Age: 39
Discharge: HOME | End: 2024-04-18
Attending: EMERGENCY MEDICINE | Admitting: EMERGENCY MEDICINE
Payer: MEDICARE

## 2024-04-18 VITALS
RESPIRATION RATE: 21 BRPM | OXYGEN SATURATION: 98 % | TEMPERATURE: 97.9 F | DIASTOLIC BLOOD PRESSURE: 93 MMHG | SYSTOLIC BLOOD PRESSURE: 139 MMHG | HEART RATE: 96 BPM

## 2024-04-18 DIAGNOSIS — Z87.898 HISTORY OF SEIZURES: ICD-10-CM

## 2024-04-18 DIAGNOSIS — M25.472 ANKLE EDEMA, BILATERAL: ICD-10-CM

## 2024-04-18 DIAGNOSIS — Z13.9 ENCOUNTER FOR MEDICAL SCREENING EXAMINATION: ICD-10-CM

## 2024-04-18 DIAGNOSIS — M25.471 ANKLE EDEMA, BILATERAL: ICD-10-CM

## 2024-04-18 DIAGNOSIS — B20 HISTORY OF HIV INFECTION (HCC): ICD-10-CM

## 2024-04-18 PROCEDURE — 99284 EMERGENCY DEPT VISIT MOD MDM: CPT

## 2024-04-18 PROCEDURE — 99284 EMERGENCY DEPT VISIT MOD MDM: CPT | Performed by: EMERGENCY MEDICINE

## 2024-04-18 PROCEDURE — 93005 ELECTROCARDIOGRAM TRACING: CPT

## 2024-04-18 RX ORDER — LEVETIRACETAM 500 MG/1
1000 TABLET ORAL ONCE
Status: COMPLETED | OUTPATIENT
Start: 2024-04-18 | End: 2024-04-18

## 2024-04-18 RX ORDER — ACETAMINOPHEN 325 MG/1
650 TABLET ORAL ONCE
Status: COMPLETED | OUTPATIENT
Start: 2024-04-18 | End: 2024-04-18

## 2024-04-18 RX ORDER — LORAZEPAM 1 MG/1
1 TABLET ORAL ONCE
Status: COMPLETED | OUTPATIENT
Start: 2024-04-18 | End: 2024-04-18

## 2024-04-18 RX ADMIN — ACETAMINOPHEN 650 MG: 325 TABLET, FILM COATED ORAL at 22:15

## 2024-04-18 RX ADMIN — LEVETIRACETAM 1000 MG: 500 TABLET, FILM COATED ORAL at 22:02

## 2024-04-18 RX ADMIN — LORAZEPAM 1 MG: 1 TABLET ORAL at 22:03

## 2024-04-19 ENCOUNTER — HOSPITAL ENCOUNTER (EMERGENCY)
Facility: HOSPITAL | Age: 39
Discharge: HOME | End: 2024-04-19
Attending: EMERGENCY MEDICINE
Payer: MEDICARE

## 2024-04-19 ENCOUNTER — PATIENT OUTREACH (OUTPATIENT)
Dept: CASE MANAGEMENT | Facility: OTHER | Age: 39
End: 2024-04-19

## 2024-04-19 VITALS
SYSTOLIC BLOOD PRESSURE: 121 MMHG | TEMPERATURE: 97.6 F | RESPIRATION RATE: 16 BRPM | DIASTOLIC BLOOD PRESSURE: 94 MMHG | OXYGEN SATURATION: 98 % | HEART RATE: 70 BPM

## 2024-04-19 DIAGNOSIS — R53.83 FATIGUE: Primary | ICD-10-CM

## 2024-04-19 DIAGNOSIS — Z71.89 ENCOUNTER FOR COORDINATION OF COMPLEX CARE: Primary | ICD-10-CM

## 2024-04-19 LAB
ATRIAL RATE: 93 BPM
P AXIS: 50 DEGREES
PR INTERVAL: 150 MS
QRS AXIS: 5 DEGREES
QRSD INTERVAL: 94 MS
QT INTERVAL: 372 MS
QTC INTERVAL: 462 MS
T WAVE AXIS: 27 DEGREES
VENTRICULAR RATE: 93 BPM

## 2024-04-19 PROCEDURE — 99283 EMERGENCY DEPT VISIT LOW MDM: CPT | Performed by: EMERGENCY MEDICINE

## 2024-04-19 PROCEDURE — 99282 EMERGENCY DEPT VISIT SF MDM: CPT

## 2024-04-19 PROCEDURE — 93010 ELECTROCARDIOGRAM REPORT: CPT | Performed by: INTERNAL MEDICINE

## 2024-04-19 NOTE — ED NOTES
Pt states that his HIV medications were renewed during his last visit here yet he has not filled them. Assures Provider that he is taking his keppra with compliance.      Diya Ferraro RN  04/18/24 2125

## 2024-04-19 NOTE — ED ATTENDING ATTESTATION
I interviewed, took the history and examined the patient.  I discussed the case with the Resident and reviewed the Resident’s note , prescribed medications, and orders placed.  I supervised the Resident and I agree with the Resident management plan as it was presented to me.  I was present in the clinic and examined the patient.    Narinder Parson MD 04/18/24      ED Course     Patient is a 38-year-old male seen in the emergency department with concern for feeling like he might have a seizure, history of lightheadedness.  Medication was ordered for symptom control.  Patient states that he has not taken his HIV medication in several days, but prescription was recently provided.  EKG was obtained and noted.  Patient was provided his evening dose of Keppra.  Patient also complained of chronic lower extremity edema, apparent dependent edema, and was instructed on use of compression stockings/elevation. Plan to have patient follow up with PCP/outpatient providers.  Patient stable for discharge. Discharge instructions were reviewed with patient.    Critical Care Time  ECG 12 Lead Documentation Only    Date/Time: 4/18/2024 9:48 PM    Performed by: Narinder Parson MD  Authorized by: Narinder Parson MD    Indications / Diagnosis:  History of lightheadedness  ECG reviewed by me, the ED Provider: yes    Patient location:  ED  Rate:     ECG rate:  93    ECG rate assessment: normal    Rhythm:     Rhythm: sinus rhythm    QRS:     QRS axis:  Normal  ST segments:     ST segments:  Normal  T waves:     T waves: normal    Comments:      Normal sinus rhythm at 93, normal axis, , QRS 94, QTc 462, no ST-T wave abnormality, no evidence of acute ischemia

## 2024-04-19 NOTE — PROGRESS NOTES
Referral received via email. Referred to [facility] [employer] Care Plan Committee on 4/19/24 to be reviewed for careplan.  Will await determination of case review.   Chart review completed.  Patient declined RN care management in the past.  Case referred to OP SW CM for complex care management.  IB message sent to OP SW CM for case communication and care coordination.

## 2024-04-19 NOTE — ED PROVIDER NOTES
History  Chief Complaint   Patient presents with    Medical Problem     Pt arrives by EMS after being found outside of a [facility]. Pt denies any problems during triage.      Patient is a 38-year-old male seen in the emergency department with concern for fatigue, after not having a place to stay this evening.  Patient stated that he wanted to rest.  Review of record shows the patient was seen in the emergency department 1 day ago with concerns that he felt like he might have a seizure.  Patient has no other acute medical complaints emergency department.  Patient notes no chest pain, shortness of breath, abdominal pain, nausea, vomiting.        Prior to Admission Medications   Prescriptions Last Dose Informant Patient Reported? Taking?   bictegravir-emtricitab-tenofovir alafenamide (BIKTARVY) -25 MG tablet   No No   Sig: Take 1 tablet by mouth daily with breakfast   folic acid (FOLVITE) 1 mg tablet   No No   Sig: Take 1 tablet (1 mg total) by mouth daily   Patient not taking: Reported on 4/8/2024   levETIRAcetam (Keppra) 1000 MG tablet   No No   Sig: Take 1 tablet (1,000 mg total) by mouth 2 (two) times a day   mirtazapine (REMERON) 30 mg tablet   No No   Sig: Take 1 tablet (30 mg total) by mouth daily at bedtime   thiamine (VITAMIN B1) 100 mg tablet   No No   Sig: Take 1 tablet (100 mg total) by mouth daily   Patient not taking: Reported on 4/8/2024      Facility-Administered Medications: None       Past Medical History:   Diagnosis Date    HIV (human immunodeficiency virus infection) (HCC)     Seizures (HCC)     Smoker     Syphilis        Past Surgical History:   Procedure Laterality Date    HERNIA REPAIR         History reviewed. No pertinent family history.  I have reviewed and agree with the history as documented.    E-Cigarette/Vaping    E-Cigarette Use Current Every Day User      E-Cigarette/Vaping Substances    Nicotine No     THC No     CBD No     Flavoring Yes     Other No     Unknown No   "    Social History     Tobacco Use    Smoking status: Every Day     Current packs/day: 2.00     Average packs/day: 2.0 packs/day for 8.0 years (16.0 ttl pk-yrs)     Types: Cigarettes    Smokeless tobacco: Never    Tobacco comments:     \"At least 2 packs a day\" of cigarettes.   Vaping Use    Vaping status: Every Day    Substances: Flavoring   Substance Use Topics    Alcohol use: Yes     Alcohol/week: 2.0 standard drinks of alcohol     Types: 1 Glasses of wine, 1 Cans of beer per week     Comment: socially    Drug use: Yes     Frequency: 1.0 times per week     Types: Marijuana       Review of Systems   Constitutional:  Positive for fatigue. Negative for diaphoresis and fever.   HENT:  Negative for ear pain and sore throat.    Eyes:  Negative for pain and visual disturbance.   Respiratory:  Negative for cough and shortness of breath.    Cardiovascular:  Negative for chest pain and palpitations.   Gastrointestinal:  Negative for abdominal pain and vomiting.   Genitourinary:  Negative for dysuria and hematuria.   Musculoskeletal:  Negative for arthralgias and back pain.   Skin:  Negative for color change and rash.   Neurological:  Negative for dizziness and syncope.   Psychiatric/Behavioral:  Negative for agitation and confusion.    All other systems reviewed and are negative.      Physical Exam  Physical Exam  Vitals and nursing note reviewed.   Constitutional:       General: He is not in acute distress.     Appearance: He is well-developed.   HENT:      Head: Normocephalic and atraumatic.      Right Ear: External ear normal.      Left Ear: External ear normal.      Nose: Nose normal.      Mouth/Throat:      Pharynx: Oropharynx is clear.   Eyes:      General: No scleral icterus.     Conjunctiva/sclera: Conjunctivae normal.   Cardiovascular:      Rate and Rhythm: Normal rate.      Comments: well-perfused extremities  Pulmonary:      Effort: Pulmonary effort is normal. No respiratory distress.   Abdominal:      General: " Abdomen is flat. There is no distension.   Musculoskeletal:         General: No deformity or signs of injury.      Cervical back: Normal range of motion and neck supple.   Skin:     General: Skin is dry.      Findings: No rash.   Neurological:      General: No focal deficit present.      Mental Status: He is alert.      Cranial Nerves: No cranial nerve deficit.   Psychiatric:         Mood and Affect: Mood normal.         Vital Signs  ED Triage Vitals [04/19/24 0141]   Temperature Pulse Respirations Blood Pressure SpO2   97.6 °F (36.4 °C) 77 16 121/94 100 %      Temp Source Heart Rate Source Patient Position - Orthostatic VS BP Location FiO2 (%)   Oral Monitor Sitting Right arm --      Pain Score       --           Vitals:    04/19/24 0141   BP: 121/94   Pulse: 77   Patient Position - Orthostatic VS: Sitting         Visual Acuity      ED Medications  Medications - No data to display    Diagnostic Studies  Results Reviewed       None                   No orders to display              Procedures  Procedures         ED Course                                             Medical Decision Making  Patient is a 38-year-old male seen in the emergency department with concern for fatigue, after not having a place to stay this evening.  Patient was allowed to rest in the emergency department.  Patient has no other acute medical complaints in the emergency department.  There is no evidence of trauma on evaluation.  Plan to have patient follow up with PCP/outpatient providers.  Patient stable for discharge.  Discharge instructions were reviewed with patient.             Disposition  Final diagnoses:   Fatigue     Time reflects when diagnosis was documented in both MDM as applicable and the Disposition within this note       Time User Action Codes Description Comment    4/19/2024  1:51 AM Narinder Parson Add [R53.83] Fatigue           ED Disposition       ED Disposition   Discharge    Condition   Stable    Date/Time   Fri Apr 19,  2024  1:49 AM    Comment   John Sanders discharge to home/self care.                   Follow-up Information       Follow up With Specialties Details Why Contact Info Additional Information    Your primary doctor  Call in 1 day       [facility] Family Medicine Call  As needed [address] [place] [id number] [phone] [facility], [address] [place] [address], [address] [phone]            Patient's Medications   Discharge Prescriptions    No medications on file       No discharge procedures on file.    PDMP Review         Value Time User    PDMP Reviewed  Yes 6/17/2023 11:20 AM Sammy Roberts MD            ED Provider  Electronically Signed by             Narinder Parson MD  04/19/24 0345

## 2024-04-19 NOTE — ED PROVIDER NOTES
History  Chief Complaint   Patient presents with    Seizure - Prior Hx Of     Per EMS pt felt that he was going to have a seizure and sat down on his rolling walker; pt did not fall or seize;      Patient is a 38-year-old male with past medical history of HIV and seizure history.  He was brought in by ED by the EMS  Per EMS pt felt that he was going to have a seizure and sat down on his rolling walker; pt did not fall or seize.   He has been having lightheadedness, photophobia since 2 weeks and felt like this is probably aura. last seizure episode was 4 weeks ago.    He has been taking his Keppra 1000 mg twice daily.  Last dose was this a.m.  Patient states he has not been taking his HIV medications since 1 month.     Plan:  Educated about HIV medication compliance.  Restarted his Biktarvy.  Ordered his evening Keppra dose 1000 mg.  Homeless shelter resources provided.             Seizure - Prior Hx Of      Prior to Admission Medications   Prescriptions Last Dose Informant Patient Reported? Taking?   bictegravir-emtricitab-tenofovir alafenamide (BIKTARVY) -25 MG tablet   No No   Sig: Take 1 tablet by mouth daily with breakfast   folic acid (FOLVITE) 1 mg tablet   No No   Sig: Take 1 tablet (1 mg total) by mouth daily   Patient not taking: Reported on 4/8/2024   levETIRAcetam (Keppra) 1000 MG tablet   No No   Sig: Take 1 tablet (1,000 mg total) by mouth 2 (two) times a day   mirtazapine (REMERON) 30 mg tablet   No No   Sig: Take 1 tablet (30 mg total) by mouth daily at bedtime   thiamine (VITAMIN B1) 100 mg tablet   No No   Sig: Take 1 tablet (100 mg total) by mouth daily   Patient not taking: Reported on 4/8/2024      Facility-Administered Medications: None       Past Medical History:   Diagnosis Date    HIV (human immunodeficiency virus infection) (HCC)     Seizures (HCC)     Smoker     Syphilis        Past Surgical History:   Procedure Laterality Date    HERNIA REPAIR         History reviewed. No pertinent  "family history.  I have reviewed and agree with the history as documented.    E-Cigarette/Vaping    E-Cigarette Use Current Every Day User      E-Cigarette/Vaping Substances    Nicotine No     THC No     CBD No     Flavoring Yes     Other No     Unknown No      Social History     Tobacco Use    Smoking status: Every Day     Current packs/day: 2.00     Average packs/day: 2.0 packs/day for 8.0 years (16.0 ttl pk-yrs)     Types: Cigarettes    Smokeless tobacco: Never    Tobacco comments:     \"At least 2 packs a day\" of cigarettes.   Vaping Use    Vaping status: Every Day    Substances: Flavoring   Substance Use Topics    Alcohol use: Yes     Alcohol/week: 2.0 standard drinks of alcohol     Types: 1 Glasses of wine, 1 Cans of beer per week     Comment: socially    Drug use: Yes     Frequency: 1.0 times per week     Types: Marijuana        Review of Systems   Eyes:  Positive for photophobia and visual disturbance.   Respiratory: Negative.     Cardiovascular:  Positive for leg swelling.   Genitourinary: Negative.    Musculoskeletal:  Positive for arthralgias.   Neurological:  Positive for light-headedness. Negative for dizziness.       Physical Exam  ED Triage Vitals   Temperature Pulse Respirations Blood Pressure SpO2   04/18/24 2112 04/18/24 2111 04/18/24 2112 04/18/24 2112 04/18/24 2112   97.9 °F (36.6 °C) 92 18 139/93 98 %      Temp Source Heart Rate Source Patient Position - Orthostatic VS BP Location FiO2 (%)   04/18/24 2112 04/18/24 2111 04/18/24 2112 04/18/24 2112 --   Oral Monitor Lying Right arm       Pain Score       04/18/24 2115       No Pain             Orthostatic Vital Signs  Vitals:    04/18/24 2111 04/18/24 2112 04/18/24 2115   BP:  139/93 139/93   Pulse: 92 95 96   Patient Position - Orthostatic VS:  Lying Lying       Physical Exam  Constitutional:       Appearance: Normal appearance.   Eyes:      Extraocular Movements: Extraocular movements intact.   Psychiatric:         Behavior: Behavior is " cooperative.         ED Medications  Medications   levETIRAcetam (KEPPRA) tablet 1,000 mg (has no administration in time range)   LORazepam (ATIVAN) tablet 1 mg (has no administration in time range)   bictegravir-emtricitab-tenofovir alafenamide (BIKTARVY) -25 MG tablet 1 tablet (has no administration in time range)       Diagnostic Studies  Results Reviewed       None                   No orders to display         Procedures  Procedures      ED Course                                       Medical Decision Making   38-year-old male with past medical history of HIV and seizure history.  Felt lightheaded and thought he is about to have a seizure, was called EMS and came to ED.He has been having lightheadedness, photophobia since 2 weeks and felt like this is probably aura. last seizure episode was 4 weeks ago.    He has been taking his Keppra 1000 mg twice daily.  Last dose was this a.m.  Patient states he has not been taking his HIV medications since 1 month.     Plan:  Educated about HIV medication compliance.  Restarted his Biktarvy.  Ordered his evening Keppra dose 1000 mg.      Risk  Prescription drug management.          Disposition  Final diagnoses:   History of seizures   Encounter for medical screening examination   History of HIV infection (HCC)     Time reflects when diagnosis was documented in both MDM as applicable and the Disposition within this note       Time User Action Codes Description Comment    4/18/2024  9:29 PM Narinder Parson [Z87.898] History of seizures     4/18/2024  9:32 PM Narinder Parson [Z13.9] Encounter for medical screening examination     4/18/2024  9:32 PM Narinder Parson [B20] History of HIV infection (HCC)     4/18/2024  9:32 PM Narinder Parson Modify [Z87.898] History of seizures           ED Disposition       ED Disposition   Discharge    Condition   Stable    Date/Time   u Apr 18, 2024  9:30 PM    Comment   John Sanders discharge to home/self care.                    Follow-up Information       Follow up With Specialties Details Why Contact Info Additional Information    Your primary doctor  Call in 1 day       [facility] Call  As needed [address] [facility] [address] [phone] [facility], [address] [place] [address], [address] [phone]            Patient's Medications   Discharge Prescriptions    No medications on file         PDMP Review         Value Time User    PDMP Reviewed  Yes 6/17/2023 11:20 AM Sammy Roberts MD             ED Provider  Attending physically available and evaluated John Sanders. I managed the patient along with the ED Attending.    Electronically Signed by           Shayy Bobby MD  04/18/24 7748

## 2024-04-20 ENCOUNTER — HOSPITAL ENCOUNTER (EMERGENCY)
Facility: HOSPITAL | Age: 39
Discharge: HOME | End: 2024-04-20
Attending: EMERGENCY MEDICINE | Admitting: EMERGENCY MEDICINE
Payer: MEDICARE

## 2024-04-20 VITALS
RESPIRATION RATE: 15 BRPM | SYSTOLIC BLOOD PRESSURE: 104 MMHG | HEART RATE: 102 BPM | TEMPERATURE: 98.3 F | DIASTOLIC BLOOD PRESSURE: 56 MMHG | OXYGEN SATURATION: 95 %

## 2024-04-20 DIAGNOSIS — E83.42 HYPOMAGNESEMIA: ICD-10-CM

## 2024-04-20 DIAGNOSIS — Z87.898 HISTORY OF SEIZURE: Primary | ICD-10-CM

## 2024-04-20 LAB
ALBUMIN SERPL BCP-MCNC: 3.8 G/DL (ref 3.5–5)
ALP SERPL-CCNC: 55 U/L (ref 34–104)
ALT SERPL W P-5'-P-CCNC: 29 U/L (ref 7–52)
ANION GAP SERPL CALCULATED.3IONS-SCNC: 7 MMOL/L (ref 4–13)
AST SERPL W P-5'-P-CCNC: 23 U/L (ref 13–39)
BASOPHILS # BLD AUTO: 0.03 THOUSANDS/ÂΜL (ref 0–0.1)
BASOPHILS NFR BLD AUTO: 1 % (ref 0–1)
BILIRUB SERPL-MCNC: 0.37 MG/DL (ref 0.2–1)
BUN SERPL-MCNC: 11 MG/DL (ref 5–25)
CALCIUM SERPL-MCNC: 8.8 MG/DL (ref 8.4–10.2)
CHLORIDE SERPL-SCNC: 105 MMOL/L (ref 96–108)
CO2 SERPL-SCNC: 28 MMOL/L (ref 21–32)
CREAT SERPL-MCNC: 0.74 MG/DL (ref 0.6–1.3)
EOSINOPHIL # BLD AUTO: 0.04 THOUSAND/ÂΜL (ref 0–0.61)
EOSINOPHIL NFR BLD AUTO: 1 % (ref 0–6)
ERYTHROCYTE [DISTWIDTH] IN BLOOD BY AUTOMATED COUNT: 13.4 % (ref 11.6–15.1)
GFR SERPL CREATININE-BSD FRML MDRD: 117 ML/MIN/1.73SQ M
GLUCOSE SERPL-MCNC: 106 MG/DL (ref 65–140)
GLUCOSE SERPL-MCNC: 111 MG/DL (ref 65–140)
HCT VFR BLD AUTO: 39.9 % (ref 36.5–49.3)
HGB BLD-MCNC: 13.1 G/DL (ref 12–17)
IMM GRANULOCYTES # BLD AUTO: 0.01 THOUSAND/UL (ref 0–0.2)
IMM GRANULOCYTES NFR BLD AUTO: 0 % (ref 0–2)
LEVETIRACETAM SERPL-MCNC: 16.4 UG/ML (ref 12–46)
LYMPHOCYTES # BLD AUTO: 1.7 THOUSANDS/ÂΜL (ref 0.6–4.47)
LYMPHOCYTES NFR BLD AUTO: 33 % (ref 14–44)
MAGNESIUM SERPL-MCNC: 1.8 MG/DL (ref 1.9–2.7)
MCH RBC QN AUTO: 29.8 PG (ref 26.8–34.3)
MCHC RBC AUTO-ENTMCNC: 32.8 G/DL (ref 31.4–37.4)
MCV RBC AUTO: 91 FL (ref 82–98)
MONOCYTES # BLD AUTO: 0.49 THOUSAND/ÂΜL (ref 0.17–1.22)
MONOCYTES NFR BLD AUTO: 10 % (ref 4–12)
NEUTROPHILS # BLD AUTO: 2.88 THOUSANDS/ÂΜL (ref 1.85–7.62)
NEUTS SEG NFR BLD AUTO: 55 % (ref 43–75)
NRBC BLD AUTO-RTO: 0 /100 WBCS
PHOSPHATE SERPL-MCNC: 4.1 MG/DL (ref 2.7–4.5)
PLATELET # BLD AUTO: 181 THOUSANDS/UL (ref 149–390)
PMV BLD AUTO: 10 FL (ref 8.9–12.7)
POTASSIUM SERPL-SCNC: 3.5 MMOL/L (ref 3.5–5.3)
PROT SERPL-MCNC: 7 G/DL (ref 6.4–8.4)
RBC # BLD AUTO: 4.4 MILLION/UL (ref 3.88–5.62)
SODIUM SERPL-SCNC: 140 MMOL/L (ref 135–147)
WBC # BLD AUTO: 5.15 THOUSAND/UL (ref 4.31–10.16)

## 2024-04-20 PROCEDURE — 36415 COLL VENOUS BLD VENIPUNCTURE: CPT | Performed by: EMERGENCY MEDICINE

## 2024-04-20 PROCEDURE — 99284 EMERGENCY DEPT VISIT MOD MDM: CPT

## 2024-04-20 PROCEDURE — 83735 ASSAY OF MAGNESIUM: CPT | Performed by: EMERGENCY MEDICINE

## 2024-04-20 PROCEDURE — 85025 COMPLETE CBC W/AUTO DIFF WBC: CPT | Performed by: EMERGENCY MEDICINE

## 2024-04-20 PROCEDURE — 99285 EMERGENCY DEPT VISIT HI MDM: CPT | Performed by: EMERGENCY MEDICINE

## 2024-04-20 PROCEDURE — 83520 IMMUNOASSAY QUANT NOS NONAB: CPT | Performed by: EMERGENCY MEDICINE

## 2024-04-20 PROCEDURE — 82948 REAGENT STRIP/BLOOD GLUCOSE: CPT

## 2024-04-20 PROCEDURE — 84100 ASSAY OF PHOSPHORUS: CPT | Performed by: EMERGENCY MEDICINE

## 2024-04-20 PROCEDURE — 93005 ELECTROCARDIOGRAM TRACING: CPT

## 2024-04-20 PROCEDURE — 80053 COMPREHEN METABOLIC PANEL: CPT | Performed by: EMERGENCY MEDICINE

## 2024-04-20 RX ORDER — LEVETIRACETAM 1000 MG/1
1000 TABLET ORAL EVERY 12 HOURS SCHEDULED
Qty: 28 TABLET | Refills: 0 | Status: SHIPPED | OUTPATIENT
Start: 2024-04-20 | End: 2024-04-20 | Stop reason: ALTCHOICE

## 2024-04-20 RX ORDER — LANOLIN ALCOHOL/MO/W.PET/CERES
800 CREAM (GRAM) TOPICAL ONCE
Status: COMPLETED | OUTPATIENT
Start: 2024-04-20 | End: 2024-04-20

## 2024-04-20 RX ORDER — LEVETIRACETAM 1000 MG/1
1000 TABLET ORAL EVERY 12 HOURS SCHEDULED
Qty: 28 TABLET | Refills: 0 | Status: SHIPPED | OUTPATIENT
Start: 2024-04-20 | End: 2024-05-04

## 2024-04-20 RX ORDER — LEVETIRACETAM 500 MG/1
1000 TABLET ORAL ONCE
Status: COMPLETED | OUTPATIENT
Start: 2024-04-20 | End: 2024-04-20

## 2024-04-20 RX ADMIN — LEVETIRACETAM 1000 MG: 500 TABLET, FILM COATED ORAL at 02:12

## 2024-04-20 RX ADMIN — Medication 800 MG: at 03:33

## 2024-04-20 NOTE — ED PROVIDER NOTES
History  Chief Complaint   Patient presents with    Seizure - Prior Hx Of     Pt states that he had a seizure in the [facility] parking lot. States that he did not take his keppra. Patient had medications reordered for him 2 weeks ago but states he has not gotten them filled. Pt states that he has not been able to get a bus to get there.      Patient is a 38-year-old male seen in the emergency department brought by EMS with concern for possible seizure.  Patient states that he had a seizure in the parking lot of [facility] prior to evaluation in the emergency department.  Patient notes no bowel or bladder incontinence.  Patient states that the last dose of Keppra he took was 1 day ago.  Review of records shows that patient has history of multiple emergency department visits with similar complaints.  Patient stated that he has been unable to  his seizure medication.  Patient states that his last previous seizure was approximately 2 months ago.  Patient notes no headache, chest pain, shortness of breath, abdominal pain, nausea, vomiting, weakness.  Patient notes no other definite clear exacerbating or alleviating factors for his symptoms.        Prior to Admission Medications   Prescriptions Last Dose Informant Patient Reported? Taking?   bictegravir-emtricitab-tenofovir alafenamide (BIKTARVY) -25 MG tablet   No No   Sig: Take 1 tablet by mouth daily with breakfast   folic acid (FOLVITE) 1 mg tablet   No No   Sig: Take 1 tablet (1 mg total) by mouth daily   Patient not taking: Reported on 4/8/2024   levETIRAcetam (Keppra) 1000 MG tablet   No No   Sig: Take 1 tablet (1,000 mg total) by mouth 2 (two) times a day   mirtazapine (REMERON) 30 mg tablet   No No   Sig: Take 1 tablet (30 mg total) by mouth daily at bedtime   thiamine (VITAMIN B1) 100 mg tablet   No No   Sig: Take 1 tablet (100 mg total) by mouth daily   Patient not taking: Reported on 4/8/2024      Facility-Administered Medications: None       Past  "Medical History:   Diagnosis Date    HIV (human immunodeficiency virus infection) (HCC)     Seizures (HCC)     Smoker     Syphilis        Past Surgical History:   Procedure Laterality Date    HERNIA REPAIR         History reviewed. No pertinent family history.  I have reviewed and agree with the history as documented.    E-Cigarette/Vaping    E-Cigarette Use Current Every Day User      E-Cigarette/Vaping Substances    Nicotine No     THC No     CBD No     Flavoring Yes     Other No     Unknown No      Social History     Tobacco Use    Smoking status: Every Day     Current packs/day: 2.00     Average packs/day: 2.0 packs/day for 8.0 years (16.0 ttl pk-yrs)     Types: Cigarettes    Smokeless tobacco: Never    Tobacco comments:     \"At least 2 packs a day\" of cigarettes.   Vaping Use    Vaping status: Every Day    Substances: Flavoring   Substance Use Topics    Alcohol use: Yes     Alcohol/week: 2.0 standard drinks of alcohol     Types: 1 Glasses of wine, 1 Cans of beer per week     Comment: socially    Drug use: Yes     Frequency: 1.0 times per week     Types: Marijuana       Review of Systems   Constitutional:  Negative for chills and fever.   HENT:  Negative for ear pain and sore throat.    Eyes:  Negative for pain and visual disturbance.   Respiratory:  Negative for cough and shortness of breath.    Cardiovascular:  Negative for chest pain and palpitations.   Gastrointestinal:  Negative for abdominal pain and vomiting.   Genitourinary:  Negative for decreased urine volume and difficulty urinating.   Musculoskeletal:  Negative for arthralgias and back pain.   Skin:  Negative for color change and rash.   Neurological:  Positive for seizures. Negative for weakness.   Psychiatric/Behavioral:  Negative for agitation and confusion.    All other systems reviewed and are negative.      Physical Exam  Physical Exam  Vitals and nursing note reviewed.   Constitutional:       General: He is not in acute distress.     " Appearance: He is well-developed.   HENT:      Head: Normocephalic and atraumatic.      Right Ear: External ear normal.      Left Ear: External ear normal.      Nose: Nose normal.      Mouth/Throat:      Pharynx: Oropharynx is clear.   Eyes:      General: No scleral icterus.     Conjunctiva/sclera: Conjunctivae normal.   Cardiovascular:      Rate and Rhythm: Regular rhythm. Tachycardia present.      Heart sounds: No murmur heard.  Pulmonary:      Effort: Pulmonary effort is normal. No respiratory distress.      Breath sounds: Normal breath sounds.   Abdominal:      General: There is no distension.      Palpations: Abdomen is soft.      Tenderness: There is no abdominal tenderness.   Musculoskeletal:         General: No deformity or signs of injury.      Cervical back: Normal range of motion and neck supple.   Skin:     General: Skin is warm and dry.   Neurological:      General: No focal deficit present.      Mental Status: He is alert.      Cranial Nerves: No cranial nerve deficit.      Sensory: No sensory deficit.   Psychiatric:         Mood and Affect: Mood normal.         Thought Content: Thought content normal.         Vital Signs  ED Triage Vitals [04/20/24 0142]   Temperature Pulse Respirations Blood Pressure SpO2   98.3 °F (36.8 °C) (!) 108 18 148/95 98 %      Temp Source Heart Rate Source Patient Position - Orthostatic VS BP Location FiO2 (%)   Oral Monitor Lying Left arm --      Pain Score       --           Vitals:    04/20/24 0142   BP: 148/95   Pulse: (!) 108   Patient Position - Orthostatic VS: Lying         Visual Acuity  Visual Acuity      Flowsheet Row Most Recent Value   L Pupil Size (mm) 2   R Pupil Size (mm) 2            ED Medications  Medications   magnesium Oxide (MAG-OX) tablet 800 mg (has no administration in time range)   levETIRAcetam (KEPPRA) tablet 1,000 mg (1,000 mg Oral Given 4/20/24 0212)       Diagnostic Studies  Results Reviewed       Procedure Component Value Units Date/Time     Comprehensive metabolic panel [[id number]] Collected: 04/20/24 0249    Lab Status: Final result Specimen: Blood from Arm, Left Updated: 04/20/24 0315     Sodium 140 mmol/L      Potassium 3.5 mmol/L      Chloride 105 mmol/L      CO2 28 mmol/L      ANION GAP 7 mmol/L      BUN 11 mg/dL      Creatinine 0.74 mg/dL      Glucose 111 mg/dL      Calcium 8.8 mg/dL      AST 23 U/L      ALT 29 U/L      Alkaline Phosphatase 55 U/L      Total Protein 7.0 g/dL      Albumin 3.8 g/dL      Total Bilirubin 0.37 mg/dL      eGFR 117 ml/min/1.73sq m     Narrative:      National Kidney Disease Foundation guidelines for Chronic Kidney Disease (CKD):     Stage 1 with normal or high GFR (GFR > 90 mL/min/1.73 square meters)    Stage 2 Mild CKD (GFR = 60-89 mL/min/1.73 square meters)    Stage 3A Moderate CKD (GFR = 45-59 mL/min/1.73 square meters)    Stage 3B Moderate CKD (GFR = 30-44 mL/min/1.73 square meters)    Stage 4 Severe CKD (GFR = 15-29 mL/min/1.73 square meters)    Stage 5 End Stage CKD (GFR <15 mL/min/1.73 square meters)  Note: GFR calculation is accurate only with a steady state creatinine    Phosphorus [[id number]]  (Normal) Collected: 04/20/24 0249    Lab Status: Final result Specimen: Blood from Arm, Left Updated: 04/20/24 0315     Phosphorus 4.1 mg/dL     Magnesium [[id number]]  (Abnormal) Collected: 04/20/24 0249    Lab Status: Final result Specimen: Blood from Arm, Left Updated: 04/20/24 0315     Magnesium 1.8 mg/dL     Levetiracetam level [[id number]] Collected: 04/20/24 0249    Lab Status: In process Specimen: Blood from Arm, Left Updated: 04/20/24 0253    Fingerstick Glucose (POCT) [[id number]]  (Normal) Collected: 04/20/24 0223    Lab Status: Final result Specimen: Blood Updated: 04/20/24 0223     POC Glucose 106 mg/dl     CBC and differential [[id number]] Collected: 04/20/24 0210    Lab Status: Final result Specimen: Blood from Arm, Left Updated: 04/20/24 0220     WBC 5.15 Thousand/uL      RBC 4.40 Million/uL      Hemoglobin  13.1 g/dL      Hematocrit 39.9 %      MCV 91 fL      MCH 29.8 pg      MCHC 32.8 g/dL      RDW 13.4 %      MPV 10.0 fL      Platelets 181 Thousands/uL      nRBC 0 /100 WBCs      Segmented % 55 %      Immature Grans % 0 %      Lymphocytes % 33 %      Monocytes % 10 %      Eosinophils Relative 1 %      Basophils Relative 1 %      Absolute Neutrophils 2.88 Thousands/µL      Absolute Immature Grans 0.01 Thousand/uL      Absolute Lymphocytes 1.70 Thousands/µL      Absolute Monocytes 0.49 Thousand/µL      Eosinophils Absolute 0.04 Thousand/µL      Basophils Absolute 0.03 Thousands/µL                    No orders to display              Procedures  ECG 12 Lead Documentation Only    Date/Time: 4/20/2024 1:59 AM    Performed by: Narinder Parson MD  Authorized by: Narinder Parson MD    Indications / Diagnosis:  Seizure  ECG reviewed by me, the ED Provider: yes    Patient location:  ED  Rate:     ECG rate:  100    ECG rate assessment: normal    Rhythm:     Rhythm: sinus rhythm    QRS:     QRS axis:  Left  ST segments:     ST segments:  Normal  T waves:     T waves: normal    Comments:      Normal sinus rhythm at 100, left axis deviation, , , QTc 474, no ST-T wave abnormality, no evidence of acute ischemia           ED Course                               SBIRT 22yo+      Flowsheet Row Most Recent Value   Initial Alcohol Screen: US AUDIT-C     1. How often do you have a drink containing alcohol? 3 Filed at: 04/20/2024 0147   2. How many drinks containing alcohol do you have on a typical day you are drinking?  1 Filed at: 04/20/2024 0147   3a. Male UNDER 65: How often do you have five or more drinks on one occasion? 1 Filed at: 04/20/2024 0147   Audit-C Score 5 Filed at: 04/20/2024 0147                      Medical Decision Making  Patient is a 38-year-old male seen in the emergency department brought by EMS with concern for possible breakthrough seizure.  EKG was obtained and noted.  Laboratory evaluation  remarkable for low magnesium of 1.8. Patient was provided dose of regular seizure medication, and prescription was sent.  Patient had baseline mental status in the emergency department, with no evidence of trauma or other acute abnormality noted on evaluation.  Evaluation is suggestive of possible breakthrough seizure. Plan to have patient follow up with neurology/outpatient providers.  Patient stable for discharge.  Discharge instructions were reviewed with patient.    Amount and/or Complexity of Data Reviewed  Labs: ordered. Decision-making details documented in ED Course.  ECG/medicine tests: ordered and independent interpretation performed. Decision-making details documented in ED Course.    Risk  OTC drugs.  Prescription drug management.             Disposition  Final diagnoses:   Hypomagnesemia   History of seizure     Time reflects when diagnosis was documented in both MDM as applicable and the Disposition within this note       Time User Action Codes Description Comment    4/20/2024  1:49 AM Narinder Parson Add [G40.909] Recurrent seizures (HCC)     4/20/2024  3:25 AM Narinder Parson Add [E83.42] Hypomagnesemia     4/20/2024  3:26 AM Narinder Parson Add [G40.919] Breakthrough seizure (HCC)     4/20/2024  3:26 AM Narinder Parson Remove [G40.919] Breakthrough seizure (HCC)     4/20/2024  3:30 AM Narinder Parson Modify [E83.42] Hypomagnesemia     4/20/2024  3:30 AM Narinder Parson Remove [G40.909] Recurrent seizures (HCC)     4/20/2024  3:30 AM Narinder Parson Add [Z87.898] History of seizure     4/20/2024  3:30 AM Narinder Parson Modify [E83.42] Hypomagnesemia     4/20/2024  3:30 AM Narinder Parson Modify [Z87.898] History of seizure           ED Disposition       ED Disposition   Discharge    Condition   Stable    Date/Time   Sat Apr 20, 2024 4455    Comment   John Sanders discharge to home/self care.                   Follow-up Information       Follow up With Specialties Details Why Contact Info Additional  Information    [facility] Neurology [facility] Neurology Call today  [address]  [address]  [facility] [address] [phone] [facility], [address], [place] [address], [place], [place], [address] [phone]    Your primary doctor  Call today       [facility] Family Medicine Call  As needed [address] [facility] [address] [phone] [facility], [address] [place] [address], [address] [phone]            Patient's Medications   Discharge Prescriptions    LEVETIRACETAM (KEPPRA) 1000 MG TABLET    Take 1 tablet (1,000 mg total) by mouth every 12 (twelve) hours for 14 days       Start Date: 4/20/2024 End Date: 5/4/2024       Order Dose: 1,000 mg       Quantity: 28 tablet    Refills: 0           PDMP Review         Value Time User    PDMP Reviewed  Yes 6/17/2023 11:20 AM Sammy Roberts MD            ED Provider  Electronically Signed by             Narinder Parson MD  04/20/24 0331

## 2024-04-20 NOTE — DISCHARGE INSTRUCTIONS
Follow up with neurology/outpatient providers, and return to the emergency department for new or worsening symptoms.

## 2024-04-22 ENCOUNTER — PATIENT OUTREACH (OUTPATIENT)
Dept: CASE MANAGEMENT | Facility: OTHER | Age: 39
End: 2024-04-22

## 2024-04-22 LAB
ATRIAL RATE: 100 BPM
P AXIS: 47 DEGREES
PR INTERVAL: 162 MS
QRS AXIS: -19 DEGREES
QRSD INTERVAL: 102 MS
QT INTERVAL: 368 MS
QTC INTERVAL: 474 MS
T WAVE AXIS: 43 DEGREES
VENTRICULAR RATE: 100 BPM

## 2024-04-22 PROCEDURE — 93010 ELECTROCARDIOGRAM REPORT: CPT | Performed by: INTERNAL MEDICINE

## 2024-04-22 NOTE — PROGRESS NOTES
Email received from High Utilizer Committee on 4/21/24.  Committee reviewed and determined a care plan is not appropriate at this time.   There are SDOH needs which is impacting utilization.  Referral has been made to OP SW CM.

## 2024-04-23 ENCOUNTER — HOSPITAL ENCOUNTER (EMERGENCY)
Facility: HOSPITAL | Age: 39
Discharge: HOME | End: 2024-04-23
Attending: EMERGENCY MEDICINE
Payer: MEDICARE

## 2024-04-23 VITALS
SYSTOLIC BLOOD PRESSURE: 159 MMHG | HEART RATE: 78 BPM | DIASTOLIC BLOOD PRESSURE: 63 MMHG | RESPIRATION RATE: 15 BRPM | OXYGEN SATURATION: 99 % | TEMPERATURE: 98 F

## 2024-04-23 DIAGNOSIS — Z76.0 ENCOUNTER FOR MEDICATION REFILL: ICD-10-CM

## 2024-04-23 DIAGNOSIS — R60.0 PERIPHERAL EDEMA: Primary | ICD-10-CM

## 2024-04-23 DIAGNOSIS — B20 HISTORY OF HIV INFECTION (HCC): ICD-10-CM

## 2024-04-23 DIAGNOSIS — B20 HIV INFECTION, UNSPECIFIED SYMPTOM STATUS (HCC): ICD-10-CM

## 2024-04-23 PROBLEM — R56.9 UNSPECIFIED CONVULSIONS: Chronic | Status: ACTIVE | Noted: 2018-05-27

## 2024-04-23 LAB
ALBUMIN SERPL BCP-MCNC: 4 G/DL (ref 3.5–5)
ALP SERPL-CCNC: 62 U/L (ref 34–104)
ALT SERPL W P-5'-P-CCNC: 27 U/L (ref 7–52)
ANION GAP SERPL CALCULATED.3IONS-SCNC: 5 MMOL/L (ref 4–13)
AST SERPL W P-5'-P-CCNC: 24 U/L (ref 13–39)
BASOPHILS # BLD AUTO: 0.03 THOUSANDS/ÂΜL (ref 0–0.1)
BASOPHILS NFR BLD AUTO: 1 % (ref 0–1)
BILIRUB SERPL-MCNC: 0.36 MG/DL (ref 0.2–1)
BUN SERPL-MCNC: 14 MG/DL (ref 5–25)
CALCIUM SERPL-MCNC: 9.5 MG/DL (ref 8.4–10.2)
CHLORIDE SERPL-SCNC: 103 MMOL/L (ref 96–108)
CO2 SERPL-SCNC: 31 MMOL/L (ref 21–32)
CREAT SERPL-MCNC: 0.83 MG/DL (ref 0.6–1.3)
EOSINOPHIL # BLD AUTO: 0.04 THOUSAND/ÂΜL (ref 0–0.61)
EOSINOPHIL NFR BLD AUTO: 1 % (ref 0–6)
ERYTHROCYTE [DISTWIDTH] IN BLOOD BY AUTOMATED COUNT: 13.5 % (ref 11.6–15.1)
GFR SERPL CREATININE-BSD FRML MDRD: 111 ML/MIN/1.73SQ M
GLUCOSE SERPL-MCNC: 97 MG/DL (ref 65–140)
HCT VFR BLD AUTO: 42.3 % (ref 36.5–49.3)
HGB BLD-MCNC: 13.4 G/DL (ref 12–17)
IMM GRANULOCYTES # BLD AUTO: 0.02 THOUSAND/UL (ref 0–0.2)
IMM GRANULOCYTES NFR BLD AUTO: 0 % (ref 0–2)
LYMPHOCYTES # BLD AUTO: 2.05 THOUSANDS/ÂΜL (ref 0.6–4.47)
LYMPHOCYTES NFR BLD AUTO: 43 % (ref 14–44)
MCH RBC QN AUTO: 29.1 PG (ref 26.8–34.3)
MCHC RBC AUTO-ENTMCNC: 31.7 G/DL (ref 31.4–37.4)
MCV RBC AUTO: 92 FL (ref 82–98)
MONOCYTES # BLD AUTO: 0.58 THOUSAND/ÂΜL (ref 0.17–1.22)
MONOCYTES NFR BLD AUTO: 12 % (ref 4–12)
NEUTROPHILS # BLD AUTO: 2.04 THOUSANDS/ÂΜL (ref 1.85–7.62)
NEUTS SEG NFR BLD AUTO: 43 % (ref 43–75)
NRBC BLD AUTO-RTO: 0 /100 WBCS
PLATELET # BLD AUTO: 172 THOUSANDS/UL (ref 149–390)
PMV BLD AUTO: 10.3 FL (ref 8.9–12.7)
POTASSIUM SERPL-SCNC: 4.6 MMOL/L (ref 3.5–5.3)
PROT SERPL-MCNC: 7.7 G/DL (ref 6.4–8.4)
RBC # BLD AUTO: 4.6 MILLION/UL (ref 3.88–5.62)
SODIUM SERPL-SCNC: 139 MMOL/L (ref 135–147)
WBC # BLD AUTO: 4.76 THOUSAND/UL (ref 4.31–10.16)

## 2024-04-23 PROCEDURE — 80053 COMPREHEN METABOLIC PANEL: CPT | Performed by: EMERGENCY MEDICINE

## 2024-04-23 PROCEDURE — 99284 EMERGENCY DEPT VISIT MOD MDM: CPT | Performed by: EMERGENCY MEDICINE

## 2024-04-23 PROCEDURE — 85025 COMPLETE CBC W/AUTO DIFF WBC: CPT | Performed by: EMERGENCY MEDICINE

## 2024-04-23 PROCEDURE — 86360 T CELL ABSOLUTE COUNT/RATIO: CPT | Performed by: EMERGENCY MEDICINE

## 2024-04-23 PROCEDURE — 36415 COLL VENOUS BLD VENIPUNCTURE: CPT | Performed by: EMERGENCY MEDICINE

## 2024-04-23 PROCEDURE — 99283 EMERGENCY DEPT VISIT LOW MDM: CPT

## 2024-04-23 RX ORDER — ACETAMINOPHEN 325 MG/1
975 TABLET ORAL ONCE
Status: COMPLETED | OUTPATIENT
Start: 2024-04-23 | End: 2024-04-23

## 2024-04-23 RX ORDER — IBUPROFEN 600 MG/1
600 TABLET ORAL ONCE
Status: COMPLETED | OUTPATIENT
Start: 2024-04-23 | End: 2024-04-23

## 2024-04-23 RX ADMIN — BICTEGRAVIR SODIUM, EMTRICITABINE, AND TENOFOVIR ALAFENAMIDE FUMARATE 1 TABLET: 50; 200; 25 TABLET ORAL at 07:54

## 2024-04-23 RX ADMIN — IBUPROFEN 600 MG: 600 TABLET, FILM COATED ORAL at 04:08

## 2024-04-23 RX ADMIN — ACETAMINOPHEN 975 MG: 325 TABLET, FILM COATED ORAL at 08:06

## 2024-04-23 NOTE — ED NOTES
Patient sleeping, easily aroused awaiting medication from central pharmacy.     Susana Smith RN  04/23/24 0687

## 2024-04-23 NOTE — ED PROVIDER NOTES
History  Chief Complaint   Patient presents with    Foot Swelling     Patient to ER with complaints of bilateral pedal edema.  Compression stockings are in place.     39 y/o male with hx of epilepsy, HIV, prior syphilis, and homelessness presents to the ED via EMS for evaluation of bilateral lower extremity swelling.        Prior to Admission Medications   Prescriptions Last Dose Informant Patient Reported? Taking?   bictegravir-emtricitab-tenofovir alafenamide (BIKTARVY) -25 MG tablet   No No   Sig: Take 1 tablet by mouth daily with breakfast   bictegravir-emtricitab-tenofovir alafenamide (BIKTARVY) -25 MG tablet   No Yes   Sig: Take 1 tablet by mouth daily with breakfast   folic acid (FOLVITE) 1 mg tablet   No No   Sig: Take 1 tablet (1 mg total) by mouth daily   Patient not taking: Reported on 4/8/2024   levETIRAcetam (Keppra) 1000 MG tablet   No No   Sig: Take 1 tablet (1,000 mg total) by mouth 2 (two) times a day   levETIRAcetam (Keppra) 1000 MG tablet   No No   Sig: Take 1 tablet (1,000 mg total) by mouth every 12 (twelve) hours for 14 days   mirtazapine (REMERON) 30 mg tablet   No No   Sig: Take 1 tablet (30 mg total) by mouth daily at bedtime   thiamine (VITAMIN B1) 100 mg tablet   No No   Sig: Take 1 tablet (100 mg total) by mouth daily   Patient not taking: Reported on 4/8/2024      Facility-Administered Medications: None       Past Medical History:   Diagnosis Date    HIV (human immunodeficiency virus infection) (HCC)     Seizures (HCC)     Smoker     Syphilis        Past Surgical History:   Procedure Laterality Date    HERNIA REPAIR         History reviewed. No pertinent family history.  I have reviewed and agree with the history as documented.    E-Cigarette/Vaping    E-Cigarette Use Current Every Day User      E-Cigarette/Vaping Substances    Nicotine No     THC No     CBD No     Flavoring Yes     Other No     Unknown No      Social History     Tobacco Use    Smoking status: Every Day      "Current packs/day: 2.00     Average packs/day: 2.0 packs/day for 8.0 years (16.0 ttl pk-yrs)     Types: Cigarettes    Smokeless tobacco: Never    Tobacco comments:     \"At least 2 packs a day\" of cigarettes.   Vaping Use    Vaping status: Every Day    Substances: Flavoring   Substance Use Topics    Alcohol use: Yes     Alcohol/week: 2.0 standard drinks of alcohol     Types: 1 Glasses of wine, 1 Cans of beer per week     Comment: socially    Drug use: Yes     Frequency: 1.0 times per week     Types: Marijuana       Review of Systems   Constitutional:  Negative for chills and fever.   HENT:  Negative for congestion, rhinorrhea and sore throat.    Respiratory:  Negative for cough and shortness of breath.    Cardiovascular:  Positive for leg swelling. Negative for chest pain and palpitations.   Gastrointestinal:  Negative for abdominal pain, diarrhea, nausea and vomiting.   Genitourinary:  Negative for dysuria and hematuria.   Musculoskeletal:  Negative for back pain and neck pain.   Neurological:  Negative for weakness, light-headedness, numbness and headaches.   All other systems reviewed and are negative.      Physical Exam  Physical Exam  Vitals and nursing note reviewed.   Constitutional:       General: He is not in acute distress.     Appearance: Normal appearance. He is normal weight.   HENT:      Head: Normocephalic and atraumatic.      Right Ear: External ear normal.      Left Ear: External ear normal.      Nose: Nose normal.      Mouth/Throat:      Mouth: Mucous membranes are moist.      Pharynx: Oropharynx is clear. No oropharyngeal exudate or posterior oropharyngeal erythema.   Eyes:      Extraocular Movements: Extraocular movements intact.      Conjunctiva/sclera: Conjunctivae normal.      Pupils: Pupils are equal, round, and reactive to light.   Cardiovascular:      Rate and Rhythm: Normal rate and regular rhythm.      Pulses: Normal pulses.      Heart sounds: Normal heart sounds.   Pulmonary:      Effort: " Pulmonary effort is normal. No respiratory distress.      Breath sounds: Normal breath sounds. No wheezing or rales.   Abdominal:      General: Abdomen is flat. Bowel sounds are normal. There is no distension.      Palpations: Abdomen is soft.      Tenderness: There is no abdominal tenderness. There is no right CVA tenderness, left CVA tenderness or guarding.   Musculoskeletal:         General: No tenderness. Normal range of motion.      Cervical back: Normal range of motion and neck supple. No tenderness.      Comments: Trace distal bilateral lower extremity nonpitting edema with no skin changes, erythema, or calf tenderness.   Skin:     General: Skin is warm and dry.   Neurological:      General: No focal deficit present.      Mental Status: He is alert and oriented to person, place, and time.      Motor: No weakness.         Vital Signs  ED Triage Vitals   Temperature Pulse Respirations Blood Pressure SpO2   04/23/24 0357 04/23/24 0357 04/23/24 0357 04/23/24 0357 04/23/24 0357   98 °F (36.7 °C) 78 15 159/63 99 %      Temp Source Heart Rate Source Patient Position - Orthostatic VS BP Location FiO2 (%)   04/23/24 0357 04/23/24 0357 04/23/24 0357 04/23/24 0357 --   Oral Monitor Lying Right arm       Pain Score       04/23/24 0408       5           Vitals:    04/23/24 0357   BP: 159/63   Pulse: 78   Patient Position - Orthostatic VS: Lying         Visual Acuity      ED Medications  Medications   bictegravir-emtricitab-tenofovir alafenamide (BIKTARVY) -25 MG tablet 1 tablet (has no administration in time range)   ibuprofen (MOTRIN) tablet 600 mg (600 mg Oral Given 4/23/24 0408)       Diagnostic Studies  Results Reviewed       Procedure Component Value Units Date/Time    Comprehensive metabolic panel [[id number]] Collected: 04/23/24 0440    Lab Status: Final result Specimen: Blood from Arm, Left Updated: 04/23/24 0516     Sodium 139 mmol/L      Potassium 4.6 mmol/L      Chloride 103 mmol/L      CO2 31 mmol/L       ANION GAP 5 mmol/L      BUN 14 mg/dL      Creatinine 0.83 mg/dL      Glucose 97 mg/dL      Calcium 9.5 mg/dL      AST 24 U/L      ALT 27 U/L      Alkaline Phosphatase 62 U/L      Total Protein 7.7 g/dL      Albumin 4.0 g/dL      Total Bilirubin 0.36 mg/dL      eGFR 111 ml/min/1.73sq m     Narrative:      [employer] guidelines for Chronic Kidney Disease (CKD):     Stage 1 with normal or high GFR (GFR > 90 mL/min/1.73 square meters)    Stage 2 Mild CKD (GFR = 60-89 mL/min/1.73 square meters)    Stage 3A Moderate CKD (GFR = 45-59 mL/min/1.73 square meters)    Stage 3B Moderate CKD (GFR = 30-44 mL/min/1.73 square meters)    Stage 4 Severe CKD (GFR = 15-29 mL/min/1.73 square meters)    Stage 5 End Stage CKD (GFR <15 mL/min/1.73 square meters)  Note: GFR calculation is accurate only with a steady state creatinine    CBC and differential [[id number]] Collected: 04/23/24 0440    Lab Status: Final result Specimen: Blood from Arm, Left Updated: 04/23/24 0455     WBC 4.76 Thousand/uL      RBC 4.60 Million/uL      Hemoglobin 13.4 g/dL      Hematocrit 42.3 %      MCV 92 fL      MCH 29.1 pg      MCHC 31.7 g/dL      RDW 13.5 %      MPV 10.3 fL      Platelets 172 Thousands/uL      nRBC 0 /100 WBCs      Segmented % 43 %      Immature Grans % 0 %      Lymphocytes % 43 %      Monocytes % 12 %      Eosinophils Relative 1 %      Basophils Relative 1 %      Absolute Neutrophils 2.04 Thousands/µL      Absolute Immature Grans 0.02 Thousand/uL      Absolute Lymphocytes 2.05 Thousands/µL      Absolute Monocytes 0.58 Thousand/µL      Eosinophils Absolute 0.04 Thousand/µL      Basophils Absolute 0.03 Thousands/µL     T-helper cells CD4/CD8 % [[id number]] Collected: 04/23/24 0440    Lab Status: In process Specimen: Blood from Arm, Left Updated: 04/23/24 0449                   No orders to display              Procedures  Procedures         ED Course  ED Course as of 04/23/24 0708   Tue Apr 23, 2024   0520 CBC and  differential  All WNL   0521 Comprehensive metabolic panel  All WNL                                             Medical Decision Making  Amount and/or Complexity of Data Reviewed  Labs: ordered. Decision-making details documented in ED Course.    Risk  Prescription drug management.             Disposition  Final diagnoses:   Peripheral edema   Encounter for medication refill   History of HIV infection (HCC)     Time reflects when diagnosis was documented in both MDM as applicable and the Disposition within this note       Time User Action Codes Description Comment    4/23/2024  4:03 AM Giuseppe Rasheed [R60.0] Peripheral edema     4/23/2024  4:03 AM Giuseppe Rasheed [Z76.0] Encounter for medication refill     4/23/2024  4:04 AM Giuseppe Rasheed [B20] History of HIV infection (HCC)     4/23/2024  7:07 AM Giuseppe Rasheed [B20] HIV infection, unspecified symptom status (HCC)           ED Disposition       ED Disposition   Discharge    Condition   Stable    Date/Time   Tue Apr 23, 2024  7:07 AM    Comment   John Sanders discharge to home/self care.                   Follow-up Information       Follow up With Specialties Details Why Contact Info Additional Information    [facility] Call in 1 week For follow-up [address] [address]  [phone]       [facility] Emergency Medicine Go to  If symptoms worsen [address] [address] [phone] [facility] Emergency [facility], [address] [address]            Current Discharge Medication List        CONTINUE these medications which have CHANGED    Details   bictegravir-emtricitab-tenofovir alafenamide (BIKTARVY) -25 MG tablet Take 1 tablet by mouth daily with breakfast  Qty: 30 tablet, Refills: 0    Associated Diagnoses: HIV infection, unspecified symptom status (HCC)           CONTINUE these medications which have NOT  CHANGED    Details   folic acid (FOLVITE) 1 mg tablet Take 1 tablet (1 mg total) by mouth daily  Qty: 30 tablet, Refills: 0    Associated Diagnoses: Alcohol use      !! levETIRAcetam (Keppra) 1000 MG tablet Take 1 tablet (1,000 mg total) by mouth 2 (two) times a day  Qty: 60 tablet, Refills: 0    Associated Diagnoses: Recurrent seizures (HCC)      !! levETIRAcetam (Keppra) 1000 MG tablet Take 1 tablet (1,000 mg total) by mouth every 12 (twelve) hours for 14 days  Qty: 28 tablet, Refills: 0    Associated Diagnoses: History of seizure      mirtazapine (REMERON) 30 mg tablet Take 1 tablet (30 mg total) by mouth daily at bedtime  Qty: 30 tablet, Refills: 0    Associated Diagnoses: Major depressive disorder      thiamine (VITAMIN B1) 100 mg tablet Take 1 tablet (100 mg total) by mouth daily  Qty: 30 tablet, Refills: 0    Associated Diagnoses: Alcohol use       !! - Potential duplicate medications found. Please discuss with provider.          No discharge procedures on file.    PDMP Review         Value Time User    PDMP Reviewed  Yes 6/17/2023 11:20 AM Sammy Roberts MD            ED Provider  Electronically Signed by           tablet (1 mg total) by mouth daily  Qty: 30 tablet, Refills: 0    Associated Diagnoses: Alcohol use      !! levETIRAcetam (Keppra) 1000 MG tablet Take 1 tablet (1,000 mg total) by mouth 2 (two) times a day  Qty: 60 tablet, Refills: 0    Associated Diagnoses: Recurrent seizures (HCC)      !! levETIRAcetam (Keppra) 1000 MG tablet Take 1 tablet (1,000 mg total) by mouth every 12 (twelve) hours for 14 days  Qty: 28 tablet, Refills: 0    Associated Diagnoses: History of seizure      mirtazapine (REMERON) 30 mg tablet Take 1 tablet (30 mg total) by mouth daily at bedtime  Qty: 30 tablet, Refills: 0    Associated Diagnoses: Major depressive disorder      thiamine (VITAMIN B1) 100 mg tablet Take 1 tablet (100 mg total) by mouth daily  Qty: 30 tablet, Refills: 0    Associated Diagnoses: Alcohol use       !! - Potential duplicate medications found. Please discuss with provider.          No discharge procedures on file.    PDMP Review         Value Time User    PDMP Reviewed  Yes 6/17/2023 11:20 AM Sammy Roberts MD            ED Provider  Electronically Signed by             Giuseppe Rasheed MD  05/07/24 0741

## 2024-04-24 LAB
BASOPHILS # BLD AUTO: 0 X10E3/UL (ref 0–0.2)
BASOPHILS NFR BLD AUTO: 1 %
CD3+CD4+ CELLS # BLD: 162 /UL (ref 359–1519)
CD3+CD4+ CELLS NFR BLD: 9 % (ref 30.8–58.5)
CD3+CD4+ CELLS/CD3+CD8+ CLL BLD: 0.18 % (ref 0.92–3.72)
CD3+CD8+ CELLS # BLD: 887 /UL (ref 109–897)
CD3+CD8+ CELLS NFR BLD: 49.3 % (ref 12–35.5)
EOSINOPHIL # BLD AUTO: 0.1 X10E3/UL (ref 0–0.4)
EOSINOPHIL NFR BLD AUTO: 1 %
ERYTHROCYTE [DISTWIDTH] IN BLOOD BY AUTOMATED COUNT: 13.4 % (ref 11.6–15.4)
HCT VFR BLD AUTO: 42 % (ref 37.5–51)
HGB BLD-MCNC: 13.5 G/DL (ref 13–17.7)
IMM GRANULOCYTES # BLD: 0 X10E3/UL (ref 0–0.1)
IMM GRANULOCYTES NFR BLD: 1 %
LYMPHOCYTES # BLD AUTO: 1.8 X10E3/UL (ref 0.7–3.1)
LYMPHOCYTES NFR BLD AUTO: 41 %
MCH RBC QN AUTO: 29.3 PG (ref 26.6–33)
MCHC RBC AUTO-ENTMCNC: 32.1 G/DL (ref 31.5–35.7)
MCV RBC AUTO: 91 FL (ref 79–97)
MONOCYTES # BLD AUTO: 0.5 X10E3/UL (ref 0.1–0.9)
MONOCYTES NFR BLD AUTO: 11 %
NEUTROPHILS # BLD AUTO: 2 X10E3/UL (ref 1.4–7)
NEUTROPHILS NFR BLD AUTO: 45 %
PLATELET # BLD AUTO: 173 X10E3/UL (ref 150–450)
RBC # BLD AUTO: 4.61 X10E6/UL (ref 4.14–5.8)
WBC # BLD AUTO: 4.4 X10E3/UL (ref 3.4–10.8)

## 2024-04-28 ENCOUNTER — HOSPITAL ENCOUNTER (EMERGENCY)
Facility: HOSPITAL | Age: 39
Discharge: HOME | End: 2024-04-28
Attending: EMERGENCY MEDICINE
Payer: MEDICARE

## 2024-04-28 VITALS
OXYGEN SATURATION: 99 % | DIASTOLIC BLOOD PRESSURE: 62 MMHG | SYSTOLIC BLOOD PRESSURE: 118 MMHG | HEART RATE: 72 BPM | RESPIRATION RATE: 18 BRPM | TEMPERATURE: 98.4 F

## 2024-04-28 DIAGNOSIS — M79.605 BILATERAL LOWER EXTREMITY PAIN: Primary | ICD-10-CM

## 2024-04-28 DIAGNOSIS — M79.604 BILATERAL LOWER EXTREMITY PAIN: Primary | ICD-10-CM

## 2024-04-28 PROCEDURE — 99282 EMERGENCY DEPT VISIT SF MDM: CPT

## 2024-04-28 PROCEDURE — 99284 EMERGENCY DEPT VISIT MOD MDM: CPT | Performed by: EMERGENCY MEDICINE

## 2024-04-28 RX ORDER — ACETAMINOPHEN 325 MG/1
975 TABLET ORAL ONCE
Status: COMPLETED | OUTPATIENT
Start: 2024-04-28 | End: 2024-04-28

## 2024-04-28 RX ORDER — IBUPROFEN 600 MG/1
600 TABLET ORAL ONCE
Status: COMPLETED | OUTPATIENT
Start: 2024-04-28 | End: 2024-04-28

## 2024-04-28 RX ADMIN — IBUPROFEN 600 MG: 600 TABLET, FILM COATED ORAL at 06:11

## 2024-04-28 RX ADMIN — ACETAMINOPHEN 975 MG: 325 TABLET, FILM COATED ORAL at 06:11

## 2024-04-28 NOTE — ED PROVIDER NOTES
History  Chief Complaint   Patient presents with    Foot Pain     Patient arrives to the Ed via [facility] with complain of bilateral foot pain.      Patient is a 38-year-old male seen in the emergency department with concern for bilateral foot pain over the past day.  Patient states that he has been on his feet over the past day, and notes pain worse with palpation.  Patient states that he has been wearing compression socks.  Patient notes no fever, chest pain, shortness of breath, weakness, numbness, tingling.  Patient notes no trauma.  Patient states that he did not take any medication for symptom control this evening prior to evaluation in the emergency department.        Prior to Admission Medications   Prescriptions Last Dose Informant Patient Reported? Taking?   bictegravir-emtricitab-tenofovir alafenamide (BIKTARVY) -25 MG tablet   No No   Sig: Take 1 tablet by mouth daily with breakfast   folic acid (FOLVITE) 1 mg tablet   No No   Sig: Take 1 tablet (1 mg total) by mouth daily   Patient not taking: Reported on 4/8/2024   levETIRAcetam (Keppra) 1000 MG tablet   No No   Sig: Take 1 tablet (1,000 mg total) by mouth 2 (two) times a day   levETIRAcetam (Keppra) 1000 MG tablet   No No   Sig: Take 1 tablet (1,000 mg total) by mouth every 12 (twelve) hours for 14 days   mirtazapine (REMERON) 30 mg tablet   No No   Sig: Take 1 tablet (30 mg total) by mouth daily at bedtime   thiamine (VITAMIN B1) 100 mg tablet   No No   Sig: Take 1 tablet (100 mg total) by mouth daily   Patient not taking: Reported on 4/8/2024      Facility-Administered Medications: None       Past Medical History:   Diagnosis Date    HIV (human immunodeficiency virus infection) (HCC)     Seizures (HCC)     Smoker     Syphilis        Past Surgical History:   Procedure Laterality Date    HERNIA REPAIR         History reviewed. No pertinent family history.  I have reviewed and agree with the history as documented.    E-Cigarette/Vaping    E-Cigarette  "Use Current Every Day User      E-Cigarette/Vaping Substances    Nicotine No     THC No     CBD No     Flavoring Yes     Other No     Unknown No      Social History     Tobacco Use    Smoking status: Every Day     Current packs/day: 2.00     Average packs/day: 2.0 packs/day for 8.0 years (16.0 ttl pk-yrs)     Types: Cigarettes    Smokeless tobacco: Never    Tobacco comments:     \"At least 2 packs a day\" of cigarettes.   Vaping Use    Vaping status: Every Day    Substances: Flavoring   Substance Use Topics    Alcohol use: Yes     Alcohol/week: 2.0 standard drinks of alcohol     Types: 1 Glasses of wine, 1 Cans of beer per week     Comment: socially    Drug use: Yes     Frequency: 1.0 times per week     Types: Marijuana       Review of Systems   Constitutional:  Negative for chills and fever.   HENT:  Negative for ear pain and sore throat.    Eyes:  Negative for pain and visual disturbance.   Respiratory:  Negative for cough and shortness of breath.    Cardiovascular:  Negative for chest pain and palpitations.   Gastrointestinal:  Negative for abdominal pain and vomiting.   Musculoskeletal:  Negative for neck stiffness.        Bilateral lower extremity pain   Skin:  Negative for color change and rash.   Neurological:  Negative for weakness and numbness.   Psychiatric/Behavioral:  Negative for agitation and confusion.        Physical Exam  Physical Exam  Vitals and nursing note reviewed.   Constitutional:       General: He is not in acute distress.     Appearance: He is well-developed.   HENT:      Head: Normocephalic and atraumatic.      Right Ear: External ear normal.      Left Ear: External ear normal.      Nose: Nose normal.      Mouth/Throat:      Pharynx: Oropharynx is clear.   Eyes:      General: No scleral icterus.     Conjunctiva/sclera: Conjunctivae normal.   Cardiovascular:      Rate and Rhythm: Normal rate.      Comments: well-perfused extremities  Pulmonary:      Effort: Pulmonary effort is normal. No " respiratory distress.   Abdominal:      General: Abdomen is flat. There is no distension.   Musculoskeletal:         General: Swelling and tenderness present. No deformity or signs of injury.      Cervical back: Normal range of motion and neck supple.      Comments: Mild edema to lower extremities bilaterally; mild tenderness to palpation of dorsal aspect of bilateral feet; 2+ DP pulse bilaterally; neurovascularly intact extremities   Skin:     General: Skin is warm and dry.   Neurological:      General: No focal deficit present.      Mental Status: He is alert.      Cranial Nerves: No cranial nerve deficit.      Sensory: No sensory deficit.   Psychiatric:         Mood and Affect: Mood normal.         Thought Content: Thought content normal.         Vital Signs  ED Triage Vitals [04/28/24 0200]   Temperature Pulse Respirations Blood Pressure SpO2   98.8 °F (37.1 °C) 89 16 129/79 99 %      Temp Source Heart Rate Source Patient Position - Orthostatic VS BP Location FiO2 (%)   Oral -- -- -- --      Pain Score       --           Vitals:    04/28/24 0200   BP: 129/79   Pulse: 89         Visual Acuity      ED Medications  Medications   ibuprofen (MOTRIN) tablet 600 mg (has no administration in time range)   acetaminophen (TYLENOL) tablet 975 mg (has no administration in time range)       Diagnostic Studies  Results Reviewed       None                   No orders to display              Procedures  Procedures         ED Course                               SBIRT 22yo+      Flowsheet Row Most Recent Value   Initial Alcohol Screen: US AUDIT-C     1. How often do you have a drink containing alcohol? 3 Filed at: 04/28/2024 0214   2. How many drinks containing alcohol do you have on a typical day you are drinking?  2 Filed at: 04/28/2024 0214   3a. Male UNDER 65: How often do you have five or more drinks on one occasion? 0 Filed at: 04/28/2024 0214   Audit-C Score 5 Filed at: 04/28/2024 0214   CALI: How many times in the past  year have you...    Used an illegal drug or used a prescription medication for non-medical reasons? Never Filed at: 04/28/2024 0214                      Medical Decision Making  Patient is a 38-year-old male seen in the emergency department with concern for bilateral lower extremity pain, apparent chronic peripheral edema. Medication was ordered for symptom control. Evaluation is not consistent with acute fracture or DVT.  Patient's symptoms appear to be due to dependent edema, pain from prolonged standing/ambulation.  Patient was allowed to rest in the emergency department. Plan to have patient follow up with PCP/outpatient providers. Patient stable for discharge home. Discharge instructions were reviewed with patient.    Problems Addressed:  Bilateral lower extremity pain: acute illness or injury    Risk  OTC drugs.  Prescription drug management.             Disposition  Final diagnoses:   Bilateral lower extremity pain     Time reflects when diagnosis was documented in both MDM as applicable and the Disposition within this note       Time User Action Codes Description Comment    4/28/2024  2:04 AM Narinder Parson [M79.604,  M79.605] Bilateral lower extremity pain           ED Disposition       ED Disposition   Discharge    Condition   Stable    Date/Time   Sun Apr 28, 2024 0204    Comment   John Sanders discharge to home/self care.                   Follow-up Information       Follow up With Specialties Details Why Contact Info Additional Information    Your primary doctor  Call in 1 day       [facility] Family Medicine Call  As needed [address] [place] [address] [phone] [facility], [address] [place] [address], [address] [phone]            Patient's Medications   Discharge Prescriptions    No medications on file       No discharge procedures on file.    PDMP Review         Value Time User    PDMP Reviewed  Yes 6/17/2023  11:20 AM Sammy Roberts MD            ED Provider  Electronically Signed by             Narinder Parson MD  04/28/24 0219

## 2024-05-01 ENCOUNTER — HOSPITAL ENCOUNTER (EMERGENCY)
Facility: HOSPITAL | Age: 39
Discharge: HOME | End: 2024-05-01
Attending: EMERGENCY MEDICINE
Payer: MEDICARE

## 2024-05-01 ENCOUNTER — HOSPITAL ENCOUNTER (EMERGENCY)
Facility: HOSPITAL | Age: 39
Discharge: HOME | End: 2024-05-01
Attending: STUDENT IN AN ORGANIZED HEALTH CARE EDUCATION/TRAINING PROGRAM
Payer: MEDICARE

## 2024-05-01 VITALS
HEART RATE: 84 BPM | DIASTOLIC BLOOD PRESSURE: 81 MMHG | TEMPERATURE: 98 F | OXYGEN SATURATION: 100 % | RESPIRATION RATE: 18 BRPM | SYSTOLIC BLOOD PRESSURE: 120 MMHG

## 2024-05-01 VITALS
TEMPERATURE: 97.7 F | OXYGEN SATURATION: 98 % | HEART RATE: 99 BPM | SYSTOLIC BLOOD PRESSURE: 140 MMHG | RESPIRATION RATE: 20 BRPM | DIASTOLIC BLOOD PRESSURE: 94 MMHG

## 2024-05-01 DIAGNOSIS — M76.62 ACHILLES TENDINITIS OF BOTH LOWER EXTREMITIES: Primary | ICD-10-CM

## 2024-05-01 DIAGNOSIS — M76.60 ACHILLES TENDON PAIN: Primary | ICD-10-CM

## 2024-05-01 DIAGNOSIS — M76.61 ACHILLES TENDINITIS OF BOTH LOWER EXTREMITIES: Primary | ICD-10-CM

## 2024-05-01 PROCEDURE — 99283 EMERGENCY DEPT VISIT LOW MDM: CPT

## 2024-05-01 PROCEDURE — 99284 EMERGENCY DEPT VISIT MOD MDM: CPT | Performed by: STUDENT IN AN ORGANIZED HEALTH CARE EDUCATION/TRAINING PROGRAM

## 2024-05-01 PROCEDURE — 99284 EMERGENCY DEPT VISIT MOD MDM: CPT | Performed by: EMERGENCY MEDICINE

## 2024-05-01 RX ORDER — ACETAMINOPHEN 325 MG/1
650 TABLET ORAL ONCE
Status: COMPLETED | OUTPATIENT
Start: 2024-05-01 | End: 2024-05-01

## 2024-05-01 RX ORDER — NAPROXEN 500 MG/1
500 TABLET ORAL ONCE
Status: COMPLETED | OUTPATIENT
Start: 2024-05-01 | End: 2024-05-01

## 2024-05-01 RX ORDER — PREDNISONE 20 MG/1
60 TABLET ORAL ONCE
Status: COMPLETED | OUTPATIENT
Start: 2024-05-01 | End: 2024-05-01

## 2024-05-01 RX ORDER — PREDNISONE 20 MG/1
60 TABLET ORAL DAILY
Qty: 12 TABLET | Refills: 0 | Status: SHIPPED | OUTPATIENT
Start: 2024-05-01 | End: 2024-05-05

## 2024-05-01 RX ADMIN — ACETAMINOPHEN 650 MG: 325 TABLET ORAL at 07:14

## 2024-05-01 RX ADMIN — NAPROXEN 500 MG: 500 TABLET ORAL at 07:15

## 2024-05-01 RX ADMIN — PREDNISONE 60 MG: 20 TABLET ORAL at 01:14

## 2024-05-01 NOTE — DISCHARGE INSTRUCTIONS
You will take 3 tablets of prednisone daily for the next 4 days.  Please start this on May 2 as we gave you dose here in the ED.    You can also take 650 mg of Tylenol every 6 hours as well for pain.    Please follow-up with your primary care doctor.

## 2024-05-01 NOTE — ED PROVIDER NOTES
History  Chief Complaint   Patient presents with    Foot Swelling     Pt. Brought in by squad for swelling of his feet and pain. Pt. Homeless     38-year-old male past medical significant for HIV as well as seizures presenting to ED today for bilateral lower ankle pain.  Patient states he has pain in the posterior bilateral lower ankles.  Ambulatory with a walker without any difficulty. Walker is not new for him.  No traumas or falls.        Prior to Admission Medications   Prescriptions Last Dose Informant Patient Reported? Taking?   bictegravir-emtricitab-tenofovir alafenamide (BIKTARVY) -25 MG tablet   No No   Sig: Take 1 tablet by mouth daily with breakfast   folic acid (FOLVITE) 1 mg tablet   No No   Sig: Take 1 tablet (1 mg total) by mouth daily   Patient not taking: Reported on 4/8/2024   levETIRAcetam (Keppra) 1000 MG tablet   No No   Sig: Take 1 tablet (1,000 mg total) by mouth 2 (two) times a day   levETIRAcetam (Keppra) 1000 MG tablet   No No   Sig: Take 1 tablet (1,000 mg total) by mouth every 12 (twelve) hours for 14 days   mirtazapine (REMERON) 30 mg tablet   No No   Sig: Take 1 tablet (30 mg total) by mouth daily at bedtime   thiamine (VITAMIN B1) 100 mg tablet   No No   Sig: Take 1 tablet (100 mg total) by mouth daily   Patient not taking: Reported on 4/8/2024      Facility-Administered Medications: None       Past Medical History:   Diagnosis Date    HIV (human immunodeficiency virus infection) (HCC)     Seizures (HCC)     Smoker     Syphilis        Past Surgical History:   Procedure Laterality Date    HERNIA REPAIR         No family history on file.  I have reviewed and agree with the history as documented.    E-Cigarette/Vaping    E-Cigarette Use Current Every Day User      E-Cigarette/Vaping Substances    Nicotine No     THC No     CBD No     Flavoring Yes     Other No     Unknown No      Social History     Tobacco Use    Smoking status: Every Day     Current packs/day: 2.00     Average  "packs/day: 2.0 packs/day for 8.0 years (16.0 ttl pk-yrs)     Types: Cigarettes    Smokeless tobacco: Never    Tobacco comments:     \"At least 2 packs a day\" of cigarettes.   Vaping Use    Vaping status: Every Day    Substances: Flavoring   Substance Use Topics    Alcohol use: Yes     Alcohol/week: 2.0 standard drinks of alcohol     Types: 1 Glasses of wine, 1 Cans of beer per week     Comment: socially    Drug use: Yes     Frequency: 1.0 times per week     Types: Marijuana       Review of Systems   Constitutional:  Negative for chills and fever.   HENT:  Negative for hearing loss.    Eyes:  Negative for visual disturbance.   Respiratory:  Negative for shortness of breath.    Cardiovascular:  Negative for chest pain.   Gastrointestinal:  Negative for abdominal pain, constipation, diarrhea, nausea and vomiting.   Genitourinary:  Negative for difficulty urinating.   Musculoskeletal:  Negative for myalgias.   Skin:  Negative for rash.   Neurological:  Negative for dizziness.   Psychiatric/Behavioral:  Negative for agitation.    All other systems reviewed and are negative.      Physical Exam  Physical Exam  Vitals and nursing note reviewed.   Constitutional:       General: He is not in acute distress.     Appearance: Normal appearance. He is not ill-appearing.   HENT:      Head: Normocephalic and atraumatic.      Right Ear: External ear normal.      Left Ear: External ear normal.      Nose: Nose normal. No congestion.      Mouth/Throat:      Mouth: Mucous membranes are moist.      Pharynx: Oropharynx is clear. No oropharyngeal exudate.   Eyes:      General:         Right eye: No discharge.         Left eye: No discharge.      Extraocular Movements: Extraocular movements intact.      Conjunctiva/sclera: Conjunctivae normal.      Pupils: Pupils are equal, round, and reactive to light.   Cardiovascular:      Rate and Rhythm: Normal rate and regular rhythm.      Pulses: Normal pulses.      Heart sounds: Normal heart sounds. "   Pulmonary:      Effort: Pulmonary effort is normal. No respiratory distress.      Breath sounds: Normal breath sounds. No wheezing.   Abdominal:      General: Abdomen is flat. Bowel sounds are normal. There is no distension.      Palpations: Abdomen is soft.      Tenderness: There is no abdominal tenderness.   Musculoskeletal:         General: No swelling or deformity. Normal range of motion.      Cervical back: Normal range of motion. No rigidity.      Comments: Tenderness to palpation of the bilateral Achilles tendons.   Skin:     General: Skin is warm and dry.      Capillary Refill: Capillary refill takes less than 2 seconds.   Neurological:      General: No focal deficit present.      Mental Status: He is alert and oriented to person, place, and time. Mental status is at baseline.      Cranial Nerves: No cranial nerve deficit.      Motor: No weakness.      Gait: Gait normal.   Psychiatric:         Mood and Affect: Mood normal.         Behavior: Behavior normal.         Vital Signs  ED Triage Vitals [05/01/24 0039]   Temperature Pulse Respirations Blood Pressure SpO2   97.7 °F (36.5 °C) 99 20 140/94 98 %      Temp Source Heart Rate Source Patient Position - Orthostatic VS BP Location FiO2 (%)   Oral Monitor Lying Left arm --      Pain Score       --           Vitals:    05/01/24 0039   BP: 140/94   Pulse: 99   Patient Position - Orthostatic VS: Lying         Visual Acuity      ED Medications  Medications   predniSONE tablet 60 mg (60 mg Oral Given 5/1/24 0114)       Diagnostic Studies  Results Reviewed       None                   No orders to display              Procedures  Procedures         ED Course                               SBIRT 20yo+      Flowsheet Row Most Recent Value   Initial Alcohol Screen: US AUDIT-C     1. How often do you have a drink containing alcohol? 3 Filed at: 05/01/2024 0119   2. How many drinks containing alcohol do you have on a typical day you are drinking?  0 Filed at: 05/01/2024  0119   3b. FEMALE Any Age, or MALE 65+: How often do you have 4 or more drinks on one occassion? 0 Filed at: 05/01/2024 0119   Audit-C Score 3 Filed at: 05/01/2024 0119   CALI: How many times in the past year have you...    Used an illegal drug or used a prescription medication for non-medical reasons? Never Filed at: 05/01/2024 0119                      Medical Decision Making  38-year-old male presenting to ED today with bilateral ankles tenderness.  Likely Achilles tendinitis.  Less likely be fracture.  Will give a dose of steroids here.  Will write him for a course of steroids.  Encouraged outpatient follow-up.  Return precautions discussed and patient was discharged home.    Risk  Prescription drug management.             Disposition  Final diagnoses:   Achilles tendinitis of both lower extremities     Time reflects when diagnosis was documented in both MDM as applicable and the Disposition within this note       Time User Action Codes Description Comment    5/1/2024  1:03 AM Kaveh Willams Add [M76.61,  M76.62] Achilles tendinitis of both lower extremities           ED Disposition       ED Disposition   Discharge    Condition   Stable    Date/Time   Wed May 1, 2024 0108    Comment   John Sanders discharge to home/self care.                   Follow-up Information       Follow up With Specialties Details Why Contact Info Additional Information    [facility] Call  To schedule an appointment to establish care with a primary care provider [address] [place] [id number] [phone] [facility], [address] [place] [address], [phone] [phone]    [facility] Emergency Department Emergency Medicine Go to  If symptoms worsen, As needed [address] [place] [id number]  [phone] [facility] Emergency Department, [address] [place] [address],  [id number]            Discharge Medication List as of 5/1/2024  1:08 AM        START taking these medications    Details   predniSONE 20 mg tablet Take 3 tablets (60 mg total) by mouth daily for 4 days, Starting Wed 5/1/2024, Until Sun 5/5/2024, Normal           CONTINUE these medications which have NOT CHANGED    Details   bictegravir-emtricitab-tenofovir alafenamide (BIKTARVY) -25 MG tablet Take 1 tablet by mouth daily with breakfast, Starting Tue 4/23/2024, Normal      folic acid (FOLVITE) 1 mg tablet Take 1 tablet (1 mg total) by mouth daily, Starting Tue 7/25/2023, Normal      !! levETIRAcetam (Keppra) 1000 MG tablet Take 1 tablet (1,000 mg total) by mouth 2 (two) times a day, Starting Mon 4/8/2024, Until Wed 5/8/2024, Normal      !! levETIRAcetam (Keppra) 1000 MG tablet Take 1 tablet (1,000 mg total) by mouth every 12 (twelve) hours for 14 days, Starting Sat 4/20/2024, Until Sat 5/4/2024, Normal      mirtazapine (REMERON) 30 mg tablet Take 1 tablet (30 mg total) by mouth daily at bedtime, Starting Mon 4/8/2024, Normal      thiamine (VITAMIN B1) 100 mg tablet Take 1 tablet (100 mg total) by mouth daily, Starting Tue 7/25/2023, Normal       !! - Potential duplicate medications found. Please discuss with provider.          No discharge procedures on file.    PDMP Review         Value Time User    PDMP Reviewed  Yes 6/17/2023 11:20 AM Sammy Roberts MD            ED Provider  Electronically Signed by             Kaveh Willams MD  05/01/24 1735

## 2024-05-01 NOTE — DISCHARGE INSTRUCTIONS
As discussed please take antiinflammatories. Continue the steroids that were prescribed to you. Return to the ED for any new or concerning symptoms.

## 2024-05-01 NOTE — ED PROVIDER NOTES
History  Chief Complaint   Patient presents with    Ankle Pain     Pt  sleeping at [facility] someone called stating that someone was unconscious . Pt staing he was just sleeping, he's complaining of bilateral ankle pain.      Patient is a 38-year-old male, past medical history including HIV, who presents the emergency room for bilateral ankle pain.  Patient states he was seen here for this earlier this evening (and per chart review patient was also seen twice about a week ago for the same thing).  He states that the pain is still there.  No trauma.  Denies any fevers or chills.  No increased difficulty ambulating.  No sensory changes or weakness.  No other complaints or concerns.    Chart reviewed.  Patient seen here earlier this morning for foot swelling and pain.  He endorsed bilateral posterior ankle pain.  Diagnosed with Achilles tendinitis.  Discharged with steroid burst.        Prior to Admission Medications   Prescriptions Last Dose Informant Patient Reported? Taking?   bictegravir-emtricitab-tenofovir alafenamide (BIKTARVY) -25 MG tablet   No No   Sig: Take 1 tablet by mouth daily with breakfast   folic acid (FOLVITE) 1 mg tablet   No No   Sig: Take 1 tablet (1 mg total) by mouth daily   Patient not taking: Reported on 4/8/2024   levETIRAcetam (Keppra) 1000 MG tablet   No No   Sig: Take 1 tablet (1,000 mg total) by mouth 2 (two) times a day   levETIRAcetam (Keppra) 1000 MG tablet   No No   Sig: Take 1 tablet (1,000 mg total) by mouth every 12 (twelve) hours for 14 days   mirtazapine (REMERON) 30 mg tablet   No No   Sig: Take 1 tablet (30 mg total) by mouth daily at bedtime   predniSONE 20 mg tablet   No No   Sig: Take 3 tablets (60 mg total) by mouth daily for 4 days   thiamine (VITAMIN B1) 100 mg tablet   No No   Sig: Take 1 tablet (100 mg total) by mouth daily   Patient not taking: Reported on 4/8/2024      Facility-Administered Medications: None       Past Medical History:   Diagnosis Date    HIV (human  "immunodeficiency virus infection) (HCC)     Seizures (HCC)     Smoker     Syphilis        Past Surgical History:   Procedure Laterality Date    HERNIA REPAIR         History reviewed. No pertinent family history.  I have reviewed and agree with the history as documented.    E-Cigarette/Vaping    E-Cigarette Use Current Every Day User      E-Cigarette/Vaping Substances    Nicotine No     THC No     CBD No     Flavoring Yes     Other No     Unknown No      Social History     Tobacco Use    Smoking status: Every Day     Current packs/day: 2.00     Average packs/day: 2.0 packs/day for 8.0 years (16.0 ttl pk-yrs)     Types: Cigarettes    Smokeless tobacco: Never    Tobacco comments:     \"At least 2 packs a day\" of cigarettes.   Vaping Use    Vaping status: Every Day    Substances: Flavoring   Substance Use Topics    Alcohol use: Yes     Alcohol/week: 2.0 standard drinks of alcohol     Types: 1 Glasses of wine, 1 Cans of beer per week     Comment: socially    Drug use: Yes     Frequency: 1.0 times per week     Types: Marijuana       Review of Systems   Constitutional:  Negative for chills and fever.   Musculoskeletal:  Negative for gait problem.        Ankle pain   All other systems reviewed and are negative.      Physical Exam  Physical Exam  Vitals and nursing note reviewed.   Constitutional:       General: He is not in acute distress.     Appearance: He is well-developed. He is not ill-appearing, toxic-appearing or diaphoretic.   HENT:      Head: Normocephalic and atraumatic.      Right Ear: External ear normal.      Left Ear: External ear normal.      Nose: Nose normal.   Eyes:      General: Lids are normal. No scleral icterus.  Cardiovascular:      Rate and Rhythm: Normal rate and regular rhythm.   Pulmonary:      Effort: Pulmonary effort is normal. No respiratory distress.   Musculoskeletal:         General: Normal range of motion.      Cervical back: Normal range of motion and neck supple.   Feet:      Comments: " "There is mild bilateral pedal edema. LE are warm and well perfused. Tenderness over the bilateral achilles, intact. Strength and sensation intact.   Skin:     General: Skin is warm and dry.   Neurological:      General: No focal deficit present.      Mental Status: He is alert.   Psychiatric:         Mood and Affect: Mood normal.         Behavior: Behavior normal.         Vital Signs  ED Triage Vitals   Temperature Pulse Respirations Blood Pressure SpO2   05/01/24 0707 05/01/24 0707 05/01/24 0707 05/01/24 0707 05/01/24 0707   98 °F (36.7 °C) 84 18 120/81 100 %      Temp src Heart Rate Source Patient Position - Orthostatic VS BP Location FiO2 (%)   -- -- -- -- --             Pain Score       05/01/24 0714       8           Vitals:    05/01/24 0707   BP: 120/81   Pulse: 84         Visual Acuity      ED Medications  Medications   naproxen (NAPROSYN) tablet 500 mg (500 mg Oral Given 5/1/24 0715)   acetaminophen (TYLENOL) tablet 650 mg (650 mg Oral Given 5/1/24 0714)       Diagnostic Studies  Results Reviewed       None                   No orders to display              Procedures  Procedures         ED Course                                             Medical Decision Making  Patient is a 38 y.o. male who presents to the ED for chronic bilateral ankle pain. Pt is non-toxic, well appearing. Vitals are stable. On exam there  is bilateral achilles tendon tenderness.     Differential includes but is not limited to: tendonitis. Presentation not consistent with fx, dislocation, infection.     Plan: Reassurance, antiinflammatories, d/c with RTED precautions.                  Portions of the record may have been created with voice recognition software. Occasional wrong word or \"sound a like\" substitutions may have occurred due to the inherent limitations of voice recognition software. Read the chart carefully and recognize, using context, where substitutions have occurred.    Problems Addressed:  Achilles tendon pain: acute " illness or injury    Risk  OTC drugs.  Prescription drug management.             Disposition  Final diagnoses:   Achilles tendon pain     Time reflects when diagnosis was documented in both MDM as applicable and the Disposition within this note       Time User Action Codes Description Comment    5/1/2024  7:10 AM Ricardo Perez Add [M76.60] Achilles tendon pain           ED Disposition       ED Disposition   Discharge    Condition   Stable    Date/Time   Wed May 1, 2024  7:09 AM    Comment   John Sanders discharge to home/self care.                   Follow-up Information       Follow up With Specialties Details Why Contact Info Additional Information    [employer]  Call in 1 day  [phone]       [facility] Emergency Department Emergency Medicine   [address] [place] [address]  [phone] [facility] Emergency Department, [address] [place] [address], [id number]            Patient's Medications   Discharge Prescriptions    No medications on file       No discharge procedures on file.    PDMP Review         Value Time User    PDMP Reviewed  Yes 6/17/2023 11:20 AM Sammy Roberts MD            ED Provider  Electronically Signed by             Ricardo Perez DO  05/01/24 0719

## 2024-05-02 ENCOUNTER — PATIENT OUTREACH (OUTPATIENT)
Dept: CASE MANAGEMENT | Facility: OTHER | Age: 39
End: 2024-05-02

## 2024-05-02 NOTE — PROGRESS NOTES
OP. CM SW received new referral. Patient was referred to the [facility] Committee but was deemed not care plan appropriate at this time. Multiple ED visits for various complaints. John seems to have complex social needs. Hx of homelessness, SA and MH. MEMO CM reach out to patient at number listed. Unable to reach and voicemail was left . MEMO CM will follow up again and remain available for return call.     Next outreach set.

## 2024-05-08 ENCOUNTER — HOSPITAL ENCOUNTER (EMERGENCY)
Facility: HOSPITAL | Age: 39
Discharge: HOME | End: 2024-05-08
Attending: EMERGENCY MEDICINE | Admitting: EMERGENCY MEDICINE
Payer: MEDICARE

## 2024-05-08 VITALS
HEART RATE: 80 BPM | SYSTOLIC BLOOD PRESSURE: 129 MMHG | OXYGEN SATURATION: 97 % | RESPIRATION RATE: 18 BRPM | WEIGHT: 165 LBS | BODY MASS INDEX: 25.84 KG/M2 | TEMPERATURE: 98 F | DIASTOLIC BLOOD PRESSURE: 84 MMHG

## 2024-05-08 DIAGNOSIS — R53.83 FATIGUE: Primary | ICD-10-CM

## 2024-05-08 LAB
ANION GAP SERPL CALCULATED.3IONS-SCNC: 6 MMOL/L (ref 4–13)
BASOPHILS # BLD AUTO: 0.03 THOUSANDS/ÂΜL (ref 0–0.1)
BASOPHILS NFR BLD AUTO: 1 % (ref 0–1)
BUN SERPL-MCNC: 12 MG/DL (ref 5–25)
CALCIUM SERPL-MCNC: 9.7 MG/DL (ref 8.4–10.2)
CHLORIDE SERPL-SCNC: 102 MMOL/L (ref 96–108)
CO2 SERPL-SCNC: 31 MMOL/L (ref 21–32)
CREAT SERPL-MCNC: 0.9 MG/DL (ref 0.6–1.3)
EOSINOPHIL # BLD AUTO: 0.04 THOUSAND/ÂΜL (ref 0–0.61)
EOSINOPHIL NFR BLD AUTO: 1 % (ref 0–6)
ERYTHROCYTE [DISTWIDTH] IN BLOOD BY AUTOMATED COUNT: 14.1 % (ref 11.6–15.1)
GFR SERPL CREATININE-BSD FRML MDRD: 107 ML/MIN/1.73SQ M
GLUCOSE SERPL-MCNC: 93 MG/DL (ref 65–140)
HCT VFR BLD AUTO: 44.1 % (ref 36.5–49.3)
HGB BLD-MCNC: 14.4 G/DL (ref 12–17)
IMM GRANULOCYTES # BLD AUTO: 0.01 THOUSAND/UL (ref 0–0.2)
IMM GRANULOCYTES NFR BLD AUTO: 0 % (ref 0–2)
LEVETIRACETAM SERPL-MCNC: 5.1 UG/ML (ref 12–46)
LYMPHOCYTES # BLD AUTO: 2.1 THOUSANDS/ÂΜL (ref 0.6–4.47)
LYMPHOCYTES NFR BLD AUTO: 46 % (ref 14–44)
MCH RBC QN AUTO: 29.8 PG (ref 26.8–34.3)
MCHC RBC AUTO-ENTMCNC: 32.7 G/DL (ref 31.4–37.4)
MCV RBC AUTO: 91 FL (ref 82–98)
MONOCYTES # BLD AUTO: 0.61 THOUSAND/ÂΜL (ref 0.17–1.22)
MONOCYTES NFR BLD AUTO: 13 % (ref 4–12)
NEUTROPHILS # BLD AUTO: 1.75 THOUSANDS/ÂΜL (ref 1.85–7.62)
NEUTS SEG NFR BLD AUTO: 39 % (ref 43–75)
NRBC BLD AUTO-RTO: 0 /100 WBCS
PLATELET # BLD AUTO: 165 THOUSANDS/UL (ref 149–390)
PMV BLD AUTO: 10.4 FL (ref 8.9–12.7)
POTASSIUM SERPL-SCNC: 4.3 MMOL/L (ref 3.5–5.3)
RBC # BLD AUTO: 4.83 MILLION/UL (ref 3.88–5.62)
SODIUM SERPL-SCNC: 139 MMOL/L (ref 135–147)
WBC # BLD AUTO: 4.54 THOUSAND/UL (ref 4.31–10.16)

## 2024-05-08 PROCEDURE — 96374 THER/PROPH/DIAG INJ IV PUSH: CPT

## 2024-05-08 PROCEDURE — 99284 EMERGENCY DEPT VISIT MOD MDM: CPT

## 2024-05-08 PROCEDURE — 99284 EMERGENCY DEPT VISIT MOD MDM: CPT | Performed by: EMERGENCY MEDICINE

## 2024-05-08 PROCEDURE — 36415 COLL VENOUS BLD VENIPUNCTURE: CPT | Performed by: EMERGENCY MEDICINE

## 2024-05-08 PROCEDURE — 80048 BASIC METABOLIC PNL TOTAL CA: CPT | Performed by: EMERGENCY MEDICINE

## 2024-05-08 PROCEDURE — 83520 IMMUNOASSAY QUANT NOS NONAB: CPT | Performed by: EMERGENCY MEDICINE

## 2024-05-08 PROCEDURE — 85025 COMPLETE CBC W/AUTO DIFF WBC: CPT | Performed by: EMERGENCY MEDICINE

## 2024-05-08 RX ORDER — LEVETIRACETAM 500 MG/5ML
1500 INJECTION, SOLUTION, CONCENTRATE INTRAVENOUS ONCE
Status: COMPLETED | OUTPATIENT
Start: 2024-05-08 | End: 2024-05-08

## 2024-05-08 RX ADMIN — LEVETIRACETAM 1500 MG: 100 INJECTION, SOLUTION INTRAVENOUS at 11:03

## 2024-05-08 NOTE — DISCHARGE INSTRUCTIONS
You likely had a recurrent seizure. Take your Keppra as prescribed.    Come back to the emergency department for new or worsening symptoms including but not limited to neurological deficits, pain.

## 2024-05-08 NOTE — ED PROVIDER NOTES
History  Chief Complaint   Patient presents with    Medical Problem     Per EMS;pt at [facility] police called EMS due to pt being unresponsive.      38-year-old male previous history of HIV on Biktarvy, seizures, smoker, syphilis, housing insufficiency.  Well-known to this emergency department.    Patient presents the emergency department after [place] [facility]  Police called EMS.  Patient denies any problems, but thinks he might of seized and feels sluggish.  Otherwise has no complaints.  The seizure was not witnessed.  He denies neurological deficits.    He states that he is taking his Keppra and Biktarvy as prescribed.      Medical Problem  Associated symptoms: fatigue        Prior to Admission Medications   Prescriptions Last Dose Informant Patient Reported? Taking?   bictegravir-emtricitab-tenofovir alafenamide (BIKTARVY) -25 MG tablet   No No   Sig: Take 1 tablet by mouth daily with breakfast   folic acid (FOLVITE) 1 mg tablet   No No   Sig: Take 1 tablet (1 mg total) by mouth daily   Patient not taking: Reported on 4/8/2024   levETIRAcetam (Keppra) 1000 MG tablet   No No   Sig: Take 1 tablet (1,000 mg total) by mouth 2 (two) times a day   levETIRAcetam (Keppra) 1000 MG tablet   No No   Sig: Take 1 tablet (1,000 mg total) by mouth every 12 (twelve) hours for 14 days   mirtazapine (REMERON) 30 mg tablet   No No   Sig: Take 1 tablet (30 mg total) by mouth daily at bedtime   thiamine (VITAMIN B1) 100 mg tablet   No No   Sig: Take 1 tablet (100 mg total) by mouth daily   Patient not taking: Reported on 4/8/2024      Facility-Administered Medications: None       Past Medical History:   Diagnosis Date    HIV (human immunodeficiency virus infection) (HCC)     Seizures (HCC)     Smoker     Syphilis        Past Surgical History:   Procedure Laterality Date    HERNIA REPAIR         History reviewed. No pertinent family history.  I have reviewed and agree with the history as  "documented.    E-Cigarette/Vaping    E-Cigarette Use Current Every Day User      E-Cigarette/Vaping Substances    Nicotine No     THC No     CBD No     Flavoring Yes     Other No     Unknown No      Social History     Tobacco Use    Smoking status: Every Day     Current packs/day: 2.00     Average packs/day: 2.0 packs/day for 8.0 years (16.0 ttl pk-yrs)     Types: Cigarettes    Smokeless tobacco: Never    Tobacco comments:     \"At least 2 packs a day\" of cigarettes.   Vaping Use    Vaping status: Every Day    Substances: Flavoring   Substance Use Topics    Alcohol use: Yes     Alcohol/week: 2.0 standard drinks of alcohol     Types: 1 Glasses of wine, 1 Cans of beer per week     Comment: socially    Drug use: Yes     Frequency: 1.0 times per week     Types: Marijuana       Review of Systems   Constitutional:  Positive for fatigue.   All other systems reviewed and are negative.      Physical Exam  Physical Exam  Vitals and nursing note reviewed.   Constitutional:       General: He is not in acute distress.     Appearance: He is well-developed. He is not diaphoretic.      Comments:   Appears fatigued   HENT:      Head: Normocephalic and atraumatic.      Right Ear: External ear normal.      Left Ear: External ear normal.      Mouth/Throat:      Comments: Poor dentition    Eyes:      Conjunctiva/sclera: Conjunctivae normal.   Neck:      Trachea: No tracheal deviation.   Cardiovascular:      Rate and Rhythm: Normal rate and regular rhythm.      Heart sounds: Normal heart sounds. No murmur heard.  Pulmonary:      Effort: No respiratory distress.      Breath sounds: Normal breath sounds. No stridor. No wheezing or rales.   Abdominal:      General: Bowel sounds are normal. There is no distension.      Palpations: Abdomen is soft. There is no mass.      Tenderness: There is no abdominal tenderness. There is no guarding or rebound.   Musculoskeletal:         General: No tenderness or deformity.   Skin:     General: Skin is " "dry.      Findings: No rash.   Neurological:      General: No focal deficit present.      Cranial Nerves: No cranial nerve deficit.      Sensory: No sensory deficit.      Motor: No weakness or abnormal muscle tone.      Coordination: Coordination normal.   Psychiatric:         Behavior: Behavior normal.         Thought Content: Thought content normal.         Judgment: Judgment normal.         Vital Signs  ED Triage Vitals [05/08/24 1032]   Temperature Pulse Respirations Blood Pressure SpO2   98 °F (36.7 °C) 80 18 129/84 97 %      Temp Source Heart Rate Source Patient Position - Orthostatic VS BP Location FiO2 (%)   Oral Monitor Lying Left arm --      Pain Score       4           Vitals:    05/08/24 1032   BP: 129/84   Pulse: 80   Patient Position - Orthostatic VS: Lying         Visual Acuity      ED Medications  Medications   levETIRAcetam (KEPPRA) injection 1,500 mg (1,500 mg Intravenous Given 5/8/24 1103)       Diagnostic Studies  Results Reviewed       Procedure Component Value Units Date/Time    Levetiracetam level [[id number]]  (Abnormal) Collected: 05/08/24 1055    Lab Status: Final result Specimen: Blood from Arm, Right Updated: 05/08/24 1420     Levetiracetam Lvl 5.1 ug/mL     Narrative:      \"Brivaracetam (Briviact) interferes with measurements of levetiracetam in the ARK Levetiracetam Assay. Patients undergoing a switch in drug therapy involving Keppra and Briviact should not be monitored for levetiracetam using the ARK assay. Serum levels of levetiracetam and or brivaracetam should be confirmed by a valid chromatographic method if there is a possibility these drugs are co-present in circulation.\"    Basic metabolic panel [[id number]] Collected: 05/08/24 1055    Lab Status: Final result Specimen: Blood from Arm, Right Updated: 05/08/24 1114     Sodium 139 mmol/L      Potassium 4.3 mmol/L      Chloride 102 mmol/L      CO2 31 mmol/L      ANION GAP 6 mmol/L      BUN 12 mg/dL      Creatinine 0.90 mg/dL      " Glucose 93 mg/dL      Calcium 9.7 mg/dL      eGFR 107 ml/min/1.73sq m     Narrative:      National Kidney Disease Foundation guidelines for Chronic Kidney Disease (CKD):     Stage 1 with normal or high GFR (GFR > 90 mL/min/1.73 square meters)    Stage 2 Mild CKD (GFR = 60-89 mL/min/1.73 square meters)    Stage 3A Moderate CKD (GFR = 45-59 mL/min/1.73 square meters)    Stage 3B Moderate CKD (GFR = 30-44 mL/min/1.73 square meters)    Stage 4 Severe CKD (GFR = 15-29 mL/min/1.73 square meters)    Stage 5 End Stage CKD (GFR <15 mL/min/1.73 square meters)  Note: GFR calculation is accurate only with a steady state creatinine    CBC and differential [[id number]]  (Abnormal) Collected: 05/08/24 1055    Lab Status: Final result Specimen: Blood from Arm, Right Updated: 05/08/24 1059     WBC 4.54 Thousand/uL      RBC 4.83 Million/uL      Hemoglobin 14.4 g/dL      Hematocrit 44.1 %      MCV 91 fL      MCH 29.8 pg      MCHC 32.7 g/dL      RDW 14.1 %      MPV 10.4 fL      Platelets 165 Thousands/uL      nRBC 0 /100 WBCs      Segmented % 39 %      Immature Grans % 0 %      Lymphocytes % 46 %      Monocytes % 13 %      Eosinophils Relative 1 %      Basophils Relative 1 %      Absolute Neutrophils 1.75 Thousands/µL      Absolute Immature Grans 0.01 Thousand/uL      Absolute Lymphocytes 2.10 Thousands/µL      Absolute Monocytes 0.61 Thousand/µL      Eosinophils Absolute 0.04 Thousand/µL      Basophils Absolute 0.03 Thousands/µL                    No orders to display              Procedures  Procedures         ED Course                               SBIRT 20yo+      Flowsheet Row Most Recent Value   Initial Alcohol Screen: US AUDIT-C     1. How often do you have a drink containing alcohol? 3 Filed at: 05/08/2024 1035   2. How many drinks containing alcohol do you have on a typical day you are drinking?  0 Filed at: 05/08/2024 1035   3a. Male UNDER 65: How often do you have five or more drinks on one occasion? 0 Filed at: 05/08/2024  1035   Audit-C Score 3 Filed at: 05/08/2024 1035   CALI: How many times in the past year have you...    Used an illegal drug or used a prescription medication for non-medical reasons? Never Filed at: 05/08/2024 1035                      Medical Decision Making  Pt with fatigue. Normal neuro exam.    Considered MOLINA, electrolyte derangement, post ictal period, seizure. No neuro deficits to suggest mass/ bleed.    Pt ambulated, urinated w/o assistance and fed himself in the ED.    Will discharge RTED precautions given.     Problems Addressed:  Fatigue: acute illness or injury    Amount and/or Complexity of Data Reviewed  Labs: ordered.    Risk  Prescription drug management.             Disposition  Final diagnoses:   Fatigue     Time reflects when diagnosis was documented in both MDM as applicable and the Disposition within this note       Time User Action Codes Description Comment    5/8/2024 11:23 AM Lizandro Abbott Add [R53.83] Fatigue           ED Disposition       ED Disposition   Discharge    Condition   Stable    Date/Time   Wed May 8, 2024 1123    Comment   John Sanders discharge to home/self care.                   Follow-up Information       Follow up With Specialties Details Why Contact Info Additional Information    [facility] Emergency Department Emergency Medicine  If symptoms worsen [address] [place] [address]  [phone] [facility] Emergency Department, [address] [place] [id number]            Patient's Medications   Discharge Prescriptions    No medications on file       No discharge procedures on file.    PDMP Review         Value Time User    PDMP Reviewed  Yes 6/17/2023 11:20 AM Sammy Roberts MD            ED Provider  Electronically Signed by             Lizandro Abbott DO  05/08/24 5827

## 2024-05-16 ENCOUNTER — PATIENT OUTREACH (OUTPATIENT)
Dept: CASE MANAGEMENT | Facility: OTHER | Age: 39
End: 2024-05-16

## 2024-05-16 NOTE — LETTER
[address] [facility] [address]  [phone]    Re: UNABLE TO REACH   5/16/2024       Dear John,    I am a [facility]  and wanted to be certain you had information to contact me should you desire assistance with or have questions about non-medical aspects of your care such as [but not limited to] medical insurance, housing, transportation, material needs, or emergency needs.  If I do not have an answer I will assist you in finding the appropriate agency or individual who can help.      Please feel free to contact me at [phone] . Thank You.    Sincerely,   Sallie LÓPEZ LSW      Outpatient Care Management

## 2024-05-16 NOTE — PROGRESS NOTES
OP.OLIVIA HAMPTON received in basket reminder to follow up with patient . Chart review completed. Outreached attempted to number listed. Busy signal , unable to leave voicemail. Does not have a my chart. Unable to reach letter will be sent to PO BOX listed in chart.     Update to joleen SALAZAR CM     Closing from care management at this time.

## 2024-05-17 ENCOUNTER — HOSPITAL ENCOUNTER (EMERGENCY)
Facility: HOSPITAL | Age: 39
Discharge: HOME | End: 2024-05-17
Attending: EMERGENCY MEDICINE
Payer: MEDICARE

## 2024-05-17 VITALS
DIASTOLIC BLOOD PRESSURE: 74 MMHG | HEART RATE: 86 BPM | TEMPERATURE: 98.1 F | OXYGEN SATURATION: 98 % | SYSTOLIC BLOOD PRESSURE: 129 MMHG | RESPIRATION RATE: 17 BRPM

## 2024-05-17 DIAGNOSIS — Z13.9 ENCOUNTER FOR MEDICAL SCREENING EXAMINATION: Primary | ICD-10-CM

## 2024-05-17 DIAGNOSIS — Z99.89 USES WALKER: ICD-10-CM

## 2024-05-17 PROCEDURE — 99283 EMERGENCY DEPT VISIT LOW MDM: CPT

## 2024-05-17 PROCEDURE — 99283 EMERGENCY DEPT VISIT LOW MDM: CPT | Performed by: EMERGENCY MEDICINE

## 2024-05-17 NOTE — ED PROVIDER NOTES
History  Chief Complaint   Patient presents with    Personal Problem     Pt presents to ed via ems, pt was sleeping outside of [facility] and owner thought pt was unresponsive. Pt a/ox4 with no complaints and states he was sleeping and just tired. A/ox4, vitals stable for ems     Is a 38-year-old male well-known to this emergency department with a history of HIV states he is compliant with his Biktarvy, seizures on Keppra presenting to the emergency department for sleeping.  Patient is homeless and was sleeping outside [facility].  Police were called.  Patient has no complaints in the emergency department besides chronic aches and pains.          Prior to Admission Medications   Prescriptions Last Dose Informant Patient Reported? Taking?   bictegravir-emtricitab-tenofovir alafenamide (BIKTARVY) -25 MG tablet   No No   Sig: Take 1 tablet by mouth daily with breakfast   folic acid (FOLVITE) 1 mg tablet   No No   Sig: Take 1 tablet (1 mg total) by mouth daily   Patient not taking: Reported on 4/8/2024   levETIRAcetam (Keppra) 1000 MG tablet   No No   Sig: Take 1 tablet (1,000 mg total) by mouth 2 (two) times a day   levETIRAcetam (Keppra) 1000 MG tablet   No No   Sig: Take 1 tablet (1,000 mg total) by mouth every 12 (twelve) hours for 14 days   mirtazapine (REMERON) 30 mg tablet   No No   Sig: Take 1 tablet (30 mg total) by mouth daily at bedtime   thiamine (VITAMIN B1) 100 mg tablet   No No   Sig: Take 1 tablet (100 mg total) by mouth daily   Patient not taking: Reported on 4/8/2024      Facility-Administered Medications: None       Past Medical History:   Diagnosis Date    HIV (human immunodeficiency virus infection) (HCC)     Seizures (HCC)     Smoker     Syphilis        Past Surgical History:   Procedure Laterality Date    HERNIA REPAIR         History reviewed. No pertinent family history.  I have reviewed and agree with the history as documented.    E-Cigarette/Vaping    E-Cigarette Use Current Every Day User   "    E-Cigarette/Vaping Substances    Nicotine No     THC No     CBD No     Flavoring Yes     Other No     Unknown No      Social History     Tobacco Use    Smoking status: Every Day     Current packs/day: 2.00     Average packs/day: 2.0 packs/day for 8.0 years (16.0 ttl pk-yrs)     Types: Cigarettes    Smokeless tobacco: Never    Tobacco comments:     \"At least 2 packs a day\" of cigarettes.   Vaping Use    Vaping status: Every Day    Substances: Flavoring   Substance Use Topics    Alcohol use: Yes     Alcohol/week: 2.0 standard drinks of alcohol     Types: 1 Glasses of wine, 1 Cans of beer per week     Comment: socially    Drug use: Yes     Frequency: 1.0 times per week     Types: Marijuana       Review of Systems   Musculoskeletal:  Positive for myalgias.   All other systems reviewed and are negative.      Physical Exam  Physical Exam  Vitals and nursing note reviewed.   Constitutional:       General: He is not in acute distress.     Appearance: He is well-developed. He is not diaphoretic.   HENT:      Head: Normocephalic and atraumatic.      Right Ear: External ear normal.      Left Ear: External ear normal.   Eyes:      Conjunctiva/sclera: Conjunctivae normal.   Neck:      Trachea: No tracheal deviation.   Cardiovascular:      Rate and Rhythm: Normal rate and regular rhythm.      Heart sounds: Normal heart sounds. No murmur heard.  Pulmonary:      Effort: No respiratory distress.      Breath sounds: Normal breath sounds. No stridor. No wheezing or rales.   Abdominal:      General: Bowel sounds are normal. There is no distension.      Palpations: Abdomen is soft. There is no mass.      Tenderness: There is no abdominal tenderness. There is no guarding or rebound.   Musculoskeletal:         General: No tenderness or deformity.   Skin:     General: Skin is dry.      Findings: No rash.   Neurological:      Motor: No abnormal muscle tone.      Coordination: Coordination normal.   Psychiatric:         Behavior: " Behavior normal.         Thought Content: Thought content normal.         Judgment: Judgment normal.         Vital Signs  ED Triage Vitals [05/17/24 0701]   Temperature Pulse Respirations Blood Pressure SpO2   98.1 °F (36.7 °C) 103 18 134/80 98 %      Temp Source Heart Rate Source Patient Position - Orthostatic VS BP Location FiO2 (%)   Temporal Monitor -- Left arm --      Pain Score       No Pain           Vitals:    05/17/24 0701 05/17/24 0705 05/17/24 0915   BP: 134/80  129/74   Pulse: 103 94 86         Visual Acuity      ED Medications  Medications - No data to display    Diagnostic Studies  Results Reviewed       None                   No orders to display              Procedures  Procedures         ED Course                                             Medical Decision Making  Well-appearing 38-year-old male known to this emergency department presenting for being sleepy.  States that he was sleeping.  Exam is consistent with this.  Patient received a medical screening examination and will be discharged home after being allowed to sleep for a few hours.  EMS forgot to bring the patient's walker.  He will be provided with a walker for him to ambulate as he is homeless and requires a walker for ambulation at baseline.    Problems Addressed:  Encounter for medical screening examination: acute illness or injury             Disposition  Final diagnoses:   Encounter for medical screening examination   Uses walker     Time reflects when diagnosis was documented in both MDM as applicable and the Disposition within this note       Time User Action Codes Description Comment    5/17/2024 10:38 AM Lizandro Abbott Add [Z13.9] Encounter for medical screening examination     5/17/2024 11:26 AM Lizandro Abbott [Z99.89] Uses walker           ED Disposition       ED Disposition   Discharge    Condition   Stable    Date/Time   Fri May 17, 2024 10:31 AM    Comment   John Sanders discharge to home/self care.                    Follow-up Information       Follow up With Specialties Details Why Contact Info Additional Information    [facility] Emergency Department Emergency Medicine  If symptoms worsen [address] [address] [phone] [facility] Emergency Department, [address] [address]            Discharge Medication List as of 5/17/2024 10:39 AM        CONTINUE these medications which have NOT CHANGED    Details   bictegravir-emtricitab-tenofovir alafenamide (BIKTARVY) -25 MG tablet Take 1 tablet by mouth daily with breakfast, Starting Tue 4/23/2024, Normal      folic acid (FOLVITE) 1 mg tablet Take 1 tablet (1 mg total) by mouth daily, Starting Tue 7/25/2023, Normal      levETIRAcetam (Keppra) 1000 MG tablet Take 1 tablet (1,000 mg total) by mouth 2 (two) times a day, Starting Mon 4/8/2024, Until Wed 5/8/2024, Normal      levETIRAcetam (Keppra) 1000 MG tablet Take 1 tablet (1,000 mg total) by mouth every 12 (twelve) hours for 14 days, Starting Sat 4/20/2024, Until Sat 5/4/2024, Normal      mirtazapine (REMERON) 30 mg tablet Take 1 tablet (30 mg total) by mouth daily at bedtime, Starting Mon 4/8/2024, Normal      thiamine (VITAMIN B1) 100 mg tablet Take 1 tablet (100 mg total) by mouth daily, Starting Tue 7/25/2023, Normal             No discharge procedures on file.    PDMP Review         Value Time User    PDMP Reviewed  Yes 6/17/2023 11:20 AM Sammy Roberts MD            ED Provider  Electronically Signed by             Lizandro Abbott,   05/17/24 1122

## 2024-05-17 NOTE — DISCHARGE INSTRUCTIONS
Follow-up with your primary care provider.    Come back to the emergency department if you have new or worsening symptoms.

## 2024-05-22 ENCOUNTER — HOSPITAL ENCOUNTER (EMERGENCY)
Facility: HOSPITAL | Age: 39
Discharge: HOME | End: 2024-05-22
Attending: EMERGENCY MEDICINE
Payer: MEDICARE

## 2024-05-22 VITALS
TEMPERATURE: 98.5 F | HEART RATE: 106 BPM | SYSTOLIC BLOOD PRESSURE: 124 MMHG | OXYGEN SATURATION: 98 % | DIASTOLIC BLOOD PRESSURE: 79 MMHG | RESPIRATION RATE: 20 BRPM

## 2024-05-22 DIAGNOSIS — Z59.00 HOMELESSNESS: ICD-10-CM

## 2024-05-22 DIAGNOSIS — R60.0 BILATERAL LOWER EXTREMITY EDEMA: Primary | ICD-10-CM

## 2024-05-22 PROCEDURE — 99283 EMERGENCY DEPT VISIT LOW MDM: CPT

## 2024-05-22 PROCEDURE — 99284 EMERGENCY DEPT VISIT MOD MDM: CPT | Performed by: EMERGENCY MEDICINE

## 2024-05-22 SDOH — ECONOMIC STABILITY - HOUSING INSECURITY: HOMELESSNESS UNSPECIFIED: Z59.00

## 2024-05-22 NOTE — ED NOTES
"Adult Protective Services report filed today by Rubén Flores  for \"refusal to take seizure meds\".   "

## 2024-05-22 NOTE — ED PROVIDER NOTES
"History  Chief Complaint   Patient presents with    Ankle Swelling     Arrives via EMS and reports bilateral ankle swelling x1 week with pain     38-year-old male with history of homelessness, seizures, HIV and chronic lower leg swelling presents to the emergency department for evaluation of lower leg swelling.  Patient arrived via EMS reporting continued swelling.  The patient reports that he was seen in the emergency department last month for similar symptoms and was provided with a prescription for a \"water pill.\"  Patient states that he did not start taking medication.  Patient reports that he typically sleeps sitting on his walker.  States that he does not elevate his legs or wear compression stockings which have been advised in the past.  He reports that he has been compliant with his Keppra and his HIV medications.  He denies any recent falls or direct trauma to the area.  The patient also reports that it is hot outside and he really wanted a place to rest which is the primary reason he came to the emergency department.  He denies fevers, chills, nausea, vomiting, diarrhea, chest pain, shortness of breath or localized numbness, tingling or weakness.        Prior to Admission Medications   Prescriptions Last Dose Informant Patient Reported? Taking?   bictegravir-emtricitab-tenofovir alafenamide (BIKTARVY) -25 MG tablet   No No   Sig: Take 1 tablet by mouth daily with breakfast   folic acid (FOLVITE) 1 mg tablet   No No   Sig: Take 1 tablet (1 mg total) by mouth daily   Patient not taking: Reported on 4/8/2024   levETIRAcetam (Keppra) 1000 MG tablet   No No   Sig: Take 1 tablet (1,000 mg total) by mouth 2 (two) times a day   levETIRAcetam (Keppra) 1000 MG tablet   No No   Sig: Take 1 tablet (1,000 mg total) by mouth every 12 (twelve) hours for 14 days   mirtazapine (REMERON) 30 mg tablet   No No   Sig: Take 1 tablet (30 mg total) by mouth daily at bedtime   thiamine (VITAMIN B1) 100 mg tablet   No No " "  Sig: Take 1 tablet (100 mg total) by mouth daily   Patient not taking: Reported on 4/8/2024      Facility-Administered Medications: None       Past Medical History:   Diagnosis Date    HIV (human immunodeficiency virus infection) (HCC)     Seizures (HCC)     Smoker     Syphilis        Past Surgical History:   Procedure Laterality Date    HERNIA REPAIR         History reviewed. No pertinent family history.  I have reviewed and agree with the history as documented.    E-Cigarette/Vaping    E-Cigarette Use Current Every Day User      E-Cigarette/Vaping Substances    Nicotine No     THC No     CBD No     Flavoring Yes     Other No     Unknown No      Social History     Tobacco Use    Smoking status: Every Day     Current packs/day: 2.00     Average packs/day: 2.0 packs/day for 8.0 years (16.0 ttl pk-yrs)     Types: Cigarettes    Smokeless tobacco: Never    Tobacco comments:     \"At least 2 packs a day\" of cigarettes.   Vaping Use    Vaping status: Every Day    Substances: Flavoring   Substance Use Topics    Alcohol use: Yes     Alcohol/week: 2.0 standard drinks of alcohol     Types: 1 Glasses of wine, 1 Cans of beer per week     Comment: socially    Drug use: Yes     Frequency: 1.0 times per week     Types: Marijuana       Review of Systems   Constitutional:  Negative for chills and fever.   HENT:  Negative for ear pain and sore throat.    Eyes:  Negative for pain and visual disturbance.   Respiratory:  Negative for cough and shortness of breath.    Cardiovascular:  Positive for leg swelling (chronic). Negative for chest pain and palpitations.   Gastrointestinal:  Negative for abdominal pain and vomiting.   Genitourinary:  Negative for dysuria and hematuria.   Musculoskeletal:  Negative for arthralgias and back pain.   Skin:  Negative for color change and rash.   Neurological:  Negative for seizures and syncope.   All other systems reviewed and are negative.      Physical Exam  Physical Exam  Vitals and nursing note " reviewed.   Constitutional:       General: He is not in acute distress.     Appearance: He is well-developed.   HENT:      Head: Normocephalic and atraumatic.   Eyes:      Conjunctiva/sclera: Conjunctivae normal.   Cardiovascular:      Rate and Rhythm: Regular rhythm. Tachycardia present.      Pulses:           Dorsalis pedis pulses are 2+ on the right side and 2+ on the left side.      Heart sounds: No murmur heard.  Pulmonary:      Effort: Pulmonary effort is normal. No respiratory distress.      Breath sounds: Normal breath sounds.   Abdominal:      Palpations: Abdomen is soft.      Tenderness: There is no abdominal tenderness.   Musculoskeletal:         General: No swelling.      Cervical back: Neck supple.      Right lower leg: Edema present.      Left lower leg: Edema present.      Comments: Distal lower extremity pulse, motor and sensation intact and symmetric bilaterally.   Skin:     General: Skin is warm and dry.      Capillary Refill: Capillary refill takes less than 2 seconds.   Neurological:      Mental Status: He is alert.   Psychiatric:         Mood and Affect: Mood normal.         Vital Signs  ED Triage Vitals [05/22/24 1430]   Temperature Pulse Respirations Blood Pressure SpO2   98.5 °F (36.9 °C) (!) 106 20 124/79 98 %      Temp Source Heart Rate Source Patient Position - Orthostatic VS BP Location FiO2 (%)   Oral Monitor Lying Right arm --      Pain Score       --           Vitals:    05/22/24 1430   BP: 124/79   Pulse: (!) 106   Patient Position - Orthostatic VS: Lying         Visual Acuity      ED Medications  Medications - No data to display    Diagnostic Studies  Results Reviewed       None                   No orders to display              Procedures  Procedures         ED Course                 SBIRT 22yo+      Flowsheet Row Most Recent Value   Initial Alcohol Screen: US AUDIT-C     1. How often do you have a drink containing alcohol? 0 Filed at: 05/22/2024 2273   2. How many drinks  containing alcohol do you have on a typical day you are drinking?  0 Filed at: 05/22/2024 1429   3a. Male UNDER 65: How often do you have five or more drinks on one occasion? 0 Filed at: 05/22/2024 1429   3b. FEMALE Any Age, or MALE 65+: How often do you have 4 or more drinks on one occassion? 0 Filed at: 05/22/2024 1429   Audit-C Score 0 Filed at: 05/22/2024 1429   CALI: How many times in the past year have you...    Used an illegal drug or used a prescription medication for non-medical reasons? Never Filed at: 05/22/2024 1429                      Medical Decision Making  38-year-old male presented to the emergency department for evaluation of lower leg swelling.  On arrival patient was awake, alert, oriented and in no acute distress.  Physical exam was consistent with the patient's chronic lower extremity edema.  Exam not consistent with acute fracture or DVT.  Patient had blood work done within the last 2 weeks.  No indication for repeat testing at this time.  Symptomatic treatments were discussed with the patient in detail.  He is appropriate for discharge.  Recommendation was made for the patient to follow-up with his PCP/outpatient providers.  Return precautions were discussed.    Patient agrees with the plan for discharge and feels comfortable to go home with proper f/u. Advised to return for worsening or additional problems. Diagnostic tests were reviewed and questions answered. Diagnosis, care plan and treatment options were discussed. The patient understands instructions and will follow up as directed.        Amount and/or Complexity of Data Reviewed  External Data Reviewed: labs and notes.     Details: Prior labs and notes reviewed             Disposition  Final diagnoses:   Bilateral lower extremity edema   Homelessness     Time reflects when diagnosis was documented in both MDM as applicable and the Disposition within this note       Time User Action Codes Description Comment    5/22/2024  2:41 PM  Ben Lozada Add [R60.0] Bilateral lower extremity edema     5/22/2024  2:41 PM Ben Lozada Add [Z59.00] Homelessness           ED Disposition       ED Disposition   Discharge    Condition   Stable    Date/Time   Wed May 22, 2024 1441    Comment   John Sanders discharge to home/self care.                   Follow-up Information       Follow up With Specialties Details Why Contact Info Additional Information    Roxana Sesay  Schedule an appointment as soon as possible for a visit   [facility] [place] ([facility]) - Internal Medicine    [phone]     [facility] Emergency Department Emergency Medicine Go to  If symptoms worsen [address] [place] [phone] [facility] Emergency Department, [address] [place] [phone]            Patient's Medications   Discharge Prescriptions    No medications on file       No discharge procedures on file.    PDMP Review         Value Time User    PDMP Reviewed  Yes 6/17/2023 11:20 AM Sammy Roberts MD            ED Provider  Electronically Signed by             Ben Lozada MD  05/22/24 1523

## 2024-06-08 ENCOUNTER — HOSPITAL ENCOUNTER (EMERGENCY)
Facility: HOSPITAL | Age: 39
Discharge: HOME | End: 2024-06-08
Attending: EMERGENCY MEDICINE | Admitting: EMERGENCY MEDICINE
Payer: MEDICARE

## 2024-06-08 ENCOUNTER — APPOINTMENT (EMERGENCY)
Dept: RADIOLOGY | Facility: HOSPITAL | Age: 39
End: 2024-06-08
Payer: MEDICARE

## 2024-06-08 VITALS
SYSTOLIC BLOOD PRESSURE: 118 MMHG | HEART RATE: 89 BPM | OXYGEN SATURATION: 97 % | RESPIRATION RATE: 20 BRPM | TEMPERATURE: 98.7 F | DIASTOLIC BLOOD PRESSURE: 72 MMHG

## 2024-06-08 DIAGNOSIS — R07.9 CHEST PAIN, UNSPECIFIED TYPE: Primary | ICD-10-CM

## 2024-06-08 DIAGNOSIS — Z76.0 ENCOUNTER FOR MEDICATION REFILL: ICD-10-CM

## 2024-06-08 LAB
2HR DELTA HS TROPONIN: <-1 NG/L
ALBUMIN SERPL BCP-MCNC: 4.2 G/DL (ref 3.5–5)
ALP SERPL-CCNC: 62 U/L (ref 34–104)
ALT SERPL W P-5'-P-CCNC: 16 U/L (ref 7–52)
ANION GAP SERPL CALCULATED.3IONS-SCNC: 8 MMOL/L (ref 4–13)
AST SERPL W P-5'-P-CCNC: 24 U/L (ref 13–39)
ATRIAL RATE: 103 BPM
BASOPHILS # BLD AUTO: 0.02 THOUSANDS/ÂΜL (ref 0–0.1)
BASOPHILS NFR BLD AUTO: 0 % (ref 0–1)
BILIRUB SERPL-MCNC: 0.5 MG/DL (ref 0.2–1)
BUN SERPL-MCNC: 9 MG/DL (ref 5–25)
CALCIUM SERPL-MCNC: 9.2 MG/DL (ref 8.4–10.2)
CARDIAC TROPONIN I PNL SERPL HS: 3 NG/L
CARDIAC TROPONIN I PNL SERPL HS: <2 NG/L
CHLORIDE SERPL-SCNC: 104 MMOL/L (ref 96–108)
CO2 SERPL-SCNC: 26 MMOL/L (ref 21–32)
CREAT SERPL-MCNC: 0.77 MG/DL (ref 0.6–1.3)
EOSINOPHIL # BLD AUTO: 0.15 THOUSAND/ÂΜL (ref 0–0.61)
EOSINOPHIL NFR BLD AUTO: 3 % (ref 0–6)
ERYTHROCYTE [DISTWIDTH] IN BLOOD BY AUTOMATED COUNT: 13.7 % (ref 11.6–15.1)
GFR SERPL CREATININE-BSD FRML MDRD: 115 ML/MIN/1.73SQ M
GLUCOSE SERPL-MCNC: 110 MG/DL (ref 65–140)
HCT VFR BLD AUTO: 40.8 % (ref 36.5–49.3)
HGB BLD-MCNC: 13.7 G/DL (ref 12–17)
IMM GRANULOCYTES # BLD AUTO: 0.01 THOUSAND/UL (ref 0–0.2)
IMM GRANULOCYTES NFR BLD AUTO: 0 % (ref 0–2)
LYMPHOCYTES # BLD AUTO: 2.03 THOUSANDS/ÂΜL (ref 0.6–4.47)
LYMPHOCYTES NFR BLD AUTO: 44 % (ref 14–44)
MCH RBC QN AUTO: 30 PG (ref 26.8–34.3)
MCHC RBC AUTO-ENTMCNC: 33.6 G/DL (ref 31.4–37.4)
MCV RBC AUTO: 90 FL (ref 82–98)
MONOCYTES # BLD AUTO: 0.5 THOUSAND/ÂΜL (ref 0.17–1.22)
MONOCYTES NFR BLD AUTO: 11 % (ref 4–12)
NEUTROPHILS # BLD AUTO: 1.96 THOUSANDS/ÂΜL (ref 1.85–7.62)
NEUTS SEG NFR BLD AUTO: 42 % (ref 43–75)
NRBC BLD AUTO-RTO: 0 /100 WBCS
P AXIS: 53 DEGREES
PLATELET # BLD AUTO: 154 THOUSANDS/UL (ref 149–390)
PMV BLD AUTO: 10.2 FL (ref 8.9–12.7)
POTASSIUM SERPL-SCNC: 3.7 MMOL/L (ref 3.5–5.3)
PR INTERVAL: 148 MS
PROT SERPL-MCNC: 7.8 G/DL (ref 6.4–8.4)
QRS AXIS: -19 DEGREES
QRSD INTERVAL: 98 MS
QT INTERVAL: 360 MS
QTC INTERVAL: 471 MS
RBC # BLD AUTO: 4.56 MILLION/UL (ref 3.88–5.62)
SODIUM SERPL-SCNC: 138 MMOL/L (ref 135–147)
T WAVE AXIS: 29 DEGREES
VENTRICULAR RATE: 103 BPM
WBC # BLD AUTO: 4.67 THOUSAND/UL (ref 4.31–10.16)

## 2024-06-08 PROCEDURE — 93005 ELECTROCARDIOGRAM TRACING: CPT

## 2024-06-08 PROCEDURE — 36415 COLL VENOUS BLD VENIPUNCTURE: CPT | Performed by: PHYSICIAN ASSISTANT

## 2024-06-08 PROCEDURE — 84484 ASSAY OF TROPONIN QUANT: CPT | Performed by: PHYSICIAN ASSISTANT

## 2024-06-08 PROCEDURE — 96374 THER/PROPH/DIAG INJ IV PUSH: CPT

## 2024-06-08 PROCEDURE — 85025 COMPLETE CBC W/AUTO DIFF WBC: CPT | Performed by: PHYSICIAN ASSISTANT

## 2024-06-08 PROCEDURE — 93010 ELECTROCARDIOGRAM REPORT: CPT | Performed by: INTERNAL MEDICINE

## 2024-06-08 PROCEDURE — 99285 EMERGENCY DEPT VISIT HI MDM: CPT

## 2024-06-08 PROCEDURE — 71045 X-RAY EXAM CHEST 1 VIEW: CPT

## 2024-06-08 PROCEDURE — 80053 COMPREHEN METABOLIC PANEL: CPT | Performed by: PHYSICIAN ASSISTANT

## 2024-06-08 PROCEDURE — 99284 EMERGENCY DEPT VISIT MOD MDM: CPT | Performed by: PHYSICIAN ASSISTANT

## 2024-06-08 RX ORDER — BICTEGRAVIR SODIUM, EMTRICITABINE, AND TENOFOVIR ALAFENAMIDE FUMARATE 50; 200; 25 MG/1; MG/1; MG/1
1 TABLET ORAL
Qty: 30 TABLET | Refills: 0 | Status: SHIPPED | OUTPATIENT
Start: 2024-06-08 | End: 2024-07-08

## 2024-06-08 RX ORDER — LEVETIRACETAM 500 MG/5ML
1000 INJECTION, SOLUTION, CONCENTRATE INTRAVENOUS ONCE
Status: COMPLETED | OUTPATIENT
Start: 2024-06-08 | End: 2024-06-08

## 2024-06-08 RX ADMIN — BICTEGRAVIR SODIUM, EMTRICITABINE, AND TENOFOVIR ALAFENAMIDE FUMARATE 1 TABLET: 50; 200; 25 TABLET ORAL at 13:43

## 2024-06-08 RX ADMIN — LEVETIRACETAM 1000 MG: 100 INJECTION, SOLUTION INTRAVENOUS at 11:12

## 2024-06-08 NOTE — ED PROVIDER NOTES
History  Chief Complaint   Patient presents with    Chest Pain     Sudden onset of cp while walking, reproducible, denies headache dizziness and or nausea      This is a 38-year-old male with past medical history significant for HIV and seizure disorders presenting to the emergency department today for sudden onset chest pain.  The patient notes that he was walking earlier today and began experiencing sudden onset generalized chest pain that is reproducible to palpation.  The patient called EMS to be brought to the emergency department for an evaluation.  The patient denies active chest pain when he presents to the emergency department.  His pain was generalized and did not radiate to the back or to the neck.  It is not associated with shortness of breath.  No dizziness, lightheadedness, or visual disturbances.  No nausea, vomiting, diarrhea, constipation, or abdominal pain.  The patient notes he has not been compliant on his Keppra or Biktarvy therapy and is requesting a refill for Biktarvy specifically.  The patient denies other complaints at this time.      History provided by:  Patient   used: No    Chest Pain  Chest pain location: generalized.  Pain quality: sharp    Pain radiates to:  Does not radiate  Pain radiates to the back: no    Onset quality:  Sudden  Duration: the patient is uncertain.  Timing:  Constant  Progression:  Resolved  Chronicity:  New  Relieved by:  Nothing  Worsened by:  Nothing tried  Ineffective treatments:  None tried  Associated symptoms: no abdominal pain, no altered mental status, no anorexia, no anxiety, no back pain, no cough, no diaphoresis, no dizziness, no dysphagia, no fatigue, no fever, no headache, no heartburn, no lower extremity edema, no nausea, no near-syncope, no numbness, no palpitations, no shortness of breath, no syncope, not vomiting and no weakness    Risk factors: male sex        Prior to Admission Medications   Prescriptions Last Dose Informant  "Patient Reported? Taking?   bictegravir-emtricitab-tenofovir alafenamide (BIKTARVY) -25 MG tablet   No No   Sig: Take 1 tablet by mouth daily with breakfast   folic acid (FOLVITE) 1 mg tablet   No No   Sig: Take 1 tablet (1 mg total) by mouth daily   Patient not taking: Reported on 4/8/2024   levETIRAcetam (Keppra) 1000 MG tablet   No No   Sig: Take 1 tablet (1,000 mg total) by mouth 2 (two) times a day   levETIRAcetam (Keppra) 1000 MG tablet   No No   Sig: Take 1 tablet (1,000 mg total) by mouth every 12 (twelve) hours for 14 days   mirtazapine (REMERON) 30 mg tablet   No No   Sig: Take 1 tablet (30 mg total) by mouth daily at bedtime   thiamine (VITAMIN B1) 100 mg tablet   No No   Sig: Take 1 tablet (100 mg total) by mouth daily   Patient not taking: Reported on 4/8/2024      Facility-Administered Medications: None       Past Medical History:   Diagnosis Date    HIV (human immunodeficiency virus infection) (HCC)     Seizures (HCC)     Smoker     Syphilis        Past Surgical History:   Procedure Laterality Date    HERNIA REPAIR         History reviewed. No pertinent family history.  I have reviewed and agree with the history as documented.    E-Cigarette/Vaping    E-Cigarette Use Current Every Day User      E-Cigarette/Vaping Substances    Nicotine No     THC No     CBD No     Flavoring Yes     Other No     Unknown No      Social History     Tobacco Use    Smoking status: Every Day     Current packs/day: 2.00     Average packs/day: 2.0 packs/day for 8.0 years (16.0 ttl pk-yrs)     Types: Cigarettes    Smokeless tobacco: Never    Tobacco comments:     \"At least 2 packs a day\" of cigarettes.   Vaping Use    Vaping status: Every Day    Substances: Flavoring   Substance Use Topics    Alcohol use: Yes     Alcohol/week: 2.0 standard drinks of alcohol     Types: 1 Glasses of wine, 1 Cans of beer per week     Comment: socially    Drug use: Yes     Frequency: 1.0 times per week     Types: Marijuana       Review of " Systems   Constitutional:  Negative for appetite change, chills, diaphoresis, fatigue and fever.   HENT:  Negative for trouble swallowing.    Eyes:  Negative for visual disturbance.   Respiratory:  Negative for cough, chest tightness, shortness of breath and wheezing.    Cardiovascular:  Positive for chest pain. Negative for palpitations, leg swelling, syncope and near-syncope.   Gastrointestinal:  Negative for abdominal pain, anorexia, constipation, diarrhea, heartburn, nausea and vomiting.   Musculoskeletal:  Negative for back pain, neck pain and neck stiffness.   Skin:  Negative for rash and wound.   Neurological:  Negative for dizziness, seizures, syncope, weakness, light-headedness, numbness and headaches.   Psychiatric/Behavioral:  Negative for confusion.    All other systems reviewed and are negative.      Physical Exam  Physical Exam  Vitals and nursing note reviewed.   Constitutional:       General: He is not in acute distress.     Appearance: Normal appearance. He is normal weight. He is not ill-appearing, toxic-appearing or diaphoretic.   HENT:      Head: Normocephalic and atraumatic.      Nose: Nose normal. No congestion or rhinorrhea.      Mouth/Throat:      Mouth: Mucous membranes are moist.      Pharynx: No oropharyngeal exudate or posterior oropharyngeal erythema.   Eyes:      General: No scleral icterus.        Right eye: No discharge.         Left eye: No discharge.      Conjunctiva/sclera: Conjunctivae normal.   Cardiovascular:      Rate and Rhythm: Normal rate and regular rhythm.      Pulses: Normal pulses.      Heart sounds: Normal heart sounds. No murmur heard.     No friction rub. No gallop.   Pulmonary:      Effort: Pulmonary effort is normal. No respiratory distress.      Breath sounds: Normal breath sounds. No stridor. No wheezing, rhonchi or rales.   Chest:      Chest wall: No tenderness.   Abdominal:      General: Abdomen is flat. There is no distension.      Palpations: Abdomen is soft.       Tenderness: There is no abdominal tenderness. There is no right CVA tenderness, left CVA tenderness, guarding or rebound.   Musculoskeletal:         General: Normal range of motion.      Cervical back: Normal range of motion. No rigidity.      Right lower leg: No edema.      Left lower leg: No edema.   Skin:     General: Skin is warm and dry.      Capillary Refill: Capillary refill takes less than 2 seconds.      Coloration: Skin is not jaundiced or pale.   Neurological:      General: No focal deficit present.      Mental Status: He is alert and oriented to person, place, and time. Mental status is at baseline.   Psychiatric:         Mood and Affect: Mood normal.         Behavior: Behavior normal.         Vital Signs  ED Triage Vitals   Temperature Pulse Respirations Blood Pressure SpO2   06/08/24 1100 06/08/24 1100 06/08/24 1100 06/08/24 1100 06/08/24 1100   98.7 °F (37.1 °C) 99 20 123/72 97 %      Temp Source Heart Rate Source Patient Position - Orthostatic VS BP Location FiO2 (%)   06/08/24 1100 -- 06/08/24 1210 06/08/24 1210 --   Oral  Lying Right arm       Pain Score       --                  Vitals:    06/08/24 1100 06/08/24 1210   BP: 123/72 118/72   Pulse: 99 89   Patient Position - Orthostatic VS:  Lying         Visual Acuity      ED Medications  Medications   levETIRAcetam (KEPPRA) injection 1,000 mg (1,000 mg Intravenous Given 6/8/24 1112)       Diagnostic Studies  Results Reviewed       Procedure Component Value Units Date/Time    HS Troponin I 2hr [[id number]]  (Normal) Collected: 06/08/24 1259    Lab Status: Final result Specimen: Blood from Arm, Left Updated: 06/08/24 1335     hs TnI 2hr <2 ng/L      Delta 2hr hsTnI <-1 ng/L     Comprehensive metabolic panel [[id number]] Collected: 06/08/24 1104    Lab Status: Final result Specimen: Blood from Arm, Left Updated: 06/08/24 1205     Sodium 138 mmol/L      Potassium 3.7 mmol/L      Chloride 104 mmol/L      CO2 26 mmol/L      ANION GAP 8 mmol/L      BUN  9 mg/dL      Creatinine 0.77 mg/dL      Glucose 110 mg/dL      Calcium 9.2 mg/dL      AST 24 U/L      ALT 16 U/L      Alkaline Phosphatase 62 U/L      Total Protein 7.8 g/dL      Albumin 4.2 g/dL      Total Bilirubin 0.50 mg/dL      eGFR 115 ml/min/1.73sq m     Narrative:      National Kidney Disease Foundation guidelines for Chronic Kidney Disease (CKD):     Stage 1 with normal or high GFR (GFR > 90 mL/min/1.73 square meters)    Stage 2 Mild CKD (GFR = 60-89 mL/min/1.73 square meters)    Stage 3A Moderate CKD (GFR = 45-59 mL/min/1.73 square meters)    Stage 3B Moderate CKD (GFR = 30-44 mL/min/1.73 square meters)    Stage 4 Severe CKD (GFR = 15-29 mL/min/1.73 square meters)    Stage 5 End Stage CKD (GFR <15 mL/min/1.73 square meters)  Note: GFR calculation is accurate only with a steady state creatinine    HS Troponin 0hr (reflex protocol) [[id number]]  (Normal) Collected: 06/08/24 1104    Lab Status: Final result Specimen: Blood from Arm, Left Updated: 06/08/24 1137     hs TnI 0hr 3 ng/L     CBC and differential [[id number]]  (Abnormal) Collected: 06/08/24 1104    Lab Status: Final result Specimen: Blood from Arm, Left Updated: 06/08/24 1109     WBC 4.67 Thousand/uL      RBC 4.56 Million/uL      Hemoglobin 13.7 g/dL      Hematocrit 40.8 %      MCV 90 fL      MCH 30.0 pg      MCHC 33.6 g/dL      RDW 13.7 %      MPV 10.2 fL      Platelets 154 Thousands/uL      nRBC 0 /100 WBCs      Segmented % 42 %      Immature Grans % 0 %      Lymphocytes % 44 %      Monocytes % 11 %      Eosinophils Relative 3 %      Basophils Relative 0 %      Absolute Neutrophils 1.96 Thousands/µL      Absolute Immature Grans 0.01 Thousand/uL      Absolute Lymphocytes 2.03 Thousands/µL      Absolute Monocytes 0.50 Thousand/µL      Eosinophils Absolute 0.15 Thousand/µL      Basophils Absolute 0.02 Thousands/µL                    XR chest 1 view portable   Final Result by Brandon Sadler MD (06/08 1153)      No acute cardiopulmonary disease.       Findings are stable      Workstation performed: [id number]                    Procedures  ECG 12 Lead Documentation Only    Date/Time: 6/8/2024 10:57 AM    Performed by: Aldo Perez PA-C  Authorized by: Aldo Perez PA-C    Indications / Diagnosis:  Chest Pain  Patient location:  ED  Previous ECG:     Previous ECG:  Compared to current    Comparison ECG info:  4/20/2024    Similarity:  Changes noted  Interpretation:     Interpretation: non-specific    Rate:     ECG rate:  103    ECG rate assessment: tachycardic    Rhythm:     Rhythm: sinus tachycardia    Ectopy:     Ectopy: none    QRS:     QRS axis:  Left  Conduction:     Conduction: normal    ST segments:     ST segments:  Normal  T waves:     T waves: normal    Comments:      Sinus tachycardia with a rate of 103.  Left axis deviation.  No ectopy.  No STEMI.           ED Course             HEART Risk Score      Flowsheet Row Most Recent Value   Heart Score Risk Calculator    History 0 Filed at: 06/08/2024 1338   ECG 1 Filed at: 06/08/2024 1338   Age 0 Filed at: 06/08/2024 1338   Risk Factors 2 Filed at: 06/08/2024 1338   Troponin 0 Filed at: 06/08/2024 1338   HEART Score 3 Filed at: 06/08/2024 1338                                        Medical Decision Making  38-year-old male presenting to the emergency department today for acute onset chest pain.  Resolved prior to arrival.  Has been noncompliant on Keppra and Biktarvy therapy.  Requesting refill on Biktarvy.  Vital signs are stable.  Afebrile.  Physical examination is reassuring.  The patient had a negative troponin x 2.  No leukocytosis.  Reassuring CMP.  The patient was dosed with Keppra while here in the emergency department and given a dose of his Biktarvy.  Chest x-ray is without acute cardiopulmonary disease on my independent interpretation.  EKG shows sinus tachycardia with a rate of 103.  No STEMI.  Tachycardia improved while here in the emergency department.  The  "patient is stable for discharge at this time.  [employer] was sent to the patient's pharmacy.  Follow-up outpatient.  Heart score is 3.  Strict return precautions were given.  Recommend PCP follow-up as soon as possible. The patient and/or patient's proxy verify their understanding and agree to the plan at this time.  All questions answered to the patient and/or their proxy's satisfaction.  All labs reviewed and utilized in the medical decision making process (if labs were ordered).  Portions of the record may have been created with voice recognition software.  Occasional wrong word or \"sound a like\" substitutions may have occurred due to the inherent limitations of voice recognition software.  Read the chart carefully and recognize, using context, where substitutions have occurred.    I reviewed prior notes.  I reviewed prior EKG.    Problems Addressed:  Chest pain, unspecified type: undiagnosed new problem with uncertain prognosis  Encounter for medication refill: undiagnosed new problem with uncertain prognosis    Amount and/or Complexity of Data Reviewed  External Data Reviewed: ECG and notes.  Labs: ordered. Decision-making details documented in ED Course.  Radiology: ordered and independent interpretation performed. Decision-making details documented in ED Course.     Details: CXR  ECG/medicine tests: ordered and independent interpretation performed. Decision-making details documented in ED Course.    Risk  Prescription drug management.             Disposition  Final diagnoses:   Chest pain, unspecified type   Encounter for medication refill     Time reflects when diagnosis was documented in both MDM as applicable and the Disposition within this note       Time User Action Codes Description Comment    6/8/2024  1:39 PM Aldo Perez [R07.9] Chest pain, unspecified type     6/8/2024  1:39 PM Aldo Perez [Z76.0] Encounter for medication refill           ED Disposition       ED " Disposition   Discharge    Condition   Stable    Date/Time   Sat Jun 8, 2024 1338    Comment   John Sanders discharge to home/self care.                   Follow-up Information       Follow up With Specialties Details Why Contact Info Additional Information    [facility] Emergency Department Emergency Medicine Go to  If symptoms worsen [address] [address] [phone] [facility] Emergency Department, [address] [address]    [facility] Family Medicine Schedule an appointment as soon as possible for a visit   [address] [facility] [address] [phone] [facility], [address] [place], [address] [phone]            Discharge Medication List as of 6/8/2024  1:40 PM        START taking these medications    Details   !! bictegravir-emtricitab-tenofovir alafenamide (Biktarvy) -25 MG tablet Take 1 tablet by mouth daily with breakfast, Starting Sat 6/8/2024, Until Mon 7/8/2024, Normal       !! - Potential duplicate medications found. Please discuss with provider.        CONTINUE these medications which have NOT CHANGED    Details   !! bictegravir-emtricitab-tenofovir alafenamide (BIKTARVY) -25 MG tablet Take 1 tablet by mouth daily with breakfast, Starting Tue 4/23/2024, Normal      folic acid (FOLVITE) 1 mg tablet Take 1 tablet (1 mg total) by mouth daily, Starting Tue 7/25/2023, Normal      levETIRAcetam (Keppra) 1000 MG tablet Take 1 tablet (1,000 mg total) by mouth 2 (two) times a day, Starting Mon 4/8/2024, Until Wed 5/8/2024, Normal      levETIRAcetam (Keppra) 1000 MG tablet Take 1 tablet (1,000 mg total) by mouth every 12 (twelve) hours for 14 days, Starting Sat 4/20/2024, Until Sat 5/4/2024, Normal      mirtazapine (REMERON) 30 mg tablet Take 1 tablet (30 mg total) by mouth daily at bedtime, Starting Mon 4/8/2024, Normal      thiamine  (VITAMIN B1) 100 mg tablet Take 1 tablet (100 mg total) by mouth daily, Starting Tue 7/25/2023, Normal       !! - Potential duplicate medications found. Please discuss with provider.          No discharge procedures on file.    PDMP Review         Value Time User    PDMP Reviewed  Yes 6/17/2023 11:20 AM Sammy Roberts MD            ED Provider  Electronically Signed by             Aldo Perez PA-C  06/08/24 8098

## 2024-06-08 NOTE — DISCHARGE INSTRUCTIONS
Please return to the emergency department for worsening symptoms including chest pain, shortness of breath, dizziness, lightheadedness, fever greater than 103, severe pain, inability to walk, fainting episodes, etc..  Please follow-up with your family practice provider as soon as possible.  I have sent medications over to the pharmacy for your symptoms.  Please take as directed.

## 2024-06-11 ENCOUNTER — APPOINTMENT (EMERGENCY)
Dept: RADIOLOGY | Facility: HOSPITAL | Age: 39
End: 2024-06-11
Payer: MEDICARE

## 2024-06-11 ENCOUNTER — HOSPITAL ENCOUNTER (EMERGENCY)
Facility: HOSPITAL | Age: 39
Discharge: HOME | End: 2024-06-11
Attending: EMERGENCY MEDICINE
Payer: MEDICARE

## 2024-06-11 ENCOUNTER — HOSPITAL ENCOUNTER (EMERGENCY)
Facility: HOSPITAL | Age: 39
Discharge: HOME | End: 2024-06-11
Attending: EMERGENCY MEDICINE | Admitting: EMERGENCY MEDICINE
Payer: MEDICARE

## 2024-06-11 VITALS
HEART RATE: 100 BPM | RESPIRATION RATE: 18 BRPM | BODY MASS INDEX: 30.18 KG/M2 | DIASTOLIC BLOOD PRESSURE: 89 MMHG | SYSTOLIC BLOOD PRESSURE: 152 MMHG | TEMPERATURE: 98.7 F | WEIGHT: 192.68 LBS | OXYGEN SATURATION: 98 %

## 2024-06-11 VITALS
OXYGEN SATURATION: 97 % | SYSTOLIC BLOOD PRESSURE: 130 MMHG | HEART RATE: 100 BPM | RESPIRATION RATE: 18 BRPM | DIASTOLIC BLOOD PRESSURE: 85 MMHG | TEMPERATURE: 97.5 F

## 2024-06-11 DIAGNOSIS — G40.909 SEIZURE DISORDER (HCC): Primary | ICD-10-CM

## 2024-06-11 DIAGNOSIS — Z00.00 ADULT GENERAL MEDICAL EXAM: Primary | ICD-10-CM

## 2024-06-11 LAB
ALBUMIN SERPL BCP-MCNC: 4 G/DL (ref 3.5–5)
ALP SERPL-CCNC: 64 U/L (ref 34–104)
ALT SERPL W P-5'-P-CCNC: 14 U/L (ref 7–52)
ANION GAP SERPL CALCULATED.3IONS-SCNC: 7 MMOL/L (ref 4–13)
AST SERPL W P-5'-P-CCNC: 28 U/L (ref 13–39)
BASOPHILS # BLD AUTO: 0.03 THOUSANDS/ÂΜL (ref 0–0.1)
BASOPHILS NFR BLD AUTO: 1 % (ref 0–1)
BILIRUB SERPL-MCNC: 0.4 MG/DL (ref 0.2–1)
BILIRUB UR QL STRIP: NEGATIVE
BUN SERPL-MCNC: 10 MG/DL (ref 5–25)
CALCIUM SERPL-MCNC: 9.2 MG/DL (ref 8.4–10.2)
CHLORIDE SERPL-SCNC: 105 MMOL/L (ref 96–108)
CLARITY UR: CLEAR
CO2 SERPL-SCNC: 26 MMOL/L (ref 21–32)
COLOR UR: YELLOW
CREAT SERPL-MCNC: 0.77 MG/DL (ref 0.6–1.3)
EOSINOPHIL # BLD AUTO: 0.21 THOUSAND/ÂΜL (ref 0–0.61)
EOSINOPHIL NFR BLD AUTO: 4 % (ref 0–6)
ERYTHROCYTE [DISTWIDTH] IN BLOOD BY AUTOMATED COUNT: 13.8 % (ref 11.6–15.1)
GFR SERPL CREATININE-BSD FRML MDRD: 115 ML/MIN/1.73SQ M
GLUCOSE SERPL-MCNC: 96 MG/DL (ref 65–140)
GLUCOSE SERPL-MCNC: 99 MG/DL (ref 65–140)
GLUCOSE UR STRIP-MCNC: NEGATIVE MG/DL
HCT VFR BLD AUTO: 45.1 % (ref 36.5–49.3)
HGB BLD-MCNC: 14.8 G/DL (ref 12–17)
HGB UR QL STRIP.AUTO: NEGATIVE
IMM GRANULOCYTES # BLD AUTO: 0.01 THOUSAND/UL (ref 0–0.2)
IMM GRANULOCYTES NFR BLD AUTO: 0 % (ref 0–2)
KETONES UR STRIP-MCNC: NEGATIVE MG/DL
LEUKOCYTE ESTERASE UR QL STRIP: NEGATIVE
LEVETIRACETAM SERPL-MCNC: 21.9 UG/ML (ref 12–46)
LYMPHOCYTES # BLD AUTO: 2.28 THOUSANDS/ÂΜL (ref 0.6–4.47)
LYMPHOCYTES NFR BLD AUTO: 40 % (ref 14–44)
MCH RBC QN AUTO: 30.1 PG (ref 26.8–34.3)
MCHC RBC AUTO-ENTMCNC: 32.8 G/DL (ref 31.4–37.4)
MCV RBC AUTO: 92 FL (ref 82–98)
MONOCYTES # BLD AUTO: 0.63 THOUSAND/ÂΜL (ref 0.17–1.22)
MONOCYTES NFR BLD AUTO: 11 % (ref 4–12)
NEUTROPHILS # BLD AUTO: 2.55 THOUSANDS/ÂΜL (ref 1.85–7.62)
NEUTS SEG NFR BLD AUTO: 44 % (ref 43–75)
NITRITE UR QL STRIP: NEGATIVE
NRBC BLD AUTO-RTO: 0 /100 WBCS
PH UR STRIP.AUTO: 7 [PH]
PLATELET # BLD AUTO: 143 THOUSANDS/UL (ref 149–390)
PMV BLD AUTO: 10.2 FL (ref 8.9–12.7)
POTASSIUM SERPL-SCNC: 3.7 MMOL/L (ref 3.5–5.3)
PROT SERPL-MCNC: 7.5 G/DL (ref 6.4–8.4)
PROT UR STRIP-MCNC: NEGATIVE MG/DL
RBC # BLD AUTO: 4.92 MILLION/UL (ref 3.88–5.62)
SODIUM SERPL-SCNC: 138 MMOL/L (ref 135–147)
SP GR UR STRIP.AUTO: 1.02 (ref 1–1.03)
UROBILINOGEN UR QL STRIP.AUTO: 0.2 E.U./DL
WBC # BLD AUTO: 5.71 THOUSAND/UL (ref 4.31–10.16)

## 2024-06-11 PROCEDURE — 71046 X-RAY EXAM CHEST 2 VIEWS: CPT

## 2024-06-11 PROCEDURE — 82948 REAGENT STRIP/BLOOD GLUCOSE: CPT

## 2024-06-11 PROCEDURE — 99284 EMERGENCY DEPT VISIT MOD MDM: CPT

## 2024-06-11 PROCEDURE — 99284 EMERGENCY DEPT VISIT MOD MDM: CPT | Performed by: PHYSICIAN ASSISTANT

## 2024-06-11 PROCEDURE — 36415 COLL VENOUS BLD VENIPUNCTURE: CPT | Performed by: PHYSICIAN ASSISTANT

## 2024-06-11 PROCEDURE — 85025 COMPLETE CBC W/AUTO DIFF WBC: CPT | Performed by: PHYSICIAN ASSISTANT

## 2024-06-11 PROCEDURE — 99284 EMERGENCY DEPT VISIT MOD MDM: CPT | Performed by: EMERGENCY MEDICINE

## 2024-06-11 PROCEDURE — 80053 COMPREHEN METABOLIC PANEL: CPT | Performed by: PHYSICIAN ASSISTANT

## 2024-06-11 PROCEDURE — 81003 URINALYSIS AUTO W/O SCOPE: CPT | Performed by: PHYSICIAN ASSISTANT

## 2024-06-11 PROCEDURE — 83520 IMMUNOASSAY QUANT NOS NONAB: CPT | Performed by: PHYSICIAN ASSISTANT

## 2024-06-11 NOTE — DISCHARGE INSTRUCTIONS
Medically cleared for placement  To go to [facility]   [address] [place] [address]    Continue to take your medication as directed    Labs Reviewed   CBC AND DIFFERENTIAL - Abnormal       Result Value Ref Range Status    WBC 5.71  4.31 - 10.16 Thousand/uL Final    RBC 4.92  3.88 - 5.62 Million/uL Final    Hemoglobin 14.8  12.0 - 17.0 g/dL Final    Hematocrit 45.1  36.5 - 49.3 % Final    MCV 92  82 - 98 fL Final    MCH 30.1  26.8 - 34.3 pg Final    MCHC 32.8  31.4 - 37.4 g/dL Final    RDW 13.8  11.6 - 15.1 % Final    MPV 10.2  8.9 - 12.7 fL Final    Platelets 143 (*) 149 - 390 Thousands/uL Final    nRBC 0  /100 WBCs Final    Segmented % 44  43 - 75 % Final    Immature Grans % 0  0 - 2 % Final    Lymphocytes % 40  14 - 44 % Final    Monocytes % 11  4 - 12 % Final    Eosinophils Relative 4  0 - 6 % Final    Basophils Relative 1  0 - 1 % Final    Absolute Neutrophils 2.55  1.85 - 7.62 Thousands/µL Final    Absolute Immature Grans 0.01  0.00 - 0.20 Thousand/uL Final    Absolute Lymphocytes 2.28  0.60 - 4.47 Thousands/µL Final    Absolute Monocytes 0.63  0.17 - 1.22 Thousand/µL Final    Eosinophils Absolute 0.21  0.00 - 0.61 Thousand/µL Final    Basophils Absolute 0.03  0.00 - 0.10 Thousands/µL Final   UA W REFLEX TO MICROSCOPIC WITH REFLEX TO CULTURE - Normal    Color, UA Yellow  Yellow Final    Clarity, UA Clear  Clear Final    Specific Gravity, UA 1.020  1.001 - 1.030 Final    pH, UA 7.0  5.0, 5.5, 6.0, 6.5, 7.0, 7.5, 8.0 Final    Leukocytes, UA Negative  Negative Final    Nitrite, UA Negative  Negative Final    Protein, UA Negative  Negative, Interference- unable to analyze mg/dl Final    Glucose, UA Negative  Negative mg/dl Final    Ketones, UA Negative  Negative mg/dl Final    Urobilinogen, UA 0.2  0.2, 1.0 E.U./dl E.U./dl Final    Bilirubin, UA Negative  Negative Final    Occult Blood, UA Negative  Negative Final   COMPREHENSIVE METABOLIC PANEL    Sodium 138  135 - 147 mmol/L Final     Potassium 3.7  3.5 - 5.3 mmol/L Final    Chloride 105  96 - 108 mmol/L Final    CO2 26  21 - 32 mmol/L Final    ANION GAP 7  4 - 13 mmol/L Final    BUN 10  5 - 25 mg/dL Final    Creatinine 0.77  0.60 - 1.30 mg/dL Final    Comment: Standardized to IDMS reference method    Glucose 96  65 - 140 mg/dL Final    Comment: If the patient is fasting, the ADA then defines impaired fasting glucose as > 100 mg/dL and diabetes as > or equal to 123 mg/dL.    Calcium 9.2  8.4 - 10.2 mg/dL Final    AST 28  13 - 39 U/L Final    ALT 14  7 - 52 U/L Final    Comment: Specimen collection should occur prior to Sulfasalazine administration due to the potential for falsely depressed results.     Alkaline Phosphatase 64  34 - 104 U/L Final    Total Protein 7.5  6.4 - 8.4 g/dL Final    Albumin 4.0  3.5 - 5.0 g/dL Final    Total Bilirubin 0.40  0.20 - 1.00 mg/dL Final    Comment: Use of this assay is not recommended for patients undergoing treatment with eltrombopag due to the potential for falsely elevated results.  N-acetyl-p-benzoquinone imine (metabolite of Acetaminophen) will generate erroneously low results in samples for patients that have taken an overdose of Acetaminophen.    eGFR 115  ml/min/1.73sq m Final    Narrative:     National Kidney Disease Foundation guidelines for Chronic Kidney Disease (CKD):     Stage 1 with normal or high GFR (GFR > 90 mL/min/1.73 square meters)    Stage 2 Mild CKD (GFR = 60-89 mL/min/1.73 square meters)    Stage 3A Moderate CKD (GFR = 45-59 mL/min/1.73 square meters)    Stage 3B Moderate CKD (GFR = 30-44 mL/min/1.73 square meters)    Stage 4 Severe CKD (GFR = 15-29 mL/min/1.73 square meters)    Stage 5 End Stage CKD (GFR <15 mL/min/1.73 square meters)  Note: GFR calculation is accurate only with a steady state creatinine   LEVETIRACETAM LEVEL

## 2024-06-11 NOTE — ED PROVIDER NOTES
History  Chief Complaint   Patient presents with    Medical Problem     Pt. Arrived via EMS from [facility]. EMS were called by Police d/t patient loitering. EMS stated that crisis wants patient to be medically cleared for group home      Past Medical History: HIV (human immunodeficiency virus infection), Seizures, Smoker, Syphilis   Past Surgical History: HERNIA REPAIR      Pt presents to ED via EMS from [facility] after police were called for loitering, with Eleni, an adult protective service investigator, requesting medical clearance for group home/placement to facility, investigator also needs medication list.  Pt has no complaints.  Took Keppra this am but hasn't been taking Biktarvy.  Pt well appearing with VSS  Walks with walker, is in his usual state of health, no acute complaints        Prior to Admission Medications   Prescriptions Last Dose Informant Patient Reported? Taking?   bictegravir-emtricitab-tenofovir alafenamide (Biktarvy) -25 MG tablet Past Week  No Yes   Sig: Take 1 tablet by mouth daily with breakfast   folic acid (FOLVITE) 1 mg tablet Not Taking  No No   Sig: Take 1 tablet (1 mg total) by mouth daily   Patient not taking: Reported on 4/8/2024   levETIRAcetam (Keppra) 1000 MG tablet 6/11/2024  No Yes   Sig: Take 1 tablet (1,000 mg total) by mouth 2 (two) times a day   Patient taking differently: Take 1,000 mg by mouth daily   mirtazapine (REMERON) 30 mg tablet Not Taking  No No   Sig: Take 1 tablet (30 mg total) by mouth daily at bedtime   Patient not taking: Reported on 6/11/2024   thiamine (VITAMIN B1) 100 mg tablet Not Taking  No No   Sig: Take 1 tablet (100 mg total) by mouth daily   Patient not taking: Reported on 4/8/2024      Facility-Administered Medications: None       Past Medical History:   Diagnosis Date    HIV (human immunodeficiency virus infection) (HCC)     Seizures (HCC)     Smoker     Syphilis        Past Surgical History:   Procedure Laterality Date    HERNIA REPAIR    "      History reviewed. No pertinent family history.  I have reviewed and agree with the history as documented.    E-Cigarette/Vaping    E-Cigarette Use Current Every Day User      E-Cigarette/Vaping Substances    Nicotine No     THC No     CBD No     Flavoring Yes     Other No     Unknown No      Social History     Tobacco Use    Smoking status: Every Day     Current packs/day: 2.00     Average packs/day: 2.0 packs/day for 8.0 years (16.0 ttl pk-yrs)     Types: Cigarettes    Smokeless tobacco: Never    Tobacco comments:     \"At least 2 packs a day\" of cigarettes.   Vaping Use    Vaping status: Every Day    Substances: Flavoring   Substance Use Topics    Alcohol use: Yes     Alcohol/week: 2.0 standard drinks of alcohol     Types: 1 Glasses of wine, 1 Cans of beer per week     Comment: socially    Drug use: Yes     Frequency: 4.0 times per week     Types: Marijuana     Comment: 4 blunts per day       Review of Systems   Constitutional:  Negative for fever.   Respiratory:  Negative for shortness of breath.    Cardiovascular:  Negative for chest pain.   Gastrointestinal:  Negative for vomiting.   Musculoskeletal:  Negative for myalgias.   Skin:  Negative for rash.   All other systems reviewed and are negative.      Physical Exam  Physical Exam  Vitals and nursing note reviewed.   Constitutional:       General: He is not in acute distress.     Appearance: Normal appearance. He is well-developed. He is not ill-appearing.   HENT:      Head: Normocephalic and atraumatic.      Nose: Nose normal.      Mouth/Throat:      Mouth: Mucous membranes are moist.      Dentition: Abnormal dentition. Dental caries present. No dental tenderness.      Pharynx: Oropharynx is clear.      Comments: Multiple broken, decaying teeth  Eyes:      Conjunctiva/sclera: Conjunctivae normal.   Cardiovascular:      Rate and Rhythm: Normal rate.   Pulmonary:      Effort: Pulmonary effort is normal. No respiratory distress.   Abdominal:      General: " "Bowel sounds are normal.      Palpations: Abdomen is soft.      Tenderness: There is no abdominal tenderness.   Musculoskeletal:         General: No tenderness. Normal range of motion.      Cervical back: Normal range of motion. No tenderness.   Skin:     General: Skin is warm and dry.   Neurological:      General: No focal deficit present.      Mental Status: He is alert.   Psychiatric:         Mood and Affect: Mood normal.         Behavior: Behavior normal.         Vital Signs  ED Triage Vitals [06/11/24 1026]   Temperature Pulse Respirations Blood Pressure SpO2   97.5 °F (36.4 °C) 100 18 130/85 97 %      Temp Source Heart Rate Source Patient Position - Orthostatic VS BP Location FiO2 (%)   Oral Monitor Lying Right arm --      Pain Score       --           Vitals:    06/11/24 1026   BP: 130/85   Pulse: 100   Patient Position - Orthostatic VS: Lying         Visual Acuity      ED Medications  Medications - No data to display    Diagnostic Studies  Results Reviewed       Procedure Component Value Units Date/Time    Levetiracetam level [[id number]]  (Normal) Collected: 06/11/24 1123    Lab Status: Final result Specimen: Blood from Arm, Right Updated: 06/11/24 1446     Levetiracetam Lvl 21.9 ug/mL     Narrative:      \"Brivaracetam (Briviact) interferes with measurements of levetiracetam in the ARK Levetiracetam Assay. Patients undergoing a switch in drug therapy involving Keppra and Briviact should not be monitored for levetiracetam using the ARK assay. Serum levels of levetiracetam and or brivaracetam should be confirmed by a valid chromatographic method if there is a possibility these drugs are co-present in circulation.\"    Comprehensive metabolic panel [[id number]] Collected: 06/11/24 1123    Lab Status: Final result Specimen: Blood from Arm, Right Updated: 06/11/24 1149     Sodium 138 mmol/L      Potassium 3.7 mmol/L      Chloride 105 mmol/L      CO2 26 mmol/L      ANION GAP 7 mmol/L      BUN 10 mg/dL      " Creatinine 0.77 mg/dL      Glucose 96 mg/dL      Calcium 9.2 mg/dL      AST 28 U/L      ALT 14 U/L      Alkaline Phosphatase 64 U/L      Total Protein 7.5 g/dL      Albumin 4.0 g/dL      Total Bilirubin 0.40 mg/dL      eGFR 115 ml/min/1.73sq m     Narrative:      National Kidney Disease Foundation guidelines for Chronic Kidney Disease (CKD):     Stage 1 with normal or high GFR (GFR > 90 mL/min/1.73 square meters)    Stage 2 Mild CKD (GFR = 60-89 mL/min/1.73 square meters)    Stage 3A Moderate CKD (GFR = 45-59 mL/min/1.73 square meters)    Stage 3B Moderate CKD (GFR = 30-44 mL/min/1.73 square meters)    Stage 4 Severe CKD (GFR = 15-29 mL/min/1.73 square meters)    Stage 5 End Stage CKD (GFR <15 mL/min/1.73 square meters)  Note: GFR calculation is accurate only with a steady state creatinine    UA w Reflex to Microscopic w Reflex to Culture [[id number]]  (Normal) Collected: 06/11/24 1136    Lab Status: Final result Specimen: Urine, Clean Catch Updated: 06/11/24 1144     Color, UA Yellow     Clarity, UA Clear     Specific Gravity, UA 1.020     pH, UA 7.0     Leukocytes, UA Negative     Nitrite, UA Negative     Protein, UA Negative mg/dl      Glucose, UA Negative mg/dl      Ketones, UA Negative mg/dl      Urobilinogen, UA 0.2 E.U./dl      Bilirubin, UA Negative     Occult Blood, UA Negative    CBC and differential [[id number]]  (Abnormal) Collected: 06/11/24 1123    Lab Status: Final result Specimen: Blood from Arm, Right Updated: 06/11/24 1129     WBC 5.71 Thousand/uL      RBC 4.92 Million/uL      Hemoglobin 14.8 g/dL      Hematocrit 45.1 %      MCV 92 fL      MCH 30.1 pg      MCHC 32.8 g/dL      RDW 13.8 %      MPV 10.2 fL      Platelets 143 Thousands/uL      nRBC 0 /100 WBCs      Segmented % 44 %      Immature Grans % 0 %      Lymphocytes % 40 %      Monocytes % 11 %      Eosinophils Relative 4 %      Basophils Relative 1 %      Absolute Neutrophils 2.55 Thousands/µL      Absolute Immature Grans 0.01 Thousand/uL       "Absolute Lymphocytes 2.28 Thousands/µL      Absolute Monocytes 0.63 Thousand/µL      Eosinophils Absolute 0.21 Thousand/µL      Basophils Absolute 0.03 Thousands/µL                    XR chest 2 views   Final Result by Estrada Friedman MD (06/11 1107)      No acute cardiopulmonary disease.            Workstation performed: [id number]                    Procedures  Procedures         ED Course  ED Course as of 06/11/24 1613   Tue Jun 11, 2024   1136 CXR NAD  CBC unremarkable   1147 UA shows no UTI   1150 CMP unremarkable   1150 Keppra level pending                               SBIRT 22yo+      Flowsheet Row Most Recent Value   Initial Alcohol Screen: US AUDIT-C     1. How often do you have a drink containing alcohol? 2 Filed at: 06/11/2024 1029   2. How many drinks containing alcohol do you have on a typical day you are drinking?  0 Filed at: 06/11/2024 1029   3a. Male UNDER 65: How often do you have five or more drinks on one occasion? 0 Filed at: 06/11/2024 1029   Audit-C Score 2 Filed at: 06/11/2024 1029   CALI: How many times in the past year have you...    Used an illegal drug or used a prescription medication for non-medical reasons? Never Filed at: 06/11/2024 1029                      Medical Decision Making  Pt without acute complaints  Here with medical clearance  Medically cleared, arranged for patient to go to [facility]    Amount and/or Complexity of Data Reviewed  External Data Reviewed: labs and notes.     Details: Multiple visits rev'd  Labs: ordered. Decision-making details documented in ED Course.  Radiology: ordered.  Discussion of management or test interpretation with external provider(s): Case discussed with  BRENDEN Benitez; \"...When the D/C is written he's going to need a LYFT ride to the [facility] ([address] [place] [address])             Disposition  Final diagnoses:   Adult general medical exam - Clearance for placement     Time " reflects when diagnosis was documented in both MDM as applicable and the Disposition within this note       Time User Action Codes Description Comment    6/11/2024 10:59 AM Annabelle Aguilar Add [Z00.00] Adult general medical exam     6/11/2024 10:59 AM Annabelle Aguilar Modify [Z00.00] Adult general medical exam Clearance for placement          ED Disposition       ED Disposition   Discharge    Condition   Stable    Date/Time   Tue Jun 11, 2024 1152    Comment   John Sanders discharge to [facility]                 Follow-up Information    None         Discharge Medication List as of 6/11/2024 11:53 AM        CONTINUE these medications which have NOT CHANGED    Details   bictegravir-emtricitab-tenofovir alafenamide (Biktarvy) -25 MG tablet Take 1 tablet by mouth daily with breakfast, Starting Sat 6/8/2024, Until Mon 7/8/2024, Normal      levETIRAcetam (Keppra) 1000 MG tablet Take 1 tablet (1,000 mg total) by mouth 2 (two) times a day, Starting Mon 4/8/2024, Until Tue 6/11/2024, Normal      folic acid (FOLVITE) 1 mg tablet Take 1 tablet (1 mg total) by mouth daily, Starting Tue 7/25/2023, Normal      mirtazapine (REMERON) 30 mg tablet Take 1 tablet (30 mg total) by mouth daily at bedtime, Starting Mon 4/8/2024, Normal      thiamine (VITAMIN B1) 100 mg tablet Take 1 tablet (100 mg total) by mouth daily, Starting Tue 7/25/2023, Normal             No discharge procedures on file.    PDMP Review         Value Time User    PDMP Reviewed  Yes 6/17/2023 11:20 AM Sammy Roberts MD            ED Provider  Electronically Signed by             Annabelle Aguilar PA-C  06/11/24 1010

## 2024-06-11 NOTE — CASE MANAGEMENT
Case Management ED Progress Note    Patient name John Sanders  Location ED 13/ED 13 MRN [id number]  : 1985 Date 2024        OBJECTIVE:  Predictive Model Details          86%  Factor Value    Risk of Hospital Admission or ED Visit Model 50% Number of ED Visits 5+     18% Has Medicaid Yes     11% Is in Relationship No     10% Number of Hospitalizations 1     6% Has Medicare Yes     6% Has Depression Yes            Chief Complaint: .  Patient Class: Emergency  Preferred Pharmacy:   [id number] - [facility] [place] - [address] [facility]  [address] [facility] [address]  Phone: [phone] Fax: [phone]    [id number] - [place] - [address]  [address]  [place] [address]  Phone: [phone] Fax: [phone]    [facility] [place] - [address]  [address]  [place] [address]  Phone: [phone] Fax: [phone]    Primary Care Provider: No primary care provider on file.    Primary Insurance: MEDICARE  Secondary Insurance: [facility] [facility]    ED Progress Note:    Message received from ED provider regarding patient. [place] [facility] CW bedside requesting to s/w CM.    CM s/w [place] [facility] CW (Eleni). Eleni requesting patient be placed in a SNF. CM reviewed that patient doesn't meet the medical criteria for LTC SNF placement. CM reviewed that patient has secondary [place] MA and patient would need to be placed in a SNF in [place] if plan is for LTC. Eleni stating that patient may benefit from group home placement or [facility]. CM reviewed that patient would need to have funding for [facility] and that group home placement typically does not happen from hospital due to amount of time that that process requires. Eleni stating that functionally patient is independent with ADLs however makes poor choices. Patient does have a mother who lives in [place], however patient does not want to involve her in his  care - chart confirms same as there is no emergency contact on file, no call made to family at this time due to no contacts.     Follow-up call received from Eleni who reports that patient was accepted by [facility] and requesting transport setup. ED made aware patient is able to go to [facility], transport to be arranged by ED.

## 2024-06-12 NOTE — ED PROVIDER NOTES
"History  Chief Complaint   Patient presents with    Homeless     Homeless person, arrives via EMS.  Pt stated to EMS that he was feeling dizzy \"like I was going to pass out.\"  Upon arrival to ED, pt admits that he is homeless and is trying to get to [facility].  Pt is awake, alert, pleasant, cooperative, gregarious.  SR on monitor.  VSS.  Glucose 158 by EMS.       38-year-old male who is homeless presenting to the emergency department via EMS because he does not have a place to stay tonight.  Notes that the rescue mission is not excepting patients for the night and he did not know what else to do so called 911.        Prior to Admission Medications   Prescriptions Last Dose Informant Patient Reported? Taking?   bictegravir-emtricitab-tenofovir alafenamide (Biktarvy) -25 MG tablet   No No   Sig: Take 1 tablet by mouth daily with breakfast   folic acid (FOLVITE) 1 mg tablet   No No   Sig: Take 1 tablet (1 mg total) by mouth daily   Patient not taking: Reported on 4/8/2024   levETIRAcetam (Keppra) 1000 MG tablet   No No   Sig: Take 1 tablet (1,000 mg total) by mouth 2 (two) times a day   Patient taking differently: Take 1,000 mg by mouth daily   mirtazapine (REMERON) 30 mg tablet   No No   Sig: Take 1 tablet (30 mg total) by mouth daily at bedtime   Patient not taking: Reported on 6/11/2024   thiamine (VITAMIN B1) 100 mg tablet   No No   Sig: Take 1 tablet (100 mg total) by mouth daily   Patient not taking: Reported on 4/8/2024      Facility-Administered Medications: None       Past Medical History:   Diagnosis Date    HIV (human immunodeficiency virus infection) (HCC)     Seizures (HCC)     Smoker     Syphilis        Past Surgical History:   Procedure Laterality Date    HERNIA REPAIR         History reviewed. No pertinent family history.  I have reviewed and agree with the history as documented.    E-Cigarette/Vaping    E-Cigarette Use Current Every Day User      E-Cigarette/Vaping Substances    " "Nicotine No     THC No     CBD No     Flavoring Yes     Other No     Unknown No      Social History     Tobacco Use    Smoking status: Every Day     Current packs/day: 2.00     Average packs/day: 2.0 packs/day for 8.0 years (16.0 ttl pk-yrs)     Types: Cigarettes    Smokeless tobacco: Never    Tobacco comments:     \"At least 2 packs a day\" of cigarettes.   Vaping Use    Vaping status: Every Day    Substances: Flavoring   Substance Use Topics    Alcohol use: Yes     Alcohol/week: 2.0 standard drinks of alcohol     Types: 1 Glasses of wine, 1 Cans of beer per week     Comment: socially    Drug use: Yes     Frequency: 4.0 times per week     Types: Marijuana     Comment: 4 blunts per day       Review of Systems   Constitutional:  Negative for chills and fever.   HENT:  Negative for ear pain and sore throat.    Eyes:  Negative for pain and visual disturbance.   Respiratory:  Negative for cough and shortness of breath.    Cardiovascular:  Negative for chest pain and palpitations.   Gastrointestinal:  Negative for abdominal pain and vomiting.   Genitourinary:  Negative for dysuria and hematuria.   Musculoskeletal:  Negative for arthralgias and back pain.   Skin:  Negative for color change and rash.   Neurological:  Negative for seizures and syncope.   All other systems reviewed and are negative.      Physical Exam  Physical Exam  Vitals and nursing note reviewed.   Constitutional:       General: He is not in acute distress.     Appearance: He is well-developed.   HENT:      Head: Normocephalic and atraumatic.   Eyes:      Conjunctiva/sclera: Conjunctivae normal.   Cardiovascular:      Rate and Rhythm: Normal rate and regular rhythm.      Heart sounds: No murmur heard.  Pulmonary:      Effort: Pulmonary effort is normal. No respiratory distress.      Breath sounds: Normal breath sounds.   Abdominal:      Palpations: Abdomen is soft.      Tenderness: There is no abdominal tenderness.   Musculoskeletal:         General: No " swelling.      Cervical back: Neck supple.   Skin:     General: Skin is warm and dry.      Capillary Refill: Capillary refill takes less than 2 seconds.   Neurological:      Mental Status: He is alert.   Psychiatric:         Mood and Affect: Mood normal.         Vital Signs  ED Triage Vitals [06/11/24 2150]   Temperature Pulse Respirations Blood Pressure SpO2   98.7 °F (37.1 °C) 100 18 152/89 98 %      Temp Source Heart Rate Source Patient Position - Orthostatic VS BP Location FiO2 (%)   Tympanic Monitor Lying Left arm --      Pain Score       --           Vitals:    06/11/24 2150   BP: 152/89   Pulse: 100   Patient Position - Orthostatic VS: Lying         Visual Acuity      ED Medications  Medications - No data to display    Diagnostic Studies  Results Reviewed       Procedure Component Value Units Date/Time    Fingerstick Glucose (POCT) [[id number]]  (Normal) Collected: 06/11/24 2146    Lab Status: Final result Specimen: Blood Updated: 06/11/24 2147     POC Glucose 99 mg/dl                    No orders to display              Procedures  Procedures         ED Course                               SBIRT 20yo+      Flowsheet Row Most Recent Value   Initial Alcohol Screen: US AUDIT-C     1. How often do you have a drink containing alcohol? 0 Filed at: 06/11/2024 2150   2. How many drinks containing alcohol do you have on a typical day you are drinking?  0 Filed at: 06/11/2024 2150   3a. Male UNDER 65: How often do you have five or more drinks on one occasion? 0 Filed at: 06/11/2024 2150   Audit-C Score 0 Filed at: 06/11/2024 2150   CALI: How many times in the past year have you...    Used an illegal drug or used a prescription medication for non-medical reasons? Never Filed at: 06/11/2024 2150                      Medical Decision Making  38-year-old male presenting emerged department due to homelessness, has no complaints.  Notes that he felt like he might be having a seizure although no actual seizure.   Discharge.    Amount and/or Complexity of Data Reviewed  Labs: ordered.             Disposition  Final diagnoses:   Seizure disorder (HCC)     Time reflects when diagnosis was documented in both MDM as applicable and the Disposition within this note       Time User Action Codes Description Comment    6/11/2024  9:42 PM Zaragoza Robert Add [G40.909] Seizure disorder (HCC)           ED Disposition       ED Disposition   Discharge    Condition   Stable    Date/Time   Tue Jun 11, 2024 2142    Comment   John Sanders discharge to home/self care.                   Follow-up Information       Follow up With Specialties Details Why Contact Info Additional Information    [facility]   [address] [place] [address], [address] [address] [phone] [facility], [address] [place] [address], [place] [address], [place], [place], [address] [phone]            Discharge Medication List as of 6/11/2024  9:42 PM        CONTINUE these medications which have NOT CHANGED    Details   bictegravir-emtricitab-tenofovir alafenamide (Biktarvy) -25 MG tablet Take 1 tablet by mouth daily with breakfast, Starting Sat 6/8/2024, Until Mon 7/8/2024, Normal      folic acid (FOLVITE) 1 mg tablet Take 1 tablet (1 mg total) by mouth daily, Starting Tue 7/25/2023, Normal      levETIRAcetam (Keppra) 1000 MG tablet Take 1 tablet (1,000 mg total) by mouth 2 (two) times a day, Starting Mon 4/8/2024, Until Tue 6/11/2024, Normal      mirtazapine (REMERON) 30 mg tablet Take 1 tablet (30 mg total) by mouth daily at bedtime, Starting Mon 4/8/2024, Normal      thiamine (VITAMIN B1) 100 mg tablet Take 1 tablet (100 mg total) by mouth daily, Starting Tue 7/25/2023, Normal             No discharge procedures on file.    PDMP Review         Value Time User    PDMP Reviewed  Yes 6/17/2023 11:20 AM Sammy Roberts MD            ED Provider  Electronically Signed by              Robert Zaragoza MD  06/11/24 9784

## 2024-06-25 ENCOUNTER — HOSPITAL ENCOUNTER (EMERGENCY)
Facility: HOSPITAL | Age: 39
Discharge: HOME | End: 2024-06-25
Attending: EMERGENCY MEDICINE
Payer: MEDICARE

## 2024-06-25 ENCOUNTER — APPOINTMENT (EMERGENCY)
Dept: RADIOLOGY | Facility: HOSPITAL | Age: 39
End: 2024-06-25
Payer: MEDICARE

## 2024-06-25 VITALS
SYSTOLIC BLOOD PRESSURE: 115 MMHG | TEMPERATURE: 98.1 F | OXYGEN SATURATION: 97 % | DIASTOLIC BLOOD PRESSURE: 84 MMHG | HEART RATE: 82 BPM | RESPIRATION RATE: 18 BRPM

## 2024-06-25 DIAGNOSIS — Z76.0 ENCOUNTER FOR MEDICATION REFILL: ICD-10-CM

## 2024-06-25 DIAGNOSIS — M25.571 ACUTE RIGHT ANKLE PAIN: Primary | ICD-10-CM

## 2024-06-25 PROCEDURE — 99284 EMERGENCY DEPT VISIT MOD MDM: CPT | Performed by: EMERGENCY MEDICINE

## 2024-06-25 PROCEDURE — 73610 X-RAY EXAM OF ANKLE: CPT

## 2024-06-25 RX ORDER — BICTEGRAVIR SODIUM, EMTRICITABINE, AND TENOFOVIR ALAFENAMIDE FUMARATE 50; 200; 25 MG/1; MG/1; MG/1
1 TABLET ORAL
Qty: 21 TABLET | Refills: 0 | Status: SHIPPED | OUTPATIENT
Start: 2024-06-25 | End: 2024-06-28

## 2024-06-25 RX ORDER — LEVETIRACETAM 1000 MG/1
1000 TABLET ORAL 2 TIMES DAILY
Qty: 42 TABLET | Refills: 0 | Status: SHIPPED | OUTPATIENT
Start: 2024-06-25 | End: 2024-06-28

## 2024-06-25 RX ORDER — IBUPROFEN 600 MG/1
600 TABLET ORAL ONCE
Status: COMPLETED | OUTPATIENT
Start: 2024-06-25 | End: 2024-06-25

## 2024-06-25 RX ADMIN — IBUPROFEN 600 MG: 600 TABLET, FILM COATED ORAL at 03:53

## 2024-06-25 NOTE — ED PROVIDER NOTES
History  Chief Complaint   Patient presents with    Ankle Pain     [place] 7p and has pain to right ankle pain 7/10. Reports not taking his keppra tonight.     Patient is a 38-year-old male seen in the emergency department brought by EMS with concern for right ankle pain.  Patient states that he twisted his right ankle 1 day ago while walking.  Patient notes right ankle pain.  Patient notes some pain with ambulation.  Patient states that he did not take any medication for symptom control this evening prior to evaluation in the emergency department.  Patient notes no other injuries.  Patient notes no weakness, numbness, or tingling.        Prior to Admission Medications   Prescriptions Last Dose Informant Patient Reported? Taking?   bictegravir-emtricitab-tenofovir alafenamide (Biktarvy) -25 MG tablet   No No   Sig: Take 1 tablet by mouth daily with breakfast   folic acid (FOLVITE) 1 mg tablet   No No   Sig: Take 1 tablet (1 mg total) by mouth daily   Patient not taking: Reported on 4/8/2024   levETIRAcetam (Keppra) 1000 MG tablet   No No   Sig: Take 1 tablet (1,000 mg total) by mouth 2 (two) times a day   Patient taking differently: Take 1,000 mg by mouth daily   mirtazapine (REMERON) 30 mg tablet   No No   Sig: Take 1 tablet (30 mg total) by mouth daily at bedtime   Patient not taking: Reported on 6/11/2024   thiamine (VITAMIN B1) 100 mg tablet   No No   Sig: Take 1 tablet (100 mg total) by mouth daily   Patient not taking: Reported on 4/8/2024      Facility-Administered Medications: None       Past Medical History:   Diagnosis Date    HIV (human immunodeficiency virus infection) (HCC)     Seizures (HCC)     Smoker     Syphilis        Past Surgical History:   Procedure Laterality Date    HERNIA REPAIR         History reviewed. No pertinent family history.  I have reviewed and agree with the history as documented.    E-Cigarette/Vaping    E-Cigarette Use Current  "Every Day User      E-Cigarette/Vaping Substances    Nicotine No     THC No     CBD No     Flavoring Yes     Other No     Unknown No      Social History     Tobacco Use    Smoking status: Every Day     Current packs/day: 2.00     Average packs/day: 2.0 packs/day for 8.0 years (16.0 ttl pk-yrs)     Types: Cigarettes    Smokeless tobacco: Never    Tobacco comments:     \"At least 2 packs a day\" of cigarettes.   Vaping Use    Vaping status: Every Day    Substances: Flavoring   Substance Use Topics    Alcohol use: Yes     Alcohol/week: 2.0 standard drinks of alcohol     Types: 1 Glasses of wine, 1 Cans of beer per week     Comment: socially    Drug use: Yes     Frequency: 4.0 times per week     Types: Marijuana     Comment: 4 blunts per day       Review of Systems   Constitutional:  Negative for chills and fever.   HENT:  Negative for ear pain and sore throat.    Eyes:  Negative for pain and visual disturbance.   Respiratory:  Negative for cough and shortness of breath.    Cardiovascular:  Negative for chest pain and palpitations.   Gastrointestinal:  Negative for abdominal pain and vomiting.   Musculoskeletal:  Negative for neck stiffness.        Right ankle pain   Skin:  Negative for color change and rash.   Neurological:  Negative for weakness and numbness.   Psychiatric/Behavioral:  Negative for agitation and confusion.        Physical Exam  Physical Exam  Vitals and nursing note reviewed.   Constitutional:       General: He is not in acute distress.     Appearance: He is well-developed.   HENT:      Head: Normocephalic and atraumatic.      Right Ear: External ear normal.      Left Ear: External ear normal.      Nose: Nose normal.      Mouth/Throat:      Pharynx: Oropharynx is clear.   Eyes:      General: No scleral icterus.     Conjunctiva/sclera: Conjunctivae normal.   Cardiovascular:      Rate and Rhythm: Normal rate.      Comments: well-perfused extremities  Pulmonary:      Effort: Pulmonary effort is normal. No " respiratory distress.   Abdominal:      General: Abdomen is flat. There is no distension.   Musculoskeletal:         General: Tenderness present. No swelling or deformity.      Cervical back: Normal range of motion and neck supple.      Comments: Mild tenderness to right medial malleolus; no appreciable tenderness to right lateral malleolus; neurovascularly intact extremities   Skin:     General: Skin is warm and dry.   Neurological:      General: No focal deficit present.      Mental Status: He is alert.      Cranial Nerves: No cranial nerve deficit.      Sensory: No sensory deficit.   Psychiatric:         Mood and Affect: Mood normal.         Thought Content: Thought content normal.         Vital Signs  ED Triage Vitals   Temperature Pulse Respirations Blood Pressure SpO2   06/25/24 0347 06/25/24 0347 06/25/24 0347 06/25/24 0347 06/25/24 0347   98.1 °F (36.7 °C) (!) 106 18 140/97 95 %      Temp Source Heart Rate Source Patient Position - Orthostatic VS BP Location FiO2 (%)   06/25/24 0347 06/25/24 0347 06/25/24 0347 06/25/24 0347 --   Oral Monitor Lying Left arm       Pain Score       06/25/24 0353       8           Vitals:    06/25/24 0347 06/25/24 0400 06/25/24 0615   BP: 140/97 140/97 115/84   Pulse: (!) 106 98 82   Patient Position - Orthostatic VS: Lying Lying Lying         Visual Acuity      ED Medications  Medications   ibuprofen (MOTRIN) tablet 600 mg (600 mg Oral Given 6/25/24 0353)       Diagnostic Studies  Results Reviewed       None                   XR ankle 3+ views RIGHT   ED Interpretation by Narinder Parson MD (06/25 0409)   No acute fracture or dislocation      Final Result by Bryce Severino MD (06/25 0521)      No acute osseous abnormality.            Workstation performed: [id number]                    Procedures  Procedures         ED Course  ED Course as of 06/25/24 0627 Tue Jun 25, 2024   0627 XR ANKLE 3+ VW RIGHT     INDICATION: right ankle pain.     COMPARISON: Right ankle x-ray  dated September 9, 2022.     FINDINGS:     No acute fracture or dislocation.     No significant degenerative changes.     No lytic or blastic osseous lesion.     Unremarkable soft tissues.     IMPRESSION:     No acute osseous abnormality.                                    SBIRT 22yo+      Flowsheet Row Most Recent Value   Initial Alcohol Screen: US AUDIT-C     1. How often do you have a drink containing alcohol? 0 Filed at: 06/25/2024 0346   2. How many drinks containing alcohol do you have on a typical day you are drinking?  0 Filed at: 06/25/2024 0346   3a. Male UNDER 65: How often do you have five or more drinks on one occasion? 0 Filed at: 06/25/2024 0346   3b. FEMALE Any Age, or MALE 65+: How often do you have 4 or more drinks on one occassion? 0 Filed at: 06/25/2024 0346   Audit-C Score 0 Filed at: 06/25/2024 0346   CALI: How many times in the past year have you...    Used an illegal drug or used a prescription medication for non-medical reasons? Never Filed at: 06/25/2024 0346                      Medical Decision Making  Patient is a 38-year-old male seen in the emergency department with concern for right ankle pain after injury.  Patient was treated with medication for symptom control.  X-ray right ankle showed no acute fracture or dislocation.  Evaluation is consistent with mild right ankle sprain.  Plan to have patient follow up with orthopedics/outpatient providers.  Patient stable for discharge.  Discharge instructions were reviewed with patient.  Patient requested prescriptions of his Keppra and Biktarvy medications.    Problems Addressed:  Acute right ankle pain: acute illness or injury    Amount and/or Complexity of Data Reviewed  Radiology: ordered and independent interpretation performed. Decision-making details documented in ED Course.    Risk  Prescription drug management.             Disposition  Final diagnoses:   Acute right ankle pain     Time reflects when diagnosis was documented in both  MDM as applicable and the Disposition within this note       Time User Action Codes Description Comment    6/25/2024  3:47 AM Narinder Parson [M25.571] Acute right ankle pain     6/25/2024  6:20 AM Narinder Parson [Z76.0] Encounter for medication refill           ED Disposition       ED Disposition   Discharge    Condition   Stable    Date/Time   Tue Jun 25, 2024 0410    Comment   John Sanders discharge to home/self care.                   Follow-up Information       Follow up With Specialties Details Why Contact Info Additional Information    [facility] Orthopedic Surgery Call in 1 day  [address] [address] [phone] [facility] [address] [address] [phone]    [facility] Family Medicine Call  As needed [address] [place] [address] [phone] [facility], [address] [place] [address], [address] [phone]            Patient's Medications   Discharge Prescriptions    BICTEGRAVIR-EMTRICITAB-TENOFOVIR ALAFENAMIDE (BIKTARVY) -25 MG TABLET    Take 1 tablet by mouth daily with breakfast for 21 days       Start Date: 6/25/2024 End Date: 7/16/2024       Order Dose: 1 tablet       Quantity: 21 tablet    Refills: 0    LEVETIRACETAM (KEPPRA) 1000 MG TABLET    Take 1 tablet (1,000 mg total) by mouth 2 (two) times a day for 21 days       Start Date: 6/25/2024 End Date: 7/16/2024       Order Dose: 1,000 mg       Quantity: 42 tablet    Refills: 0           PDMP Review         Value Time User    PDMP Reviewed  Yes 6/17/2023 11:20 AM Sammy Roberts MD            ED Provider  Electronically Signed by             Narinder Parson MD  06/25/24 0504       Narinder Parson MD  06/25/24 0626       Narinder Parson MD  06/25/24 0626       Narinder Parson MD  06/25/24 0627

## 2024-06-26 ENCOUNTER — HOSPITAL ENCOUNTER (EMERGENCY)
Facility: HOSPITAL | Age: 39
Discharge: HOME | End: 2024-06-26
Attending: EMERGENCY MEDICINE
Payer: MEDICARE

## 2024-06-26 VITALS
HEART RATE: 73 BPM | SYSTOLIC BLOOD PRESSURE: 109 MMHG | DIASTOLIC BLOOD PRESSURE: 58 MMHG | TEMPERATURE: 98.3 F | OXYGEN SATURATION: 98 % | RESPIRATION RATE: 15 BRPM

## 2024-06-26 DIAGNOSIS — M54.2 NECK PAIN ON RIGHT SIDE: Primary | ICD-10-CM

## 2024-06-26 PROCEDURE — 99283 EMERGENCY DEPT VISIT LOW MDM: CPT

## 2024-06-26 PROCEDURE — 99284 EMERGENCY DEPT VISIT MOD MDM: CPT | Performed by: EMERGENCY MEDICINE

## 2024-06-26 RX ORDER — DIAZEPAM 5 MG/1
5 TABLET ORAL ONCE
Status: COMPLETED | OUTPATIENT
Start: 2024-06-26 | End: 2024-06-26

## 2024-06-26 RX ORDER — LIDOCAINE 50 MG/G
1 PATCH TOPICAL ONCE
Status: DISCONTINUED | OUTPATIENT
Start: 2024-06-26 | End: 2024-06-26 | Stop reason: HOSPADM

## 2024-06-26 RX ORDER — IBUPROFEN 600 MG/1
600 TABLET ORAL ONCE
Status: COMPLETED | OUTPATIENT
Start: 2024-06-26 | End: 2024-06-26

## 2024-06-26 RX ADMIN — IBUPROFEN 600 MG: 600 TABLET, FILM COATED ORAL at 04:05

## 2024-06-26 RX ADMIN — LIDOCAINE 1 PATCH: 700 PATCH TOPICAL at 04:05

## 2024-06-26 RX ADMIN — DIAZEPAM 5 MG: 5 TABLET ORAL at 04:05

## 2024-06-26 NOTE — ED PROVIDER NOTES
History  Chief Complaint   Patient presents with    Neck Pain     Pt reports right sided neck pain. Pt denies injury. No meds pta      Patient is a 38-year-old male seen in the emergency department with concern for right-sided neck pain which began this evening prior to evaluation in the emergency department.  Patient notes no trauma.  Patient states that he did not take any medication for symptom control this evening prior to evaluation in the emergency department.  Patient notes no fever, chest pain, shortness of breath, abdominal pain, nausea, vomiting.  Patient notes pain worse with rotation of his neck to the right.        Prior to Admission Medications   Prescriptions Last Dose Informant Patient Reported? Taking?   bictegravir-emtricitab-tenofovir alafenamide (Biktarvy) -25 MG tablet   No No   Sig: Take 1 tablet by mouth daily with breakfast   bictegravir-emtricitab-tenofovir alafenamide (Biktarvy) -25 MG tablet   No No   Sig: Take 1 tablet by mouth daily with breakfast for 21 days   folic acid (FOLVITE) 1 mg tablet   No No   Sig: Take 1 tablet (1 mg total) by mouth daily   Patient not taking: Reported on 4/8/2024   levETIRAcetam (Keppra) 1000 MG tablet   No No   Sig: Take 1 tablet (1,000 mg total) by mouth 2 (two) times a day   Patient taking differently: Take 1,000 mg by mouth daily   levETIRAcetam (Keppra) 1000 MG tablet   No No   Sig: Take 1 tablet (1,000 mg total) by mouth 2 (two) times a day for 21 days   mirtazapine (REMERON) 30 mg tablet   No No   Sig: Take 1 tablet (30 mg total) by mouth daily at bedtime   Patient not taking: Reported on 6/11/2024   thiamine (VITAMIN B1) 100 mg tablet   No No   Sig: Take 1 tablet (100 mg total) by mouth daily   Patient not taking: Reported on 4/8/2024      Facility-Administered Medications: None       Past Medical History:   Diagnosis Date    HIV (human immunodeficiency virus infection) (HCC)     Seizures (HCC)     Smoker     Syphilis        Past Surgical  "History:   Procedure Laterality Date    HERNIA REPAIR         History reviewed. No pertinent family history.  I have reviewed and agree with the history as documented.    E-Cigarette/Vaping    E-Cigarette Use Current Every Day User      E-Cigarette/Vaping Substances    Nicotine No     THC No     CBD No     Flavoring Yes     Other No     Unknown No      Social History     Tobacco Use    Smoking status: Every Day     Current packs/day: 2.00     Average packs/day: 2.0 packs/day for 8.0 years (16.0 ttl pk-yrs)     Types: Cigarettes    Smokeless tobacco: Never    Tobacco comments:     \"At least 2 packs a day\" of cigarettes.   Vaping Use    Vaping status: Every Day    Substances: Flavoring   Substance Use Topics    Alcohol use: Yes     Alcohol/week: 2.0 standard drinks of alcohol     Types: 1 Glasses of wine, 1 Cans of beer per week     Comment: socially    Drug use: Yes     Frequency: 4.0 times per week     Types: Marijuana     Comment: 4 blunts per day       Review of Systems   Constitutional:  Negative for chills and fever.   HENT:  Negative for ear pain and trouble swallowing.    Eyes:  Negative for pain and visual disturbance.   Respiratory:  Negative for cough and shortness of breath.    Cardiovascular:  Negative for chest pain and palpitations.   Gastrointestinal:  Negative for abdominal pain and vomiting.   Musculoskeletal:  Positive for neck pain. Negative for gait problem.   Skin:  Negative for color change and rash.   Neurological:  Negative for weakness and numbness.   Psychiatric/Behavioral:  Negative for agitation and confusion.        Physical Exam  Physical Exam  Vitals and nursing note reviewed.   Constitutional:       General: He is not in acute distress.     Appearance: He is well-developed.   HENT:      Head: Normocephalic and atraumatic.      Right Ear: External ear normal.      Left Ear: External ear normal.      Nose: Nose normal.      Mouth/Throat:      Pharynx: Oropharynx is clear.   Eyes:      " General: No scleral icterus.     Conjunctiva/sclera: Conjunctivae normal.   Neck:      Comments: Mild tenderness to palpation over right paraspinal region; no midline C-spine tenderness or step-offs noted   Cardiovascular:      Rate and Rhythm: Normal rate.      Comments: well-perfused extremities  Pulmonary:      Effort: Pulmonary effort is normal. No respiratory distress.   Abdominal:      General: Abdomen is flat. There is no distension.   Musculoskeletal:         General: No swelling or deformity.      Cervical back: Neck supple. Tenderness present.   Skin:     General: Skin is warm and dry.   Neurological:      General: No focal deficit present.      Mental Status: He is alert.      Cranial Nerves: No cranial nerve deficit.      Sensory: No sensory deficit.   Psychiatric:         Mood and Affect: Mood normal.         Thought Content: Thought content normal.         Vital Signs  ED Triage Vitals   Temperature Pulse Respirations Blood Pressure SpO2   06/26/24 0313 06/26/24 0313 06/26/24 0313 06/26/24 0313 06/26/24 0313   98.3 °F (36.8 °C) 89 16 117/62 97 %      Temp Source Heart Rate Source Patient Position - Orthostatic VS BP Location FiO2 (%)   06/26/24 0313 06/26/24 0313 06/26/24 0313 06/26/24 0313 --   Oral Monitor Lying Right arm       Pain Score       06/26/24 0405       8           Vitals:    06/26/24 0313   BP: 117/62   Pulse: 89   Patient Position - Orthostatic VS: Lying         Visual Acuity      ED Medications  Medications   lidocaine (LIDODERM) 5 % patch 1 patch (1 patch Topical Medication Applied 6/26/24 0405)   ibuprofen (MOTRIN) tablet 600 mg (600 mg Oral Given 6/26/24 0405)   diazepam (VALIUM) tablet 5 mg (5 mg Oral Given 6/26/24 0405)       Diagnostic Studies  Results Reviewed       None                   No orders to display              Procedures  Procedures         ED Course                                             Medical Decision Making  Patient is a 38-year-old male seen in the  emergency department with concern for right-sided neck pain.  Evaluation is consistent with musculoskeletal pain, spasm. Patient was treated with medication for symptom control, with good effect.  Imaging is not indicated based on evaluation.  Plan to have patient follow up with PCP/outpatient providers.  Patient stable for discharge.  Discharge instructions were reviewed with patient.    Problems Addressed:  Neck pain on right side: acute illness or injury    Risk  Prescription drug management.             Disposition  Final diagnoses:   Neck pain on right side     Time reflects when diagnosis was documented in both MDM as applicable and the Disposition within this note       Time User Action Codes Description Comment    6/26/2024  3:08 AM Narinder Parson Add [M54.2] Neck pain on right side           ED Disposition       ED Disposition   Discharge    Condition   Stable    Date/Time   Wed Jun 26, 2024  3:08 AM    Comment   John Sanders discharge to home/self care.                   Follow-up Information       Follow up With Specialties Details Why Contact Info Additional Information    Your primary doctor  Call in 1 day       [facility] Family Medicine Call  As needed [address] [place] [address] [phone] [facility] [place], [address] [place] [address], [id number] [phone]    [facility] Orthopedic Surgery Call  As needed [address] [place] [address] [phone] [facility] [address] [address] [phone]            Patient's Medications   Discharge Prescriptions    No medications on file       No discharge procedures on file.    PDMP Review         Value Time User    PDMP Reviewed  Yes 6/17/2023 11:20 AM Sammy Roberts MD            ED Provider  Electronically Signed by             Narinder Parson MD  06/26/24 9226

## 2024-06-28 ENCOUNTER — HOSPITAL ENCOUNTER (EMERGENCY)
Facility: HOSPITAL | Age: 39
Discharge: HOME | End: 2024-06-28
Attending: INTERNAL MEDICINE
Payer: MEDICARE

## 2024-06-28 VITALS
OXYGEN SATURATION: 96 % | RESPIRATION RATE: 16 BRPM | TEMPERATURE: 97.6 F | SYSTOLIC BLOOD PRESSURE: 116 MMHG | DIASTOLIC BLOOD PRESSURE: 57 MMHG | HEART RATE: 69 BPM

## 2024-06-28 DIAGNOSIS — Z59.00 HOMELESS: ICD-10-CM

## 2024-06-28 DIAGNOSIS — Z76.0 ENCOUNTER FOR MEDICATION REFILL: ICD-10-CM

## 2024-06-28 DIAGNOSIS — Z91.148 NONCOMPLIANCE WITH MEDICATION REGIMEN: ICD-10-CM

## 2024-06-28 DIAGNOSIS — R56.9 SEIZURE (HCC): Primary | ICD-10-CM

## 2024-06-28 LAB
ANION GAP SERPL CALCULATED.3IONS-SCNC: 18 MMOL/L (ref 4–13)
ANION GAP SERPL CALCULATED.3IONS-SCNC: 5 MMOL/L (ref 4–13)
ATRIAL RATE: 105 BPM
BASOPHILS # BLD AUTO: 0.05 THOUSANDS/ÂΜL (ref 0–0.1)
BASOPHILS NFR BLD AUTO: 1 % (ref 0–1)
BUN SERPL-MCNC: 11 MG/DL (ref 5–25)
BUN SERPL-MCNC: 11 MG/DL (ref 5–25)
CALCIUM SERPL-MCNC: 8.4 MG/DL (ref 8.4–10.2)
CALCIUM SERPL-MCNC: 9 MG/DL (ref 8.4–10.2)
CHLORIDE SERPL-SCNC: 102 MMOL/L (ref 96–108)
CHLORIDE SERPL-SCNC: 104 MMOL/L (ref 96–108)
CO2 SERPL-SCNC: 18 MMOL/L (ref 21–32)
CO2 SERPL-SCNC: 29 MMOL/L (ref 21–32)
CREAT SERPL-MCNC: 0.86 MG/DL (ref 0.6–1.3)
CREAT SERPL-MCNC: 0.93 MG/DL (ref 0.6–1.3)
EOSINOPHIL # BLD AUTO: 0.27 THOUSAND/ÂΜL (ref 0–0.61)
EOSINOPHIL NFR BLD AUTO: 3 % (ref 0–6)
ERYTHROCYTE [DISTWIDTH] IN BLOOD BY AUTOMATED COUNT: 13.1 % (ref 11.6–15.1)
GFR SERPL CREATININE-BSD FRML MDRD: 103 ML/MIN/1.73SQ M
GFR SERPL CREATININE-BSD FRML MDRD: 110 ML/MIN/1.73SQ M
GLUCOSE SERPL-MCNC: 86 MG/DL (ref 65–140)
GLUCOSE SERPL-MCNC: 91 MG/DL (ref 65–140)
HCT VFR BLD AUTO: 46.5 % (ref 36.5–49.3)
HGB BLD-MCNC: 14.9 G/DL (ref 12–17)
IMM GRANULOCYTES # BLD AUTO: 0.02 THOUSAND/UL (ref 0–0.2)
IMM GRANULOCYTES NFR BLD AUTO: 0 % (ref 0–2)
LEVETIRACETAM SERPL-MCNC: <2 UG/ML (ref 12–46)
LYMPHOCYTES # BLD AUTO: 5.12 THOUSANDS/ÂΜL (ref 0.6–4.47)
LYMPHOCYTES NFR BLD AUTO: 64 % (ref 14–44)
MCH RBC QN AUTO: 29.4 PG (ref 26.8–34.3)
MCHC RBC AUTO-ENTMCNC: 32 G/DL (ref 31.4–37.4)
MCV RBC AUTO: 92 FL (ref 82–98)
MONOCYTES # BLD AUTO: 0.61 THOUSAND/ÂΜL (ref 0.17–1.22)
MONOCYTES NFR BLD AUTO: 8 % (ref 4–12)
NEUTROPHILS # BLD AUTO: 1.92 THOUSANDS/ÂΜL (ref 1.85–7.62)
NEUTS SEG NFR BLD AUTO: 24 % (ref 43–75)
NRBC BLD AUTO-RTO: 0 /100 WBCS
P AXIS: 46 DEGREES
PLATELET # BLD AUTO: 171 THOUSANDS/UL (ref 149–390)
PMV BLD AUTO: 11.5 FL (ref 8.9–12.7)
POTASSIUM SERPL-SCNC: 3.8 MMOL/L (ref 3.5–5.3)
POTASSIUM SERPL-SCNC: 4.8 MMOL/L (ref 3.5–5.3)
PR INTERVAL: 154 MS
QRS AXIS: -7 DEGREES
QRSD INTERVAL: 98 MS
QT INTERVAL: 366 MS
QTC INTERVAL: 483 MS
RBC # BLD AUTO: 5.06 MILLION/UL (ref 3.88–5.62)
SODIUM SERPL-SCNC: 138 MMOL/L (ref 135–147)
SODIUM SERPL-SCNC: 138 MMOL/L (ref 135–147)
T WAVE AXIS: 10 DEGREES
VENTRICULAR RATE: 105 BPM
WBC # BLD AUTO: 7.99 THOUSAND/UL (ref 4.31–10.16)

## 2024-06-28 PROCEDURE — 96361 HYDRATE IV INFUSION ADD-ON: CPT

## 2024-06-28 PROCEDURE — 99285 EMERGENCY DEPT VISIT HI MDM: CPT | Performed by: PHYSICIAN ASSISTANT

## 2024-06-28 PROCEDURE — 93010 ELECTROCARDIOGRAM REPORT: CPT | Performed by: INTERNAL MEDICINE

## 2024-06-28 PROCEDURE — 36415 COLL VENOUS BLD VENIPUNCTURE: CPT | Performed by: PHYSICIAN ASSISTANT

## 2024-06-28 PROCEDURE — 80048 BASIC METABOLIC PNL TOTAL CA: CPT | Performed by: PHYSICIAN ASSISTANT

## 2024-06-28 PROCEDURE — 93005 ELECTROCARDIOGRAM TRACING: CPT

## 2024-06-28 PROCEDURE — 99284 EMERGENCY DEPT VISIT MOD MDM: CPT

## 2024-06-28 PROCEDURE — 85025 COMPLETE CBC W/AUTO DIFF WBC: CPT | Performed by: PHYSICIAN ASSISTANT

## 2024-06-28 PROCEDURE — 96374 THER/PROPH/DIAG INJ IV PUSH: CPT

## 2024-06-28 PROCEDURE — 83520 IMMUNOASSAY QUANT NOS NONAB: CPT | Performed by: PHYSICIAN ASSISTANT

## 2024-06-28 RX ORDER — BICTEGRAVIR SODIUM, EMTRICITABINE, AND TENOFOVIR ALAFENAMIDE FUMARATE 50; 200; 25 MG/1; MG/1; MG/1
1 TABLET ORAL
Qty: 21 TABLET | Refills: 0 | Status: SHIPPED | OUTPATIENT
Start: 2024-06-28 | End: 2024-07-09

## 2024-06-28 RX ORDER — LEVETIRACETAM 1000 MG/1
1000 TABLET ORAL 2 TIMES DAILY
Qty: 42 TABLET | Refills: 0 | Status: SHIPPED | OUTPATIENT
Start: 2024-06-28 | End: 2024-07-09

## 2024-06-28 RX ORDER — LEVETIRACETAM 500 MG/5ML
1000 INJECTION, SOLUTION, CONCENTRATE INTRAVENOUS ONCE
Status: COMPLETED | OUTPATIENT
Start: 2024-06-28 | End: 2024-06-28

## 2024-06-28 RX ADMIN — LEVETIRACETAM 1000 MG: 100 INJECTION, SOLUTION INTRAVENOUS at 08:34

## 2024-06-28 RX ADMIN — SODIUM CHLORIDE 500 ML: 0.9 INJECTION, SOLUTION INTRAVENOUS at 10:50

## 2024-06-28 RX ADMIN — SODIUM CHLORIDE 500 ML: 0.9 INJECTION, SOLUTION INTRAVENOUS at 09:36

## 2024-06-28 RX ADMIN — BICTEGRAVIR SODIUM, EMTRICITABINE, AND TENOFOVIR ALAFENAMIDE FUMARATE 1 TABLET: 50; 200; 25 TABLET ORAL at 12:06

## 2024-06-28 SDOH — ECONOMIC STABILITY - HOUSING INSECURITY: HOMELESSNESS UNSPECIFIED: Z59.00

## 2024-06-28 NOTE — DISCHARGE INSTRUCTIONS
Please return to the emergency department for worsening symptoms including chest pain, shortness of breath, dizziness, lightheadedness, fever greater than 103, severe pain, inability to walk, fainting episodes, etc..  Please follow-up with your family practice provider as soon as possible.  Take your home Keppra and Biktarvy.  I have provided you with refills.

## 2024-06-28 NOTE — ED PROVIDER NOTES
History  Chief Complaint   Patient presents with    Seizure - Prior Hx Of     Per EMS pt was picked up from local [facility] having called EMS post seizure activity. Pt states last seizure medications taken 1-2 weeks ago.      This is a 38-year-old male with past medical history significant for HIV and seizure disorders presenting to the emergency department today after having a seizure.  The patient was seen having a seizure at the local [facility].  The patient has a history of seizure secondary to medication noncompliance.  The patient notes he has not been compliant with his Keppra and Biktarvy outpatient.  He denies any neck pain.  He has no fevers or chills.  He did not bite his tongue nor urinate himself.  He has been evaluated for the same numerous times in the past.  The patient denies other complaints at this time.      History provided by:  Patient   used: No    Seizure - Prior Hx Of  Seizure activity on arrival: no    Seizure type:  Unable to specify  Initial focality:  None  Episode characteristics: no apnea, no confusion, no eye deviation, fully responsive and no tongue biting    Postictal symptoms: no confusion, no memory loss and no somnolence    Return to baseline: yes    Severity:  Moderate  Duration: just PTA.  Timing:  Once  Recent head injury:  No recent head injuries  PTA treatment:  None  History of seizures: yes    Seizure control level:  Uncontrolled  Current therapy:  Levetiracetam  Compliance with current therapy:  Poor      Prior to Admission Medications   Prescriptions Last Dose Informant Patient Reported? Taking?   bictegravir-emtricitab-tenofovir alafenamide (Biktarvy) -25 MG tablet   No No   Sig: Take 1 tablet by mouth daily with breakfast   bictegravir-emtricitab-tenofovir alafenamide (Biktarvy) -25 MG tablet   No No   Sig: Take 1 tablet by mouth daily with breakfast for 21 days   bictegravir-emtricitab-tenofovir alafenamide (Biktarvy) -25 MG  "tablet   No Yes   Sig: Take 1 tablet by mouth daily with breakfast for 21 days   folic acid (FOLVITE) 1 mg tablet   No No   Sig: Take 1 tablet (1 mg total) by mouth daily   Patient not taking: Reported on 4/8/2024   levETIRAcetam (Keppra) 1000 MG tablet   No No   Sig: Take 1 tablet (1,000 mg total) by mouth 2 (two) times a day   Patient taking differently: Take 1,000 mg by mouth daily   levETIRAcetam (Keppra) 1000 MG tablet   No No   Sig: Take 1 tablet (1,000 mg total) by mouth 2 (two) times a day for 21 days   levETIRAcetam (Keppra) 1000 MG tablet   No Yes   Sig: Take 1 tablet (1,000 mg total) by mouth 2 (two) times a day for 21 days   mirtazapine (REMERON) 30 mg tablet   No No   Sig: Take 1 tablet (30 mg total) by mouth daily at bedtime   Patient not taking: Reported on 6/11/2024   thiamine (VITAMIN B1) 100 mg tablet   No No   Sig: Take 1 tablet (100 mg total) by mouth daily   Patient not taking: Reported on 4/8/2024      Facility-Administered Medications: None       Past Medical History:   Diagnosis Date    HIV (human immunodeficiency virus infection) (HCC)     Seizures (HCC)     Smoker     Syphilis        Past Surgical History:   Procedure Laterality Date    HERNIA REPAIR         No family history on file.  I have reviewed and agree with the history as documented.    E-Cigarette/Vaping    E-Cigarette Use Current Every Day User      E-Cigarette/Vaping Substances    Nicotine No     THC No     CBD No     Flavoring Yes     Other No     Unknown No      Social History     Tobacco Use    Smoking status: Every Day     Current packs/day: 2.00     Average packs/day: 2.0 packs/day for 8.0 years (16.0 ttl pk-yrs)     Types: Cigarettes    Smokeless tobacco: Never    Tobacco comments:     \"At least 2 packs a day\" of cigarettes.   Vaping Use    Vaping status: Every Day    Substances: Flavoring   Substance Use Topics    Alcohol use: Yes     Alcohol/week: 2.0 standard drinks of alcohol     Types: 1 Glasses of wine, 1 Cans of " beer per week     Comment: socially    Drug use: Yes     Frequency: 4.0 times per week     Types: Marijuana     Comment: 4 blunts per day       Review of Systems   Constitutional:  Negative for appetite change, chills, diaphoresis, fatigue and fever.   Eyes:  Negative for visual disturbance.   Respiratory:  Negative for cough, chest tightness, shortness of breath and wheezing.    Cardiovascular:  Positive for leg swelling. Negative for chest pain and palpitations.   Gastrointestinal:  Negative for abdominal pain, constipation, diarrhea, nausea and vomiting.   Musculoskeletal:  Negative for neck pain and neck stiffness.   Skin:  Negative for rash and wound.   Neurological:  Positive for seizures. Negative for dizziness, syncope, weakness, light-headedness, numbness and headaches.   Psychiatric/Behavioral:  Negative for confusion.    All other systems reviewed and are negative.      Physical Exam  Physical Exam  Vitals and nursing note reviewed.   Constitutional:       General: He is not in acute distress.     Appearance: Normal appearance. He is normal weight. He is not ill-appearing, toxic-appearing or diaphoretic.   HENT:      Head: Normocephalic and atraumatic.      Nose: Nose normal. No congestion or rhinorrhea.      Mouth/Throat:      Mouth: Mucous membranes are moist.      Pharynx: No oropharyngeal exudate or posterior oropharyngeal erythema.   Eyes:      General: No scleral icterus.        Right eye: No discharge.         Left eye: No discharge.      Conjunctiva/sclera: Conjunctivae normal.   Cardiovascular:      Rate and Rhythm: Normal rate and regular rhythm.      Pulses: Normal pulses.      Heart sounds: Normal heart sounds. No murmur heard.     No friction rub. No gallop.   Pulmonary:      Effort: Pulmonary effort is normal. No respiratory distress.      Breath sounds: Normal breath sounds. No stridor. No wheezing, rhonchi or rales.   Chest:      Chest wall: No tenderness.   Musculoskeletal:          General: Normal range of motion.      Cervical back: Normal range of motion. No rigidity.      Right lower leg: Edema (trace) present.      Left lower leg: Edema (trace) present.      Comments: Negative Homans' sign bilaterally   Skin:     General: Skin is warm and dry.      Capillary Refill: Capillary refill takes less than 2 seconds.      Coloration: Skin is not jaundiced or pale.   Neurological:      General: No focal deficit present.      Mental Status: He is alert and oriented to person, place, and time. Mental status is at baseline.   Psychiatric:         Mood and Affect: Mood normal.         Behavior: Behavior normal.         Vital Signs  ED Triage Vitals   Temperature Pulse Respirations Blood Pressure SpO2   06/28/24 0802 06/28/24 0802 06/28/24 0802 06/28/24 0802 06/28/24 0802   97.6 °F (36.4 °C) 105 16 123/78 100 %      Temp src Heart Rate Source Patient Position - Orthostatic VS BP Location FiO2 (%)   -- 06/28/24 0802 06/28/24 0926 06/28/24 0802 --    Monitor Lying Right arm       Pain Score       06/28/24 0802       No Pain           Vitals:    06/28/24 0802 06/28/24 0831 06/28/24 0926 06/28/24 1105   BP: 123/78  93/50 116/57   Pulse: 105 97 88 69   Patient Position - Orthostatic VS:   Lying Lying         Visual Acuity  Visual Acuity      Flowsheet Row Most Recent Value   L Pupil Size (mm) 1   R Pupil Size (mm) 1            ED Medications  Medications   levETIRAcetam (KEPPRA) injection 1,000 mg (1,000 mg Intravenous Given 6/28/24 0834)   sodium chloride 0.9 % bolus 500 mL (0 mL Intravenous Stopped 6/28/24 1023)   sodium chloride 0.9 % bolus 500 mL (0 mL Intravenous Stopped 6/28/24 1208)   bictegravir-emtricitab-tenofovir alafenamide (BIKTARVY) -25 MG tablet 1 tablet (1 tablet Oral Given 6/28/24 1206)       Diagnostic Studies  Results Reviewed       Procedure Component Value Units Date/Time    Basic metabolic panel [[id number]] Collected: 06/28/24 1030    Lab Status: Final result Specimen: Blood from  "Arm, Left Updated: 06/28/24 1055     Sodium 138 mmol/L      Potassium 4.8 mmol/L      Chloride 104 mmol/L      CO2 29 mmol/L      ANION GAP 5 mmol/L      BUN 11 mg/dL      Creatinine 0.86 mg/dL      Glucose 91 mg/dL      Calcium 8.4 mg/dL      eGFR 110 ml/min/1.73sq m     Narrative:      National Kidney Disease Foundation guidelines for Chronic Kidney Disease (CKD):     Stage 1 with normal or high GFR (GFR > 90 mL/min/1.73 square meters)    Stage 2 Mild CKD (GFR = 60-89 mL/min/1.73 square meters)    Stage 3A Moderate CKD (GFR = 45-59 mL/min/1.73 square meters)    Stage 3B Moderate CKD (GFR = 30-44 mL/min/1.73 square meters)    Stage 4 Severe CKD (GFR = 15-29 mL/min/1.73 square meters)    Stage 5 End Stage CKD (GFR <15 mL/min/1.73 square meters)  Note: GFR calculation is accurate only with a steady state creatinine    Levetiracetam level [[id number]]  (Abnormal) Collected: 06/28/24 0814    Lab Status: Final result Specimen: Blood from Arm, Left Updated: 06/28/24 1043     Levetiracetam Lvl <2.0 ug/mL     Narrative:      \"Brivaracetam (Briviact) interferes with measurements of levetiracetam in the ARK Levetiracetam Assay. Patients undergoing a switch in drug therapy involving Keppra and Briviact should not be monitored for levetiracetam using the ARK assay. Serum levels of levetiracetam and or brivaracetam should be confirmed by a valid chromatographic method if there is a possibility these drugs are co-present in circulation.\"    Basic metabolic panel [[id number]]  (Abnormal) Collected: 06/28/24 0814    Lab Status: Final result Specimen: Blood from Arm, Left Updated: 06/28/24 0923     Sodium 138 mmol/L      Potassium 3.8 mmol/L      Chloride 102 mmol/L      CO2 18 mmol/L      ANION GAP 18 mmol/L      BUN 11 mg/dL      Creatinine 0.93 mg/dL      Glucose 86 mg/dL      Calcium 9.0 mg/dL      eGFR 103 ml/min/1.73sq m     Narrative:      National Kidney Disease Foundation guidelines for Chronic Kidney Disease (CKD):     " Stage 1 with normal or high GFR (GFR > 90 mL/min/1.73 square meters)    Stage 2 Mild CKD (GFR = 60-89 mL/min/1.73 square meters)    Stage 3A Moderate CKD (GFR = 45-59 mL/min/1.73 square meters)    Stage 3B Moderate CKD (GFR = 30-44 mL/min/1.73 square meters)    Stage 4 Severe CKD (GFR = 15-29 mL/min/1.73 square meters)    Stage 5 End Stage CKD (GFR <15 mL/min/1.73 square meters)  Note: GFR calculation is accurate only with a steady state creatinine    CBC and differential [[id number]]  (Abnormal) Collected: 06/28/24 0814    Lab Status: Final result Specimen: Blood from Arm, Left Updated: 06/28/24 0831     WBC 7.99 Thousand/uL      RBC 5.06 Million/uL      Hemoglobin 14.9 g/dL      Hematocrit 46.5 %      MCV 92 fL      MCH 29.4 pg      MCHC 32.0 g/dL      RDW 13.1 %      MPV 11.5 fL      Platelets 171 Thousands/uL      nRBC 0 /100 WBCs      Segmented % 24 %      Immature Grans % 0 %      Lymphocytes % 64 %      Monocytes % 8 %      Eosinophils Relative 3 %      Basophils Relative 1 %      Absolute Neutrophils 1.92 Thousands/µL      Absolute Immature Grans 0.02 Thousand/uL      Absolute Lymphocytes 5.12 Thousands/µL      Absolute Monocytes 0.61 Thousand/µL      Eosinophils Absolute 0.27 Thousand/µL      Basophils Absolute 0.05 Thousands/µL                    No orders to display              Procedures  ECG 12 Lead Documentation Only    Date/Time: 6/28/2024 8:06 AM    Performed by: Aldo Perez PA-C  Authorized by: Aldo Perez PA-C    Indications / Diagnosis:  Seizure  Patient location:  ED  Previous ECG:     Previous ECG:  Compared to current    Comparison ECG info:  6/8/2024    Similarity:  No change  Interpretation:     Interpretation: non-specific    Rate:     ECG rate:  105    ECG rate assessment: tachycardic    Rhythm:     Rhythm: sinus tachycardia    Ectopy:     Ectopy: none    QRS:     QRS axis:  Normal  Conduction:     Conduction: normal    ST segments:     ST segments:   Normal  T waves:     T waves: non-specific    Comments:      Sinus tachycardia with a rate of 105.  Normal axis.  No ectopy.  No STEMI.  No significant change from June 8, 2024.           ED Course  ED Course as of 06/28/24 1915 Fri Jun 28, 2024   0931 ANION GAP(!): 18  IVF in process.   1025 LVEF without significant dysfunction. Will dose with an additional 500cc bolus. Patient clinically examines as dry.   1045 LEVETIRACETA (KEPPRA)(!): <2.0  Patient appears to have not been taking his Keppra.                               SBIRT 22yo+      Flowsheet Row Most Recent Value   Initial Alcohol Screen: US AUDIT-C     1. How often do you have a drink containing alcohol? 0 Filed at: 06/28/2024 0807   2. How many drinks containing alcohol do you have on a typical day you are drinking?  0 Filed at: 06/28/2024 0807   3a. Male UNDER 65: How often do you have five or more drinks on one occasion? 0 Filed at: 06/28/2024 0807   Audit-C Score 0 Filed at: 06/28/2024 0807   CALI: How many times in the past year have you...    Used an illegal drug or used a prescription medication for non-medical reasons? Once or Twice Filed at: 06/28/2024 0807                      Medical Decision Making  38-year-old male presenting to the emergency department today after having a reported seizure.  Patient has a long history of seizure and medication noncompliance.  Patient presents to the emergency department postictal.  Vital signs are stable.  Afebrile.  EKG shows sinus tachycardia with a rate of 105.  No change on my review of prior EKG.  No leukocytosis.  Initial BMP shows anion gap of 18 and CO2 of 18.  The patient was given 2 L of intravenous fluids and subsequently blood pressure remains greater than 100 systolic.  After 1 L of intravenous fluids, CO2 and anion gap normalized.  Keppra level was less than 2.  The patient was given a dose of Keppra while here in the emergency department and also given a dose of Biktarvy secondary to HIV  "medication noncompliance.  The patient has a long history of pedal edema which appears to be at his baseline.  The patient is stable for discharge at this time.  Refills of Biktarvy and Keppra sent to the patient's pharmacy.  Follow-up outpatient.  Strict return precautions were given.  Recommend PCP follow-up as soon as possible. The patient and/or patient's proxy verify their understanding and agree to the plan at this time.  All questions answered to the patient and/or their proxy's satisfaction.  All labs reviewed and utilized in the medical decision making process (if labs were ordered).  Portions of the record may have been created with voice recognition software.  Occasional wrong word or \"sound a like\" substitutions may have occurred due to the inherent limitations of voice recognition software.  Read the chart carefully and recognize, using context, where substitutions have occurred.    I reviewed prior notes.  I reviewed prior EKG.    Problems Addressed:  Homeless: chronic illness or injury  Noncompliance with medication regimen: chronic illness or injury that poses a threat to life or bodily functions  Seizure (HCC): chronic illness or injury with exacerbation, progression, or side effects of treatment    Amount and/or Complexity of Data Reviewed  External Data Reviewed: ECG and notes.  Labs: ordered. Decision-making details documented in ED Course.  ECG/medicine tests: ordered and independent interpretation performed. Decision-making details documented in ED Course.    Risk  Prescription drug management.  Diagnosis or treatment significantly limited by social determinants of health.             Disposition  Final diagnoses:   Seizure (HCC)   Noncompliance with medication regimen   Homeless     Time reflects when diagnosis was documented in both MDM as applicable and the Disposition within this note       Time User Action Codes Description Comment    6/28/2024 11:01 AM Aldo Perez Add [R56.9] " Seizure (HCC)     6/28/2024 11:01 AM Aldo Perez [Z91.148] Noncompliance with medication regimen     6/28/2024 11:02 AM Aldo Perez [Z76.0] Encounter for medication refill     6/28/2024  7:15 PM Aldo Perez [Z59.00] Homeless           ED Disposition       ED Disposition   Discharge    Condition   Stable    Date/Time   Fri Jun 28, 2024 1101    Comment   John Sanders discharge to home/self care.                   Follow-up Information       Follow up With Specialties Details Why Contact Info Additional Information    [facility] Emergency Medicine Go to  If symptoms worsen [address] [id number] [phone] [facility], [address] [address]    [facility] Family Medicine Schedule an appointment as soon as possible for a visit   [address] [facility] [address] [phone] [facility], [address] [place], [id number] [phone]    [facility] Geriatric Medicine Call   [address] [place], [place] [id number] [phone]               Discharge Medication List as of 6/28/2024 11:03 AM        CONTINUE these medications which have CHANGED    Details   !! bictegravir-emtricitab-tenofovir alafenamide (Biktarvy) -25 MG tablet Take 1 tablet by mouth daily with breakfast for 21 days, Starting Fri 6/28/2024, Until Fri 7/19/2024, Normal      levETIRAcetam (Keppra) 1000 MG tablet Take 1 tablet (1,000 mg total) by mouth 2 (two) times a day for 21 days, Starting Fri 6/28/2024, Until Fri 7/19/2024, Normal       !! - Potential duplicate medications found. Please discuss with provider.        CONTINUE these medications which have NOT CHANGED    Details   !! bictegravir-emtricitab-tenofovir alafenamide (Biktarvy) -25 MG tablet Take 1 tablet by mouth daily with breakfast, Starting  Sat 6/8/2024, Until Mon 7/8/2024, Normal      folic acid (FOLVITE) 1 mg tablet Take 1 tablet (1 mg total) by mouth daily, Starting Tue 7/25/2023, Normal      mirtazapine (REMERON) 30 mg tablet Take 1 tablet (30 mg total) by mouth daily at bedtime, Starting Mon 4/8/2024, Normal      thiamine (VITAMIN B1) 100 mg tablet Take 1 tablet (100 mg total) by mouth daily, Starting Tue 7/25/2023, Normal       !! - Potential duplicate medications found. Please discuss with provider.          No discharge procedures on file.    PDMP Review         Value Time User    PDMP Reviewed  Yes 6/17/2023 11:20 AM Sammy Roberts MD            ED Provider  Electronically Signed by             Aldo Perez PA-C  06/28/24 1914       Aldo Perez PA-C  06/28/24 1915

## 2024-07-08 ENCOUNTER — HOSPITAL ENCOUNTER (EMERGENCY)
Facility: HOSPITAL | Age: 39
Discharge: HOME | End: 2024-07-08
Attending: EMERGENCY MEDICINE
Payer: MEDICARE

## 2024-07-08 VITALS
OXYGEN SATURATION: 98 % | HEART RATE: 72 BPM | TEMPERATURE: 98.4 F | SYSTOLIC BLOOD PRESSURE: 110 MMHG | DIASTOLIC BLOOD PRESSURE: 64 MMHG | RESPIRATION RATE: 18 BRPM

## 2024-07-08 DIAGNOSIS — R45.851 SUICIDAL IDEATIONS: Primary | ICD-10-CM

## 2024-07-08 LAB — ETHANOL EXG-MCNC: 0 MG/DL

## 2024-07-08 PROCEDURE — 99285 EMERGENCY DEPT VISIT HI MDM: CPT

## 2024-07-08 PROCEDURE — 82075 ASSAY OF BREATH ETHANOL: CPT

## 2024-07-08 PROCEDURE — 99284 EMERGENCY DEPT VISIT MOD MDM: CPT | Performed by: EMERGENCY MEDICINE

## 2024-07-08 RX ADMIN — BICTEGRAVIR SODIUM, EMTRICITABINE, AND TENOFOVIR ALAFENAMIDE FUMARATE 1 TABLET: 50; 200; 25 TABLET ORAL at 11:40

## 2024-07-08 NOTE — ED NOTES
This writer discussed the patient's current presentation and recommended discharge plan with Dr. Abbott. They agree with the patient being discharged at this time with referrals and/or information about hotline / warm line numbers; walk-in clinic information; local outpatient resource list for therapists / counselors, psychologists, psychiatrists, and partial programs; and shelters and housing resources.  Advised to utilize natural and existing supports. Advised for safety planning and effective coping skills.  In addition, the patient was instructed to call local [facility] crisis, other crisis services, 911 or to go to the nearest ER immediately if you begin having thoughts of hurting herself or others.     [facility] Crisis Number: [place] [phone].  National Suicide/Crisis Lifeline: Dial 988  Crisis Text Line: Text Connect to 246008.  [facility] Wellness jairo that connects you to a mental health professional.     SAFETY PLAN:  Warning Signs (thoughts, images, mood, behavior, situations) of a potential crisis: Thoughts of harming herself.  Hopelessness     Coping Skills (what can I do to take my mind off the problem, or to keep myself safe:  Music.  Take a break/rest.  Adequate rest.  Deep breathing techniques.     Outside Support (who can I reach out to for support and help: See list of shelters.  See list of resources.  [facility] mental health/intake and referral, [facility] [phone], and [facility] [phone]    DEPRESSION TIPS:     Shower. Not a bath, a shower. Use water as hot or cold as you like. You don´t even need to wash. Just get in under the water and let it run over you for a while. Sit on the floor if you need to.     Moisturize everything. Use whatever lotion you like. Use whatever you want, and use it all over your entire dermis.      Put on clean, comfortable clothes.      Drink cold water. Use ice. If you want, add  some mint or lemon for an extra boost.      Clean something. Doesn´t have to be anything big. Organize one drawer of a desk. Wash five dirty dishes. Do a load of laundry. Scrub the bathroom sink.      Blast music. Listen to something upbeat, something that´s got lots of energy. Sing to it, dance to it.     Make food. Don´t just grab something to munch on. Take the time and make food. Even if it´s simple. Prepare food, it tastes way better, and you´ll feel like you accomplished something.      Make something. Write a short story or a poem, draw a picture, color a picture, fold origami, jair or knit, sculpt something out of lindsay, anything artistic. Even if you don´t think you´re good at it. Create.      Go outside. Take a walk. Sit in the grass. Look at the clouds. Smell flowers. Put your hands in the dirt and feel the soil against your skin.     Call someone. Call a loved one, a friend, a family member, call a chat service if you have no one else to call. Talk to a stranger on the street. Have a conversation and listen to someone´s voice. If you can´t bring yourself to call, text or email or whatever, just have some social interaction with another person. Even if you don´t say much, listen to them. It helps.      Cuddle your pets if you have them/can cuddle them. Take pictures of them. Talk to them. Tell them how you feel, about your favorite movie, a new game coming out, anything.      May seem small or silly to some, but this list keeps people alive.       Find something to be grateful for!

## 2024-07-08 NOTE — DISCHARGE INSTRUCTIONS
Patient being discharged at this time with referrals and/or information about hotline / warm line numbers; walk-in clinic information; local outpatient resource list for therapists / counselors, psychologists, psychiatrists, and partial programs; and shelters and housing resources.  Advised to utilize natural and existing supports. Advised for safety planning and effective coping skills.  Patient denies act to firearms. In addition, the patient was instructed to call local [facility] crisis, other crisis services, 911 or to go to the nearest ER immediately if you begin having thoughts of hurting herself or others.      [facility] Crisis Number: [place] [phone].  National Suicide/Crisis Lifeline: Dial 988  Crisis Text Line: Text Connect to 130599.  [facility] jairo that connects you to a mental health professional.      SAFETY PLAN:  Warning Signs (thoughts, images, mood, behavior, situations) of a potential crisis: Thoughts of harming herself.  Hopelessness     Coping Skills (what can I do to take my mind off the problem, or to keep myself safe:  Music. Take a break/rest.  Adequate rest.       Outside Support (who can I reach out to for support and help: See list of shelters.  See list of resources.  [facility] mental health/intake and referral, [facility] [phone], and [facility] [phone]     DEPRESSION TIPS:     Shower. Not a bath, a shower. Use water as hot or cold as you like. You don´t even need to wash. Just get in under the water and let it run over you for a while. Sit on the floor if you need to.     Moisturize everything. Use whatever lotion you like. Use whatever you want, and use it all over your entire dermis.      Put on clean, comfortable clothes.      Drink cold water. Use ice. If you want, add some mint or lemon for an extra boost.      Clean something. Doesn´t have to be anything big. Organize one drawer of a  desk. Wash five dirty dishes. Do a load of laundry. Scrub the bathroom sink.      Blast music. Listen to something upbeat, something that´s got lots of energy. Sing to it, dance to it.     Make food. Don´t just grab something to munch on. Take the time and make food. Even if it´s simple. Prepare food, it tastes way better, and you´ll feel like you accomplished something.      Make something. Write a short story or a poem, draw a picture, color a picture, fold origami, jair or knit, sculpt something out of lindsay, anything artistic. Even if you don´t think you´re good at it. Create.      Go outside. Take a walk. Sit in the grass. Look at the clouds. Smell flowers. Put your hands in the dirt and feel the soil against your skin.     Call someone. Call a loved one, a friend, a family member, call a chat service if you have no one else to call. Talk to a stranger on the street. Have a conversation and listen to someone´s voice. If you can´t bring yourself to call, text or email or whatever, just have some social interaction with another person. Even if you don´t say much, listen to them. It helps.      Cuddle your pets if you have them/can cuddle them. Take pictures of them. Talk to them. Tell them how you feel, about your favorite movie, a new game coming out, anything.      May seem small or silly to some, but this list keeps people alive.       Find something to be grateful for!

## 2024-07-08 NOTE — ED PROVIDER NOTES
History  Chief Complaint   Patient presents with    Psychiatric Evaluation     Patient presented via EMS, reports need for psychiatric evaluation starting 3 hours prior to coming in to unit.      38-year-old male with significant PMH including HIV and seizure disorder who presents to the emergency department via ambulance for suicidal ideation.  Patient states about 3 hours ago he started to have thoughts of wanting to hurt himself such as jumping off a bridge after he had smoked a blunt.  Patient has been seen previously in the emergency department for similar complaints.  He is also complaining of his chronic neck pain and weakness.  He denies any chest pain, shortness of breath, or abdominal pain at this time.  No other acute concerns at this time. Denies chest pain, SOB, cough, abdominal pain, n/v/d, fever, chills, dizziness, lightheadedness, HA, dysuria, hematuria, hematochezia, or melena.           Prior to Admission Medications   Prescriptions Last Dose Informant Patient Reported? Taking?   bictegravir-emtricitab-tenofovir alafenamide (Biktarvy) -25 MG tablet   No No   Sig: Take 1 tablet by mouth daily with breakfast   bictegravir-emtricitab-tenofovir alafenamide (Biktarvy) -25 MG tablet   No No   Sig: Take 1 tablet by mouth daily with breakfast for 21 days   folic acid (FOLVITE) 1 mg tablet   No No   Sig: Take 1 tablet (1 mg total) by mouth daily   Patient not taking: Reported on 4/8/2024   levETIRAcetam (Keppra) 1000 MG tablet   No No   Sig: Take 1 tablet (1,000 mg total) by mouth 2 (two) times a day   Patient taking differently: Take 1,000 mg by mouth daily   levETIRAcetam (Keppra) 1000 MG tablet   No No   Sig: Take 1 tablet (1,000 mg total) by mouth 2 (two) times a day for 21 days   mirtazapine (REMERON) 30 mg tablet   No No   Sig: Take 1 tablet (30 mg total) by mouth daily at bedtime   Patient not taking: Reported on 6/11/2024   thiamine (VITAMIN B1) 100 mg tablet   No No   Sig: Take 1 tablet  "(100 mg total) by mouth daily   Patient not taking: Reported on 4/8/2024      Facility-Administered Medications: None       Past Medical History:   Diagnosis Date    HIV (human immunodeficiency virus infection) (HCC)     Seizures (HCC)     Smoker     Syphilis        Past Surgical History:   Procedure Laterality Date    HERNIA REPAIR         History reviewed. No pertinent family history.  I have reviewed and agree with the history as documented.    E-Cigarette/Vaping    E-Cigarette Use Current Some Day User      E-Cigarette/Vaping Substances    Nicotine Yes     THC Yes     CBD No     Flavoring Yes     Other No     Unknown No      Social History     Tobacco Use    Smoking status: Every Day     Current packs/day: 2.00     Average packs/day: 2.0 packs/day for 8.0 years (16.0 ttl pk-yrs)     Types: Cigarettes    Smokeless tobacco: Never    Tobacco comments:     \"At least 2 packs a day\" of cigarettes.   Vaping Use    Vaping status: Some Days    Substances: Nicotine, THC, Flavoring   Substance Use Topics    Alcohol use: Yes     Alcohol/week: 2.0 standard drinks of alcohol     Types: 1 Glasses of wine, 1 Cans of beer per week     Comment: socially    Drug use: Yes     Frequency: 4.0 times per week     Types: Marijuana     Comment: 4 blunts per day        Review of Systems   Constitutional:  Negative for appetite change, chills, diaphoresis, fatigue and fever.   HENT: Negative.  Negative for congestion, ear discharge, ear pain, postnasal drip, rhinorrhea, sore throat and trouble swallowing.    Eyes: Negative.  Negative for pain and visual disturbance.   Respiratory: Negative.  Negative for cough and shortness of breath.    Cardiovascular: Negative.  Negative for chest pain.   Gastrointestinal: Negative.  Negative for abdominal pain, blood in stool, constipation, diarrhea, nausea and vomiting.   Genitourinary: Negative.  Negative for decreased urine volume, difficulty urinating, dysuria, flank pain and hematuria. "   Musculoskeletal:  Positive for neck pain. Negative for arthralgias and back pain.   Skin: Negative.  Negative for color change and rash.   Neurological:  Positive for weakness. Negative for dizziness, syncope, light-headedness and headaches.   Psychiatric/Behavioral:  Positive for suicidal ideas.        Physical Exam  ED Triage Vitals [07/08/24 0122]   Temperature Pulse Respirations Blood Pressure SpO2   98.1 °F (36.7 °C) 94 18 111/58 98 %      Temp Source Heart Rate Source Patient Position - Orthostatic VS BP Location FiO2 (%)   Oral Monitor Lying Right arm --      Pain Score       --             Orthostatic Vital Signs  Vitals:    07/08/24 0122 07/08/24 1104   BP: 111/58 110/64   Pulse: 94 72   Patient Position - Orthostatic VS: Lying Lying       Physical Exam  Constitutional:       General: He is not in acute distress.     Appearance: He is normal weight.      Comments: Patient appears disheveled with dirty clothes.   HENT:      Head: Normocephalic and atraumatic.      Right Ear: External ear normal.      Left Ear: External ear normal.      Nose: Nose normal.      Mouth/Throat:      Pharynx: Oropharynx is clear.   Eyes:      Conjunctiva/sclera: Conjunctivae normal.   Cardiovascular:      Rate and Rhythm: Normal rate and regular rhythm.      Pulses: Normal pulses.      Heart sounds: Normal heart sounds.      Comments: RRR with +S1 and S2, no murmurs appreciated on exam. Peripheral pulses intact.    Pulmonary:      Effort: Pulmonary effort is normal. No respiratory distress.      Breath sounds: Normal breath sounds. No wheezing, rhonchi or rales.      Comments: CTABL with no abnormal lung sounds such as wheezes or rales appreciated on exam.     Abdominal:      General: Abdomen is flat. Bowel sounds are normal. There is no distension.      Palpations: Abdomen is soft.      Tenderness: There is no abdominal tenderness.      Comments: Soft, non tender, normo-active bowel sounds. Without rigidity, guarding, or  distension.     Musculoskeletal:         General: Normal range of motion.      Cervical back: Normal range of motion.   Skin:     General: Skin is warm and dry.   Neurological:      General: No focal deficit present.      Mental Status: He is alert and oriented to person, place, and time. Mental status is at baseline.      Comments: CN grossly intact on visualization. No focal neurologic deficits noted on exam. 5/5 strength in all extremities. Neurovascularly intact with normal sensation and motor function.             ED Medications  Medications   bictegravir-emtricitab-tenofovir alafenamide (BIKTARVY) -25 MG tablet 1 tablet (1 tablet Oral Given 7/8/24 1140)       Diagnostic Studies  Results Reviewed       Procedure Component Value Units Date/Time    POCT alcohol breath test [[id number]]  (Normal) Resulted: 07/08/24 0134    Lab Status: Final result Updated: 07/08/24 0134     EXTBreath Alcohol 0.00    Rapid drug screen, urine [[id number]]     Lab Status: No result Specimen: Urine                    No orders to display         Procedures  Procedures      ED Course  ED Course as of 07/08/24 1357   Mon Jul 08, 2024 0134 EXTBreath Alcohol: 0.00   0144 Signed out to Dr. Rasheed at the end of my shift.                             SBIRT 22yo+      Flowsheet Row Most Recent Value   Initial Alcohol Screen: US AUDIT-C     1. How often do you have a drink containing alcohol? 0 Filed at: 07/08/2024 0126   2. How many drinks containing alcohol do you have on a typical day you are drinking?  0 Filed at: 07/08/2024 0126   3a. Male UNDER 65: How often do you have five or more drinks on one occasion? 0 Filed at: 07/08/2024 0126   3b. FEMALE Any Age, or MALE 65+: How often do you have 4 or more drinks on one occassion? 0 Filed at: 07/08/2024 0126   Audit-C Score 0 Filed at: 07/08/2024 0126   CALI: How many times in the past year have you...    Used an illegal drug or used a prescription medication for non-medical reasons? Never  Filed at: 07/08/2024 0126                  Medical Decision Making  38-year-old male with significant PMH including HIV and seizure disorder who presented to the ED via ambulance for suicidal ideation.  Patient's labs were obtained and reviewed by an ED provider.  See ED course for more details about patient's ED stay.  Upon examination at bedside, patient was noted to be disheveled however, in no acute distress.  Patient's labs showed a level of 0 for the breathalyzer.  Patient was allowed to sober in the emergency department following marijuana intoxication.  Patient was also given a dose of his Biktarvy while in the emergency department.  At which point he chose to leave the emergency department prior to giving urine for urine drug screen.  Patient was advised to follow-up outpatient with his PCP.  He was also instructed return to the ED if his symptoms worsen including but not limited to increasing SI/HI, active plan to harm himself or others, hallucinations, fever, chills, nausea or vomiting, or changes in his behavior.    Amount and/or Complexity of Data Reviewed  Labs: ordered. Decision-making details documented in ED Course.    Risk  Prescription drug management.          Disposition  Final diagnoses:   Suicidal ideations     Time reflects when diagnosis was documented in both MDM as applicable and the Disposition within this note       Time User Action Codes Description Comment    7/8/2024 11:04 AM Lizandro Abbott Add [R45.851] Suicidal ideations           ED Disposition       ED Disposition   Discharge    Condition   Stable    Date/Time   Mon Jul 8, 2024 1104    Comment   John Sanders discharge to home/self care.                   Follow-up Information       Follow up With Specialties Details Why Contact Info Additional Information    [facility] Emergency Department Emergency Medicine  If symptoms worsen [address] [place] [id number]  [phone] [facility], [address] [place] [phone]            Discharge Medication List as of 7/8/2024 11:04 AM        CONTINUE these medications which have NOT CHANGED    Details   !! bictegravir-emtricitab-tenofovir alafenamide (Biktarvy) -25 MG tablet Take 1 tablet by mouth daily with breakfast, Starting Sat 6/8/2024, Until Mon 7/8/2024, Normal      !! bictegravir-emtricitab-tenofovir alafenamide (Biktarvy) -25 MG tablet Take 1 tablet by mouth daily with breakfast for 21 days, Starting Fri 6/28/2024, Until Fri 7/19/2024, Normal      folic acid (FOLVITE) 1 mg tablet Take 1 tablet (1 mg total) by mouth daily, Starting Tue 7/25/2023, Normal      levETIRAcetam (Keppra) 1000 MG tablet Take 1 tablet (1,000 mg total) by mouth 2 (two) times a day for 21 days, Starting Fri 6/28/2024, Until Fri 7/19/2024, Normal      mirtazapine (REMERON) 30 mg tablet Take 1 tablet (30 mg total) by mouth daily at bedtime, Starting Mon 4/8/2024, Normal      thiamine (VITAMIN B1) 100 mg tablet Take 1 tablet (100 mg total) by mouth daily, Starting Tue 7/25/2023, Normal       !! - Potential duplicate medications found. Please discuss with provider.        No discharge procedures on file.    PDMP Review         Value Time User    PDMP Reviewed  Yes 6/17/2023 11:20 AM Sammy Roberts MD             ED Provider  Attending physically available and evaluated John Sanders. I managed the patient along with the ED Attending.    Electronically Signed by           Hakeem Blakely MD  07/08/24 2036

## 2024-07-08 NOTE — ED ATTENDING ATTESTATION
7/8/2024  I, Giuseppe Rasheed MD, saw and evaluated the patient. I have discussed the patient with the resident/non-physician practitioner and agree with the resident's/non-physician practitioner's findings, Plan of Care, and MDM as documented in the resident's/non-physician practitioner's note, except where noted. All available labs and Radiology studies were reviewed.  I was present for key portions of any procedure(s) performed by the resident/non-physician practitioner and I was immediately available to provide assistance.       At this point I agree with the current assessment done in the Emergency Department.  I have conducted an independent evaluation of this patient a history and physical is as follows:    39 y/o male with hx of epilepsy, HIV, prior syphilis, and homelessness presents to the ED via EMS for evaluation of suicidal ideation.  The patient states that approximately 3 hours ago he started sprinting suicidal ideation with a plan to jump off a bridge.  He states that these thoughts occurred after smoking a blunt/marijuana cigarette.  He reports prior psychiatric admission approximately 1 year ago for similar suicidal ideation.  He also reports musculoskeletal neck pain and generalized weakness that are chronic in nature.  He denies any other physical symptoms or complaints.  No homicidal ideation or hallucinations.            Review of Systems   Constitutional:  Positive for fatigue. Negative for chills and fever.   HENT:  Negative for congestion, rhinorrhea and sore throat.    Respiratory:  Negative for cough and shortness of breath.    Cardiovascular:  Negative for chest pain and palpitations.   Gastrointestinal:  Negative for abdominal pain, diarrhea, nausea and vomiting.   Genitourinary:  Negative for dysuria and hematuria.   Musculoskeletal:  Negative for back pain and neck pain.   Neurological:  Positive for weakness. Negative for light-headedness, numbness and headaches.    Psychiatric/Behavioral:  Positive for suicidal ideas. Negative for hallucinations and self-injury.    All other systems reviewed and are negative.      Physical Exam  Vitals and nursing note reviewed.   Constitutional:       General: He is not in acute distress.     Appearance: Normal appearance. He is normal weight.   HENT:      Head: Normocephalic and atraumatic.      Right Ear: External ear normal.      Left Ear: External ear normal.      Nose: Nose normal.      Mouth/Throat:      Mouth: Mucous membranes are moist.      Pharynx: Oropharynx is clear. No oropharyngeal exudate or posterior oropharyngeal erythema.   Eyes:      Extraocular Movements: Extraocular movements intact.      Conjunctiva/sclera: Conjunctivae normal.      Pupils: Pupils are equal, round, and reactive to light.   Cardiovascular:      Rate and Rhythm: Normal rate and regular rhythm.      Pulses: Normal pulses.      Heart sounds: Normal heart sounds.   Pulmonary:      Effort: Pulmonary effort is normal. No respiratory distress.      Breath sounds: Normal breath sounds. No wheezing or rales.   Abdominal:      General: Abdomen is flat. Bowel sounds are normal. There is no distension.      Palpations: Abdomen is soft.      Tenderness: There is no abdominal tenderness. There is no right CVA tenderness, left CVA tenderness or guarding.   Musculoskeletal:         General: No swelling or tenderness. Normal range of motion.      Cervical back: Normal range of motion and neck supple. No tenderness.   Skin:     General: Skin is warm and dry.   Neurological:      General: No focal deficit present.      Mental Status: He is alert and oriented to person, place, and time.   Psychiatric:      Comments: Awake, cooperative.  Endorses suicidal ideation with plan to jump off a bridge.  Denies any homicidal ideation or hallucinations.           ED Course         Critical Care Time  Procedures    Medical Decision Making  37 y/o male with hx of epilepsy, HIV, prior  syphilis, and homelessness presents to the ED via EMS for evaluation of suicidal ideation.  The patient states that approximately 3 hours ago he started sprinting suicidal ideation with a plan to jump off a bridge.  He states that these thoughts occurred after smoking a blunt/marijuana cigarette.  He reports prior psychiatric admission approximately 1 year ago for similar suicidal ideation.  He also reports musculoskeletal neck pain and generalized weakness that are chronic in nature.  He denies any other physical symptoms or complaints.  No homicidal ideation or hallucinations.    Vital signs reviewed.  See physical exam documentation for exam findings.  The patient was seen and evaluated by Dr. Blakely, resident physician and I was present as the supervising attending physician.  Given the patient's ongoing suicidal ideation with plan, patient will require crisis evaluation and discussion for possible inpatient admission as he has had prior admission for similar presentations.  Medical screening performed and the patient is medically cleared for behavioral health evaluation.  Care for the patient was signed out at end of shift, pending crisis evaluation and disposition.  The patient was subsequently seen by crisis, declined voluntary admission and had no criteria at this time for 302 involuntary admission as his suicidal ideation resolved.    Amount and/or Complexity of Data Reviewed  Labs: ordered.

## 2024-07-08 NOTE — ED NOTES
"Met with patient to complete crisis intake and safety assessments.  Seen under one-to-one observation.  Patient arrived EMS for suicidal ideations.  Patient is a high utilizer emergency department services mostly for medical complaints and occasional visits for psychiatric symptoms.  Patient sleeping when approached and easily woken with verbal commands.  Patient is alert and oriented.  He is cooperative in answering questions though minimal and while keeping eyes closed throughout assessment.  Patient currently denies suicidal ideation; intent or plan.  Patient admits to SI yesterday.  Patient no longer feels that he is needs to be admitted and answers yes in regard to ability to contract for safety inside outside of the emergency department.  Patient denies self-injurious behaviors or homicidal ideations or audiovisual hallucinations.  He denies any access to firearms.  Patient reports compliance with medications that he says are prescribed by \"my doctor\".  Patient currently is homeless with long history of the same.  Patient requesting food and drink.  Patient mood is euthymic, is calm and cooperative.     Currently does not present risk to self.  Patient does not want to sign a 201. Collaborated with Dr. Abbott who is in agreement with discharge to follow up outpatient.  Completed safety plan with the patient.  See separate note for the plan.       "

## 2024-07-08 NOTE — ED CARE HANDOFF
Emergency Department Sign Out Note        Sign out and transfer of care from [facility]. See Separate Emergency Department note.     The patient, John Sanders, was evaluated by the previous provider for SI w plan to jump off bridge.      Well known to this emergency department.    Workup Completed:  Labs Reviewed   POCT ALCOHOL BREATH TEST - Normal       Result Value Ref Range Status    EXTBreath Alcohol 0.00   Final   RAPID DRUG SCREEN, URINE     No results found.      ED Course / Workup Pending (followup):  Pending crisis eval.     Received med screening exam and is medically cleared for psych.         Patient was evaluated by crisis worker.  He declined that he is suicidal at this point.  He contracts for safety.  I discussed discharge versus admission with the patient and he opted for discharge.  Strict return precautions discussed.                            ED Course as of 07/08/24 1208   Mon Jul 08, 2024   1104 Patient no longer suicidal when evaluated by crisis.  Will discharge home.  Strict return precautions given.     Procedures  Medical Decision Making  Amount and/or Complexity of Data Reviewed  Labs: ordered.    Risk  Prescription drug management.            Disposition  Final diagnoses:   Suicidal ideations     Time reflects when diagnosis was documented in both MDM as applicable and the Disposition within this note       Time User Action Codes Description Comment    7/8/2024 11:04 AM Lizandro Abbott Add [R45.851] Suicidal ideations           ED Disposition       ED Disposition   Discharge    Condition   Stable    Date/Time   Mon Jul 8, 2024 11:04 AM    Comment   John Sanders discharge to home/self care.                   Follow-up Information       Follow up With Specialties Details Why Contact Info Additional Information    [facility] Emergency Department Emergency Medicine  If symptoms worsen [address] [place] [phone]  [phone] [facility], [address] [place] [phone]          Patient's Medications   Discharge Prescriptions    No medications on file     No discharge procedures on file.       ED Provider  Electronically Signed by     Lizandro Abbott DO  07/08/24 0869

## 2024-07-09 ENCOUNTER — HOSPITAL ENCOUNTER (EMERGENCY)
Facility: HOSPITAL | Age: 39
Discharge: HOME | End: 2024-07-09
Attending: EMERGENCY MEDICINE | Admitting: EMERGENCY MEDICINE
Payer: MEDICARE

## 2024-07-09 VITALS
RESPIRATION RATE: 16 BRPM | HEART RATE: 69 BPM | SYSTOLIC BLOOD PRESSURE: 105 MMHG | DIASTOLIC BLOOD PRESSURE: 57 MMHG | TEMPERATURE: 98.5 F | OXYGEN SATURATION: 96 %

## 2024-07-09 DIAGNOSIS — R53.1 GENERALIZED WEAKNESS: Primary | ICD-10-CM

## 2024-07-09 DIAGNOSIS — Z86.69 HISTORY OF SEIZURE DISORDER: ICD-10-CM

## 2024-07-09 DIAGNOSIS — Z76.0 ENCOUNTER FOR MEDICATION REFILL: ICD-10-CM

## 2024-07-09 DIAGNOSIS — B20 HISTORY OF HIV INFECTION (HCC): ICD-10-CM

## 2024-07-09 DIAGNOSIS — Z76.0 MEDICATION REFILL: ICD-10-CM

## 2024-07-09 DIAGNOSIS — R26.2 AMBULATORY DYSFUNCTION: ICD-10-CM

## 2024-07-09 LAB
ANION GAP SERPL CALCULATED.3IONS-SCNC: 9 MMOL/L (ref 4–13)
BASOPHILS # BLD AUTO: 0.03 THOUSANDS/ÂΜL (ref 0–0.1)
BASOPHILS NFR BLD AUTO: 1 % (ref 0–1)
BUN SERPL-MCNC: 11 MG/DL (ref 5–25)
CALCIUM SERPL-MCNC: 8.9 MG/DL (ref 8.4–10.2)
CHLORIDE SERPL-SCNC: 104 MMOL/L (ref 96–108)
CO2 SERPL-SCNC: 24 MMOL/L (ref 21–32)
CREAT SERPL-MCNC: 0.96 MG/DL (ref 0.6–1.3)
EOSINOPHIL # BLD AUTO: 0.19 THOUSAND/ÂΜL (ref 0–0.61)
EOSINOPHIL NFR BLD AUTO: 3 % (ref 0–6)
ERYTHROCYTE [DISTWIDTH] IN BLOOD BY AUTOMATED COUNT: 13.3 % (ref 11.6–15.1)
GFR SERPL CREATININE-BSD FRML MDRD: 99 ML/MIN/1.73SQ M
GLUCOSE SERPL-MCNC: 113 MG/DL (ref 65–140)
HCT VFR BLD AUTO: 40.2 % (ref 36.5–49.3)
HGB BLD-MCNC: 13.5 G/DL (ref 12–17)
IMM GRANULOCYTES # BLD AUTO: 0.01 THOUSAND/UL (ref 0–0.2)
IMM GRANULOCYTES NFR BLD AUTO: 0 % (ref 0–2)
LYMPHOCYTES # BLD AUTO: 2.69 THOUSANDS/ÂΜL (ref 0.6–4.47)
LYMPHOCYTES NFR BLD AUTO: 43 % (ref 14–44)
MCH RBC QN AUTO: 30 PG (ref 26.8–34.3)
MCHC RBC AUTO-ENTMCNC: 33.6 G/DL (ref 31.4–37.4)
MCV RBC AUTO: 89 FL (ref 82–98)
MONOCYTES # BLD AUTO: 0.48 THOUSAND/ÂΜL (ref 0.17–1.22)
MONOCYTES NFR BLD AUTO: 8 % (ref 4–12)
NEUTROPHILS # BLD AUTO: 2.86 THOUSANDS/ÂΜL (ref 1.85–7.62)
NEUTS SEG NFR BLD AUTO: 45 % (ref 43–75)
NRBC BLD AUTO-RTO: 0 /100 WBCS
PLATELET # BLD AUTO: 184 THOUSANDS/UL (ref 149–390)
PMV BLD AUTO: 9.9 FL (ref 8.9–12.7)
POTASSIUM SERPL-SCNC: 3.7 MMOL/L (ref 3.5–5.3)
RBC # BLD AUTO: 4.5 MILLION/UL (ref 3.88–5.62)
SODIUM SERPL-SCNC: 137 MMOL/L (ref 135–147)
WBC # BLD AUTO: 6.26 THOUSAND/UL (ref 4.31–10.16)

## 2024-07-09 PROCEDURE — 80048 BASIC METABOLIC PNL TOTAL CA: CPT | Performed by: EMERGENCY MEDICINE

## 2024-07-09 PROCEDURE — 85025 COMPLETE CBC W/AUTO DIFF WBC: CPT | Performed by: EMERGENCY MEDICINE

## 2024-07-09 PROCEDURE — 36415 COLL VENOUS BLD VENIPUNCTURE: CPT | Performed by: EMERGENCY MEDICINE

## 2024-07-09 PROCEDURE — 99284 EMERGENCY DEPT VISIT MOD MDM: CPT

## 2024-07-09 PROCEDURE — 99284 EMERGENCY DEPT VISIT MOD MDM: CPT | Performed by: EMERGENCY MEDICINE

## 2024-07-09 RX ORDER — BICTEGRAVIR SODIUM, EMTRICITABINE, AND TENOFOVIR ALAFENAMIDE FUMARATE 50; 200; 25 MG/1; MG/1; MG/1
1 TABLET ORAL
Qty: 21 TABLET | Refills: 0 | Status: SHIPPED | OUTPATIENT
Start: 2024-07-09 | End: 2024-07-30

## 2024-07-09 RX ORDER — LEVETIRACETAM 500 MG/1
1000 TABLET ORAL ONCE
Status: COMPLETED | OUTPATIENT
Start: 2024-07-09 | End: 2024-07-09

## 2024-07-09 RX ORDER — LEVETIRACETAM 1000 MG/1
1000 TABLET ORAL 2 TIMES DAILY
Qty: 42 TABLET | Refills: 0 | Status: SHIPPED | OUTPATIENT
Start: 2024-07-09 | End: 2024-07-30

## 2024-07-09 RX ADMIN — LEVETIRACETAM 1000 MG: 500 TABLET, FILM COATED ORAL at 02:17

## 2024-07-09 NOTE — ED PROVIDER NOTES
"History  Chief Complaint   Patient presents with    Extremity Weakness     Patient stating was having difficulty walking even with his walker. Stating it was taking him longer to get to places     37 y/o male with hx of epilepsy, HIV, prior syphilis, and homelessness presents to the ED via EMS for evaluation of increased generalized weakness and ambulatory dysfunction.  The patient has baseline ambulatory dysfunction and frequently needs to use a walker to assist with his ambulation.  He states that he had more difficulty tonight than usual with his ambulation.  The patient is currently homeless and notes that tonight he was walking from a local [employer] to [facility], however \"it was taking way longer than normal for me to walk there with my walker.\"  When he arrived to the [facility] he called EMS for assistance and was brought to the ER for evaluation.        Prior to Admission Medications   Prescriptions Last Dose Informant Patient Reported? Taking?   bictegravir-emtricitab-tenofovir alafenamide (Biktarvy) -25 MG tablet   No No   Sig: Take 1 tablet by mouth daily with breakfast   bictegravir-emtricitab-tenofovir alafenamide (Biktarvy) -25 MG tablet   No No   Sig: Take 1 tablet by mouth daily with breakfast for 21 days   bictegravir-emtricitab-tenofovir alafenamide (Biktarvy) -25 MG tablet   No Yes   Sig: Take 1 tablet by mouth daily with breakfast for 21 days   folic acid (FOLVITE) 1 mg tablet   No No   Sig: Take 1 tablet (1 mg total) by mouth daily   Patient not taking: Reported on 4/8/2024   levETIRAcetam (Keppra) 1000 MG tablet   No No   Sig: Take 1 tablet (1,000 mg total) by mouth 2 (two) times a day   Patient taking differently: Take 1,000 mg by mouth daily   levETIRAcetam (Keppra) 1000 MG tablet   No No   Sig: Take 1 tablet (1,000 mg total) by mouth 2 (two) times a day for 21 days   levETIRAcetam (Keppra) 1000 MG tablet   No Yes   Sig: Take 1 tablet (1,000 mg total) by mouth 2 (two) times a day for " "21 days   mirtazapine (REMERON) 30 mg tablet   No No   Sig: Take 1 tablet (30 mg total) by mouth daily at bedtime   Patient not taking: Reported on 6/11/2024   thiamine (VITAMIN B1) 100 mg tablet   No No   Sig: Take 1 tablet (100 mg total) by mouth daily   Patient not taking: Reported on 4/8/2024      Facility-Administered Medications: None       Past Medical History:   Diagnosis Date    HIV (human immunodeficiency virus infection) (HCC)     Seizures (HCC)     Smoker     Syphilis        Past Surgical History:   Procedure Laterality Date    HERNIA REPAIR         History reviewed. No pertinent family history.  I have reviewed and agree with the history as documented.    E-Cigarette/Vaping    E-Cigarette Use Current Some Day User      E-Cigarette/Vaping Substances    Nicotine Yes     THC Yes     CBD No     Flavoring Yes     Other No     Unknown No      Social History     Tobacco Use    Smoking status: Every Day     Current packs/day: 2.00     Average packs/day: 2.0 packs/day for 8.0 years (16.0 ttl pk-yrs)     Types: Cigarettes    Smokeless tobacco: Never    Tobacco comments:     \"At least 2 packs a day\" of cigarettes.   Vaping Use    Vaping status: Some Days    Substances: Nicotine, THC, Flavoring   Substance Use Topics    Alcohol use: Yes     Alcohol/week: 2.0 standard drinks of alcohol     Types: 1 Glasses of wine, 1 Cans of beer per week     Comment: socially    Drug use: Yes     Frequency: 4.0 times per week     Types: Marijuana     Comment: 4 blunts per day       Review of Systems   Constitutional:  Negative for chills and fever.   HENT:  Negative for congestion, rhinorrhea and sore throat.    Respiratory:  Negative for cough and shortness of breath.    Cardiovascular:  Negative for chest pain and palpitations.   Gastrointestinal:  Negative for abdominal pain, diarrhea, nausea and vomiting.   Genitourinary:  Negative for dysuria and hematuria.   Musculoskeletal:  Negative for back pain and neck pain. "   Neurological:  Negative for weakness, light-headedness, numbness and headaches.        Generalized weakness, ambulatory dysfunction, increased from baseline, see HPI   All other systems reviewed and are negative.      Physical Exam  Physical Exam  Vitals and nursing note reviewed.   Constitutional:       General: He is not in acute distress.     Appearance: Normal appearance. He is normal weight.   HENT:      Head: Normocephalic and atraumatic.      Right Ear: External ear normal.      Left Ear: External ear normal.      Nose: Nose normal.      Mouth/Throat:      Mouth: Mucous membranes are moist.      Pharynx: Oropharynx is clear. No oropharyngeal exudate or posterior oropharyngeal erythema.   Eyes:      Extraocular Movements: Extraocular movements intact.      Conjunctiva/sclera: Conjunctivae normal.      Pupils: Pupils are equal, round, and reactive to light.   Cardiovascular:      Rate and Rhythm: Normal rate and regular rhythm.      Pulses: Normal pulses.      Heart sounds: Normal heart sounds.   Pulmonary:      Effort: Pulmonary effort is normal. No respiratory distress.      Breath sounds: Normal breath sounds. No wheezing or rales.   Abdominal:      General: Abdomen is flat. Bowel sounds are normal. There is no distension.      Palpations: Abdomen is soft.      Tenderness: There is no abdominal tenderness. There is no right CVA tenderness, left CVA tenderness or guarding.   Musculoskeletal:         General: No swelling or tenderness. Normal range of motion.      Cervical back: Normal range of motion and neck supple. No tenderness.   Skin:     General: Skin is warm and dry.   Neurological:      General: No focal deficit present.      Mental Status: He is alert and oriented to person, place, and time. Mental status is at baseline.      Sensory: No sensory deficit.      Motor: No weakness.         Vital Signs  ED Triage Vitals   Temperature Pulse Respirations Blood Pressure SpO2   07/09/24 0211 07/09/24 0211  07/09/24 0211 07/09/24 0211 07/09/24 0211   98.5 °F (36.9 °C) (!) 110 18 129/67 98 %      Temp Source Heart Rate Source Patient Position - Orthostatic VS BP Location FiO2 (%)   07/09/24 0211 07/09/24 0211 07/09/24 0211 07/09/24 0211 --   Oral Monitor Lying Left arm       Pain Score       07/09/24 0545       No Pain           Vitals:    07/09/24 0211 07/09/24 0430 07/09/24 0545   BP: 129/67  105/57   Pulse: (!) 110 80 69   Patient Position - Orthostatic VS: Lying  Lying         Visual Acuity      ED Medications  Medications   levETIRAcetam (KEPPRA) tablet 1,000 mg (1,000 mg Oral Given 7/9/24 0217)       Diagnostic Studies  Results Reviewed       Procedure Component Value Units Date/Time    Basic metabolic panel [[id number]] Collected: 07/09/24 0218    Lab Status: Final result Specimen: Blood from Arm, Right Updated: 07/09/24 0243     Sodium 137 mmol/L      Potassium 3.7 mmol/L      Chloride 104 mmol/L      CO2 24 mmol/L      ANION GAP 9 mmol/L      BUN 11 mg/dL      Creatinine 0.96 mg/dL      Glucose 113 mg/dL      Calcium 8.9 mg/dL      eGFR 99 ml/min/1.73sq m     Narrative:      National Kidney Disease Foundation guidelines for Chronic Kidney Disease (CKD):     Stage 1 with normal or high GFR (GFR > 90 mL/min/1.73 square meters)    Stage 2 Mild CKD (GFR = 60-89 mL/min/1.73 square meters)    Stage 3A Moderate CKD (GFR = 45-59 mL/min/1.73 square meters)    Stage 3B Moderate CKD (GFR = 30-44 mL/min/1.73 square meters)    Stage 4 Severe CKD (GFR = 15-29 mL/min/1.73 square meters)    Stage 5 End Stage CKD (GFR <15 mL/min/1.73 square meters)  Note: GFR calculation is accurate only with a steady state creatinine    CBC and differential [[id number]] Collected: 07/09/24 0218    Lab Status: Final result Specimen: Blood from Arm, Right Updated: 07/09/24 0228     WBC 6.26 Thousand/uL      RBC 4.50 Million/uL      Hemoglobin 13.5 g/dL      Hematocrit 40.2 %      MCV 89 fL      MCH 30.0 pg      MCHC 33.6 g/dL      RDW 13.3 %       MPV 9.9 fL      Platelets 184 Thousands/uL      nRBC 0 /100 WBCs      Segmented % 45 %      Immature Grans % 0 %      Lymphocytes % 43 %      Monocytes % 8 %      Eosinophils Relative 3 %      Basophils Relative 1 %      Absolute Neutrophils 2.86 Thousands/µL      Absolute Immature Grans 0.01 Thousand/uL      Absolute Lymphocytes 2.69 Thousands/µL      Absolute Monocytes 0.48 Thousand/µL      Eosinophils Absolute 0.19 Thousand/µL      Basophils Absolute 0.03 Thousands/µL                    No orders to display              Procedures  Procedures         ED Course  ED Course as of 07/09/24 0645   Tue Jul 09, 2024   0235 CBC and differential  All WNL   0236 Patient states that he has missed his prescribed Keppra and Biktarvy today and lost his prescriptions yesterday.  Will dose Keppra here.  Attempted to order Biktarvy, however no Biktarvy available in the hospital and I informed the patient that I will provide a refill prescription for both his Keppra and his Biktarvy.   0246 Basic metabolic panel  All WNL                               SBIRT 22yo+      Flowsheet Row Most Recent Value   Initial Alcohol Screen: US AUDIT-C     1. How often do you have a drink containing alcohol? 0 Filed at: 07/09/2024 0213   2. How many drinks containing alcohol do you have on a typical day you are drinking?  0 Filed at: 07/09/2024 0213   3a. Male UNDER 65: How often do you have five or more drinks on one occasion? 0 Filed at: 07/09/2024 0213   Audit-C Score 0 Filed at: 07/09/2024 0213   CALI: How many times in the past year have you...    Used an illegal drug or used a prescription medication for non-medical reasons? Never Filed at: 07/09/2024 0213                      Medical Decision Making  39 y/o male with hx of epilepsy, HIV, prior syphilis, and homelessness presents to the ED via EMS for evaluation of increased generalized weakness and ambulatory dysfunction.  The patient has baseline ambulatory dysfunction and frequently needs  "to use a walker to assist with his ambulation.  He states that he had more difficulty tonight than usual with his ambulation.  The patient is currently homeless and notes that tonight he was walking from a local [place] to [facility], however \"it was taking way longer than normal for me to walk there with my walker.\"  When he arrived to the [facility] he called EMS for assistance and was brought to the ER for evaluation.    Vital signs reviewed.  See physical exam documentation for exam findings.  The patient has been seen in the ER several times in the past for similar complaints and has chronic generalized weakness and ambulatory dysfunction, for which she uses a walker.  The patient is able to demonstrate ambulation with a walker at bedside and appears to be at his baseline status.  Screening labs ordered with CBC and BMP.  No acute action abnormality is noted. Patient states that he has missed his prescribed Keppra and Biktarvy today and lost his prescriptions yesterday.  Will dose Keppra here.  Attempted to order Biktarvy, however no Biktarvy available in the hospital and I informed the patient that I will provide a refill prescription for both his Keppra and his Biktarvy. I discussed all findings, treatment, red flags/return precautions, and outpatient follow-up and the patient/family understand and agree. Stable for discharge.    Amount and/or Complexity of Data Reviewed  Labs: ordered. Decision-making details documented in ED Course.    Risk  Prescription drug management.             Disposition  Final diagnoses:   Generalized weakness   Ambulatory dysfunction   Medication refill   History of HIV infection (HCC)   History of seizure disorder     Time reflects when diagnosis was documented in both MDM as applicable and the Disposition within this note       Time User Action Codes Description Comment    7/9/2024  6:06 AM Giuseppe Rasheed Add [R53.1] Generalized weakness     7/9/2024  6:06 AM Giuseppe Rasheed Add [R26.2] " Ambulatory dysfunction     7/9/2024  6:06 AM Kathya Giuseppe Bojorquez [Z76.0] Medication refill     7/9/2024  6:06 AM Kathya Giuseppe Bojorquez [B20] History of HIV infection (HCC)     7/9/2024  6:06 AM Kathya Giuseppe Maryellen [Z86.69] History of seizure disorder     7/9/2024  6:07 AM Kathya Giuseppe Bojorquez [Z76.0] Encounter for medication refill           ED Disposition       ED Disposition   Discharge    Condition   Stable    Date/Time   Tue Jul 9, 2024 0606    Comment   John Sanders discharge to home/self care.                   Follow-up Information       Follow up With Specialties Details Why Contact Info    [facility] Call in 1 week For follow-up [address] [place] [id number]  [phone]              Discharge Medication List as of 7/9/2024  6:08 AM        CONTINUE these medications which have CHANGED    Details   bictegravir-emtricitab-tenofovir alafenamide (Biktarvy) -25 MG tablet Take 1 tablet by mouth daily with breakfast for 21 days, Starting Tue 7/9/2024, Until Tue 7/30/2024, Normal      levETIRAcetam (Keppra) 1000 MG tablet Take 1 tablet (1,000 mg total) by mouth 2 (two) times a day for 21 days, Starting Tue 7/9/2024, Until Tue 7/30/2024, Normal           CONTINUE these medications which have NOT CHANGED    Details   folic acid (FOLVITE) 1 mg tablet Take 1 tablet (1 mg total) by mouth daily, Starting Tue 7/25/2023, Normal      mirtazapine (REMERON) 30 mg tablet Take 1 tablet (30 mg total) by mouth daily at bedtime, Starting Mon 4/8/2024, Normal      thiamine (VITAMIN B1) 100 mg tablet Take 1 tablet (100 mg total) by mouth daily, Starting Tue 7/25/2023, Normal             No discharge procedures on file.    PDMP Review         Value Time User    PDMP Reviewed  Yes 6/17/2023 11:20 AM Sammy Roberts MD            ED Provider  Electronically Signed by             Giuseppe Rasheed MD  07/09/24 0645

## 2024-07-17 ENCOUNTER — HOSPITAL ENCOUNTER (EMERGENCY)
Facility: HOSPITAL | Age: 39
Discharge: HOME | End: 2024-07-17
Attending: EMERGENCY MEDICINE
Payer: MEDICARE

## 2024-07-17 VITALS
OXYGEN SATURATION: 98 % | DIASTOLIC BLOOD PRESSURE: 56 MMHG | HEART RATE: 70 BPM | TEMPERATURE: 98.6 F | BODY MASS INDEX: 30.07 KG/M2 | WEIGHT: 192 LBS | SYSTOLIC BLOOD PRESSURE: 115 MMHG | RESPIRATION RATE: 16 BRPM

## 2024-07-17 DIAGNOSIS — R60.0 PERIPHERAL EDEMA: Primary | ICD-10-CM

## 2024-07-17 LAB
ALBUMIN SERPL BCG-MCNC: 4.1 G/DL (ref 3.5–5)
ALP SERPL-CCNC: 48 U/L (ref 34–104)
ALT SERPL W P-5'-P-CCNC: 13 U/L (ref 7–52)
ANION GAP SERPL CALCULATED.3IONS-SCNC: 7 MMOL/L (ref 4–13)
AST SERPL W P-5'-P-CCNC: 20 U/L (ref 13–39)
BASOPHILS # BLD AUTO: 0.05 THOUSANDS/ÂΜL (ref 0–0.1)
BASOPHILS NFR BLD AUTO: 1 % (ref 0–1)
BILIRUB SERPL-MCNC: 0.55 MG/DL (ref 0.2–1)
BNP SERPL-MCNC: 14 PG/ML (ref 0–100)
BUN SERPL-MCNC: 12 MG/DL (ref 5–25)
CALCIUM SERPL-MCNC: 9 MG/DL (ref 8.4–10.2)
CHLORIDE SERPL-SCNC: 104 MMOL/L (ref 96–108)
CO2 SERPL-SCNC: 27 MMOL/L (ref 21–32)
CREAT SERPL-MCNC: 0.89 MG/DL (ref 0.6–1.3)
EOSINOPHIL # BLD AUTO: 0.59 THOUSAND/ÂΜL (ref 0–0.61)
EOSINOPHIL NFR BLD AUTO: 8 % (ref 0–6)
ERYTHROCYTE [DISTWIDTH] IN BLOOD BY AUTOMATED COUNT: 13.5 % (ref 11.6–15.1)
GFR SERPL CREATININE-BSD FRML MDRD: 108 ML/MIN/1.73SQ M
GLUCOSE SERPL-MCNC: 92 MG/DL (ref 65–140)
HCT VFR BLD AUTO: 43.6 % (ref 36.5–49.3)
HGB BLD-MCNC: 14.1 G/DL (ref 12–17)
IMM GRANULOCYTES # BLD AUTO: 0.01 THOUSAND/UL (ref 0–0.2)
IMM GRANULOCYTES NFR BLD AUTO: 0 % (ref 0–2)
LYMPHOCYTES # BLD AUTO: 4.28 THOUSANDS/ÂΜL (ref 0.6–4.47)
LYMPHOCYTES NFR BLD AUTO: 54 % (ref 14–44)
MCH RBC QN AUTO: 29.6 PG (ref 26.8–34.3)
MCHC RBC AUTO-ENTMCNC: 32.3 G/DL (ref 31.4–37.4)
MCV RBC AUTO: 92 FL (ref 82–98)
MONOCYTES # BLD AUTO: 0.64 THOUSAND/ÂΜL (ref 0.17–1.22)
MONOCYTES NFR BLD AUTO: 8 % (ref 4–12)
NEUTROPHILS # BLD AUTO: 2.24 THOUSANDS/ÂΜL (ref 1.85–7.62)
NEUTS SEG NFR BLD AUTO: 29 % (ref 43–75)
NRBC BLD AUTO-RTO: 0 /100 WBCS
PLATELET # BLD AUTO: 162 THOUSANDS/UL (ref 149–390)
PMV BLD AUTO: 10.1 FL (ref 8.9–12.7)
POTASSIUM SERPL-SCNC: 4 MMOL/L (ref 3.5–5.3)
PROT SERPL-MCNC: 7.6 G/DL (ref 6.4–8.4)
RBC # BLD AUTO: 4.76 MILLION/UL (ref 3.88–5.62)
SODIUM SERPL-SCNC: 138 MMOL/L (ref 135–147)
WBC # BLD AUTO: 7.81 THOUSAND/UL (ref 4.31–10.16)

## 2024-07-17 PROCEDURE — 99284 EMERGENCY DEPT VISIT MOD MDM: CPT

## 2024-07-17 PROCEDURE — 80053 COMPREHEN METABOLIC PANEL: CPT | Performed by: EMERGENCY MEDICINE

## 2024-07-17 PROCEDURE — 85025 COMPLETE CBC W/AUTO DIFF WBC: CPT | Performed by: EMERGENCY MEDICINE

## 2024-07-17 PROCEDURE — 83880 ASSAY OF NATRIURETIC PEPTIDE: CPT | Performed by: EMERGENCY MEDICINE

## 2024-07-17 PROCEDURE — 36415 COLL VENOUS BLD VENIPUNCTURE: CPT | Performed by: EMERGENCY MEDICINE

## 2024-07-17 PROCEDURE — 99284 EMERGENCY DEPT VISIT MOD MDM: CPT | Performed by: EMERGENCY MEDICINE

## 2024-07-17 RX ORDER — ACETAMINOPHEN 325 MG/1
650 TABLET ORAL ONCE
Status: COMPLETED | OUTPATIENT
Start: 2024-07-17 | End: 2024-07-17

## 2024-07-17 RX ADMIN — ACETAMINOPHEN 650 MG: 325 TABLET, FILM COATED ORAL at 11:32

## 2024-07-17 NOTE — ED PROVIDER NOTES
History  Chief Complaint   Patient presents with    Leg Swelling     Reports bilateral leg swelling.  Says they went down but then swelling came back     38-year-old male history of HIV, seizures, homelessness, well-known to this ER presents with complaint of bilateral ankle pain with standing.  Also notes increase in chronic lower extremity swelling recently.        Prior to Admission Medications   Prescriptions Last Dose Informant Patient Reported? Taking?   bictegravir-emtricitab-tenofovir alafenamide (Biktarvy) -25 MG tablet   No No   Sig: Take 1 tablet by mouth daily with breakfast for 21 days   folic acid (FOLVITE) 1 mg tablet   No No   Sig: Take 1 tablet (1 mg total) by mouth daily   Patient not taking: Reported on 4/8/2024   levETIRAcetam (Keppra) 1000 MG tablet   No No   Sig: Take 1 tablet (1,000 mg total) by mouth 2 (two) times a day for 21 days   mirtazapine (REMERON) 30 mg tablet   No No   Sig: Take 1 tablet (30 mg total) by mouth daily at bedtime   Patient not taking: Reported on 6/11/2024   thiamine (VITAMIN B1) 100 mg tablet   No No   Sig: Take 1 tablet (100 mg total) by mouth daily   Patient not taking: Reported on 4/8/2024      Facility-Administered Medications: None       Past Medical History:   Diagnosis Date    HIV (human immunodeficiency virus infection) (HCC)     Seizures (HCC)     Smoker     Syphilis        Past Surgical History:   Procedure Laterality Date    HERNIA REPAIR         History reviewed. No pertinent family history.  I have reviewed and agree with the history as documented.    E-Cigarette/Vaping    E-Cigarette Use Current Some Day User      E-Cigarette/Vaping Substances    Nicotine Yes     THC Yes     CBD No     Flavoring Yes     Other No     Unknown No      Social History     Tobacco Use    Smoking status: Every Day     Current packs/day: 2.00     Average packs/day: 2.0 packs/day for 8.0 years (16.0 ttl pk-yrs)     Types: Cigarettes    Smokeless tobacco: Never    Tobacco  "comments:     \"At least 2 packs a day\" of cigarettes.   Vaping Use    Vaping status: Some Days    Substances: Nicotine, THC, Flavoring   Substance Use Topics    Alcohol use: Yes     Alcohol/week: 2.0 standard drinks of alcohol     Types: 1 Glasses of wine, 1 Cans of beer per week     Comment: socially    Drug use: Yes     Frequency: 4.0 times per week     Types: Marijuana     Comment: 4 blunts per day       Review of Systems   All other systems reviewed and are negative.      Physical Exam  Physical Exam  Constitutional:       Comments: Sleeping but arouses to voice   Cardiovascular:      Rate and Rhythm: Normal rate.   Pulmonary:      Effort: Pulmonary effort is normal.   Musculoskeletal:         General: Normal range of motion.      Comments: Mild bilateral lower extremity edema, trace skin breakdown to dorsum of left foot   Skin:     General: Skin is warm and dry.   Neurological:      Mental Status: He is oriented to person, place, and time. Mental status is at baseline.         Vital Signs  ED Triage Vitals [07/17/24 1019]   Temperature Pulse Respirations Blood Pressure SpO2   98.6 °F (37 °C) 70 16 115/56 98 %      Temp Source Heart Rate Source Patient Position - Orthostatic VS BP Location FiO2 (%)   Oral Monitor Lying Right arm --      Pain Score       7           Vitals:    07/17/24 1019   BP: 115/56   Pulse: 70   Patient Position - Orthostatic VS: Lying         Visual Acuity  Visual Acuity      Flowsheet Row Most Recent Value   L Pupil Size (mm) 3   R Pupil Size (mm) 3            ED Medications  Medications   acetaminophen (TYLENOL) tablet 650 mg (650 mg Oral Given 7/17/24 1132)       Diagnostic Studies  Results Reviewed       Procedure Component Value Units Date/Time    B-Type Natriuretic Peptide(BNP) [[id number]]  (Normal) Collected: 07/17/24 1043    Lab Status: Final result Specimen: Blood from Hand, Right Updated: 07/17/24 1111     BNP 14 pg/mL     Comprehensive metabolic panel [[id number]] Collected: " 07/17/24 1043    Lab Status: Final result Specimen: Blood from Hand, Right Updated: 07/17/24 1106     Sodium 138 mmol/L      Potassium 4.0 mmol/L      Chloride 104 mmol/L      CO2 27 mmol/L      ANION GAP 7 mmol/L      BUN 12 mg/dL      Creatinine 0.89 mg/dL      Glucose 92 mg/dL      Calcium 9.0 mg/dL      AST 20 U/L      ALT 13 U/L      Alkaline Phosphatase 48 U/L      Total Protein 7.6 g/dL      Albumin 4.1 g/dL      Total Bilirubin 0.55 mg/dL      eGFR 108 ml/min/1.73sq m     Narrative:      National Kidney Disease Foundation guidelines for Chronic Kidney Disease (CKD):     Stage 1 with normal or high GFR (GFR > 90 mL/min/1.73 square meters)    Stage 2 Mild CKD (GFR = 60-89 mL/min/1.73 square meters)    Stage 3A Moderate CKD (GFR = 45-59 mL/min/1.73 square meters)    Stage 3B Moderate CKD (GFR = 30-44 mL/min/1.73 square meters)    Stage 4 Severe CKD (GFR = 15-29 mL/min/1.73 square meters)    Stage 5 End Stage CKD (GFR <15 mL/min/1.73 square meters)  Note: GFR calculation is accurate only with a steady state creatinine    CBC and differential [[id number]]  (Abnormal) Collected: 07/17/24 1043    Lab Status: Final result Specimen: Blood from Hand, Right Updated: 07/17/24 1049     WBC 7.81 Thousand/uL      RBC 4.76 Million/uL      Hemoglobin 14.1 g/dL      Hematocrit 43.6 %      MCV 92 fL      MCH 29.6 pg      MCHC 32.3 g/dL      RDW 13.5 %      MPV 10.1 fL      Platelets 162 Thousands/uL      nRBC 0 /100 WBCs      Segmented % 29 %      Immature Grans % 0 %      Lymphocytes % 54 %      Monocytes % 8 %      Eosinophils Relative 8 %      Basophils Relative 1 %      Absolute Neutrophils 2.24 Thousands/µL      Absolute Immature Grans 0.01 Thousand/uL      Absolute Lymphocytes 4.28 Thousands/µL      Absolute Monocytes 0.64 Thousand/µL      Eosinophils Absolute 0.59 Thousand/µL      Basophils Absolute 0.05 Thousands/µL                    No orders to display              Procedures  Procedures         ED Course        38-year-old male presenting to the ED with complaint of bilateral ankle pain no inciting injuries.  Patient is well-appearing on arrival sleeping in bed but awakes easily, mild bilateral lower extremity swelling, small amount of skin break down on the dorsum of left foot likely related to sustained moisture exposure and not changing socks.  Screening labs notable for normal CBC BMP and BNP.  Patient given Tylenol for pain related to being on his feet a lot.  Stable for discharge.                          SBIRT 20yo+      Flowsheet Row Most Recent Value   Initial Alcohol Screen: US AUDIT-C     1. How often do you have a drink containing alcohol? 0 Filed at: 07/17/2024 1018   2. How many drinks containing alcohol do you have on a typical day you are drinking?  0 Filed at: 07/17/2024 1018   3a. Male UNDER 65: How often do you have five or more drinks on one occasion? 0 Filed at: 07/17/2024 1018   3b. FEMALE Any Age, or MALE 65+: How often do you have 4 or more drinks on one occassion? 0 Filed at: 07/17/2024 1018   Audit-C Score 0 Filed at: 07/17/2024 1018   CALI: How many times in the past year have you...    Used an illegal drug or used a prescription medication for non-medical reasons? Never Filed at: 07/17/2024 1018                      Medical Decision Making  Amount and/or Complexity of Data Reviewed  Labs: ordered.    Risk  OTC drugs.                 Disposition  Final diagnoses:   Peripheral edema     Time reflects when diagnosis was documented in both MDM as applicable and the Disposition within this note       Time User Action Codes Description Comment    7/17/2024 11:54 AM Lorrie Yung Add [R60.0] Peripheral edema           ED Disposition       ED Disposition   Discharge    Condition   Stable    Date/Time   Wed Jul 17, 2024 1154    Comment   John Sanders discharge to home/self care.                   Follow-up Information    None         Patient's Medications   Discharge Prescriptions    No  medications on file       No discharge procedures on file.    PDMP Review         Value Time User    PDMP Reviewed  Yes 6/17/2023 11:20 AM Sammy Roberts MD            ED Provider  Electronically Signed by             Lorrie Yung MD  07/17/24 4507

## 2024-07-17 NOTE — ED NOTES
Discharge reviewed with patient. Patient verbalized understanding and no further questions at this time. Patient ambulatory off unit with steady gait.      Fe Post  07/17/24 2765

## 2024-07-22 ENCOUNTER — HOSPITAL ENCOUNTER (EMERGENCY)
Facility: HOSPITAL | Age: 39
Discharge: HOME | End: 2024-07-22
Attending: INTERNAL MEDICINE
Payer: MEDICARE

## 2024-07-22 VITALS
DIASTOLIC BLOOD PRESSURE: 86 MMHG | TEMPERATURE: 98.4 F | RESPIRATION RATE: 18 BRPM | OXYGEN SATURATION: 99 % | HEART RATE: 60 BPM | SYSTOLIC BLOOD PRESSURE: 138 MMHG

## 2024-07-22 DIAGNOSIS — R26.2 AMBULATORY DYSFUNCTION: Primary | ICD-10-CM

## 2024-07-22 PROCEDURE — 99283 EMERGENCY DEPT VISIT LOW MDM: CPT

## 2024-07-22 PROCEDURE — 97167 OT EVAL HIGH COMPLEX 60 MIN: CPT

## 2024-07-22 PROCEDURE — 97162 PT EVAL MOD COMPLEX 30 MIN: CPT

## 2024-07-22 PROCEDURE — 99284 EMERGENCY DEPT VISIT MOD MDM: CPT | Performed by: INTERNAL MEDICINE

## 2024-07-22 RX ORDER — IBUPROFEN 600 MG/1
600 TABLET ORAL ONCE
Status: COMPLETED | OUTPATIENT
Start: 2024-07-22 | End: 2024-07-22

## 2024-07-22 RX ADMIN — IBUPROFEN 600 MG: 600 TABLET, FILM COATED ORAL at 07:28

## 2024-07-22 NOTE — ED PROVIDER NOTES
History  Chief Complaint   Patient presents with    Leg Pain     Patient reports bilat leg pain and numbness      This is a 38 years old with history of HIV, seizure disorder, syphilis, and homeless, came to the ER complaining of swelling of both ankles and foot.  Patient stated that walking is painful.  Patient came by ambulance from nearby [facility].  Patient is known to our ER staff and he has been to the ER multiple times.  Patient was at the ER 4 days ago for the same thing and eventually was discharged.  During that ER visit patient has labs including CBC, CMP came back within normal limits.  Patient stated that because he is homeless he walked for much history wearing socks and he does not to change it and he has a lot of sweat and is very hot weather.  Patient denies history of DVT, fall, trauma.  Patient in no distress.  Patient has history of ambulatory dysfunction.        Prior to Admission Medications   Prescriptions Last Dose Informant Patient Reported? Taking?   bictegravir-emtricitab-tenofovir alafenamide (Biktarvy) -25 MG tablet   No No   Sig: Take 1 tablet by mouth daily with breakfast for 21 days   folic acid (FOLVITE) 1 mg tablet   No No   Sig: Take 1 tablet (1 mg total) by mouth daily   Patient not taking: Reported on 4/8/2024   levETIRAcetam (Keppra) 1000 MG tablet   No No   Sig: Take 1 tablet (1,000 mg total) by mouth 2 (two) times a day for 21 days   mirtazapine (REMERON) 30 mg tablet   No No   Sig: Take 1 tablet (30 mg total) by mouth daily at bedtime   thiamine (VITAMIN B1) 100 mg tablet   No No   Sig: Take 1 tablet (100 mg total) by mouth daily   Patient not taking: Reported on 4/8/2024      Facility-Administered Medications: None       Past Medical History:   Diagnosis Date    HIV (human immunodeficiency virus infection) (HCC)     Seizures (HCC)     Smoker     Syphilis        Past Surgical History:   Procedure Laterality Date    HERNIA REPAIR         History reviewed. No pertinent family  "history.  I have reviewed and agree with the history as documented.    E-Cigarette/Vaping    E-Cigarette Use Current Some Day User      E-Cigarette/Vaping Substances    Nicotine Yes     THC Yes     CBD No     Flavoring Yes     Other No     Unknown No      Social History     Tobacco Use    Smoking status: Every Day     Current packs/day: 2.00     Average packs/day: 2.0 packs/day for 8.0 years (16.0 ttl pk-yrs)     Types: Cigarettes    Smokeless tobacco: Never    Tobacco comments:     \"At least 2 packs a day\" of cigarettes.   Vaping Use    Vaping status: Some Days    Substances: Nicotine, THC, Flavoring   Substance Use Topics    Alcohol use: Yes     Alcohol/week: 2.0 standard drinks of alcohol     Types: 1 Glasses of wine, 1 Cans of beer per week     Comment: socially    Drug use: Yes     Frequency: 4.0 times per week     Types: Marijuana     Comment: 4 blunts per day       Review of Systems   Constitutional:  Negative for chills and fever.   HENT:  Negative for congestion, ear pain, postnasal drip, rhinorrhea, sinus pressure, sinus pain, sneezing, sore throat and tinnitus.    Eyes:  Negative for pain and visual disturbance.   Respiratory:  Negative for cough and shortness of breath.    Cardiovascular:  Positive for leg swelling. Negative for chest pain and palpitations.   Gastrointestinal:  Negative for abdominal pain and vomiting.   Genitourinary:  Negative for dysuria and hematuria.   Musculoskeletal:  Negative for arthralgias and back pain.   Skin:  Negative for color change and rash.   Neurological:  Negative for seizures and syncope.   All other systems reviewed and are negative.      Physical Exam  Physical Exam  Vitals and nursing note reviewed.   Constitutional:       General: He is not in acute distress.     Appearance: He is well-developed.   HENT:      Head: Normocephalic and atraumatic.      Right Ear: Ear canal normal.      Left Ear: Ear canal normal.      Nose: Nose normal. No congestion or rhinorrhea. "      Mouth/Throat:      Mouth: Mucous membranes are moist.      Pharynx: No oropharyngeal exudate or posterior oropharyngeal erythema.   Eyes:      Conjunctiva/sclera: Conjunctivae normal.   Neck:      Vascular: No carotid bruit.   Cardiovascular:      Rate and Rhythm: Normal rate and regular rhythm.      Heart sounds: No murmur heard.     No friction rub. No gallop.   Pulmonary:      Effort: Pulmonary effort is normal. No respiratory distress.      Breath sounds: Normal breath sounds. No stridor. No wheezing, rhonchi or rales.   Chest:      Chest wall: No tenderness.   Abdominal:      General: There is no distension.      Palpations: Abdomen is soft.      Tenderness: There is no abdominal tenderness. There is no right CVA tenderness or guarding.   Musculoskeletal:         General: No swelling, tenderness, deformity or signs of injury.      Cervical back: Normal range of motion and neck supple. No rigidity or tenderness.      Right lower leg: No edema.      Left lower leg: No edema.   Lymphadenopathy:      Cervical: No cervical adenopathy.   Skin:     General: Skin is warm and dry.      Capillary Refill: Capillary refill takes less than 2 seconds.      Coloration: Skin is not jaundiced or pale.      Findings: No bruising, erythema, lesion or rash.      Comments: Has very dry skin and is itching.  Patient has very poor body hygiene.  There is no bilateral ankle or foot edema.   Neurological:      Mental Status: He is alert and oriented to person, place, and time.   Psychiatric:         Mood and Affect: Mood normal.         Behavior: Behavior normal.         Vital Signs  ED Triage Vitals   Temperature Pulse Respirations Blood Pressure SpO2   07/22/24 0700 07/22/24 0700 07/22/24 0700 07/22/24 0700 07/22/24 0700   98.4 °F (36.9 °C) 60 18 138/86 99 %      Temp Source Heart Rate Source Patient Position - Orthostatic VS BP Location FiO2 (%)   07/22/24 0700 07/22/24 0700 -- -- --   Oral Monitor         Pain Score        07/22/24 0728       6           Vitals:    07/22/24 0700   BP: 138/86   Pulse: 60         Visual Acuity      ED Medications  Medications   ibuprofen (MOTRIN) tablet 600 mg (600 mg Oral Given 7/22/24 0728)       Diagnostic Studies  Results Reviewed       None                   No orders to display              Procedures  Procedures         ED Course                                               Medical Decision Making  This is a 38 years old known to our staff, came from the near by [facility], by ambulance for having ankle and foot swelling.  Patient has history of HIV, seizure disorder, syphilis.  Patient is homeless.  Patient has baseline ambulatory dysfunction.  Patient came to the ER multiple times and he needs to sleep and eat.  The patient stated that he cannot walk.  Patient was at the ER 4 days ago and has blood work including CBC, CMP came back within normal limits and patient was discharged home.  Patient came 4 days ago for the same thing.  Physical exam shows very dry skin and very poor body hygiene.  Patient came with the socks on his foot for 1 to 2 months.  Recall PT and OT who evaluated the patient at the ER and they stated that he needed no therapy including PT and OT.  At this time again discharge patient home patient was supplied with new socks and giving food.  There is no need to repeat the blood work which was done 4 days ago.  Patient follow-up with medical clinic.    Risk  Prescription drug management.                 Disposition  Final diagnoses:   Ambulatory dysfunction     Time reflects when diagnosis was documented in both MDM as applicable and the Disposition within this note       Time User Action Codes Description Comment    7/22/2024 10:56 AM Kimberly Mesa Add [R26.2] Ambulatory dysfunction           ED Disposition       ED Disposition   Discharge    Condition   Stable    Date/Time   Mon Jul 22, 2024 10:55 AM    Comment   John Sanders discharge to home/self care.                    Follow-up Information       Follow up With Specialties Details Why Contact Info Additional Information    [facility] In 3 days  [address]  [address]  [facility] [address] [phone] [facility], [address], [address], [place], [address], [phone]            Discharge Medication List as of 7/22/2024 10:58 AM        CONTINUE these medications which have NOT CHANGED    Details   bictegravir-emtricitab-tenofovir alafenamide (Biktarvy) -25 MG tablet Take 1 tablet by mouth daily with breakfast for 21 days, Starting Tue 7/9/2024, Until Tue 7/30/2024, Normal      folic acid (FOLVITE) 1 mg tablet Take 1 tablet (1 mg total) by mouth daily, Starting Tue 7/25/2023, Normal      levETIRAcetam (Keppra) 1000 MG tablet Take 1 tablet (1,000 mg total) by mouth 2 (two) times a day for 21 days, Starting Tue 7/9/2024, Until Tue 7/30/2024, Normal      mirtazapine (REMERON) 30 mg tablet Take 1 tablet (30 mg total) by mouth daily at bedtime, Starting Mon 4/8/2024, Normal      thiamine (VITAMIN B1) 100 mg tablet Take 1 tablet (100 mg total) by mouth daily, Starting Tue 7/25/2023, Normal             No discharge procedures on file.    PDMP Review         Value Time User    PDMP Reviewed  Yes 6/17/2023 11:20 AM Sammy Roberts MD            ED Provider  Electronically Signed by             Kimberly Mesa MD  07/23/24 4100

## 2024-07-22 NOTE — OCCUPATIONAL THERAPY NOTE
"    Occupational Therapy Evaluation     Patient Name: John Sanders  Today's Date: 7/22/2024  Problem List  Active Problems:  There are no active Hospital Problems.    Past Medical History  Past Medical History:   Diagnosis Date    HIV (human immunodeficiency virus infection) (HCC)     Seizures (HCC)     Smoker     Syphilis      Past Surgical History  Past Surgical History:   Procedure Laterality Date    HERNIA REPAIR             07/22/24 1038   OT Last Visit   OT Visit Date 07/22/24   Note Type   Note type Evaluation   Pain Assessment   Pain Assessment Tool 0-10   Pain Score No Pain   Restrictions/Precautions   Weight Bearing Precautions Per Order No   Other Precautions Fall Risk   Home Living   Type of Home Homeless   Home Equipment   (Rollator)   Additional Comments Pt reports that he sleeps \"literally on the streets\". Spends a lot of his time at [employer]   Prior Function   Level of [place] Independent with ADLs;Independent with functional mobility   Lives With Alone   IADLs Independent with medication management  (Pt does not drive. Reports that he obtains his food from [employer])   Falls in the last 6 months 5 to 10  (endorses 6 falls)   Vocational On disability   Lifestyle   Intrinsic Gratification Watching tv show friends, singing   Subjective   Subjective Pt received in supine. Pt agreeable to session. Pt pleasant. Agreeable to work with therapy   ADL   Eating Assistance 7  Independent   Grooming Assistance 7  Independent   UB Bathing Assistance 5  Supervision/Setup   LB Bathing Assistance 5  Supervision/Setup   UB Dressing Assistance 5  Supervision/Setup   LB Dressing Assistance 5  Supervision/Setup   Toileting Assistance  5  Supervision/Setup   Bed Mobility   Supine to Sit 5  Supervision   Additional items Increased time required   Sit to Supine 5  Supervision   Additional items Increased time required   Transfers   Sit to Stand 5  Supervision   Stand to Sit 5  Supervision   Functional Mobility   Functional " Mobility 5  Supervision   Additional Comments rollator, x 200 feet   Balance   Static Sitting Good   Dynamic Sitting Fair +   Static Standing Fair -   Dynamic Standing Fair -   Activity Tolerance   Activity Tolerance Patient tolerated treatment well   Medical Staff Made Aware PT Jd   Nurse Made Aware Dr Moshe MCCRACKEN Assessment   RUE Assessment WFL   LUE Assessment   LUE Assessment WFL   Cognition   Overall Cognitive Status WFL   Arousal/Participation Alert   Attention Within functional limits   Orientation Level Oriented X4   Memory Within functional limits   Following Commands Follows all commands and directions without difficulty   Assessment   Assessment Pt is a 38 y.o. male seen for OT evaluation at [facility], admitted 7/22/2024 w/ b/l foot/ankle pain. Comorbidities affecting pt's functional performance at time of assessment include: HIV, syphilis, recurrent seizures, alcohol abuse, adjustment disorder, major depressive disorder, and homelessness.  Prior to admission, pt was homeless, spending a lot of this time at [facility].  Pt was I w/  ADLS and IADLS,  & required rollator PTA. Upon evaluation, pt presents at/close to functional baseline, but may be limited due to symptomology caused by medical issues.    This evaluation required an extensive review of medical and/or therapy records and additional review of physical, cognitive and psychosocial history related to functional performance. Based upon functional performance deficits and assessments, this evaluation has been identified as a high complexity evaluation.The patient's raw score on the AM-PAC Daily Activity inpatient short form is 24, standardized score is 57.54, greater than 39.4. Patients at this level are likely to benefit from DC to home. Please refer to the recommendation of the Occupational Therapist for safe DC planning. At this time, OT recommendations at time of discharge are no OT needs. No further acute OT needs indicated at  this time - Recommend pt continue to be OOB for meals, ambulation to/from BR, perform self care tasks, and mobility in hallway with nursing. D/C from OT caseload with above recommendations.   Goals   Patient Goals Pt did not verbalize goals   Plan   OT Frequency Eval only   Discharge Recommendation   Rehab Resource Intensity Level, OT No post-acute rehabilitation needs  (Pt presents at functional baseline)   AM-PAC Daily Activity Inpatient   Lower Body Dressing 4   Bathing 4   Toileting 4   Upper Body Dressing 4   Grooming 4   Eating 4   Daily Activity Raw Score 24   Daily Activity Standardized Score (Calc for Raw Score >=11) 57.54   AM-PAC Applied Cognition Inpatient   Following a Speech/Presentation 4   Understanding Ordinary Conversation 4   Taking Medications 4   Remembering Where Things Are Placed or Put Away 4   Remembering List of 4-5 Errands 4   Taking Care of Complicated Tasks 4   Applied Cognition Raw Score 24   Applied Cognition Standardized Score 62.21   End of Consult   Education Provided Yes   Patient Position at End of Consult Supine;Bed/Chair alarm activated;All needs within reach   Nurse Communication Nurse aware of consult           Milvia Cannon OTR/L

## 2024-07-22 NOTE — DISCHARGE INSTRUCTIONS
Follow-up with [facility].  Take Tylenol Motrin for pain if needed.  The socks frequently and keep your foot and the leg clean and dry.

## 2024-07-22 NOTE — PHYSICAL THERAPY NOTE
PHYSICAL THERAPY Evaluation and Discharge Summary  DATE: 07/22/24  TIME: 5711-6539    NAME:  John Sanders  AGE:   38 y.o.  Mrn:   [id number]  Length Of Stay: 0    ADMIT DX:  No admission diagnoses are documented for this encounter.    Past Medical History:   Diagnosis Date    HIV (human immunodeficiency virus infection) (HCC)     Seizures (HCC)     Smoker     Syphilis      Past Surgical History:   Procedure Laterality Date    HERNIA REPAIR          07/22/24 1008   PT Last Visit   PT Visit Date 07/22/24   Note Type   Note type Evaluation   Additional Comments 37 y/o presents for BLE pain and numbness.   Pain Assessment   Pain Assessment Tool 0-10   Pain Score No Pain   Restrictions/Precautions   Weight Bearing Precautions Per Order No   Other Precautions Fall Risk   Home Living   Additional Comments Pt reports homelessness, often sleeps in his rollator.  Utilizes [employer] bathroom.   Prior Function   Comments Prior to admission: mod I with ADL, ambulates with a rollator. neglects most self care/IADLs.   General   Family/Caregiver Present No   Cognition   Overall Cognitive Status WFL   Arousal/Participation Alert   Orientation Level Oriented X4   Subjective   Subjective Pt reporting tiredness.  Initially, expressing inability to participate.  But with encouragement, pt able to perform.   RUE Assessment   RUE Assessment WFL   LUE Assessment   LUE Assessment WFL   RLE Assessment   RLE Assessment WFL  (DF 3-/5)   LLE Assessment   LLE Assessment WFL  (DF 3-/5)   Coordination   Movements are Fluid and Coordinated 1   Coordination and Movement Description bradykinesia   Sensation WFL   Bed Mobility   Supine to Sit 6  Modified independent   Sit to Supine 6  Modified independent   Transfers   Sit to Stand 6  Modified independent   Stand to Sit 6  Modified independent   Ambulation/Elevation   Gait Assistance 6  Modified independent   Assistive Device 4-wheeled walker   Distance 130'   Balance   Static Sitting Normal   Dynamic  Sitting Normal   Static Standing Fair +   Ambulatory Fair +  (rollator)   Endurance Deficit   Endurance Deficit No   Activity Tolerance   Activity Tolerance Patient tolerated treatment well   Medical Staff Made Aware collaborated with OT.  physican notified of d/c rec   Assessment   Assessment Orders for PT eval and treat received. Pt's PMHx: HIV, seizure, syphilis, homelessness, depression/psychiatric hx. Pt exhibits physical deficits noted in problem list above.  Deficits listed contribute to functional limitations that are significant from the patient PLOF and include: unsafe/inefficient ambulation and fall risk    Additional considerations affecting pt's discharge planning: Unfavorable, dangerous, or deplorable home environment, Lacking appropriate transportation to manage appointments or to acquire groceries, Progressive cognitive decline affecting safety awareness/insight, Inability to manage basic self care ADLs with the assistance that is currently available, Inability to perform necessary transfers and/or mobility out of bed, with the assistance that is currently available, and High risk for re-admission due to non-compliance with healthcare provider intervention/s    During today's session, formal PT evaluation performed.  Pt displays bradykinetic movements, but likely behavioral.  Pt utilizes rollator for mobility, but displayed ability to ambulate bathroom without AD.  Rollator is in poor condition and unable to be adjusted properly, causing pt to flex forward and drags toes during swing through.  Pt's ann ankle ROM WFL, but DF seems to be lacking due to possible weakness/swelling or behavioral.  No significant LOB or unsafe movements noted.  Pt does not have significant need for therapy intervention, and appears to be at his functional baseline.    The AM-PAC & Barthel Index outcome tools were used to assist in determining pt safety w/ mobility/self care & appropriate d/c recommendations, see above for  scores. Patient's clinical presentation is stable  due to complicated social/support system.     From a PT/mobility standpoint given the above findings, DC recommendation is level: No Post Acute Rehabilitation Needs   Barriers to Discharge Inaccessible home environment;Decreased caregiver support   Plan   PT Frequency   (Evaluation and Discharge Summary)   Discharge Recommendation   Rehab Resource Intensity Level, PT No post-acute rehabilitation needs   AM-PAC Basic Mobility Inpatient   Turning in Flat Bed Without Bedrails 4   Lying on Back to Sitting on Edge of Flat Bed Without Bedrails 4   Moving Bed to Chair 4   Standing Up From Chair Using Arms 4   Walk in Room 4   Climb 3-5 Stairs With Railing 3   Basic Mobility Inpatient Raw Score 23   Basic Mobility Standardized Score 50.88   [facility] Highest Level Of Mobility   -HLM Goal 7: Walk 25 feet or more   -HLM Achieved 7: Walk 25 feet or more   End of Consult   Patient Position at End of Consult Supine;All needs within reach     The patient's AM-PAC Basic Mobility Inpatient Short Form Raw Score is 23. A Raw score of greater than or equal to 16 suggests the patient may benefit from discharge to home. Please also refer to the recommendation of the Physical Therapist for safe discharge planning.    Estrada Jimenez, PT, DPT  [place] Licensure #[id number]

## 2024-07-25 ENCOUNTER — HOSPITAL ENCOUNTER (EMERGENCY)
Facility: HOSPITAL | Age: 39
Discharge: HOME | End: 2024-07-25
Attending: EMERGENCY MEDICINE
Payer: MEDICARE

## 2024-07-25 VITALS
RESPIRATION RATE: 16 BRPM | OXYGEN SATURATION: 97 % | DIASTOLIC BLOOD PRESSURE: 80 MMHG | TEMPERATURE: 98 F | HEART RATE: 95 BPM | SYSTOLIC BLOOD PRESSURE: 126 MMHG

## 2024-07-25 DIAGNOSIS — G40.909 RECURRENT SEIZURES (HCC): Primary | ICD-10-CM

## 2024-07-25 DIAGNOSIS — Z76.0 ENCOUNTER FOR MEDICATION REFILL: ICD-10-CM

## 2024-07-25 DIAGNOSIS — Z76.0 MEDICATION REFILL: ICD-10-CM

## 2024-07-25 LAB
ANION GAP SERPL CALCULATED.3IONS-SCNC: 8 MMOL/L (ref 4–13)
BASOPHILS # BLD AUTO: 0.05 THOUSANDS/ÂΜL (ref 0–0.1)
BASOPHILS NFR BLD AUTO: 1 % (ref 0–1)
BUN SERPL-MCNC: 12 MG/DL (ref 5–25)
CALCIUM SERPL-MCNC: 9 MG/DL (ref 8.4–10.2)
CHLORIDE SERPL-SCNC: 103 MMOL/L (ref 96–108)
CO2 SERPL-SCNC: 26 MMOL/L (ref 21–32)
CREAT SERPL-MCNC: 0.92 MG/DL (ref 0.6–1.3)
EOSINOPHIL # BLD AUTO: 0.46 THOUSAND/ÂΜL (ref 0–0.61)
EOSINOPHIL NFR BLD AUTO: 8 % (ref 0–6)
ERYTHROCYTE [DISTWIDTH] IN BLOOD BY AUTOMATED COUNT: 13.3 % (ref 11.6–15.1)
GFR SERPL CREATININE-BSD FRML MDRD: 105 ML/MIN/1.73SQ M
GLUCOSE SERPL-MCNC: 104 MG/DL (ref 65–140)
GLUCOSE SERPL-MCNC: 113 MG/DL (ref 65–140)
HCT VFR BLD AUTO: 42.7 % (ref 36.5–49.3)
HGB BLD-MCNC: 14.5 G/DL (ref 12–17)
IMM GRANULOCYTES # BLD AUTO: 0.01 THOUSAND/UL (ref 0–0.2)
IMM GRANULOCYTES NFR BLD AUTO: 0 % (ref 0–2)
LEVETIRACETAM SERPL-MCNC: <2 UG/ML (ref 12–46)
LYMPHOCYTES # BLD AUTO: 2.66 THOUSANDS/ÂΜL (ref 0.6–4.47)
LYMPHOCYTES NFR BLD AUTO: 49 % (ref 14–44)
MCH RBC QN AUTO: 30.1 PG (ref 26.8–34.3)
MCHC RBC AUTO-ENTMCNC: 34 G/DL (ref 31.4–37.4)
MCV RBC AUTO: 89 FL (ref 82–98)
MONOCYTES # BLD AUTO: 0.52 THOUSAND/ÂΜL (ref 0.17–1.22)
MONOCYTES NFR BLD AUTO: 9 % (ref 4–12)
NEUTROPHILS # BLD AUTO: 1.83 THOUSANDS/ÂΜL (ref 1.85–7.62)
NEUTS SEG NFR BLD AUTO: 33 % (ref 43–75)
NRBC BLD AUTO-RTO: 0 /100 WBCS
PLATELET # BLD AUTO: 166 THOUSANDS/UL (ref 149–390)
PMV BLD AUTO: 10.3 FL (ref 8.9–12.7)
POTASSIUM SERPL-SCNC: 3.6 MMOL/L (ref 3.5–5.3)
RBC # BLD AUTO: 4.82 MILLION/UL (ref 3.88–5.62)
SODIUM SERPL-SCNC: 137 MMOL/L (ref 135–147)
WBC # BLD AUTO: 5.53 THOUSAND/UL (ref 4.31–10.16)

## 2024-07-25 PROCEDURE — 93005 ELECTROCARDIOGRAM TRACING: CPT

## 2024-07-25 PROCEDURE — 99285 EMERGENCY DEPT VISIT HI MDM: CPT | Performed by: EMERGENCY MEDICINE

## 2024-07-25 PROCEDURE — 80048 BASIC METABOLIC PNL TOTAL CA: CPT | Performed by: EMERGENCY MEDICINE

## 2024-07-25 PROCEDURE — 99284 EMERGENCY DEPT VISIT MOD MDM: CPT

## 2024-07-25 PROCEDURE — 85025 COMPLETE CBC W/AUTO DIFF WBC: CPT | Performed by: EMERGENCY MEDICINE

## 2024-07-25 PROCEDURE — 36415 COLL VENOUS BLD VENIPUNCTURE: CPT | Performed by: EMERGENCY MEDICINE

## 2024-07-25 PROCEDURE — 82948 REAGENT STRIP/BLOOD GLUCOSE: CPT

## 2024-07-25 PROCEDURE — 83520 IMMUNOASSAY QUANT NOS NONAB: CPT | Performed by: EMERGENCY MEDICINE

## 2024-07-25 RX ORDER — LEVETIRACETAM 1000 MG/1
1000 TABLET ORAL 2 TIMES DAILY
Qty: 120 TABLET | Refills: 0 | Status: SHIPPED | OUTPATIENT
Start: 2024-07-25 | End: 2024-07-25 | Stop reason: CLARIF

## 2024-07-25 RX ORDER — LEVETIRACETAM 500 MG/1
1000 TABLET ORAL ONCE
Status: COMPLETED | OUTPATIENT
Start: 2024-07-25 | End: 2024-07-25

## 2024-07-25 RX ORDER — LEVETIRACETAM 1000 MG/1
1000 TABLET ORAL 2 TIMES DAILY
Qty: 120 TABLET | Refills: 0 | Status: SHIPPED | OUTPATIENT
Start: 2024-07-25

## 2024-07-25 RX ORDER — LEVETIRACETAM 1000 MG/1
1000 TABLET ORAL 2 TIMES DAILY
Qty: 60 TABLET | Refills: 0 | Status: SHIPPED | OUTPATIENT
Start: 2024-07-25 | End: 2024-07-25 | Stop reason: CLARIF

## 2024-07-25 RX ADMIN — LEVETIRACETAM 1000 MG: 500 TABLET, FILM COATED ORAL at 02:11

## 2024-07-25 NOTE — ED PROVIDER NOTES
History  Chief Complaint   Patient presents with    Seizure - Prior Hx Of     Arrives via EMS after having seizure. Missed dose of keppra yesterday     38-year-old male with history of epilepsy and HIV presents to the ED via EMS for evaluation of seizure like activity.  The patient is homeless and was near a dumpster when a bystander noticed that he was experiencing seizure-like activity and called 911.  By the time EMS evaluated the patient he was back to baseline, however he was brought to the ER for evaluation.  He states that he lost his prescription for Keppra and has not taken his Keppra for a day.  He denies any other symptoms or complaints.        Prior to Admission Medications   Prescriptions Last Dose Informant Patient Reported? Taking?   bictegravir-emtricitab-tenofovir alafenamide (Biktarvy) -25 MG tablet   No No   Sig: Take 1 tablet by mouth daily with breakfast for 21 days   folic acid (FOLVITE) 1 mg tablet   No No   Sig: Take 1 tablet (1 mg total) by mouth daily   Patient not taking: Reported on 4/8/2024   levETIRAcetam (Keppra) 1000 MG tablet   No No   Sig: Take 1 tablet (1,000 mg total) by mouth 2 (two) times a day for 21 days   levETIRAcetam (Keppra) 1000 MG tablet   No Yes   Sig: Take 1 tablet (1,000 mg total) by mouth 2 (two) times a day   mirtazapine (REMERON) 30 mg tablet   No No   Sig: Take 1 tablet (30 mg total) by mouth daily at bedtime   thiamine (VITAMIN B1) 100 mg tablet   No No   Sig: Take 1 tablet (100 mg total) by mouth daily   Patient not taking: Reported on 4/8/2024      Facility-Administered Medications: None       Past Medical History:   Diagnosis Date    HIV (human immunodeficiency virus infection) (HCC)     Seizures (HCC)     Smoker     Syphilis        Past Surgical History:   Procedure Laterality Date    HERNIA REPAIR         History reviewed. No pertinent family history.  I have reviewed and agree with the history as documented.    E-Cigarette/Vaping    E-Cigarette Use  "Current Some Day User      E-Cigarette/Vaping Substances    Nicotine Yes     THC Yes     CBD No     Flavoring Yes     Other No     Unknown No      Social History     Tobacco Use    Smoking status: Every Day     Current packs/day: 2.00     Average packs/day: 2.0 packs/day for 8.0 years (16.0 ttl pk-yrs)     Types: Cigarettes    Smokeless tobacco: Never    Tobacco comments:     \"At least 2 packs a day\" of cigarettes.   Vaping Use    Vaping status: Some Days    Substances: Nicotine, THC, Flavoring   Substance Use Topics    Alcohol use: Yes     Alcohol/week: 2.0 standard drinks of alcohol     Types: 1 Glasses of wine, 1 Cans of beer per week     Comment: socially    Drug use: Yes     Frequency: 4.0 times per week     Types: Marijuana     Comment: 4 blunts per day       Review of Systems   Constitutional:  Negative for chills and fever.   HENT:  Negative for congestion, rhinorrhea and sore throat.    Respiratory:  Negative for cough and shortness of breath.    Cardiovascular:  Negative for chest pain and palpitations.   Gastrointestinal:  Negative for abdominal pain, diarrhea, nausea and vomiting.   Genitourinary:  Negative for dysuria and hematuria.   Musculoskeletal:  Negative for back pain and neck pain.   Neurological:  Negative for weakness, light-headedness, numbness and headaches.   All other systems reviewed and are negative.      Physical Exam  Physical Exam  Vitals and nursing note reviewed.   Constitutional:       General: He is not in acute distress.     Appearance: Normal appearance. He is normal weight.   HENT:      Head: Normocephalic and atraumatic.      Right Ear: External ear normal.      Left Ear: External ear normal.      Nose: Nose normal.      Mouth/Throat:      Mouth: Mucous membranes are moist.      Pharynx: Oropharynx is clear. No oropharyngeal exudate or posterior oropharyngeal erythema.   Eyes:      Extraocular Movements: Extraocular movements intact.      Conjunctiva/sclera: Conjunctivae " normal.      Pupils: Pupils are equal, round, and reactive to light.   Cardiovascular:      Rate and Rhythm: Normal rate and regular rhythm.      Pulses: Normal pulses.      Heart sounds: Normal heart sounds.   Pulmonary:      Effort: Pulmonary effort is normal. No respiratory distress.      Breath sounds: Normal breath sounds. No wheezing or rales.   Abdominal:      General: Abdomen is flat. Bowel sounds are normal. There is no distension.      Palpations: Abdomen is soft.      Tenderness: There is no abdominal tenderness. There is no right CVA tenderness, left CVA tenderness or guarding.   Musculoskeletal:         General: No swelling or tenderness. Normal range of motion.      Cervical back: Normal range of motion and neck supple. No tenderness.   Skin:     General: Skin is warm and dry.   Neurological:      General: No focal deficit present.      Mental Status: He is alert and oriented to person, place, and time.      Cranial Nerves: No cranial nerve deficit.      Sensory: No sensory deficit.      Motor: No weakness.      Comments: Awake, alert and oriented x 3, moving all extremities spontaneously, no facial asymmetry.  No focal deficits.  Appears at baseline, no postictal signs.         Vital Signs  ED Triage Vitals [07/25/24 0200]   Temperature Pulse Respirations Blood Pressure SpO2   98 °F (36.7 °C) 95 16 126/80 97 %      Temp Source Heart Rate Source Patient Position - Orthostatic VS BP Location FiO2 (%)   Oral Monitor Lying Left arm --      Pain Score       --           Vitals:    07/25/24 0200   BP: 126/80   Pulse: 95   Patient Position - Orthostatic VS: Lying         Visual Acuity      ED Medications  Medications   levETIRAcetam (KEPPRA) tablet 1,000 mg (1,000 mg Oral Given 7/25/24 0211)       Diagnostic Studies  Results Reviewed       Procedure Component Value Units Date/Time    Basic metabolic panel [[id number]] Collected: 07/25/24 0207    Lab Status: Final result Specimen: Blood from Arm, Right  Updated: 07/25/24 0237     Sodium 137 mmol/L      Potassium 3.6 mmol/L      Chloride 103 mmol/L      CO2 26 mmol/L      ANION GAP 8 mmol/L      BUN 12 mg/dL      Creatinine 0.92 mg/dL      Glucose 104 mg/dL      Calcium 9.0 mg/dL      eGFR 105 ml/min/1.73sq m     Narrative:      National Kid[employer] Foundation guidelines for Chronic Kidney Disease (CKD):     Stage 1 with normal or high GFR (GFR > 90 mL/min/1.73 square meters)    Stage 2 Mild CKD (GFR = 60-89 mL/min/1.73 square meters)    Stage 3A Moderate CKD (GFR = 45-59 mL/min/1.73 square meters)    Stage 3B Moderate CKD (GFR = 30-44 mL/min/1.73 square meters)    Stage 4 Severe CKD (GFR = 15-29 mL/min/1.73 square meters)    Stage 5 End Stage CKD (GFR <15 mL/min/1.73 square meters)  Note: GFR calculation is accurate only with a steady state creatinine    CBC and differential [[id number]]  (Abnormal) Collected: 07/25/24 0207    Lab Status: Final result Specimen: Blood from Arm, Right Updated: 07/25/24 0220     WBC 5.53 Thousand/uL      RBC 4.82 Million/uL      Hemoglobin 14.5 g/dL      Hematocrit 42.7 %      MCV 89 fL      MCH 30.1 pg      MCHC 34.0 g/dL      RDW 13.3 %      MPV 10.3 fL      Platelets 166 Thousands/uL      nRBC 0 /100 WBCs      Segmented % 33 %      Immature Grans % 0 %      Lymphocytes % 49 %      Monocytes % 9 %      Eosinophils Relative 8 %      Basophils Relative 1 %      Absolute Neutrophils 1.83 Thousands/µL      Absolute Immature Grans 0.01 Thousand/uL      Absolute Lymphocytes 2.66 Thousands/µL      Absolute Monocytes 0.52 Thousand/µL      Eosinophils Absolute 0.46 Thousand/µL      Basophils Absolute 0.05 Thousands/µL     Levetiracetam level [[id number]] Collected: 07/25/24 0207    Lab Status: In process Specimen: Blood from Arm, Right Updated: 07/25/24 0217    Fingerstick Glucose (POCT) [[id number]]  (Normal) Collected: 07/25/24 0209    Lab Status: Final result Specimen: Blood Updated: 07/25/24 0210     POC Glucose 113 mg/dl                     No orders to display              Procedures  Procedures         ED Course  ED Course as of 07/25/24 0428   u Jul 25, 2024   0218 Procedure Note: EKG  Date/Time: 07/25/24 2:16 AM   Interpreted by: Giuseppe Rasheed MD  Indications / Diagnosis: Seizure activity  ECG reviewed by me, the ED Physician: yes   The EKG demonstrates:  Rhythm: normal sinus rhythm 93 bpm  Intervals: normal intervals  Axis: normal axis  QRS/Blocks: normal QRS  ST Changes: No acute ST-T wave changes.  No STEMI.  Normal ECG.  No significant change compared to prior study from 6/28/2024.   0226 POC Glucose: 113  Normal   0226 CBC and differential(!)  Normal WBC, hemoglobin, hematocrit, and platelet count.   0237 Basic metabolic panel  All WNL                                 SBIRT 20yo+      Flowsheet Row Most Recent Value   Initial Alcohol Screen: US AUDIT-C     1. How often do you have a drink containing alcohol? 0 Filed at: 07/25/2024 0159   2. How many drinks containing alcohol do you have on a typical day you are drinking?  0 Filed at: 07/25/2024 0159   3a. Male UNDER 65: How often do you have five or more drinks on one occasion? 0 Filed at: 07/25/2024 0159   3b. FEMALE Any Age, or MALE 65+: How often do you have 4 or more drinks on one occassion? 0 Filed at: 07/25/2024 0159   Audit-C Score 0 Filed at: 07/25/2024 0159   CALI: How many times in the past year have you...    Used an illegal drug or used a prescription medication for non-medical reasons? Never Filed at: 07/25/2024 0159                      Medical Decision Making  38-year-old male with history of epilepsy and HIV presents to the ED via EMS for evaluation of seizure like activity.  The patient is homeless and was near a dumpster when a bystander noticed that he was experiencing seizure-like activity and called 911.  By the time EMS evaluated the patient he was back to baseline, however he was brought to the ER for evaluation.  He states that he lost his prescription for  Keppra and has not taken his Keppra for a day.  He denies any other symptoms or complaints.    Vital signs reviewed.  See physical exam documentation for exam findings.  The patient has known seizure disorder and missed his Keppra dosing after losing his prescription.  Plan to give Keppra and will provide a refill.  Will also check basic screening labs including CBC, chemistry, as well as a Keppra level.  See ED course for independent interpretation of results.  Screening labs unremarkable, Keppra level pending (send out). I discussed all findings, treatment, red flags/return precautions, and outpatient follow-up and the patient/family understand and agree. Stable for discharge.                 Disposition  Final diagnoses:   Recurrent seizures (HCC)   Medication refill     Time reflects when diagnosis was documented in both MDM as applicable and the Disposition within this note       Time User Action Codes Description Comment    7/25/2024  3:31 AM Giuseppe Rasheed [G40.909] Recurrent seizures (HCC)     7/25/2024  3:31 AM Giuseppe Rasheed [Z76.0] Medication refill     7/25/2024  3:32 AM Giuseppe Rasheed [Z76.0] Encounter for medication refill           ED Disposition       ED Disposition   Discharge    Condition   Stable    Date/Time   Th Jul 25, 2024 0332    Comment   John Sanders discharge to home/self care.                   Follow-up Information       Follow up With Specialties Details Why Contact Info Additional Information    [facility] Call in 1 week For follow-up [address] [facility] [address] [phone]       [facility] Emergency Department Emergency Medicine Go to  If symptoms worsen [address] [address] [phone] [facility] Emergency Department, [address] [address]            Current Discharge Medication List        CONTINUE these medications which have CHANGED    Details   !!  levETIRAcetam (Keppra) 1000 MG tablet Take 1 tablet (1,000 mg total) by mouth 2 (two) times a day  Qty: 60 tablet, Refills: 0    Associated Diagnoses: Encounter for medication refill      !! levETIRAcetam (Keppra) 1000 MG tablet Take 1 tablet (1,000 mg total) by mouth 2 (two) times a day  Qty: 120 tablet, Refills: 0    Associated Diagnoses: Recurrent seizures (HCC); Medication refill       !! - Potential duplicate medications found. Please discuss with provider.        CONTINUE these medications which have NOT CHANGED    Details   bictegravir-emtricitab-tenofovir alafenamide (Biktarvy) -25 MG tablet Take 1 tablet by mouth daily with breakfast for 21 days  Qty: 21 tablet, Refills: 0    Associated Diagnoses: Encounter for medication refill      folic acid (FOLVITE) 1 mg tablet Take 1 tablet (1 mg total) by mouth daily  Qty: 30 tablet, Refills: 0    Associated Diagnoses: Alcohol use      mirtazapine (REMERON) 30 mg tablet Take 1 tablet (30 mg total) by mouth daily at bedtime  Qty: 30 tablet, Refills: 0    Associated Diagnoses: Major depressive disorder      thiamine (VITAMIN B1) 100 mg tablet Take 1 tablet (100 mg total) by mouth daily  Qty: 30 tablet, Refills: 0    Associated Diagnoses: Alcohol use             No discharge procedures on file.    PDMP Review         Value Time User    PDMP Reviewed  Yes 6/17/2023 11:20 AM Sammy Roberts MD            ED Provider  Electronically Signed by             Giuseppe Rasheed MD  07/25/24 0428

## 2024-07-26 ENCOUNTER — HOSPITAL ENCOUNTER (EMERGENCY)
Facility: HOSPITAL | Age: 39
Discharge: HOME | End: 2024-07-26
Attending: EMERGENCY MEDICINE
Payer: MEDICARE

## 2024-07-26 VITALS
OXYGEN SATURATION: 99 % | TEMPERATURE: 97.9 F | HEART RATE: 89 BPM | DIASTOLIC BLOOD PRESSURE: 87 MMHG | SYSTOLIC BLOOD PRESSURE: 145 MMHG | RESPIRATION RATE: 18 BRPM

## 2024-07-26 DIAGNOSIS — R45.851 SUICIDAL IDEATION: Primary | ICD-10-CM

## 2024-07-26 LAB
AMPHETAMINES SERPL QL SCN: NEGATIVE
ATRIAL RATE: 93 BPM
BARBITURATES UR QL: NEGATIVE
BENZODIAZ UR QL: NEGATIVE
COCAINE UR QL: NEGATIVE
ETHANOL EXG-MCNC: 0 MG/DL
FENTANYL UR QL SCN: NEGATIVE
HYDROCODONE UR QL SCN: NEGATIVE
METHADONE UR QL: NEGATIVE
OPIATES UR QL SCN: NEGATIVE
OXYCODONE+OXYMORPHONE UR QL SCN: NEGATIVE
P AXIS: 37 DEGREES
PCP UR QL: NEGATIVE
PR INTERVAL: 148 MS
QRS AXIS: -15 DEGREES
QRSD INTERVAL: 100 MS
QT INTERVAL: 372 MS
QTC INTERVAL: 462 MS
T WAVE AXIS: 31 DEGREES
THC UR QL: POSITIVE
VENTRICULAR RATE: 93 BPM

## 2024-07-26 PROCEDURE — 99285 EMERGENCY DEPT VISIT HI MDM: CPT

## 2024-07-26 PROCEDURE — 93010 ELECTROCARDIOGRAM REPORT: CPT | Performed by: INTERNAL MEDICINE

## 2024-07-26 PROCEDURE — 80307 DRUG TEST PRSMV CHEM ANLYZR: CPT | Performed by: EMERGENCY MEDICINE

## 2024-07-26 PROCEDURE — 82075 ASSAY OF BREATH ETHANOL: CPT | Performed by: EMERGENCY MEDICINE

## 2024-07-26 PROCEDURE — 99285 EMERGENCY DEPT VISIT HI MDM: CPT | Performed by: EMERGENCY MEDICINE

## 2024-07-26 RX ORDER — LORAZEPAM 0.5 MG/1
0.5 TABLET ORAL ONCE
Status: COMPLETED | OUTPATIENT
Start: 2024-07-26 | End: 2024-07-26

## 2024-07-26 RX ORDER — LEVETIRACETAM 500 MG/1
1000 TABLET ORAL ONCE
Status: COMPLETED | OUTPATIENT
Start: 2024-07-26 | End: 2024-07-26

## 2024-07-26 RX ADMIN — LORAZEPAM 0.5 MG: 0.5 TABLET ORAL at 04:45

## 2024-07-26 RX ADMIN — LEVETIRACETAM 1000 MG: 500 TABLET, FILM COATED ORAL at 04:45

## 2024-07-26 NOTE — ED CARE HANDOFF
Emergency Department Sign Out Note        Sign out and transfer of care from Dr. Parson. See Separate Emergency Department note.     The patient, John Sanders, was evaluated by the previous provider for psychiatric evaluation. Patient stating SI with plan to jump off bridge but no longer SI once in the ED.     Workup Completed:  Labs Reviewed   RAPID DRUG SCREEN, URINE - Abnormal       Result Value Ref Range Status    Amph/Meth UR Negative  Negative Final    Barbiturate Ur Negative  Negative Final    Benzodiazepine Urine Negative  Negative Final    Cocaine Urine Negative  Negative Final    Methadone Urine Negative  Negative Final    Opiate Urine Negative  Negative Final    PCP Ur Negative  Negative Final    THC Urine Positive (*) Negative Final    Oxycodone Urine Negative  Negative Final    Fentanyl Urine Negative  Negative Final    HYDROCODONE URINE Negative  Negative Final    Narrative:     Presumptive report. If requested, specimen will be sent to reference lab for confirmation.  FOR MEDICAL PURPOSES ONLY.   IF CONFIRMATION NEEDED PLEASE CONTACT THE LAB WITHIN 5 DAYS.    Drug Screen Cutoff Levels:  AMPHETAMINE/METHAMPHETAMINES  1000 ng/mL  BARBITURATES     200 ng/mL  BENZODIAZEPINES     200 ng/mL  COCAINE      300 ng/mL  METHADONE      300 ng/mL  OPIATES      300 ng/mL  PHENCYCLIDINE     25 ng/mL  THC       50 ng/mL  OXYCODONE      100 ng/mL  FENTANYL      5 ng/mL  HYDROCODONE     300 ng/mL   POCT ALCOHOL BREATH TEST - Normal    EXTBreath Alcohol 0.00   Final        ED Course / Workup Pending (followup):        ED Course as of 07/26/24 1026   Fri Jul 26, 2024   0655 SO: SI, plan to jump off bridge, now not suicidal. Pending crisis.   0850 Patient evaluated by Min James from crisis. Patient endorsing that he is no longer suicidal. No indication for inpatient treatment at this time.      Procedures  Medical Decision Making  38-year-old male previously evaluated for psychiatric evaluation.  Signed out to me  pending crisis evaluation.  Patient was evaluated by crisis, no longer endorsing suicidal ideation.  No indication for inpatient treatment at this time.  He is otherwise stable for discharge.  Return precautions discussed.    Problems Addressed:  Suicidal ideation: acute illness or injury    Amount and/or Complexity of Data Reviewed  Labs: ordered.    Risk  Prescription drug management.  Decision regarding hospitalization.            Disposition  Final diagnoses:   Suicidal ideation     Time reflects when diagnosis was documented in both MDM as applicable and the Disposition within this note       Time User Action Codes Description Comment    7/26/2024  3:32 AM Narinder Parson Add [R45.851] Suicidal ideation           ED Disposition       ED Disposition   Discharge    Condition   Stable    Date/Time   Fri Jul 26, 2024  9:24 AM    Comment   John Sanders should be transferred out to behavioral health, and has been medically cleared.                Follow-up Information    None       Discharge Medication List as of 7/26/2024  9:24 AM        CONTINUE these medications which have NOT CHANGED    Details   bictegravir-emtricitab-tenofovir alafenamide (Biktarvy) -25 MG tablet Take 1 tablet by mouth daily with breakfast for 21 days, Starting Tue 7/9/2024, Until Tue 7/30/2024, Normal      folic acid (FOLVITE) 1 mg tablet Take 1 tablet (1 mg total) by mouth daily, Starting Tue 7/25/2023, Normal      levETIRAcetam (Keppra) 1000 MG tablet Take 1 tablet (1,000 mg total) by mouth 2 (two) times a day, Starting Thu 7/25/2024, Print      mirtazapine (REMERON) 30 mg tablet Take 1 tablet (30 mg total) by mouth daily at bedtime, Starting Mon 4/8/2024, Normal      thiamine (VITAMIN B1) 100 mg tablet Take 1 tablet (100 mg total) by mouth daily, Starting Tue 7/25/2023, Normal           No discharge procedures on file.       ED Provider  Electronically Signed by     Flakita Soto MD  07/26/24 9257

## 2024-07-26 NOTE — ED NOTES
Patient showered due to malodorous odor coming from patient. Per pt, has not been able to shower for 2 weeks due to homlessness. Pt placed in paper scrubs and belonging secured.     Marce Beckford RN  07/26/24 0420

## 2024-07-26 NOTE — Clinical Note
John Sanders should be transferred out to behavioral [facility], and has been medically cleared.   
5

## 2024-07-26 NOTE — ED PROVIDER NOTES
History  Chief Complaint   Patient presents with    Psychiatric Evaluation     Pt c/o of SI tonight, he states when he's alone he has si thoughts, expressed wanting to light a cigarette and jumping of the free bridge.      Patient is a 38-year-old male seen in the emergency department brought by EMS with concern for suicidal ideation.  Patient states that he was feeling depressed and suicidal this evening, with a plan to jump off a bridge.  Patient denies homicidal ideation.  Patient notes no definite clear exacerbating or alleviating factors for his symptoms.  Patient notes no fever, chest pain, shortness of breath, abdominal pain, nausea, vomiting, weakness.        Prior to Admission Medications   Prescriptions Last Dose Informant Patient Reported? Taking?   bictegravir-emtricitab-tenofovir alafenamide (Biktarvy) -25 MG tablet   No No   Sig: Take 1 tablet by mouth daily with breakfast for 21 days   folic acid (FOLVITE) 1 mg tablet   No No   Sig: Take 1 tablet (1 mg total) by mouth daily   Patient not taking: Reported on 4/8/2024   levETIRAcetam (Keppra) 1000 MG tablet   No No   Sig: Take 1 tablet (1,000 mg total) by mouth 2 (two) times a day   mirtazapine (REMERON) 30 mg tablet   No No   Sig: Take 1 tablet (30 mg total) by mouth daily at bedtime   thiamine (VITAMIN B1) 100 mg tablet   No No   Sig: Take 1 tablet (100 mg total) by mouth daily   Patient not taking: Reported on 4/8/2024      Facility-Administered Medications: None       Past Medical History:   Diagnosis Date    HIV (human immunodeficiency virus infection) (HCC)     Seizures (HCC)     Smoker     Syphilis        Past Surgical History:   Procedure Laterality Date    HERNIA REPAIR         History reviewed. No pertinent family history.  I have reviewed and agree with the history as documented.    E-Cigarette/Vaping    E-Cigarette Use Current Some Day User      E-Cigarette/Vaping Substances    Nicotine Yes     THC Yes     CBD No     Flavoring Yes      "Other No     Unknown No      Social History     Tobacco Use    Smoking status: Every Day     Current packs/day: 2.00     Average packs/day: 2.0 packs/day for 8.0 years (16.0 ttl pk-yrs)     Types: Cigarettes    Smokeless tobacco: Never    Tobacco comments:     \"At least 2 packs a day\" of cigarettes.   Vaping Use    Vaping status: Some Days    Substances: Nicotine, THC, Flavoring   Substance Use Topics    Alcohol use: Yes     Alcohol/week: 2.0 standard drinks of alcohol     Types: 1 Glasses of wine, 1 Cans of beer per week     Comment: socially    Drug use: Yes     Frequency: 4.0 times per week     Types: Marijuana     Comment: 4 blunts per day       Review of Systems   Constitutional:  Negative for chills and fever.   HENT:  Negative for ear pain and sore throat.    Eyes:  Negative for pain and visual disturbance.   Respiratory:  Negative for cough and shortness of breath.    Cardiovascular:  Negative for chest pain and palpitations.   Gastrointestinal:  Negative for abdominal pain and vomiting.   Genitourinary:  Negative for decreased urine volume and difficulty urinating.   Musculoskeletal:  Negative for arthralgias and back pain.   Skin:  Negative for color change and rash.   Neurological:  Negative for weakness and numbness.   Psychiatric/Behavioral:  Positive for suicidal ideas. Negative for agitation.         No homicidal ideation   All other systems reviewed and are negative.      Physical Exam  Physical Exam  Vitals and nursing note reviewed.   Constitutional:       General: He is not in acute distress.     Appearance: He is well-developed.   HENT:      Head: Normocephalic and atraumatic.      Right Ear: External ear normal.      Left Ear: External ear normal.      Nose: Nose normal.      Mouth/Throat:      Pharynx: Oropharynx is clear.   Eyes:      General: No scleral icterus.     Conjunctiva/sclera: Conjunctivae normal.   Cardiovascular:      Rate and Rhythm: Normal rate.      Comments: well-perfused " extremities  Pulmonary:      Effort: Pulmonary effort is normal. No respiratory distress.   Abdominal:      General: Abdomen is flat. There is no distension.   Musculoskeletal:         General: No swelling or deformity.      Cervical back: Normal range of motion and neck supple.   Skin:     General: Skin is dry.      Findings: No bruising.   Neurological:      General: No focal deficit present.      Mental Status: He is alert.      Cranial Nerves: No cranial nerve deficit.      Motor: No weakness.   Psychiatric:         Thought Content: Thought content normal.      Comments: depressed, suicidal ideation with plan to jump from bridge, no homicidal ideation         Vital Signs  ED Triage Vitals [07/26/24 0342]   Temperature Pulse Respirations Blood Pressure SpO2   97.9 °F (36.6 °C) 89 18 145/87 99 %      Temp Source Heart Rate Source Patient Position - Orthostatic VS BP Location FiO2 (%)   Oral Monitor Lying Left arm --      Pain Score       --           Vitals:    07/26/24 0342   BP: 145/87   Pulse: 89   Patient Position - Orthostatic VS: Lying         Visual Acuity      ED Medications  Medications   LORazepam (ATIVAN) tablet 0.5 mg (0.5 mg Oral Given 7/26/24 0445)   levETIRAcetam (KEPPRA) tablet 1,000 mg (1,000 mg Oral Given 7/26/24 0445)       Diagnostic Studies  Results Reviewed       Procedure Component Value Units Date/Time    Rapid drug screen, urine [[id number]]  (Abnormal) Collected: 07/26/24 0416    Lab Status: Final result Specimen: Urine, Clean Catch Updated: 07/26/24 0534     Amph/Meth UR Negative     Barbiturate Ur Negative     Benzodiazepine Urine Negative     Cocaine Urine Negative     Methadone Urine Negative     Opiate Urine Negative     PCP Ur Negative     THC Urine Positive     Oxycodone Urine Negative     Fentanyl Urine Negative     HYDROCODONE URINE Negative    Narrative:      Presumptive report. If requested, specimen will be sent to reference lab for confirmation.  FOR MEDICAL PURPOSES ONLY.    IF CONFIRMATION NEEDED PLEASE CONTACT THE LAB WITHIN 5 DAYS.    Drug Screen Cutoff Levels:  AMPHETAMINE/METHAMPHETAMINES  1000 ng/mL  BARBITURATES     200 ng/mL  BENZODIAZEPINES     200 ng/mL  COCAINE      300 ng/mL  METHADONE      300 ng/mL  OPIATES      300 ng/mL  PHENCYCLIDINE     25 ng/mL  THC       50 ng/mL  OXYCODONE      100 ng/mL  FENTANYL      5 ng/mL  HYDROCODONE     300 ng/mL    POCT alcohol breath test [[id number]]  (Normal) Resulted: 07/26/24 0414    Lab Status: Final result Updated: 07/26/24 0414     EXTBreath Alcohol 0.00                   No orders to display              Procedures  Procedures         ED Course                                               Medical Decision Making  Patient is a 38-year-old male seen in the emergency department brought by EMS with concern for depression/suicidal ideation.  Patient was placed on a safety watch in the emergency department.  Alcohol breath test was obtained and noted 0.00. Urine drug screen was positive for THC. Patient is medically cleared for evaluation by crisis team.  Patient is to be signed out to my colleague at change of shift, with plan for evaluation by crisis team.    Problems Addressed:  Suicidal ideation: acute illness or injury    Amount and/or Complexity of Data Reviewed  Labs: ordered. Decision-making details documented in ED Course.  ECG/medicine tests: ordered. Decision-making details documented in ED Course.    Risk  Prescription drug management.  Decision regarding hospitalization.                 Disposition  Final diagnoses:   Suicidal ideation     Time reflects when diagnosis was documented in both MDM as applicable and the Disposition within this note       Time User Action Codes Description Comment    7/26/2024  3:32 AM Narinder Parson Add [R45.851] Suicidal ideation           ED Disposition       ED Disposition   Transfer to Behavioral Health    [facility]   --    Date/Time   Fri Jul 26, 2024  3:34 AM    Comment   John Sanders  should be transferred out to behavioral health, and has been medically cleared.                Follow-up Information    None         Patient's Medications   Discharge Prescriptions    No medications on file       No discharge procedures on file.    PDMP Review         Value Time User    PDMP Reviewed  Yes 6/17/2023 11:20 AM Sammy Roberts MD            ED Provider  Electronically Signed by             Narinder Parson MD  07/26/24 0620       Narinder Parson MD  07/26/24 0620       Narinder Parson MD  07/26/24 0621       Narinder Parson MD  07/26/24 0621       Narinder Parson MD  07/26/24 0621

## 2024-07-26 NOTE — ED NOTES
Pt instructed that it's ok to get dressed so he can be discharged.      Diya Ferraro RN  07/26/24 0947

## 2024-07-26 NOTE — ED NOTES
This writer discussed the patient's current presentation and recommended discharge plan with Dr. Soto. They agree with the patient being discharged at this time with referrals and/or information about hotline / warm line numbers; walk-in clinic information; local outpatient resource list for therapists / counselors, psychologists, psychiatrists; and shelters and housing resources.  Advised to utilize natural and existing supports. Advised for safety planning and effective coping skills.  In addition, the patient was instructed to call [facility] crisis, other crisis services, 911 or to go to the nearest ER immediately if you begin having thoughts of hurting yourself.     [facility] Crisis Number: [place] [phone].  National Suicide/Crisis Lifeline: Dial 988  Crisis Text Line: Text Connect to 029729.  [facility] jairo that connects you to a mental health professional.      SAFETY PLAN:  Warning Signs (thoughts, images, mood, behavior, situations) of a potential crisis: Thoughts of harming self.  Hopelessness     Coping Skills (what can I do to take my mind off the problem, or to keep myself safe:  Music.  Take a break/rest.  Adequate rest.  Deep breathing techniques.     Outside Support (who can I reach out to for support and help: See list of shelters.  See list of resources.  [facility] mental health/intake and referral, [facility] [phone], and [facility] [phone]     DEPRESSION TIPS:     Shower. Not a bath, a shower. Use water as hot or cold as you like. You don´t even need to wash. Just get in under the water and let it run over you for a while. Sit on the floor if you need to.     Moisturize everything. Use whatever lotion you like. Use whatever you want, and use it all over your entire dermis.      Put on clean, comfortable clothes.      Drink cold water. Use ice. If you want, add some mint or lemon for an extra  boost.      Clean something. Doesn´t have to be anything big. Organize one drawer of a desk. Wash five dirty dishes. Do a load of laundry. Scrub the bathroom sink.      Blast music. Listen to something upbeat, something that´s got lots of energy. Sing to it, dance to it.     Make food. Don´t just grab something to munch on. Take the time and make food. Even if it´s simple. Prepare food, it tastes way better, and you´ll feel like you accomplished something.      Make something. Write a short story or a poem, draw a picture, color a picture, fold origami, jair or knit, sculpt something out of lindsay, anything artistic. Even if you don´t think you´re good at it. Create.      Go outside. Take a walk. Sit in the grass. Look at the clouds. Smell flowers. Put your hands in the dirt and feel the soil against your skin.     Call someone. Call a loved one, a friend, a family member, call a chat service if you have no one else to call. Talk to a stranger on the street. Have a conversation and listen to someone´s voice. If you can´t bring yourself to call, text or email or whatever, just have some social interaction with another person. Even if you don´t say much, listen to them. It helps.      Cuddle your pets if you have them/can cuddle them. Take pictures of them. Talk to them. Tell them how you feel, about your favorite movie, a new game coming out, anything.      May seem small or silly to some, but this list keeps people alive.       Find something to be grateful for!

## 2024-07-26 NOTE — ED NOTES
Pt informed it is time for him to get dressed. He is discharged. Pt verbalizes understanding.      Diya Ferraro RN  07/26/24 [id number]

## 2024-07-26 NOTE — ED NOTES
"Met with patient to complete crisis intake and safety assessments.  Seen under virtual observation. Patient arrived by EMS for suicidal ideations.  Patient is a high utilizer of ER services, mostly for medical complaints with occasional visits for psychiatric complaints.  This is the patient's second visit to the ER within 24 hours. Patient has been medically cleared. Patient is sleeping when approached. He is easily woken with with verbal commands. He is fully alert and oriented.  He is malodorous. He is scant with answering questions though polite, and cooperative.  Patient currently denies suicidal ideation; intent or plan.  Patient admits to fleeting suicidal ideations earlier today.  Patient again denies any current thoughts or plans to harm himself, he answers yes in regard to ability to contract for safety inside/outside of the ER.  Patient denies self-injurious behaviors, homicidal ideations or audiovisual hallucinations.  Patient requesting food tray while in the ER.  UDS positive for THC.  Denies alcohol use. Patient denies any access to firearms.  Patient reports compliance with medications.  Patient currently is homeless with a long history of the same.  Patient reports he is living on the [place]streets.  Patient mood is euthymic.  Is calm and cooperative, at baseline.  Patient currently does not present as an acute risk to harm himself.  Patient does not want to sign a 201. Collaborated with Dr. Soto who is in agreement with discharge to follow up outpatient.  Completed safety plan with the patient.  See separate note for the plan.   "

## 2024-07-26 NOTE — ED NOTES
Pt has rolled marijuana cigar on hand which was taken by security and discarded by Will (security).      Tori Kinney RN  07/26/24 041

## 2024-07-26 NOTE — ED NOTES
Pt taken to [facility] room to shower at this time.      Tori Kinney RN  07/26/24 0410       Tori Kinney RN  07/26/24 0410

## 2024-07-26 NOTE — DISCHARGE INSTRUCTIONS
Patient being discharged at this time with referrals and/or information about hotline / warm line numbers; walk-in clinic information; local outpatient resource list for therapists / counselors, psychologists, psychiatrists; and shelters and housing resources.  Advised to utilize natural and existing supports.  Advised to continue current medication regimen. Advised for safety planning and effective coping skills.  In addition, the patient was instructed to call local [facility] crisis, other crisis services, 911 or to go to the nearest ER immediately if you begin having thoughts of hurting yourself.     [facility] Crisis Number: [place] [phone].  National Suicide/Crisis Lifeline: Dial 988  Crisis Text Line: Text Connect to 686752.  [facility]ness jairo that connects you to a mental health professional.      SAFETY PLAN:  Warning Signs (thoughts, images, mood, behavior, situations) of a potential crisis: Thoughts of harming self.  Hopelessness     Coping Skills (what can I do to take my mind off the problem, or to keep myself safe:  Music.  Take a break/rest.  Adequate rest.  Deep breathing techniques.     Outside Support (who can I reach out to for support and help: See list of shelters.  See list of resources.  [facility] mental health/intake and referral, [facility] [phone], and [facility] [phone]     DEPRESSION TIPS:     Shower. Not a bath, a shower. Use water as hot or cold as you like. You don´t even need to wash. Just get in under the water and let it run over you for a while. Sit on the floor if you need to.     Moisturize everything. Use whatever lotion you like. Use whatever you want, and use it all over your entire dermis.      Put on clean, comfortable clothes.      Drink cold water. Use ice. If you want, add some mint or lemon for an extra boost.      Clean something. Doesn´t have to be anything big. Organize one  drawer of a desk. Wash five dirty dishes. Do a load of laundry. Scrub the bathroom sink.      Blast music. Listen to something upbeat, something that´s got lots of energy. Sing to it, dance to it.     Make food. Don´t just grab something to munch on. Take the time and make food. Even if it´s simple. Prepare food, it tastes way better, and you´ll feel like you accomplished something.      Make something. Write a short story or a poem, draw a picture, color a picture, fold origami, jair or knit, sculpt something out of lindsay, anything artistic. Even if you don´t think you´re good at it. Create.      Go outside. Take a walk. Sit in the grass. Look at the clouds. Smell flowers. Put your hands in the dirt and feel the soil against your skin.     Call someone. Call a loved one, a friend, a family member, call a chat service if you have no one else to call. Talk to a stranger on the street. Have a conversation and listen to someone´s voice. If you can´t bring yourself to call, text or email or whatever, just have some social interaction with another person. Even if you don´t say much, listen to them. It helps.      Cuddle your pets if you have them/can cuddle them. Take pictures of them. Talk to them. Tell them how you feel, about your favorite movie, a new game coming out, anything.      May seem small or silly to some, but this list keeps people alive.       Find something to be grateful for!

## 2024-11-20 ENCOUNTER — HOSPITAL ENCOUNTER (EMERGENCY)
Facility: HOSPITAL | Age: 39
Discharge: HOME | End: 2024-11-20
Attending: EMERGENCY MEDICINE
Payer: MEDICARE

## 2024-11-20 VITALS
OXYGEN SATURATION: 98 % | SYSTOLIC BLOOD PRESSURE: 140 MMHG | RESPIRATION RATE: 16 BRPM | HEART RATE: 110 BPM | TEMPERATURE: 98.6 F | DIASTOLIC BLOOD PRESSURE: 99 MMHG

## 2024-11-20 DIAGNOSIS — Z87.898 HISTORY OF SEIZURES: ICD-10-CM

## 2024-11-20 DIAGNOSIS — Z76.0 MEDICATION REFILL: ICD-10-CM

## 2024-11-20 PROCEDURE — 99284 EMERGENCY DEPT VISIT MOD MDM: CPT | Performed by: EMERGENCY MEDICINE

## 2024-11-20 PROCEDURE — 99282 EMERGENCY DEPT VISIT SF MDM: CPT

## 2024-11-20 RX ORDER — LEVETIRACETAM 1000 MG/1
1000 TABLET ORAL 2 TIMES DAILY
Qty: 42 TABLET | Refills: 0 | Status: SHIPPED | OUTPATIENT
Start: 2024-11-20 | End: 2024-12-11

## 2024-11-20 RX ORDER — LEVETIRACETAM 500 MG/1
1000 TABLET ORAL ONCE
Status: COMPLETED | OUTPATIENT
Start: 2024-11-20 | End: 2024-11-20

## 2024-11-20 RX ADMIN — LEVETIRACETAM 1000 MG: 500 TABLET, FILM COATED ORAL at 04:00

## 2024-11-20 NOTE — ED PROVIDER NOTES
Time reflects when diagnosis was documented in both MDM as applicable and the Disposition within this note       Time User Action Codes Description Comment    11/20/2024  3:56 AM Narinder Parson Add [Z76.0] Medication refill     11/20/2024  3:57 AM Narinder Parson Add [Z87.898] History of seizures     11/20/2024  4:05 AM Narinder Parson Modify [Z76.0] Medication refill           ED Disposition       ED Disposition   Discharge    Condition   Stable    Date/Time   Wed Nov 20, 2024  4:06 AM    Comment   John Sanders discharge to home/self care.                   Assessment & Plan       Medical Decision Making  Patient is a 39-year-old male seen in the emergency department requesting medication refill, with history of seizures.  Patient was provided dose of Keppra in the emergency department.  Patient was allowed to rest in the emergency department.  A new Keppra prescription was provided.  Plan to have patient follow up with PCP/outpatient providers.  Patient stable for discharge.  Discharge instructions were reviewed with patient.    Risk  Prescription drug management.             Medications   levETIRAcetam (KEPPRA) tablet 1,000 mg (1,000 mg Oral Given 11/20/24 0400)       ED Risk Strat Scores                           SBIRT 20yo+      Flowsheet Row Most Recent Value   Initial Alcohol Screen: US AUDIT-C     1. How often do you have a drink containing alcohol? 0 Filed at: 11/20/2024 0354   2. How many drinks containing alcohol do you have on a typical day you are drinking?  0 Filed at: 11/20/2024 0354   3a. Male UNDER 65: How often do you have five or more drinks on one occasion? 0 Filed at: 11/20/2024 0354   3b. FEMALE Any Age, or MALE 65+: How often do you have 4 or more drinks on one occassion? 0 Filed at: 11/20/2024 0354   Audit-C Score 0 Filed at: 11/20/2024 0354   CALI: How many times in the past year have you...    Used an illegal drug or used a prescription medication for non-medical reasons? Never Filed at:  "11/20/2024 0354                            History of Present Illness       Chief Complaint   Patient presents with    Medication Management     Reports being off Keppra for 3 days. Feels like he is about to have a seizure; reports dizziness \"a little bit\".        Past Medical History:   Diagnosis Date    HIV (human immunodeficiency virus infection) (HCC)     Seizures (HCC)     Smoker     Syphilis       Past Surgical History:   Procedure Laterality Date    HERNIA REPAIR        History reviewed. No pertinent family history.   Social History     Tobacco Use    Smoking status: Every Day     Current packs/day: 2.00     Average packs/day: 2.0 packs/day for 8.0 years (16.0 ttl pk-yrs)     Types: Cigarettes    Smokeless tobacco: Never    Tobacco comments:     \"At least 2 packs a day\" of cigarettes.   Vaping Use    Vaping status: Some Days    Substances: Nicotine, THC, Flavoring   Substance Use Topics    Alcohol use: Yes     Alcohol/week: 2.0 standard drinks of alcohol     Types: 1 Glasses of wine, 1 Cans of beer per week     Comment: socially    Drug use: Yes     Frequency: 4.0 times per week     Types: Marijuana     Comment: 4 blunts per day      E-Cigarette/Vaping    E-Cigarette Use Current Some Day User       E-Cigarette/Vaping Substances    Nicotine Yes     THC Yes     CBD No     Flavoring Yes     Other No     Unknown No       I have reviewed and agree with the history as documented.     Patient is a 39-year-old male seen in the emergency department brought by EMS with concern for medication refill, stating that he has not had his seizure medication in approximately 3 days.  Patient states that he ran out of Keppra approximately 3 days ago.  Patient notes no fever, chest pain, shortness of breath, abdominal pain, weakness, numbness, tingling. Patient noted minimal dizziness.  Patient has no other acute medical complaints in the emergency department.        Review of Systems   Constitutional:  Negative for chills and " fever.        Requesting medication refill   HENT:  Negative for ear pain and sore throat.    Eyes:  Negative for pain and visual disturbance.   Respiratory:  Negative for cough and shortness of breath.    Cardiovascular:  Negative for chest pain and palpitations.   Gastrointestinal:  Negative for abdominal pain and vomiting.   Musculoskeletal:  Negative for arthralgias and back pain.   Skin:  Negative for color change and rash.   Neurological:  Positive for dizziness. Negative for weakness and numbness.   Psychiatric/Behavioral:  Negative for agitation and confusion.            Objective       ED Triage Vitals [11/20/24 0353]   Temperature Pulse Blood Pressure Respirations SpO2 Patient Position - Orthostatic VS   98.6 °F (37 °C) (!) 110 140/99 16 98 % Lying      Temp Source Heart Rate Source BP Location FiO2 (%) Pain Score    Oral Monitor Left arm -- --      Vitals      Date and Time Temp Pulse SpO2 Resp BP Pain Score FACES Pain Rating User   11/20/24 0353 98.6 °F (37 °C) 110 98 % 16 140/99 -- -- DS            Physical Exam  Vitals and nursing note reviewed.   Constitutional:       General: He is not in acute distress.     Appearance: He is well-developed.   HENT:      Head: Normocephalic and atraumatic.      Right Ear: External ear normal.      Left Ear: External ear normal.      Nose: Nose normal.      Mouth/Throat:      Pharynx: Oropharynx is clear.   Eyes:      General: No scleral icterus.     Conjunctiva/sclera: Conjunctivae normal.   Cardiovascular:      Rate and Rhythm: Tachycardia present.      Comments: well-perfused extremities  Pulmonary:      Effort: Pulmonary effort is normal. No respiratory distress.   Abdominal:      General: Abdomen is flat. There is no distension.   Musculoskeletal:         General: No deformity or signs of injury.      Cervical back: Normal range of motion and neck supple.   Skin:     General: Skin is dry.      Findings: No rash.   Neurological:      General: No focal deficit  present.      Mental Status: He is alert.      Cranial Nerves: No cranial nerve deficit.      Motor: No weakness.   Psychiatric:         Mood and Affect: Mood normal.         Thought Content: Thought content normal.         Results Reviewed       None            No orders to display       Procedures    ED Medication and Procedure Management   Prior to Admission Medications   Prescriptions Last Dose Informant Patient Reported? Taking?   bictegravir-emtricitab-tenofovir alafenamide (Biktarvy) -25 MG tablet   No No   Sig: Take 1 tablet by mouth daily with breakfast for 21 days   folic acid (FOLVITE) 1 mg tablet   No No   Sig: Take 1 tablet (1 mg total) by mouth daily   Patient not taking: Reported on 4/8/2024   levETIRAcetam (Keppra) 1000 MG tablet   No No   Sig: Take 1 tablet (1,000 mg total) by mouth 2 (two) times a day   mirtazapine (REMERON) 30 mg tablet   No No   Sig: Take 1 tablet (30 mg total) by mouth daily at bedtime   thiamine (VITAMIN B1) 100 mg tablet   No No   Sig: Take 1 tablet (100 mg total) by mouth daily   Patient not taking: Reported on 4/8/2024      Facility-Administered Medications: None     Patient's Medications   Discharge Prescriptions    LEVETIRACETAM (KEPPRA) 1000 MG TABLET    Take 1 tablet (1,000 mg total) by mouth 2 (two) times a day for 21 days       Start Date: 11/20/2024End Date: 12/11/2024       Order Dose: 1,000 mg       Quantity: 42 tablet    Refills: 0       ED SEPSIS DOCUMENTATION   Time reflects when diagnosis was documented in both MDM as applicable and the Disposition within this note       Time User Action Codes Description Comment    11/20/2024  3:56 AM Narinder Parson [Z76.0] Medication refill     11/20/2024  3:57 AM Narinder Parson [Z87.898] History of seizures     11/20/2024  4:05 AM Narinder Parson Modify [Z76.0] Medication refill                  Narinder Parson MD  11/20/24 0504

## 2024-11-21 ENCOUNTER — HOSPITAL ENCOUNTER (EMERGENCY)
Facility: HOSPITAL | Age: 39
Discharge: HOME | End: 2024-11-21
Attending: EMERGENCY MEDICINE
Payer: MEDICARE

## 2024-11-21 VITALS
TEMPERATURE: 98.3 F | HEART RATE: 107 BPM | OXYGEN SATURATION: 100 % | RESPIRATION RATE: 16 BRPM | SYSTOLIC BLOOD PRESSURE: 132 MMHG | DIASTOLIC BLOOD PRESSURE: 92 MMHG

## 2024-11-21 DIAGNOSIS — M54.2 NECK PAIN: Primary | ICD-10-CM

## 2024-11-21 PROCEDURE — 99283 EMERGENCY DEPT VISIT LOW MDM: CPT

## 2024-11-21 PROCEDURE — 99284 EMERGENCY DEPT VISIT MOD MDM: CPT | Performed by: EMERGENCY MEDICINE

## 2024-11-21 RX ORDER — ACETAMINOPHEN 325 MG/1
650 TABLET ORAL ONCE
Status: COMPLETED | OUTPATIENT
Start: 2024-11-21 | End: 2024-11-21

## 2024-11-21 RX ORDER — LIDOCAINE 50 MG/G
1 PATCH TOPICAL ONCE
Status: DISCONTINUED | OUTPATIENT
Start: 2024-11-21 | End: 2024-11-21 | Stop reason: HOSPADM

## 2024-11-21 RX ADMIN — ACETAMINOPHEN 650 MG: 325 TABLET, FILM COATED ORAL at 02:04

## 2024-11-21 RX ADMIN — LIDOCAINE 1 PATCH: 50 PATCH CUTANEOUS at 02:04

## 2024-11-21 NOTE — ED PROVIDER NOTES
Time reflects when diagnosis was documented in both MDM as applicable and the Disposition within this note       Time User Action Codes Description Comment    11/21/2024  2:01 AM Narinder Parson Add [M54.2] Neck pain           ED Disposition       ED Disposition   Discharge    Condition   Stable    Date/Time   Thu Nov 21, 2024  2:01 AM    Comment   John Sanders discharge to home/self care.                   Assessment & Plan       Medical Decision Making  Patient is a 39-year-old male seen in the emergency department with concern for neck pain.  Evaluation is consistent with musculoskeletal pain.  Evaluation is not consistent with acute fracture or deep space infection.  Medication was ordered for symptom control.  Plan to have patient follow up with PCP/outpatient providers.  Patient stable for discharge.  Discharge instructions were reviewed with patient.    Problems Addressed:  Neck pain: acute illness or injury    Risk  OTC drugs.  Prescription drug management.             Medications   lidocaine (LIDODERM) 5 % patch 1 patch (1 patch Topical Medication Applied 11/21/24 0204)   acetaminophen (TYLENOL) tablet 650 mg (650 mg Oral Given 11/21/24 0204)       ED Risk Strat Scores                           SBIRT 20yo+      Flowsheet Row Most Recent Value   Initial Alcohol Screen: US AUDIT-C     1. How often do you have a drink containing alcohol? 0 Filed at: 11/21/2024 0158   2. How many drinks containing alcohol do you have on a typical day you are drinking?  0 Filed at: 11/21/2024 0158   3a. Male UNDER 65: How often do you have five or more drinks on one occasion? 0 Filed at: 11/21/2024 0158   3b. FEMALE Any Age, or MALE 65+: How often do you have 4 or more drinks on one occassion? 0 Filed at: 11/21/2024 0158   Audit-C Score 0 Filed at: 11/21/2024 0158   CALI: How many times in the past year have you...    Used an illegal drug or used a prescription medication for non-medical reasons? Never Filed at: 11/21/2024  "0158                            History of Present Illness       Chief Complaint   Patient presents with    Neck Pain     Reports neck pain starting this morning.        Past Medical History:   Diagnosis Date    HIV (human immunodeficiency virus infection) (HCC)     Seizures (HCC)     Smoker     Syphilis       Past Surgical History:   Procedure Laterality Date    HERNIA REPAIR        No family history on file.   Social History     Tobacco Use    Smoking status: Every Day     Current packs/day: 2.00     Average packs/day: 2.0 packs/day for 8.0 years (16.0 ttl pk-yrs)     Types: Cigarettes    Smokeless tobacco: Never    Tobacco comments:     \"At least 2 packs a day\" of cigarettes.   Vaping Use    Vaping status: Some Days    Substances: Nicotine, THC, Flavoring   Substance Use Topics    Alcohol use: Yes     Alcohol/week: 2.0 standard drinks of alcohol     Types: 1 Glasses of wine, 1 Cans of beer per week     Comment: socially    Drug use: Yes     Frequency: 4.0 times per week     Types: Marijuana     Comment: 4 blunts per day      E-Cigarette/Vaping    E-Cigarette Use Current Some Day User       E-Cigarette/Vaping Substances    Nicotine Yes     THC Yes     CBD No     Flavoring Yes     Other No     Unknown No       I have reviewed and agree with the history as documented.     Patient is a 39-year-old male seen in the emergency department with concern for neck spasm which began today prior to evaluation in the emergency department.  Patient notes no trauma. Patient notes no fever, chest pain, shortness of breath, weakness, numbness, tingling. Patient notes no definite clear exacerbating or alleviating factors for his symptoms.  Patient states that he smoked marijuana earlier this evening.        Review of Systems   Constitutional:  Negative for chills and fever.   HENT:  Negative for ear pain and sore throat.    Eyes:  Negative for pain and visual disturbance.   Respiratory:  Negative for cough and shortness of breath.  "   Cardiovascular:  Negative for chest pain and palpitations.   Gastrointestinal:  Negative for abdominal pain and vomiting.   Musculoskeletal:  Positive for neck pain. Negative for gait problem.   Skin:  Negative for color change and rash.   Neurological:  Negative for weakness and numbness.   Psychiatric/Behavioral:  Negative for agitation and confusion.            Objective       ED Triage Vitals [11/21/24 0159]   Temperature Pulse Blood Pressure Respirations SpO2 Patient Position - Orthostatic VS   98.3 °F (36.8 °C) (!) 107 132/92 16 100 % Lying      Temp Source Heart Rate Source BP Location FiO2 (%) Pain Score    Oral Monitor Left arm -- --      Vitals      Date and Time Temp Pulse SpO2 Resp BP Pain Score FACES Pain Rating User   11/21/24 0159 98.3 °F (36.8 °C) 107 100 % 16 132/92 -- -- DS            Physical Exam  Vitals and nursing note reviewed.   Constitutional:       General: He is not in acute distress.     Appearance: He is well-developed.   HENT:      Head: Normocephalic and atraumatic.      Right Ear: External ear normal.      Left Ear: External ear normal.      Nose: Nose normal.      Mouth/Throat:      Pharynx: Oropharynx is clear.   Eyes:      General: No scleral icterus.     Conjunctiva/sclera: Conjunctivae normal.   Neck:      Comments: mild bilateral paraspinal tenderness; no midline spinal tenderness or step-offs noted  Cardiovascular:      Rate and Rhythm: Normal rate and regular rhythm.      Heart sounds: No murmur heard.  Pulmonary:      Effort: Pulmonary effort is normal. No respiratory distress.      Breath sounds: Normal breath sounds.   Abdominal:      Palpations: Abdomen is soft.      Tenderness: There is no abdominal tenderness.   Musculoskeletal:         General: No swelling.      Cervical back: Neck supple.   Skin:     General: Skin is warm and dry.      Capillary Refill: Capillary refill takes less than 2 seconds.   Neurological:      Mental Status: He is alert.   Psychiatric:          Mood and Affect: Mood normal.         Results Reviewed       None            No orders to display       Procedures    ED Medication and Procedure Management   Prior to Admission Medications   Prescriptions Last Dose Informant Patient Reported? Taking?   bictegravir-emtricitab-tenofovir alafenamide (Biktarvy) -25 MG tablet   No No   Sig: Take 1 tablet by mouth daily with breakfast for 21 days   folic acid (FOLVITE) 1 mg tablet   No No   Sig: Take 1 tablet (1 mg total) by mouth daily   Patient not taking: Reported on 4/8/2024   levETIRAcetam (Keppra) 1000 MG tablet   No No   Sig: Take 1 tablet (1,000 mg total) by mouth 2 (two) times a day   levETIRAcetam (Keppra) 1000 MG tablet   No No   Sig: Take 1 tablet (1,000 mg total) by mouth 2 (two) times a day for 21 days   mirtazapine (REMERON) 30 mg tablet   No No   Sig: Take 1 tablet (30 mg total) by mouth daily at bedtime   thiamine (VITAMIN B1) 100 mg tablet   No No   Sig: Take 1 tablet (100 mg total) by mouth daily   Patient not taking: Reported on 4/8/2024      Facility-Administered Medications: None     Discharge Medication List as of 11/21/2024  2:01 AM        CONTINUE these medications which have NOT CHANGED    Details   bictegravir-emtricitab-tenofovir alafenamide (Biktarvy) -25 MG tablet Take 1 tablet by mouth daily with breakfast for 21 days, Starting Tue 7/9/2024, Until Tue 7/30/2024, Normal      folic acid (FOLVITE) 1 mg tablet Take 1 tablet (1 mg total) by mouth daily, Starting Tue 7/25/2023, Normal      !! levETIRAcetam (Keppra) 1000 MG tablet Take 1 tablet (1,000 mg total) by mouth 2 (two) times a day, Starting Thu 7/25/2024, Print      !! levETIRAcetam (Keppra) 1000 MG tablet Take 1 tablet (1,000 mg total) by mouth 2 (two) times a day for 21 days, Starting Wed 11/20/2024, Until Wed 12/11/2024, Normal      mirtazapine (REMERON) 30 mg tablet Take 1 tablet (30 mg total) by mouth daily at bedtime, Starting Mon 4/8/2024, Normal      thiamine (VITAMIN  B1) 100 mg tablet Take 1 tablet (100 mg total) by mouth daily, Starting Tue 7/25/2023, Normal       !! - Potential duplicate medications found. Please discuss with provider.        No discharge procedures on file.  ED SEPSIS DOCUMENTATION   Time reflects when diagnosis was documented in both MDM as applicable and the Disposition within this note       Time User Action Codes Description Comment    11/21/2024  2:01 AM Narinder Parson Add [M54.2] Neck pain                  Narinder Parson MD  11/21/24 0217

## 2024-11-23 ENCOUNTER — HOSPITAL ENCOUNTER (EMERGENCY)
Facility: HOSPITAL | Age: 39
Discharge: HOME | End: 2024-11-23
Attending: EMERGENCY MEDICINE
Payer: MEDICARE

## 2024-11-23 VITALS
DIASTOLIC BLOOD PRESSURE: 83 MMHG | SYSTOLIC BLOOD PRESSURE: 114 MMHG | TEMPERATURE: 98.2 F | HEART RATE: 100 BPM | RESPIRATION RATE: 15 BRPM | OXYGEN SATURATION: 100 %

## 2024-11-23 DIAGNOSIS — M43.6 TORTICOLLIS, ACQUIRED: ICD-10-CM

## 2024-11-23 DIAGNOSIS — G40.919 BREAKTHROUGH SEIZURE (HCC): Primary | ICD-10-CM

## 2024-11-23 DIAGNOSIS — Z59.00 HOMELESSNESS: ICD-10-CM

## 2024-11-23 DIAGNOSIS — Z21 HIV INFECTION, UNSPECIFIED SYMPTOM STATUS (HCC): ICD-10-CM

## 2024-11-23 PROCEDURE — 99284 EMERGENCY DEPT VISIT MOD MDM: CPT | Performed by: EMERGENCY MEDICINE

## 2024-11-23 PROCEDURE — 99284 EMERGENCY DEPT VISIT MOD MDM: CPT

## 2024-11-23 RX ORDER — LEVETIRACETAM 500 MG/1
1000 TABLET ORAL ONCE
Status: COMPLETED | OUTPATIENT
Start: 2024-11-23 | End: 2024-11-23

## 2024-11-23 RX ORDER — LIDOCAINE 50 MG/G
1 PATCH TOPICAL ONCE
Status: DISCONTINUED | OUTPATIENT
Start: 2024-11-23 | End: 2024-11-23 | Stop reason: HOSPADM

## 2024-11-23 RX ADMIN — LEVETIRACETAM 1000 MG: 500 TABLET, FILM COATED ORAL at 02:57

## 2024-11-23 RX ADMIN — LIDOCAINE 1 PATCH: 50 PATCH CUTANEOUS at 02:57

## 2024-11-23 SDOH — ECONOMIC STABILITY - HOUSING INSECURITY: HOMELESSNESS UNSPECIFIED: Z59.00

## 2024-11-23 NOTE — ED PROVIDER NOTES
"Time reflects when diagnosis was documented in both MDM as applicable and the Disposition within this note       Time User Action Codes Description Comment    11/23/2024  4:07 AM Narinder Ching [Z21] HIV infection, unspecified symptom status (HCC)     11/23/2024  4:07 AM Narinder Ching [M43.6] Torticollis, acquired     11/23/2024  4:07 AM Narinder Ching [Z59.00] Homelessness     11/23/2024  4:07 AM Narinder Ching [G40.919] Breakthrough seizure (HCC)     11/23/2024  5:45 AM Narinder Ching Modify [Z21] HIV infection, unspecified symptom status (HCC)     11/23/2024  5:45 AM Narinder Ching Modify [G40.919] Breakthrough seizure (HCC)           ED Disposition       ED Disposition   Discharge    Condition   Stable    Date/Time   Sat Nov 23, 2024  5:45 AM    Comment   John Sanders discharge to home/self care.                   Assessment & Plan       Medical Decision Making      ED Course as of 11/23/24 0614   Sat Nov 23, 2024   0353 I reassessed the patient who has not had any seizures.  Repeat heart rate in 70s.   0614 Patient woke up, no neurovascular deficits.  Neck feels improved.  I discussed importance of compliance with his seizure medications.       Medications   lidocaine (LIDODERM) 5 % patch 1 patch (1 patch Topical Medication Applied 11/23/24 0257)   levETIRAcetam (KEPPRA) tablet 1,000 mg (1,000 mg Oral Given 11/23/24 0257)       ED Risk Strat Scores                                               History of Present Illness       Chief Complaint   Patient presents with    Seizure - Prior Hx Of     Pt reported having 1 seizure yesterday, feels like he going to have another one tonight, states \"I just feel unsteady\". Also c/o neck pain, requesting lidocaine patch, denies injury. Has not been able to  script for seizure medication.        Past Medical History:   Diagnosis Date    HIV (human immunodeficiency virus infection) (HCC)     Seizures (HCC)     Smoker     Syphilis       Past " "Surgical History:   Procedure Laterality Date    HERNIA REPAIR        History reviewed. No pertinent family history.   Social History     Tobacco Use    Smoking status: Every Day     Current packs/day: 2.00     Average packs/day: 2.0 packs/day for 8.0 years (16.0 ttl pk-yrs)     Types: Cigarettes    Smokeless tobacco: Never    Tobacco comments:     \"At least 2 packs a day\" of cigarettes.   Vaping Use    Vaping status: Some Days    Substances: Nicotine, THC, Flavoring   Substance Use Topics    Alcohol use: Yes     Alcohol/week: 2.0 standard drinks of alcohol     Types: 1 Glasses of wine, 1 Cans of beer per week     Comment: socially    Drug use: Yes     Frequency: 4.0 times per week     Types: Marijuana     Comment: 4 blunts per day      E-Cigarette/Vaping    E-Cigarette Use Current Some Day User       E-Cigarette/Vaping Substances    Nicotine Yes     THC Yes     CBD No     Flavoring Yes     Other No     Unknown No       I have reviewed and agree with the history as documented.     Patient tells me he has a known seizure history.  He tells me he last had a seizure yesterday.  He does have the feeling that he might have 1 again today.  He notes that he was recently represcribed Keppra but has not yet been able to fill it so he has not taken as an outpatient.  In addition this he reports he has some chronic neck stiffness that usually responds well to the lidocaine patch which she request.  The pain does not travel down his arms.  No tingling or numbness.  No fevers or chills.  Patient is homeless and EMS was called initially by a bystander.  The patient did not call EMS on his own accord.      History provided by:  EMS personnel  Seizure - Prior Hx Of      Review of Systems   All other systems reviewed and are negative.          Objective       ED Triage Vitals [11/23/24 0250]   Temperature Pulse Blood Pressure Respirations SpO2 Patient Position - Orthostatic VS   98.2 °F (36.8 °C) (!) 119 121/82 18 98 % Lying    "   Temp Source Heart Rate Source BP Location FiO2 (%) Pain Score    Oral Monitor Right arm -- --      Vitals      Date and Time Temp Pulse SpO2 Resp BP Pain Score FACES Pain Rating User   11/23/24 0500 -- 69 100 % 12 -- -- -- AF   11/23/24 0458 -- 80 98 % 12 -- -- -- AF   11/23/24 0455 -- 77 97 % 12 -- -- -- AF   11/23/24 0345 -- 80 96 % 14 -- -- -- AF   11/23/24 0250 98.2 °F (36.8 °C) 119 98 % 18 121/82 -- -- JF            Physical Exam  Vitals and nursing note reviewed.   Constitutional:       General: He is not in acute distress.     Appearance: He is well-developed.   HENT:      Head: Normocephalic and atraumatic.      Mouth/Throat:      Comments: Poor dentition, no trismus  Eyes:      Conjunctiva/sclera: Conjunctivae normal.   Cardiovascular:      Rate and Rhythm: Normal rate and regular rhythm.      Heart sounds: No murmur heard.  Pulmonary:      Effort: Pulmonary effort is normal. No respiratory distress.      Breath sounds: Normal breath sounds.   Abdominal:      Palpations: Abdomen is soft.      Tenderness: There is no abdominal tenderness.   Musculoskeletal:         General: No swelling.      Cervical back: Neck supple. Tenderness (Mild posterior right) present. No rigidity.   Skin:     General: Skin is warm and dry.      Capillary Refill: Capillary refill takes less than 2 seconds.   Neurological:      Mental Status: He is alert.      Cranial Nerves: No cranial nerve deficit.      Motor: No weakness.   Psychiatric:         Mood and Affect: Mood normal.         Results Reviewed       None            No orders to display       Procedures    ED Medication and Procedure Management   Prior to Admission Medications   Prescriptions Last Dose Informant Patient Reported? Taking?   bictegravir-emtricitab-tenofovir alafenamide (Biktarvy) -25 MG tablet   No No   Sig: Take 1 tablet by mouth daily with breakfast for 21 days   folic acid (FOLVITE) 1 mg tablet   No No   Sig: Take 1 tablet (1 mg total) by mouth daily    Patient not taking: Reported on 4/8/2024   levETIRAcetam (Keppra) 1000 MG tablet   No No   Sig: Take 1 tablet (1,000 mg total) by mouth 2 (two) times a day   levETIRAcetam (Keppra) 1000 MG tablet   No No   Sig: Take 1 tablet (1,000 mg total) by mouth 2 (two) times a day for 21 days   mirtazapine (REMERON) 30 mg tablet   No No   Sig: Take 1 tablet (30 mg total) by mouth daily at bedtime   thiamine (VITAMIN B1) 100 mg tablet   No No   Sig: Take 1 tablet (100 mg total) by mouth daily   Patient not taking: Reported on 4/8/2024      Facility-Administered Medications: None     Current Discharge Medication List        CONTINUE these medications which have NOT CHANGED    Details   bictegravir-emtricitab-tenofovir alafenamide (Biktarvy) -25 MG tablet Take 1 tablet by mouth daily with breakfast for 21 days  Qty: 21 tablet, Refills: 0    Associated Diagnoses: Encounter for medication refill      folic acid (FOLVITE) 1 mg tablet Take 1 tablet (1 mg total) by mouth daily  Qty: 30 tablet, Refills: 0    Associated Diagnoses: Alcohol use      !! levETIRAcetam (Keppra) 1000 MG tablet Take 1 tablet (1,000 mg total) by mouth 2 (two) times a day  Qty: 120 tablet, Refills: 0    Associated Diagnoses: Recurrent seizures (HCC); Medication refill      !! levETIRAcetam (Keppra) 1000 MG tablet Take 1 tablet (1,000 mg total) by mouth 2 (two) times a day for 21 days  Qty: 42 tablet, Refills: 0    Associated Diagnoses: Medication refill; History of seizures      mirtazapine (REMERON) 30 mg tablet Take 1 tablet (30 mg total) by mouth daily at bedtime  Qty: 30 tablet, Refills: 0    Associated Diagnoses: Major depressive disorder      thiamine (VITAMIN B1) 100 mg tablet Take 1 tablet (100 mg total) by mouth daily  Qty: 30 tablet, Refills: 0    Associated Diagnoses: Alcohol use       !! - Potential duplicate medications found. Please discuss with provider.        No discharge procedures on file.  ED SEPSIS DOCUMENTATION   Time reflects when  diagnosis was documented in both MDM as applicable and the Disposition within this note       Time User Action Codes Description Comment    11/23/2024  4:07 AM Narinder Ching [Z21] HIV infection, unspecified symptom status (HCC)     11/23/2024  4:07 AM Narinder Ching [M43.6] Torticollis, acquired     11/23/2024  4:07 AM Narinder Ching [Z59.00] Homelessness     11/23/2024  4:07 AM Narinder Ching [G40.919] Breakthrough seizure (HCC)     11/23/2024  5:45 AM Narinder Ching Modify [Z21] HIV infection, unspecified symptom status (HCC)     11/23/2024  5:45 AM Narinder Ching [G40.919] Breakthrough seizure (HCC)                  Narinder Ching MD  11/23/24 0615